# Patient Record
Sex: MALE | Race: WHITE | Employment: OTHER | URBAN - METROPOLITAN AREA
[De-identification: names, ages, dates, MRNs, and addresses within clinical notes are randomized per-mention and may not be internally consistent; named-entity substitution may affect disease eponyms.]

---

## 2017-08-22 ENCOUNTER — HOSPITAL ENCOUNTER (OUTPATIENT)
Dept: PREADMISSION TESTING | Age: 72
Discharge: HOME | End: 2017-08-22
Payer: MEDICARE

## 2017-08-22 VITALS — WEIGHT: 144 LBS | HEIGHT: 71 IN | BODY MASS INDEX: 20.16 KG/M2

## 2017-08-22 LAB
ANION GAP SERPL CALC-SCNC: 9 MMOL/L (ref 3–18)
APTT PPP: 28.8 SEC (ref 23–36.4)
ATRIAL RATE: 92 BPM
BACTERIA SPEC CULT: NORMAL
BUN SERPL-MCNC: 26 MG/DL (ref 7–18)
BUN/CREAT SERPL: 21 (ref 12–20)
CALCIUM SERPL-MCNC: 9.6 MG/DL (ref 8.5–10.1)
CALCULATED P AXIS, ECG09: 75 DEGREES
CALCULATED R AXIS, ECG10: -63 DEGREES
CALCULATED T AXIS, ECG11: 78 DEGREES
CHLORIDE SERPL-SCNC: 103 MMOL/L (ref 100–108)
CO2 SERPL-SCNC: 27 MMOL/L (ref 21–32)
CREAT SERPL-MCNC: 1.21 MG/DL (ref 0.6–1.3)
DIAGNOSIS, 93000: NORMAL
ERYTHROCYTE [DISTWIDTH] IN BLOOD BY AUTOMATED COUNT: 14.7 % (ref 11.6–14.5)
EST. AVERAGE GLUCOSE BLD GHB EST-MCNC: 131 MG/DL
GLUCOSE SERPL-MCNC: 90 MG/DL (ref 74–99)
HBA1C MFR BLD: 6.2 % (ref 4.5–5.6)
HCT VFR BLD AUTO: 43.7 % (ref 36–48)
HGB BLD-MCNC: 14.9 G/DL (ref 13–16)
INR PPP: 1 (ref 0.8–1.2)
MCH RBC QN AUTO: 31.8 PG (ref 24–34)
MCHC RBC AUTO-ENTMCNC: 34.1 G/DL (ref 31–37)
MCV RBC AUTO: 93.4 FL (ref 74–97)
P-R INTERVAL, ECG05: 144 MS
PLATELET # BLD AUTO: 216 K/UL (ref 135–420)
PMV BLD AUTO: 10.6 FL (ref 9.2–11.8)
POTASSIUM SERPL-SCNC: 4.4 MMOL/L (ref 3.5–5.5)
PROTHROMBIN TIME: 12.3 SEC (ref 11.5–15.2)
Q-T INTERVAL, ECG07: 354 MS
QRS DURATION, ECG06: 102 MS
QTC CALCULATION (BEZET), ECG08: 437 MS
RBC # BLD AUTO: 4.68 M/UL (ref 4.7–5.5)
SERVICE CMNT-IMP: NORMAL
SODIUM SERPL-SCNC: 139 MMOL/L (ref 136–145)
VENTRICULAR RATE, ECG03: 92 BPM
WBC # BLD AUTO: 9.2 K/UL (ref 4.6–13.2)

## 2017-08-22 PROCEDURE — 80048 BASIC METABOLIC PNL TOTAL CA: CPT | Performed by: ORTHOPAEDIC SURGERY

## 2017-08-22 PROCEDURE — 85027 COMPLETE CBC AUTOMATED: CPT | Performed by: ORTHOPAEDIC SURGERY

## 2017-08-22 PROCEDURE — 83036 HEMOGLOBIN GLYCOSYLATED A1C: CPT | Performed by: ORTHOPAEDIC SURGERY

## 2017-08-22 PROCEDURE — 87641 MR-STAPH DNA AMP PROBE: CPT | Performed by: ORTHOPAEDIC SURGERY

## 2017-08-22 PROCEDURE — 85730 THROMBOPLASTIN TIME PARTIAL: CPT | Performed by: ORTHOPAEDIC SURGERY

## 2017-08-22 PROCEDURE — 85610 PROTHROMBIN TIME: CPT | Performed by: ORTHOPAEDIC SURGERY

## 2017-08-22 PROCEDURE — 93005 ELECTROCARDIOGRAM TRACING: CPT

## 2017-08-22 RX ORDER — OMEGA-3S/DHA/EPA/FISH OIL/D3 300MG-1000
CAPSULE ORAL
COMMUNITY
End: 2021-01-01

## 2017-08-22 RX ORDER — LISINOPRIL AND HYDROCHLOROTHIAZIDE 20; 25 MG/1; MG/1
TABLET ORAL DAILY
COMMUNITY
End: 2021-01-01

## 2017-08-22 RX ORDER — ASPIRIN 81 MG/1
TABLET ORAL DAILY
COMMUNITY
End: 2017-09-08

## 2017-08-22 RX ORDER — SODIUM CHLORIDE, SODIUM LACTATE, POTASSIUM CHLORIDE, CALCIUM CHLORIDE 600; 310; 30; 20 MG/100ML; MG/100ML; MG/100ML; MG/100ML
125 INJECTION, SOLUTION INTRAVENOUS CONTINUOUS
Status: CANCELLED | OUTPATIENT
Start: 2017-08-22

## 2017-08-22 RX ORDER — BISMUTH SUBSALICYLATE 262 MG
1 TABLET,CHEWABLE ORAL DAILY
COMMUNITY

## 2017-08-22 RX ORDER — CEFAZOLIN SODIUM 2 G/50ML
2 SOLUTION INTRAVENOUS ONCE
Status: CANCELLED | OUTPATIENT
Start: 2017-08-22 | End: 2017-08-22

## 2017-08-22 RX ORDER — ATORVASTATIN CALCIUM 20 MG/1
20 TABLET, FILM COATED ORAL DAILY
COMMUNITY
End: 2020-05-01

## 2017-08-22 RX ORDER — METFORMIN HYDROCHLORIDE 500 MG/1
500 TABLET ORAL 2 TIMES DAILY WITH MEALS
COMMUNITY
End: 2021-01-01

## 2017-08-22 NOTE — PERIOP NOTES
No sleep apnea or previous sleep studies. No history of MH. Care fusion instructions reviewed and kit given. Does not meet criteria for special population at this time. Not participating in clinical trials or research study.

## 2017-09-05 PROBLEM — M54.10 RADICULOPATHY OF LEG: Status: ACTIVE | Noted: 2017-09-05

## 2017-09-05 PROBLEM — M48.061 LUMBAR SPINAL STENOSIS: Status: ACTIVE | Noted: 2017-09-05

## 2017-09-05 PROBLEM — M47.816 LUMBAR SPONDYLOSIS: Status: ACTIVE | Noted: 2017-09-05

## 2017-09-05 NOTE — H&P
Patient Name:   Afua Soria    YOB: 1945      Chief Complaint:  Status post epidural on 07/21/17. History of Chief Complaint:  The patient presents status post epidural on 07/21/17. He states the injection worked great for three days and his symptoms completely returned in his back, buttocks, and bilateral lower extremities, right greater than left, with severe pain that is incapacitating with standing and ambulating. At this point, he feels he has failed conservative care. We have reviewed his studies with him and discussed he does have a spondylolisthesis at L4-5 with severe facet joint arthropathy. He has severe degenerative disc disease, facet joint arthropathy, and a very large disc herniation at L5-S1. His treatment would require wide decompressive lumbar laminectomy and instrumented stabilization at both levels. He has been given the risks and benefits of that procedure. Past Medical/Surgical History:  Patient reported no relevant past med/surg/psych history. Allergies:  No known allergies. Ingredient Reaction Medication Name Comment   NO KNOWN ALLERGIES          Current Medications:    Medication Directions   aspirin 81 mg tablet,delayed release    Lipitor 20 mg tablet    metformin 500 mg tablet    lisinopril 20 mg-hydrochlorothiazide 25 mg tablet take 1 tablet by oral route  every day   Vitamin D3 2,000 unit capsule take 1 capsule by oral route  every day     Social History:    SMOKING  Status Tobacco Type Units Per Day Yrs Used   Heavy tobacco smoker Cigarette 1.00      ALCOHOL  There is no history of alcohol use     Family History:    Disease Detail Family Member Age Cause of Death Comments   Family history of Heart disease         Review of Systems:   Pertinent positives include joint stiffness and numbness/tingling. Pertinent negatives include weight change.     Vitals:  Date BP Pulse Temp (F) Resp. (per min.) Height (Total in.) Weight (lbs.) BMI   06/07/2017 71.00  20.78     Physical Examination:    The thoracolumbar spine has normal kyphosis and lordosis, a normal appearance, and no scoliosis. There was no tenderness to palpation over the thoracolumbar paraspinal musculature. No crepitation, deformity, or instability was noted. There is full range of motion of the cervical, thoracic, and lumbar spine and of the hips, knees, and ankles. Ambulation was without antalgia. Straight leg, cross leg, and femoral stretch testing were negative. There was no weakness in the paraspinal muscles or in all myotomes (L2-S1) of the lower extremities. Deep tendon reflexes are present in the Achilles and patellae and were normal,  2/4, bilaterally. Clonus was negative, and Babinski was downgoing. Pulses were 2+ in the lower extremities bilaterally, and no edema was noted. Leg lengths were equal.  The patient could stand and heel walk and toe walk without evidence of neurological deficits. The patient is oriented with no evident mood abnormality. There is tenderness of the lumbosacral junction and bilateral gluteus that increases with flexion, extension, side bending, and rotation. He has a positive tension sign on the right. Assessment:   1. Lumbar degenerative disc disease, multilevel. 2. Lumbar facet joint arthropathy, multilevel. 3. Grade 1 anterolisthesis L4-5.  4. Grade 1 retrolisthesis L5-S1.  5. Spinal stenosis L4-S1.  6. Right greater than left lower extremity radiculopathy. 7. Neurogenic claudication. Recommendation: At this time, the patient has failed conservative care. We are going to move forward with an L4 through S1 decompression and instrumented fusion with LESS exposure surgical approach from the right. I suspect that he will need a full decompression of L5 unilateral decompression at L4-5 right and interbody cages at L4-5 and L5-S1 from a right-sided approach.  The risks versus the benefits as well as the alternatives were fully explained to the patient. Risks include, but are not limited to, paralysis, death, heart attack, stroke, pulmonary embolism, deep vein thrombosis, infection, failure to relieve pain, increase in back or leg pain, reherniation of disc material, need for revision surgery, scarring, spinal fluid leak, bowel or bladder dysfunction, disease transmission, chronic graft site pain, instrumentation failure, pseudarthrosis, and the need for chronic ambulatory assist devices. The patient states full understanding of the risks and benefits and wishes to proceed. This patient has been scheduled for a surgical procedure with expected acute opiate control up to three months duration. Opiate use will be for pain not adequately controlled with non-opiate treatments. Opiate treatment has reduced pain, improved function and quality of life measures for patients after this procedure and is necessary. There are no unmanaged or intolerable side effects. There has been no reported aberrant substance or medication-taking behavior, addiction, or diversion. This patient has been notified to inform us of any benzodiazepine or sedative hypnotic usage. The [place] Prescription Monitoring Program report has been reviewed for this patient preoperatively and is appropriate. My physician assistant and partners may occasionally sign limited medication prescriptions that have been approved by me. We will refer this patient to our pain management department if any abnormal behavior or lack of pain control with a reasonable dosage of opiate is encountered or if greater than three months of opiate is required.

## 2017-09-07 ENCOUNTER — ANESTHESIA EVENT (OUTPATIENT)
Dept: SURGERY | Age: 72
DRG: 460 | End: 2017-09-07
Payer: MEDICARE

## 2017-09-07 ENCOUNTER — HOSPITAL ENCOUNTER (INPATIENT)
Age: 72
LOS: 1 days | Discharge: HOME HEALTH | DRG: 460 | End: 2017-09-08
Attending: ORTHOPAEDIC SURGERY | Admitting: ORTHOPAEDIC SURGERY
Payer: MEDICARE

## 2017-09-07 ENCOUNTER — ANESTHESIA (OUTPATIENT)
Dept: SURGERY | Age: 72
DRG: 460 | End: 2017-09-07
Payer: MEDICARE

## 2017-09-07 ENCOUNTER — APPOINTMENT (OUTPATIENT)
Dept: GENERAL RADIOLOGY | Age: 72
DRG: 460 | End: 2017-09-07
Attending: ORTHOPAEDIC SURGERY
Payer: MEDICARE

## 2017-09-07 PROBLEM — E11.9 DM (DIABETES MELLITUS) (HCC): Chronic | Status: ACTIVE | Noted: 2017-09-07

## 2017-09-07 LAB
ABO + RH BLD: NORMAL
BLOOD GROUP ANTIBODIES SERPL: NORMAL
GLUCOSE BLD STRIP.AUTO-MCNC: 108 MG/DL (ref 70–110)
GLUCOSE BLD STRIP.AUTO-MCNC: 112 MG/DL (ref 70–110)
GLUCOSE BLD STRIP.AUTO-MCNC: 140 MG/DL (ref 70–110)
GLUCOSE BLD STRIP.AUTO-MCNC: 95 MG/DL (ref 70–110)
SPECIMEN EXP DATE BLD: NORMAL

## 2017-09-07 PROCEDURE — C1713 ANCHOR/SCREW BN/BN,TIS/BN: HCPCS | Performed by: ORTHOPAEDIC SURGERY

## 2017-09-07 PROCEDURE — 77010033678 HC OXYGEN DAILY

## 2017-09-07 PROCEDURE — 65270000029 HC RM PRIVATE

## 2017-09-07 PROCEDURE — 0SG00AJ FUSION OF LUMBAR VERTEBRAL JOINT WITH INTERBODY FUSION DEVICE, POSTERIOR APPROACH, ANTERIOR COLUMN, OPEN APPROACH: ICD-10-PCS | Performed by: ORTHOPAEDIC SURGERY

## 2017-09-07 PROCEDURE — 0SG0071 FUSION OF LUMBAR VERTEBRAL JOINT WITH AUTOLOGOUS TISSUE SUBSTITUTE, POSTERIOR APPROACH, POSTERIOR COLUMN, OPEN APPROACH: ICD-10-PCS | Performed by: ORTHOPAEDIC SURGERY

## 2017-09-07 PROCEDURE — 74011000250 HC RX REV CODE- 250: Performed by: ORTHOPAEDIC SURGERY

## 2017-09-07 PROCEDURE — 77030013708 HC HNDPC SUC IRR PULS STRY –B: Performed by: ORTHOPAEDIC SURGERY

## 2017-09-07 PROCEDURE — 76210000016 HC OR PH I REC 1 TO 1.5 HR: Performed by: ORTHOPAEDIC SURGERY

## 2017-09-07 PROCEDURE — 77030018836 HC SOL IRR NACL ICUM -A: Performed by: ORTHOPAEDIC SURGERY

## 2017-09-07 PROCEDURE — 77030003194 HC GRFT RECOMB BN MEDT -I1: Performed by: ORTHOPAEDIC SURGERY

## 2017-09-07 PROCEDURE — 77030011640 HC PAD GRND REM COVD -A: Performed by: ORTHOPAEDIC SURGERY

## 2017-09-07 PROCEDURE — 36415 COLL VENOUS BLD VENIPUNCTURE: CPT | Performed by: ORTHOPAEDIC SURGERY

## 2017-09-07 PROCEDURE — 07DR3ZZ EXTRACTION OF ILIAC BONE MARROW, PERCUTANEOUS APPROACH: ICD-10-PCS | Performed by: ORTHOPAEDIC SURGERY

## 2017-09-07 PROCEDURE — 74011000250 HC RX REV CODE- 250: Performed by: ANESTHESIOLOGY

## 2017-09-07 PROCEDURE — C9290 INJ, BUPIVACAINE LIPOSOME: HCPCS | Performed by: ORTHOPAEDIC SURGERY

## 2017-09-07 PROCEDURE — 77030012406 HC DRN WND PENRS BARD -A: Performed by: ORTHOPAEDIC SURGERY

## 2017-09-07 PROCEDURE — 74011250636 HC RX REV CODE- 250/636: Performed by: PHYSICIAN ASSISTANT

## 2017-09-07 PROCEDURE — 0SG3071 FUSION OF LUMBOSACRAL JOINT WITH AUTOLOGOUS TISSUE SUBSTITUTE, POSTERIOR APPROACH, POSTERIOR COLUMN, OPEN APPROACH: ICD-10-PCS | Performed by: ORTHOPAEDIC SURGERY

## 2017-09-07 PROCEDURE — 0SG30AJ FUSION OF LUMBOSACRAL JOINT WITH INTERBODY FUSION DEVICE, POSTERIOR APPROACH, ANTERIOR COLUMN, OPEN APPROACH: ICD-10-PCS | Performed by: ORTHOPAEDIC SURGERY

## 2017-09-07 PROCEDURE — 77030004402 HC BUR NEUR STRY -C: Performed by: ORTHOPAEDIC SURGERY

## 2017-09-07 PROCEDURE — 74011250636 HC RX REV CODE- 250/636: Performed by: ORTHOPAEDIC SURGERY

## 2017-09-07 PROCEDURE — 77030003666 HC NDL SPINAL BD -A: Performed by: ORTHOPAEDIC SURGERY

## 2017-09-07 PROCEDURE — 74011250636 HC RX REV CODE- 250/636

## 2017-09-07 PROCEDURE — 77030020782 HC GWN BAIR PAWS FLX 3M -B: Performed by: ORTHOPAEDIC SURGERY

## 2017-09-07 PROCEDURE — 86900 BLOOD TYPING SEROLOGIC ABO: CPT | Performed by: ORTHOPAEDIC SURGERY

## 2017-09-07 PROCEDURE — 77030003029 HC SUT VCRL J&J -B: Performed by: ORTHOPAEDIC SURGERY

## 2017-09-07 PROCEDURE — 97162 PT EVAL MOD COMPLEX 30 MIN: CPT

## 2017-09-07 PROCEDURE — 77030020262 HC SOL INJ SOD CL 0.9% 100ML: Performed by: ORTHOPAEDIC SURGERY

## 2017-09-07 PROCEDURE — 74011250637 HC RX REV CODE- 250/637: Performed by: PHYSICIAN ASSISTANT

## 2017-09-07 PROCEDURE — 97116 GAIT TRAINING THERAPY: CPT

## 2017-09-07 PROCEDURE — 0ST20ZZ RESECTION OF LUMBAR VERTEBRAL DISC, OPEN APPROACH: ICD-10-PCS | Performed by: ORTHOPAEDIC SURGERY

## 2017-09-07 PROCEDURE — 76060000037 HC ANESTHESIA 3 TO 3.5 HR: Performed by: ORTHOPAEDIC SURGERY

## 2017-09-07 PROCEDURE — 77030034849: Performed by: ORTHOPAEDIC SURGERY

## 2017-09-07 PROCEDURE — 74011250636 HC RX REV CODE- 250/636: Performed by: ANESTHESIOLOGY

## 2017-09-07 PROCEDURE — 77030032490 HC SLV COMPR SCD KNE COVD -B: Performed by: ORTHOPAEDIC SURGERY

## 2017-09-07 PROCEDURE — 74011000272 HC RX REV CODE- 272: Performed by: ORTHOPAEDIC SURGERY

## 2017-09-07 PROCEDURE — 77030020010 HC FCPS BPLR DISP INLC -E: Performed by: ORTHOPAEDIC SURGERY

## 2017-09-07 PROCEDURE — 77030004435 HC BUR RND STRY -C: Performed by: ORTHOPAEDIC SURGERY

## 2017-09-07 PROCEDURE — 01NR0ZZ RELEASE SACRAL NERVE, OPEN APPROACH: ICD-10-PCS | Performed by: ORTHOPAEDIC SURGERY

## 2017-09-07 PROCEDURE — 77030034903 HC CGE SPN P-LIFT DORADO SPFT -I: Performed by: ORTHOPAEDIC SURGERY

## 2017-09-07 PROCEDURE — 77030037875 HC DRSG MEPILEX <16IN BORD MOLN -A: Performed by: ORTHOPAEDIC SURGERY

## 2017-09-07 PROCEDURE — 77030034479 HC ADH SKN CLSR PRINEO J&J -B: Performed by: ORTHOPAEDIC SURGERY

## 2017-09-07 PROCEDURE — 74011000250 HC RX REV CODE- 250

## 2017-09-07 PROCEDURE — 77030003445 HC NDL BIOP BN BD -B: Performed by: ORTHOPAEDIC SURGERY

## 2017-09-07 PROCEDURE — 74011000258 HC RX REV CODE- 258: Performed by: ORTHOPAEDIC SURGERY

## 2017-09-07 PROCEDURE — 0ST40ZZ RESECTION OF LUMBOSACRAL DISC, OPEN APPROACH: ICD-10-PCS | Performed by: ORTHOPAEDIC SURGERY

## 2017-09-07 PROCEDURE — 74011000258 HC RX REV CODE- 258

## 2017-09-07 PROCEDURE — 77030033138 HC SUT PGA STRATFX J&J -B: Performed by: ORTHOPAEDIC SURGERY

## 2017-09-07 PROCEDURE — 01NB0ZZ RELEASE LUMBAR NERVE, OPEN APPROACH: ICD-10-PCS | Performed by: ORTHOPAEDIC SURGERY

## 2017-09-07 PROCEDURE — 82962 GLUCOSE BLOOD TEST: CPT

## 2017-09-07 PROCEDURE — 77030010784 HC BOWL MX BN MEDT -C: Performed by: ORTHOPAEDIC SURGERY

## 2017-09-07 PROCEDURE — 76010000133 HC OR TIME 3 TO 3.5 HR: Performed by: ORTHOPAEDIC SURGERY

## 2017-09-07 PROCEDURE — 77030018835 HC SOL IRR LR ICUM -A: Performed by: ORTHOPAEDIC SURGERY

## 2017-09-07 DEVICE — SCREW SPNL L35MM OD6.5MM LES TECHNOLOGY QUAD-H RBM COAT: Type: IMPLANTABLE DEVICE | Site: SPINE LUMBAR | Status: FUNCTIONAL

## 2017-09-07 DEVICE — BONE GRAFT KIT 7510200 INFUSE SMALL
Type: IMPLANTABLE DEVICE | Site: SPINE LUMBAR | Status: FUNCTIONAL
Brand: INFUSE® BONE GRAFT

## 2017-09-07 DEVICE — SCREW SPNL L35MM OD7MM SLD GEN 3 PEDFUSE RESET: Type: IMPLANTABLE DEVICE | Site: SPINE LUMBAR | Status: FUNCTIONAL

## 2017-09-07 DEVICE — CAGE SPNL STR 30X14X8-10 MM DORADO P-LIFT IBC: Type: IMPLANTABLE DEVICE | Site: SPINE LUMBAR | Status: FUNCTIONAL

## 2017-09-07 DEVICE — SET SCR SPNL L5-7MM PEDFUSE: Type: IMPLANTABLE DEVICE | Site: SPINE LUMBAR | Status: FUNCTIONAL

## 2017-09-07 DEVICE — SCREW SPNL L40MM OD7MM SLD GEN 3 PEDFUSE RESET: Type: IMPLANTABLE DEVICE | Site: SPINE LUMBAR | Status: FUNCTIONAL

## 2017-09-07 DEVICE — SCREW SPNL 6.5X40MM QUAD RBM COAT PEDFUSE REMIND LES: Type: IMPLANTABLE DEVICE | Site: SPINE LUMBAR | Status: FUNCTIONAL

## 2017-09-07 DEVICE — DBM T44150 10CC ORTHOBLEND SMALL DEFGRAF
Type: IMPLANTABLE DEVICE | Site: SPINE LUMBAR | Status: FUNCTIONAL
Brand: GRAFTON®AND GRAFTON PLUS®DEMINERALIZED BONE MATRIX (DBM)

## 2017-09-07 DEVICE — ROD SPNL L65MM OD5.5MM LORDTC PEDFUSE: Type: IMPLANTABLE DEVICE | Site: SPINE LUMBAR | Status: FUNCTIONAL

## 2017-09-07 DEVICE — CAGE SPNL STR 30X12X8-10 MM DORADO P-LIFT IBC: Type: IMPLANTABLE DEVICE | Site: SPINE LUMBAR | Status: FUNCTIONAL

## 2017-09-07 DEVICE — ROD SPNL 5.5X55MM LORDTC PEDFUSE: Type: IMPLANTABLE DEVICE | Site: SPINE LUMBAR | Status: FUNCTIONAL

## 2017-09-07 RX ORDER — ONDANSETRON 2 MG/ML
INJECTION INTRAMUSCULAR; INTRAVENOUS AS NEEDED
Status: DISCONTINUED | OUTPATIENT
Start: 2017-09-07 | End: 2017-09-07 | Stop reason: HOSPADM

## 2017-09-07 RX ORDER — ATORVASTATIN CALCIUM 20 MG/1
20 TABLET, FILM COATED ORAL DAILY
Status: DISCONTINUED | OUTPATIENT
Start: 2017-09-08 | End: 2017-09-08 | Stop reason: HOSPADM

## 2017-09-07 RX ORDER — ONDANSETRON 2 MG/ML
4 INJECTION INTRAMUSCULAR; INTRAVENOUS
Status: DISCONTINUED | OUTPATIENT
Start: 2017-09-07 | End: 2017-09-08 | Stop reason: HOSPADM

## 2017-09-07 RX ORDER — HYDROMORPHONE HYDROCHLORIDE 2 MG/ML
INJECTION, SOLUTION INTRAMUSCULAR; INTRAVENOUS; SUBCUTANEOUS AS NEEDED
Status: DISCONTINUED | OUTPATIENT
Start: 2017-09-07 | End: 2017-09-07 | Stop reason: HOSPADM

## 2017-09-07 RX ORDER — BISACODYL 5 MG
5 TABLET, DELAYED RELEASE (ENTERIC COATED) ORAL DAILY PRN
Status: DISCONTINUED | OUTPATIENT
Start: 2017-09-07 | End: 2017-09-08 | Stop reason: HOSPADM

## 2017-09-07 RX ORDER — TEMAZEPAM 15 MG/1
15 CAPSULE ORAL
Status: DISCONTINUED | OUTPATIENT
Start: 2017-09-07 | End: 2017-09-08 | Stop reason: HOSPADM

## 2017-09-07 RX ORDER — ROCURONIUM BROMIDE 10 MG/ML
INJECTION, SOLUTION INTRAVENOUS AS NEEDED
Status: DISCONTINUED | OUTPATIENT
Start: 2017-09-07 | End: 2017-09-07 | Stop reason: HOSPADM

## 2017-09-07 RX ORDER — SODIUM CHLORIDE 0.9 % (FLUSH) 0.9 %
5-10 SYRINGE (ML) INJECTION AS NEEDED
Status: DISCONTINUED | OUTPATIENT
Start: 2017-09-07 | End: 2017-09-08 | Stop reason: HOSPADM

## 2017-09-07 RX ORDER — LIDOCAINE HYDROCHLORIDE 20 MG/ML
INJECTION, SOLUTION EPIDURAL; INFILTRATION; INTRACAUDAL; PERINEURAL AS NEEDED
Status: DISCONTINUED | OUTPATIENT
Start: 2017-09-07 | End: 2017-09-07 | Stop reason: HOSPADM

## 2017-09-07 RX ORDER — MAGNESIUM SULFATE 100 %
4 CRYSTALS MISCELLANEOUS AS NEEDED
Status: DISCONTINUED | OUTPATIENT
Start: 2017-09-07 | End: 2017-09-08 | Stop reason: HOSPADM

## 2017-09-07 RX ORDER — MIDAZOLAM HYDROCHLORIDE 1 MG/ML
INJECTION, SOLUTION INTRAMUSCULAR; INTRAVENOUS AS NEEDED
Status: DISCONTINUED | OUTPATIENT
Start: 2017-09-07 | End: 2017-09-07 | Stop reason: HOSPADM

## 2017-09-07 RX ORDER — SODIUM CHLORIDE 0.9 % (FLUSH) 0.9 %
5-10 SYRINGE (ML) INJECTION AS NEEDED
Status: DISCONTINUED | OUTPATIENT
Start: 2017-09-07 | End: 2017-09-07 | Stop reason: HOSPADM

## 2017-09-07 RX ORDER — DEXTROSE 50 % IN WATER (D50W) INTRAVENOUS SYRINGE
25-50 AS NEEDED
Status: DISCONTINUED | OUTPATIENT
Start: 2017-09-07 | End: 2017-09-07 | Stop reason: HOSPADM

## 2017-09-07 RX ORDER — DEXTROSE 50 % IN WATER (D50W) INTRAVENOUS SYRINGE
25-50 AS NEEDED
Status: DISCONTINUED | OUTPATIENT
Start: 2017-09-07 | End: 2017-09-08 | Stop reason: HOSPADM

## 2017-09-07 RX ORDER — ACETAMINOPHEN 10 MG/ML
INJECTION, SOLUTION INTRAVENOUS AS NEEDED
Status: DISCONTINUED | OUTPATIENT
Start: 2017-09-07 | End: 2017-09-07 | Stop reason: HOSPADM

## 2017-09-07 RX ORDER — SODIUM CHLORIDE 0.9 % (FLUSH) 0.9 %
5-10 SYRINGE (ML) INJECTION EVERY 8 HOURS
Status: DISCONTINUED | OUTPATIENT
Start: 2017-09-07 | End: 2017-09-08 | Stop reason: HOSPADM

## 2017-09-07 RX ORDER — NALOXONE HYDROCHLORIDE 0.4 MG/ML
0.1 INJECTION, SOLUTION INTRAMUSCULAR; INTRAVENOUS; SUBCUTANEOUS AS NEEDED
Status: DISCONTINUED | OUTPATIENT
Start: 2017-09-07 | End: 2017-09-08 | Stop reason: HOSPADM

## 2017-09-07 RX ORDER — OXYCODONE AND ACETAMINOPHEN 5; 325 MG/1; MG/1
1-2 TABLET ORAL
Status: DISCONTINUED | OUTPATIENT
Start: 2017-09-07 | End: 2017-09-08 | Stop reason: HOSPADM

## 2017-09-07 RX ORDER — SODIUM CHLORIDE, SODIUM LACTATE, POTASSIUM CHLORIDE, CALCIUM CHLORIDE 600; 310; 30; 20 MG/100ML; MG/100ML; MG/100ML; MG/100ML
125 INJECTION, SOLUTION INTRAVENOUS CONTINUOUS
Status: DISCONTINUED | OUTPATIENT
Start: 2017-09-07 | End: 2017-09-08

## 2017-09-07 RX ORDER — PROPOFOL 10 MG/ML
INJECTION, EMULSION INTRAVENOUS AS NEEDED
Status: DISCONTINUED | OUTPATIENT
Start: 2017-09-07 | End: 2017-09-07 | Stop reason: HOSPADM

## 2017-09-07 RX ORDER — SODIUM CHLORIDE, SODIUM LACTATE, POTASSIUM CHLORIDE, CALCIUM CHLORIDE 600; 310; 30; 20 MG/100ML; MG/100ML; MG/100ML; MG/100ML
70 INJECTION, SOLUTION INTRAVENOUS CONTINUOUS
Status: DISCONTINUED | OUTPATIENT
Start: 2017-09-07 | End: 2017-09-08

## 2017-09-07 RX ORDER — NEOSTIGMINE METHYLSULFATE 5 MG/5 ML
SYRINGE (ML) INTRAVENOUS AS NEEDED
Status: DISCONTINUED | OUTPATIENT
Start: 2017-09-07 | End: 2017-09-07 | Stop reason: HOSPADM

## 2017-09-07 RX ORDER — MAGNESIUM SULFATE 100 %
4 CRYSTALS MISCELLANEOUS AS NEEDED
Status: DISCONTINUED | OUTPATIENT
Start: 2017-09-07 | End: 2017-09-07 | Stop reason: HOSPADM

## 2017-09-07 RX ORDER — ONDANSETRON 2 MG/ML
4 INJECTION INTRAMUSCULAR; INTRAVENOUS ONCE
Status: DISCONTINUED | OUTPATIENT
Start: 2017-09-07 | End: 2017-09-07 | Stop reason: HOSPADM

## 2017-09-07 RX ORDER — FAMOTIDINE 20 MG/1
20 TABLET, FILM COATED ORAL EVERY 12 HOURS
Status: DISCONTINUED | OUTPATIENT
Start: 2017-09-07 | End: 2017-09-08 | Stop reason: HOSPADM

## 2017-09-07 RX ORDER — CEFAZOLIN SODIUM 2 G/50ML
2 SOLUTION INTRAVENOUS ONCE
Status: COMPLETED | OUTPATIENT
Start: 2017-09-07 | End: 2017-09-07

## 2017-09-07 RX ORDER — CYCLOBENZAPRINE HCL 10 MG
10 TABLET ORAL
Status: DISCONTINUED | OUTPATIENT
Start: 2017-09-07 | End: 2017-09-08 | Stop reason: HOSPADM

## 2017-09-07 RX ORDER — GLYCOPYRROLATE 0.2 MG/ML
INJECTION INTRAMUSCULAR; INTRAVENOUS AS NEEDED
Status: DISCONTINUED | OUTPATIENT
Start: 2017-09-07 | End: 2017-09-07 | Stop reason: HOSPADM

## 2017-09-07 RX ORDER — SODIUM CHLORIDE, SODIUM LACTATE, POTASSIUM CHLORIDE, CALCIUM CHLORIDE 600; 310; 30; 20 MG/100ML; MG/100ML; MG/100ML; MG/100ML
100 INJECTION, SOLUTION INTRAVENOUS CONTINUOUS
Status: DISCONTINUED | OUTPATIENT
Start: 2017-09-07 | End: 2017-09-07 | Stop reason: HOSPADM

## 2017-09-07 RX ORDER — INSULIN LISPRO 100 [IU]/ML
INJECTION, SOLUTION INTRAVENOUS; SUBCUTANEOUS
Status: DISCONTINUED | OUTPATIENT
Start: 2017-09-07 | End: 2017-09-08 | Stop reason: HOSPADM

## 2017-09-07 RX ORDER — NALOXONE HYDROCHLORIDE 0.4 MG/ML
0.4 INJECTION, SOLUTION INTRAMUSCULAR; INTRAVENOUS; SUBCUTANEOUS AS NEEDED
Status: DISCONTINUED | OUTPATIENT
Start: 2017-09-07 | End: 2017-09-08 | Stop reason: HOSPADM

## 2017-09-07 RX ORDER — LISINOPRIL 20 MG/1
20 TABLET ORAL DAILY
Status: DISCONTINUED | OUTPATIENT
Start: 2017-09-08 | End: 2017-09-08 | Stop reason: HOSPADM

## 2017-09-07 RX ORDER — INSULIN LISPRO 100 [IU]/ML
INJECTION, SOLUTION INTRAVENOUS; SUBCUTANEOUS ONCE
Status: DISCONTINUED | OUTPATIENT
Start: 2017-09-07 | End: 2017-09-07 | Stop reason: HOSPADM

## 2017-09-07 RX ORDER — FENTANYL CITRATE 50 UG/ML
INJECTION, SOLUTION INTRAMUSCULAR; INTRAVENOUS AS NEEDED
Status: DISCONTINUED | OUTPATIENT
Start: 2017-09-07 | End: 2017-09-07 | Stop reason: HOSPADM

## 2017-09-07 RX ORDER — HYDROCHLOROTHIAZIDE 25 MG/1
25 TABLET ORAL DAILY
Status: DISCONTINUED | OUTPATIENT
Start: 2017-09-08 | End: 2017-09-08 | Stop reason: HOSPADM

## 2017-09-07 RX ORDER — DIPHENHYDRAMINE HYDROCHLORIDE 50 MG/ML
12.5 INJECTION, SOLUTION INTRAMUSCULAR; INTRAVENOUS
Status: DISCONTINUED | OUTPATIENT
Start: 2017-09-07 | End: 2017-09-08 | Stop reason: HOSPADM

## 2017-09-07 RX ORDER — HYDROMORPHONE HYDROCHLORIDE 1 MG/ML
0.5 INJECTION, SOLUTION INTRAMUSCULAR; INTRAVENOUS; SUBCUTANEOUS
Status: DISCONTINUED | OUTPATIENT
Start: 2017-09-07 | End: 2017-09-07 | Stop reason: HOSPADM

## 2017-09-07 RX ORDER — NALOXONE HYDROCHLORIDE 0.4 MG/ML
0.1 INJECTION, SOLUTION INTRAMUSCULAR; INTRAVENOUS; SUBCUTANEOUS AS NEEDED
Status: DISCONTINUED | OUTPATIENT
Start: 2017-09-07 | End: 2017-09-07 | Stop reason: HOSPADM

## 2017-09-07 RX ORDER — CEFAZOLIN SODIUM 2 G/50ML
2 SOLUTION INTRAVENOUS EVERY 6 HOURS
Status: COMPLETED | OUTPATIENT
Start: 2017-09-07 | End: 2017-09-08

## 2017-09-07 RX ADMIN — ROCURONIUM BROMIDE 10 MG: 10 INJECTION, SOLUTION INTRAVENOUS at 07:48

## 2017-09-07 RX ADMIN — SODIUM CHLORIDE, SODIUM LACTATE, POTASSIUM CHLORIDE, AND CALCIUM CHLORIDE 125 ML/HR: 600; 310; 30; 20 INJECTION, SOLUTION INTRAVENOUS at 11:58

## 2017-09-07 RX ADMIN — CEFAZOLIN SODIUM 2 G: 2 SOLUTION INTRAVENOUS at 19:25

## 2017-09-07 RX ADMIN — GLYCOPYRROLATE 0.4 MG: 0.2 INJECTION INTRAMUSCULAR; INTRAVENOUS at 10:18

## 2017-09-07 RX ADMIN — FENTANYL CITRATE 25 MCG: 50 INJECTION, SOLUTION INTRAMUSCULAR; INTRAVENOUS at 08:05

## 2017-09-07 RX ADMIN — FAMOTIDINE 20 MG: 20 TABLET ORAL at 15:50

## 2017-09-07 RX ADMIN — SODIUM CHLORIDE 6.25 MG: 9 INJECTION INTRAMUSCULAR; INTRAVENOUS; SUBCUTANEOUS at 11:50

## 2017-09-07 RX ADMIN — LIDOCAINE HYDROCHLORIDE 80 MG: 20 INJECTION, SOLUTION EPIDURAL; INFILTRATION; INTRACAUDAL; PERINEURAL at 07:39

## 2017-09-07 RX ADMIN — Medication 2 MG: at 10:18

## 2017-09-07 RX ADMIN — FAMOTIDINE 20 MG: 20 TABLET ORAL at 20:55

## 2017-09-07 RX ADMIN — ROCURONIUM BROMIDE 20 MG: 10 INJECTION, SOLUTION INTRAVENOUS at 08:21

## 2017-09-07 RX ADMIN — HYDROMORPHONE HYDROCHLORIDE 0.5 MG: 2 INJECTION, SOLUTION INTRAMUSCULAR; INTRAVENOUS; SUBCUTANEOUS at 09:42

## 2017-09-07 RX ADMIN — ROCURONIUM BROMIDE 10 MG: 10 INJECTION, SOLUTION INTRAVENOUS at 09:10

## 2017-09-07 RX ADMIN — HYDROMORPHONE HYDROCHLORIDE 0.5 MG: 2 INJECTION, SOLUTION INTRAMUSCULAR; INTRAVENOUS; SUBCUTANEOUS at 09:05

## 2017-09-07 RX ADMIN — ROCURONIUM BROMIDE 10 MG: 10 INJECTION, SOLUTION INTRAVENOUS at 09:33

## 2017-09-07 RX ADMIN — HYDROMORPHONE HYDROCHLORIDE: 10 INJECTION, SOLUTION INTRAMUSCULAR; INTRAVENOUS; SUBCUTANEOUS at 11:33

## 2017-09-07 RX ADMIN — CEFAZOLIN SODIUM 2 G: 2 SOLUTION INTRAVENOUS at 07:55

## 2017-09-07 RX ADMIN — ROCURONIUM BROMIDE 10 MG: 10 INJECTION, SOLUTION INTRAVENOUS at 09:44

## 2017-09-07 RX ADMIN — PROPOFOL 130 MG: 10 INJECTION, EMULSION INTRAVENOUS at 07:39

## 2017-09-07 RX ADMIN — ACETAMINOPHEN 975 MG: 10 INJECTION, SOLUTION INTRAVENOUS at 09:54

## 2017-09-07 RX ADMIN — ROCURONIUM BROMIDE 40 MG: 10 INJECTION, SOLUTION INTRAVENOUS at 07:40

## 2017-09-07 RX ADMIN — MIDAZOLAM HYDROCHLORIDE 2 MG: 1 INJECTION, SOLUTION INTRAMUSCULAR; INTRAVENOUS at 07:32

## 2017-09-07 RX ADMIN — FENTANYL CITRATE 75 MCG: 50 INJECTION, SOLUTION INTRAMUSCULAR; INTRAVENOUS at 07:38

## 2017-09-07 RX ADMIN — ONDANSETRON 4 MG: 2 INJECTION INTRAMUSCULAR; INTRAVENOUS at 10:05

## 2017-09-07 RX ADMIN — SODIUM CHLORIDE, SODIUM LACTATE, POTASSIUM CHLORIDE, AND CALCIUM CHLORIDE: 600; 310; 30; 20 INJECTION, SOLUTION INTRAVENOUS at 10:19

## 2017-09-07 RX ADMIN — CEFAZOLIN SODIUM 2 G: 2 SOLUTION INTRAVENOUS at 15:50

## 2017-09-07 RX ADMIN — SODIUM CHLORIDE, SODIUM LACTATE, POTASSIUM CHLORIDE, AND CALCIUM CHLORIDE: 600; 310; 30; 20 INJECTION, SOLUTION INTRAVENOUS at 08:13

## 2017-09-07 RX ADMIN — SODIUM CHLORIDE, SODIUM LACTATE, POTASSIUM CHLORIDE, AND CALCIUM CHLORIDE 125 ML/HR: 600; 310; 30; 20 INJECTION, SOLUTION INTRAVENOUS at 06:22

## 2017-09-07 NOTE — IP AVS SNAPSHOT
[address] [place] [id number] 
[phone] Patient: Jewel Jose MRN: [id number] :1945 You are allergic to the following No active allergies Recent Documentation Height Weight BMI Smoking Status 1.803 m 64.9 kg 19.97 kg/m2 Current Every Day Smoker Emergency Contacts Name Discharge Info Relation Home Work Mobile JeronimojasElana MIGUEL ÁNGEL DISCHARGE CAREGIVER [3] Spouse [3] [phone] About your hospitalization You were admitted on:  2017 You last received care in the:  [facility] [address] You were discharged on:  2017 Unit phone number:  [phone] Why you were hospitalized Your primary diagnosis was:  Lumbar Spinal Stenosis Your diagnoses also included:  Lumbar Spondylosis, Radiculopathy Of Leg, Dm (Diabetes Mellitus) (Hcc) Providers Seen During Your Hospitalizations Provider Role Specialty Primary office phone Julio C Nur MD Attending Provider Orthopedic Surgery [phone] Your Primary Care Physician (PCP) Primary Care Physician Office Phone Office Fax Elfida Pyo [phone] Follow-up Information Follow up With Details Comments Contact Info Julio C Nur MD On 2017 Follow up appointment @ 8:30am [address] Orthopedic and [address] [place] [address] 
[phone] Neema Lozano MD   [address] 
[phone] [facility] [address] [place] [address],[address] Floor to continue managing your healthcare needs. [phone] Current Discharge Medication List  
  
START taking these medications Dose & Instructions Dispensing Information Comments Morning Noon Evening Bedtime  
 cyclobenzaprine 10 mg tablet Commonly known as:  FLEXERIL Your last dose was: Your next dose is:    
   
   
 Dose:  10 mg Take 1 Tab by mouth three (3) times daily as needed for Muscle Spasm(s). Quantity:  40 Tab Refills:  1  
     
   
   
   
  
 oxyCODONE-acetaminophen 5-325 mg per tablet Commonly known as:  PERCOCET Your last dose was: Your next dose is:    
   
   
 Dose:  1-2 Tab Take 1-2 Tabs by mouth every four (4) hours as needed. Max Daily Amount: 12 Tabs. Quantity:  40 Tab Refills:  0 CONTINUE these medications which have NOT CHANGED Dose & Instructions Dispensing Information Comments Morning Noon Evening Bedtime LIPITOR 20 mg tablet Generic drug:  atorvastatin Your last dose was: Your next dose is:    
   
   
 Dose:  20 mg Take 20 mg by mouth daily. Refills:  0  
     
   
   
   
  
 lisinopril-hydroCHLOROthiazide 20-25 mg per tablet Commonly known as:  Elyn Gell Your last dose was: Your next dose is: Take  by mouth daily. Refills:  0  
     
   
   
   
  
 metFORMIN 500 mg tablet Commonly known as:  GLUCOPHAGE Your last dose was: Your next dose is: Take  by mouth. Refills:  0  
     
   
   
   
  
 multivitamin tablet Commonly known as:  ONE A DAY Your last dose was: Your next dose is:    
   
   
 Dose:  1 Tab Take 1 Tab by mouth daily. Refills:  0  
     
   
   
   
  
 VITAMIN D3 2,000 unit Tab Generic drug:  cholecalciferol (vitamin D3) Your last dose was: Your next dose is: Take  by mouth. Refills:  0 STOP taking these medications   
 aspirin delayed-release 81 mg tablet Where to Get Your Medications Information on where to get these meds will be given to you by the nurse or doctor. ! Ask your nurse or doctor about these medications  
  cyclobenzaprine 10 mg tablet oxyCODONE-acetaminophen 5-325 mg per tablet Discharge Instructions [address] Dr. Johana Huntley Instructions Lumbar Fusion *YOU MUST AVOID SMOKING OR BEING AROUND ANYONE WHO SMOKES. AVOID ALL PRODUCTS THAT CONTAIN NICOTINE. DO NOT TAKE IBUPROFEN OR ANTI--INFLAMMATORIES, AS THESE MAY ALTER THE HEALING OF THE FUSION. ACTIVITIES : 
*The first week after surgery 1. You may be up and walking about the house. 2.  Activities around the house, such as washing dishes, fixing light meals, and your own personal care are fine. 3.  Avoid strenuous activities, such as vacuuming, lifting laundry or grocery bags. 4.  Do not lift anything heavier than 1 gallon of milk (or about 5-8 pounds). 5.  Do not bend over to  items from the ground level until 3 months post-op. *Week 2 and beyond 1. You may gradually increase your activities, but still avoid heavy lifting, pushing/pulling. 2.  Walking is the best way to rebuild strength and stamina. Start SLOWLY and gradually increase the distance a little every week. 3.  Walk at a pace that avoids fatigue or severe pain. Do not try to walk several blocks the first day! As you increase the distance, you may feel tired. If so, stop and rest.  
4.  You should be able to walk several blocks by your first clinic visit. 5.  Follow-up with Dr. Justo Kolb in 10-14 days. BATHING and INCISION CARE: 
1. The incision may be tender to touch or feel numb: this is normal.  
2.  Keep the incision clean and dry no showering until your follow-up appointment. The incision will be closed with sutures under the skin and the skin will be glued. 3.  Do not apply any lotions, ointments or oils on the incision. 4.  If you notice any excessive swelling, redness, or persistent drainage around the incision, notify the office immediately. DRIVIN. You should not drive until after your follow-up appointment. 2.  You can be in a vehicle for short distances, but if you travel any long distance, please stop about every 30 minutes and walk/stretch. 3.  You should NEVER drive while taking narcotic medication. RETURN TO WORK : 
1. The decision to return to work will be determined on an individual basis. 2.  Many people who have a strenuous job (construction, heavy labor, etc) may need to be off work for up to 12 weeks. 3.  If you need a work note, please let us know as soon as possible, and not the same day you are planning to return to work. NUTRITION : 
1.  Good nutrition is an essential part of healing. 2.  You should eat a balanced diet each day, including fruits, vegetables, dairy products and protein. 3.  Remember to drink plenty of water. 4.  If you have not had a bowel movement within 3 days of surgery, you will need to use a laxative or suppository that can be obtained over-the-counter at your local pharmacy. BONE STIMULATOR: 
1. A spinal bone stimulator may be prescribed for you under certain situations. 2.  A nurse will call you if one has been requested and discuss its use for approximately 4-6 months post-op every day. 3.  This device assists in bone healing and fusion. MEDICATIONS - 
1. You may resume the medications you were taking before surgery, with the general exception of (or DO NOT TAKE) non-steroidal anti-inflammatory medications, such as Motrin, Aleve, Advil Naprosyn, Ibuprofen or aspirin. 2.  You will receive a prescription for pain medication at discharge from the hospital. The pain medication works best if taken before the pain becomes severe. 3.  To reduce stomach upset, always take the medication with food. 4.  Begin to wean yourself off the pain medication during the second week after discharge. 5.  If you need a refill, please call the office during working hours at least 2 days before your prescription runs out.  Do not wait until your bottle is empty to call for a refill. 6.  DO NOT drive if you are taking narcotic pain medications. HOME HEALTH CARE: 
1.   A home health care service has been set-up for you to help assist you once you leave the hospital. 
2.  They will contact you either before you leave the hospital or within 24 hours once you have been discharged home. 3. A nurse will assist you with your dressing changes and a Physical Therapist with help you with your therapy needs. CALL THE OFFICE: 
? If you have severe pain unrelieved by the medications, new numbness or tingling in your legs; 
? If you have a fever of 101.0°F or greater;  
? If you notice excessive swelling, redness, or persistent drainage from the incision or IV site; [facility] office number is [phone] from 8:00am to 5:00pm Monday through Friday. After 5:00pm, on weekends, or holidays, please leave a message with our answering service and the doctor on-call will get back to you shortly. Discharge Orders None [employer] Announcement We are excited to announce that we are making your provider's discharge notes available to you in [employer]. You will see these notes when they are completed and signed by the physician that discharged you from your recent hospital stay. If you have any questions or concerns about any information you see in [employer], please call the Health Information Department where you were seen or reach out to your Primary Care Provider for more information about your plan of care. Introducing [facility]! Tiffany Hinojosa introduces [employer] patient portal. Now you can access parts of your medical record, email your doctor's office, and request medication refills online. 1. In your internet browser, go to [email or web address] 2. Click on the First Time User? Click Here link in the Sign In box. You will see the New Member Sign Up page. 3. Enter your [employer] Access Code exactly as it appears below. You will not need to use this code after youve completed the sign-up process. If you do not sign up before the expiration date, you must request a new code. · [employer] Access Code: [email or web address] Expires: 11/20/2017  6:50 AM 
 
4. Enter the last four digits of your Social Security Number (xxxx) and Date of Birth (mm/dd/yyyy) as indicated and click Submit. You will be taken to the next sign-up page. 5. Create a [employer] ID. This will be your [employer] login ID and cannot be changed, so think of one that is secure and easy to remember. 6. Create a [employer] password. You can change your password at any time. 7. Enter your Password Reset Question and Answer. This can be used at a later time if you forget your password. 8. Enter your e-mail address. You will receive e-mail notification when new information is available in [address] 9. Click Sign Up. You can now view and download portions of your medical record. 10. Click the Download Summary menu link to download a portable copy of your medical information. If you have questions, please visit the Frequently Asked Questions section of the [employer] website. Remember, [employer] is NOT to be used for urgent needs. For medical emergencies, dial 911. Now available from your iPhone and Android! General Information Please provide this summary of care documentation to your next provider. Patient Signature:  ____________________________________________________________ Date:  ____________________________________________________________  
  
Zeynep Castellon Provider Signature:  ____________________________________________________________ Date:  ____________________________________________________________

## 2017-09-07 NOTE — PROGRESS NOTES
Problem: Mobility Impaired (Adult and Pediatric)  Goal: *Acute Goals and Plan of Care (Insert Text)  In 1-7 days pt will be able to perform:  STG  1. Bed mobility: Demonstrate proper log-roll technique for safe initiation of rolling for OOB activities. 2. Supine to sit to supine S with HR for meals. 3. Sit to stand to sit S with RW/LSO in prep for ambulation. LT. Gait: Ambulate 150ft S with RW/LSO, for home/community mobility. 2. Activity tolerance: Tolerate up in chair 30 minutes-1 hour for ADLs. 3. Patient/Family Education: Patient/family to be independent with HEP for follow-up care and safe discharge. PHYSICAL THERAPY EVALUATION     Patient: Mike Muhammad (43 y.o. male)  Date: 2017  Primary Diagnosis: LUMBAR SPONDYLOSIS W/RADICULOPATHY,LUMBOSACRAL SPONDYLOLISTHESIS  Lumbar spinal stenosis  Procedure(s) (LRB):  L4-S1 LAMINECTOMY, INSTRUMENTED FUSION W/CAGES W/C-ARM (N/A) Day of Surgery   Precautions:   Fall, WBAT      ASSESSMENT :  Based on the objective data described below, the patient presents with decreased functional mobility and independence in regard to bed mobility, transfers, gt quality and tolerance, generalized strength, pain, stair negotiation and safety due to recent back surgery. Pt instructed in back precautions, log roll techniques and donning/doffing/wearing LSO. Pt drowsy and required additional time for all mobility. Pt required min A for bed mobility, supine <>SL<>sit. Pt able to participate in gt training w/ RW, LSO, GB and min A w/ antalgic pattern. Pt c/o light headedness, dizziness and pain during gt training limiting tolerance and distance. Pt returned to supine in bed w/ all needs within reach, SCDs applied. Nurse Edelmira Cruz aware. [facility]. Pt reports having wheels for his SW present. Patient will benefit from skilled intervention to address the above impairments.   Patients rehabilitation potential is considered to be Good  Factors which may influence rehabilitation potential include:   [ ]         None noted  [ ]         Mental ability/status  [ ]         Medical condition  [ ]         Home/family situation and support systems  [ ]         Safety awareness  [X]         Pain tolerance/management  [ ]         Other:        PLAN :  Recommendations and Planned Interventions:  [X]           Bed Mobility Training             [ ]    Neuromuscular Re-Education  [X]           Transfer Training                   [ ]    Orthotic/Prosthetic Training  [X]           Gait Training                          [ ]    Modalities  [X]           Therapeutic Exercises          [ ]    Edema Management/Control  [X]           Therapeutic Activities            [X]    Patient and Family Training/Education  [ ]           Other (comment):     Frequency/Duration: Patient will be followed by physical therapy twice daily to address goals. Discharge Recommendations: Home Health  Further Equipment Recommendations for Discharge: N/A       SUBJECTIVE:   [facility]      OBJECTIVE DATA SUMMARY:       Past Medical History:   Diagnosis Date    Diabetes ([facility] [address] 2013     type 2    Hypertension 2013     Past Surgical History:   Procedure Laterality Date    HX HEENT         40 years ago deviated septum     Barriers to Learning/Limitations: None  Compensate with: visual, verbal, tactile, kinesthetic cues/model  Prior Level of Function/Home Situation:   Home Situation  Home Environment: Private residence  # Steps to Enter: 0  One/Two Story Residence: One story  Living Alone: No  Support Systems: Spouse/Significant Other/Partner  Patient Expects to be Discharged to[de-identified] Private residence  Current DME Used/Available at Home: Brace/Splint, Walker  Critical Behavior:  Neurologic State: Drowsy  Orientation Level: Oriented to person;Oriented to place;Oriented to situation  Cognition: Follows commands; Impaired decision making;Poor safety awareness  Safety/Judgement: Awareness of environment  Psychosocial  Patient Behaviors: Calm; Cooperative  Purposeful Interaction: Yes  Pt Identified Daily Priority: Clinical issues (comment)  Caritas Process: Establish trust;Teaching/learning; Attend basic human needs;Create healing environment  Caring Interventions: Reassure  Reassure: Therapeutic listening; Informing  Skin Condition/Temp: Dry;Warm  Skin Integrity: Incision (comment) (back)  Skin Integumentary  Skin Color: Appropriate for ethnicity  Skin Condition/Temp: Dry;Warm  Skin Integrity: Incision (comment) (back)  Turgor: Epidermis thin w/ loss of subcut tissue  Hair Growth: Present  Varicosities: Absent  Strength:    Strength: Generally decreased, functional  Tone & Sensation:   Tone: Normal  Sensation: Intact  Range Of Motion:  AROM: Generally decreased, functional  Functional Mobility:  Bed Mobility:  Rolling: Minimum assistance;Contact guard assistance; Additional time (vc)  Supine to Sit: Minimum assistance; Additional time (vc)  Transfers:  Sit to Stand: Minimum assistance; Additional time (vc)  Stand to Sit: Minimum assistance; Additional time (vc)  Balance:   Sitting: Impaired; With support  Sitting - Static: Fair (occasional)  Sitting - Dynamic: Fair (occasional)  Standing: Impaired; With support  Standing - Static: Fair;Constant support  Standing - Dynamic : Fair  Ambulation/Gait Training:  Distance (ft): 10 Feet (ft)  Assistive Device: Walker, rolling;Gait belt;Brace/Splint  Ambulation - Level of Assistance: Minimal assistance (vc)  Gait Abnormalities: Antalgic;Decreased step clearance  Base of Support: Narrowed  Stance: Weight shift;Time  Speed/Nicky: Slow;Shuffled  Step Length: Left shortened;Right shortened  Swing Pattern: Left asymmetrical;Right asymmetrical  Interventions: Safety awareness training;Verbal cues  Therapeutic Exercises:   Pain:  Pain Scale 1: Numeric (0 - 10)  Pain Intensity 1: 0  Pain Location 1: Back  Pain Orientation 1: Lower  Pain Description 1: Constant; Throbbing;Radiating  Pain Intervention(s) 1: Declines; Rest  Activity Tolerance:   Fair   Please refer to the flowsheet for vital signs taken during this treatment. After treatment:   [ ]         Patient left in no apparent distress sitting up in chair  [X]         Patient left in no apparent distress in bed  [X]         Call bell left within reach  [X]         Nursing notified  [ ]         Caregiver present  [ ]         Bed alarm activated      COMMUNICATION/EDUCATION:   [X]         Fall prevention education was provided and the patient/caregiver indicated understanding. [X]         Patient/family have participated as able in goal setting and plan of care. [X]         Patient/family agree to work toward stated goals and plan of care. [ ]         Patient understands intent and goals of therapy, but is neutral about his/her participation. [ ]         Patient is unable to participate in goal setting and plan of care.      Thank you for this referral.  Isabel Jonas, PT   Time Calculation: 23 mins  Eval Complexity: History: HIGH Complexity :3+ comorbidities / personal factors will impact the outcome/ POC Exam:MEDIUM Complexity : 3 Standardized tests and measures addressing body structure, function, activity limitation and / or participation in recreation  Presentation: MEDIUM Complexity : Evolving with changing characteristics  Clinical Decision Making:Medium Complexity amb <30' Overall Complexity:MEDIUM

## 2017-09-07 NOTE — ROUTINE PROCESS
Bedside and Verbal shift change report given to CHRYSTAL Inman RN (oncoming nurse) by SHAHBAZ Mccracken RN (offgoing nurse). Report included the following information SBAR, Kardex, OR Summary, Intake/Output and MAR.

## 2017-09-07 NOTE — IP AVS SNAPSHOT
Tash Cardenas 
 
 
 [address] [place] [id number] 
[phone] Patient: Amelia Dailey MRN: [id number] :1945 Current Discharge Medication List  
  
START taking these medications Dose & Instructions Dispensing Information Comments Morning Noon Evening Bedtime  
 cyclobenzaprine 10 mg tablet Commonly known as:  FLEXERIL Your last dose was: Your next dose is:    
   
   
 Dose:  10 mg Take 1 Tab by mouth three (3) times daily as needed for Muscle Spasm(s). Quantity:  40 Tab Refills:  1  
     
   
   
   
  
 oxyCODONE-acetaminophen 5-325 mg per tablet Commonly known as:  PERCOCET Your last dose was: Your next dose is:    
   
   
 Dose:  1-2 Tab Take 1-2 Tabs by mouth every four (4) hours as needed. Max Daily Amount: 12 Tabs. Quantity:  40 Tab Refills:  0 CONTINUE these medications which have NOT CHANGED Dose & Instructions Dispensing Information Comments Morning Noon Evening Bedtime LIPITOR 20 mg tablet Generic drug:  atorvastatin Your last dose was: Your next dose is:    
   
   
 Dose:  20 mg Take 20 mg by mouth daily. Refills:  0  
     
   
   
   
  
 lisinopril-hydroCHLOROthiazide 20-25 mg per tablet Commonly known as:  Mendez Josep Your last dose was: Your next dose is: Take  by mouth daily. Refills:  0  
     
   
   
   
  
 metFORMIN 500 mg tablet Commonly known as:  GLUCOPHAGE Your last dose was: Your next dose is: Take  by mouth. Refills:  0  
     
   
   
   
  
 multivitamin tablet Commonly known as:  ONE A DAY Your last dose was: Your next dose is:    
   
   
 Dose:  1 Tab Take 1 Tab by mouth daily. Refills:  0  
     
   
   
   
  
 VITAMIN D3 2,000 unit Tab Generic drug:  cholecalciferol (vitamin D3) Your last dose was: Your next dose is: Take  by mouth. Refills:  0 STOP taking these medications   
 aspirin delayed-release 81 mg tablet Where to Get Your Medications Information on where to get these meds will be given to you by the nurse or doctor. ! Ask your nurse or doctor about these medications  
  cyclobenzaprine 10 mg tablet  
 oxyCODONE-acetaminophen 5-325 mg per tablet

## 2017-09-07 NOTE — PERIOP NOTES
Transported patient to room 213 via hospital bed, portable oxygen at 2 L via NC, PCA pump, pt sleeping easily aroused. Upon arrival to room pt was received by Sheri Hart RN, VS: 129/73, 62, 14, 97.4, 100%.  Opportunity for questions provided

## 2017-09-07 NOTE — PROGRESS NOTES
1245 - Received patient from PACU in satisfactory condition. VSS. Assumed care of patient. Patient is drowsy, but arousable. Oriented x3. Patient is calm and cooperative. Reports numbness to toes from prior to surgery. Pedal and radial pulses palpable and equal bilaterally. Capillary Refill < 3 seconds. Lung sounds clear bilaterally. Respirations even and unlabored. No use of accessory muscles. Educated on use of incentive spirometer and patient demonstrated proper use. Abdomen is soft and non-tender. Bowel sounds hypoactive to all quadrants. Patient with nausea. Diet ginger-jesús offered, patient declined. Patient has alonso catheter intact. No bladder distention evident. No complaints of bladder discomfort. Skin is warm, dry and skin color is appropriate to race. Dressing to back noted CDI. No other skin integrity issues present. Carlos hose applied to BLE. Sequential compression device applied to BLE. IV intact to left hand and infusing without difficulty. Reports pain 0/10. Educated on use of PCA. Patient oriented to call bell use as well as bed use. Call bell within reach. Bed in low position. Three side rails up. 1410 - Patient remains drowsy, but more alert. Denies any pain/discomfort. Call light within reach. Shift summary: Patient had uneventful shift. Patient ambulated with assistance in room with PT. Patient denied pain throughout shift. No issues/concerns at this time.

## 2017-09-07 NOTE — ANESTHESIA PREPROCEDURE EVALUATION
Anesthetic History   No history of anesthetic complications            Review of Systems / Medical History  Patient summary reviewed, nursing notes reviewed and pertinent labs reviewed    Pulmonary          Smoker         Neuro/Psych   Within defined limits           Cardiovascular    Hypertension: well controlled              Exercise tolerance: >4 METS     GI/Hepatic/Renal                Endo/Other    Diabetes: type 2    Arthritis     Other Findings              Physical Exam    Airway  Mallampati: III  TM Distance: 4 - 6 cm  Neck ROM: normal range of motion   Mouth opening: Diminished (comment)     Cardiovascular  Regular rate and rhythm,  S1 and S2 normal,  no murmur, click, rub, or gallop  Rhythm: regular  Rate: normal         Dental  No notable dental hx       Pulmonary  Breath sounds clear to auscultation               Abdominal  GI exam deferred       Other Findings            Anesthetic Plan    ASA: 2  Anesthesia type: general          Induction: Intravenous  Anesthetic plan and risks discussed with: Patient

## 2017-09-07 NOTE — PERIOP NOTES
Reviewed PTA medication list with patient/caregiver and patient/caregiver denies any additional medications.  Patient admits to having a responsible adult care for them for at least 24 hours after surgery.

## 2017-09-07 NOTE — INTERVAL H&P NOTE
H&P Update:  Littie Done was seen and examined. History and physical has been reviewed. The patient has been examined. There have been no significant clinical changes since the completion of the originally dated History and Physical.  Patient identified by surgeon; surgical site was confirmed by patient and surgeon.     Signed By: Mary Pham MD     September 7, 2017 7:35 AM

## 2017-09-07 NOTE — PROGRESS NOTES
Problem: Falls - Risk of  Goal: *Absence of Falls  Document Tushar Fall Risk and appropriate interventions in the flowsheet. Outcome: Progressing Towards Goal  Fall Risk Interventions:  Mobility Interventions: Patient to call before getting OOB           Medication Interventions: Patient to call before getting OOB     Elimination Interventions: Patient to call for help with toileting needs      Bed wheels locked and in lowest position. Side rails x3. Gripper socks to bilateral feet. Problem: Pain  Goal: *Control of Pain  Outcome: Progressing Towards Goal  Patient has not reported any pain this shift. Educated on use of PCA if needed for pain relief. Patient informed to notify nurse of increase in pain.

## 2017-09-07 NOTE — PROGRESS NOTES
conducted a pre-surgery visited with [facility], who is a 73 y.o.,male. The  provided the following Interventions:  Initiated a relationship of care and support. Offered prayer and assurance of continued prayers on patients behalf. Plan:  Chaplains will continue to follow and will provide pastoral care on an as needed/requested basis.  recommends bedside caregivers page  on duty if patient shows signs of acute spiritual or emotional distress.     [address] [facility]

## 2017-09-07 NOTE — PERIOP NOTES
PCA started loading dose and basal rate held after speaking with Dr Rochelle Hidalgo do to pt neuro status (very sleepy and decreased respirations)

## 2017-09-07 NOTE — PERIOP NOTES
TRANSFER - OUT REPORT:    Verbal report given to [facility] on [employer]  being transferred to  for routine post - op       Report consisted of patients Situation, Background, Assessment and   Recommendations(SBAR). Information from the following report(s) OR Summary, Procedure Summary, Intake/Output and MAR was reviewed with the receiving nurse. Lines:   Peripheral IV 09/07/17 Left Hand (Active)   Site Assessment Clean, dry, & intact 9/7/2017  6:21 AM   Phlebitis Assessment 0 9/7/2017  6:21 AM   Infiltration Assessment 0 9/7/2017  6:21 AM   Dressing Status Clean, dry, & intact 9/7/2017  6:21 AM   Dressing Type Transparent 9/7/2017  6:21 AM   Hub Color/Line Status Green 9/7/2017  6:21 AM        Opportunity for questions and clarification was provided.       Patient transported with:   Registered Nurse  Tech   OXYGEN 2L NC

## 2017-09-07 NOTE — ANESTHESIA POSTPROCEDURE EVALUATION
Post-Anesthesia Evaluation and Assessment    Cardiovascular Function/Vital Signs  Visit Vitals    /68    Pulse (!) 57    Temp 36.7 °C (98.1 °F)    Resp 11    Ht 5' 11\" (1.803 m)    Wt 64.9 kg (143 lb 3 oz)    SpO2 98%    BMI 19.97 kg/m2       Patient is status post Procedure(s):  L4-S1 LAMINECTOMY, INSTRUMENTED FUSION W/CAGES W/C-ARM. Nausea/Vomiting: Controlled. Postoperative hydration reviewed and adequate. Pain:  Pain Scale 1: Numeric (0 - 10) (09/07/17 1135)  Pain Intensity 1: 0 (09/07/17 1135)   Managed. Neurological Status:   Neuro (WDL): Exceptions to WDL (09/07/17 1041)   At baseline. Mental Status and Level of Consciousness: Arousable. Pulmonary Status:   O2 Device: Nasal cannula (09/07/17 1105)   Adequate oxygenation and airway patent. Complications related to anesthesia: None    Post-anesthesia assessment completed. No concerns. Patient has met all discharge requirements.     Signed By: Nolan Watts MD    September 7, 2017

## 2017-09-07 NOTE — PROGRESS NOTES
19:37 Assessment completed. Lungs are clear bilat. Back dsg remains C/D/I with serosanguinous drainage per H-vac. + CMS & + PP. Continues to have numbness & tingling in LE's. Offers 0 c/o of pain or discomfort. Resting quietly in bed with TV on.     22:35  Shift assessment completed. See nsg flow sheet for details    03:00 Reassessed with 0 changes noted. Back dsg remains C/D/I with sm amt of drainage per H-vac. + CMS & + PP. Continues to refuse pain meds (PCA, oral, muscle relaxer) States \"He doesn't like taking medicines. \"    05:40 Offered pain meds again prior to stopping PCA but pt declined & states I'm fine. Mepilex dsg on back remains C/D/I . H-vac emptied. Jimenez dc'd, urinal within reach. 06:45 Up in the chair @bedside with phone & call bell within reach. 07:00 c/o feeling lightheaded in the chair, BP 87/55, . Back to  Bed, BP 99/61,.    07:25 Bedside and Verbal shift change report given to CATHERINE Alvarado RN (oncoming nurse) by Jarad Toure RN (offgoing nurse). Report included the following information SBAR.     08:00 Dr Maude Esparza paged. Bolus & EKG ordered. Will assess after orders.

## 2017-09-07 NOTE — OP NOTES
OPERATIVE NOTE    Patient: Gama Logan MRN: [id number]  SSN: [id number]    YOB: 1945  Age: 73 y.o. Sex: male      Indications: This is a 73y.o. year-old male who presents with back and leg pain. He was positive for DDD/Instability/Stenosis. The patient was admitted for surgery as conservative measures have failed. Date of Procedure: 9/7/2017     Preoperative Diagnosis: LUMBAR SPONDYLOSIS W/RADICULOPATHY,LUMBOSACRAL SPONDYLOLISTHESIS    Postoperative Diagnosis: LUMBAR SPONDYLOSIS W/RADICULOPATHY,LUMBOSACRAL SPONDYLOLISTHESIS      Procedure: L4-5 and L5-S1 posterior osteotomy for stabilization/decompression. L4,L5,S1 laminectomy. L4-S1 posterolateral, instrumented and interbody PEEK cage fusion. Grafting, Fluoro, BMA through separate fascial incision. Surgeon: Beatrice Bazan DO,Surgical Assistant: Michael Hopper PA-C    Anesthesia: [facility]    Estimated Blood Loss: 190cc    Specimens: * No specimens in log *     Drains: Hemovac    Implants:   Implant Name Type Inv.  Item Serial No.  Lot No. LRB No. Used Action   GRAFT BNE RECOMB HUM INFUS SM --  - [id number]  GRAFT BNE RECOMB HUM INFUS SM --   [employer] SOFAMOR DANEK [id number] N/A 1 Implanted   GRAFT BNE SUB DEFGRAFT SM 10ML -- ORTHOBLEND - [id number]  GRAFT BNE SUB DEFGRAFT SM 10ML -- ORTHOBLEND [id number] [employer] SPINALGRAFT TECH  N/A 1 Implanted   SCR SET PEDFUSE 5-7MM --  - S0  SCR SET PEDFUSE 5-7MM --  0 SPINEFRONTIER  N/A 6 Implanted   DOT SP LORDTC PEDFUSE 5.5X65MM --  - S0  DOT SP LORDTC PEDFUSE 5.5X65MM --  0 SPINEFRONTIER  N/A 1 Implanted   DOT SP LORDTC PEDFUSE 5.5X55MM --  - S0  DOT SP LORDTC PEDFUSE 5.5X55MM --  0 SPINEFRONTIER  N/A 1 Implanted   CAGE SPNE [facility] STR [id number] -- DORADO P-LIFT - S0  CAGE SPNE [facility] STR [id number] -- DORADO P-LIFT 0 SPINEFRONTIER  N/A 1 Implanted   CAGE SPNE IBC STR [id number] -- DORADO P-LIFT - S0  CAGE SPNE IBC STR [id number] -- DORADO P-LIFT 0 SPINEFRONTIER  N/A 1 Implanted SCR SYS QUAD RBM 6.5X40MM -- PEDFUSE REMIND LES - S0  SCR SYS QUAD RBM 6.5X40MM -- PEDFUSE REMIND LES 0 SPINEFRONTIER  N/A 3 Implanted   SCR SYS QUAD RBM 6.5X35MM -- PEDFUSE REMIND LES - S0  SCR SYS QUAD RBM 6.5X35MM -- PEDFUSE REMIND LES 0 SPINEFRONTIER  N/A 1 Implanted   SCR PDCL RESET SLD 7.0X40MM -- GEN 3 - S0  SCR PDCL RESET SLD 7.0X40MM -- GEN 3 0 SPINEFRONTIER  N/A 1 Implanted   SCR PDCL RESET SLD 7.0X35MM -- GEN 3 - S0   SCR PDCL RESET SLD 7.0X35MM -- GEN 3 0 SPINEFRONTIER   N/A 1 Implanted       Complications: None; patient tolerated the procedure well. Procedure: The patent was greeted by anesthesia and taken to the operative suite where the patient underwent general endotracheal anesthesia. The patient was positioned in the prone position on a standard radiolucent Kareem spine table. A alonso catheter was placed. The lumbar spine was sterilely prepped and draped in the standard fashion. Flouroscopy confirmed the appropriate segments and an incision was made over the posterior spinous processes. The paraspinal musculature was elevated with electrocautery. The upper facet capsule of the surgical levels was left intact and the remaining facet capsules for the surgical levels were removed and the facets decorticated posteriorly. The transverse processes and posterolateral bone was exposed to prepare for posterolateral fusion. . A high speed bur was utilized to outline the laminectomy site and a complete decompressive lumbar laminectomy was subsequently performed secondary to spinal stenosis. Complete or partial laminectomy was performed at  L-4-S1 focused right. Aggressive medial facetectomies were performed at all levels with partial or complete facetectomies as needed to decompress the exiting spinal nerve roots. Foraminotomy was performed over each exiting nerve root bilaterally to assure neurologic decompression.  Posterior facet osteotomies were performed at L4-5 and L5-S1 right to allow stability restoration and decompression where there were found to be severe facet cysts completely compressing the NF. A large cyst had pushed to the midline at L5-S1 and was compressing the S1 root as a psuedoherniation. At the completion of the decompression there was no evidence of residual neurological encroachment at any level. A complete discectomy was subsequently performed for interbody fusion from a trans-foraminal approach at L-4-S1. All disc material was removed. All cartilage was removed from the endplates with disc space brandi and ring curettes. Four cc's of local bone was packed at the interbody fusion levels as grafting material.  An PEEK interbody cage of appropriate length and height was placed in oblique position between the superior and interior endplate of the  M-[id number] space for disc height restoration, neural foraminal expansion and lordosis correction as well as interbody fusion. This process was repeated at L5-S1 for interbody cage and fusion. .All of these were accomplished and X-rays confirmed excellent position of the interbody fusion cages without abnormalities. Attention was turned to pedicle screw instrumentation. Andrea Segura was utilized to palpate the superior, inferior, and medial wall of each pedicle from the spinal canal.  A high speed bur was utilized to gain lateral entrance into the pedicle and an awl was passed through the pedicle into the vertebral bone. A ball tipped probe was utilized to palpate the internal anatomy of each pedicle including the superior, inferior, medial and lateral internal wall of each pedicle as well as a bony bottom and lateral wall of each vertebrae. An appropriate length and diameter SpineFrontier Remind Midline Cortical Based pedicle screw was placed and confirmed both radiographicaly and visually to be in excellent position without neurologic encroachment or pedicle wall breach. This was again performed at  L-4-S1. .  A bone marrow aspiration was performed from the right iliac crest with a bone marrow aspiration needle and mixed with Orthoblend DBM structural grafts and allowed to sit on the back table for absorption. The transverse processes  and posterolateral bone was decorticated with a high speed bur. The tissues were irrigated with 3000 ml of sterile saline with bacitracin utilizing pulsatile lavage. Subsequently, the remainder of the patients local bone from decompression was placed between the posterolateral structures for posterolateral fusion between  L-4-S1. The Orthoblend DBM structural grafts were packed as a bone graft extender with the local bone and a large bone graft volume was noted. Pre-bent lordotic rods were placed into the polyaxial heads of the pedicle screw instrumentation and locked into position with locking caps and a torque limiting screwdriver. A secondary evaluation of each neural foramina was performed and no residual or post instrumentation stenosis was noted. All bleeding was cauterized with bipolar electrocautery or gel-foam hemostatic agents. A deep hemovac drain was placed. Final X-rays confirmed excellent position of the spinal intsrumentation without abnormality. The fascia was re-approximated with 1-0 Vicryl, the skin edges were re-approximated with 2-0 Vicryl and the skin was closed with a running 3-0 Monocryl. A layer of Dermabond Prineo skin sealant was placed as well as a Mepalex dressing. The patient recovered from anesthesia and was transferred to the post-anesthesia care unit in stable condition.   Eddie Irwin MD  9/7/2017  10:14 AM

## 2017-09-07 NOTE — PERIOP NOTES
Patient's wife left with his belonging's I asked her to please bring his back brace to his room after surgery and not take it home she stated she would bring it back when he was done.

## 2017-09-08 VITALS
HEART RATE: 112 BPM | HEIGHT: 71 IN | BODY MASS INDEX: 20.05 KG/M2 | OXYGEN SATURATION: 96 % | DIASTOLIC BLOOD PRESSURE: 62 MMHG | TEMPERATURE: 97.3 F | WEIGHT: 143.19 LBS | SYSTOLIC BLOOD PRESSURE: 116 MMHG | RESPIRATION RATE: 18 BRPM

## 2017-09-08 LAB
ANION GAP SERPL CALC-SCNC: 6 MMOL/L (ref 3–18)
ATRIAL RATE: 97 BPM
BUN SERPL-MCNC: 20 MG/DL (ref 7–18)
BUN/CREAT SERPL: 17 (ref 12–20)
CALCIUM SERPL-MCNC: 8.5 MG/DL (ref 8.5–10.1)
CALCULATED P AXIS, ECG09: 67 DEGREES
CALCULATED R AXIS, ECG10: -23 DEGREES
CALCULATED T AXIS, ECG11: 57 DEGREES
CHLORIDE SERPL-SCNC: 105 MMOL/L (ref 100–108)
CO2 SERPL-SCNC: 30 MMOL/L (ref 21–32)
CREAT SERPL-MCNC: 1.17 MG/DL (ref 0.6–1.3)
DIAGNOSIS, 93000: NORMAL
ERYTHROCYTE [DISTWIDTH] IN BLOOD BY AUTOMATED COUNT: 14.6 % (ref 11.6–14.5)
GLUCOSE BLD STRIP.AUTO-MCNC: 137 MG/DL (ref 70–110)
GLUCOSE BLD STRIP.AUTO-MCNC: 139 MG/DL (ref 70–110)
GLUCOSE SERPL-MCNC: 107 MG/DL (ref 74–99)
HCT VFR BLD AUTO: 35.3 % (ref 36–48)
HGB BLD-MCNC: 11.9 G/DL (ref 13–16)
MCH RBC QN AUTO: 31.2 PG (ref 24–34)
MCHC RBC AUTO-ENTMCNC: 33.7 G/DL (ref 31–37)
MCV RBC AUTO: 92.7 FL (ref 74–97)
P-R INTERVAL, ECG05: 142 MS
PLATELET # BLD AUTO: 174 K/UL (ref 135–420)
PMV BLD AUTO: 10.2 FL (ref 9.2–11.8)
POTASSIUM SERPL-SCNC: 4.8 MMOL/L (ref 3.5–5.5)
Q-T INTERVAL, ECG07: 364 MS
QRS DURATION, ECG06: 98 MS
QTC CALCULATION (BEZET), ECG08: 462 MS
RBC # BLD AUTO: 3.81 M/UL (ref 4.7–5.5)
SODIUM SERPL-SCNC: 141 MMOL/L (ref 136–145)
VENTRICULAR RATE, ECG03: 97 BPM
WBC # BLD AUTO: 12.7 K/UL (ref 4.6–13.2)

## 2017-09-08 PROCEDURE — 97116 GAIT TRAINING THERAPY: CPT

## 2017-09-08 PROCEDURE — 97535 SELF CARE MNGMENT TRAINING: CPT

## 2017-09-08 PROCEDURE — 85027 COMPLETE CBC AUTOMATED: CPT | Performed by: ORTHOPAEDIC SURGERY

## 2017-09-08 PROCEDURE — 93005 ELECTROCARDIOGRAM TRACING: CPT

## 2017-09-08 PROCEDURE — 74011250637 HC RX REV CODE- 250/637: Performed by: PHYSICIAN ASSISTANT

## 2017-09-08 PROCEDURE — 80048 BASIC METABOLIC PNL TOTAL CA: CPT | Performed by: ORTHOPAEDIC SURGERY

## 2017-09-08 PROCEDURE — 74011250636 HC RX REV CODE- 250/636: Performed by: PHYSICIAN ASSISTANT

## 2017-09-08 PROCEDURE — 36415 COLL VENOUS BLD VENIPUNCTURE: CPT | Performed by: ORTHOPAEDIC SURGERY

## 2017-09-08 PROCEDURE — 82962 GLUCOSE BLOOD TEST: CPT

## 2017-09-08 PROCEDURE — 74011250636 HC RX REV CODE- 250/636: Performed by: INTERNAL MEDICINE

## 2017-09-08 PROCEDURE — 97165 OT EVAL LOW COMPLEX 30 MIN: CPT

## 2017-09-08 PROCEDURE — 97530 THERAPEUTIC ACTIVITIES: CPT

## 2017-09-08 RX ORDER — OXYCODONE AND ACETAMINOPHEN 5; 325 MG/1; MG/1
1-2 TABLET ORAL
Qty: 40 TAB | Refills: 0 | Status: SHIPPED | OUTPATIENT
Start: 2017-09-08 | End: 2017-09-18

## 2017-09-08 RX ORDER — CYCLOBENZAPRINE HCL 10 MG
10 TABLET ORAL
Qty: 40 TAB | Refills: 1 | Status: SHIPPED | OUTPATIENT
Start: 2017-09-08 | End: 2020-10-09 | Stop reason: CLARIF

## 2017-09-08 RX ORDER — SODIUM CHLORIDE 9 MG/ML
150 INJECTION, SOLUTION INTRAVENOUS CONTINUOUS
Status: DISCONTINUED | OUTPATIENT
Start: 2017-09-08 | End: 2017-09-08 | Stop reason: HOSPADM

## 2017-09-08 RX ADMIN — SODIUM CHLORIDE 1000 ML: 900 INJECTION, SOLUTION INTRAVENOUS at 08:06

## 2017-09-08 RX ADMIN — ATORVASTATIN CALCIUM 20 MG: 20 TABLET, FILM COATED ORAL at 09:38

## 2017-09-08 RX ADMIN — CEFAZOLIN SODIUM 2 G: 2 SOLUTION INTRAVENOUS at 01:34

## 2017-09-08 RX ADMIN — FAMOTIDINE 20 MG: 20 TABLET ORAL at 09:38

## 2017-09-08 RX ADMIN — OXYCODONE HYDROCHLORIDE AND ACETAMINOPHEN 2 TABLET: 5; 325 TABLET ORAL at 11:27

## 2017-09-08 RX ADMIN — CYCLOBENZAPRINE HYDROCHLORIDE 10 MG: 10 TABLET, FILM COATED ORAL at 12:52

## 2017-09-08 RX ADMIN — SODIUM CHLORIDE, SODIUM LACTATE, POTASSIUM CHLORIDE, AND CALCIUM CHLORIDE 70 ML/HR: 600; 310; 30; 20 INJECTION, SOLUTION INTRAVENOUS at 01:35

## 2017-09-08 RX ADMIN — SODIUM CHLORIDE 150 ML/HR: 900 INJECTION, SOLUTION INTRAVENOUS at 11:22

## 2017-09-08 NOTE — PROGRESS NOTES
Problem: Falls - Risk of  Goal: *Absence of Falls  Document Tushar Fall Risk and appropriate interventions in the flowsheet.    Outcome: Progressing Towards Goal  Fall Risk Interventions:  Mobility Interventions: Patient to call before getting OOB           Medication Interventions: Assess postural VS orthostatic hypotension, Patient to call before getting OOB, Teach patient to arise slowly     Elimination Interventions: Call light in reach

## 2017-09-08 NOTE — PROGRESS NOTES
Chart reviewed, met with pt at bedside. Pt anticipates clearing PT for discharge home, has wife available to assist. FOC offered and pt chose [facility] [phone] for follow up; referral placed with CMS. Pt has walker for home. Care Management Interventions  PCP Verified by CM:  Yes  Transition of Care Consult (CM Consult): [address]: No  Reason Outside [place]: Physician referred to specific agency ([facility])  Discharge Durable Medical Equipment: No (pt has walker)  Physical Therapy Consult: Yes  Occupational Therapy Consult: Yes  Current Support Network: Lives with Spouse, Own Home  Confirm Follow Up Transport: Family  Plan discussed with Pt/Family/Caregiver: Yes  Freedom of Choice Offered: Yes  Discharge Location  Discharge Placement: Home with home health

## 2017-09-08 NOTE — PROGRESS NOTES
Progress Note      Patient: Earle Moses               Sex: male          DOA: 9/7/2017         YOB: 1945      Age:  73 y.o.        LOS:  LOS: 1 day     Procedure: Procedure(s):  L4-S1 LAMINECTOMY, INSTRUMENTED FUSION W/CAGES W/C-ARM            Subjective:     Earle Moses is a 73 y.o. male who c/o stable, mild-to-moderate joint symptoms intermittently, reasonably well controlled by PRN meds      Objective:      Visit Vitals    BP (!) 87/55 (BP 1 Location: Right arm, BP Patient Position: Sitting)    Pulse (!) 126    Temp 98.3 °F (36.8 °C)    Resp 16    Ht 5' 11\" (1.803 m)    Wt 64.9 kg (143 lb 3 oz)    SpO2 95%    BMI 19.97 kg/m2       Physical Exam:  A&O x3  VSS  HF/GS/QUADS/EHL/TA 5/5 bilateral  Calves nontender      Dressing: C/D/I    Intake and Output:  Current Shift:     Last three shifts:  09/06 1901 - 09/08 0700  In: 5059 [P.O.:740; I.V.:4319]  Out: 3607 [Urine:2575; Drains:295]    Drain Output:    Lab/Data Reviewed: All lab results for the last 24 hours reviewed.     Medications Reviewed    Continued hospitalization is indicated due to therapy needs      Assessment/Plan     Principal Problem:    Lumbar spinal stenosis (9/5/2017)    Active Problems:    Lumbar spondylosis (9/5/2017)      Radiculopathy of leg (9/5/2017)      DM (diabetes mellitus) ([facility] [address] (9/7/2017)        S/p Lumbar fusion doing well  Change dressing, D/c O2  OOB today with PT BID with walker and Brace  D/c drain when less than 50cc, D/c alonso when ambulatory  D/C PCA, D/C IVF  Possible D/C home today if patient clears PT    Home

## 2017-09-08 NOTE — PROGRESS NOTES
Problem: Mobility Impaired (Adult and Pediatric)  Goal: *Acute Goals and Plan of Care (Insert Text)  In 1-7 days pt will be able to perform:  STG  1. Bed mobility: Demonstrate proper log-roll technique for safe initiation of rolling for OOB activities. 2. Supine to sit to supine S with HR for meals. 3. Sit to stand to sit S with RW/LSO in prep for ambulation. LT. Gait: Ambulate 150ft S with RW/LSO, for home/community mobility. 2. Activity tolerance: Tolerate up in chair 30 minutes-1 hour for ADLs. 3. Patient/Family Education: Patient/family to be independent with HEP for follow-up care and safe discharge. Outcome: Progressing Towards Goal  PHYSICAL THERAPY TREATMENT/DISCHARGE     Patient: Audrey Sandoval (53 y.o. male)  Date: 2017  Diagnosis: LUMBAR SPONDYLOSIS W/RADICULOPATHY,LUMBOSACRAL SPONDYLOLISTHESIS  Lumbar spinal stenosis Lumbar spinal stenosis  Procedure(s) (LRB):  L4-S1 LAMINECTOMY, INSTRUMENTED FUSION W/CAGES W/C-ARM (N/A) 1 Day Post-Op  Precautions: Fall, WBAT  Chart, physical therapy assessment, plan of care and goals were reviewed. ASSESSMENT:  Patient has met short and long term goals at this time. Patient donned LSO with supervision. Patient performed sit to/from stand with Supervision, ambulated 150 ft with RW and supervision; pt did require occasional cues for RW management. Pt has no stairs at home. Pt resting HR was 114 bpm, sitting , and increased up to 123-130 with ambulation. Pt is cleared from inpatient physical therapy services at this time and home health physical therapy is recommended upon discharge from hospital. Spoke with nurse, Yuridia Adames, regarding concerns with elevated HR during session. RN reported plans to call hospitalist regarding vitals.       Progression toward goals:  [X]      Goals met  [ ]      Improving appropriately and progressing toward goals  [ ]      Improving slowly and progressing toward goals  [ ]      Not making progress toward goals and plan of care will be adjusted       PLAN:  Patient will be discharged from physical therapy at this time. Rationale for discharge:  [X] Goals Achieved  [ ] Masood Ran  [ ] Patient not participating in therapy  [ ] Other:  Discharge Recommendations:  Home Health  Further Equipment Recommendations for Discharge:  N/A       SUBJECTIVE:   Patient stated I am feeling pretty good right now, no pain unless I move.       OBJECTIVE DATA SUMMARY:   Critical Behavior:  Neurologic State: Alert, Appropriate for age  Orientation Level: Oriented X4  Cognition: Appropriate for age attention/concentration  Safety/Judgement: Awareness of environment  Functional Mobility Training:  Bed Mobility:  Supine to Sit: Supervision; Additional time  Sit to Supine: Supervision; Additional time  Transfers:  Sit to Stand: Supervision; Additional time  Stand to Sit: Supervision  Balance:  Sitting: Intact  Sitting - Static: Good (unsupported)  Sitting - Dynamic: Fair (occasional)  Standing: Intact; With support  Ambulation/Gait Training:  Distance (ft): 150 Feet (ft)  Assistive Device: Gait belt;Walker, rolling  Ambulation - Level of Assistance: Supervision; Additional time  Gait Abnormalities: Decreased step clearance; Antalgic  Base of Support: Narrowed  Stance: Time;Weight shift  Speed/Nicky: Slow;Shuffled  Step Length: Right shortened;Left shortened  Swing Pattern: Left asymmetrical;Right asymmetrical  Interventions: Tactile cues; Verbal cues; Safety awareness training  Pain:  Pain Scale 1: Numeric (0 - 10)  Pain Intensity 1: 2  Pain Location 1: Back  Pain Orientation 1: Lower  Pain Description 1: Aching  Pain Intervention(s) 1: Medication (see MAR)  Activity Tolerance:   Fair  Please refer to the flowsheet for vital signs taken during this treatment.   After treatment:   [ ] Patient left in no apparent distress sitting up in chair  [X] Patient left in no apparent distress in bed  [X] Call bell left within reach  [X] Nursing notified  [ ] Caregiver present  [ ] Bed alarm activated  Virgen Osman   Time Calculation: 40 mins

## 2017-09-08 NOTE — PROGRESS NOTES
Problem: Mobility Impaired (Adult and Pediatric)  Goal: *Acute Goals and Plan of Care (Insert Text)  In 1-7 days pt will be able to perform:  STG  1. Bed mobility: Demonstrate proper log-roll technique for safe initiation of rolling for OOB activities. 2. Supine to sit to supine S with HR for meals. 3. Sit to stand to sit S with RW/LSO in prep for ambulation. LT. Gait: Ambulate 150ft S with RW/LSO, for home/community mobility. 2. Activity tolerance: Tolerate up in chair 30 minutes-1 hour for ADLs. 3. Patient/Family Education: Patient/family to be independent with HEP for follow-up care and safe discharge. Outcome: Progressing Towards Goal  PHYSICAL THERAPY TREATMENT     Patient: Lawrence Nguyen (49 y.o. male)  Date: 2017  Diagnosis: LUMBAR SPONDYLOSIS W/RADICULOPATHY,LUMBOSACRAL SPONDYLOLISTHESIS  Lumbar spinal stenosis Lumbar spinal stenosis  Procedure(s) (LRB):  L4-S1 LAMINECTOMY, INSTRUMENTED FUSION W/CAGES W/C-ARM (N/A) 1 Day Post-Op  Precautions: Fall, WBAT   Chart, physical therapy assessment, plan of care and goals were reviewed. ASSESSMENT:  Pt required CGA to move to EOB, extra time. Discomfort noted with bed mobility. Pt donned LSO with Raul. Pt required Raul during sit to stand for steadying assist. Pt amb 35 ft c/RW, GB applied, CGA; gait distance limited by pain and pt subjective report of \"weakness in back. \" Pt transferred to/from toilet with CGA for safety and cues for hand placement. Pt required CG/Raul to move to supine. Pt left supine in bed with all needs met and call bell within reach. Progression toward goals:  [X]      Improving appropriately and progressing toward goals  [ ]      Improving slowly and progressing toward goals  [ ]      Not making progress toward goals and plan of care will be adjusted       PLAN:  Patient continues to benefit from skilled intervention to address the above impairments. Continue treatment per established plan of care.   Discharge Recommendations:  Home Health  Further Equipment Recommendations for Discharge:  N/A       SUBJECTIVE:   Patient stated I am doing fairly well.       OBJECTIVE DATA SUMMARY:   Critical Behavior:  Neurologic State: Alert, Appropriate for age  Orientation Level: Oriented X4  Cognition: Appropriate for age attention/concentration  Safety/Judgement: Awareness of environment  Functional Mobility Training:  Bed Mobility:  Supine to Sit: Contact guard assistance  Sit to Supine: Contact guard assistance;Minimum assistance  Transfers:  Sit to Stand: Minimum assistance; Additional time  Stand to Sit: Contact guard assistance  Balance:  Sitting: Intact  Sitting - Static: Good (unsupported)  Sitting - Dynamic: Fair (occasional)  Standing: Intact; With support  Ambulation/Gait Training:  Distance (ft): 35 Feet (ft)  Assistive Device: Gait belt;Walker, rolling  Ambulation - Level of Assistance: Contact guard assistance  Gait Abnormalities: Antalgic;Decreased step clearance  Base of Support: Narrowed  Stance: Weight shift  Speed/Nicky: Slow;Shuffled  Step Length: Left shortened;Right shortened  Swing Pattern: Left asymmetrical;Right asymmetrical  Interventions: Tactile cues; Verbal cues; Safety awareness training  Pain:  Pain Scale 1: Numeric (0 - 10)  Pain Intensity 1: 2  Pain Location 1: Back  Pain Orientation 1: Lower  Pain Description 1: Aching  Pain Intervention(s) 1: Medication (see MAR)  Activity Tolerance:   Fair  Please refer to the flowsheet for vital signs taken during this treatment.   After treatment:   [ ] Patient left in no apparent distress sitting up in chair  [X] Patient left in no apparent distress in bed  [X] Call bell left within reach  [X] Nursing notified  [ ] Caregiver present  [ ] Bed alarm activated      Marlon Osman   Time Calculation: 30 mins

## 2017-09-08 NOTE — CONSULTS
Medicine Consult    Patient:  Hans Lynn 73 y.o. male 1945  Asked to evaluate patient by Dr Surinder Polanco  Primary Care Provider:  Florecita Trujillo MD  Date of Admission:  9/7/2017  Reason for Consult: hypotension      Assessment/Plan   1. Postoperative hypotension  2. HTn  3. DM2  4. Lumbar stenosis    PLAN:  - continue to hold antihypertensives  - give additional liter of fluid  - monitor accuchecks ac/hs  - OK for DC home after additional fluid if HR< 100, and normotensive with out orthostasis, otherwise recommend monitoring overnight. If DCd today would hold antihypertensives until follow up w Dr Mac Owen      Patient Active Problem List   Diagnosis Code    Lumbar spinal stenosis M48.06    Lumbar spondylosis M47.816    Radiculopathy of leg M54.10    DM (diabetes mellitus) ([facility] [address] E11.9       Thank you for allowing us to participate in  Mr Riddle's care  HPI:   CC: Elmer Pompa is a 73 y.o. male with past medical history significant for HTn, DM2, lumbar stenosis is sp lumbar laminectomy. He had been doing well until this morning when he was noted to have dizziness and light headedness with getting out of the chair. He was noted to be tachcyardic and hypotensive. Medicine is asked to admit for further management. He was given a liter bolus NS and he had improvement in his blood pressure but remained tachcyadric. CUrrently he denies any complaitns. No dizziness, cp, palpitations or dyspnea. Past Medical History:   Diagnosis Date    Diabetes ([facility] [address] 2013    type 2    Hypertension 2013     Past Surgical History:   Procedure Laterality Date    HX HEENT      40 years ago deviated septum      Social History   Substance Use Topics    Smoking status: Current Every Day Smoker     Packs/day: 1.00     Years: 40.00    Smokeless tobacco: Never Used      Comment: advised not to smoke 24 h pre-op    Alcohol use No     History reviewed. No pertinent family history.   No current facility-administered medications on file prior to encounter. No current outpatient prescriptions on file prior to encounter. No Known Allergies      Review of Systems  Constitutional: No fever, chills, diaphoresis, malaise, fatigue or weight gain/loss or falls  Skin: no itching or rashes  HEENT: no ear discomfort, hearing loss, tinnitus, epistaxis or sore throat  EYES: no blurry vision, double vision or photophobia  CARDIOVASCULAR: no claudication, cp, palpitations, orthopnea, pnd or LE edema  PULMONARY: no cough, wheeze, shortness of breath or sputum production  GI: no nausea, vomiting, diarrhea, abdominal pain, melena, hematemesis or brbpr  : no dysuria, hematuria  MUSCULOSKELETAL: no back pain, joint pain or myalgias  ENDOCRINE: no heat/cold intolerance, polyuria or polydipsia  HEME: no easy bruising or bleeding  NEURO: no unilateral weakness, numbness, tingling or seizures      Physical Exam:      Visit Vitals    /64 (BP 1 Location: Right arm, BP Patient Position: At rest)    Pulse (!) 110    Temp 97.6 °F (36.4 °C)    Resp 16    Ht 5' 11\" (1.803 m)    Wt 64.9 kg (143 lb 3 oz)    SpO2 95%    BMI 19.97 kg/m2     Body mass index is 19.97 kg/(m^2).     Physical Exam:  GEN: well nourished, laying in bed in no acute distress  HEENT: atraumatic, nose normal,oropharynx clear, MMM  NECK: supple, trachea midline, no supraclavicular or submandibular adenopathy noted  EYES: conjuctiva normal, lids with out lesions, PERRL  HEART: RRR with out m/r/g, pmi nondisplaced, pulses 2+ distally, cap refil normal  LUNGS: equal chest wall expansion, cta bl with out wheezes/rales or rhonchi  AB: soft, +BS, nt/nd no organomegaly  NEURO: alert, awake and oriented x3, gait not assessed, cranial nerves intact, strength 5/5 bl UE and LE, sensation intact, reflexes nonpathological  SKIN: dry, intact, warm no breakdown noted        Laboratory Studies:    CMP:   Lab Results   Component Value Date/Time     09/08/2017 01:00 AM    K 4.8 09/08/2017 01:00 AM     09/08/2017 01:00 AM    CO2 30 09/08/2017 01:00 AM    AGAP 6 09/08/2017 01:00 AM     (H) 09/08/2017 01:00 AM    BUN 20 (H) 09/08/2017 01:00 AM    CREA 1.17 09/08/2017 01:00 AM    GFRAA >60 09/08/2017 01:00 AM    GFRNA >60 09/08/2017 01:00 AM    CA 8.5 09/08/2017 01:00 AM     CBC:   Lab Results   Component Value Date/Time    WBC 12.7 09/08/2017 01:00 AM    HGB 11.9 (L) 09/08/2017 01:00 AM    HCT 35.3 (L) 09/08/2017 01:00 AM     09/08/2017 01:00 AM         Radha Barlow, DO  Internal Medicine/Geriatrics

## 2017-09-08 NOTE — DISCHARGE SUMMARY
Discharge Summary    Patient: Jarad Rosa               Sex: male          DOA: 9/7/2017         YOB: 1945      Age:  73 y.o.        LOS:  LOS: 1 day                Admit Date: 9/7/2017    Discharge Date: 9/8/2017    Admission Diagnoses: LUMBAR SPONDYLOSIS W/RADICULOPATHY,LUMBOSACRAL SPONDYLOLISTHESIS  Lumbar spinal stenosis    Discharge Diagnoses:    Problem List as of 9/8/2017  Date Reviewed: 9/8/2017          Codes Class Noted - Resolved    DM (diabetes mellitus) ([facility] [address] (Chronic) ICD-10-CM: E11.9  ICD-9-CM: 250.00  9/7/2017 - Present        * (Principal)Lumbar spinal stenosis ICD-10-CM: M48.06  ICD-9-CM: 724.02  9/5/2017 - Present        Lumbar spondylosis ICD-10-CM: M47.816  ICD-9-CM: 721.3  9/5/2017 - Present        Radiculopathy of leg ICD-10-CM: M54.10  ICD-9-CM: 724.4  9/5/2017 - Present              Discharge Condition:  stable    Hospital Course: The patient was transferred to the floor for post-operative and medical care. He   was alert and oriented times 3. He was neurologically intact in the lower extremities, and calves are non-tender. He was c/o of stable, mild-to-moderate joint symptoms intermittently, reasonably well controlled by current meds . He will begin Physical therapy and will be up and ambulatory with their walker. Their Hemoglobin was stable. Their pain medications were continued for pain control. Later today, if He is thought to be medically and orthopaedically stable then they will be discharged. Consults: None    Significant Diagnostic Studies: none    Discharge Medications:     Current Discharge Medication List      START taking these medications    Details   cyclobenzaprine (FLEXERIL) 10 mg tablet Take 1 Tab by mouth three (3) times daily as needed for Muscle Spasm(s). Qty: 40 Tab, Refills: 1      oxyCODONE-acetaminophen (PERCOCET) 5-325 mg per tablet Take 1-2 Tabs by mouth every four (4) hours as needed. Max Daily Amount: 12 Tabs.   Qty: 40 Tab, Refills: 0         CONTINUE these medications which have NOT CHANGED    Details   atorvastatin (LIPITOR) 20 mg tablet Take 20 mg by mouth daily. lisinopril-hydroCHLOROthiazide (PRINZIDE, ZESTORETIC) 20-25 mg per tablet Take  by mouth daily. metFORMIN (GLUCOPHAGE) 500 mg tablet Take  by mouth. cholecalciferol, vitamin D3, (VITAMIN D3) 2,000 unit tab Take  by mouth.      multivitamin (ONE A DAY) tablet Take 1 Tab by mouth daily. STOP taking these medications       aspirin delayed-release 81 mg tablet Comments:   Reason for Stopping:               Activity: No lifting, driving or strenuous activity for 2 weeks     Diet:  Regular Diet    Wound Care: Keep wound clean and dry. Change dressing as needed. Follow-up: Pt should follow-up in the office in 10 days - 2 weeks. They should not get their incision wet until they see us back. The patient will have home health care since they are home bound after their recent surgery. They will have dressing changes and physical therapy. With physical therapy, they should be up and ambulatory weight bear as tolerated. They will be sent home on percocet and flexaril. They will also take all of the medications on their discharge medical reconciliation form. They should avoid any lifting, anti-inflammatories or tobacco use. They will call with any and all concerns. Their medications are on the chart.

## 2017-09-08 NOTE — DISCHARGE INSTRUCTIONS
[facility]  Dr. Eliud Sharif Post-Operative Instructions Lumbar Fusion    *YOU MUST AVOID SMOKING OR BEING AROUND ANYONE WHO SMOKES. AVOID ALL PRODUCTS THAT CONTAIN NICOTINE. DO NOT TAKE IBUPROFEN OR ANTI--INFLAMMATORIES, AS THESE MAY ALTER THE HEALING OF THE FUSION. ACTIVITIES :  *The first week after surgery   1. You may be up and walking about the house. 2.  Activities around the house, such as washing dishes, fixing light meals, and your own personal care are fine. 3.  Avoid strenuous activities, such as vacuuming, lifting laundry or grocery bags. 4.  Do not lift anything heavier than 1 gallon of milk (or about 5-8 pounds). 5.  Do not bend over to  items from the ground level until 3 months post-op. *Week 2 and beyond  1. You may gradually increase your activities, but still avoid heavy lifting, pushing/pulling. 2.  Walking is the best way to rebuild strength and stamina. Start SLOWLY and gradually increase the distance a little every week. 3.  Walk at a pace that avoids fatigue or severe pain. Do not try to walk several blocks the first day! As you increase the distance, you may feel tired. If so, stop and rest.   4.  You should be able to walk several blocks by your first clinic visit. 5.  Follow-up with Dr. Ronald Natarajan in 10-14 days. BATHING and INCISION CARE:  1. The incision may be tender to touch or feel numb: this is normal.   2.  Keep the incision clean and dry no showering until your follow-up appointment. The incision will be closed with sutures under the skin and the skin will be glued. 3.  Do not apply any lotions, ointments or oils on the incision. 4.  If you notice any excessive swelling, redness, or persistent drainage around the incision, notify the office immediately. DRIVIN. You should not drive until after your follow-up appointment.    2.  You can be in a vehicle for short distances, but if you travel any long distance, please stop about every 30 minutes and walk/stretch. 3.  You should NEVER drive while taking narcotic medication. RETURN TO WORK :  1. The decision to return to work will be determined on an individual basis. 2.  Many people who have a strenuous job (construction, heavy labor, etc) may need to be off work for up to 12 weeks. 3.  If you need a work note, please let us know as soon as possible, and not the same day you are planning to return to work. NUTRITION :  1.  Good nutrition is an essential part of healing. 2.  You should eat a balanced diet each day, including fruits, vegetables, dairy products and protein. 3.  Remember to drink plenty of water. 4.  If you have not had a bowel movement within 3 days of surgery, you will need to use a laxative or suppository that can be obtained over-the-counter at your local pharmacy. BONE STIMULATOR:  1. A spinal bone stimulator may be prescribed for you under certain situations. 2.  A nurse will call you if one has been requested and discuss its use for approximately 4-6 months post-op every day. 3.  This device assists in bone healing and fusion. MEDICATIONS -  1. You may resume the medications you were taking before surgery, with the general exception of (or DO NOT TAKE) non-steroidal anti-inflammatory medications, such as Motrin, Aleve, Advil Naprosyn, Ibuprofen or aspirin. 2.  You will receive a prescription for pain medication at discharge from the hospital. The pain medication works best if taken before the pain becomes severe. 3.  To reduce stomach upset, always take the medication with food. 4.  Begin to wean yourself off the pain medication during the second week after discharge. 5.  If you need a refill, please call the office during working hours at least 2 days before your prescription runs out. Do not wait until your bottle is empty to call for a refill. 6.  DO NOT drive if you are taking narcotic pain medications.     HOME HEALTH CARE:  1.   A home health care service has been set-up for you to help assist you once you leave the hospital.  2.  They will contact you either before you leave the hospital or within 24 hours once you have been discharged home. 3. A nurse will assist you with your dressing changes and a Physical Therapist with help you with your therapy needs. CALL THE OFFICE:   If you have severe pain unrelieved by the medications, new numbness or tingling in your legs;   If you have a fever of 101.0°F or greater;    If you notice excessive swelling, redness, or persistent drainage from the incision or IV site; [facility] office number is [phone] from 8:00am to 5:00pm Monday through Friday. After 5:00pm, on weekends, or holidays, please leave a message with our answering service and the doctor on-call will get back to you shortly.

## 2017-09-08 NOTE — ROUTINE PROCESS
Dual AVS reviewed with Jen Ely RN. All medications reviewed individually with patient. Opportunities for questions and concerns provided. Patient discharged via (mode of transport ie. Car, ambulance or air transport) car. Patient's arm band appropriately discarded.

## 2017-09-08 NOTE — PROGRESS NOTES
Problem: Self Care Deficits Care Plan (Adult)  Goal: *Acute Goals and Plan of Care (Insert Text)  OCCUPATIONAL THERAPY EVALUATION/DISCHARGE     Patient: Marlene Oleary (77 y.o. male)  Date: 9/8/2017  Primary Diagnosis: LUMBAR SPONDYLOSIS W/RADICULOPATHY,LUMBOSACRAL SPONDYLOLISTHESIS  Lumbar spinal stenosis  Procedure(s) (LRB):  L4-S1 LAMINECTOMY, INSTRUMENTED FUSION W/CAGES W/C-ARM (N/A) 1 Day Post-Op   Precautions:  Back, Fall      ASSESSMENT AND RECOMMENDATIONS:  Based on the objective data described below, the patient presents with L4-S1 decompression and fusion. Pt completed LB ADL with min assist and UB with supervision. Pt completed toileting and transfers with supervision using RW and verbal cues for direction. Pt education on home safety, back precautions and ADL technique and demonstrated understanding through verbal feedback. Pt HR this session at rest 114 and with activity up to 130. Nurse Vera Rubio notified of elevated HR. Pt has spouse to assist at home if needed. No further skilled acute OT needs indicated at this time. Skilled occupational therapy is not indicated at this time. Discharge Recommendations: Home Health  Further Equipment Recommendations for Discharge: N/A        SUBJECTIVE:   Patient stated I'm fine.       OBJECTIVE DATA SUMMARY:       Past Medical History:   Diagnosis Date    Diabetes ([facility] [address] 2013     type 2    Hypertension 2013     Past Surgical History:   Procedure Laterality Date    HX HEENT         40 years ago deviated septum     Barriers to Learning/Limitations: None  Compensate with: visual, verbal, tactile, kinesthetic cues/model  Prior Level of Function/Home Situation: I with ADLs prior to admission  Home Situation  Home Environment: Private residence  # Steps to Enter: 0  One/Two Story Residence: One story  Living Alone: No  Support Systems: Spouse/Significant Other/Partner  Patient Expects to be Discharged to[de-identified] Private residence  Current DME Used/Available at Home: Korin Patiño  [ ]     Right hand dominant        [ ]     Left hand dominant  Cognitive/Behavioral Status:  Neurologic State: Alert; Appropriate for age  Orientation Level: Oriented X4  Cognition: Appropriate for age attention/concentration  Safety/Judgement: Awareness of environment; Fall prevention  Skin: incision on lower back covered with dressing  Edema: none noted  Vision/Perceptual:  N/A  Coordination:  Coordination: Within functional limits  Fine Motor Skills-Upper: Left Intact; Right Intact    Gross Motor Skills-Upper: Left Intact; Right Intact  Balance:  Sitting: Intact  Sitting - Static: Good (unsupported)  Sitting - Dynamic: Fair (occasional)  Standing: Intact; With support  Strength:  Strength: Within functional limits  Tone & Sensation:  Tone: Normal  Sensation: Intact  Range of Motion:  AROM: Within functional limits  Functional Mobility and Transfers for ADLs:  Bed Mobility:  Supine to Sit: Supervision; Additional time  Sit to Supine: Supervision; Additional time  Transfers:  Sit to Stand: Supervision; Additional time              Toilet Transfer : Supervision              ADL Assessment:  Upper Body Dressing: Supervision  Lower Body Dressing: Minimum assistance  Toileting: Supervision  ADL Intervention:  Cognitive Retraining  Safety/Judgement: Awareness of environment; Fall prevention     Pain:  Pain Scale 1: Numeric (0 - 10)  Pain Intensity 1: 2  Pain Location 1: Back  Pain Orientation 1: Lower  Pain Description 1: Aching  Pain Intervention(s) 1: Medication (see MAR)  Activity Tolerance:   good  Please refer to the flowsheet for vital signs taken during this treatment.   After treatment:   [ ]  Patient left in no apparent distress sitting up in chair  [X]  Patient left in no apparent distress in bed  [X]  Call bell left within reach  [X]  Nursing notified  [ ]  Caregiver present  [ ]  Bed alarm activated      COMMUNICATION/EDUCATION:   Communication/Collaboration:  [X]      Home safety education was provided and the patient/caregiver indicated understanding. [X]      Patient/family have participated as able and agree with findings and recommendations. [ ]      Patient is unable to participate in plan of care at this time.      Dioni Knight OTR/L  Time Calculation: 41 mins

## 2017-09-08 NOTE — PROGRESS NOTES
8:10 AM   Pt is awake, alert, oriented x 4. Lying in bed. Lungs are clear. Abdomen soft with active BS. With hemovac drain with serosanguinous drainage. Pt has numbness to both legs which is present before surgery. Pt was seen by Dr Rochelle Hidalgo this morning with discharge order. BP was low and pt tacchycardic this morning after getting out of bed. Pt is back in bed. IV NSS 1 liter bolus  was started. EKG was ordered and being done at this time. 9:39 AM  /64  after IV bolus. Lungs are clear. Abdomen soft with active BS.    10:15 AM  Pt was seen by PT. Ambulated to  and voided 300 ml of clear yellow urine. Pt walked to  outside of room then back in bed. Pt has not cleared PT yet. Pt did not have dizziness while ambulating. 11:12 AM   Pt was seen by dr Marly Scott. Ordered NSS at 150 ml/hr. As per MD, okay for pt to go home today. 12:59 PM   Pain level 2/10 at rest but goes up when moving as per pt. Medicated with Flexeril 10 mg po for leg spasm. Resting in bed.   1:38 PM  Pt was seen by PT. Pt ambulated in hallway with PT. Pt did well with ambulation but WA went up to 130 while ambulating. WA at rest was  116.    2:04 PM Dr Marly Scott aware of elevated WA of 130 while walking  And present VS. /71  RR 16 Temp 99.6. Will give rest of IFV  700 ml bolus and recheck  WA while ambulating. 1515  Pt's IV bolus was completed. Pt ambulated with RN abd CNA in hallway. WA while ambulating  Went up to 118. BP while sitting after 3 min 112. /62 after ambulating. Dr Marly Scott was aware. OK for pt to go home. Pt is aware  will hold BP meds till seen by Dr Gely Ye. Pt denied dizziness. 3:34 PM INT removed. Mepilex dressing to lower back removed. Incision intact with glue. Hemovac drain removed. Gauze and Tegaderm applied. Mepilex dressing applied to incision site. 3:44 PM  Discharge instructions given by Phong Foley RN.

## 2017-09-14 ENCOUNTER — APPOINTMENT (OUTPATIENT)
Dept: MRI IMAGING | Age: 72
DRG: 641 | End: 2017-09-14
Attending: HOSPITALIST
Payer: MEDICARE

## 2017-09-14 ENCOUNTER — HOSPITAL ENCOUNTER (INPATIENT)
Age: 72
LOS: 4 days | Discharge: NURSING, REHAB OR LONG-TERM CARE | DRG: 641 | End: 2017-09-18
Attending: EMERGENCY MEDICINE | Admitting: ORTHOPAEDIC SURGERY
Payer: MEDICARE

## 2017-09-14 ENCOUNTER — APPOINTMENT (OUTPATIENT)
Dept: GENERAL RADIOLOGY | Age: 72
DRG: 641 | End: 2017-09-14
Attending: EMERGENCY MEDICINE
Payer: MEDICARE

## 2017-09-14 DIAGNOSIS — E86.0 DEHYDRATION: ICD-10-CM

## 2017-09-14 DIAGNOSIS — R26.2 UNABLE TO AMBULATE: Primary | ICD-10-CM

## 2017-09-14 DIAGNOSIS — R33.8 ACUTE URINARY RETENTION: ICD-10-CM

## 2017-09-14 DIAGNOSIS — E87.1 HYPONATREMIA: ICD-10-CM

## 2017-09-14 DIAGNOSIS — G89.18 ACUTE POST-OPERATIVE PAIN: ICD-10-CM

## 2017-09-14 PROBLEM — R33.9 URINARY RETENTION: Status: ACTIVE | Noted: 2017-09-14

## 2017-09-14 LAB
ALBUMIN SERPL-MCNC: 2.4 G/DL (ref 3.4–5)
ALBUMIN/GLOB SERPL: 0.5 {RATIO} (ref 0.8–1.7)
ALP SERPL-CCNC: 176 U/L (ref 45–117)
ALT SERPL-CCNC: 63 U/L (ref 16–61)
ANION GAP SERPL CALC-SCNC: 5 MMOL/L (ref 3–18)
APPEARANCE UR: CLEAR
AST SERPL-CCNC: 52 U/L (ref 15–37)
BACTERIA URNS QL MICRO: NEGATIVE /HPF
BASOPHILS # BLD: 0 K/UL (ref 0–0.06)
BASOPHILS NFR BLD: 0 % (ref 0–2)
BILIRUB SERPL-MCNC: 0.7 MG/DL (ref 0.2–1)
BILIRUB UR QL: NEGATIVE
BUN SERPL-MCNC: 30 MG/DL (ref 7–18)
BUN/CREAT SERPL: 26 (ref 12–20)
CALCIUM SERPL-MCNC: 11.8 MG/DL (ref 8.5–10.1)
CHLORIDE SERPL-SCNC: 95 MMOL/L (ref 100–108)
CK MB CFR SERPL CALC: 2.9 % (ref 0–4)
CK MB SERPL-MCNC: 1.6 NG/ML (ref 5–25)
CK SERPL-CCNC: 55 U/L (ref 39–308)
CO2 SERPL-SCNC: 29 MMOL/L (ref 21–32)
COLOR UR: YELLOW
CREAT SERPL-MCNC: 1.17 MG/DL (ref 0.6–1.3)
DIFFERENTIAL METHOD BLD: ABNORMAL
EOSINOPHIL # BLD: 0.3 K/UL (ref 0–0.4)
EOSINOPHIL NFR BLD: 2 % (ref 0–5)
EPITH CASTS URNS QL MICRO: NORMAL /LPF (ref 0–5)
ERYTHROCYTE [DISTWIDTH] IN BLOOD BY AUTOMATED COUNT: 14.8 % (ref 11.6–14.5)
EST. AVERAGE GLUCOSE BLD GHB EST-MCNC: 151 MG/DL
GLOBULIN SER CALC-MCNC: 5.1 G/DL (ref 2–4)
GLUCOSE BLD STRIP.AUTO-MCNC: 106 MG/DL (ref 70–110)
GLUCOSE BLD STRIP.AUTO-MCNC: 122 MG/DL (ref 70–110)
GLUCOSE BLD STRIP.AUTO-MCNC: 125 MG/DL (ref 70–110)
GLUCOSE SERPL-MCNC: 131 MG/DL (ref 74–99)
GLUCOSE UR STRIP.AUTO-MCNC: NEGATIVE MG/DL
HBA1C MFR BLD: 6.9 % (ref 4.5–5.6)
HCT VFR BLD AUTO: 31.1 % (ref 36–48)
HGB BLD-MCNC: 10.7 G/DL (ref 13–16)
HGB UR QL STRIP: NEGATIVE
KETONES UR QL STRIP.AUTO: NEGATIVE MG/DL
LACTATE SERPL-SCNC: 1 MMOL/L (ref 0.4–2)
LEUKOCYTE ESTERASE UR QL STRIP.AUTO: ABNORMAL
LYMPHOCYTES # BLD: 1.6 K/UL (ref 0.9–3.6)
LYMPHOCYTES NFR BLD: 12 % (ref 21–52)
MCH RBC QN AUTO: 31.1 PG (ref 24–34)
MCHC RBC AUTO-ENTMCNC: 34.4 G/DL (ref 31–37)
MCV RBC AUTO: 90.4 FL (ref 74–97)
MONOCYTES # BLD: 2.2 K/UL (ref 0.05–1.2)
MONOCYTES NFR BLD: 17 % (ref 3–10)
NEUTS SEG # BLD: 8.7 K/UL (ref 1.8–8)
NEUTS SEG NFR BLD: 69 % (ref 40–73)
NITRITE UR QL STRIP.AUTO: NEGATIVE
PH UR STRIP: 5 [PH] (ref 5–8)
PLATELET # BLD AUTO: 358 K/UL (ref 135–420)
PMV BLD AUTO: 9.5 FL (ref 9.2–11.8)
POTASSIUM SERPL-SCNC: 3.9 MMOL/L (ref 3.5–5.5)
PROT SERPL-MCNC: 7.5 G/DL (ref 6.4–8.2)
PROT UR STRIP-MCNC: ABNORMAL MG/DL
RBC # BLD AUTO: 3.44 M/UL (ref 4.7–5.5)
RBC #/AREA URNS HPF: NEGATIVE /HPF (ref 0–5)
SODIUM SERPL-SCNC: 129 MMOL/L (ref 136–145)
SP GR UR REFRACTOMETRY: 1.01 (ref 1–1.03)
TROPONIN I SERPL-MCNC: <0.02 NG/ML (ref 0–0.06)
UROBILINOGEN UR QL STRIP.AUTO: 0.2 EU/DL (ref 0.2–1)
WBC # BLD AUTO: 12.8 K/UL (ref 4.6–13.2)
WBC URNS QL MICRO: NORMAL /HPF (ref 0–5)

## 2017-09-14 PROCEDURE — 83605 ASSAY OF LACTIC ACID: CPT | Performed by: EMERGENCY MEDICINE

## 2017-09-14 PROCEDURE — 74011250636 HC RX REV CODE- 250/636: Performed by: EMERGENCY MEDICINE

## 2017-09-14 PROCEDURE — 96361 HYDRATE IV INFUSION ADD-ON: CPT

## 2017-09-14 PROCEDURE — 85025 COMPLETE CBC W/AUTO DIFF WBC: CPT | Performed by: EMERGENCY MEDICINE

## 2017-09-14 PROCEDURE — 81001 URINALYSIS AUTO W/SCOPE: CPT | Performed by: EMERGENCY MEDICINE

## 2017-09-14 PROCEDURE — 74011250637 HC RX REV CODE- 250/637: Performed by: HOSPITALIST

## 2017-09-14 PROCEDURE — 74011250636 HC RX REV CODE- 250/636: Performed by: ORTHOPAEDIC SURGERY

## 2017-09-14 PROCEDURE — 72158 MRI LUMBAR SPINE W/O & W/DYE: CPT

## 2017-09-14 PROCEDURE — 71010 XR CHEST PORT: CPT

## 2017-09-14 PROCEDURE — 80053 COMPREHEN METABOLIC PANEL: CPT | Performed by: EMERGENCY MEDICINE

## 2017-09-14 PROCEDURE — 65270000029 HC RM PRIVATE

## 2017-09-14 PROCEDURE — 82550 ASSAY OF CK (CPK): CPT | Performed by: EMERGENCY MEDICINE

## 2017-09-14 PROCEDURE — 51702 INSERT TEMP BLADDER CATH: CPT

## 2017-09-14 PROCEDURE — 96374 THER/PROPH/DIAG INJ IV PUSH: CPT

## 2017-09-14 PROCEDURE — 77030005514 HC CATH URETH FOL14 BARD -A

## 2017-09-14 PROCEDURE — 83036 HEMOGLOBIN GLYCOSYLATED A1C: CPT | Performed by: HOSPITALIST

## 2017-09-14 PROCEDURE — 74000 XR ABD (KUB): CPT

## 2017-09-14 PROCEDURE — 82962 GLUCOSE BLOOD TEST: CPT

## 2017-09-14 PROCEDURE — 74011250636 HC RX REV CODE- 250/636: Performed by: HOSPITALIST

## 2017-09-14 PROCEDURE — A9585 GADOBUTROL INJECTION: HCPCS | Performed by: ORTHOPAEDIC SURGERY

## 2017-09-14 PROCEDURE — 93005 ELECTROCARDIOGRAM TRACING: CPT

## 2017-09-14 PROCEDURE — 96375 TX/PRO/DX INJ NEW DRUG ADDON: CPT

## 2017-09-14 PROCEDURE — 99285 EMERGENCY DEPT VISIT HI MDM: CPT

## 2017-09-14 PROCEDURE — 77030027138 HC INCENT SPIROMETER -A

## 2017-09-14 RX ORDER — POLYETHYLENE GLYCOL 3350 17 G/17G
17 POWDER, FOR SOLUTION ORAL DAILY
Status: DISCONTINUED | OUTPATIENT
Start: 2017-09-15 | End: 2017-09-14

## 2017-09-14 RX ORDER — HYDROCODONE BITARTRATE AND ACETAMINOPHEN 5; 325 MG/1; MG/1
1 TABLET ORAL
Status: DISCONTINUED | OUTPATIENT
Start: 2017-09-14 | End: 2017-09-18 | Stop reason: HOSPADM

## 2017-09-14 RX ORDER — INSULIN LISPRO 100 [IU]/ML
INJECTION, SOLUTION INTRAVENOUS; SUBCUTANEOUS
Status: DISCONTINUED | OUTPATIENT
Start: 2017-09-14 | End: 2017-09-15

## 2017-09-14 RX ORDER — ONDANSETRON 2 MG/ML
4 INJECTION INTRAMUSCULAR; INTRAVENOUS ONCE
Status: COMPLETED | OUTPATIENT
Start: 2017-09-14 | End: 2017-09-14

## 2017-09-14 RX ORDER — DEXTROSE 50 % IN WATER (D50W) INTRAVENOUS SYRINGE
25-50 AS NEEDED
Status: DISCONTINUED | OUTPATIENT
Start: 2017-09-14 | End: 2017-09-18 | Stop reason: HOSPADM

## 2017-09-14 RX ORDER — MAGNESIUM SULFATE 100 %
4 CRYSTALS MISCELLANEOUS AS NEEDED
Status: DISCONTINUED | OUTPATIENT
Start: 2017-09-14 | End: 2017-09-18 | Stop reason: HOSPADM

## 2017-09-14 RX ORDER — SODIUM CHLORIDE 9 MG/ML
100 INJECTION, SOLUTION INTRAVENOUS CONTINUOUS
Status: DISCONTINUED | OUTPATIENT
Start: 2017-09-14 | End: 2017-09-17

## 2017-09-14 RX ORDER — ENOXAPARIN SODIUM 100 MG/ML
40 INJECTION SUBCUTANEOUS EVERY 24 HOURS
Status: DISCONTINUED | OUTPATIENT
Start: 2017-09-14 | End: 2017-09-18 | Stop reason: HOSPADM

## 2017-09-14 RX ORDER — MORPHINE SULFATE 4 MG/ML
4 INJECTION, SOLUTION INTRAMUSCULAR; INTRAVENOUS ONCE
Status: COMPLETED | OUTPATIENT
Start: 2017-09-14 | End: 2017-09-14

## 2017-09-14 RX ORDER — POLYETHYLENE GLYCOL 3350 17 G/17G
17 POWDER, FOR SOLUTION ORAL DAILY
Status: DISCONTINUED | OUTPATIENT
Start: 2017-09-14 | End: 2017-09-18 | Stop reason: HOSPADM

## 2017-09-14 RX ADMIN — Medication 4 MG: at 15:03

## 2017-09-14 RX ADMIN — ENOXAPARIN SODIUM 40 MG: 40 INJECTION SUBCUTANEOUS at 20:24

## 2017-09-14 RX ADMIN — SODIUM CHLORIDE 1000 ML: 900 INJECTION, SOLUTION INTRAVENOUS at 14:47

## 2017-09-14 RX ADMIN — ONDANSETRON 4 MG: 2 INJECTION INTRAMUSCULAR; INTRAVENOUS at 15:03

## 2017-09-14 RX ADMIN — POLYETHYLENE GLYCOL 3350 17 G: 17 POWDER, FOR SOLUTION ORAL at 20:24

## 2017-09-14 RX ADMIN — GADOBUTROL 7.5 ML: 604.72 INJECTION INTRAVENOUS at 22:24

## 2017-09-14 NOTE — ED TRIAGE NOTES
EMS called to pt's home - where he was able to ambulate to cot w/ assistance of EMS - pt reports he had a Lumbar laminectomy here last Thursday - discharged home Friday w/ HH Nsg and Physical therapy to support him. Pt reports incontinence of urine since his sgy w/ low back pain (difficulty controlling back pain since sgy) and no feeling in both legs. [facility] nurse contacted Laureen's office re: pt's c/o's - and they told him to bring him to office - pt's wife reports she was not able to bring pt to the office via POV - this was 2 days ago. Today [facility] Nurse called office again - and they brought him here today. Pt awake/alert - stating he may need to go into a rehab facility. Pt reports no BM since before his sgy - eating okay - did take senekot yesterday w/ some gas movement. Pt denies any abd pain.

## 2017-09-14 NOTE — PROGRESS NOTES

## 2017-09-14 NOTE — ED PROVIDER NOTES
[address]  EMERGENCY DEPARTMENT HISTORY AND PHYSICAL EXAM       Date: 9/14/2017   Patient Name: Marimar Mckinney   YOB: 1945  Medical Record Number: [id number]    History of Presenting Illness     Chief Complaint   Patient presents with    Back Pain        History Provided By:  patient    Additional History: 2:02 PM   Marimar Mckinney is a 73 y.o. male who presents to the emergency department via EMS C/O bilateral leg numbness and lower back pain s/p lumbar laminectomy performed by Dr. Alfonzo Sylvester 7 days ago. Pt rates his pain a 7/10. Pt has been attending physical therapy. Associated sxs include constipation and urinary incontinence since the surgery. NKDA. Reports he takes Hydrocodone for pain. Denies abdominal pain, chest pain, SOB, and any other sxs or complaints. Primary Care Provider: Gabe Krishna MD   Specialist:    Past History     Past Medical History:   Past Medical History:   Diagnosis Date    Diabetes ([facility] [address] 2013    type 2    Hypertension 2013        Past Surgical History:   Past Surgical History:   Procedure Laterality Date    HX HEENT      40 years ago deviated septum    HX ORTHOPAEDIC      Lumbar laminectomy 9/7/17        Family History:   History reviewed. No pertinent family history. Social History:   Social History   Substance Use Topics    Smoking status: Current Every Day Smoker     Packs/day: 1.00     Years: 40.00    Smokeless tobacco: Never Used      Comment: advised not to smoke 24 h pre-op    Alcohol use No        Allergies:   No Known Allergies     Review of Systems   Review of Systems   Respiratory: Negative for shortness of breath. Cardiovascular: Negative for chest pain. Gastrointestinal: Positive for constipation. Negative for abdominal pain. Genitourinary:        (+) urinary incontinence   Musculoskeletal: Positive for back pain (lower). Neurological: Positive for numbness (bilateral legs).    All other systems reviewed and are negative. Physical Exam  Vitals:    09/14/17 1343 09/14/17 1500 09/14/17 1556   BP: 131/84 (!) 142/92 (!) 167/92   Pulse: (!) 115 (!) 105 (!) 106   Resp: 18 18 22   Temp: 98.1 °F (36.7 °C)     SpO2: 96% 97% 97%   Weight: 68 kg (150 lb)     Height: 5' 11\" (1.803 m)         Physical Exam   Constitutional: He is oriented to person, place, and time. He appears well-developed and well-nourished. HENT:   Head: Normocephalic and atraumatic. Right Ear: External ear normal.   Left Ear: External ear normal.   Nose: Nose normal.   Mouth/Throat: Oropharynx is clear and moist. Mucous membranes are dry. Eyes: Conjunctivae and EOM are normal. Pupils are equal, round, and reactive to light. Neck: Normal range of motion. Neck supple. No JVD present. No tracheal deviation present. No thyromegaly present. Cardiovascular: Normal heart sounds and intact distal pulses. Exam reveals no gallop and no friction rub. No murmur heard. Fast rate and rhythm   Pulmonary/Chest: Effort normal and breath sounds normal. No respiratory distress. He has no wheezes. He has no rales. Abdominal: He exhibits distension (mild). He exhibits no mass. Bowel sounds are decreased. There is tenderness. There is no rebound and no guarding. Genitourinary: Rectum normal and prostate normal. Rectal exam shows guaiac negative stool. Genitourinary Comments: Rectal normal tone, stool brown, heme negative. Musculoskeletal: Normal range of motion. Neurological: He is alert and oriented to person, place, and time. He has normal reflexes. No cranial nerve deficit. He exhibits normal muscle tone. CN II-XII intact. Pt is able to move his lower extremities off the bed. Sensation is intact to light touch and position sense of lower extremities symmetric and equal bilaterally; DTRs 2+ BLE symmetric. Skin: Skin is warm and dry. No rash noted.    Lumbar surgical scar is intact, no erythema, warmth, swelling tenderness or drainage. Psychiatric: He has a normal mood and affect. His behavior is normal.   Nursing note and vitals reviewed. Diagnostic Study Results     Labs -      Recent Results (from the past 12 hour(s))   EKG, 12 LEAD, INITIAL    Collection Time: 09/14/17  2:25 PM   Result Value Ref Range    Ventricular Rate 111 BPM    Atrial Rate 111 BPM    P-R Interval 132 ms    QRS Duration 100 ms    Q-T Interval 332 ms    QTC Calculation (Bezet) 451 ms    Calculated P Axis 58 degrees    Calculated R Axis -32 degrees    Calculated T Axis 39 degrees    Diagnosis       Sinus tachycardia with occasional premature ventricular complexes  Left axis deviation  Inferior infarct , age undetermined  Abnormal ECG  When compared with ECG of 08-SEP-2017 08:15,  Inferior infarct is now present     LACTIC ACID    Collection Time: 09/14/17  2:30 PM   Result Value Ref Range    Lactic acid 1.0 0.4 - 2.0 MMOL/L   CBC WITH AUTOMATED DIFF    Collection Time: 09/14/17  2:30 PM   Result Value Ref Range    WBC 12.8 4.6 - 13.2 K/uL    RBC 3.44 (L) 4.70 - 5.50 M/uL    HGB 10.7 (L) 13.0 - 16.0 g/dL    HCT 31.1 (L) 36.0 - 48.0 %    MCV 90.4 74.0 - 97.0 FL    MCH 31.1 24.0 - 34.0 PG    MCHC 34.4 31.0 - 37.0 g/dL    RDW 14.8 (H) 11.6 - 14.5 %    PLATELET 523 884 - 008 K/uL    MPV 9.5 9.2 - 11.8 FL    NEUTROPHILS 69 40 - 73 %    LYMPHOCYTES 12 (L) 21 - 52 %    MONOCYTES 17 (H) 3 - 10 %    EOSINOPHILS 2 0 - 5 %    BASOPHILS 0 0 - 2 %    ABS. NEUTROPHILS 8.7 (H) 1.8 - 8.0 K/UL    ABS. LYMPHOCYTES 1.6 0.9 - 3.6 K/UL    ABS. MONOCYTES 2.2 (H) 0.05 - 1.2 K/UL    ABS. EOSINOPHILS 0.3 0.0 - 0.4 K/UL    ABS.  BASOPHILS 0.0 0.0 - 0.06 K/UL    DF AUTOMATED     METABOLIC PANEL, COMPREHENSIVE    Collection Time: 09/14/17  2:30 PM   Result Value Ref Range    Sodium 129 (L) 136 - 145 mmol/L    Potassium 3.9 3.5 - 5.5 mmol/L    Chloride 95 (L) 100 - 108 mmol/L    CO2 29 21 - 32 mmol/L    Anion gap 5 3.0 - 18 mmol/L    Glucose 131 (H) 74 - 99 mg/dL    BUN 30 (H) 7.0 - 18 MG/DL    Creatinine 1.17 0.6 - 1.3 MG/DL    BUN/Creatinine ratio 26 (H) 12 - 20      GFR est AA >60 >60 ml/min/1.73m2    GFR est non-AA >60 >60 ml/min/1.73m2    Calcium 11.8 (H) 8.5 - 10.1 MG/DL    Bilirubin, total 0.7 0.2 - 1.0 MG/DL    ALT (SGPT) 63 (H) 16 - 61 U/L    AST (SGOT) 52 (H) 15 - 37 U/L    Alk. phosphatase 176 (H) 45 - 117 U/L    Protein, total 7.5 6.4 - 8.2 g/dL    Albumin 2.4 (L) 3.4 - 5.0 g/dL    Globulin 5.1 (H) 2.0 - 4.0 g/dL    A-G Ratio 0.5 (L) 0.8 - 1.7     URINALYSIS W/ RFLX MICROSCOPIC    Collection Time: 09/14/17  2:30 PM   Result Value Ref Range    Color YELLOW      Appearance CLEAR      Specific gravity 1.015 1.005 - 1.030      pH (UA) 5.0 5.0 - 8.0      Protein TRACE (A) NEG mg/dL    Glucose NEGATIVE  NEG mg/dL    Ketone NEGATIVE  NEG mg/dL    Bilirubin NEGATIVE  NEG      Blood NEGATIVE  NEG      Urobilinogen 0.2 0.2 - 1.0 EU/dL    Nitrites NEGATIVE  NEG      Leukocyte Esterase TRACE (A) NEG     CARDIAC PANEL,(CK, CKMB & TROPONIN)    Collection Time: 09/14/17  2:30 PM   Result Value Ref Range    CK 55 39 - 308 U/L    CK - MB 1.6 <3.6 ng/ml    CK-MB Index 2.9 0.0 - 4.0 %    Troponin-I, Qt. <0.02 0.00 - 0.06 NG/ML   URINE MICROSCOPIC ONLY    Collection Time: 09/14/17  2:30 PM   Result Value Ref Range    WBC 0 to 3 0 - 5 /hpf    RBC NEGATIVE  0 - 5 /hpf    Epithelial cells 0 to 3 0 - 5 /lpf    Bacteria NEGATIVE  NEG /hpf       Radiologic Studies -  The following have been ordered and reviewed:  XR CHEST PORT   Final Result  IMPRESSION:  Rotated projection of the chest with mild left basilar atelectasis. No superimposed acute radiographic abnormality. As read by the radiologist.       XR ABD (KUB)   Final Result   IMPRESSION:  1. Marked burden of formed stool throughout the large intestine with mild associated gaseous distention of the transverse colon. As read by the radiologist.        Medical Decision Making   I am the first provider for this patient.      I reviewed the vital signs, available nursing notes, past medical history, past surgical history, family history and social history. Vital Signs-Reviewed the patient's vital signs. Patient Vitals for the past 12 hrs:   Temp Pulse Resp BP SpO2   09/14/17 1556 - (!) 106 22 (!) 167/92 97 %   09/14/17 1500 - (!) 105 18 (!) 142/92 97 %   09/14/17 1343 98.1 °F (36.7 °C) (!) 115 18 131/84 96 %       Pulse Oximetry Analysis - Normal 96% on room air     Cardiac Monitor:   Rate 115 bpm  Rhythm: Sinus tachycardia. EKG interpretation: (Preliminary)  Sinus tachycardia. Rate 111 bpm. Q wave II, aVF with occasional PVCs  EKG read by Natalie Villagran MD at 2:25 PM      Provider Notes:   INITIAL CLINICAL IMPRESSION and PLANS:  The patient presents with the primary complaint(s) of: lower back pain and numbness. The presentation, to include historical aspects and clinical findings are consistent with the DX of Lumbar cord compression. However, other possible DX's to consider as primary, associated with, or exacerbated by include:    1. Cauda equina syndrome  2. Dehydration  3. Constipation  4. Electrolyte abnormality  5. Severe post op pain  6. UTI    Considering the above, my initial management plan to evaluate and therapeutic interventions include the following and as noted in the orders:    1. Labs: UA, CMP, CBC, Lactic Acid    Procedures:   Procedures     PROCEDURE NOTE - RECTAL EXAM:   2:07 PM  Performed by: Natalie Villagran MD  Rectal exam performed. Rectal tone is intact. The procedure took 1-15 minutes, and pt tolerated well. Written by Bakari Mcelroy ED Scribe, as dictated by Natalie Villagran MD.     ED Course:  2:02 PM  Initial assessment performed. The patients presenting problems have been discussed, and they are in agreement with the care plan formulated and outlined with them. I have encouraged them to ask questions as they arise throughout their visit. 4:00 PM Pt had 2L of urine output after alonso placement.      4:18 PM Discussed patient's history, exam, and available diagnostics results with Maira Young DO (orthopedic surgery), who recommends admission to hospitalist for dehydration and urinary retention. 4:41 PM Discussed patient's history, exam, and available diagnostics results with Clarita Warren MD, internal medicine, who states pt will be difficulty to admit pt as he does not meet admission criteria. Will talk to Maira Young DO (orthopedic surgery) again. Medications Given in the ED:  Medications   sodium chloride 0.9 % bolus infusion 1,000 mL (0 mL IntraVENous IV Completed 9/14/17 1556)   morphine injection 4 mg (4 mg IntraVENous Given 9/14/17 1503)   ondansetron (ZOFRAN) injection 4 mg (4 mg IntraVENous Given 9/14/17 1503)     Discussion:  Patient presents with urinary retention, inability to ambulate. Labs remarkable for hyponatremia, elevated BUN. Patient given IV NS fluid bolus. CXR unremarkable. KUB with constipation. Patient felt better after 2 liters urine output post alonso cath. Patient unable to ambulate secondary to LE weakness. Case discussed with Orthopedics who agrees with admission. Case discussed with hospitalist who agrees with medicine consultation. Admission Note:  4:50 PM Discussed patient's history, exam, and available diagnostics results with Maira Young DO (orthopedic surgery), who agrees to admit pt to Medical, but would like internal medicine for consult. 4:51 PM Patient is being admitted to the hospital by Maira Young DO. The results of their tests and reasons for their admission have been discussed with them and/or available family. They convey agreement and understanding for the need to be admitted and for their admission diagnosis. 5:00 PM Discussed patient's history, exam, and available diagnostics results with Clarita Warren MD, internal medicine, who agrees to consult on pt.     CONDITIONS ON ADMISSION:  Deep Vein Thrombosis is not present at the time of admission. Thrombosis is not present at the time of admission. Urinary Tract Infection is not present at the time of admission. Pneumonia is not present at the time of admission. MRSA is not present at the time of admission. Wound infection is not present at the time of admission. Pressure Ulcer is not present at the time of admission. Diagnosis   Clinical Impression:   1. Unable to ambulate    2. Acute post-operative pain    3. Acute urinary retention    4. Dehydration    5. Hyponatremia         _______________________________   Attestations: This note is prepared by Jayashree Gracia, acting as a Scribe for Aurelia Cahse MD on 1:46 PM on 9/14/2017 . Aurelia Chase MD: The scribe's documentation has been prepared under my direction and personally reviewed by me in its entirety.   _______________________________

## 2017-09-14 NOTE — ED NOTES
TRANSFER - OUT REPORT:    Verbal report [facility](name[facility] [address] [facility]  [facility] to [facility] (unit) for routine progression of care       Report consisted of patients Situation, Background, Assessment and   Recommendations(SBAR). Information from the following report(s) SBAR, Kardex, ED Summary, Intake/Output, MAR, Recent Results and Cardiac Rhythm sinus tach was reviewed with the receiving nurse. Lines:   Peripheral IV 09/14/17 Left Antecubital (Active)   Site Assessment Clean, dry, & intact 9/14/2017  2:17 PM   Phlebitis Assessment 0 9/14/2017  2:17 PM   Infiltration Assessment 0 9/14/2017  2:17 PM   Dressing Status Clean, dry, & intact 9/14/2017  2:17 PM   Dressing Type Transparent 9/14/2017  2:17 PM   Hub Color/Line Status Pink 9/14/2017  2:17 PM        Opportunity for questions and clarification was provided.       Patient transported with:   [place]

## 2017-09-14 NOTE — PROGRESS NOTES
1840 pt arrived to unit via stretcher, pt required assistance transferring from stretcher to bed. Shift summary: pt resting in bed. No c/o voiced. MRI check list gone over by patient and wife.

## 2017-09-14 NOTE — CONSULTS
[address] [place] [address] Rd    Name:  Lyssa Saldana  MR#:  [id number]  :  1945  Account #:  [de-identified]  Date of Adm:  2017  Date of Consultation:  2017      REQUESTING PHYSICIAN: William Tam DO    REASON FOR MEDICAL CONSULTATION: Hyponatremia and  urinary retention. HISTORY OF PRESENT ILLNESS: This is a 68-year-old gentleman  who had a laminectomy 1 week ago. He came into the emergency  room because of pain and lower abdominal pain. He was unable to  urinate over the past few days very well and thus he was noted to be in  acute urinary retention. A Jimenez catheter was placed and that has  alleviated that nicely for him. However, he complains of leg numbness,  low back pain and inability to walk. He was sent home after his  laminectomy and he met clinical parameters, but his pain has been  debilitating him at home. He has had a physical therapist out to the  house, but he otherwise has not been progressing and thus  recommendations were for admission to the hospital for physical  therapy, I think at this point in time, the admitting surgeon has  accepted him and Medicine has been consulted. With that said, he is  relatively stable in the emergency room bed, lying comfortably, denies  any acute pain currently. He is feeling much better after the Jimenez  catheter was placed. his sodium was slightly depressed at 129. He has  had no other signs or symptoms of infection. He can move both feet  with good pressure bilaterally. PAST MEDICAL HISTORY: Notable for non-insulin-dependent  diabetes mellitus, hypertension, back surgery recently on 2017. He has had a previous deviated septum surgery. FAMILY HISTORY: Not pertinent to his current issues. SOCIAL HISTORY: Smokes a pack of cigarettes a day, history of 40  years plus. No regular alcohol use. ALLERGIES: NO MEDICATION ALLERGIES. MEDICATIONS THAT HE IS ON AT HOME INCLUDE  1. Lipitor.   2. Vitamin D.  3. Flexeril. 4. Lisinopril/Hydrochlorothiazide. 5. Metformin. 6. Multivitamin. 7. Percocet. PRIMARY CARE PHYSICIAN: Mao So MD, with  [facility]    REVIEW OF SYSTEMS  GENERAL: Denies fevers or chills. CARDIOVASCULAR: No chest pain, palpitations, or leg swelling. MUSCULOSKELETAL: Lower back pain, pain down both legs with  associated paresthesias. GASTROINTESTINAL: No nausea, vomiting, constipation. GENITOURINARY: Difficulty urinating, but improved lower suprapubic  pain after Jimenez catheter placed. No hematuria or dysuria noted. HEMATOLOGIC: No bleeding or easy bruisability. NEUROLOGIC: No syncopal symptoms, seizures or near syncope. PHYSICAL EXAMINATION  GENERAL: He is awake and alert, in no acute distress. The patient is  73years old. VITAL SIGNS: Temperature is 98, pulse 110, blood pressure 136/89,  saturation on room air is 95%, respiratory rate 20. HEENT: His oropharynx is dry. No lesions. Sclerae are anicteric. Conjunctivae pink. NECK: Supple. No thyromegaly or lymphadenopathy. LUNGS: Clear bilaterally. No rales, rhonchi, or wheezes. CARDIAC: Tachycardic, regular rhythm. No murmur, rub, or gallop. ABDOMEN: Soft, flat, nontender. No distention. Normal bowel sounds. Jimenez catheter is in place currently. LOWER EXTREMITIES: Trace ankle edema. NEUROLOGIC: Upper extremity strength is equal bilaterally. Lower  extremity strength with flexion and dorsiflexion appears equal  bilaterally. He has generalized leg weakness with difficulty lifting his  legs off the bed, primarily due to pain from what I can assess. Distal  tendon reflexes are weakened in both lower extremities. No muscle  fasciculations seen. LABORATORY DATA: His labs show a sodium of 129, potassium 3.9,  BUN 30, creatinine 1.17. Calcium is 11.8. Alkaline phosphatase is 176. Cardiac markers were normal.    Chest x-ray showed no acute radiographic abnormality.     KUB showed marked burden of formed stool throughout the large  intestine. EKG showed sinus tachycardia with occasional PVCs. Hemoglobin and hematocrit 10.7 and 31.1, platelets of 825, white  count is 12.8. ASSESSMENT  1. Acute urinary retention. 2. Recent laminectomy one week ago. 3. Lower extremity pain and paresthesias. 4. Hyponatremia. 5. Diabetes mellitus. PLAN: I would recommend, unless orthopedic surgery disagrees, that  we go ahead and get an MRI of the spine. With urinary retention,  certainly could be postoperative, but would want to make sure that  there was no other complication potentially. Will put him on a diabetic  diet, Accu-Cheks before meals and at bedtime. Lovenox for DVT  prophylaxis. MiraLax daily. Give a Fleet enema today for constipation. Fluid hydration resuscitation, keep the Jimenez catheter in place. Will  follow along with you. Consult physical therapy. Repeat a BMP and  CBC in the morning, and appreciate the consult, working  with Orthopedic Surgery on this patient. At this point in time, anticipate  he will be in the hospital at least 2-3 days likely.         MD Austin Mcenil / Cm.Manny  D:  09/14/2017   18:36  T:  09/14/2017   19:15  Job #:  [id number]

## 2017-09-14 NOTE — ROUTINE PROCESS
TRANSFER - IN REPORT:    Verbal report received from Valeriy Mai RN(name) on [address]  being received from ER(unit) for routine progression of care      Report consisted of patients Situation, Background, Assessment and   Recommendations(SBAR). Information from the following report(s) SBAR, Kardex, ED Summary, Intake/Output, MAR, Recent Results and Med Rec Status was reviewed with the receiving nurse. Opportunity for questions and clarification was provided. Assessment completed upon patients arrival to unit and care assumed.

## 2017-09-15 ENCOUNTER — APPOINTMENT (OUTPATIENT)
Dept: ULTRASOUND IMAGING | Age: 72
DRG: 641 | End: 2017-09-15
Attending: HOSPITALIST
Payer: MEDICARE

## 2017-09-15 LAB
ANION GAP SERPL CALC-SCNC: 0 MMOL/L (ref 3–18)
BUN SERPL-MCNC: 28 MG/DL (ref 7–18)
BUN/CREAT SERPL: 24 (ref 12–20)
CALCIUM SERPL-MCNC: 10.8 MG/DL (ref 8.5–10.1)
CHLORIDE SERPL-SCNC: 103 MMOL/L (ref 100–108)
CO2 SERPL-SCNC: 32 MMOL/L (ref 21–32)
CREAT SERPL-MCNC: 1.18 MG/DL (ref 0.6–1.3)
ERYTHROCYTE [DISTWIDTH] IN BLOOD BY AUTOMATED COUNT: 15 % (ref 11.6–14.5)
GLUCOSE BLD STRIP.AUTO-MCNC: 101 MG/DL (ref 70–110)
GLUCOSE BLD STRIP.AUTO-MCNC: 158 MG/DL (ref 70–110)
GLUCOSE BLD STRIP.AUTO-MCNC: 231 MG/DL (ref 70–110)
GLUCOSE BLD STRIP.AUTO-MCNC: 265 MG/DL (ref 70–110)
GLUCOSE SERPL-MCNC: 95 MG/DL (ref 74–99)
HCT VFR BLD AUTO: 26.5 % (ref 36–48)
HGB BLD-MCNC: 9.1 G/DL (ref 13–16)
MCH RBC QN AUTO: 31.5 PG (ref 24–34)
MCHC RBC AUTO-ENTMCNC: 34.3 G/DL (ref 31–37)
MCV RBC AUTO: 91.7 FL (ref 74–97)
PLATELET # BLD AUTO: 324 K/UL (ref 135–420)
PMV BLD AUTO: 9.5 FL (ref 9.2–11.8)
POTASSIUM SERPL-SCNC: 4.4 MMOL/L (ref 3.5–5.5)
RBC # BLD AUTO: 2.89 M/UL (ref 4.7–5.5)
SODIUM SERPL-SCNC: 135 MMOL/L (ref 136–145)
WBC # BLD AUTO: 9.9 K/UL (ref 4.6–13.2)

## 2017-09-15 PROCEDURE — 74011250636 HC RX REV CODE- 250/636: Performed by: HOSPITALIST

## 2017-09-15 PROCEDURE — 76770 US EXAM ABDO BACK WALL COMP: CPT

## 2017-09-15 PROCEDURE — 74011636637 HC RX REV CODE- 636/637: Performed by: ORTHOPAEDIC SURGERY

## 2017-09-15 PROCEDURE — 74011250636 HC RX REV CODE- 250/636: Performed by: ORTHOPAEDIC SURGERY

## 2017-09-15 PROCEDURE — 82962 GLUCOSE BLOOD TEST: CPT

## 2017-09-15 PROCEDURE — 74011636637 HC RX REV CODE- 636/637: Performed by: HOSPITALIST

## 2017-09-15 PROCEDURE — 97116 GAIT TRAINING THERAPY: CPT

## 2017-09-15 PROCEDURE — 74011250637 HC RX REV CODE- 250/637: Performed by: PHYSICIAN ASSISTANT

## 2017-09-15 PROCEDURE — 80048 BASIC METABOLIC PNL TOTAL CA: CPT | Performed by: HOSPITALIST

## 2017-09-15 PROCEDURE — 36415 COLL VENOUS BLD VENIPUNCTURE: CPT | Performed by: HOSPITALIST

## 2017-09-15 PROCEDURE — 65270000029 HC RM PRIVATE

## 2017-09-15 PROCEDURE — 74011250637 HC RX REV CODE- 250/637: Performed by: HOSPITALIST

## 2017-09-15 PROCEDURE — 85027 COMPLETE CBC AUTOMATED: CPT | Performed by: HOSPITALIST

## 2017-09-15 PROCEDURE — 97161 PT EVAL LOW COMPLEX 20 MIN: CPT

## 2017-09-15 RX ORDER — INSULIN LISPRO 100 [IU]/ML
INJECTION, SOLUTION INTRAVENOUS; SUBCUTANEOUS
Status: DISCONTINUED | OUTPATIENT
Start: 2017-09-15 | End: 2017-09-18 | Stop reason: HOSPADM

## 2017-09-15 RX ORDER — ATORVASTATIN CALCIUM 20 MG/1
20 TABLET, FILM COATED ORAL DAILY
Status: DISCONTINUED | OUTPATIENT
Start: 2017-09-15 | End: 2017-09-18 | Stop reason: HOSPADM

## 2017-09-15 RX ORDER — IBUPROFEN 200 MG
1 TABLET ORAL DAILY
Status: DISCONTINUED | OUTPATIENT
Start: 2017-09-15 | End: 2017-09-18 | Stop reason: HOSPADM

## 2017-09-15 RX ORDER — TAMSULOSIN HYDROCHLORIDE 0.4 MG/1
0.8 CAPSULE ORAL DAILY
Status: DISCONTINUED | OUTPATIENT
Start: 2017-09-16 | End: 2017-09-18 | Stop reason: HOSPADM

## 2017-09-15 RX ORDER — DEXAMETHASONE SODIUM PHOSPHATE 4 MG/ML
4 INJECTION, SOLUTION INTRA-ARTICULAR; INTRALESIONAL; INTRAMUSCULAR; INTRAVENOUS; SOFT TISSUE EVERY 6 HOURS
Status: COMPLETED | OUTPATIENT
Start: 2017-09-15 | End: 2017-09-16

## 2017-09-15 RX ORDER — INSULIN LISPRO 100 [IU]/ML
INJECTION, SOLUTION INTRAVENOUS; SUBCUTANEOUS
Status: DISCONTINUED | OUTPATIENT
Start: 2017-09-16 | End: 2017-09-15

## 2017-09-15 RX ADMIN — POLYETHYLENE GLYCOL 3350 17 G: 17 POWDER, FOR SOLUTION ORAL at 08:10

## 2017-09-15 RX ADMIN — DEXAMETHASONE SODIUM PHOSPHATE 4 MG: 4 INJECTION, SOLUTION INTRAMUSCULAR; INTRAVENOUS at 12:00

## 2017-09-15 RX ADMIN — DEXAMETHASONE SODIUM PHOSPHATE 4 MG: 4 INJECTION, SOLUTION INTRAMUSCULAR; INTRAVENOUS at 08:10

## 2017-09-15 RX ADMIN — INSULIN LISPRO 9 UNITS: 100 INJECTION, SOLUTION INTRAVENOUS; SUBCUTANEOUS at 22:51

## 2017-09-15 RX ADMIN — ATORVASTATIN CALCIUM 20 MG: 20 TABLET, FILM COATED ORAL at 08:10

## 2017-09-15 RX ADMIN — INSULIN LISPRO 4 UNITS: 100 INJECTION, SOLUTION INTRAVENOUS; SUBCUTANEOUS at 17:52

## 2017-09-15 RX ADMIN — SODIUM CHLORIDE 100 ML/HR: 900 INJECTION, SOLUTION INTRAVENOUS at 08:09

## 2017-09-15 RX ADMIN — HYDROCODONE BITARTRATE AND ACETAMINOPHEN 1 TABLET: 5; 325 TABLET ORAL at 22:51

## 2017-09-15 RX ADMIN — ENOXAPARIN SODIUM 40 MG: 40 INJECTION SUBCUTANEOUS at 19:07

## 2017-09-15 RX ADMIN — INSULIN LISPRO 2 UNITS: 100 INJECTION, SOLUTION INTRAVENOUS; SUBCUTANEOUS at 14:40

## 2017-09-15 RX ADMIN — DEXAMETHASONE SODIUM PHOSPHATE 4 MG: 4 INJECTION, SOLUTION INTRAMUSCULAR; INTRAVENOUS at 17:52

## 2017-09-15 RX ADMIN — HYDROCODONE BITARTRATE AND ACETAMINOPHEN 1 TABLET: 5; 325 TABLET ORAL at 08:10

## 2017-09-15 NOTE — PROGRESS NOTES
Shift Summary : Very drowsy when awake and sleeping the rest of night without signs of distress. At approximately 0500 awake and wet with perspiration. Partial bath with gown and pillow case change with some of the six covers removed. Repositioned. Dressing to back loose. Suture line dry with edges well approximated. Jimenez intact and draining yellow urine. No complaints of pain. Bed rest continued until further evaluations.

## 2017-09-15 NOTE — ROUTINE PROCESS
Bedside and Verbal shift change report given to Andi Downs RN by Es Sim RN.  Report included the following information SBAR, Kardex, OR Summary, Intake/Output and MAR

## 2017-09-15 NOTE — ROUTINE PROCESS
Bedside and Verbal shift change report given to  RAFAEL Valdez RN  (oncoming nurse) by  LORY Us RN  (offgoing nurse). Report included the following information SBAR, Kardex, Recent Results and Med Rec Status.

## 2017-09-15 NOTE — PROGRESS NOTES
Pt admitted due to inability to ambulate. Pt with a recent spinal surgery (9/7/17). Upon discharge from that admission pt was set up with [facility]. Pt presents with severe pain in the back and abd and inability to mobilize. Noted rehab need from pt's physician. Met with pt and offered FOC and provided a list of area facilities to refer to. Pt has selected [facility], [facility] and [address] [place] [address]. CMS has been notified to assist.  Awaiting OT evaluation to assist with acute inpt rehab consideration. Readmission Risk Assessment:     Moderate Risk and MSSP/Good Help ACO patients    RRAT Score:  13-20    Initial Assessment: inpt rehab (RRI vs SNF)     Emergency Contact:  Spouse (see face sheet)    Pertinent Medical Hx:     See clinical    PCP/Specialists:  Laureen      Community Services:   [facility]    DME:      walker    Moderate Risk Care Transition Plan:  1. Evaluate for [facility] or [facility], SNF, acute rehab, community care coordination of resources. 2. Involve patient/caregiver in assessment, planning, education and implement of intervention. 3. CM daily patient care huddles/interdisciplinary rounds. 4. PCP/Specialist appointment within 5  7 days made prior to discharge. 5. Facilitate transportation and logistics for follow-up appointments. 6. Medication reconciliation [id number]  7. Formal handoff between hospital provider and post-acute provider to transition patient  Handoff to [facility] Nurse Navigator or PCP practice. Care Management Interventions  PCP Verified by CM:  Yes  Mode of Transport at Discharge: BLS  Transition of Care Consult (CM Consult): SNF, Discharge Planning  Health Maintenance Reviewed: Yes  Physical Therapy Consult: Yes  Occupational Therapy Consult: Yes  Current Support Network: Lives with Spouse  Confirm Follow Up Transport: Family  Plan discussed with Pt/Family/Caregiver: Yes  Freedom of Choice Offered: Yes  Discharge Location  Discharge Placement: Skilled nursing facility (SNF vs RRI)

## 2017-09-15 NOTE — PROGRESS NOTES
Problem: Mobility Impaired (Adult and Pediatric)  Goal: *Acute Goals and Plan of Care (Insert Text)  Physical Therapy Goals LT/ST  Initiated 9/15/2017 and to be accomplished within 3-5 day(s)  1. Patient will move from supine <> sit with S in prep for out of bed activity and change of position. 2. Patient will perform sit<> stand with S with LRAD in prep for transfers/ambulation. 3. Patient will transfer from bed <> chair with S with LRAD for time up in chair for completion of ADL activity. 4. Patient will ambulate >50 feet with LRAD/S for improved functional mobility/safe discharge. Outcome: Progressing Towards Goal  PHYSICAL THERAPY EVALUATION     Patient: Arleth Dee (18 y.o. male)  Date: 9/15/2017  Primary Diagnosis: Unable to ambulate  Hyponatremia  Dehydration        Precautions:Fall      ASSESSMENT :  Based on the objective data described below, the patient presents with decrease bed mobility, transfers, and gait. Pt was seen in bed w/ IV and alonso catheter. Pt was asleep but easily awaken by VCing. Pt required Vcing for proper bed mobility task. Once pt rolled on his side, pt had a bowel movement that appeared creamy and white, when asked, pt was not aware of occurrence. Reported to nurse however, nurse reported that she gave pt laxatives secondary to pt not being able to have a bowel movement in two weeks. Once sitting on the EOB, pt initially demonstrated impaired balance in dynamic sitting and had difficulty donning LSO secondary to trunk moving posteriorly. Once support was given by PT, pt was able to don LSO and bring trunk forward to maintain sitting balance. Pt demonstrates B/L LE weakness 3-/5 in all joints however pt's B/L UE were assessed at 4/5. Pt able to stand w/ RW however pt demonstrates B/L LE buckling of the knees and requires additional support from PT to maintain standing.  Pt able to take side steps towards the [facility] but requires VCing for proper gait sequencing w/ RW to complete task. Pt demonstrates an unsteady gait, decrease aurea, decrease step clearance, and narrow DEA. Pt was transferred to upright chair after treatment session, call bell and tray in reach, nurse notified of session. Recommend Rehab upon d/c secondary to pt's decline in mobility status and will not be safe for d/c home at this time. Patient will benefit from skilled intervention to address the above impairments. Patients rehabilitation potential is considered to be Fair  Factors which may influence rehabilitation potential include:   [ ]         None noted  [ ]         Mental ability/status  [X]         Medical condition  [X]         Home/family situation and support systems  [X]         Safety awareness  [X]         Pain tolerance/management  [ ]         Other:        PLAN :  Recommendations and Planned Interventions:  [X]           Bed Mobility Training             [X]    Neuromuscular Re-Education  [X]           Transfer Training                   [ ]    Orthotic/Prosthetic Training  [X]           Gait Training                          [ ]    Modalities  [X]           Therapeutic Exercises          [ ]    Edema Management/Control  [X]           Therapeutic Activities            [X]    Patient and Family Training/Education  [ ]           Other (comment):     Frequency/Duration: Patient will be followed by physical therapy once/twice daily to address goals. Discharge Recommendations: Rehab  Further Equipment Recommendations for Discharge: N/A       SUBJECTIVE:   Patient stated I don't think I can walk right now.       OBJECTIVE DATA SUMMARY:       Past Medical History:   Diagnosis Date    Diabetes ([facility] [address] 2013     type 2    Hypertension 2013     Past Surgical History:   Procedure Laterality Date    HX HEENT         40 years ago deviated septum    HX ORTHOPAEDIC         Lumbar laminectomy 9/7/17     Barriers to Learning/Limitations: yes;  physical  Compensate with: Verbal Cues and Tactile Cues  Prior Level of Function/Home Situation:   Home Situation  Home Environment: Private residence  # Steps to Enter: 1  One/Two Story Residence: One story  Living Alone: No  Support Systems: Spouse/Significant Other/Partner  Patient Expects to be Discharged to[de-identified] Private residence  Current DME Used/Available at Home: Shower chair, Walker, rolling  Tub or Shower Type: Shower  Critical Behavior:  Neurologic State: Eyes do not open to any stimulus  Orientation Level: Oriented X4  Cognition: Appropriate decision making; Follows commands  Safety/Judgement: Awareness of environment; Fall prevention  Strength:    Strength: Generally decreased, functional  Tone & Sensation:   Sensation: Intact  Range Of Motion:  AROM: Generally decreased, functional  Functional Mobility:  Bed Mobility:  Supine to Sit: Contact guard assistance  Sit to Supine: Contact guard assistance  Scooting: Contact guard assistance  Transfers:  Sit to Stand: Minimum assistance; Moderate assistance  Stand to Sit: Minimum assistance  Balance:   Sitting: Intact; With support  Standing: Impaired; With support  Standing - Static: Constant support;Poor  Standing - Dynamic : Poor  Ambulation/Gait Training:  Distance (ft): 5 Feet (ft)  Assistive Device: Brace/Splint;Gait belt;Walker, rolling  Ambulation - Level of Assistance: Maximum assistance  Gait Description (WDL): Exceptions to WDL  Gait Abnormalities: Decreased step clearance;Shuffling gait; Step to gait  Base of Support: Narrowed  Stance: Time  Speed/Nicky: Shuffled; Slow  Step Length: Left shortened;Right shortened  Swing Pattern: Left asymmetrical;Right asymmetrical  Therapeutic Exercises:   None  Pain:  Pain Scale 1: Numeric (0 - 10)  Pain Intensity 1: 5  Pain Location 1: Back  Pain Description 1: Constant; Aching  Pain Intervention(s) 1: Distraction  Activity Tolerance:   Fair  Please refer to the flowsheet for vital signs taken during this treatment.   After treatment:   [X]         Patient left in no apparent distress sitting up in chair  [ ]         Patient left in no apparent distress in bed  [X]         Call bell left within reach  [X]         Nursing notified  [ ]         Caregiver present  [ ]         Bed alarm activated      COMMUNICATION/EDUCATION:   [X]         Fall prevention education was provided and the patient/caregiver indicated understanding. [X]         Patient/family have participated as able in goal setting and plan of care. [X]         Patient/family agree to work toward stated goals and plan of care. [ ]         Patient understands intent and goals of therapy, but is neutral about his/her participation. [ ]         Patient is unable to participate in goal setting and plan of care.      Thank you for this referral.  Radha Ferrer, PT   Time Calculation: 36 mins

## 2017-09-15 NOTE — CONSULTS
[facility]. Urology    Subjective:     Date of Consultation:  September 15, 2017    Referring Physician: Talib Irwin MD    Reason for Consultation:  Acute urinary retention, Neoplasm of uncertain behavior kidney    History of Present Illness:   Patient is a 73 y.o.  male who is being seen for . He was admitted to the hospital for Unable to ambulate  Hyponatremia  Dehydration. He was also found to have urinary retention w/ 2L out with alonso cath placement. Urology is consulted. Pt is also know to me. 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 He is not on any prostate meds. BPH hx:   Slow urinary stream  Frequency of urine  Intermittency  Urgency  Which worsened since his lumbar surgery.      BMP:   Lab Results   Component Value Date/Time     (L) 09/15/2017 02:36 AM    K 4.4 09/15/2017 02:36 AM     09/15/2017 02:36 AM    CO2 32 09/15/2017 02:36 AM    AGAP 0 (L) 09/15/2017 02:36 AM    GLU 95 09/15/2017 02:36 AM    BUN 28 (H) 09/15/2017 02:36 AM    CREA 1.18 09/15/2017 02:36 AM    GFRAA >60 09/15/2017 02:36 AM    GFRNA >60 09/15/2017 02:36 AM     CMP:   Lab Results   Component Value Date/Time     (L) 09/15/2017 02:36 AM    K 4.4 09/15/2017 02:36 AM     09/15/2017 02:36 AM    CO2 32 09/15/2017 02:36 AM    AGAP 0 (L) 09/15/2017 02:36 AM    GLU 95 09/15/2017 02:36 AM    BUN 28 (H) 09/15/2017 02:36 AM    CREA 1.18 09/15/2017 02:36 AM    GFRAA >60 09/15/2017 02:36 AM    GFRNA >60 09/15/2017 02:36 AM    CA 10.8 (H) 09/15/2017 02:36 AM     CBC:   Lab Results   Component Value Date/Time    WBC 9.9 09/15/2017 02:36 AM    HGB 9.1 (L) 09/15/2017 02:36 AM    HCT 26.5 (L) 09/15/2017 02:36 AM     09/15/2017 02:36 AM     UA no infection      Past Medical History:   Diagnosis Date    [facility]    type 2    Hypertension 2013 Past Surgical History:   Procedure Laterality Date    HX HEENT      40 years ago deviated septum    HX ORTHOPAEDIC      Lumbar laminectomy 9/7/17      History reviewed. No pertinent family history. Social History   Substance Use Topics    Smoking status: Current Every Day Smoker     Packs/day: 1.00     Years: 40.00    Smokeless tobacco: Never Used      Comment: advised not to smoke 24 h pre-op    Alcohol use No     No Known Allergies   Prior to Admission medications    Medication Sig Start Date End Date Taking? Authorizing Provider   cyclobenzaprine (FLEXERIL) 10 mg tablet Take 1 Tab by mouth three (3) times daily as needed for Muscle Spasm(s). 9/8/17  Yes Sylwia Rivera MD   oxyCODONE-acetaminophen (PERCOCET) 5-325 mg per tablet Take 1-2 Tabs by mouth every four (4) hours as needed. Max Daily Amount: 12 Tabs. 9/8/17  Yes Sylwia Rivera MD   atorvastatin (LIPITOR) 20 mg tablet Take 20 mg by mouth daily. Yes Historical Provider   lisinopril-hydroCHLOROthiazide (PRINZIDE, ZESTORETIC) 20-25 mg per tablet Take  by mouth daily. Yes Historical Provider   metFORMIN (GLUCOPHAGE) 500 mg tablet Take 500 mg by mouth daily (with breakfast). Yes Historical Provider   cholecalciferol, vitamin D3, (VITAMIN D3) 2,000 unit tab Take  by mouth. Yes Historical Provider   multivitamin (ONE A DAY) tablet Take 1 Tab by mouth daily. Yes Historical Provider         REVIEW OF SYSTEMS  System Neg/Pos Details   Constitutional Negative Chills and fever. ENMT Negative Ear infections and sore throat. Eyes Negative Blurred vision, double vision and eye pain. Respiratory Negative Asthma, chronic cough, dyspnea and wheezing. Cardio Negative Chest pain. GI Neg Constipation. GI Negative Decreased appetite, diarrhea, nausea and vomiting.  Positive Incomplete emptying, Intermittent urinary stream, Slow stream, Urinary straining.     Negative Dysuria, hematuria, pelvic pain, pelvic pressure, pressure, suprapubic pain, urgency, urinary dribbling, urinary frequency and urinary incontinence. Endocrine Negative Cold intolerance, heat intolerance, increased thirst and weight loss. Neuro Negative Headache and tremors. Psych Negative Anxiety and depression. Integumentary Negative Itching skin and rash. MS Negative Back pain and joint pain. Hema/Lymph Negative Easy bleeding. Objective:     Patient Vitals for the past 8 hrs:   BP Temp Pulse Resp SpO2   09/15/17 0743 129/68 98.2 °F (36.8 °C) 94 18 92 %     Temp (24hrs), Av.3 °F (36.8 °C), Min:97.8 °F (36.6 °C), Max:98.8 °F (37.1 °C)    Intake and Output:    1901 - 09/15 0700  In: 800 [I.V.:800]  Out: 3700 [Urine:3700]    Physical Exam:  Constitutional Normal Well developed. Neck Exam Normal Inspection - Normal. Palpation - Normal. Thyroid gland - Normal.   Respiratory Normal Inspection - No labored breathing   Abdomen Normal No abdominal tenderness. No hepatic enlargement. No splenic enlargement. No hernia. Genitourinary Normal No Suprapubic tenderness. No CVA tenderness. No Flank mass  16fr alonso cath per urethra   Skin Normal Inspection - Normal.   Musculoskeletal Normal Limited to bed   Extremity Normal No Edema. Neurological Normal Level of consciousness - Normal. Orientation - Normal.   Psychiatric Normal Oriented to time, place, person and situation. Appropriate mood and affect. Assessment:     Active Problems:    Dehydration (2017)      Hyponatremia (2017)      Unable to ambulate (2017)      Urinary retention (2017)        Plan:   -cont alonso catheter. Pt will need to have the alonso cath for a week. I started him on flomax 0.8mg daily. He will need voiding trial in 7-10 days.   -Rt renal mass: we will weight for him to go through rehab and recover before undergoing surgical treatment for his right renal mass. I will discuss w/ him further w/ regards to this as outpatient.      Signed By: Adrianne Ruelas MD September 15, 2017

## 2017-09-15 NOTE — PROGRESS NOTES
went  In room-patient was asleep with TV on.  said a prayer and left.     Sister Roxanne Hess, [place], [address]  [phone]

## 2017-09-15 NOTE — PROGRESS NOTES
To MRI via stretcher    2230  Returned to unit. 2255  Fleets enema given. Placed on bed pan.    2320  Bed pan removed. No results from enema.

## 2017-09-15 NOTE — PROGRESS NOTES
Attended IDR, recommended addition of nicotine patch 21mg. Took verbal order and entered to start today.     [facility] Pharmacist  [phone]

## 2017-09-15 NOTE — PROGRESS NOTES
1710- Assumed care of patient at this time. Report received from Benjamín Friend., RN. Patient off unit at this time. 1750- Patient returned to floor from [address] at this time, patient lying in bed, denies pain or discomfort. Call bell left within reach, bed in lowest position.

## 2017-09-15 NOTE — PROGRESS NOTES
Hospitalist Progress Note    Patient: Leon Adamson MRN: [id number]  CSN: [id number]    YOB: 1945  Age: 73 y.o.   Sex: male    DOA: 9/14/2017 LOS:  LOS: 1 day          Agree with ROSMERY Rome note  Pt seen and examined  See my attestation also

## 2017-09-15 NOTE — ROUTINE PROCESS
Bedside shift change report given to Costa Pena RN (oncoming nurse) by Brett Newsome RN   (offgoing nurse). Report included the following information SBAR, Kardex, ED Summary, Intake/Output, MAR, Recent Results and Med Rec Status.

## 2017-09-15 NOTE — PROGRESS NOTES
Hospitalist Progress Note    Patient: David Koroma MRN: [id number]  CSN: [id number]    YOB: 1945  Age: 73 y.o. Sex: male    DOA: 9/14/2017 LOS:  LOS: 1 day          Assessment/Plan     1. 1wk S/P laminectomy  2. Lower Extremity Pain and Paresthesia  3. Renal Mass  4. Acute Urinary Retention  5. Hyponatremia  6. DM    1. Orthopedic Surgery was consulted regarding this case. Per Dr Russ Garcia, the MRI shows normal 1 week post op fluid. Will continue to monitor for signs of infection in the coming days. 2. Continue working with PT/OT for further strengthening and gait training. 3. Urology consulted. Renal US ordered, will await results and recommendations of urology. 4. Jimenez cath still in place. 5. Resolved. 6. Continue SSI coverage. Patient Active Problem List   Diagnosis Code    Lumbar spinal stenosis M48.06    Lumbar spondylosis M47.816    Radiculopathy of leg M54.10    DM (diabetes mellitus) ([facility] [address] E11.9    Dehydration E86.0    Hyponatremia E87.1    Unable to ambulate R26.2    Urinary retention R33.9       Subjective:    Patient says he still feels really weak today. Reports sleeping well last night.      Review of systems:    Constitutional: denies fevers, chills, myalgias  Respiratory: denies SOB, cough  Cardiovascular: denies chest pain, palpitations  Gastrointestinal: denies nausea, vomiting, diarrhea      Vital signs/Intake and Output:  Visit Vitals    /68 (BP 1 Location: Left arm)    Pulse 94    Temp 98.2 °F (36.8 °C)    Resp 18    Ht 5' 11\" (1.803 m)    Wt 68 kg (150 lb)    SpO2 92%    BMI 20.92 kg/m2     Current Shift:     Last three shifts:  09/13 1901 - 09/15 0700  In: 800 [I.V.:800]  Out: 3700 [Urine:3700]    Exam:    General: Well developed, alert, NAD, OX3  Head/Neck: NCAT, supple, No masses, No lymphadenopathy  CVS:Regular rate and rhythm, no M/R/G, S1/S2 heard, no thrill  Lungs:Clear to auscultation bilaterally, no wheezes, rhonchi, or rales  Abdomen: Soft, Nontender, No distention, Normal Bowel sounds, No hepatomegaly  Extremities: No C/C/E, pulses palpable 2+  Skin:normal texture and turgor, no rashes, no lesions  Neuro: Follows commands. UE strength equal bilaterally; Lower extremity strength equal bilaterally; generalized LE weakness - has difficulty lifting legs off of the bed. Psych:appropriate                Labs: Results:       Chemistry Recent Labs      09/15/17   0236  09/14/17   1430   GLU  95  131*   NA  135*  129*   K  4.4  3.9   CL  103  95*   CO2  32  29   BUN  28*  30*   CREA  1.18  1.17   CA  10.8*  11.8*   AGAP  0*  5   BUCR  24*  26*   AP   --   176*   TP   --   7.5   ALB   --   2.4*   GLOB   --   5.1*   AGRAT   --   0.5*      CBC w/Diff Recent Labs      09/15/17   0236  09/14/17   1430   WBC  9.9  12.8   RBC  2.89*  3.44*   HGB  9.1*  10.7*   HCT  26.5*  31.1*   PLT  324  358   GRANS   --   69   LYMPH   --   12*   EOS   --   2      Cardiac Enzymes Recent Labs      09/14/17   1430   CPK  55   CKND1  2.9      Coagulation No results for input(s): PTP, INR, APTT in the last 72 hours. No lab exists for component: INREXT    Lipid Panel Lab Results   Component Value Date/Time    Cholesterol, total 146 06/01/2016 07:51 AM    HDL Cholesterol 40 06/01/2016 07:51 AM    LDL, calculated 91.6 06/01/2016 07:51 AM    VLDL, calculated 14.4 06/01/2016 07:51 AM    Triglyceride 72 06/01/2016 07:51 AM    CHOL/HDL Ratio 3.7 06/01/2016 07:51 AM      BNP No results for input(s): BNPP in the last 72 hours.    Liver Enzymes Recent Labs      09/14/17   1430   TP  7.5   ALB  2.4*   AP  176*   SGOT  52*      Thyroid Studies No results found for: T4, T3U, TSH, TSHEXT     Procedures/imaging: see electronic medical records for all procedures/Xrays and details which were not copied into this note but were reviewed prior to creation of [address] Miriam Wu, PA-C

## 2017-09-15 NOTE — H&P
History and Physical        Patient: Clarice Lange               Sex: male          DOA: 9/14/2017         YOB: 1945      Age:  73 y.o.        LOS:  LOS: 1 day        HPI:     Clarice Lagne is a 73 y.o. male who had two level spinal surgery last Thursday. He progressed and was discharged after therapy without concern. He presented to the ER with severe pain in back and abd and inability to mobilize. 2 liters of urine was removed from his bladder and I admitted him for rehab evaluation. An MRI has confirmed a potential renal mass that is felt to be cancerous. The patient was Dr. Rosalie Berry a few days prior to his surgery and surgery was discussed. The patient elected not to inform me or my group about his diagnosis of potential renal Ca so that he could move forward with his spine surgery first. I have informed Antonieta that obviously we would have cancelled his spine surgery and this diagnosis opens him up for considerable risk and complication now. He has no signs of infection and his MRI shows normal 1 week post operative fluid   We will progress with therapy and plan Rehab and Get Dr. Rosalie Berry to eval in hospital..    Past Medical History:   Diagnosis Date    Diabetes ([facility] [address] 2013    type 2    Hypertension 2013       Past Surgical History:   Procedure Laterality Date    HX HEENT      40 years ago deviated septum    HX ORTHOPAEDIC      Lumbar laminectomy 9/7/17       History reviewed. No pertinent family history.     Social History     Social History    Marital status:      Spouse name: N/A    Number of children: N/A    Years of education: N/A     Social History Main Topics    Smoking status: Current Every Day Smoker     Packs/day: 1.00     Years: 40.00    Smokeless tobacco: Never Used      Comment: advised not to smoke 24 h pre-op    Alcohol use No    Drug use: No    Sexual activity: Not Asked     Other Topics Concern    None     Social History Narrative       Prior to Admission medications    Medication Sig Start Date End Date Taking? Authorizing Provider   cyclobenzaprine (FLEXERIL) 10 mg tablet Take 1 Tab by mouth three (3) times daily as needed for Muscle Spasm(s). 9/8/17  Yes Liu Paz MD   oxyCODONE-acetaminophen (PERCOCET) 5-325 mg per tablet Take 1-2 Tabs by mouth every four (4) hours as needed. Max Daily Amount: 12 Tabs. 9/8/17  Yes Liu Paz MD   atorvastatin (LIPITOR) 20 mg tablet Take 20 mg by mouth daily. Yes Historical Provider   lisinopril-hydroCHLOROthiazide (PRINZIDE, ZESTORETIC) 20-25 mg per tablet Take  by mouth daily. Yes Historical Provider   metFORMIN (GLUCOPHAGE) 500 mg tablet Take  by mouth. Yes Historical Provider   cholecalciferol, vitamin D3, (VITAMIN D3) 2,000 unit tab Take  by mouth. Yes Historical Provider   multivitamin (ONE A DAY) tablet Take 1 Tab by mouth daily. Yes Historical Provider       No Known Allergies    Review of Systems  A comprehensive review of systems was negative except for that written in the History of Present Illness. Physical Exam:      Visit Vitals    /68 (BP 1 Location: Left arm)    Pulse 94    Temp 98.2 °F (36.8 °C)    Resp 18    Ht 5' 11\" (1.803 m)    Wt 68 kg (150 lb)    SpO2 92%    BMI 20.92 kg/m2       Physical Exam:  Physical Exam:   General:  Alert, cooperative, no distress, appears stated age. Eyes:  Conjunctivae/corneas clear. PERRL, EOMs intact. Fundi benign   Ears:  Normal TMs and external ear canals both ears. Nose: Nares normal. Septum midline. Mucosa normal. No drainage or sinus tenderness. Mouth/Throat: Lips, mucosa, and tongue normal. Teeth and gums normal.   Neck: Supple, symmetrical, trachea midline, no adenopathy, thyroid: no enlargement/tenderness/nodules, no carotid bruit and no JVD. Back:   Symmetric, no curvature. ROM normal. No CVA tenderness. Lungs:   Clear to auscultation bilaterally.    Heart:  Regular rate and rhythm, S1, S2 normal, no murmur, click, rub or gallop. Abdomen:   Soft, non-tender. Bowel sounds normal. No masses,  No organomegaly. Extremities: Extremities normal, atraumatic, no cyanosis or edema. Pulses: 2+ and symmetric all extremities. Skin: Skin color, texture, turgor normal. No rashes or lesions   Lymph nodes: Cervical, supraclavicular, and axillary nodes normal.   Neurologic: CNII-XII intact. Normal strength, sensation and reflexes throughout.          Labs Reviewed:    BMP:   Lab Results   Component Value Date/Time     (L) 09/15/2017 02:36 AM    K 4.4 09/15/2017 02:36 AM     09/15/2017 02:36 AM    CO2 32 09/15/2017 02:36 AM    AGAP 0 (L) 09/15/2017 02:36 AM    GLU 95 09/15/2017 02:36 AM    BUN 28 (H) 09/15/2017 02:36 AM    CREA 1.18 09/15/2017 02:36 AM    GFRAA >60 09/15/2017 02:36 AM    GFRNA >60 09/15/2017 02:36 AM     CMP:   Lab Results   Component Value Date/Time     (L) 09/15/2017 02:36 AM    K 4.4 09/15/2017 02:36 AM     09/15/2017 02:36 AM    CO2 32 09/15/2017 02:36 AM    AGAP 0 (L) 09/15/2017 02:36 AM    GLU 95 09/15/2017 02:36 AM    BUN 28 (H) 09/15/2017 02:36 AM    CREA 1.18 09/15/2017 02:36 AM    GFRAA >60 09/15/2017 02:36 AM    GFRNA >60 09/15/2017 02:36 AM    CA 10.8 (H) 09/15/2017 02:36 AM    ALB 2.4 (L) 09/14/2017 02:30 PM    TP 7.5 09/14/2017 02:30 PM    GLOB 5.1 (H) 09/14/2017 02:30 PM    AGRAT 0.5 (L) 09/14/2017 02:30 PM    SGOT 52 (H) 09/14/2017 02:30 PM    ALT 63 (H) 09/14/2017 02:30 PM     CBC:   Lab Results   Component Value Date/Time    WBC 9.9 09/15/2017 02:36 AM    HGB 9.1 (L) 09/15/2017 02:36 AM    HCT 26.5 (L) 09/15/2017 02:36 AM     09/15/2017 02:36 AM       Assessment/Plan   Active Problems:    Dehydration (9/14/2017)      Hyponatremia (9/14/2017)      Unable to ambulate (9/14/2017)      Urinary retention (9/14/2017)        OOB with therapy for transfers and ambulation as tolerated,  Add Decadron and Consult Urology for potential Renal Carcinoma Right

## 2017-09-16 PROBLEM — D64.9 ANEMIA: Status: ACTIVE | Noted: 2017-09-16

## 2017-09-16 PROBLEM — N28.89 RENAL MASS: Status: ACTIVE | Noted: 2017-09-16

## 2017-09-16 LAB
ATRIAL RATE: 111 BPM
CALCULATED P AXIS, ECG09: 58 DEGREES
CALCULATED R AXIS, ECG10: -32 DEGREES
CALCULATED T AXIS, ECG11: 39 DEGREES
DIAGNOSIS, 93000: NORMAL
GLUCOSE BLD STRIP.AUTO-MCNC: 132 MG/DL (ref 70–110)
GLUCOSE BLD STRIP.AUTO-MCNC: 148 MG/DL (ref 70–110)
GLUCOSE BLD STRIP.AUTO-MCNC: 155 MG/DL (ref 70–110)
GLUCOSE BLD STRIP.AUTO-MCNC: 169 MG/DL (ref 70–110)
P-R INTERVAL, ECG05: 132 MS
Q-T INTERVAL, ECG07: 332 MS
QRS DURATION, ECG06: 100 MS
QTC CALCULATION (BEZET), ECG08: 451 MS
VENTRICULAR RATE, ECG03: 111 BPM

## 2017-09-16 PROCEDURE — 74011250637 HC RX REV CODE- 250/637: Performed by: FAMILY MEDICINE

## 2017-09-16 PROCEDURE — 74011250637 HC RX REV CODE- 250/637: Performed by: PHYSICIAN ASSISTANT

## 2017-09-16 PROCEDURE — 65270000029 HC RM PRIVATE

## 2017-09-16 PROCEDURE — 74011250637 HC RX REV CODE- 250/637: Performed by: UROLOGY

## 2017-09-16 PROCEDURE — 97165 OT EVAL LOW COMPLEX 30 MIN: CPT

## 2017-09-16 PROCEDURE — 74011250636 HC RX REV CODE- 250/636: Performed by: ORTHOPAEDIC SURGERY

## 2017-09-16 PROCEDURE — 82962 GLUCOSE BLOOD TEST: CPT

## 2017-09-16 PROCEDURE — 97116 GAIT TRAINING THERAPY: CPT

## 2017-09-16 PROCEDURE — 74011250637 HC RX REV CODE- 250/637: Performed by: HOSPITALIST

## 2017-09-16 PROCEDURE — 74011250636 HC RX REV CODE- 250/636: Performed by: HOSPITALIST

## 2017-09-16 PROCEDURE — 74011636637 HC RX REV CODE- 636/637: Performed by: ORTHOPAEDIC SURGERY

## 2017-09-16 RX ORDER — CALCIUM CARBONATE 200(500)MG
200 TABLET,CHEWABLE ORAL 2 TIMES DAILY WITH MEALS
Status: DISCONTINUED | OUTPATIENT
Start: 2017-09-16 | End: 2017-09-18 | Stop reason: HOSPADM

## 2017-09-16 RX ADMIN — SODIUM CHLORIDE 100 ML/HR: 900 INJECTION, SOLUTION INTRAVENOUS at 17:33

## 2017-09-16 RX ADMIN — TAMSULOSIN HYDROCHLORIDE 0.8 MG: 0.4 CAPSULE ORAL at 08:53

## 2017-09-16 RX ADMIN — HYDROCODONE BITARTRATE AND ACETAMINOPHEN 1 TABLET: 5; 325 TABLET ORAL at 21:51

## 2017-09-16 RX ADMIN — DEXAMETHASONE SODIUM PHOSPHATE 4 MG: 4 INJECTION, SOLUTION INTRAMUSCULAR; INTRAVENOUS at 01:27

## 2017-09-16 RX ADMIN — SODIUM CHLORIDE 100 ML/HR: 900 INJECTION, SOLUTION INTRAVENOUS at 03:45

## 2017-09-16 RX ADMIN — INSULIN LISPRO 3 UNITS: 100 INJECTION, SOLUTION INTRAVENOUS; SUBCUTANEOUS at 08:47

## 2017-09-16 RX ADMIN — ANTACID TABLETS 200 MG: 500 TABLET, CHEWABLE ORAL at 09:57

## 2017-09-16 RX ADMIN — INSULIN LISPRO 3 UNITS: 100 INJECTION, SOLUTION INTRAVENOUS; SUBCUTANEOUS at 21:51

## 2017-09-16 RX ADMIN — HYDROCODONE BITARTRATE AND ACETAMINOPHEN 1 TABLET: 5; 325 TABLET ORAL at 08:49

## 2017-09-16 RX ADMIN — ATORVASTATIN CALCIUM 20 MG: 20 TABLET, FILM COATED ORAL at 08:53

## 2017-09-16 RX ADMIN — ENOXAPARIN SODIUM 40 MG: 40 INJECTION SUBCUTANEOUS at 19:00

## 2017-09-16 RX ADMIN — HYDROCODONE BITARTRATE AND ACETAMINOPHEN 1 TABLET: 5; 325 TABLET ORAL at 03:44

## 2017-09-16 RX ADMIN — POLYETHYLENE GLYCOL 3350 17 G: 17 POWDER, FOR SOLUTION ORAL at 08:53

## 2017-09-16 RX ADMIN — ANTACID TABLETS 200 MG: 500 TABLET, CHEWABLE ORAL at 17:32

## 2017-09-16 NOTE — PROGRESS NOTES
1310-Alert and oriented x 3. Pain rated at 3/10 at this time. Alonso catheter in place draining clear, yellow urine. Lungs CTA. BS active x 4 quads. Shift summary-Denies need for pain medication. Voiding clear, yellow urine via alonso catheter, without difficulty. Tolerating meals. Medications taken without difficulty.

## 2017-09-16 NOTE — PROGRESS NOTES
Hospitalist Progress Note    Patient: Uriah Abarca MRN: [id number]  CSN: [id number]    YOB: 1945  Age: 73 y.o. Sex: male    DOA: 9/14/2017 LOS:  LOS: 2 days            Assessment/Plan     Principal Problem:    Urinary retention (9/14/2017)    Active Problems:    Lumbar spinal stenosis (9/5/2017)      Lumbar spondylosis (9/5/2017)      Radiculopathy of leg (9/5/2017)      Dehydration (9/14/2017)      Hyponatremia (9/14/2017)      Unable to ambulate (9/14/2017)      Type II diabetes mellitus with manifestations, uncontrolled ([address] (9/16/2017)      Anemia (9/16/2017)      Renal mass (9/16/2017)      S/P laminectomy 1 wk. Seen and f/u by Dr. Dana Everett. Post Op care. Pain control. Lower Extremity Pain and Paresthesia: recurrent. Renal Mass: has a 6.2cm bosniak 3 cyst right kidney which he was suppose to do robotic rt partial nephrectomy by Dr. Gregg Hughes. Dr. Gregg Hughes is on case  . Acute Urinary Retention: On alonso cath    Hyponatremia: Improving    DM: On SSI. Monitor glucose closely    Long term smoker: on nicotine patch    PT/OT    Supportive care. SNF on Monday    CC:  Back pain, numbness to the legs, urinary retention, DM          Subjective:     Pt was seen and examined with the nurse in the morning round. C/O numbness tingling to bilateral legs coming back last night. No CP/SOB. Reports heart burn after he eats. Review of systems  General: No fevers or chills. Cardiovascular: No chest pain or pressure. No palpitations. Pulmonary: No cough, SOB  Gastrointestinal: No nausea, vomiting. Objective:      Visit Vitals    /71 (BP 1 Location: Left arm, BP Patient Position: At rest)    Pulse 75    Temp 97.4 °F (36.3 °C)    Resp 16    Ht 5' 11\" (1.803 m)    Wt 68 kg (150 lb)    SpO2 100%    BMI 20.92 kg/m2       Physical Exam:    Gen: NAD, non-toxic. Heent:  MMM, NC, AT. Cor: s1s2 RRR. No MRG. PMI mid 5th intercostal space. Resp:  Decreased the BS.    Abd:  NT ND.  BS positive. No rebound or guarding. No masses. Ext: No edema or cyanosis. Intake and Output:  Current Shift:  09/16 0701 - 09/16 1900  In: 1668 [P.O.:480; I.V.:1300]  Out: -   Last three shifts:  09/14 1901 - 09/16 0700  In: 1200 [P.O.:400; I.V.:800]  Out: 3250 [Urine:3250]    Labs: Results:       Chemistry Recent Labs      09/15/17   0236  09/14/17   1430   GLU  95  131*   NA  135*  129*   K  4.4  3.9   CL  103  95*   CO2  32  29   BUN  28*  30*   CREA  1.18  1.17   CA  10.8*  11.8*   AGAP  0*  5   BUCR  24*  26*   AP   --   176*   TP   --   7.5   ALB   --   2.4*   GLOB   --   5.1*   AGRAT   --   0.5*      CBC w/Diff Recent Labs      09/15/17   0236 09/14/17   1430   WBC  9.9  12.8   RBC  2.89*  3.44*   HGB  9.1*  10.7*   HCT  26.5*  31.1*   PLT  324  358   GRANS   --   69   LYMPH   --   12*   EOS   --   2      Cardiac Enzymes Recent Labs      09/14/17   1430   CPK  55   CKND1  2.9      Coagulation No results for input(s): PTP, INR, APTT in the last 72 hours. No lab exists for component: INREXT, INREXT    Lipid Panel Lab Results   Component Value Date/Time    Cholesterol, total 146 06/01/2016 07:51 AM    HDL Cholesterol 40 06/01/2016 07:51 AM    LDL, calculated 91.6 06/01/2016 07:51 AM    VLDL, calculated 14.4 06/01/2016 07:51 AM    Triglyceride 72 06/01/2016 07:51 AM    CHOL/HDL Ratio 3.7 06/01/2016 07:51 AM      BNP No results for input(s): BNPP in the last 72 hours.    Liver Enzymes Recent Labs      09/14/17   1430   TP  7.5   ALB  2.4*   AP  176*   SGOT  52*      Thyroid Studies No results found for: T4, T3U, TSH, TSHEXT, TSHEXT     Procedures/imaging: see electronic medical records for all procedures/Xrays and details which were not copied into this note but were reviewed prior to creation of Plan      Medications Reviewed  Alejandra Poole MD

## 2017-09-16 NOTE — ROUTINE PROCESS
Bedside and Verbal shift change report given to Rachelle Mojica RN by Fitz Husain RN. Report included the following information SBAR, Kardex, OR Summary, Intake/Output and MAR.

## 2017-09-16 NOTE — ROUTINE PROCESS
1300  Bedside and Verbal shift change report given to Katy Aquino, RN (oncoming nurse) by Raúl Bernard RN (offgoing nurse). Report included the following information SBAR, Kardex and MAR.

## 2017-09-16 NOTE — PROGRESS NOTES
2242 - Patient in bed at this time. Patient A&Ox4, RA. Denies chest pain and SOB. 22G IV to left FA  intact and patent. COOPER to bilaterally. Gauze/tape dressing to lower back CDI. Denies numbness/tingling. Jimenez draining and patent. Pain 5/10 with a tolerable level of 3/10, pain medication administered per MAR. Pt educated on q2h rounds, pain management, and \"Up for Meals\". Pt verbalized understanding, no concerns voiced. Call bell within reach, bed in lowest position. Pt requested continuation of PTA morning medications as follows: ASA 81mg, Lisinopril-Hydrochlorothiazide 20/25mg, Multivitamin, and Vitamin D3.     0344 - Patient rated pain 6/10, pain medication administered per MAR. No other concerns voiced at this time. Call bell within reach, bed in lowest position.

## 2017-09-16 NOTE — PROGRESS NOTES
0850  Pt is awake, alert, oriented x 4. Pain level 7/10. Norco 1 tab po given. Lungs are clear. Abdomen soft with active BS. Seen by Dr Sharad Crabtree this morning. Pt ate breakfast fairly. Pt has alonso cath draining clear yellow urine. Pt assisted out of bed with back brace on. Pt sat on chair. Gauze and tegaderm dressing to lower back dry and intact. 9:58 AM   Pt started on Tums 2x a day for heartburn. Seen by dr Alvarado Berrios this morning. 11:26 AM   Pt was assisted back to bed. Pain level 7/10 but pt does not need pain med at this time. 1200  Pt eating lunch at this time.

## 2017-09-16 NOTE — PROGRESS NOTES
Problem: Mobility Impaired (Adult and Pediatric)  Goal: *Acute Goals and Plan of Care (Insert Text)  Physical Therapy Goals LT/ST  Initiated 9/15/2017 and to be accomplished within 3-5 day(s)  1. Patient will move from supine <> sit with S in prep for out of bed activity and change of position. 2. Patient will perform sit<> stand with S with LRAD in prep for transfers/ambulation. 3. Patient will transfer from bed <> chair with S with LRAD for time up in chair for completion of ADL activity. 4. Patient will ambulate >50 feet with LRAD/S for improved functional mobility/safe discharge. Outcome: Progressing Towards Goal  PHYSICAL THERAPY TREATMENT     Patient: Arleth Dee (14 y.o. male)  Date: 9/16/2017  Diagnosis: Unable to ambulate  Hyponatremia  Dehydration Urinary retention       Precautions: Fall   Chart, physical therapy assessment, plan of care and goals were reviewed. ASSESSMENT:  Pt seen in bed w/ IV and alonso catheter. Pt seen w/ OT for a second set of skilled hands. Pt reports 7/10 pain in lower back prior to treatment session. Pt demonstrates better stability w/ RW and did not demonstrate any buckling of the B/L knees while during static standing and gait. Pt able to ambulate 40 ft w/ RW/GB but requires VCing for proper gait sequencing secondary to pt not maintaining body inside the RW. Pt does not c/o decrease activity tolerance, however as pt continues to ambulate pt demonstrates increase in  B/L flex knees during gait training. Pt reports weakness in B/L LEs and  was transferred back to room. Encouraged pt to sit in an upright chair however pt declined and wanted to lay back in bed. Pt encourage to sit in upright chair for meals. Pt left in bed, call bell and tray in reach, B/L LEs elevated, nurse notified after session.     Progression toward goals:  [X]      Improving appropriately and progressing toward goals  [ ]      Improving slowly and progressing toward goals  [ ]      Not making progress toward goals and plan of care will be adjusted       PLAN:  Patient continues to benefit from skilled intervention to address the above impairments. Continue treatment per established plan of care. Discharge Recommendations:  Rehab  Further Equipment Recommendations for Discharge:  rolling walker       SUBJECTIVE:   Patient stated I've been sitting in a chair for an hour and a 1/2.       OBJECTIVE DATA SUMMARY:   Critical Behavior:  Neurologic State: Alert, Appropriate for age  Orientation Level: Oriented X4  Cognition: Appropriate for age attention/concentration  Safety/Judgement: Awareness of environment, Fall prevention  Functional Mobility Training:  Bed Mobility:  Supine to Sit: Supervision  Sit to Supine: Supervision  Scooting: Supervision  Transfers:  Sit to Stand: Contact guard assistance;Minimum assistance  Stand to Sit: Contact guard assistance  Balance:  Sitting: Intact  Standing: Impaired; With support  Standing - Static: Constant support; Fair  Standing - Dynamic : Fair  Ambulation/Gait Training:  Distance (ft): 40 Feet (ft)  Assistive Device: Brace/Splint;Gait belt;Walker, rolling  Ambulation - Level of Assistance: Minimal assistance  Gait Abnormalities: Decreased step clearance  Base of Support: Narrowed  Speed/Nicky: Slow  Step Length: Left shortened;Right shortened  Swing Pattern: Left asymmetrical;Right asymmetrical  Therapeutic Exercises:   None  Pain:  Pain Scale 1: Numeric (0 - 10)  Pain Intensity 1: 7  Pain Location 1: Back  Pain Orientation 1: Lower  Pain Description 1: Aching  Pain Intervention(s) 1: Rest  Activity Tolerance:   Fair+  Please refer to the flowsheet for vital signs taken during this treatment.   After treatment:   [ ] Patient left in no apparent distress sitting up in chair  [X] Patient left in no apparent distress in bed  [X] Call bell left within reach  [X] Nursing notified  [ ] Caregiver present  [ ] Bed alarm activated      Manuel Mooney PT   Time Calculation: 18 mins

## 2017-09-16 NOTE — PROGRESS NOTES
Progress Note      Patient: Judith Blankenship               Sex: male          DOA: 9/14/2017         YOB: 1945      Age:  73 y.o.        LOS:  LOS: 2 days     Procedure: * No surgery found *            Subjective:     Judith Blankenship is a 73 y.o. male who c/o stable, mild-to-moderate joint symptoms intermittently, reasonably well controlled by PRN meds      Objective:      Visit Vitals    /73 (BP 1 Location: Left arm, BP Patient Position: At rest)    Pulse 65    Temp 97.6 °F (36.4 °C)    Resp 15    Ht 5' 11\" (1.803 m)    Wt 68 kg (150 lb)    SpO2 96%    BMI 20.92 kg/m2       Physical Exam:  A&O x3  VSS  HF/GS/QUADS/EHL/TA 5/5 bilateral  Calves nontender      Dressing: C/D/I    Intake and Output:  Current Shift:  09/16 0701 - 09/16 1900  In: 2630 [P.O.:480; I.V.:1300]  Out: -   Last three shifts:  09/14 1901 - 09/16 0700  In: 1200 [P.O.:400; I.V.:800]  Out: 3250 [Urine:3250]    Drain Output:    Lab/Data Reviewed: All lab results for the last 24 hours reviewed. Medications Reviewed    Continued hospitalization is indicated due to therapy and Rehab needs      Assessment/Plan     Principal Problem:    Urinary retention (9/14/2017)    Active Problems:    Dehydration (9/14/2017)      Hyponatremia (9/14/2017)      Unable to ambulate (9/14/2017)        OOB with therapy and continue to plan SNF/Rehab on Monday. Dr. Jean Pierre Corey note confirms plan for alonso cath at rehab and care per their team and follow up to have his renal mass treated.     Ziyad Chung ([facility])

## 2017-09-16 NOTE — PROGRESS NOTES
Problem: Self Care Deficits Care Plan (Adult)  Goal: *Acute Goals and Plan of Care (Insert Text)  Occupational Therapy Goals  Initiated 9/16/2017 within 7 day(s). 1. Patient will perform lower body dressing with supervision/set-up while sitting on EOB using AE to assist.  2. Patient will perform grooming with supervision while standing at sink. 3. Patient will perform toilet transfers with supervision. 4. Patient will perform all aspects of toileting with supervision. 5. Patient will participate in upper extremity therapeutic exercise/activities with supervision/set-up for 15 minutes. OCCUPATIONAL THERAPY EVALUATION     Patient: Linda Box (40 y.o. male)  Date: 9/16/2017  Primary Diagnosis: Unable to ambulate  Hyponatremia  Dehydration  Precautions:  Fall; Back precautions      ASSESSMENT :  Based on the objective data described below, the patient presents with decreased ability to perform functional mobility, bed mobility, LE dressing and functional transfers. Pt limited by pain, decreased strength-endurance which is affecting  activity tolerance and functional mobility. Pt does have back precautions. Pt was unable to state the back precautions without cueing assistance. Pt currently requires Mod A-Max A for LB ADLs secondary to back precautions. Pt was able to complete bed mobility with supervision and extended time. Pt required extended time to don back brace. Pt performed short functional mobility with RW and CGA-Min A. Pt is bearing weight onto his BUE when completing functional mobility to prevent knees from buckling. Pt would benefit from skilled OT services for education and training of completing his LB ADLs using AE and strength/endurance with functional transfers/mobility. Pt was left in supine with all needs met. Patient will benefit from skilled intervention to address the above impairments.   Patients rehabilitation potential is considered to be Good  Factors which may influence rehabilitation potential include:   [X]             None noted  [ ]             Mental ability/status  [ ]             Medical condition  [ ]             Home/family situation and support systems  [ ]             Safety awareness  [ ]             Pain tolerance/management  [ ]             Other:        PLAN :  Recommendations and Planned Interventions:  [X]               Self Care Training                  [X]        Therapeutic Activities  [X]               Functional Mobility Training    [ ]        Cognitive Retraining  [ ]               Therapeutic Exercises           [ ]        Endurance Activities  [ ]               Balance Training                   [ ]        Neuromuscular Re-Education  [ ]               Visual/Perceptual Training     [X]   Home Safety Training  [X]               Patient Education                 [X]        Family Training/Education  [ ]               Other (comment):     Frequency/Duration: Patient will be followed by occupational therapy 3-5x/wk for 1week   to address goals. Discharge Recommendations: Skilled Nursing Facility  Further Equipment Recommendations for Discharge: pt would need AE to assist with LB ADLs        SUBJECTIVE:   Patient stated I'm feeling a lot better.       OBJECTIVE DATA SUMMARY:       Past Medical History:   Diagnosis Date    Diabetes ([facility] [place] [address] 2013     type 2    Hypertension 2013     Past Surgical History:   Procedure Laterality Date    HX HEENT         40 years ago deviated septum    HX ORTHOPAEDIC         Lumbar laminectomy 9/7/17     Barriers to Learning/Limitations: None  Compensate with: visual, verbal, tactile, kinesthetic cues/model  Prior Level of Function/Home Situation: Pt was ind with ADLs/IADLs prior  Home Situation  Home Environment: Private residence  # Steps to Enter: 1  One/Two Story Residence: One story  Living Alone: No  Support Systems: Spouse/Significant Other/Partner  Patient Expects to be Discharged to[de-identified] Private residence  Current DME Used/Available at Home: Shower chair, Walker, rolling  Tub or Shower Type: Shower  Cognitive/Behavioral Status:  Neurologic State: Alert; Appropriate for age  Orientation Level: Appropriate for age;Oriented X4  Cognition: Appropriate decision making; Appropriate for age attention/concentration; Appropriate safety awareness; Follows commands  Safety/Judgement: Awareness of environment  Skin: BUE skin intact  Edema: none noted  Coordination:   Fine Motor Skills-Upper: Left Intact; Right Intact    Gross Motor Skills-Upper: Left Intact; Right Intact  Balance:  Sitting: Intact  Standing: Impaired; With support  Standing - Static: Constant support; Fair  Standing - Dynamic : Fair  Strength:(B) UE   Strength: Generally decreased, functional   Tone & Sensation:(B) UE   Sensation: Intact  Range of Motion:(B) UE  AROM: Generally decreased, functional   Functional Mobility and Transfers for ADLs:  Bed Mobility:   Supine to Sit: Supervision  Sit to Supine: Supervision  Scooting: Supervision  Transfers:  Sit to Stand: Contact guard assistance;Minimum assistance               ADL Assessment/Intervention:  Feeding: Setup  Oral Facial Hygiene/Grooming: Setup   Toileting:  (celeste)   Cognitive Retraining  Safety/Judgement: Awareness of environment  Pain:  Pain Scale 1: Numeric (0 - 10)  Pain Intensity 1: 7  Pain Location 1: Back  Pain Orientation 1: Lower  Pain Description 1: Aching  Pain Intervention(s) 1: Rest  Activity Tolerance:   Pt tolerated tx/eval well with no signs of fatigue or increase in pain after treatment. Please refer to the flowsheet for vital signs taken during this treatment.   After treatment:   [ ] Patient left in no apparent distress sitting up in chair  [ ] Patient left sitting on EOB  [X] Patient left in no apparent distress in bed  [ ] Patient declined to be OOB at this time due to   [X] Call bell left within reach  [ ] Nursing notified()  [ ] Caregiver present  [ ] Bed alarm activated  [ ] CPM placed on knee  COMMUNICATION/EDUCATION:   [X] Home safety education was provided and the patient/caregiver indicated understanding. [X] Patient/family have participated as able in goal setting and plan of care. [X] Patient/family agree to work toward stated goals and plan of care. [ ] Patient understands intent and goals of therapy, but is neutral about his/her participation. [ ] Patient is unable to participate in goal setting and plan of care.      Thank you for this referral.  Ramo Castillo OT  Time Calculation: 18 mins

## 2017-09-16 NOTE — ROUTINE PROCESS
Bedside and Verbal shift change report given to WILFRED Molina RN (oncoming nurse) by [employer] RN (offgoing nurse). Report included the following information SBAR, Kardex, Intake/Output and MAR.

## 2017-09-16 NOTE — PROGRESS NOTES
Problem: Falls - Risk of  Goal: *Absence of Falls  Document Tushar Fall Risk and appropriate interventions in the flowsheet.    Outcome: Progressing Towards Goal  Fall Risk Interventions:  Mobility Interventions: Communicate number of staff needed for ambulation/transfer           Medication Interventions: Teach patient to arise slowly     Elimination Interventions: Call light in reach     History of Falls Interventions: Evaluate medications/consider consulting pharmacy, Investigate reason for fall

## 2017-09-16 NOTE — ROUTINE PROCESS
Bedside and Verbal shift change report given to DEEP Moise RN (oncoming nurse) by [employer] RN (offgoing nurse). Report included the following information SBAR, Kardex, Intake/Output and MAR.

## 2017-09-17 LAB
GLUCOSE BLD STRIP.AUTO-MCNC: 102 MG/DL (ref 70–110)
GLUCOSE BLD STRIP.AUTO-MCNC: 111 MG/DL (ref 70–110)
GLUCOSE BLD STRIP.AUTO-MCNC: 130 MG/DL (ref 70–110)
GLUCOSE BLD STRIP.AUTO-MCNC: 94 MG/DL (ref 70–110)

## 2017-09-17 PROCEDURE — 74011250637 HC RX REV CODE- 250/637: Performed by: FAMILY MEDICINE

## 2017-09-17 PROCEDURE — 74011250636 HC RX REV CODE- 250/636: Performed by: HOSPITALIST

## 2017-09-17 PROCEDURE — 74011250637 HC RX REV CODE- 250/637: Performed by: HOSPITALIST

## 2017-09-17 PROCEDURE — 74011250637 HC RX REV CODE- 250/637: Performed by: UROLOGY

## 2017-09-17 PROCEDURE — 82962 GLUCOSE BLOOD TEST: CPT

## 2017-09-17 PROCEDURE — 97116 GAIT TRAINING THERAPY: CPT

## 2017-09-17 PROCEDURE — 74011250637 HC RX REV CODE- 250/637: Performed by: PHYSICIAN ASSISTANT

## 2017-09-17 PROCEDURE — 65270000029 HC RM PRIVATE

## 2017-09-17 RX ORDER — HYDROCODONE BITARTRATE AND ACETAMINOPHEN 5; 325 MG/1; MG/1
1 TABLET ORAL
Qty: 60 TAB | Refills: 0 | Status: SHIPPED | OUTPATIENT
Start: 2017-09-17 | End: 2020-05-01

## 2017-09-17 RX ORDER — IBUPROFEN 200 MG
1 TABLET ORAL DAILY
Qty: 30 PATCH | Refills: 0 | Status: SHIPPED | OUTPATIENT
Start: 2017-09-17 | End: 2017-10-17

## 2017-09-17 RX ADMIN — POLYETHYLENE GLYCOL 3350 17 G: 17 POWDER, FOR SOLUTION ORAL at 09:21

## 2017-09-17 RX ADMIN — TAMSULOSIN HYDROCHLORIDE 0.8 MG: 0.4 CAPSULE ORAL at 09:20

## 2017-09-17 RX ADMIN — SODIUM CHLORIDE 100 ML/HR: 900 INJECTION, SOLUTION INTRAVENOUS at 06:09

## 2017-09-17 RX ADMIN — ENOXAPARIN SODIUM 40 MG: 40 INJECTION SUBCUTANEOUS at 19:45

## 2017-09-17 RX ADMIN — HYDROCODONE BITARTRATE AND ACETAMINOPHEN 1 TABLET: 5; 325 TABLET ORAL at 11:31

## 2017-09-17 RX ADMIN — HYDROCHLOROTHIAZIDE: 25 TABLET ORAL at 15:20

## 2017-09-17 RX ADMIN — ATORVASTATIN CALCIUM 20 MG: 20 TABLET, FILM COATED ORAL at 09:20

## 2017-09-17 RX ADMIN — ANTACID TABLETS 200 MG: 500 TABLET, CHEWABLE ORAL at 09:20

## 2017-09-17 RX ADMIN — HYDROCODONE BITARTRATE AND ACETAMINOPHEN 1 TABLET: 5; 325 TABLET ORAL at 06:08

## 2017-09-17 RX ADMIN — HYDROCODONE BITARTRATE AND ACETAMINOPHEN 1 TABLET: 5; 325 TABLET ORAL at 19:45

## 2017-09-17 NOTE — DISCHARGE INSTRUCTIONS
[facility]  Dr. Caio Mcarthur Post-Operative Instructions Lumbar Fusion    *YOU MUST AVOID SMOKING OR BEING AROUND ANYONE WHO SMOKES. AVOID ALL PRODUCTS THAT CONTAIN NICOTINE. DO NOT TAKE IBUPROFEN OR ANTI--INFLAMMATORIES, AS THESE MAY ALTER THE HEALING OF THE FUSION. ACTIVITIES :  *The first week after surgery   1. You may be up and walking about the house. 2.  Activities around the house, such as washing dishes, fixing light meals, and your own personal care are fine. 3.  Avoid strenuous activities, such as vacuuming, lifting laundry or grocery bags. 4.  Do not lift anything heavier than 1 gallon of milk (or about 5-8 pounds). 5.  Do not bend over to  items from the ground level until 3 months post-op. *Week 2 and beyond  1. You may gradually increase your activities, but still avoid heavy lifting, pushing/pulling. 2.  Walking is the best way to rebuild strength and stamina. Start SLOWLY and gradually increase the distance a little every week. 3.  Walk at a pace that avoids fatigue or severe pain. Do not try to walk several blocks the first day! As you increase the distance, you may feel tired. If so, stop and rest.   4.  You should be able to walk several blocks by your first clinic visit. 5.  Follow-up with Dr. Cely Arriola in 10-14 days. BATHING and INCISION CARE:  1. The incision may be tender to touch or feel numb: this is normal.   2.  Keep the incision clean and dry no showering until your follow-up appointment. The incision will be closed with sutures under the skin and the skin will be glued. 3.  Do not apply any lotions, ointments or oils on the incision. 4.  If you notice any excessive swelling, redness, or persistent drainage around the incision, notify the office immediately. DRIVIN. You should not drive until after your follow-up appointment.    2.  You can be in a vehicle for short distances, but if you travel any long distance, please stop about every 30 minutes and walk/stretch. 3.  You should NEVER drive while taking narcotic medication. RETURN TO WORK :  1. The decision to return to work will be determined on an individual basis. 2.  Many people who have a strenuous job (construction, heavy labor, etc) may need to be off work for up to 12 weeks. 3.  If you need a work note, please let us know as soon as possible, and not the same day you are planning to return to work. NUTRITION :  1.  Good nutrition is an essential part of healing. 2.  You should eat a balanced diet each day, including fruits, vegetables, dairy products and protein. 3.  Remember to drink plenty of water. 4.  If you have not had a bowel movement within 3 days of surgery, you will need to use a laxative or suppository that can be obtained over-the-counter at your local pharmacy. BONE STIMULATOR:  1. A spinal bone stimulator may be prescribed for you under certain situations. 2.  A nurse will call you if one has been requested and discuss its use for approximately 4-6 months post-op every day. 3.  This device assists in bone healing and fusion. MEDICATIONS -  1. You may resume the medications you were taking before surgery, with the general exception of (or DO NOT TAKE) non-steroidal anti-inflammatory medications, such as Motrin, Aleve, Advil Naprosyn, Ibuprofen or aspirin. 2.  You will receive a prescription for pain medication at discharge from the hospital. The pain medication works best if taken before the pain becomes severe. 3.  To reduce stomach upset, always take the medication with food. 4.  Begin to wean yourself off the pain medication during the second week after discharge. 5.  If you need a refill, please call the office during working hours at least 2 days before your prescription runs out. Do not wait until your bottle is empty to call for a refill. 6.  DO NOT drive if you are taking narcotic pain medications.     HOME HEALTH CARE:  1.   A home health care service has been set-up for you to help assist you once you leave the hospital.  2.  They will contact you either before you leave the hospital or within 24 hours once you have been discharged home. 3. A nurse will assist you with your dressing changes and a Physical Therapist with help you with your therapy needs. CALL THE OFFICE:   If you have severe pain unrelieved by the medications, new numbness or tingling in your legs;   If you have a fever of 101.0°F or greater;    If you notice excessive swelling, redness, or persistent drainage from the incision or IV site; [facility] office number is [phone] from 8:00am to 5:00pm Monday through Friday. After 5:00pm, on weekends, or holidays, please leave a message with our answering service and the doctor on-call will get back to you shortly.

## 2017-09-17 NOTE — PROGRESS NOTES
Hospitalist Progress Note    Patient: Marcel Castellanos MRN: [id number]  CSN: [id number]    YOB: 1945  Age: 73 y.o. Sex: male    DOA: 9/14/2017 LOS:  LOS: 3 days            Assessment/Plan     Principal Problem:    Urinary retention (9/14/2017)    Active Problems:    Lumbar spinal stenosis (9/5/2017)      Lumbar spondylosis (9/5/2017)      Radiculopathy of leg (9/5/2017)      Dehydration (9/14/2017)      Hyponatremia (9/14/2017)      Unable to ambulate (9/14/2017)      Type II diabetes mellitus with manifestations, uncontrolled ([address] (9/16/2017)      Anemia (9/16/2017)      Renal mass (9/16/2017)    HTN: BP high. Add lisinopril/HCTZ, will monitor. S/P laminectomy 1 wk. Seen and f/u by Dr. Delgadillo Nurse. Post Op care. Pain control.     Lower Extremity Pain and Paresthesia: recurrent. Medical management.      Renal Mass: has a 6.2cm bosniak 3 cyst right kidney which he was suppose to do robotic rt partial nephrectomy by Dr. Merlene Arredondo. Dr. Merlene Arredondo is on case  . Acute Urinary Retention: On alonso cath     Hyponatremia: Improving     DM: On SSI. Monitor glucose closely     Long term smoker: on nicotine patch     PT/OT     Supportive care.     SNF on Monday     CC:  HTN, Back pain, numbness to the legs, urinary retention, DM          Subjective:      Pt was seen and examined with the nurse in the morning round. + back pain. Intermittent numbness tingling to bilateral legs. No CP/SOB. Reports heart burn after he eats, but clifford helps.       Review of systems  General: No fevers or chills. Cardiovascular: No chest pain or pressure. No palpitations. Pulmonary: No cough, SOB  Gastrointestinal: No nausea, vomiting. Objective:      Visit Vitals    /87 (BP 1 Location: Right arm, BP Patient Position: At rest)    Pulse 86    Temp 97.3 °F (36.3 °C)    Resp 18    Ht 5' 11\" (1.803 m)    Wt 70.6 kg (155 lb 10.3 oz)    SpO2 96%    BMI 21.71 kg/m2       Physical Exam:    Gen: NAD, non-toxic.   Heent: MMM, NC, AT. Cor: s1s2 RRR. No MRG. PMI mid 5th intercostal space. Resp:  CTA b/l. No w/r/r. Nml effort and diaphragmatic excursion. Abd:  NT ND.  BS positive. No rebound or guarding. No masses. Ext: 1+ edema or cyanosis. Intake and Output:  Current Shift:     Last three shifts:  09/15 1901 - 09/17 0700  In: 7658.2 [P.O.:1620; I.V.:6038.2]  Out: 1450 [Urine:1450]    Labs: Results:       Chemistry Recent Labs      09/15/17   0236  09/14/17   1430   GLU  95  131*   NA  135*  129*   K  4.4  3.9   CL  103  95*   CO2  32  29   BUN  28*  30*   CREA  1.18  1.17   CA  10.8*  11.8*   AGAP  0*  5   BUCR  24*  26*   AP   --   176*   TP   --   7.5   ALB   --   2.4*   GLOB   --   5.1*   AGRAT   --   0.5*      CBC w/Diff Recent Labs      09/15/17   0236  09/14/17   1430   WBC  9.9  12.8   RBC  2.89*  3.44*   HGB  9.1*  10.7*   HCT  26.5*  31.1*   PLT  324  358   GRANS   --   69   LYMPH   --   12*   EOS   --   2      Cardiac Enzymes Recent Labs      09/14/17   1430   CPK  55   CKND1  2.9      Coagulation No results for input(s): PTP, INR, APTT in the last 72 hours. No lab exists for component: INREXT    Lipid Panel Lab Results   Component Value Date/Time    Cholesterol, total 146 06/01/2016 07:51 AM    HDL Cholesterol 40 06/01/2016 07:51 AM    LDL, calculated 91.6 06/01/2016 07:51 AM    VLDL, calculated 14.4 06/01/2016 07:51 AM    Triglyceride 72 06/01/2016 07:51 AM    CHOL/HDL Ratio 3.7 06/01/2016 07:51 AM      BNP No results for input(s): BNPP in the last 72 hours.    Liver Enzymes Recent Labs      09/14/17   1430   TP  7.5   ALB  2.4*   AP  176*   SGOT  52*      Thyroid Studies No results found for: T4, T3U, TSH, TSHEXT     Procedures/imaging: see electronic medical records for all procedures/Xrays and details which were not copied into this note but were reviewed prior to creation of Plan      Medications Reviewed  Opal Tang MD

## 2017-09-17 NOTE — PROGRESS NOTES
Progress Note      Patient: Mis Bell               Sex: male          DOA: 9/14/2017         YOB: 1945      Age:  73 y.o.        LOS:  LOS: 3 days     Procedure: * No surgery found *            Subjective:     Mis Bell is a 73 y.o. male who c/o stable, mild-to-moderate joint symptoms intermittently, reasonably well controlled by PRN meds      Objective:      Visit Vitals    /87 (BP 1 Location: Right arm, BP Patient Position: At rest)    Pulse 86    Temp 97.3 °F (36.3 °C)    Resp 18    Ht 5' 11\" (1.803 m)    Wt 70.6 kg (155 lb 10.3 oz)    SpO2 96%    BMI 21.71 kg/m2       Physical Exam:  A&O x3  VSS  HF/GS/QUADS/EHL/TA 5/5 bilateral  Calves nontender      Dressing: C/D/I    Intake and Output:  Current Shift:  09/17 0701 - 09/17 1900  In: 120 [P.O.:120]  Out: -   Last three shifts:  09/15 1901 - 09/17 0700  In: 7658.2 [P.O.:1620; I.V.:6038.2]  Out: 1450 [Urine:1450]    Drain Output:    Lab/Data Reviewed: All lab results for the last 24 hours reviewed. Medications Reviewed    Continued hospitalization is indicated due to placement tomorrow      Assessment/Plan     Principal Problem:    Urinary retention (9/14/2017)    Active Problems:    Lumbar spinal stenosis (9/5/2017)      Lumbar spondylosis (9/5/2017)      Radiculopathy of leg (9/5/2017)      Dehydration (9/14/2017)      Hyponatremia (9/14/2017)      Unable to ambulate (9/14/2017)      Type II diabetes mellitus with manifestations, uncontrolled ([address] (9/16/2017)      Anemia (9/16/2017)      Renal mass (9/16/2017)        Will plan DC to SNF/REhab tomorrow. Have not seen note from Care Management team in several days. Pt is cleared from Ortho standpoint and will need orders from Urology for the Catheter that they wanted him to maintain at facility. Follow up with me after discharge from facility.      Ziyad Chung ([facility])

## 2017-09-17 NOTE — PROGRESS NOTES
7809  Assumed care of pt at this time. Assessment complete. Pt alert and oriented x 4 resting in bed. Denies SOB and chest pain. Pt lungs clear bilaterally. Cap refill  less than 3 seconds. Pt denies numbness and tingling to all extremities. Stated pain 7/10. Pt has 22 GIV to R arm. Pt has gauze with transparent film dressing to lower back CDI . SCD's and TEDs in place. Pt encouraged to continue use of IS. Pt verbalized understanding. Call light and possessions within reach. Bed in low position. Will continue to monitor. 1059  Pt ambulating in hallway with PT.     1130  Pt stated pain is 7/10. Medicated with 5mg of norco    1526  Gauze and transparent film removed. Incision site well approx and free from s/s of infection. Pt stated pain is 7/10. Mepilex applied to lower back. Pt tolerated well.

## 2017-09-17 NOTE — PROGRESS NOTES
Problem: Falls - Risk of  Goal: *Absence of Falls  Document Tushar Fall Risk and appropriate interventions in the flowsheet.    Outcome: Progressing Towards Goal  Fall Risk Interventions:  Mobility Interventions: PT Consult for mobility concerns, PT Consult for assist device competence, OT consult for ADLs, Patient to call before getting OOB, Communicate number of staff needed for ambulation/transfer, Utilize walker, cane, or other assitive device     Mentation Interventions: Adequate sleep, hydration, pain control     Medication Interventions: Patient to call before getting OOB, Teach patient to arise slowly     Elimination Interventions: Call light in reach, Toileting schedule/hourly rounds, Patient to call for help with toileting needs     History of Falls Interventions: Evaluate medications/consider consulting pharmacy, Investigate reason for fall

## 2017-09-17 NOTE — PROGRESS NOTES
Problem: Mobility Impaired (Adult and Pediatric)  Goal: *Acute Goals and Plan of Care (Insert Text)  Physical Therapy Goals LT/ST  Initiated 9/15/2017 and to be accomplished within 3-5 day(s)  1. Patient will move from supine <> sit with S in prep for out of bed activity and change of position. 2. Patient will perform sit<> stand with S with LRAD in prep for transfers/ambulation. 3. Patient will transfer from bed <> chair with S with LRAD for time up in chair for completion of ADL activity. 4. Patient will ambulate >50 feet with LRAD/S for improved functional mobility/safe discharge. Outcome: Progressing Towards Goal  PHYSICAL THERAPY TREATMENT     Patient: Ramses Costa (50 y.o. male)  Date: 9/17/2017  Diagnosis: Unable to ambulate  Hyponatremia  Dehydration Urinary retention  Precautions: Fall   Chart, physical therapy assessment, plan of care and goals were reviewed. ASSESSMENT:  Pt showing continued progress with mobility and slightly increasing ambulation distance with RW. Mild fatigue noted post tx. Pt willing to remain sitting in chair post tx. Reviewed seated HEP. Pt express understanding. Cont POC  Progression toward goals:  [ ]      Improving appropriately and progressing toward goals  [X]      Improving slowly and progressing toward goals  [ ]      Not making progress toward goals and plan of care will be adjusted       PLAN:  Patient continues to benefit from skilled intervention to address the above impairments. Continue treatment per established plan of care.   Discharge Recommendations:  Rehab  Further Equipment Recommendations for Discharge:  rolling walker       SUBJECTIVE:   Patient stated  \" Yes, lets get up\"      OBJECTIVE DATA SUMMARY:   Critical Behavior:  Neurologic State: Alert, Appropriate for age  Orientation Level: Oriented X4  Cognition: Appropriate decision making  Safety/Judgement: Awareness of environment  Functional Mobility Training:  Bed Mobility:  Supine to Sit: Supervision  Scooting: Supervision  Transfers:  Sit to Stand: Contact guard assistance  Stand to Sit: Contact guard assistance  Balance:  Sitting: Intact  Standing: Impaired; With support  Standing - Static: Fair;Constant support  Standing - Dynamic : Fair  Ambulation/Gait Training:  Distance (ft): 65 Feet (ft)  Assistive Device: Walker, rolling;Gait belt;Brace/Splint  Ambulation - Level of Assistance: Contact guard assistance;Minimal assistance  Gait Abnormalities: Decreased step clearance  Base of Support: Narrowed  Speed/Nicky: Slow  Step Length: Left shortened;Right shortened  Swing Pattern: Left asymmetrical;Right asymmetrical     Therapeutic Exercises:            EXERCISE   Sets   Reps   Active Active Assist   Passive Self ROM   Comments   Ankle Pumps 1 10  [X] [ ] [ ] [ ]     Quad Sets/Glut Sets     [ ] [ ] [ ] [ ]     Hamstring Sets     [ ] [ ] [ ] [ ]     Gabriela Cordova     [ ] [ ] [ ] [ ]     Ivan Lent     [ ] [ ] [ ] [ ]     Thresa Anes     [ ] [ ] [ ] [ ]     Hip Abd/Add     [ ] [ ] [ ] [ ]     Arlice Side 1 8 [X] [ ] [ ] [ ]     Adrienne Doss     [ ] [ ] [ ] [ ]     Michael Cortez     [ ] [ ] [ ] [ ]           [ ] [ ] [ ] [ ]           Pain:  Pain Scale 1: Numeric (0 - 10)  Pain Intensity 1: 7  Pain Location 1: Back  Pain Orientation 1: Lower  Pain Description 1: Aching  Pain Intervention(s) 1: Medication (see MAR)  Activity Tolerance:   Fair      After treatment:   [X] Patient left in no apparent distress sitting up in chair  [ ] Patient left in no apparent distress in bed  [X] Call bell left within reach  [ ] Nursing notified  [ ] Caregiver present  [ ] Bed alarm activated      Chris Wang PTA   Time Calculation: 13 mins

## 2017-09-17 NOTE — ROUTINE PROCESS
1930  Bedside and Verbal shift change report given to Ricky Baker RN (oncoming nurse) by Ondina Oleary RN (offgoing nurse). Report included the following information SBAR, Kardex, MAR and Recent Results.

## 2017-09-17 NOTE — PROGRESS NOTES
2147 - Patient in bed at this time. Patient A&Ox4, RA. Denies chest pain and SOB. 22G IV to left FA  intact and patent. COOPER to BLE. Gauze/tape dressing to lower back CDI. Denies numbness, tingling to BLE. Jimenez draining and patent. Pain 7/10 with a tolerable level of 3/10, pain medication administered per MAR. Pt educated on q2h rounds and pain management. Pt verbalized understanding, no concerns voiced. Call bell within reach, bed in lowest position. 0602 - Patient rated pain 7/10, pain medication administered per MAR. No other concerns voiced at this time. Call bell within reach, bed in lowest position.

## 2017-09-17 NOTE — ROUTINE PROCESS
Bedside and Verbal shift change report given to ILIANA Anderson RN by Edda Caba RN. Report included the following information SBAR, Kardex, OR Summary, Intake/Output and MAR.

## 2017-09-18 VITALS
SYSTOLIC BLOOD PRESSURE: 168 MMHG | DIASTOLIC BLOOD PRESSURE: 93 MMHG | RESPIRATION RATE: 18 BRPM | TEMPERATURE: 98.1 F | WEIGHT: 155.65 LBS | OXYGEN SATURATION: 96 % | HEART RATE: 99 BPM | BODY MASS INDEX: 21.79 KG/M2 | HEIGHT: 71 IN

## 2017-09-18 LAB
ANION GAP SERPL CALC-SCNC: 8 MMOL/L (ref 3–18)
BASOPHILS # BLD: 0.1 K/UL (ref 0–0.06)
BASOPHILS NFR BLD: 0 % (ref 0–2)
BUN SERPL-MCNC: 25 MG/DL (ref 7–18)
BUN/CREAT SERPL: 27 (ref 12–20)
CALCIUM SERPL-MCNC: 9.6 MG/DL (ref 8.5–10.1)
CHLORIDE SERPL-SCNC: 102 MMOL/L (ref 100–108)
CO2 SERPL-SCNC: 26 MMOL/L (ref 21–32)
CREAT SERPL-MCNC: 0.94 MG/DL (ref 0.6–1.3)
DIFFERENTIAL METHOD BLD: ABNORMAL
EOSINOPHIL # BLD: 0.3 K/UL (ref 0–0.4)
EOSINOPHIL NFR BLD: 3 % (ref 0–5)
ERYTHROCYTE [DISTWIDTH] IN BLOOD BY AUTOMATED COUNT: 14.4 % (ref 11.6–14.5)
GLUCOSE BLD STRIP.AUTO-MCNC: 103 MG/DL (ref 70–110)
GLUCOSE SERPL-MCNC: 88 MG/DL (ref 74–99)
HCT VFR BLD AUTO: 31 % (ref 36–48)
HGB BLD-MCNC: 10.7 G/DL (ref 13–16)
LYMPHOCYTES # BLD: 2.6 K/UL (ref 0.9–3.6)
LYMPHOCYTES NFR BLD: 19 % (ref 21–52)
MCH RBC QN AUTO: 31.6 PG (ref 24–34)
MCHC RBC AUTO-ENTMCNC: 34.5 G/DL (ref 31–37)
MCV RBC AUTO: 91.4 FL (ref 74–97)
MONOCYTES # BLD: 1.5 K/UL (ref 0.05–1.2)
MONOCYTES NFR BLD: 11 % (ref 3–10)
NEUTS SEG # BLD: 9.4 K/UL (ref 1.8–8)
NEUTS SEG NFR BLD: 67 % (ref 40–73)
PLATELET # BLD AUTO: 517 K/UL (ref 135–420)
PMV BLD AUTO: 9.2 FL (ref 9.2–11.8)
POTASSIUM SERPL-SCNC: 4.3 MMOL/L (ref 3.5–5.5)
RBC # BLD AUTO: 3.39 M/UL (ref 4.7–5.5)
SODIUM SERPL-SCNC: 136 MMOL/L (ref 136–145)
WBC # BLD AUTO: 13.9 K/UL (ref 4.6–13.2)

## 2017-09-18 PROCEDURE — 74011250637 HC RX REV CODE- 250/637: Performed by: PHYSICIAN ASSISTANT

## 2017-09-18 PROCEDURE — 82962 GLUCOSE BLOOD TEST: CPT

## 2017-09-18 PROCEDURE — 74011250637 HC RX REV CODE- 250/637: Performed by: UROLOGY

## 2017-09-18 PROCEDURE — 74011250637 HC RX REV CODE- 250/637: Performed by: FAMILY MEDICINE

## 2017-09-18 PROCEDURE — 80048 BASIC METABOLIC PNL TOTAL CA: CPT | Performed by: FAMILY MEDICINE

## 2017-09-18 PROCEDURE — 74011250637 HC RX REV CODE- 250/637: Performed by: HOSPITALIST

## 2017-09-18 PROCEDURE — 36415 COLL VENOUS BLD VENIPUNCTURE: CPT | Performed by: FAMILY MEDICINE

## 2017-09-18 PROCEDURE — 85025 COMPLETE CBC W/AUTO DIFF WBC: CPT | Performed by: FAMILY MEDICINE

## 2017-09-18 PROCEDURE — 97116 GAIT TRAINING THERAPY: CPT

## 2017-09-18 RX ADMIN — HYDROCODONE BITARTRATE AND ACETAMINOPHEN 1 TABLET: 5; 325 TABLET ORAL at 01:25

## 2017-09-18 RX ADMIN — ANTACID TABLETS 200 MG: 500 TABLET, CHEWABLE ORAL at 09:27

## 2017-09-18 RX ADMIN — ATORVASTATIN CALCIUM 20 MG: 20 TABLET, FILM COATED ORAL at 09:27

## 2017-09-18 RX ADMIN — POLYETHYLENE GLYCOL 3350 17 G: 17 POWDER, FOR SOLUTION ORAL at 09:27

## 2017-09-18 RX ADMIN — TAMSULOSIN HYDROCHLORIDE 0.8 MG: 0.4 CAPSULE ORAL at 09:27

## 2017-09-18 RX ADMIN — HYDROCHLOROTHIAZIDE: 25 TABLET ORAL at 09:27

## 2017-09-18 NOTE — PROGRESS NOTES
D/C Plan: [facility] 9/18/17      Pt has been accepted to [facility] for admission today. Spoke with pt and he is aware and in agreement with this plan. Medical transport will have to be arranged for pt to transfer to [facility] ([address]) due to pt's inability to ambulate to ensure a safe transition. Pt's wife will meet the pt at the facility. Please call report to [phone]. No other plan of care needs have been identified.

## 2017-09-18 NOTE — PROGRESS NOTES
Problem: Falls - Risk of  Goal: *Absence of Falls  Document Tushar Fall Risk and appropriate interventions in the flowsheet.    Outcome: Progressing Towards Goal  Fall Risk Interventions:  Mobility Interventions: Patient to call before getting OOB, Utilize walker, cane, or other assitive device     Mentation Interventions: Adequate sleep, hydration, pain control     Medication Interventions: Patient to call before getting OOB, Teach patient to arise slowly     Elimination Interventions: Call light in reach     History of Falls Interventions: Evaluate medications/consider consulting pharmacy, Investigate reason for fall

## 2017-09-18 NOTE — PROGRESS NOTES
2406 - Patient in chair at this time. A/O x 4. IV to right arm  intact and patent. SCDs and TEDs to bilateral legs. Mepilex dressing to back CDI. No numbness/tingling. Pedal pulses palpable. Lungs CTA. Bowel sounds active to all quadrants. Pain 3/10. Alonso catheter in place, draining clear, yellow urine. 1103-Report called to Shar Antonio RN at [address] [place] [address], with opportunity given for questions to be answered. 1150-Patient left facility for [address] [place] [address] via Medical Transfer. Pain medication x one tab given before leaving, unable to chart on MAR. IV access discontinued. Patient left with Alonso catheter in place for one week longer, Shar Antonio RN aware of alonso in place.

## 2017-09-18 NOTE — PROGRESS NOTES
1925 - Assumed care of patient at this time. Patient denies chest pain, shortness of breath, or numbness or tingling in the upper or lower extremities. Dressing on the lower back is clean dry and intact. COOPER hose in place on the patient. 22g IV in the right wrist is patent with no signs of phlebitis or infiltration. Patient currently experiencing 7/10 pain. Bed is in lowest position with the wheels locked. Siderails x 3 are up. Call bell within reach. Patient is currently resting in bed in no apparent distress. 1945 - Head to toe assessment performed at this time. Patient has no complaints of chest pain or shortness of breath. Denies any numbness or tingling in extremities. Lungs are clear to auscultation. Bowel sounds are present in all 4 quadrants. Patient educated how to  actively manage their pain. Patient encouraged to use the incentive spirometer. Patient educated on the side effects of medications. Explained the importance of ambulation. Patient left in bed with call light within reach, bed in lowest position with wheels locked, and siderails x 3 up. Will continue to monitor. Shift Summary - Patient had an uneventful night. Pt pain was controlled with PRN medications. Jimenez draining adequate urine. Patient encouraged to continue to actively manage pain and call for assistance. Patient left in room with no signs of distress. Call bell within reach, bed in lowest position with wheels locked, and siderails x 3 up.

## 2017-09-18 NOTE — PROGRESS NOTES
conducted an initial consultation and Spiritual Assessment for [facility], who is a 73 y.o.,male. According to the patients chart Congregational Affiliation is: No Gnosticism. Patient is in good spirits. Expect to be discharged to [address] [place] [address] soon. Cj. The reason the Patient came to the hospital is:   Patient Active Problem List    Diagnosis Date Noted    Type II diabetes mellitus with manifestations, uncontrolled ([facility] 09/16/2017    Anemia 09/16/2017    Renal mass 09/16/2017    Dehydration 09/14/2017    Hyponatremia 09/14/2017    Unable to ambulate 09/14/2017    Urinary retention 09/14/2017    DM (diabetes mellitus) ([facility] [address] 09/07/2017    Lumbar spinal stenosis 09/05/2017    Lumbar spondylosis 09/05/2017    Radiculopathy of leg 09/05/2017        The  provided the following Interventions:  Initiated a relationship of care and support. Explored issues of vik, belief, spirituality and Sikhism/ritual needs while hospitalized. Listened empathically. Provided information about Spiritual Care Services. Offered prayer and assurance of continued prayers on patients behalf. Chart reviewed. The following outcomes were achieved:  Patient shared limited information about their medical narrative, spiritual journey and beliefs.  confirmed Patient's Congregational Affiliation. Patient processed feelings about current hospitalization. Patient expressed gratitude for Spiritual Care visit. Assessment:  There are no significant spiritual or Sikhism issues which require further intervention at this time. Patient does not have any Sikhism or cultural needs that will affect patients preferences in health care. Plan:  Chaplains will continue to follow and will provide pastoral care as needed or requested.  recommends bedside caregivers page  on duty if patient shows signs of acute spiritual or emotional distress.     [address] [facility], [place]   Board Certified [facility]  [phone] - Office

## 2017-09-18 NOTE — ROUTINE PROCESS
Bedside and Verbal shift change report given to DEEP Villasenor RN (oncoming nurse) by LORIE Ortega RN (offgoing nurse). Report included the following information SBAR, Kardex, Intake/Output and MAR.

## 2017-09-18 NOTE — PROGRESS NOTES
Hospitalist Progress Note    Patient: Bettina Soriano MRN: [id number]  CSN: [id number]    YOB: 1945  Age: 73 y.o. Sex: male    DOA: 9/14/2017 LOS:  LOS: 4 days        Assessment/Plan     1. Urinary Retention  2. Lower Extremity Weakness, Bilateral  3. HTN  4. DM2  5. Renal Mass    Patient seen an examined this morning. Patient had uneventful weekend and is doing well. Still residual lower extremity weakness and back pain. Dr Cely Arriola has been following this patient, and Dr Jessica Pickard was consulted regarding renal mass. The plan moving forward is outpatient follow up with Dr Cely Arriola, and partial nephrectomy with Dr Jessica Pickard. Patient is stable enough for discharge and agree with Dr Caio blair regarding follow up and management of problems. Patient Active Problem List   Diagnosis Code    Lumbar spinal stenosis M48.06    Lumbar spondylosis M47.816    Radiculopathy of leg M54.10    DM (diabetes mellitus) ([facility] [address] E11.9    Dehydration E86.0    Hyponatremia E87.1    Unable to ambulate R26.2    Urinary retention R33.9    Type II diabetes mellitus with manifestations, uncontrolled (HCC) E11.8, E11.65    Anemia D64.9    Renal mass N28.89       Subjective:    Patient reports an easy weekend with some LE weakness and back pain.     Review of systems:    Constitutional: denies fevers, chills, myalgias  Respiratory: denies SOB, cough  Cardiovascular: denies chest pain, palpitations  Gastrointestinal: denies nausea, vomiting, diarrhea      Vital signs/Intake and Output:  Visit Vitals    BP (!) 168/93    Pulse 99    Temp 98.1 °F (36.7 °C)    Resp 18    Ht 5' 11\" (1.803 m)    Wt 70.6 kg (155 lb 10.3 oz)    SpO2 96%    BMI 21.71 kg/m2     Current Shift:  09/18 0701 - 09/18 1900  In: 120 [P.O.:120]  Out: -   Last three shifts:  09/16 1901 - 09/18 0700  In: 6518.2 [P.O.:1460; I.V.:5058.2]  Out: 6125 [Urine:6125]    Exam:    General: Well developed, alert, NAD, OX3  Head/Neck: NCAT, supple, No masses, No lymphadenopathy  CVS:Regular rate and rhythm, no M/R/G, S1/S2 heard, no thrill  Lungs:Clear to auscultation bilaterally, no wheezes, rhonchi, or rales  Abdomen: Soft, Nontender, No distention, Normal Bowel sounds, No hepatomegaly  Extremities: No C/C/E, pulses palpable 2+  Skin:normal texture and turgor, no rashes, no lesions  Neuro:grossly normal , follows commands  Psych:appropriate                Labs: Results:       Chemistry Recent Labs      09/18/17   0435   GLU  88   NA  136   K  4.3   CL  102   CO2  26   BUN  25*   CREA  0.94   CA  9.6   AGAP  8   BUCR  27*      CBC w/Diff Recent Labs      09/18/17 0435   WBC  13.9*   RBC  3.39*   HGB  10.7*   HCT  31.0*   PLT  517*   GRANS  67   LYMPH  19*   EOS  3      Cardiac Enzymes No results for input(s): CPK, CKND1, LUH in the last 72 hours. No lab exists for component: CKRMB, TROIP   Coagulation No results for input(s): PTP, INR, APTT in the last 72 hours. No lab exists for component: INREXT    Lipid Panel Lab Results   Component Value Date/Time    Cholesterol, total 146 06/01/2016 07:51 AM    HDL Cholesterol 40 06/01/2016 07:51 AM    LDL, calculated 91.6 06/01/2016 07:51 AM    VLDL, calculated 14.4 06/01/2016 07:51 AM    Triglyceride 72 06/01/2016 07:51 AM    CHOL/HDL Ratio 3.7 06/01/2016 07:51 AM      BNP No results for input(s): BNPP in the last 72 hours. Liver Enzymes No results for input(s): TP, ALB, TBIL, AP, SGOT, GPT in the last 72 hours.     No lab exists for component: DBIL   Thyroid Studies No results found for: T4, T3U, TSH, TSHEXT     Procedures/imaging: see electronic medical records for all procedures/Xrays and details which were not copied into this note but were reviewed prior to creation of [address] Miriam Wu, PA-C

## 2017-09-18 NOTE — PROGRESS NOTES
Problem: Mobility Impaired (Adult and Pediatric)  Goal: *Acute Goals and Plan of Care (Insert Text)  Physical Therapy Goals LT/ST  Initiated 9/15/2017 and to be accomplished within 3-5 day(s)  1. Patient will move from supine <> sit with S in prep for out of bed activity and change of position. 2. Patient will perform sit<> stand with S with LRAD in prep for transfers/ambulation. 3. Patient will transfer from bed <> chair with S with LRAD for time up in chair for completion of ADL activity. 4. Patient will ambulate >50 feet with LRAD/S for improved functional mobility/safe discharge. Outcome: Progressing Towards Goal  PHYSICAL THERAPY TREATMENT     Patient: Nam Madera (6 y.o. male)  Date: 9/18/2017  Diagnosis: Unable to ambulate  Hyponatremia  Dehydration Urinary retention  Precautions: Fall   Chart, physical therapy assessment, plan of care and goals were reviewed. ASSESSMENT:  Progression toward goals:  [ ]      Improving appropriately and progressing toward goals  [X]      Improving slowly and progressing toward goals  [ ]      Not making progress toward goals and plan of care will be adjusted       PLAN:  Patient continues to benefit from skilled intervention to address the above impairments. Continue treatment per established plan of care. Discharge Recommendations:  Rehab  Further Equipment Recommendations for Discharge:  rolling walker       SUBJECTIVE:   Patient stated  I'm ready      OBJECTIVE DATA SUMMARY:   Critical Behavior:  Neurologic State: Alert, Appropriate for age  Orientation Level: Oriented X4  Cognition: Appropriate for age attention/concentration  Safety/Judgement: Awareness of environment  Functional Mobility Training:  Bed Mobility:  Supine to Sit: Supervision  Scooting: Supervision  Transfers:  Sit to Stand: Contact guard assistance  Stand to Sit: Contact guard assistance  Balance:  Sitting: Intact  Standing: Impaired; With support  Standing - Static: Fair;Constant support  Standing - Dynamic : Fair  Ambulation/Gait Training:  Distance (ft): 125 Feet (ft)  Assistive Device: Walker, rolling;Gait belt  Ambulation - Level of Assistance: Contact guard assistance  Gait Abnormalities: Decreased step clearance  Base of Support: Narrowed  Speed/Nicky: Slow  Step Length: Right shortened;Left shortened  Swing Pattern: Right asymmetrical;Left asymmetrical     Pain:  Pain Scale 1: Visual  Pain Intensity 1: 8  Pain Location 1: Back  Pain Orientation 1: Lower  Pain Intervention(s) 1: Medication (see MAR)  Activity Tolerance:   Fair     After treatment:   [X] Patient left in no apparent distress sitting up in chair  [ ] Patient left in no apparent distress in bed  [X] Call bell left within reach  [ ] Nursing notified  [ ] Caregiver present  [ ] Bed alarm activated      Shanon Alexandra PTA   Time Calculation: 16 mins

## 2017-09-18 NOTE — ROUTINE PROCESS
Bedside and Verbal shift change report given to Jorje Jarvis RN (oncoming nurse) by Celina Nick RN (offgoing nurse).  Report included the following information SBAR, Procedure Summary, Intake/Output, MAR and Recent Results

## 2017-09-20 ENCOUNTER — HOSPITAL ENCOUNTER (OUTPATIENT)
Dept: LAB | Age: 72
Discharge: HOME | End: 2017-09-20
Payer: MEDICARE

## 2017-09-20 LAB
BASOPHILS # BLD: 0 K/UL (ref 0–0.1)
BASOPHILS NFR BLD: 0 % (ref 0–3)
DIFFERENTIAL METHOD BLD: ABNORMAL
EOSINOPHIL # BLD: 0.5 K/UL (ref 0–0.4)
EOSINOPHIL NFR BLD: 3 % (ref 0–5)
ERYTHROCYTE [DISTWIDTH] IN BLOOD BY AUTOMATED COUNT: 14.8 % (ref 11.6–14.5)
HCT VFR BLD AUTO: 34.2 % (ref 36–48)
HGB BLD-MCNC: 11.7 G/DL (ref 13–16)
LYMPHOCYTES # BLD: 3 K/UL (ref 0.8–3.5)
LYMPHOCYTES NFR BLD: 20 % (ref 20–51)
MCH RBC QN AUTO: 31.1 PG (ref 24–34)
MCHC RBC AUTO-ENTMCNC: 34.2 G/DL (ref 31–37)
MCV RBC AUTO: 91 FL (ref 74–97)
METAMYELOCYTES NFR BLD MANUAL: 6 %
MONOCYTES # BLD: 2.3 K/UL (ref 0–1)
MONOCYTES NFR BLD: 15 % (ref 2–9)
MYELOCYTES NFR BLD MANUAL: 3 %
NEUTS SEG # BLD: 8 K/UL (ref 1.8–8)
NEUTS SEG NFR BLD: 53 % (ref 42–75)
PLATELET # BLD AUTO: 613 K/UL (ref 135–420)
PLATELET COMMENTS,PCOM: ABNORMAL
PMV BLD AUTO: 9.3 FL (ref 9.2–11.8)
RBC # BLD AUTO: 3.76 M/UL (ref 4.7–5.5)
RBC MORPH BLD: ABNORMAL
WBC # BLD AUTO: 15 K/UL (ref 4.6–13.2)

## 2017-09-20 PROCEDURE — 85025 COMPLETE CBC W/AUTO DIFF WBC: CPT | Performed by: FAMILY MEDICINE

## 2017-09-26 ENCOUNTER — HOSPITAL ENCOUNTER (OUTPATIENT)
Dept: LAB | Age: 72
Discharge: HOME | End: 2017-09-26

## 2017-09-26 LAB
BASOPHILS # BLD: 0 K/UL (ref 0–0.06)
BASOPHILS NFR BLD: 0 % (ref 0–2)
DIFFERENTIAL METHOD BLD: ABNORMAL
EOSINOPHIL # BLD: 0.1 K/UL (ref 0–0.4)
EOSINOPHIL NFR BLD: 1 % (ref 0–5)
ERYTHROCYTE [DISTWIDTH] IN BLOOD BY AUTOMATED COUNT: 14.3 % (ref 11.6–14.5)
HCT VFR BLD AUTO: 35.3 % (ref 36–48)
HGB BLD-MCNC: 12.1 G/DL (ref 13–16)
LYMPHOCYTES # BLD: 1.6 K/UL (ref 0.9–3.6)
LYMPHOCYTES NFR BLD: 11 % (ref 21–52)
MCH RBC QN AUTO: 30.7 PG (ref 24–34)
MCHC RBC AUTO-ENTMCNC: 34.3 G/DL (ref 31–37)
MCV RBC AUTO: 89.6 FL (ref 74–97)
MONOCYTES # BLD: 2.9 K/UL (ref 0.05–1.2)
MONOCYTES NFR BLD: 20 % (ref 3–10)
NEUTS SEG # BLD: 9.8 K/UL (ref 1.8–8)
NEUTS SEG NFR BLD: 68 % (ref 40–73)
PLATELET # BLD AUTO: 426 K/UL (ref 135–420)
PMV BLD AUTO: 10 FL (ref 9.2–11.8)
RBC # BLD AUTO: 3.94 M/UL (ref 4.7–5.5)
RBC MORPH BLD: ABNORMAL
WBC # BLD AUTO: 14.4 K/UL (ref 4.6–13.2)

## 2017-09-26 PROCEDURE — 85025 COMPLETE CBC W/AUTO DIFF WBC: CPT | Performed by: FAMILY MEDICINE

## 2017-10-03 ENCOUNTER — HOSPITAL ENCOUNTER (EMERGENCY)
Age: 72
Discharge: HOME | End: 2017-10-03
Attending: EMERGENCY MEDICINE
Payer: MEDICARE

## 2017-10-03 VITALS
TEMPERATURE: 97.8 F | WEIGHT: 145 LBS | HEART RATE: 91 BPM | DIASTOLIC BLOOD PRESSURE: 79 MMHG | RESPIRATION RATE: 17 BRPM | SYSTOLIC BLOOD PRESSURE: 120 MMHG | BODY MASS INDEX: 20.3 KG/M2 | HEIGHT: 71 IN | OXYGEN SATURATION: 97 %

## 2017-10-03 DIAGNOSIS — R33.8 ACUTE URINARY RETENTION: Primary | ICD-10-CM

## 2017-10-03 DIAGNOSIS — N39.0 URINARY TRACT INFECTION WITHOUT HEMATURIA, SITE UNSPECIFIED: ICD-10-CM

## 2017-10-03 LAB
APPEARANCE UR: ABNORMAL
BACTERIA URNS QL MICRO: ABNORMAL /HPF
BILIRUB UR QL: NEGATIVE
COLOR UR: YELLOW
GLUCOSE UR STRIP.AUTO-MCNC: NEGATIVE MG/DL
HGB UR QL STRIP: ABNORMAL
HYALINE CASTS URNS QL MICRO: NEGATIVE /LPF (ref 0–2)
KETONES UR QL STRIP.AUTO: NEGATIVE MG/DL
LEUKOCYTE ESTERASE UR QL STRIP.AUTO: ABNORMAL
MUCOUS THREADS URNS QL MICRO: NEGATIVE /LPF
NITRITE UR QL STRIP.AUTO: NEGATIVE
PH UR STRIP: 7.5 [PH] (ref 5–8)
PROT UR STRIP-MCNC: 100 MG/DL
RBC #/AREA URNS HPF: ABNORMAL /HPF (ref 0–5)
SP GR UR REFRACTOMETRY: 1.01 (ref 1–1.03)
UROBILINOGEN UR QL STRIP.AUTO: 1 EU/DL (ref 0.2–1)
WBC URNS QL MICRO: ABNORMAL /HPF (ref 0–5)

## 2017-10-03 PROCEDURE — 51702 INSERT TEMP BLADDER CATH: CPT

## 2017-10-03 PROCEDURE — 74011250637 HC RX REV CODE- 250/637: Performed by: EMERGENCY MEDICINE

## 2017-10-03 PROCEDURE — 87077 CULTURE AEROBIC IDENTIFY: CPT | Performed by: EMERGENCY MEDICINE

## 2017-10-03 PROCEDURE — 87086 URINE CULTURE/COLONY COUNT: CPT | Performed by: EMERGENCY MEDICINE

## 2017-10-03 PROCEDURE — 99284 EMERGENCY DEPT VISIT MOD MDM: CPT

## 2017-10-03 PROCEDURE — 87186 SC STD MICRODIL/AGAR DIL: CPT | Performed by: EMERGENCY MEDICINE

## 2017-10-03 PROCEDURE — 77030034849

## 2017-10-03 PROCEDURE — 81001 URINALYSIS AUTO W/SCOPE: CPT | Performed by: EMERGENCY MEDICINE

## 2017-10-03 RX ORDER — LEVOFLOXACIN 500 MG/1
500 TABLET, FILM COATED ORAL
Status: COMPLETED | OUTPATIENT
Start: 2017-10-03 | End: 2017-10-03

## 2017-10-03 RX ORDER — LEVOFLOXACIN 500 MG/1
500 TABLET, FILM COATED ORAL DAILY
Qty: 7 TAB | Refills: 0 | Status: SHIPPED | OUTPATIENT
Start: 2017-10-03 | End: 2020-05-01

## 2017-10-03 RX ADMIN — LEVOFLOXACIN 500 MG: 500 TABLET, FILM COATED ORAL at 02:09

## 2017-10-03 NOTE — ED NOTES
Presented to ED to be evaluated for reported urinary retention since 10 a.m. Patient had indwelling alonso catheter in place x reported 5 days with removal @ 1000. Patient reports lower abdominal pain as documented. Patient denies any additional complaints at time of assessment. No s/s distress noted. Emptied 1000ccs cloudy yellow urine.

## 2017-10-03 NOTE — DISCHARGE INSTRUCTIONS
Urinary Retention: Care Instructions  Your Care Instructions    Urinary retention means that you aren't able to urinate. In men, it is often caused by a blockage of the urinary tract from an enlarged prostate gland. In men and women, it can also be caused by an infection or nerve damage. Or it may be a side effect of a medicine. The doctor may have drained the urine from your bladder. If you still have problems passing urine, you may need to use a catheter at home. This is used to empty your bladder until the problem can be fixed. Your doctor may put a catheter in your bladder before you go home. If so, he or she will tell you when to come back to have the catheter removed. The doctor has checked you closely. But problems can develop later. If you notice any problems or new symptoms, get medical treatment right away. Follow-up care is a key part of your treatment and safety. Be sure to make and go to all appointments, and call your doctor if you are having problems. It's also a good idea to know your test results and keep a list of the medicines you take. How can you care for yourself at home? · Take your medicines exactly as prescribed. Call your doctor if you think you are having a problem with your medicine. You will get more details on the specific medicines your doctor prescribes. · Check with your doctor before you use any over-the-counter medicines. Many cold and allergy medicines, for example, can make this problem worse. Make sure your doctor knows all of the medicines, vitamins, supplements, and herbal remedies you take. · Spread out through the day the amount of fluid you drink. Do not drink a lot at bedtime. · Avoid alcohol and caffeine. · If you have been given a catheter, or if one is already in place, follow the instructions you were given. Always wash your hands before and after you handle the catheter. When should you call for help?   Call your doctor now or seek immediate medical care if:  · You cannot urinate at all, or it is getting harder to urinate. · You have symptoms of a urinary tract infection. These may include:  ¨ Pain or burning when you urinate. ¨ A frequent need to urinate without being able to pass much urine. ¨ Pain in the flank, which is just below the rib cage and above the waist on either side of the back. ¨ Blood in your urine. ¨ A fever. Watch closely for changes in your health, and be sure to contact your doctor if:  · You have any problems with your catheter. · You do not get better as expected. Where can you learn more? Go to http://jacinta-sonia.info/. Enter M244 in the search box to learn more about \"Urinary Retention: Care Instructions. \"  Current as of: August 12, 2016  Content Version: 11.3  © 0598-1857 [employer]. Care instructions adapted under license by [employer] (which disclaims liability or warranty for this information). If you have questions about a medical condition or this instruction, always ask your healthcare professional. [place] [address] any warranty or liability for your use of this information. Urinary Tract Infections in Men: Care Instructions  Your Care Instructions    A urinary tract infection, or UTI, is a general term for an infection anywhere between the kidneys and the tip of the penis. UTIs can also be a result of a prostate problem. Most cause pain or burning when you urinate. Most UTIs are caused by bacteria and can be cured with antibiotics. It is important to complete your treatment so that the infection does not get worse. Follow-up care is a key part of your treatment and safety. Be sure to make and go to all appointments, and call your doctor if you are having problems. It's also a good idea to know your test results and keep a list of the medicines you take. How can you care for yourself at home? · Take your antibiotics as prescribed.  Do not stop taking them just because you feel better. You need to take the full course of antibiotics. · Take your medicines exactly as prescribed. Your doctor may have prescribed a medicine, such as phenazopyridine (Pyridium), to help relieve pain when you urinate. This turns your urine orange. You may stop taking it when your symptoms get better. But be sure to take all of your antibiotics, which treat the infection. · Drink extra water for the next day or two. This will help make the urine less concentrated and help wash out the bacteria causing the infection. (If you have kidney, heart, or liver disease and have to limit your fluids, talk with your doctor before you increase your fluid intake.)  · Avoid drinks that are carbonated or have caffeine. They can irritate the bladder. · Urinate often. Try to empty your bladder each time. · To relieve pain, take a hot bath or lay a heating pad (set on low) over your lower belly or genital area. Never go to sleep with a heating pad in place. To help prevent UTIs  · Drink plenty of fluids, enough so that your urine is light yellow or clear like water. If you have kidney, heart, or liver disease and have to limit fluids, talk with your doctor before you increase the amount of fluids you drink. · Urinate when you have the urge. Do not hold your urine for a long time. Urinate before you go to sleep. · Keep your penis clean. Catheter care  If you have a drainage tube (catheter) in place, the following steps will help you care for it. · Always wash your hands before and after touching your catheter. · Check the area around the urethra for inflammation or signs of infection. Signs of infection include irritated, swollen, red, or tender skin, or pus around the catheter. · Clean the area around the catheter with soap and water two times a day. Dry with a clean towel afterward. · Do not apply powder or lotion to the skin around the catheter.   To empty the urine collection bag  · Wash your hands with soap and water. · Without touching the drain spout, remove the spout from its sleeve at the bottom of the collection bag. Open the valve on the spout. · Let the urine flow out of the bag and into the toilet or a container. Do not let the tubing or drain spout touch anything. · After you empty the bag, clean the end of the drain spout with tissue and water. Close the valve and put the drain spout back into its sleeve at the bottom of the collection bag. · Wash your hands with soap and water. When should you call for help? Call your doctor now or seek immediate medical care if:  · Symptoms such as a fever, chills, nausea, or vomiting get worse or happen for the first time. · You have new pain in your back just below your rib cage. This is called flank pain. · There is new blood or pus in your urine. · You are not able to take or keep down your antibiotics. Watch closely for changes in your health, and be sure to contact your doctor if:  · You are not getting better after taking an antibiotic for 2 days. · Your symptoms go away but then come back. Where can you learn more? Go to http://jacinta-sonia.info/. Enter M997 in the search box to learn more about \"Urinary Tract Infections in Men: Care Instructions. \"  Current as of: November 28, 2016  Content Version: 11.3  © 1518-6993 [employer]. Care instructions adapted under license by [employer] (which disclaims liability or warranty for this information). If you have questions about a medical condition or this instruction, always ask your healthcare professional. [place] [address] any warranty or liability for your use of this information.

## 2017-10-03 NOTE — ED NOTES
Patient armband removed and shredded  I have reviewed discharge instructions with the patient and caregiver. The patient and caregiver verbalized understanding.

## 2017-10-03 NOTE — ED TRIAGE NOTES
Pt brought to ED by [facility] from [facility]. Pt had alonso catherter removed yesterday morning and reports urinary retention. Staff attempted to reinsert alonso catheter 3 times and were unsuccesfull.

## 2017-10-03 NOTE — ED PROVIDER NOTES
[address]  EMERGENCY DEPARTMENT HISTORY AND PHYSICAL EXAM       Date: 10/3/2017   Patient Name: Ellen Leo   YOB: 1945  Medical Record Number: [id number]      HISTORY OF PRESENTING ILLNESS     Chief Complaint   Patient presents with    Urinary Retention        History Provided By:  patient    Additional History:   1:08 AM    Ellen Leo is a 73 y.o. male with pertinent PMHx of HTN, urinary retention, DM and HLD presenting via EMS to the ED c/o urinary retention s/p alonso catheter being removed x 1000 this morning. Pt notes an associated symptom of suprapubic abdominal pain. Pt was sent here because they were unable to place a alonso at his home. Nursing were able to place a alonso catheter here with significant relief of his pain. Pt specifically denies any N/V/D or fever/chills. PCP: Hannah Ospina MD  Social Hx: + tobacco use, - alcohol use, - illicit drug use    There are no other complaints, changes, or physical findings at this time. PAST HISTORY    Past Medical History:   Past Medical History:   Diagnosis Date    Atherosclerosis     Diabetes ([facility] [address] 2013    type 2    Hyperlipidemia     Hypertension 2013    Muscle weakness     Neoplasm     right kidney    Spinal stenosis     Urinary retention     Vitamin D deficiency         Past Surgical History:   Past Surgical History:   Procedure Laterality Date    HX HEENT      40 years ago deviated septum    HX ORTHOPAEDIC      Lumbar laminectomy 9/7/17        Social History:   Social History   Substance Use Topics    Smoking status: Current Every Day Smoker     Packs/day: 1.00     Years: 40.00    Smokeless tobacco: Never Used      Comment: advised not to smoke 24 h pre-op    Alcohol use No        Allergies:   No Known Allergies     Review of Systems   Review of Systems   Constitutional: Negative for chills and fever. Gastrointestinal: Positive for abdominal pain (suprapubic).  Negative for diarrhea, nausea and vomiting. Genitourinary: Positive for difficulty urinating. All other systems reviewed and are negative. PHYSICAL EXAM    Vitals:    10/03/17 0107   BP: 138/85   Pulse: 100   Resp: 16   Temp: 97.3 °F (36.3 °C)   SpO2: 98%   Weight: 65.8 kg (145 lb)   Height: 5' 11\" (1.803 m)       Physical Exam   Constitutional: He is oriented to person, place, and time. He appears well-developed and well-nourished. HENT:   Head: Normocephalic and atraumatic. Right Ear: External ear normal.   Left Ear: External ear normal.   Nose: Nose normal.   Mouth/Throat: Oropharynx is clear and moist.   Eyes: Conjunctivae and EOM are normal. Pupils are equal, round, and reactive to light. Neck: Normal range of motion. Neck supple. No JVD present. No tracheal deviation present. No thyromegaly present. Cardiovascular: Normal rate, regular rhythm, normal heart sounds and intact distal pulses. Exam reveals no gallop and no friction rub. No murmur heard. Pulmonary/Chest: Effort normal and breath sounds normal. No respiratory distress. He has no wheezes. He has no rales. Abdominal: Soft. Bowel sounds are normal. He exhibits no distension and no mass. There is tenderness (mild suprapubic TTP). There is no rebound and no guarding. Musculoskeletal: Normal range of motion. Neurological: He is alert and oriented to person, place, and time. He has normal reflexes. No cranial nerve deficit. Skin: Skin is warm and dry. No rash noted. Psychiatric: He has a normal mood and affect. His behavior is normal.   Nursing note and vitals reviewed.       DIAGNOSTIC RESULTS    Labs -      Recent Results (from the past 12 hour(s))   URINALYSIS W/ RFLX MICROSCOPIC    Collection Time: 10/03/17  1:16 AM   Result Value Ref Range    Color YELLOW      Appearance TURBID      Specific gravity 1.011 1.005 - 1.030      pH (UA) 7.5 5.0 - 8.0      Protein 100 (A) NEG mg/dL    Glucose NEGATIVE  NEG mg/dL    Ketone NEGATIVE NEG mg/dL    Bilirubin NEGATIVE  NEG      Blood MODERATE (A) NEG      Urobilinogen 1.0 0.2 - 1.0 EU/dL    Nitrites NEGATIVE  NEG      Leukocyte Esterase LARGE (A) NEG     URINE MICROSCOPIC ONLY    Collection Time: 10/03/17  1:16 AM   Result Value Ref Range    WBC TOO NUMEROUS TO COUNT 0 - 5 /hpf    RBC 5 to 10 0 - 5 /hpf    Bacteria 4+ (A) NEG /hpf    Mucus NEGATIVE  NEG /lpf    Hyaline cast NEGATIVE  0 - 2 /lpf       MEDICAL DECISION MAKING  I am the first provider for this patient. I reviewed the vital signs, available nursing notes, past medical history, past surgical history, family history and social history. Vital Signs-Reviewed the patient's vital signs. Patient Vitals for the past 12 hrs:   Temp Pulse Resp BP SpO2   10/03/17 0107 97.3 °F (36.3 °C) 100 16 138/85 98 %       Pulse Oximetry Analysis - Normal 98% on RA      Old Medical Records: Nursing notes. PROVIDERS NOTES: INITIAL CLINICAL IMPRESSION and PLANS:  The patient presents with the primary complaint(s) of: difficulty urinating. The presentation, to include historical aspects and clinical findings are consistent with the DX of urinary retention. However, other possible DX's to consider as primary, associated with, or exacerbated by include:    1. BPH  2.  UTI    Considering the above, my initial management plan to evaluate and therapeutic interventions include the following and as noted in the orders:    1. Labs: UA       Procedures:   Procedures    ED Course:    1:08 AM  Initial assessment performed. Medications   levoFLOXacin (LEVAQUIN) tablet 500 mg (500 mg Oral Given 10/3/17 0209)     Discussion:  Patient was stable in the ED improved after alonso catheter placement with resolution of suprapubic pain. Patient had more than 1 liter urine output. U/A showed UTI. Patient was given Levaquin. Alonso placed to leg bag. Patient will follow-up with Urology and PCP for further evaluation.     DISCHARGE NOTE:  2:00 AM  The patient is ready for discharge. The patient's signs, symptoms, diagnosis, and discharge instructions have been discussed and the patient and/or family has conveyed their understanding. The patient and/or family is to follow up as recommended or return to the ER should their symptoms worsen. Plan has been discussed and the patient and/or family is in agreement. Written by Jillian Ovalle ED Scribe, as dictated by Danny Solis MD.     DIAGNOSIS  Clinical Impression:   1. Acute urinary retention    2. Urinary tract infection without hematuria, site unspecified         PLAN: D/C    Follow-up Information     Follow up With Details Comments Contact Info    Rex Serrano MD Schedule an appointment as soon as possible for a visit for primary care follow up, as needed [address]      Krystle Gray MD Schedule an appointment as soon as possible for a visit for urology follow up; OR with your urologist [address] [place] [address] [phone]      [facility] EMERGENCY DEPT  As needed, If symptoms worsen [address] [address] [phone]          Current Discharge Medication List      START taking these medications    Details   levoFLOXacin (LEVAQUIN) 500 mg tablet Take 1 Tab by mouth daily. Qty: 7 Tab, Refills: 0             _______________________________   Attestation: This note is prepared by Annalise Rader. Drea Ovalle, acting as Scribe for Danny Solis MD; at 1:08 AM on 10/3/2017. Danny Solis MD: The scribe's documentation has been prepared under my direction and personally reviewed by me in its entirety.  I confirm that the note above accurately reflects all work, treatment, procedures, and medical decision making performed by me.     _______________________________

## 2017-10-03 NOTE — ED NOTES
Called report to [facility] with discharge information given to receiving RN(Kandi)

## 2017-10-04 NOTE — CALL BACK NOTE
11:08 AM  10/04/2017    Reviewed recent [facility] ED visit. + growth on culture. On levaquin. Will await sensitivity.      Johnathan Lindsey PA-C

## 2017-10-05 LAB
BACTERIA SPEC CULT: ABNORMAL
SERVICE CMNT-IMP: ABNORMAL

## 2017-10-05 NOTE — CALL BACK NOTE
9: 07 AM  10/05/2017    On levaquin for UTI. Sensitive per C&S. On appropriate tx.      Kemi Sow PA-C

## 2020-05-01 ENCOUNTER — HOSPITAL ENCOUNTER (INPATIENT)
Age: 75
LOS: 3 days | Discharge: HOME HEALTH | DRG: 698 | End: 2020-05-04
Attending: EMERGENCY MEDICINE | Admitting: HOSPITALIST
Payer: MEDICARE

## 2020-05-01 ENCOUNTER — APPOINTMENT (OUTPATIENT)
Dept: GENERAL RADIOLOGY | Age: 75
DRG: 698 | End: 2020-05-01
Attending: EMERGENCY MEDICINE
Payer: MEDICARE

## 2020-05-01 DIAGNOSIS — T83.511A URINARY TRACT INFECTION ASSOCIATED WITH INDWELLING URETHRAL CATHETER, INITIAL ENCOUNTER (HCC): Primary | ICD-10-CM

## 2020-05-01 DIAGNOSIS — N39.0 URINARY TRACT INFECTION ASSOCIATED WITH INDWELLING URETHRAL CATHETER, INITIAL ENCOUNTER (HCC): Primary | ICD-10-CM

## 2020-05-01 DIAGNOSIS — N17.9 AKI (ACUTE KIDNEY INJURY) (HCC): ICD-10-CM

## 2020-05-01 PROBLEM — E87.1 HYPONATREMIA: Status: RESOLVED | Noted: 2017-09-14 | Resolved: 2020-05-01

## 2020-05-01 PROBLEM — D64.9 ANEMIA: Status: RESOLVED | Noted: 2017-09-16 | Resolved: 2020-05-01

## 2020-05-01 PROBLEM — E86.0 DEHYDRATION: Status: RESOLVED | Noted: 2017-09-14 | Resolved: 2020-05-01

## 2020-05-01 PROBLEM — E11.9 DM (DIABETES MELLITUS) (HCC): Chronic | Status: RESOLVED | Noted: 2017-09-07 | Resolved: 2020-05-01

## 2020-05-01 PROBLEM — A41.9 SEPSIS (HCC): Status: ACTIVE | Noted: 2020-05-01

## 2020-05-01 LAB
ALBUMIN SERPL-MCNC: 3.2 G/DL (ref 3.4–5)
ALBUMIN/GLOB SERPL: 0.7 {RATIO} (ref 0.8–1.7)
ALP SERPL-CCNC: 120 U/L (ref 45–117)
ALT SERPL-CCNC: 27 U/L (ref 16–61)
ANION GAP SERPL CALC-SCNC: 5 MMOL/L (ref 3–18)
APPEARANCE UR: ABNORMAL
AST SERPL-CCNC: 31 U/L (ref 10–38)
BACTERIA URNS QL MICRO: ABNORMAL /HPF
BASOPHILS # BLD: 0 K/UL (ref 0–0.1)
BASOPHILS NFR BLD: 0 % (ref 0–3)
BILIRUB SERPL-MCNC: 0.7 MG/DL (ref 0.2–1)
BILIRUB UR QL: NEGATIVE
BUN SERPL-MCNC: 27 MG/DL (ref 7–18)
BUN/CREAT SERPL: 17 (ref 12–20)
CALCIUM SERPL-MCNC: 8.7 MG/DL (ref 8.5–10.1)
CHLORIDE SERPL-SCNC: 107 MMOL/L (ref 100–111)
CK MB CFR SERPL CALC: 0.6 % (ref 0–4)
CK MB SERPL-MCNC: 1.8 NG/ML (ref 5–25)
CK SERPL-CCNC: 299 U/L (ref 39–308)
CO2 SERPL-SCNC: 27 MMOL/L (ref 21–32)
COLOR UR: ABNORMAL
CREAT SERPL-MCNC: 1.55 MG/DL (ref 0.6–1.3)
DIFFERENTIAL METHOD BLD: ABNORMAL
EOSINOPHIL # BLD: 0 K/UL (ref 0–0.4)
EOSINOPHIL NFR BLD: 0 % (ref 0–5)
EPITH CASTS URNS QL MICRO: ABNORMAL /LPF (ref 0–5)
ERYTHROCYTE [DISTWIDTH] IN BLOOD BY AUTOMATED COUNT: 15.6 % (ref 11.6–14.5)
EST. AVERAGE GLUCOSE BLD GHB EST-MCNC: 123 MG/DL
GLOBULIN SER CALC-MCNC: 4.4 G/DL (ref 2–4)
GLUCOSE BLD STRIP.AUTO-MCNC: 96 MG/DL (ref 70–110)
GLUCOSE SERPL-MCNC: 133 MG/DL (ref 74–99)
GLUCOSE UR STRIP.AUTO-MCNC: NEGATIVE MG/DL
HBA1C MFR BLD: 5.9 % (ref 4.2–5.6)
HCT VFR BLD AUTO: 39.9 % (ref 36–48)
HGB BLD-MCNC: 13.6 G/DL (ref 13–16)
HGB UR QL STRIP: ABNORMAL
KETONES UR QL STRIP.AUTO: ABNORMAL MG/DL
LACTATE SERPL-SCNC: 1.9 MMOL/L (ref 0.4–2)
LEUKOCYTE ESTERASE UR QL STRIP.AUTO: ABNORMAL
LYMPHOCYTES # BLD: 3.1 K/UL (ref 0.8–3.5)
LYMPHOCYTES NFR BLD: 12 % (ref 20–51)
MCH RBC QN AUTO: 30.2 PG (ref 24–34)
MCHC RBC AUTO-ENTMCNC: 34.1 G/DL (ref 31–37)
MCV RBC AUTO: 88.7 FL (ref 74–97)
MONOCYTES # BLD: 1.8 K/UL (ref 0–1)
MONOCYTES NFR BLD: 7 % (ref 2–9)
NEUTS BAND NFR BLD MANUAL: 1 % (ref 0–5)
NEUTS SEG # BLD: 21 K/UL (ref 1.8–8)
NEUTS SEG NFR BLD: 80 % (ref 42–75)
NITRITE UR QL STRIP.AUTO: POSITIVE
PH UR STRIP: 6 [PH] (ref 5–8)
PLATELET # BLD AUTO: 235 K/UL (ref 135–420)
PLATELET COMMENTS,PCOM: ABNORMAL
PMV BLD AUTO: 10.2 FL (ref 9.2–11.8)
POTASSIUM SERPL-SCNC: 4 MMOL/L (ref 3.5–5.5)
PROT SERPL-MCNC: 7.6 G/DL (ref 6.4–8.2)
PROT UR STRIP-MCNC: 100 MG/DL
RBC # BLD AUTO: 4.5 M/UL (ref 4.7–5.5)
RBC #/AREA URNS HPF: ABNORMAL /HPF (ref 0–5)
RBC MORPH BLD: ABNORMAL
SODIUM SERPL-SCNC: 139 MMOL/L (ref 136–145)
SP GR UR REFRACTOMETRY: 1.02 (ref 1–1.03)
TROPONIN I SERPL-MCNC: <0.02 NG/ML (ref 0–0.04)
UROBILINOGEN UR QL STRIP.AUTO: 1 EU/DL (ref 0.2–1)
WBC # BLD AUTO: 26.2 K/UL (ref 4.6–13.2)
WBC URNS QL MICRO: ABNORMAL /HPF (ref 0–5)

## 2020-05-01 PROCEDURE — 74011250637 HC RX REV CODE- 250/637: Performed by: HOSPITALIST

## 2020-05-01 PROCEDURE — 87040 BLOOD CULTURE FOR BACTERIA: CPT

## 2020-05-01 PROCEDURE — 65270000029 HC RM PRIVATE

## 2020-05-01 PROCEDURE — 82962 GLUCOSE BLOOD TEST: CPT

## 2020-05-01 PROCEDURE — 74011250636 HC RX REV CODE- 250/636: Performed by: EMERGENCY MEDICINE

## 2020-05-01 PROCEDURE — 83036 HEMOGLOBIN GLYCOSYLATED A1C: CPT

## 2020-05-01 PROCEDURE — 80053 COMPREHEN METABOLIC PANEL: CPT

## 2020-05-01 PROCEDURE — 96375 TX/PRO/DX INJ NEW DRUG ADDON: CPT

## 2020-05-01 PROCEDURE — 87086 URINE CULTURE/COLONY COUNT: CPT

## 2020-05-01 PROCEDURE — 74011250636 HC RX REV CODE- 250/636: Performed by: HOSPITALIST

## 2020-05-01 PROCEDURE — 96374 THER/PROPH/DIAG INJ IV PUSH: CPT

## 2020-05-01 PROCEDURE — 87186 SC STD MICRODIL/AGAR DIL: CPT

## 2020-05-01 PROCEDURE — 51702 INSERT TEMP BLADDER CATH: CPT

## 2020-05-01 PROCEDURE — 87077 CULTURE AEROBIC IDENTIFY: CPT

## 2020-05-01 PROCEDURE — 74011250637 HC RX REV CODE- 250/637: Performed by: INTERNAL MEDICINE

## 2020-05-01 PROCEDURE — 83605 ASSAY OF LACTIC ACID: CPT

## 2020-05-01 PROCEDURE — 85025 COMPLETE CBC W/AUTO DIFF WBC: CPT

## 2020-05-01 PROCEDURE — 74011000258 HC RX REV CODE- 258: Performed by: HOSPITALIST

## 2020-05-01 PROCEDURE — 74011250637 HC RX REV CODE- 250/637: Performed by: EMERGENCY MEDICINE

## 2020-05-01 PROCEDURE — 93005 ELECTROCARDIOGRAM TRACING: CPT

## 2020-05-01 PROCEDURE — 82550 ASSAY OF CK (CPK): CPT

## 2020-05-01 PROCEDURE — 99285 EMERGENCY DEPT VISIT HI MDM: CPT

## 2020-05-01 PROCEDURE — 74011000250 HC RX REV CODE- 250: Performed by: EMERGENCY MEDICINE

## 2020-05-01 PROCEDURE — 71045 X-RAY EXAM CHEST 1 VIEW: CPT

## 2020-05-01 PROCEDURE — 81001 URINALYSIS AUTO W/SCOPE: CPT

## 2020-05-01 RX ORDER — GUAIFENESIN 100 MG/5ML
81 LIQUID (ML) ORAL DAILY
COMMUNITY

## 2020-05-01 RX ORDER — LEVOFLOXACIN 5 MG/ML
750 INJECTION, SOLUTION INTRAVENOUS EVERY 24 HOURS
Status: DISCONTINUED | OUTPATIENT
Start: 2020-05-01 | End: 2020-05-04 | Stop reason: HOSPADM

## 2020-05-01 RX ORDER — CALCIUM CARBONATE 200(500)MG
200 TABLET,CHEWABLE ORAL
Status: DISCONTINUED | OUTPATIENT
Start: 2020-05-02 | End: 2020-05-04 | Stop reason: HOSPADM

## 2020-05-01 RX ORDER — IBUPROFEN 200 MG
1 TABLET ORAL DAILY
Status: DISCONTINUED | OUTPATIENT
Start: 2020-05-01 | End: 2020-05-04 | Stop reason: HOSPADM

## 2020-05-01 RX ORDER — LEVOFLOXACIN 5 MG/ML
750 INJECTION, SOLUTION INTRAVENOUS EVERY 24 HOURS
Status: DISCONTINUED | OUTPATIENT
Start: 2020-05-01 | End: 2020-05-01

## 2020-05-01 RX ORDER — LISINOPRIL 20 MG/1
20 TABLET ORAL DAILY
Status: DISCONTINUED | OUTPATIENT
Start: 2020-05-02 | End: 2020-05-04 | Stop reason: HOSPADM

## 2020-05-01 RX ORDER — CALCIUM CARBONATE 200(500)MG
200 TABLET,CHEWABLE ORAL
Status: DISCONTINUED | OUTPATIENT
Start: 2020-05-01 | End: 2020-05-04 | Stop reason: HOSPADM

## 2020-05-01 RX ORDER — HYDROCHLOROTHIAZIDE 25 MG/1
25 TABLET ORAL DAILY
Status: DISCONTINUED | OUTPATIENT
Start: 2020-05-02 | End: 2020-05-04 | Stop reason: HOSPADM

## 2020-05-01 RX ORDER — GUAIFENESIN 100 MG/5ML
81 LIQUID (ML) ORAL DAILY
Status: DISCONTINUED | OUTPATIENT
Start: 2020-05-02 | End: 2020-05-04 | Stop reason: HOSPADM

## 2020-05-01 RX ORDER — SODIUM CHLORIDE 9 MG/ML
100 INJECTION, SOLUTION INTRAVENOUS CONTINUOUS
Status: DISCONTINUED | OUTPATIENT
Start: 2020-05-01 | End: 2020-05-04

## 2020-05-01 RX ORDER — INSULIN LISPRO 100 [IU]/ML
INJECTION, SOLUTION INTRAVENOUS; SUBCUTANEOUS
Status: DISCONTINUED | OUTPATIENT
Start: 2020-05-01 | End: 2020-05-04 | Stop reason: HOSPADM

## 2020-05-01 RX ORDER — ACETAMINOPHEN 325 MG/1
650 TABLET ORAL
Status: DISCONTINUED | OUTPATIENT
Start: 2020-05-01 | End: 2020-05-04 | Stop reason: HOSPADM

## 2020-05-01 RX ORDER — METOPROLOL SUCCINATE 25 MG/1
1 TABLET, EXTENDED RELEASE ORAL
COMMUNITY
Start: 2020-03-21 | End: 2021-01-01

## 2020-05-01 RX ORDER — MAGNESIUM SULFATE 100 %
4 CRYSTALS MISCELLANEOUS AS NEEDED
Status: DISCONTINUED | OUTPATIENT
Start: 2020-05-01 | End: 2020-05-04 | Stop reason: HOSPADM

## 2020-05-01 RX ORDER — ACETAMINOPHEN 500 MG
1000 TABLET ORAL ONCE
Status: COMPLETED | OUTPATIENT
Start: 2020-05-01 | End: 2020-05-01

## 2020-05-01 RX ORDER — IBUPROFEN 200 MG
1 TABLET ORAL DAILY
Status: DISCONTINUED | OUTPATIENT
Start: 2020-05-02 | End: 2020-05-01

## 2020-05-01 RX ORDER — METOPROLOL SUCCINATE 25 MG/1
25 TABLET, EXTENDED RELEASE ORAL DAILY
Status: DISCONTINUED | OUTPATIENT
Start: 2020-05-02 | End: 2020-05-04 | Stop reason: HOSPADM

## 2020-05-01 RX ORDER — ONDANSETRON 2 MG/ML
4 INJECTION INTRAMUSCULAR; INTRAVENOUS
Status: DISCONTINUED | OUTPATIENT
Start: 2020-05-01 | End: 2020-05-04 | Stop reason: HOSPADM

## 2020-05-01 RX ORDER — ENOXAPARIN SODIUM 100 MG/ML
40 INJECTION SUBCUTANEOUS EVERY 24 HOURS
Status: DISCONTINUED | OUTPATIENT
Start: 2020-05-01 | End: 2020-05-04 | Stop reason: HOSPADM

## 2020-05-01 RX ADMIN — CEFEPIME HYDROCHLORIDE 2 G: 2 INJECTION, POWDER, FOR SOLUTION INTRAVENOUS at 23:18

## 2020-05-01 RX ADMIN — SODIUM CHLORIDE 178 ML: 900 INJECTION, SOLUTION INTRAVENOUS at 17:33

## 2020-05-01 RX ADMIN — ANTACID TABLETS 200 MG: 500 TABLET, CHEWABLE ORAL at 21:35

## 2020-05-01 RX ADMIN — LEVOFLOXACIN 750 MG: 5 INJECTION, SOLUTION INTRAVENOUS at 16:25

## 2020-05-01 RX ADMIN — SODIUM CHLORIDE 1000 ML: 900 INJECTION, SOLUTION INTRAVENOUS at 16:25

## 2020-05-01 RX ADMIN — ENOXAPARIN SODIUM 40 MG: 40 INJECTION SUBCUTANEOUS at 17:34

## 2020-05-01 RX ADMIN — SODIUM CHLORIDE 1000 ML: 900 INJECTION, SOLUTION INTRAVENOUS at 16:00

## 2020-05-01 RX ADMIN — CEFEPIME HYDROCHLORIDE 2 G: 2 INJECTION, POWDER, FOR SOLUTION INTRAVENOUS at 16:19

## 2020-05-01 RX ADMIN — ACETAMINOPHEN 1000 MG: 500 TABLET ORAL at 16:00

## 2020-05-01 NOTE — ED TRIAGE NOTES
Pt to ED via EMS after EMS was called by pt's wife. EMS reports cough, but pt denies cough, subjective fever/chills (found to have temp of 100.8F upon arrival), CP/SOB, N/V/D. Pt reports hx of smoking, and chronic alonso catheter d/t back surgery, pt pt. Pt arrives to ED Aox4, w/ SpO2 sats ranging from 95-98% on RA, pt respirations unlabored with regular rate and rhythm. Pt placed on cardiac monitoring x3.

## 2020-05-01 NOTE — ED PROVIDER NOTES
EMERGENCY DEPARTMENT HISTORY AND PHYSICAL EXAM 
 
Date: 5/1/2020 Patient Name: Shaylee Pierre History of Presenting Illness Chief Complaint Patient presents with  Fever History Provided By: [address] Shaylee Pierre is a 69 y.o. male with PMHX of hypertension, diabetes, spinal stenosis, chronic indwelling Jimenez who presents to the emergency department C/O altered mental status. Patient arrives via EMS. He reports that he is not sure why he is in the hospital however when pressed reports that his wife called the medics because he was acting funny. Patient denies complaints to me. He denies chest pain, trouble breathing, fever, chills. No abdominal pain. Reports recently exchange Jimenez catheter. States normal diet. Is otherwise been a state of his normal health. Reports less of his wife and his dog who have both been healthy PCP: Karen Rosario MD 
 
Current Facility-Administered Medications Medication Dose Route Frequency Provider Last Rate Last Dose  cefepime (MAXIPIME) 2 g in sterile water (preservative free) 10 mL IV syringe  2 g IntraVENous Q8H Park Manriquez MD      
 levoFLOXacin (LEVAQUIN) 750 mg in D5W IVPB  750 mg IntraVENous Q24H Clair Aguila MD      
 sodium chloride 0.9 % bolus infusion 1,000 mL  1,000 mL IntraVENous ONCE Clair Aguila MD      
 
Current Outpatient Medications Medication Sig Dispense Refill  metoprolol succinate (TOPROL-XL) 25 mg XL tablet Take 1 Tab by mouth daily.  aspirin 81 mg chewable tablet Take 81 mg by mouth daily.  cyclobenzaprine (FLEXERIL) 10 mg tablet Take 1 Tab by mouth three (3) times daily as needed for Muscle Spasm(s). 40 Tab 1  
 lisinopril-hydroCHLOROthiazide (PRINZIDE, ZESTORETIC) 20-25 mg per tablet Take  by mouth daily.  metFORMIN (GLUCOPHAGE) 500 mg tablet Take 500 mg by mouth two (2) times daily (with meals).  cholecalciferol, vitamin D3, (VITAMIN D3) 2,000 unit tab Take  by mouth.  multivitamin (ONE A DAY) tablet Take 1 Tab by mouth daily. Past History Past Medical History: 
Past Medical History:  
Diagnosis Date  Atherosclerosis  Diabetes ([address] 2013  
 type 2  
 Hyperlipidemia  Hypertension 2013  Muscle weakness  Neoplasm   
 right kidney  Spinal stenosis  Urinary retention  Vitamin D deficiency Past Surgical History: 
Past Surgical History:  
Procedure Laterality Date  HX HEENT    
 40 years ago deviated septum  HX ORTHOPAEDIC Lumbar laminectomy 9/7/17 Family History: 
History reviewed. No pertinent family history. Social History: 
Social History Tobacco Use  Smoking status: Current Every Day Smoker Packs/day: 1.00 Years: 40.00 Pack years: 40.00  Smokeless tobacco: Never Used  Tobacco comment: advised not to smoke 24 h pre-op Substance Use Topics  Alcohol use: No  
 Drug use: No  
 
 
Allergies: 
No Known Allergies Review of Systems Review of Systems Constitutional: Negative for chills and fever. Respiratory: Negative for cough, chest tightness and shortness of breath. Cardiovascular: Negative for chest pain and palpitations. Gastrointestinal: Negative for abdominal pain, diarrhea, nausea and vomiting. Genitourinary: Negative for penile pain. Musculoskeletal: Negative for arthralgias and back pain. Neurological: Negative for weakness, light-headedness and headaches. All other systems reviewed and are negative. Physical Exam  
 
Vitals:  
 05/01/20 1445 05/01/20 1455 05/01/20 1500 05/01/20 1515 BP: 135/63  129/64 129/67 Pulse: (!) 105 (!) 105 (!) 107 (!) 104 Resp: 16 23 23 19 Temp: (!) 100.8 °F (38.2 °C) SpO2: 97% 95% 95% 96% Weight: 72.6 kg (160 lb) Height: 5' 11\" (1.803 m) Physical Exam 
Vitals signs and nursing note reviewed. Constitutional: General: He is not in acute distress. Appearance: Normal appearance. He is well-developed and underweight. He is not ill-appearing. HENT:  
   Head: Normocephalic and atraumatic. Eyes:  
   Conjunctiva/sclera: Conjunctivae normal.  
   Pupils: Pupils are equal, round, and reactive to light. Neck: Musculoskeletal: Normal range of motion and neck supple. Cardiovascular:  
   Rate and Rhythm: Regular rhythm. Tachycardia present. Frequent extrasystoles are present. Heart sounds: Normal heart sounds. No murmur. Comments: Bigeminy Pulmonary:  
   Effort: Pulmonary effort is normal. No tachypnea or respiratory distress. Breath sounds: Normal breath sounds and air entry. No wheezing or rales. Chest:  
   Chest wall: No tenderness. Abdominal:  
   General: Abdomen is flat. There is no distension. Palpations: Abdomen is soft. Tenderness: There is no abdominal tenderness. There is no guarding or rebound. Musculoskeletal: Normal range of motion. General: No tenderness. Lymphadenopathy:  
   Cervical: No cervical adenopathy. Skin: 
   General: Skin is warm and dry. Neurological:  
   General: No focal deficit present. Mental Status: He is alert and oriented to person, place, and time. Cranial Nerves: No cranial nerve deficit. Motor: No abnormal muscle tone. Coordination: Coordination normal.  
Psychiatric:     
   Mood and Affect: Mood normal.     
   Behavior: Behavior normal.  
 
 
 
 
Diagnostic Study Results Labs - Recent Results (from the past 12 hour(s)) CBC WITH AUTOMATED DIFF Collection Time: 05/01/20  2:55 PM  
Result Value Ref Range WBC 26.2 (H) 4.6 - 13.2 K/uL  
 RBC 4.50 (L) 4.70 - 5.50 M/uL  
 HGB 13.6 13.0 - 16.0 g/dL HCT 39.9 36.0 - 48.0 % MCV 88.7 74.0 - 97.0 FL  
 MCH 30.2 24.0 - 34.0 PG  
 MCHC 34.1 31.0 - 37.0 g/dL  
 RDW 15.6 (H) 11.6 - 14.5 % PLATELET 436 594 - 352 K/uL  MPV 10.2 9.2 - 11.8 FL  
 NEUTROPHILS PENDING % LYMPHOCYTES PENDING % MONOCYTES PENDING % EOSINOPHILS PENDING % BASOPHILS PENDING %  
 ABS. NEUTROPHILS PENDING K/UL  
 ABS. LYMPHOCYTES PENDING K/UL  
 ABS. MONOCYTES PENDING K/UL  
 ABS. EOSINOPHILS PENDING K/UL  
 ABS. BASOPHILS PENDING K/UL  
 DF PENDING   
METABOLIC PANEL, COMPREHENSIVE Collection Time: 05/01/20  2:55 PM  
Result Value Ref Range Sodium 139 136 - 145 mmol/L Potassium 4.0 3.5 - 5.5 mmol/L Chloride 107 100 - 111 mmol/L  
 CO2 27 21 - 32 mmol/L Anion gap 5 3.0 - 18 mmol/L Glucose 133 (H) 74 - 99 mg/dL BUN 27 (H) 7.0 - 18 MG/DL Creatinine 1.55 (H) 0.6 - 1.3 MG/DL  
 BUN/Creatinine ratio 17 12 - 20 GFR est AA 54 (L) >60 ml/min/1.73m2 GFR est non-AA 44 (L) >60 ml/min/1.73m2 Calcium 8.7 8.5 - 10.1 MG/DL Bilirubin, total 0.7 0.2 - 1.0 MG/DL  
 ALT (SGPT) 27 16 - 61 U/L  
 AST (SGOT) 31 10 - 38 U/L Alk. phosphatase 120 (H) 45 - 117 U/L Protein, total 7.6 6.4 - 8.2 g/dL Albumin 3.2 (L) 3.4 - 5.0 g/dL Globulin 4.4 (H) 2.0 - 4.0 g/dL A-G Ratio 0.7 (L) 0.8 - 1.7 CARDIAC PANEL,(CK, CKMB & TROPONIN) Collection Time: 05/01/20  2:55 PM  
Result Value Ref Range  39 - 308 U/L  
 CK - MB 1.8 <3.6 ng/ml CK-MB Index 0.6 0.0 - 4.0 % Troponin-I, QT <0.02 0.0 - 0.045 NG/ML  
LACTIC ACID Collection Time: 05/01/20  2:55 PM  
Result Value Ref Range Lactic acid 1.9 0.4 - 2.0 MMOL/L  
URINALYSIS W/ RFLX MICROSCOPIC Collection Time: 05/01/20  3:15 PM  
Result Value Ref Range Color DARK YELLOW Appearance CLOUDY Specific gravity 1.025 1.005 - 1.030    
 pH (UA) 6.0 5.0 - 8.0 Protein 100 (A) NEG mg/dL Glucose Negative NEG mg/dL Ketone TRACE (A) NEG mg/dL Bilirubin Negative NEG Blood SMALL (A) NEG Urobilinogen 1.0 0.2 - 1.0 EU/dL Nitrites Positive (A) NEG Leukocyte Esterase LARGE (A) NEG URINE MICROSCOPIC ONLY  Collection Time: 05/01/20  3:15 PM  
 Result Value Ref Range WBC TOO NUMEROUS TO COUNT 0 - 5 /hpf  
 RBC TOO NUMEROUS TO COUNT 0 - 5 /hpf Epithelial cells FEW 0 - 5 /lpf Bacteria 4+ (A) NEG /hpf Radiologic Studies -  
XR CHEST PORT    (Results Pending) CT Results  (Last 48 hours) None CXR Results  (Last 48 hours) None Medications given in the ED- Medications  
cefepime (MAXIPIME) 2 g in sterile water (preservative free) 10 mL IV syringe (has no administration in time range) levoFLOXacin (LEVAQUIN) 750 mg in D5W IVPB (has no administration in time range)  
sodium chloride 0.9 % bolus infusion 1,000 mL (has no administration in time range)  
sodium chloride 0.9 % bolus infusion 1,000 mL (1,000 mL IntraVENous New Bag 5/1/20 1600)  
acetaminophen (TYLENOL) tablet 1,000 mg (1,000 mg Oral Given 5/1/20 1600) Medical Decision Making I am the first provider for this patient. I reviewed the vital signs, available nursing notes, past medical history, past surgical history, family history and social history. Vital Signs-Reviewed the patient's vital signs. Pulse Oximetry Analysis - 96% on RA , normal 
 
Cardiac Monitor: 
Rate: 104 bpm 
Rhythm: Tachycardia with bigeminy, previous EKG demonstrated PVCs EKG interpretation: (Preliminary) EKG read by Dr. Riley Rojas at 0711 Sinus tachycardia rate of 107, ventricular  bigeminy Records Reviewed: Nursing Notes and Old Medical Records Provider Notes (Medical Decision Making): Yuli Powers is a 69 y.o. male who presents here with fever, tachycardia. Found to have UTI due to indwelling Jimenez. Will exchange and cover for complicated catheter associated cystitis. Patient nontoxic and currently denying to me medical complaints. Lactate 1.9. Denies COVID-19 symptoms. Procedures: 
Procedures ED Course:  
CONSULT NOTE:  
4:02 PM  
I discussed care with Dr Ann Generous It was a standard discussion, including history of patients chief complaint, available diagnostic results, and treatment course. Discussed case. Will admit to inpatient telemetry Diagnosis and Disposition Critical Care Time:  
 
Core Measures: 
For Hospitalized Patients: 
 
1. Hospitalization Decision Time: The decision to hospitalize the patient was made by Dr Alberto Lind at 1600 on 5/1/2020 2. Aspirin: Aspirin was not given because the patient did not present with a stroke at the time of their Emergency Department evaluation 4:06 PM  Patient is being admitted to the hospital by Dr Michelle Molina. The results of their tests and reasons for their admission have been discussed with them and/or available family. They convey agreement and understanding for the need to be admitted and for their admission diagnosis. CONDITIONS ON ADMISSION: 
Sepsis is present at the time of admission. Deep Vein Thrombosis is not present at the time of admission. Thrombosis is not present at the time of admission. Urinary Tract Infection is present at the time of admission. Pneumonia is not present at the time of admission. MRSA is not present at the time of admission. Wound infection is not present at the time of admission. Pressure Ulcer is not present at the time of admission. CLINICAL IMPRESSION: 
 
1. Urinary tract infection associated with indwelling urethral catheter, initial encounter ([facility] [address] 2. BRITT (acute kidney injury) ([facility] [address]   
 
_______________________________ Please note that this dictation was completed with See[employer], the computer voice recognition software. Quite often unanticipated grammatical, syntax, homophones, and other interpretive errors are inadvertently transcribed by the computer software. Please disregard these errors. Please excuse any errors that have escaped final proofreading.

## 2020-05-01 NOTE — ED NOTES
TRANSFER - OUT REPORT: 
 
Verbal report given to Edilberto Bautista RN(name) on Carleen Boyle  being transferred to [address] [place] [address](unit) for routine progression of care Report consisted of patients Situation, Background, Assessment and  
Recommendations(SBAR). Information from the following report(s) SBAR, Kardex, ED Summary, [facility] and Recent Results was reviewed with the receiving nurse. Lines:  
Peripheral IV 05/01/20 Left Forearm (Active) Site Assessment Clean, dry, & intact 5/1/2020  2:59 PM  
Phlebitis Assessment 0 5/1/2020  2:59 PM  
Infiltration Assessment 0 5/1/2020  2:59 PM  
Dressing Status Clean, dry, & intact 5/1/2020  2:59 PM  
Dressing Type Transparent 5/1/2020  2:59 PM  
   
Peripheral IV 05/01/20 Right Arm (Active) Site Assessment Clean, dry, & intact 5/1/2020  3:05 PM  
Phlebitis Assessment 0 5/1/2020  3:05 PM  
Infiltration Assessment 0 5/1/2020  3:05 PM  
Dressing Status Clean, dry, & intact 5/1/2020  3:05 PM  
Dressing Type Tape;Transparent 5/1/2020  3:05 PM  
Hub Color/Line Status Pink;Flushed;Patent 5/1/2020  3:05 PM  
Action Taken Blood drawn 5/1/2020  3:05 PM  
Alcohol Cap Used Yes 5/1/2020  3:05 PM  
  
 
Opportunity for questions and clarification was provided. Patient transported with: 
 Monitor Registered Nurse

## 2020-05-01 NOTE — H&P
[facility] HISTORY AND PHYSICAL Name:  Rafael Rashid 
MR#:   [id number] :  1945 ACCOUNT #:  [[id number]] ADMIT DATE:  2020 ADMITTING DIAGNOSES: 
1. Sepsis. 2.  Catheter-associated urinary tract infection. 3.  Delirium secondary to sepsis, metabolic encephalopathy. 4.  Chronic urinary retention with chronic Jimenez catheter. 5.  History of spinal surgery. 6.  Tobacco use. 7.  Diabetes mellitus. 8.  Hypertension. HISTORY OF PRESENT ILLNESS:  This is a 68-year-old gentleman, who has a chronic indwelling Jimenez due to chronic urinary retention after a back surgery in the past.  He has had the catheter for about 3 or 4 years now. His wife called EMS because he was delirious at home and acting \"funny. \" He really did not have any complaints when he came into the emergency room, although his urine looked putrid per the emergency room physician. He has had his Jimenez catheter exchanged now. He met sepsis criteria with SIRS, elevated heart rate, temperature, and source of an infection with urinary tract infection per urinalysis, so he is being admitted for further treatment of infection. PAST MEDICAL HISTORY:  Includes hypertension; diabetes mellitus; urinary retention; spinal stenosis, status post surgery; hyperlipidemia; tobacco use. PAST SURGICAL HISTORY:  Previous laminectomy, deviated septum. FAMILY HISTORY:  Diabetes. SOCIAL HISTORY:  Smokes one pack of cigarettes a day, has done for 40 years. Denies any alcohol use. No drug use. He is retired from work and he lives currently with his wife. He used to work at [employer]. ALLERGIES:  HE HAS NO MEDICATION ALLERGIES. MEDICATIONS AT HOME:  Include aspirin, vitamin D, Zestoretic, Glucophage, Toprol, and multivitamin. REVIEW OF SYSTEMS: 
GENERAL:  He denies fevers or chills. RESPIRATORY:  No shortness of breath, cough, or wheezing. CARDIOVASCULAR:  No chest pain, palpitations, or leg swelling. GASTROINTESTINAL:  No nausea, vomiting, diarrhea. He ate fine this morning. GENITOURINARY:  No difficulty with pain around the Jimenez or bleeding recently. MUSCULOSKELETAL:  No myalgias or arthralgias. PHYSICAL EXAMINATION: 
GENERAL:  On exam, he is a 82-year-old elderly gentleman, who is awake, pleasant, cooperative, oriented x3. VITAL SIGNS:  T-max is 100.8, pulse 107, blood pressure 157/79, respiratory rate 19, SaO2 100% on room air. HEENT:  Head and neck is normal.  Oropharynx is dry. No lesions. Sclerae anicteric. LUNGS:  Clear bilaterally. CARDIAC:  Regular rate and rhythm. No murmur. ABDOMEN:  Soft, nontender. No distention or hepatosplenomegaly. Jimenez catheter now exchanged, draining clearer yellow urine. EXTREMITIES:  Lower extremities:  No clubbing, cyanosis, or edema. LABORATORY DATA:  Metabolic panel shows BUN 27, creatinine 1.55, so there is evidence for some prerenal azotemia. His LFTs and cardiac markers are within normal limits. Potassium, sodium, and bicarb are normal.  White count is 26.2, H and H 13.6 and 39.9, platelets are 286. Neutrophils are 80%, bands are 1. The patient has had urine culture sent. He had a chest x-ray that shows no acute cardiopulmonary abnormality. The urinalysis is large,  yxm-[id number]-[id number]-count wbc's, positive nitrites, large leukocyte esterase. No EKG done. ASSESSMENT: 
1.  Sepsis due to catheter-associated urinary tract infection. 2.  Metabolic encephalopathy due to above. 3.  Hypertension. 4.  Diabetes mellitus type 2. 
5.  Tobacco use. 6.  Urinary retention with chronic Jimenez catheter. PLAN:  Admission to telemetry, sepsis protocol initiated. Fluid resuscitation. His lactate was normal.  Continue Levaquin and cefepime per sepsis protocol, Accu-Cheks a.c. and at bedtime. Check a hemoglobin A1c. Follow up the results of his urine culture.   Sliding scale insulin as needed. Continue maintenance IV fluids. Repeat CBC with differential and BMP in the morning. DVT prophylaxis with Lovenox. Nicoderm patch for his tobacco use, counseled cessation, and he is stable to be admitted to telemetry. If his heart rate gets better over the next 46 hours, he can likely transition and downgrade to a medical bed also. Expected length of stay 2-3 days in the hospital.  I have discussed the plan of care with him. Papito Mueller MD 
 
 
[id number] 
D:  05/01/2020 16:53 
T:  05/01/2020 19:18 
JOB #:  [id number] CC:  Cristin Snider MD

## 2020-05-02 LAB
ANION GAP SERPL CALC-SCNC: 5 MMOL/L (ref 3–18)
ATRIAL RATE: 107 BPM
BASOPHILS # BLD: 0 K/UL (ref 0–0.1)
BASOPHILS NFR BLD: 0 % (ref 0–2)
BUN SERPL-MCNC: 28 MG/DL (ref 7–18)
BUN/CREAT SERPL: 22 (ref 12–20)
CALCIUM SERPL-MCNC: 7.8 MG/DL (ref 8.5–10.1)
CALCULATED P AXIS, ECG09: 59 DEGREES
CALCULATED R AXIS, ECG10: -64 DEGREES
CALCULATED T AXIS, ECG11: 70 DEGREES
CHLORIDE SERPL-SCNC: 115 MMOL/L (ref 100–111)
CO2 SERPL-SCNC: 23 MMOL/L (ref 21–32)
CREAT SERPL-MCNC: 1.27 MG/DL (ref 0.6–1.3)
DIAGNOSIS, 93000: NORMAL
DIFFERENTIAL METHOD BLD: ABNORMAL
EOSINOPHIL # BLD: 0.2 K/UL (ref 0–0.4)
EOSINOPHIL NFR BLD: 1 % (ref 0–5)
ERYTHROCYTE [DISTWIDTH] IN BLOOD BY AUTOMATED COUNT: 15.8 % (ref 11.6–14.5)
GLUCOSE BLD STRIP.AUTO-MCNC: 110 MG/DL (ref 70–110)
GLUCOSE BLD STRIP.AUTO-MCNC: 112 MG/DL (ref 70–110)
GLUCOSE BLD STRIP.AUTO-MCNC: 113 MG/DL (ref 70–110)
GLUCOSE BLD STRIP.AUTO-MCNC: 81 MG/DL (ref 70–110)
GLUCOSE SERPL-MCNC: 85 MG/DL (ref 74–99)
HCT VFR BLD AUTO: 31.9 % (ref 36–48)
HGB BLD-MCNC: 10.6 G/DL (ref 13–16)
LYMPHOCYTES # BLD: 2.3 K/UL (ref 0.9–3.6)
LYMPHOCYTES NFR BLD: 15 % (ref 21–52)
MCH RBC QN AUTO: 29.9 PG (ref 24–34)
MCHC RBC AUTO-ENTMCNC: 33.2 G/DL (ref 31–37)
MCV RBC AUTO: 89.9 FL (ref 74–97)
MONOCYTES # BLD: 1.6 K/UL (ref 0.05–1.2)
MONOCYTES NFR BLD: 11 % (ref 3–10)
NEUTS SEG # BLD: 11.2 K/UL (ref 1.8–8)
NEUTS SEG NFR BLD: 73 % (ref 40–73)
P-R INTERVAL, ECG05: 140 MS
PLATELET # BLD AUTO: 166 K/UL (ref 135–420)
PMV BLD AUTO: 10.1 FL (ref 9.2–11.8)
POTASSIUM SERPL-SCNC: 3.9 MMOL/L (ref 3.5–5.5)
Q-T INTERVAL, ECG07: 340 MS
QRS DURATION, ECG06: 98 MS
QTC CALCULATION (BEZET), ECG08: 453 MS
RBC # BLD AUTO: 3.55 M/UL (ref 4.7–5.5)
SODIUM SERPL-SCNC: 143 MMOL/L (ref 136–145)
VENTRICULAR RATE, ECG03: 107 BPM
WBC # BLD AUTO: 15.3 K/UL (ref 4.6–13.2)

## 2020-05-02 PROCEDURE — 74011250637 HC RX REV CODE- 250/637: Performed by: FAMILY MEDICINE

## 2020-05-02 PROCEDURE — 74011250637 HC RX REV CODE- 250/637: Performed by: HOSPITALIST

## 2020-05-02 PROCEDURE — 65270000029 HC RM PRIVATE

## 2020-05-02 PROCEDURE — 74011000250 HC RX REV CODE- 250: Performed by: EMERGENCY MEDICINE

## 2020-05-02 PROCEDURE — 74011250636 HC RX REV CODE- 250/636: Performed by: HOSPITALIST

## 2020-05-02 PROCEDURE — 80048 BASIC METABOLIC PNL TOTAL CA: CPT

## 2020-05-02 PROCEDURE — 74011250637 HC RX REV CODE- 250/637: Performed by: INTERNAL MEDICINE

## 2020-05-02 PROCEDURE — 36415 COLL VENOUS BLD VENIPUNCTURE: CPT

## 2020-05-02 PROCEDURE — 85025 COMPLETE CBC W/AUTO DIFF WBC: CPT

## 2020-05-02 PROCEDURE — 74011250636 HC RX REV CODE- 250/636: Performed by: EMERGENCY MEDICINE

## 2020-05-02 PROCEDURE — 82962 GLUCOSE BLOOD TEST: CPT

## 2020-05-02 PROCEDURE — 77010033678 HC OXYGEN DAILY

## 2020-05-02 RX ORDER — TRAZODONE HYDROCHLORIDE 50 MG/1
50 TABLET ORAL
Status: DISCONTINUED | OUTPATIENT
Start: 2020-05-02 | End: 2020-05-04 | Stop reason: HOSPADM

## 2020-05-02 RX ADMIN — ANTACID TABLETS 200 MG: 500 TABLET, CHEWABLE ORAL at 09:02

## 2020-05-02 RX ADMIN — ENOXAPARIN SODIUM 40 MG: 40 INJECTION SUBCUTANEOUS at 16:16

## 2020-05-02 RX ADMIN — HYDROCHLOROTHIAZIDE 25 MG: 25 TABLET ORAL at 09:03

## 2020-05-02 RX ADMIN — ANTACID TABLETS 200 MG: 500 TABLET, CHEWABLE ORAL at 11:30

## 2020-05-02 RX ADMIN — CEFEPIME HYDROCHLORIDE 2 G: 2 INJECTION, POWDER, FOR SOLUTION INTRAVENOUS at 06:48

## 2020-05-02 RX ADMIN — ONDANSETRON 4 MG: 2 INJECTION INTRAMUSCULAR; INTRAVENOUS at 19:56

## 2020-05-02 RX ADMIN — LEVOFLOXACIN 750 MG: 5 INJECTION, SOLUTION INTRAVENOUS at 16:16

## 2020-05-02 RX ADMIN — ASPIRIN 81 MG 81 MG: 81 TABLET ORAL at 09:07

## 2020-05-02 RX ADMIN — CEFEPIME HYDROCHLORIDE 2 G: 2 INJECTION, POWDER, FOR SOLUTION INTRAVENOUS at 16:15

## 2020-05-02 RX ADMIN — TRAZODONE HYDROCHLORIDE 50 MG: 50 TABLET ORAL at 22:48

## 2020-05-02 RX ADMIN — ANTACID TABLETS 200 MG: 500 TABLET, CHEWABLE ORAL at 16:15

## 2020-05-02 RX ADMIN — MULTIPLE VITAMINS W/ MINERALS TAB 1 TABLET: TAB at 09:03

## 2020-05-02 RX ADMIN — LISINOPRIL 20 MG: 20 TABLET ORAL at 09:03

## 2020-05-02 RX ADMIN — METOPROLOL SUCCINATE 25 MG: 25 TABLET, EXTENDED RELEASE ORAL at 09:03

## 2020-05-02 RX ADMIN — SODIUM CHLORIDE 100 ML/HR: 900 INJECTION, SOLUTION INTRAVENOUS at 13:14

## 2020-05-02 RX ADMIN — SODIUM CHLORIDE 100 ML/HR: 900 INJECTION, SOLUTION INTRAVENOUS at 23:52

## 2020-05-02 NOTE — ROUTINE PROCESS
1540 
Bedside and Verbal shift change report given to Clarissa Hylton RN (oncoming nurse) by Hunter Tee RN (offgoing nurse). Report included the following information SBAR, Kardex and MAR.

## 2020-05-02 NOTE — PROGRESS NOTES
Problem: Falls - Risk of 
Goal: *Absence of Falls Description: Document Bard Sin Fall Risk and appropriate interventions in the flowsheet. Outcome: Progressing Towards Goal 
Note: Fall Risk Interventions: 
Mobility Interventions: Bed/chair exit alarm, Communicate number of staff needed for ambulation/transfer, Patient to call before getting OOB, Utilize walker, cane, or other assistive device Medication Interventions: Bed/chair exit alarm, Patient to call before getting OOB, Teach patient to arise slowly Elimination Interventions: Bed/chair exit alarm, Call light in reach, Patient to call for help with toileting needs History of Falls Interventions: Bed/chair exit alarm, Consult care management for discharge planning

## 2020-05-02 NOTE — PROGRESS NOTES
Chart reviewed as CM on call. Pt admitted to medical by hospitalist for sepsis. VO for PT/OT eval order given by MD Lay Stern. CM will follow for transition of care needs and will be available via hospital  on weekends. Reason for Admission:   Per H&P pt is \"This is a 62-year-old gentleman, who has a chronic indwelling Jimenez due to chronic urinary retention after a back surgery in the past.  He has had the catheter for about 3 or 4 years now. His wife called EMS because he was delirious at home and acting \"funny. \" He really did not have any complaints when he came into the emergency room, although his urine looked putrid per the emergency room physician. He has had his Jimenez catheter exchanged now. He met sepsis criteria with SIRS, elevated heart rate, temperature, and source of an infection with urinary tract infection per urinalysis, so he is being admitted for further treatment of infection. \" 
               
RUR Score:     mod PCP: First and Last name:  Listed as MD Serrano Name of Practice: [facility]. Are you a current patient: Yes/No:  
 Approximate date of last visit: Do you (patient/family) have any concerns for transition/discharge? Plan for utilizing home health:   TBD pending therapy Current Advanced Directive/Advance Care Plan:  Not on file Transition of Care Plan:      TBD Care Management Interventions Transition of Care Consult (CM Consult): Discharge Planning Physical Therapy Consult: Yes Occupational Therapy Consult:  Yes

## 2020-05-02 NOTE — ROUTINE PROCESS
Bedside and Verbal shift change report given to Nida Cochran RN by Georgia Fisher. Report included the following information SBAR, Kardex, OR Summary, Intake/Output and MAR.

## 2020-05-02 NOTE — PROGRESS NOTES
Hospitalist Progress Note Patient: Manish Charlton MRN: [id number]  CSN: [id number] YOB: 1945  Age: 69 y.o. Sex: male DOA: 5/1/2020 LOS:  LOS: 1 day Assessment/Plan Principal Problem: 
  Sepsis ([address] (5/1/2020) Active Problems: 
  Lumbar spondylosis (9/5/2017) Urinary retention (9/14/2017) Type II diabetes mellitus with manifestations, uncontrolled ([address] (9/16/2017) UTI (urinary tract infection) due to urinary indwelling catheter ([address] (5/1/2020) UTI (urinary tract infection) (5/1/2020) Sepsis: sepsis protocol initiated. Fluid resuscitation. Continue Levaquin and cefepime per sepsis protocol,  Follow up the results of his urine culture. Sliding scale insulin as needed. Continue maintenance IV fluids.  
  
Metabolic encephalopathy due to above. Resolved. Hypertension: Controlled. Continue current regimen. Diabetes mellitus type 2: Accu-Cheks a.c. and at bedtime. Check a hemoglobin A1c/5.9 Tobacco use: On nicotine patch. Consult for smoking cessation Urinary retention with chronic Jimenez catheter: Being seen in follow-up by Dr. Miles Garduno at [facility] History of spinal surgery PT/OT Dispo: 2-3 days CC:   Sepsis, UTI, DM, HTN Subjective: Pt was seen and examined with the nurse in the morning round. \"I am fine. Can I go home today? \" Review of systems General: No fevers or chills. Cardiovascular: No chest pain or pressure. No palpitations. Pulmonary: No cough, SOB Gastrointestinal: No nausea, vomiting. Objective:  
  
Visit Vitals /71 (BP 1 Location: Left arm, BP Patient Position: Sitting) Pulse 88 Temp 97.7 °F (36.5 °C) Resp 16 Ht 5' 11\" (1.803 m) Wt 72.6 kg (160 lb) SpO2 95% BMI 22.32 kg/m² Physical Exam: 
 
Gen: NAD, non-toxic. Heent:  MMM, NC, AT. Cor: s1s2 RRR. No MRG. PMI mid 5th intercostal space. Resp:  CTA b/l. No w/r/r. Nml effort and diaphragmatic excursion. Abd:  NT ND.  BS positive. No rebound or guarding. No masses. Ext: No edema or cyanosis. Intake and Output: 
Current Shift:  05/02 0701 - 05/02 1900 In: 240 [P.O.:240] Out: - Last three shifts:  04/30 1901 - 05/02 0700 In: 2178 [I.V.:2178] Out: 550 [Urine:550] Labs: Results:  
   
Chemistry Recent Labs 05/02/20 
0400 05/01/20 
1455 GLU 85 133*  139  
K 3.9 4.0  
* 107 CO2 23 27 BUN 28* 27* CREA 1.27 1.55* CA 7.8* 8.7 AGAP 5 5 BUCR 22* 17  
AP  --  120* TP  --  7.6 ALB  --  3.2*  
GLOB  --  4.4* AGRAT  --  0.7* CBC w/Diff Recent Labs 05/02/20 
0400 05/01/20 
1455 WBC 15.3* 26.2*  
RBC 3.55* 4.50* HGB 10.6* 13.6 HCT 31.9* 39.9  235 GRANS 73 80* LYMPH 15* 12* EOS 1 0 Cardiac Enzymes Recent Labs 05/01/20 
1455  CKND1 0.6 Coagulation No results for input(s): PTP, INR, APTT, INREXT in the last 72 hours. Lipid Panel Lab Results Component Value Date/Time Cholesterol, total 146 06/01/2016 07:51 AM  
 HDL Cholesterol 40 06/01/2016 07:51 AM  
 LDL, calculated 91.6 06/01/2016 07:51 AM  
 VLDL, calculated 14.4 06/01/2016 07:51 AM  
 Triglyceride 72 06/01/2016 07:51 AM  
 CHOL/HDL Ratio 3.7 06/01/2016 07:51 AM  
  
BNP No results for input(s): BNPP in the last 72 hours. Liver Enzymes Recent Labs 05/01/20 
1455 TP 7.6 ALB 3.2* * SGOT 31 Thyroid Studies No results found for: T4, T3U, TSH, TSHEXT Procedures/imaging: see electronic medical records for all procedures/Xrays and details which were not copied into this note but were reviewed prior to creation of Plan Medications Reviewed Jaun Cheng MD

## 2020-05-02 NOTE — PROGRESS NOTES
2010 - Patient arrives to unit at this time. Admission completed at this time. Patient is A/O x 4. IV to L FA and RUE  intact and patent. .  Dressing to L elbow CDI. Abrasion noted to this left elbow from the fall this AM per pt. . Denies numbness/tingling. Pain 0/10. Jimenez with summer urine. Patient was oriented to the room to include use of call bell, meal ordering, and use of incentive spirometer. Phone and call bell left within reach. Bed alarm activated d/t HO frequent falls. Skin assessment completed with ED RN, Jeannette Calderon. Pt has a small abrasion to L elbow, the rest of skin is intact. 2045-Paged Dr Renetta Steen for TUMs, pt reporting heart burn, says he takes tums at home. 2330-Pt's O2 sats beteen 88-90 on RA. Put on 2l NC, sats up to 95 %. Will monitor. Pt had uneventful shift. Pt ambulated with 1 assist with a walker. No issues/concerns at this time. Call bell within reach Now sitting on the edge of bed, oxygen off. Day RN to assess if he needs it. RN also aware that pt is a very high fall risk, and that he will need a bed alarm when in bed. No confusion noted in this shift.

## 2020-05-02 NOTE — PROGRESS NOTES
Problem: Falls - Risk of 
Goal: *Absence of Falls Description: Document Mary Duran Fall Risk and appropriate interventions in the flowsheet. Outcome: Progressing Towards Goal 
Note: Fall Risk Interventions: 
Mobility Interventions: Bed/chair exit alarm Medication Interventions: Bed/chair exit alarm, Patient to call before getting OOB Elimination Interventions: Bed/chair exit alarm History of Falls Interventions: Bed/chair exit alarm, Consult care management for discharge planning

## 2020-05-02 NOTE — PROGRESS NOTES
0745 
 Pt is awake, alert, oriented x4. Sitting at edge of bed. Denies pain at this time. With alonso cath draining clear yellow urine. 0911 
 pt sitting at edge of bed. Ate breakfast well. Lungs are clear. Abdomen soft with active BS. Denies pain. [facility] Resting in bed. No complaints made. 36 Pt was seen by Dr Katalina Newton. Pt for PT/OT eval/tx. [address] As per pt, he takes Trazadone 50 mg at bedtime for sleep but not on his med list.  RN verified with wife and as per wife ,pt takes 50 mg Trazadone at bedtime. Dr Katalina Newton was made aware and order was placed.

## 2020-05-03 LAB
ANION GAP SERPL CALC-SCNC: 8 MMOL/L (ref 3–18)
BASOPHILS # BLD: 0 K/UL (ref 0–0.1)
BASOPHILS NFR BLD: 0 % (ref 0–2)
BUN SERPL-MCNC: 28 MG/DL (ref 7–18)
BUN/CREAT SERPL: 24 (ref 12–20)
CALCIUM SERPL-MCNC: 7.9 MG/DL (ref 8.5–10.1)
CHLORIDE SERPL-SCNC: 111 MMOL/L (ref 100–111)
CO2 SERPL-SCNC: 22 MMOL/L (ref 21–32)
CREAT SERPL-MCNC: 1.17 MG/DL (ref 0.6–1.3)
DIFFERENTIAL METHOD BLD: ABNORMAL
EOSINOPHIL # BLD: 0 K/UL (ref 0–0.4)
EOSINOPHIL NFR BLD: 0 % (ref 0–5)
ERYTHROCYTE [DISTWIDTH] IN BLOOD BY AUTOMATED COUNT: 15.6 % (ref 11.6–14.5)
GLUCOSE BLD STRIP.AUTO-MCNC: 110 MG/DL (ref 70–110)
GLUCOSE BLD STRIP.AUTO-MCNC: 113 MG/DL (ref 70–110)
GLUCOSE BLD STRIP.AUTO-MCNC: 118 MG/DL (ref 70–110)
GLUCOSE BLD STRIP.AUTO-MCNC: 197 MG/DL (ref 70–110)
GLUCOSE SERPL-MCNC: 105 MG/DL (ref 74–99)
HCT VFR BLD AUTO: 33.5 % (ref 36–48)
HGB BLD-MCNC: 10.8 G/DL (ref 13–16)
LYMPHOCYTES # BLD: 1.9 K/UL (ref 0.9–3.6)
LYMPHOCYTES NFR BLD: 17 % (ref 21–52)
MCH RBC QN AUTO: 29.8 PG (ref 24–34)
MCHC RBC AUTO-ENTMCNC: 32.2 G/DL (ref 31–37)
MCV RBC AUTO: 92.5 FL (ref 74–97)
MONOCYTES # BLD: 1.4 K/UL (ref 0.05–1.2)
MONOCYTES NFR BLD: 13 % (ref 3–10)
NEUTS SEG # BLD: 7.8 K/UL (ref 1.8–8)
NEUTS SEG NFR BLD: 70 % (ref 40–73)
PLATELET # BLD AUTO: 154 K/UL (ref 135–420)
PMV BLD AUTO: 10.3 FL (ref 9.2–11.8)
POTASSIUM SERPL-SCNC: 3.8 MMOL/L (ref 3.5–5.5)
RBC # BLD AUTO: 3.62 M/UL (ref 4.7–5.5)
SODIUM SERPL-SCNC: 141 MMOL/L (ref 136–145)
WBC # BLD AUTO: 11.1 K/UL (ref 4.6–13.2)

## 2020-05-03 PROCEDURE — 74011250636 HC RX REV CODE- 250/636: Performed by: HOSPITALIST

## 2020-05-03 PROCEDURE — 74011000250 HC RX REV CODE- 250: Performed by: EMERGENCY MEDICINE

## 2020-05-03 PROCEDURE — 97161 PT EVAL LOW COMPLEX 20 MIN: CPT

## 2020-05-03 PROCEDURE — 65270000029 HC RM PRIVATE

## 2020-05-03 PROCEDURE — 97535 SELF CARE MNGMENT TRAINING: CPT

## 2020-05-03 PROCEDURE — 74011250637 HC RX REV CODE- 250/637: Performed by: FAMILY MEDICINE

## 2020-05-03 PROCEDURE — 80048 BASIC METABOLIC PNL TOTAL CA: CPT

## 2020-05-03 PROCEDURE — C9113 INJ PANTOPRAZOLE SODIUM, VIA: HCPCS | Performed by: FAMILY MEDICINE

## 2020-05-03 PROCEDURE — 82962 GLUCOSE BLOOD TEST: CPT

## 2020-05-03 PROCEDURE — 74011636637 HC RX REV CODE- 636/637: Performed by: HOSPITALIST

## 2020-05-03 PROCEDURE — 85025 COMPLETE CBC W/AUTO DIFF WBC: CPT

## 2020-05-03 PROCEDURE — 74011000250 HC RX REV CODE- 250: Performed by: FAMILY MEDICINE

## 2020-05-03 PROCEDURE — 74011250637 HC RX REV CODE- 250/637: Performed by: INTERNAL MEDICINE

## 2020-05-03 PROCEDURE — 74011250637 HC RX REV CODE- 250/637: Performed by: HOSPITALIST

## 2020-05-03 PROCEDURE — 74011250636 HC RX REV CODE- 250/636: Performed by: EMERGENCY MEDICINE

## 2020-05-03 PROCEDURE — 97166 OT EVAL MOD COMPLEX 45 MIN: CPT

## 2020-05-03 PROCEDURE — 36415 COLL VENOUS BLD VENIPUNCTURE: CPT

## 2020-05-03 PROCEDURE — 97116 GAIT TRAINING THERAPY: CPT

## 2020-05-03 PROCEDURE — 74011250636 HC RX REV CODE- 250/636: Performed by: FAMILY MEDICINE

## 2020-05-03 RX ADMIN — CEFEPIME HYDROCHLORIDE 2 G: 2 INJECTION, POWDER, FOR SOLUTION INTRAVENOUS at 17:48

## 2020-05-03 RX ADMIN — SODIUM CHLORIDE 100 ML/HR: 900 INJECTION, SOLUTION INTRAVENOUS at 10:49

## 2020-05-03 RX ADMIN — SODIUM CHLORIDE 40 MG: 9 INJECTION, SOLUTION INTRAMUSCULAR; INTRAVENOUS; SUBCUTANEOUS at 14:07

## 2020-05-03 RX ADMIN — ENOXAPARIN SODIUM 40 MG: 40 INJECTION SUBCUTANEOUS at 17:48

## 2020-05-03 RX ADMIN — ONDANSETRON 4 MG: 2 INJECTION INTRAMUSCULAR; INTRAVENOUS at 10:10

## 2020-05-03 RX ADMIN — ANTACID TABLETS 200 MG: 500 TABLET, CHEWABLE ORAL at 17:48

## 2020-05-03 RX ADMIN — SODIUM CHLORIDE 40 MG: 9 INJECTION, SOLUTION INTRAMUSCULAR; INTRAVENOUS; SUBCUTANEOUS at 20:31

## 2020-05-03 RX ADMIN — TRAZODONE HYDROCHLORIDE 50 MG: 50 TABLET ORAL at 21:23

## 2020-05-03 RX ADMIN — ANTACID TABLETS 200 MG: 500 TABLET, CHEWABLE ORAL at 10:44

## 2020-05-03 RX ADMIN — INSULIN LISPRO 2 UNITS: 100 INJECTION, SOLUTION INTRAVENOUS; SUBCUTANEOUS at 22:00

## 2020-05-03 RX ADMIN — CEFEPIME HYDROCHLORIDE 2 G: 2 INJECTION, POWDER, FOR SOLUTION INTRAVENOUS at 10:12

## 2020-05-03 RX ADMIN — LEVOFLOXACIN 750 MG: 5 INJECTION, SOLUTION INTRAVENOUS at 16:12

## 2020-05-03 RX ADMIN — ANTACID TABLETS 200 MG: 500 TABLET, CHEWABLE ORAL at 12:11

## 2020-05-03 RX ADMIN — CEFEPIME HYDROCHLORIDE 2 G: 2 INJECTION, POWDER, FOR SOLUTION INTRAVENOUS at 01:18

## 2020-05-03 NOTE — PROGRESS NOTES
Problem: Mobility Impaired (Adult and Pediatric) Goal: *Acute Goals and Plan of Care (Insert Text) Description: Physical Therapy Goals Initiated 5/3/2020 and to be accomplished within 3-5 day(s) 1. Patient will move from supine <> sit with S in prep for out of bed activity and change of position. 2.  Patient will perform sit<> stand with S with LRAD in prep for transfers/ambulation. 3.  Patient will transfer from bed <> chair with S with LRAD for time up in chair for completion of ADL activity. 4.  Patient will ambulate >100 feet with LRAD/S for improved functional mobility/safe discharge. Outcome: Progressing Towards Goal 
 PHYSICAL THERAPY EVALUATION Patient: Moises Gonzalez (83 y.o. male) Date: 5/3/2020 Primary Diagnosis: UTI (urinary tract infection) [N39.0] UTI (urinary tract infection) [N39.0] Precautions: Fall ASSESSMENT : 
Based on the objective data described below, the patient presents with decrease independence w/ bed mobility, transfers, and gait. Pt seen in supine prior to session w/ telemonitor donned and alonso catheter. Pt reports PTA that he uses a RW to assist w/ mobility in the home but uses his rollator in the community. Pt denies any pain. Pt reported feeling dizzy, however pt's VS assessed WNLs. Upon sitting at the EOB, pt began to vomit in emesis bag, nurse present and administered antinausea medication. Pt willing to continue session, and able to ambulate a short distance w/ RW/GB, mild unsteadiness initially noted. Pt transferred back to room where pt sat at the EOB. Pt began to vomit again, nurse made aware. Pt transferred back to supine in bed after session, call bell and tray in reach, nurse notified after session. Patient will benefit from skilled intervention to address the above impairments. Patients rehabilitation potential is considered to be Good Factors which may influence rehabilitation potential include:  
[]         None noted []         Mental ability/status [x]         Medical condition 
[x]         Home/family situation and support systems 
[]         Safety awareness 
[]         Pain tolerance/management 
[]         Other: PLAN : 
Recommendations and Planned Interventions: 
[x]           Bed Mobility Training             [x]    Neuromuscular Re-Education 
[x]           Transfer Training                   []    Orthotic/Prosthetic Training 
[x]           Gait Training                          []    Modalities [x]           Therapeutic Exercises          []    Edema Management/Control 
[x]           Therapeutic Activities            [x]    Patient and Family Training/Education 
[]           Other (comment): Frequency/Duration: Patient will be followed by physical therapy 1-2 times per day to address goals. Discharge Recommendations: Home Health Further Equipment Recommendations for Discharge: N/A  
 
SUBJECTIVE:  
Patient stated I feel really nauseous right now.  OBJECTIVE DATA SUMMARY:  
 
Past Medical History:  
Diagnosis Date Atherosclerosis Diabetes ([facility] 2013  
 type 2 Hyperlipidemia Hypertension 2013 Muscle weakness Neoplasm   
 right kidney Spinal stenosis Urinary retention Vitamin D deficiency Past Surgical History:  
Procedure Laterality Date HX HEENT    
 40 years ago deviated septum HX ORTHOPAEDIC Lumbar laminectomy 9/7/17 Barriers to Learning/Limitations: yes;  physical 
Compensate with: Verbal Cues and Tactile Cues Prior Level of Function/Home Situation:  
Home Situation Home Environment: Private residence # Steps to Enter: 0 One/Two Story Residence: One story Living Alone: No 
Support Systems: Spouse/Significant Other/Partner Patient Expects to be Discharged to[de-identified] Private residence Current DME Used/Available at Home: [employer], rollator, Walker, rolling Critical Behavior: 
Neurologic State: Alert Orientation Level: Oriented X4 
 Cognition: Follows commands; Appropriate safety awareness Psychosocial 
Purposeful Interaction: Yes Pt Identified Daily Priority: Clinical issues (comment) [employer] Process: Establish trust;Teaching/learning; Attend basic human needs Caring Interventions: Reassure Reassure: Caring rounds; Therapeutic listening Therapeutic Modalities: Intentional therapeutic touch Skin Condition/Temp: Warm;Dry Skin Integrity: Abrasion(elbow) Skin Integumentary Skin Color: Appropriate for ethnicity Skin Condition/Temp: Warm;Dry Skin Integrity: Abrasion(elbow) Turgor: Non-tenting Hair Growth: Present Strength:   
Strength: Generally decreased, functional 
Tone & Sensation:  
Tone: Normal 
Sensation: Intact Range Of Motion: 
AROM: Generally decreased, functional 
Functional Mobility: 
Bed Mobility: 
Supine to Sit: Contact guard assistance Sit to Supine: Contact guard assistance Scooting: Contact guard assistance Transfers: 
Sit to Stand: Contact guard assistance;Minimum assistance Stand to Sit: Contact guard assistance Balance:  
Sitting: Intact Standing: Impaired; With support Standing - Static: Fair Standing - Dynamic : Fair Ambulation/Gait Training: 
Distance (ft): 35 Feet (ft) Assistive Device: Gait belt;Walker, rolling Ambulation - Level of Assistance: Contact guard assistance Gait Description (WDL): Exceptions to [facility] Gait Abnormalities: Decreased step clearance Base of Support: Narrowed Speed/Nicky: Slow Step Length: Left shortened;Right shortened Swing Pattern: Left asymmetrical;Right asymmetrical 
Pain: 
Pain Scale 1: Numeric (0 - 10) Pain Intensity 1: 0 Activity Tolerance:  
Fair Please refer to the flowsheet for vital signs taken during this treatment. After treatment:  
[]         Patient left in no apparent distress sitting up in chair 
[x]         Patient left in no apparent distress in bed 
[x]         Call bell left within reach [x]         Nursing notified 
[]         Caregiver present []         Bed alarm activated COMMUNICATION/EDUCATION:  
[x]         Fall prevention education was provided and the patient/caregiver indicated understanding. [x]         Patient/family have participated as able in goal setting and plan of care. [x]         Patient/family agree to work toward stated goals and plan of care. []         Patient understands intent and goals of therapy, but is neutral about his/her participation. []         Patient is unable to participate in goal setting and plan of care. Thank you for this referral. 
Bozena Aguirre, PT Time Calculation: 27 mins Eval Complexity: History: HIGH Complexity :3+ comorbidities / personal factors will impact the outcome/ POC Exam:LOW Complexity : 1-2 Standardized tests and measures addressing body structure, function, activity limitation and / or participation in recreation  Presentation: LOW Complexity : Stable, uncomplicated  Clinical Decision Making:Low Complexity ambulate >30ft  Overall Complexity:LOW

## 2020-05-03 NOTE — ROUTINE PROCESS
Bedside and Verbal shift change report given to [facility] (oncoming nurse) by Sadiq Abreu (offgoing nurse). Report included the following information SBAR, Kardex and MAR.

## 2020-05-03 NOTE — PROGRESS NOTES
Problem: Self Care Deficits Care Plan (Adult) Goal: *Acute Goals and Plan of Care (Insert Text) Description: Occupational Therapy Goals Initiated 5/3/2020 within 7 day(s). 1.  Patient will perform grooming with supervision/set-up standing at sink for 5 minutes or more. 2.  Patient will perform lower body dressing with supervision/set-up. 3.  Patient will perform toilet transfers with supervision/set-up. 4.  Patient will perform all aspects of toileting with supervision/set-up. 5.  Patient will participate in upper extremity therapeutic exercise/activities with supervision/set-up for 10 minutes. 6.  Patient will utilize energy conservation techniques during functional activities with verbal cues. OCCUPATIONAL THERAPY EVALUATION Patient: Deon Mabry (61 y.o. male) Date: 5/3/2020 Primary Diagnosis: UTI (urinary tract infection) [N39.0] UTI (urinary tract infection) [N39.0] Precautions:   Fall PLOF: mod I for ADls and transfers ASSESSMENT : 
Based on the objective data described below, the patient presents with decreased strength, endurance and balance for carryover of ADls and transfers following above mentioned medical diagnosis. Pt participate with bed mobility, STS transfers and attempt to perform simulated toilet transfers but reported getting dizzy during the session. Pt required min A for LB dressing. Pt was left supine in bed at the end of session in NAD, pain 0/10. Patient will benefit from skilled intervention to address the above impairments. Patient's rehabilitation potential is considered to be Good Factors which may influence rehabilitation potential include:  
[x]             None noted []             Mental ability/status []             Medical condition []             Home/family situation and support systems []             Safety awareness []             Pain tolerance/management 
[]             Other: PLAN : 
 Recommendations and Planned Interventions:  
[x]               Self Care Training                  [x]      Therapeutic Activities [x]               Functional Mobility Training   []      Cognitive Retraining 
[x]               Therapeutic Exercises           []      Endurance Activities [x]               Balance Training                    []      Neuromuscular Re-Education []               Visual/Perceptual Training     [x]      Home Safety Training 
[x]               Patient Education                   [x]      Family Training/Education []               Other (comment): Frequency/Duration: Patient will be followed by occupational therapy 1-2 times per day/4-7 days per week to address goals. Discharge Recommendations: Home Health and To Be Determined Further Equipment Recommendations for Discharge: N/A  
 
SUBJECTIVE:  
Patient stated  I am doing alright.  OBJECTIVE DATA SUMMARY:  
 
Past Medical History:  
Diagnosis Date Atherosclerosis Diabetes ([address] 2013  
 type 2 Hyperlipidemia Hypertension 2013 Muscle weakness Neoplasm   
 right kidney Spinal stenosis Urinary retention Vitamin D deficiency Past Surgical History:  
Procedure Laterality Date HX HEENT    
 40 years ago deviated septum HX ORTHOPAEDIC Lumbar laminectomy 9/7/17 Barriers to Learning/Limitations: None Compensate with: visual, verbal, tactile, kinesthetic cues/model Home Situation:  
Home Situation Home Environment: Private residence # Steps to Enter: 0 One/Two Story Residence: One story Living Alone: No 
Support Systems: Spouse/Significant Other/Partner Patient Expects to be Discharged to[de-identified] Private residence Current DME Used/Available at Home: Paige Matter, [place], Walker, rollator, Shower chair Tub or Shower Type: Shower 
[]  Right hand dominant   []  Left hand dominant Cognitive/Behavioral Status: 
Neurologic State: Alert Orientation Level: Oriented X4 
 Cognition: Appropriate for age attention/concentration; Follows commands Safety/Judgement: Fall prevention Skin: intact Edema: none Vision/Perceptual:   
Tracking: Able to track stimulus in all quadrants w/o difficulty Coordination: BUE Coordination: Within functional limits Fine Motor Skills-Upper: Left Intact; Right Intact Gross Motor Skills-Upper: Left Intact; Right Intact Balance: 
Sitting: Intact Standing: Impaired; With support Standing - Static: Fair Standing - Dynamic : Fair Strength: BUE Strength: Generally decreased, functional 
Tone & Sensation: BUE Tone: Normal 
Sensation: Intact Range of Motion: BUE 
AROM: Generally decreased, functional 
Functional Mobility and Transfers for ADLs: 
Bed Mobility: 
Supine to Sit: Contact guard assistance Sit to Supine: Contact guard assistance Scooting: Contact guard assistance Transfers: 
Sit to Stand: Contact guard assistance Stand to Sit: Contact guard assistance Toilet Transfer : Contact guard assistance;Minimum assistance(simulated toilet transfer) ADL Assessment:  
Feeding: Independent Oral Facial Hygiene/Grooming: Supervision Upper Body Dressing: Supervision Lower Body Dressing: Minimum assistance Toileting: Total assistance ADL Intervention: Lower Body Dressing Assistance Dressing Assistance: Minimum assistance Socks: Minimum assistance Leg Crossed Method Used: Yes Position Performed: Seated edge of bed Cues: Don;Doff;Physical assistance Cognitive Retraining Safety/Judgement: Fall prevention Therapeutic Exercise: 
 
Pain: 
Pain level pre-treatment: 0/10 Pain level post-treatment: 0/10 Pain Intervention(s): Medication (see MAR); Rest, Ice, Repositioning Response to intervention: Nurse notified, See doc flow Activity Tolerance:  
Good Please refer to the flowsheet for vital signs taken during this treatment. After treatment:  
[] Patient left in no apparent distress sitting up in chair [x] Patient left in no apparent distress in bed 
[x] Call bell left within reach 
[] Nursing notified 
[] Caregiver present 
[] Bed alarm activated COMMUNICATION/EDUCATION:  
[x] Role of Occupational Therapy in the acute care setting 
[x] Home safety education was provided and the patient/caregiver indicated understanding. [x] Patient/family have participated as able in goal setting and plan of care. [x] Patient/family agree to work toward stated goals and plan of care. [] Patient understands intent and goals of therapy, but is neutral about his/her participation. [] Patient is unable to participate in goal setting and plan of care. Thank you for this referral. 
Taniya Huddleston, OTR/L Time Calculation: 13 mins Eval Complexity: History: MEDIUM Complexity : Expanded review of history including physical, cognitive and psychosocial  history ; Examination: MEDIUM Complexity : 3-5 performance deficits relating to physical, cognitive , or psychosocial skils that result in activity limitations and / or participation restrictions; Decision Making:MEDIUM Complexity : Patient may present with comorbidities that affect occupational performnce. Miniml to moderate modification of tasks or assistance (eg, physical or verbal ) with assesment(s) is necessary to enable patient to complete evaluation

## 2020-05-03 NOTE — PROGRESS NOTES
Bedside shift change report given to Mere Slater RN (oncoming nurse) by Shilpa Cullen RN (offgoing nurse). Report included the following information SBAR, Kardex, ED Summary, Procedure Summary, Intake/Output, MAR, Recent Results, Cardiac Rhythm NSR, Alarm Parameters  and Quality Measures.

## 2020-05-03 NOTE — PROGRESS NOTES
Bedside and Verbal shift change report given to VAHE Gregg RN (oncoming nurse) by Colleen Mon RN (offgoing nurse). Report included the following information SBAR and Kardex. Patient Vitals for the past 12 hrs: 
 Temp Pulse Resp BP SpO2  
05/03/20 1120 97.7 °F (36.5 °C) 82 17 143/82 91 % 05/03/20 0739 97.7 °F (36.5 °C) 76 16 123/68 96 % 05/03/20 0312 98.8 °F (37.1 °C) 99 16 104/70 (!) 88 %

## 2020-05-03 NOTE — PROGRESS NOTES
DC Plan: TBD, pending therapy recommendations Chart reviewed as CM on call. Pt admitted to medical by hospitalist. Called and spoke with pt on phone regarding dc plan, not entering patient rooms due to covid restrictions. Pt lives with wife. States wife can drive home at discharge. Pt states he drives. Home address confirmed per face sheet. Pt has RW. Denies being active with [facility] services, but states he has had them in past, agency name unknown. Discussed possible need for SNF, FOC offered, pt [facility]. Awaiting therapy recommendations. Pts PCP is MD Serrano, pt states he has appt in the next week, last saw PCP around 6 months ago. Pt also sees MD Kern. No dc concerns identified. CM will cont to follow for transition of care needs and will be available via hospital  on weekends.

## 2020-05-03 NOTE — PROGRESS NOTES
INITIAL NUTRITION ASSESSMENT 
  
RECOMMENDATIONS/PLAN:  
1. Ensure enlive BID. PO Intakes >75% to meet estimated needs 2. Monitor labs/lytes, fluid status, skin integrity, weight, bowel function REASON FOR ASSESSMENT:  
 
[x] Positive Nutrition Screen: 
[] BMI <19 [] NPO/Clear Liquid Diet > 5 days (>3 days in ICU) [] New Order for TPN 
 
NUTRITION ASSESSMENT:  
Client History: 69 yrs old Male admitted with sepsis with chronic indwelling alonso catheter. PMHx: HTN, DM, urinary rentention, spinal stenosis, hyperlipidemia Cultural/Scientology Food Preferences: None Identified FOOD/NUTRITION HISTORY Diet History: unable to obtain Food Allergies:  [x] NKFA Pertinent PTA Medications: metformin, vitamin D3, multivitamin NUTRITION INTAKE Diet Order: diabetic diet 2000kcal   
Average PO Intake:      
Patient Vitals for the past 100 hrs: 
 % Diet Eaten 05/02/20 1859 0 % 05/02/20 1509 25 % 05/02/20 0853 100 % Pertinent Medications:  [x] Reviewed; tums, lispro, MVI+M Electrolyte Replacement Protocol: []K  []Mg  []PO4 Insulin:  [] SSI  [x] Pre-meal   []  Basal   [] Drip  [] None Pt expected to meet estimated nutrient needs through next review:          [x]  Yes     ANTHROPOMETRICS Height: 5' 11\" (180.3 cm)       Weight: 61.1 kg (134 lb 11.2 oz) BMI: 18.8 kg/m^2  -  normal weight (18.5%-24.9% BMI) Weight change: approx wt loss of 10lbs x 9 months, approx 6.9% body weight Comparison to Reference Standards: IBW: 172 lbs      %IBW: 78%      AdjBW: n/a kg NUTRITION-FOCUSED PHYSICAL ASSESSMENT Skin: no pressure area per documentation GI: BM 5/2 BIOCHEMICAL DATA & MEDICAL TESTS Pertinent Labs:  [x] Reviewed NUTRITION PRESCRIPTION Calories: 2440 kcal/day based on 40kcal/kg Protein: 80 g/day based on 1.3 g/kg CHO: 305 g/day based on 50% of total energy Fluid: 2440 ml/day based on 1 kcal/ml NUTRITION DIAGNOSES:  
 1. Predicted suboptimal oral intake related decreased appetite as evidenced by wt loss of approx 6.9% x 9 months NUTRITION INTERVENTIONS:  
INTERVENTIONS:        GOALS: 
1. ONS: ensure enlive BID to aid in meeting estimated needs 1. PO Intakes >75% of meals and supplements throughout the next 3 days LEARNING NEEDS (Diet, Supplementation, Food/Nutrient-Drug Interaction):  
[x] None Identified 
[] Inpatient education provided/documented   
[] Identified and patient:  [] Declined     [] Was not appropriate/indicated NUTRITION MONITORING /EVALUATION:  
Follow PO intake Monitor wt Monitor renal labs, electrolytes, fluid status Monitor for additional supplement needs 
 
[] Participated in Interdisciplinary Rounds 
[x] [address] [place] [address] Reviewed/Documented DISCHARGE NUTRITION RECOMMENDATIONS ADDRESSED:  
    
  [x] To be determined closer to discharge NUTRITION RISK:     [x]  At risk                     []  Not currently at risk Will follow-up per policy. Roman Carranza 1

## 2020-05-03 NOTE — PROGRESS NOTES
Bedside shift change report given to [facility] KARLY Report included the following information SBAR, Kardex, Intake/Output, MAR, and Recent Results.

## 2020-05-03 NOTE — PROGRESS NOTES
Hospitalist Progress Note Patient: Russ Moran MRN: [id number]  [facility]: [id number] YOB: 1945  Age: 69 y.o. Sex: male DOA: 5/1/2020 LOS:  LOS: 2 days Assessment/Plan Principal Problem: 
  Sepsis ([address] (5/1/2020) Active Problems: 
  Lumbar spondylosis (9/5/2017) Urinary retention (9/14/2017) Type II diabetes mellitus with manifestations, uncontrolled ([address] (9/16/2017) UTI (urinary tract infection) due to urinary indwelling catheter ([address] (5/1/2020) UTI (urinary tract infection) (5/1/2020) Sepsis/UTI: on sepsis protocol initiated. IVF. Continue Levaquin and cefepime per sepsis protocol,  Follow up the results of his urine culture.  Sliding scale insulin as needed.  Continue maintenance IV fluids.  
  
Nausea vomiting: New. No abdominal pain no diarrhea. Zofran as needed with IV Protonix. Metabolic encephalopathy due to sepsis UTI. Resolved. 
  
Hypertension: Controlled. Continue current regimen. 
  
Diabetes mellitus type 2: Accu-Cheks a.c. and at bedtime. Check a hemoglobin A1c/5.9 
  
Tobacco use: On nicotine patch. Consult for smoking cessation 
  
Urinary retention with chronic Jimenez catheter: Being seen in follow-up by Dr. Christo Peterson at [facility] 
  
History of spinal surgery 
  
PT/OT 
  
Dispo: 2-3 days 
  
CC:   Sepsis, UTI, DM, HTN, nausea and vomiting Subjective: Pt was seen and examined with the nurse in the morning round. Episode of nausea vomiting since yesterday. No diarrhea no constipation no abdominal pain no chest pain. Review of systems General: No fevers or chills. Cardiovascular: No chest pain or pressure. No palpitations. Pulmonary: No cough, SOB Gastrointestinal: No nausea, vomiting. Objective:  
  
Visit Vitals /82 (BP 1 Location: Left arm, BP Patient Position: At rest) Pulse 82 Temp 97.7 °F (36.5 °C) Resp 17 Ht 5' 11\" (1.803 m) Wt 61.1 kg (134 lb 11.2 oz) SpO2 91% BMI 18.79 kg/m² Physical Exam: 
 
Gen: NAD, ill appearing Heent:  MMM, NC, AT. Cor: s1s2 RRR. No MRG. PMI mid 5th intercostal space. Resp:  CTA b/l. No w/r/r. Nml effort and diaphragmatic excursion. Abd:  NT ND.  BS positive. No rebound or guarding. No masses. Ext: No edema or cyanosis. Intake and Output: 
Current Shift:  05/03 0701 - 05/03 1900 In: -  
Out: 1 Last three shifts:  05/01 1901 - 05/03 0700 In: 1208.3 [P.O.:600; I.V.:608.3] Out: 1500 [Urine:1300] Labs: Results:  
   
Chemistry Recent Labs 05/03/20 0345 05/02/20 0400 05/01/20 
1455 * 85 133*  143 139  
K 3.8 3.9 4.0  
 115* 107 CO2 22 23 27 BUN 28* 28* 27* CREA 1.17 1.27 1.55* CA 7.9* 7.8* 8.7 AGAP 8 5 5 BUCR 24* 22* 17  
AP  --   --  120* TP  --   --  7.6 ALB  --   --  3.2*  
GLOB  --   --  4.4* AGRAT  --   --  0.7* CBC w/Diff Recent Labs 05/03/20 0345 05/02/20 
0400 05/01/20 
1455 WBC 11.1 15.3* 26.2*  
RBC 3.62* 3.55* 4.50* HGB 10.8* 10.6* 13.6 HCT 33.5* 31.9* 39.9  166 235 GRANS 70 73 80* LYMPH 17* 15* 12* EOS 0 1 0 Cardiac Enzymes Recent Labs 05/01/20 
1455  CKND1 0.6 Coagulation No results for input(s): PTP, INR, APTT, INREXT, INREXT in the last 72 hours. Lipid Panel Lab Results Component Value Date/Time Cholesterol, total 146 06/01/2016 07:51 AM  
 HDL Cholesterol 40 06/01/2016 07:51 AM  
 LDL, calculated 91.6 06/01/2016 07:51 AM  
 VLDL, calculated 14.4 06/01/2016 07:51 AM  
 Triglyceride 72 06/01/2016 07:51 AM  
 CHOL/HDL Ratio 3.7 06/01/2016 07:51 AM  
  
BNP No results for input(s): BNPP in the last 72 hours. Liver Enzymes Recent Labs 05/01/20 
1455 TP 7.6 ALB 3.2* * SGOT 31 Thyroid Studies No results found for: T4, T3U, TSH, TSHEXT, TSHEXT Procedures/imaging: see electronic medical records for all procedures/Xrays and details which were not copied into this note but were reviewed prior to creation of Plan Medications Reviewed Ivan Buchanan MD

## 2020-05-04 VITALS
HEIGHT: 71 IN | HEART RATE: 80 BPM | OXYGEN SATURATION: 96 % | BODY MASS INDEX: 18.86 KG/M2 | DIASTOLIC BLOOD PRESSURE: 83 MMHG | SYSTOLIC BLOOD PRESSURE: 138 MMHG | WEIGHT: 134.7 LBS | RESPIRATION RATE: 16 BRPM | TEMPERATURE: 98.4 F

## 2020-05-04 LAB
ANION GAP SERPL CALC-SCNC: 8 MMOL/L (ref 3–18)
BACTERIA SPEC CULT: ABNORMAL
BASOPHILS # BLD: 0 K/UL (ref 0–0.1)
BASOPHILS NFR BLD: 0 % (ref 0–2)
BUN SERPL-MCNC: 28 MG/DL (ref 7–18)
BUN/CREAT SERPL: 24 (ref 12–20)
CALCIUM SERPL-MCNC: 7.9 MG/DL (ref 8.5–10.1)
CC UR VC: ABNORMAL
CHLORIDE SERPL-SCNC: 116 MMOL/L (ref 100–111)
CO2 SERPL-SCNC: 22 MMOL/L (ref 21–32)
CREAT SERPL-MCNC: 1.15 MG/DL (ref 0.6–1.3)
DIFFERENTIAL METHOD BLD: ABNORMAL
EOSINOPHIL # BLD: 0.2 K/UL (ref 0–0.4)
EOSINOPHIL NFR BLD: 2 % (ref 0–5)
ERYTHROCYTE [DISTWIDTH] IN BLOOD BY AUTOMATED COUNT: 15.7 % (ref 11.6–14.5)
GLUCOSE BLD STRIP.AUTO-MCNC: 94 MG/DL (ref 70–110)
GLUCOSE SERPL-MCNC: 87 MG/DL (ref 74–99)
HCT VFR BLD AUTO: 31.9 % (ref 36–48)
HGB BLD-MCNC: 10.6 G/DL (ref 13–16)
LYMPHOCYTES # BLD: 2 K/UL (ref 0.9–3.6)
LYMPHOCYTES NFR BLD: 19 % (ref 21–52)
MCH RBC QN AUTO: 29.6 PG (ref 24–34)
MCHC RBC AUTO-ENTMCNC: 33.2 G/DL (ref 31–37)
MCV RBC AUTO: 89.1 FL (ref 74–97)
MONOCYTES # BLD: 1.8 K/UL (ref 0.05–1.2)
MONOCYTES NFR BLD: 17 % (ref 3–10)
NEUTS SEG # BLD: 6.6 K/UL (ref 1.8–8)
NEUTS SEG NFR BLD: 62 % (ref 40–73)
PLATELET # BLD AUTO: 152 K/UL (ref 135–420)
PMV BLD AUTO: 9.6 FL (ref 9.2–11.8)
POTASSIUM SERPL-SCNC: 3.9 MMOL/L (ref 3.5–5.5)
RBC # BLD AUTO: 3.58 M/UL (ref 4.7–5.5)
SERVICE CMNT-IMP: ABNORMAL
SODIUM SERPL-SCNC: 146 MMOL/L (ref 136–145)
WBC # BLD AUTO: 10.6 K/UL (ref 4.6–13.2)

## 2020-05-04 PROCEDURE — 74011000250 HC RX REV CODE- 250: Performed by: EMERGENCY MEDICINE

## 2020-05-04 PROCEDURE — 97110 THERAPEUTIC EXERCISES: CPT

## 2020-05-04 PROCEDURE — 97116 GAIT TRAINING THERAPY: CPT

## 2020-05-04 PROCEDURE — 80048 BASIC METABOLIC PNL TOTAL CA: CPT

## 2020-05-04 PROCEDURE — 97535 SELF CARE MNGMENT TRAINING: CPT

## 2020-05-04 PROCEDURE — 74011250637 HC RX REV CODE- 250/637: Performed by: INTERNAL MEDICINE

## 2020-05-04 PROCEDURE — 74011250636 HC RX REV CODE- 250/636: Performed by: EMERGENCY MEDICINE

## 2020-05-04 PROCEDURE — 74011000250 HC RX REV CODE- 250: Performed by: FAMILY MEDICINE

## 2020-05-04 PROCEDURE — C9113 INJ PANTOPRAZOLE SODIUM, VIA: HCPCS | Performed by: FAMILY MEDICINE

## 2020-05-04 PROCEDURE — 82962 GLUCOSE BLOOD TEST: CPT

## 2020-05-04 PROCEDURE — 85025 COMPLETE CBC W/AUTO DIFF WBC: CPT

## 2020-05-04 PROCEDURE — 74011250636 HC RX REV CODE- 250/636: Performed by: FAMILY MEDICINE

## 2020-05-04 PROCEDURE — 74011250636 HC RX REV CODE- 250/636: Performed by: HOSPITALIST

## 2020-05-04 PROCEDURE — 74011250637 HC RX REV CODE- 250/637: Performed by: HOSPITALIST

## 2020-05-04 PROCEDURE — 36415 COLL VENOUS BLD VENIPUNCTURE: CPT

## 2020-05-04 RX ORDER — CEFUROXIME AXETIL 500 MG/1
500 TABLET ORAL 2 TIMES DAILY
Qty: 10 TAB | Refills: 0 | Status: SHIPPED | OUTPATIENT
Start: 2020-05-04 | End: 2020-10-09 | Stop reason: CLARIF

## 2020-05-04 RX ORDER — LEVOFLOXACIN 500 MG/1
500 TABLET, FILM COATED ORAL DAILY
Qty: 7 TAB | Refills: 0 | Status: SHIPPED | OUTPATIENT
Start: 2020-05-04 | End: 2020-10-09 | Stop reason: CLARIF

## 2020-05-04 RX ADMIN — CEFEPIME HYDROCHLORIDE 2 G: 2 INJECTION, POWDER, FOR SOLUTION INTRAVENOUS at 08:41

## 2020-05-04 RX ADMIN — SODIUM CHLORIDE 40 MG: 9 INJECTION, SOLUTION INTRAMUSCULAR; INTRAVENOUS; SUBCUTANEOUS at 08:41

## 2020-05-04 RX ADMIN — LISINOPRIL 20 MG: 20 TABLET ORAL at 08:44

## 2020-05-04 RX ADMIN — SODIUM CHLORIDE 100 ML/HR: 900 INJECTION, SOLUTION INTRAVENOUS at 07:51

## 2020-05-04 RX ADMIN — HYDROCHLOROTHIAZIDE 25 MG: 25 TABLET ORAL at 08:44

## 2020-05-04 RX ADMIN — CEFEPIME HYDROCHLORIDE 2 G: 2 INJECTION, POWDER, FOR SOLUTION INTRAVENOUS at 01:08

## 2020-05-04 RX ADMIN — MULTIPLE VITAMINS W/ MINERALS TAB 1 TABLET: TAB at 08:45

## 2020-05-04 RX ADMIN — METOPROLOL SUCCINATE 25 MG: 25 TABLET, EXTENDED RELEASE ORAL at 08:45

## 2020-05-04 RX ADMIN — ANTACID TABLETS 200 MG: 500 TABLET, CHEWABLE ORAL at 08:45

## 2020-05-04 RX ADMIN — ASPIRIN 81 MG 81 MG: 81 TABLET ORAL at 08:45

## 2020-05-04 NOTE — PROGRESS NOTES
9787 
Bedside and verbal shift change report given to Tru Mckay RN (on coming nurse) by Danay Chun RN (off going nurse). Report included the following information SBAR, Kardex, OR Summary, Intake/Output and MAR. 
 
1142 - Changed Jimenez bag to a leg bag. 
 
1142 C. Fay Powers, SASKIA - prepared document. [place], RN - 
AVS review with pt, opportunity for questions and concerns at this time. D/c IV clean and dry. Properly discarded armbands.

## 2020-05-04 NOTE — PROGRESS NOTES
Problem: Mobility Impaired (Adult and Pediatric) Goal: *Acute Goals and Plan of Care (Insert Text) Description: Physical Therapy Goals Initiated 5/3/2020 and to be accomplished within 3-5 day(s) 1. Patient will move from supine <> sit with S in prep for out of bed activity and change of position. 2.  Patient will perform sit<> stand with S with LRAD in prep for transfers/ambulation. 3.  Patient will transfer from bed <> chair with S with LRAD for time up in chair for completion of ADL activity. 4.  Patient will ambulate >100 feet with LRAD/S for improved functional mobility/safe discharge. Outcome: Progressing Towards Goal 
PHYSICAL THERAPY TREATMENT Patient: Shaylee Pierre (30 y.o. male) Date: 5/4/2020 Diagnosis: UTI (urinary tract infection) [N39.0] UTI (urinary tract infection) [N39.0] Sepsis ([facility] [address] Precautions: Fall Chart, physical therapy assessment, plan of care and goals were reviewed. ASSESSMENT: 
Pt found supine in bed and willing to participate with PT session, though discharging today. Able to met goals for bed mobility, transfers and progressing with gt function. Distance increased to 60ft with RW. Verbal cues to remain more closely inside RW as pt tends to increase proximity with time/distance amb. No c/o dizziness today. Tolerated strengthening ex LE's well. Progression toward goals: 
[x]      Improving appropriately and progressing toward goals 
[]      Improving slowly and progressing toward goals 
[]      Not making progress toward goals and plan of care will be adjusted PLAN: 
Patient continues to benefit from skilled intervention to address the above impairments. Continue treatment per established plan of care. Discharge Recommendations:  Home Health Further Equipment Recommendations for Discharge:  N/A  
 
SUBJECTIVE:  
Patient stated Doing okay, going home.  OBJECTIVE DATA SUMMARY:  
Critical Behavior: 
Neurologic State: Alert Orientation Level: Oriented X4 Cognition: Appropriate decision making Safety/Judgement: Awareness of environment, Fall prevention Functional Mobility Training: 
Bed Mobility: 
Supine to Sit: Supervision Sit to Supine: Supervision Scooting: Supervision Transfers: 
Sit to Stand: Supervision; Additional time Stand to Sit: Supervision Balance: 
Sitting: Intact Standing: Impaired; With support Standing - Static: Good Standing - Dynamic : Fair Ambulation/Gait Training: 
Distance (ft): 60 Feet (ft) Assistive Device: Gait belt;Walker, rolling Ambulation - Level of Assistance: Contact guard assistance; Other (comment)(vc) 
Gait Abnormalities: Decreased step clearance Base of Support: Narrowed Speed/Nicky: Slow Step Length: Right shortened;Left shortened Swing Pattern: Right asymmetrical;Left asymmetrical 
Therapeutic Exercises:  
Supine: AP x20, QS x 10, HS x 10 Pain: 
Pain Scale 1: Numeric (0 - 10) Pain Intensity 1: 0 Activity Tolerance:  
[place] Eyvonne Beverage Please refer to the flowsheet for vital signs taken during this treatment. After treatment:  
[] Patient left in no apparent distress sitting up in chair 
[x] Patient left in no apparent distress in bed 
[x] Call bell left within reach 
[] Nursing notified 
[] Caregiver present 
[] Bed alarm activated Bozena Chambers PT Time Calculation: 28 mins

## 2020-05-04 NOTE — ROUTINE PROCESS
1929 Bedside and Verbal shift change report given to Moshe Marcum RN (oncoming nurse) by Roger Gregg RN (offgoing nurse). Report included the following information SBAR, Kardex, Intake/Output, MAR and Recent Results. 2123 Pt resting in bed w/ HOB elevated and bed in lowest position and call bell at pts side. A&Ox4, lung sounds clear in all lobes, bowel sounds active and pedal pulses present and palpable. Jimenez catheter patent and draining summer urine with no odor, cath care provided. Insulin coverage provided for elevated blood sugar. Skin is intact with a abrasion noted to the left elbow. Pt denies any pain or distress at this time. 2318 Received report from CNA pts O2 saturation down to 89%. Upon entry to pts room pt observed sleeping in bed, pt awakened denies any SOB or distress. Lung sounds clear in all lobes, HOB elevated and 2L O2 applied via NC. Will mo itor effectiveness. 2340  Pt reassessed and currently at 97 O2 saturation 1939 Incontinent care provided. 2835 Bedside and Verbal shift change report given to SHAHBAZ Coyle RN (oncoming nurse) by Moshe Marcum RN (offgoing nurse). Report included the following information SBAR, Kardex, Intake/Output, MAR and Recent Results.

## 2020-05-04 NOTE — PROGRESS NOTES
Problem: Falls - Risk of 
Goal: *Absence of Falls Description: Document Gabino Vaz Fall Risk and appropriate interventions in the flowsheet. Outcome: Progressing Towards Goal 
Note: Fall Risk Interventions: 
Mobility Interventions: Assess mobility with egress test 
 
  
 
Medication Interventions: Teach patient to arise slowly Elimination Interventions: Call light in reach History of Falls Interventions: Evaluate medications/consider consulting pharmacy, Room close to nurse's station, Bed/chair exit alarm

## 2020-05-04 NOTE — DISCHARGE INSTRUCTIONS
Patient Education        Sepsis: Care Instructions  Your Care Instructions    Sepsis is an intense reaction to an infection. It can cause deadly damage to the body and lead to a dangerously low blood pressure. You may have inflammation across large areas of your body. It can damage tissue and even go deep into your organs. Infections that can lead to sepsis include:  · A skin infection such as from a cut. · A lung infection like pneumonia. · A kidney infection. · A gut infection such as E. coli. It's important to care for yourself and try to avoid infections so that you don't get sepsis again. Follow-up care is a key part of your treatment and safety. Be sure to make and go to all appointments, and call your doctor if you are having problems. It's also a good idea to know your test results and keep a list of the medicines you take. How can you care for yourself at home? · If your doctor prescribed antibiotics, take them as directed. Do not stop taking them just because you feel better. You need to take the full course of antibiotics. · Help prevent infections that could lead to sepsis:  ? Try to avoid colds and flu. If you must be around people who have a cold or the flu, wash your hands often. And get a flu vaccine every year. ? Get a pneumococcal vaccine shot (to prevent pneumonia, meningitis, and other infections). If you have had one before, ask your doctor if you need another dose. ? Clean any wounds or scrapes. · Do not smoke or use other tobacco products. When you quit smoking, you are less likely to get a cold, the flu, bronchitis, and pneumonia. If you need help quitting, talk to your doctor about stop-smoking programs and medicines. These can increase your chances of quitting for good. · To prevent dehydration, drink plenty of fluids. Choose water and other caffeine-free clear liquids until you feel better.  If you have kidney, heart, or liver disease and have to limit fluids, talk with your doctor before you increase the amount of fluids you drink. · Eat a healthy diet. Include fruits, vegetables, and whole grains in your diet every day. · If your doctor recommends it, try doing some physical activity. Walking is a good choice. Bit by bit, increase the amount you walk every day. When should you call for help? Call 911 anytime you think you may need emergency care. For example, call if:    · You passed out (lost consciousness).    Call your doctor now or seek immediate medical care if:    · You have symptoms of sepsis. These may include:  ? Shortness of breath. ? A fast heart rate. ? Cool, pale, or clammy skin. ? Feeling confused.     · You are dizzy or lightheaded, or you feel like you may faint.     · You have a fever or chills.    Watch closely for changes in your health, and be sure to contact your doctor if:    · You do not get better as expected. Where can you learn more? Go to http://jacintaVdolgsonia.info/  Enter T383 in the search box to learn more about \"Sepsis: Care Instructions. \"  Current as of: June 26, 2019Content Version: 12.4  © 7087-7217 [employer]. Care instructions adapted under license by [employer] (which disclaims liability or warranty for this information). If you have questions about a medical condition or this instruction, always ask your healthcare professional. [address] any warranty or liability for your use of this information. Urinary Tract Infections in Men: Care Instructions  Your Care Instructions    A urinary tract infection, or UTI, is a general term for an infection anywhere between the kidneys and the tip of the penis. UTIs can also be a result of a prostate problem. Most cause pain or burning when you urinate. Most UTIs are caused by bacteria and can be cured with antibiotics. It is important to complete your treatment so that the infection does not get worse.   Follow-up care is a key part of your treatment and safety. Be sure to make and go to all appointments, and call your doctor if you are having problems. It's also a good idea to know your test results and keep a list of the medicines you take. How can you care for yourself at home? · Take your antibiotics as prescribed. Do not stop taking them just because you feel better. You need to take the full course of antibiotics. · Take your medicines exactly as prescribed. Your doctor may have prescribed a medicine, such as phenazopyridine (Pyridium), to help relieve pain when you urinate. This turns your urine orange. You may stop taking it when your symptoms get better. But be sure to take all of your antibiotics, which treat the infection. · Drink extra water for the next day or two. This will help make the urine less concentrated and help wash out the bacteria causing the infection. (If you have kidney, heart, or liver disease and have to limit your fluids, talk with your doctor before you increase your fluid intake.)  · Avoid drinks that are carbonated or have caffeine. They can irritate the bladder. · Urinate often. Try to empty your bladder each time. · To relieve pain, take a hot bath or lay a heating pad (set on low) over your lower belly or genital area. Never go to sleep with a heating pad in place. To help prevent UTIs  · Drink plenty of fluids, enough so that your urine is light yellow or clear like water. If you have kidney, heart, or liver disease and have to limit fluids, talk with your doctor before you increase the amount of fluids you drink. · Urinate when you have the urge. Do not hold your urine for a long time. Urinate before you go to sleep. · Keep your penis clean. Catheter care  If you have a drainage tube (catheter) in place, the following steps will help you care for it. · Always wash your hands before and after touching your catheter.   · Check the area around the urethra for inflammation or signs of infection. Signs of infection include irritated, swollen, red, or tender skin, or pus around the catheter. · Clean the area around the catheter with soap and water two times a day. Dry with a clean towel afterward. · Do not apply powder or lotion to the skin around the catheter. To empty the urine collection bag  · Wash your hands with soap and water. · Without touching the drain spout, remove the spout from its sleeve at the bottom of the collection bag. Open the valve on the spout. · Let the urine flow out of the bag and into the toilet or a container. Do not let the tubing or drain spout touch anything. · After you empty the bag, clean the end of the drain spout with tissue and water. Close the valve and put the drain spout back into its sleeve at the bottom of the collection bag. · Wash your hands with soap and water. When should you call for help? Call your doctor now or seek immediate medical care if:    · Symptoms such as a fever, chills, nausea, or vomiting get worse or happen for the first time.     · You have new pain in your back just below your rib cage. This is called flank pain.     · There is new blood or pus in your urine.     · You are not able to take or keep down your antibiotics.    Watch closely for changes in your health, and be sure to contact your doctor if:    · You are not getting better after taking an antibiotic for 2 days.     · Your symptoms go away but then come back. Where can you learn more? Go to http://jacinta-sonia.info/  Enter G412 in the search box to learn more about \"Urinary Tract Infections in Men: Care Instructions. \"  Current as of: August 21, 2019Content Version: 12.4  © 7494-3829 [employer]. Care instructions adapted under license by [employer] (which disclaims liability or warranty for this information).  If you have questions about a medical condition or this instruction, always ask your healthcare professional. [employer] disclaims any warranty or liability for your use of this information.

## 2020-05-04 NOTE — ACP (ADVANCE CARE PLANNING)
[facility] - Not Interested  addressed Advance Medical Directives with the patient. Patient is not interested at this time.  completed visit with patient and offered Pastoral care. Chaplains will continue to follow and will provide pastoral care as needed or requested. Rev. Donnell Hernandezlain,[facility] [employer] 
[phone]

## 2020-05-04 NOTE — PROGRESS NOTES
Problem: Self Care Deficits Care Plan (Adult) Goal: *Acute Goals and Plan of Care (Insert Text) Description: Occupational Therapy Goals Initiated 5/3/2020 within 7 day(s). 1.  Patient will perform grooming with supervision/set-up standing at sink for 5 minutes or more. 2.  Patient will perform lower body dressing with supervision/set-up. 3.  Patient will perform toilet transfers with supervision/set-up. 4.  Patient will perform all aspects of toileting with supervision/set-up. 5.  Patient will participate in upper extremity therapeutic exercise/activities with supervision/set-up for 10 minutes. 6.  Patient will utilize energy conservation techniques during functional activities with verbal cues. 5/4/2020 0926 by Margie Zaman Outcome: Progressing Towards Goal 
5/4/2020 0926 by Lady Jordan Leahy Outcome: Progressing Towards Goal 
 OCCUPATIONAL THERAPY TREATMENT Patient: Ovidio Larson (81 y.o. male) Date: 5/4/2020 Diagnosis: UTI (urinary tract infection) [N39.0] UTI (urinary tract infection) [N39.0] Sepsis ([address] Precautions: Fall PLOF: MI with ADL's and transfers using RW. Chart, occupational therapy assessment, plan of care, and goals were reviewed. ASSESSMENT: 
Based on the information provided below, pt presents with decreased strength, balance, safety, and activity tolerance which are limiting pt's progress with ADL's and transfers. Pt was received in supine with RN present in room. Pt agreeable to therapy. Pt rated his pain level a 0/10 at rest at the start of the session. Pt did supine to sit to right side of the bed with supervision and extra time. Once he was sitting on the EOB, he complained of being lightheaded. Pt sat on the EOB ~2 minutes before he said it improved. Once his light headedness improved, pt donned his socks with SBA and extra time seated on the EOB.  Pt did sit to stand from EOB with SBA/S with vc's for proper hand placement/safety. Pt ambulated to BR with RW and CGA for balance/safety. Pt washed his hands standing at the sink. Pt needed vc's to stand upright during task vs leaning on sink to complete task. Pt then ambulated back to his bed and requested to sit on the EOB. Pt instructed to not get up by himself and RN aware that pt was sitting on the EOB at the end of the session. Pt's call light in reach and all needs met at the end of the session. Pt was very concerned about dc plans and needed to be reassured several times during the session that everything would be in place for him to go home. Progression toward goals: 
[x]          Improving appropriately and progressing toward goals 
[]          Improving slowly and progressing toward goals 
[]          Not making progress toward goals and plan of care will be adjusted PLAN: 
Patient continues to benefit from skilled intervention to address the above impairments. Continue treatment per established plan of care. Discharge Recommendations:  Home Health Further Equipment Recommendations for Discharge:  N/A  
 
SUBJECTIVE:  
Patient stated i'm going home today. Will HH be set up for me?  OBJECTIVE DATA SUMMARY:  
Cognitive/Behavioral Status: 
Neurologic State: Alert Orientation Level: Oriented X4 Cognition: Appropriate decision making, Follows commands Safety/Judgement: Awareness of environment, Fall prevention Functional Mobility and Transfers for ADLs: 
 Bed Mobility: 
Supine to Sit: Supervision Scooting: Supervision Transfers: 
Sit to Stand: Stand-by assistance;Supervision Stand to Sit: Stand-by assistance;Supervision Bathroom Mobility: Contact guard assistance Balance: 
Sitting: Intact Standing: Impaired; With support Standing - Static: Good Standing - Dynamic : Fair ADL Intervention: 
 Grooming Grooming Assistance: Stand-by assistance Position Performed: Standing Washing Hands: Stand-by assistance Lower Body Dressing Assistance Dressing Assistance: Set-up Socks: Set-up Leg Crossed Method Used: Yes Position Performed: Seated edge of bed Cognitive Retraining Safety/Judgement: Awareness of environment; Fall prevention Pain: 
Pain level pre-treatment: 0/10 Pain level post-treatment: 0/10 Pain Intervention(s): Medication administered by RN (see MAR); Rest, Ice, Repositioning Response to intervention: Nurse notified, See doc flow sheet Activity Tolerance:   
Poor to fair. Pt required rest breaks during ADL's due to fatigue. Please refer to the flowsheet for vital signs taken during this treatment. After treatment:  
[x]  Patient left in no apparent distress sitting on EOB []  Patient left in no apparent distress in bed 
[x]  Call bell left within reach [x]  Nursing notified 
[]  Caregiver present 
[]  Bed alarm activated COMMUNICATION/EDUCATION:  
[x] Role of Occupational Therapy in the acute care setting 
[x] Home safety education was provided and the patient/caregiver indicated understanding. [x] Patient/family have participated as able in working towards goals and plan of care. [x] Patient/family agree to work toward stated goals and plan of care. [] Patient understands intent and goals of therapy, but is neutral about his/her participation. [] Patient is unable to participate in goal setting and plan of care. Thank you for this referral. 
Elías Leahy OTR/L MOT Time Calculation: 23 mins

## 2020-05-04 NOTE — PROGRESS NOTES
Plan: home with [facility] [address] [employer] Chart reviewed, noted recommendations for home health. Spoke with pt on phone into room- FOC offered, pt chose [facility]; referral placed. Pt eager to discharge home, states his wife will pick him up once discharged. Verified contact information via face sheet. Pt will need PCP follow up and Urology follow up (per pt and hospitalist note) pt has chronic alonso and states Dr. Marella Frederickson called wife and wants him to follow up in office, will contact office to see if appointment needed/made. No other needs identified, CM will remain available. Care Management Interventions PCP Verified by CM: Yes Transition of Care Consult (CM Consult): Home Health [address]: No 
Reason Outside [place]: Patient already serviced by other home care/hospice agency([facility]) Discharge Durable Medical Equipment: No 
Physical Therapy Consult: Yes Occupational Therapy Consult: Yes Current Support Network: Lives with Spouse The Plan for Transition of Care is Related to the Following Treatment Goals : [facility] The Patient and/or Patient Representative was Provided with a Choice of Provider and Agrees with the Discharge Plan?: Yes Freedom of Choice List was Provided with Basic Dialogue that Supports the Patient's Individualized Plan of Care/Goals, Treatment Preferences and Shares the Quality Data Associated with the Providers?: Yes Discharge Location Discharge Placement: Home with home health

## 2020-05-04 NOTE — DISCHARGE SUMMARY
[facility] [place] DISCHARGE SUMMARY Name:  Nia Olmstead 
MR#:   [id number] :  1945 ACCOUNT #:  [de-identified] ADMIT DATE:  2020 DISCHARGE DATE:  2020 DISCHARGE DIAGNOSES: 
1. Sepsis with urinary tract infection related to indwelling catheter. 2.  Chronic urinary retention. 3.  Tobacco use. 4.  Diabetes mellitus. HOSPITAL SUMMARY:  The patient is 69years old. 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 He came in with delirium. He was found have a UTI, likely related to his catheter, and thus was begun on treatment. Received fluid resuscitation, sepsis protocol was initiated. The urinalysis came back with positive indicators for UTI. The urine culture came back with Pseudomonas and Staphylococcus aureus. No sensitivities are included on the final result. He had a mild acute kidney injury that is resolved with fluid resuscitation. His hemoglobin A1c is 5.9%. His blood sugars are between . He had a leukocytosis on admission which is now resolved. Platelet count is within normal limits. H and H are stable at 10.6 and 31.9. He is anxious to go home today. He is at his baseline mentation. He is appropriate, up and ambulatory. He wants a leg bag for his Jimenez catheter. PHYSICAL EXAMINATION: 
GENERAL:  He is oriented x3. VITAL SIGNS:  Blood pressure is 138/83, pulse 80, temperature 98.4, respiratory rate 16, SaO2 is 96%. LUNGS:  Clear bilaterally. CARDIAC:  Regular rate and rhythm. No murmur. ABDOMEN:  Soft and nontender. Jimenez catheter is draining clear yellow urine. LOWER EXTREMITIES:  Without edema. PLAN:  Discharge home today on Ceftin 500 mg twice daily for 5 days and Levaquin 500 mg daily for 7 days. He has received 3 days of IV antibiotics here, including cefepime and Levaquin.   The combination of antibiotics is to hopefully cover for both the MSSA and the Pseudomonas, which I am giving him a longer course of, but recommendation would be to follow up his urine studies as an outpatient, and he needs to see his urologist this week which he is planning to do as well. ADDENDUM-IT IS MRSA, NOT MSSA, AND IT IS KLEBSIELLA AND PSEUDOMONAS 
 
MEDICATIONS:  Continue his home medications otherwise which include the following, 
1. Vitamin D. 
2.  Flexeril as needed. 3.  Prinizide for hypertension. 4.  Glucophage. 5.  Aspirin. 6.  Toprol. 7.  Multivitamin. 8.  Again, the new antibiotics are Ceftin and Levaquin. He received cefepime and Levaquin IV over the last 3 days. ADDENDUM: LAB CALLED WITH UPDATE ON CX RESULTS-MRSA NOTED-SO WILL CANCEL CEFTIN AND CALL IN BACTRIM DS FOR 10 DAYS-DISCUSSED WITH PHARMACIST-RECOMMEND REPEAT CX IN 1 WEEK DISPOSITION:  He is stable for transition home today with Home Health. FOLLOWUP:  Follow up with Dr. Sara Rendon, Urology in 2-3 days in the office, and Dr. Charis Griffin in  3 days in the office as well. Thirty five minutes discharge time. I discussed the plan of care with the patient. Kely Cruz MD 
 
 
[id number] 
D:  05/04/2020 11:26 
T:  05/04/2020 11:50 
JOB #:  [id number] CC:  Neeru Paez MD

## 2020-05-07 LAB
BACTERIA SPEC CULT: NORMAL
SERVICE CMNT-IMP: NORMAL

## 2020-10-07 ENCOUNTER — HOSPITAL ENCOUNTER (OUTPATIENT)
Dept: PREADMISSION TESTING | Age: 75
Discharge: HOME | End: 2020-10-07
Payer: MEDICARE

## 2020-10-07 PROCEDURE — 87635 SARS-COV-2 COVID-19 AMP PRB: CPT

## 2020-10-09 LAB — SARS-COV-2, COV2NT: NOT DETECTED

## 2020-10-12 ENCOUNTER — ANESTHESIA EVENT (OUTPATIENT)
Dept: SURGERY | Age: 75
End: 2020-10-12
Payer: MEDICARE

## 2020-10-12 RX ORDER — INSULIN LISPRO 100 [IU]/ML
INJECTION, SOLUTION INTRAVENOUS; SUBCUTANEOUS ONCE
Status: CANCELLED | OUTPATIENT
Start: 2020-10-12 | End: 2020-10-12

## 2020-10-12 RX ORDER — MAGNESIUM SULFATE 100 %
4 CRYSTALS MISCELLANEOUS AS NEEDED
Status: CANCELLED | OUTPATIENT
Start: 2020-10-12

## 2020-10-12 RX ORDER — DEXTROSE MONOHYDRATE 100 MG/ML
125-250 INJECTION, SOLUTION INTRAVENOUS AS NEEDED
Status: CANCELLED | OUTPATIENT
Start: 2020-10-12

## 2020-10-12 RX ORDER — OXYCODONE AND ACETAMINOPHEN 5; 325 MG/1; MG/1
1 TABLET ORAL AS NEEDED
Status: CANCELLED | OUTPATIENT
Start: 2020-10-12

## 2020-10-12 RX ORDER — MAGNESIUM SULFATE 100 %
16 CRYSTALS MISCELLANEOUS AS NEEDED
Status: CANCELLED | OUTPATIENT
Start: 2020-10-12

## 2020-10-12 RX ORDER — SODIUM CHLORIDE 9 MG/ML
125 INJECTION, SOLUTION INTRAVENOUS CONTINUOUS
Status: CANCELLED | OUTPATIENT
Start: 2020-10-12

## 2020-10-12 RX ORDER — NALOXONE HYDROCHLORIDE 0.4 MG/ML
0.1 INJECTION, SOLUTION INTRAMUSCULAR; INTRAVENOUS; SUBCUTANEOUS AS NEEDED
Status: CANCELLED | OUTPATIENT
Start: 2020-10-12

## 2020-10-12 RX ORDER — FENTANYL CITRATE 50 UG/ML
50 INJECTION, SOLUTION INTRAMUSCULAR; INTRAVENOUS
Status: CANCELLED | OUTPATIENT
Start: 2020-10-12

## 2020-10-12 RX ORDER — FENTANYL CITRATE 50 UG/ML
25 INJECTION, SOLUTION INTRAMUSCULAR; INTRAVENOUS AS NEEDED
Status: CANCELLED | OUTPATIENT
Start: 2020-10-12

## 2020-10-12 RX ORDER — ONDANSETRON 2 MG/ML
4 INJECTION INTRAMUSCULAR; INTRAVENOUS ONCE
Status: CANCELLED | OUTPATIENT
Start: 2020-10-12 | End: 2020-10-12

## 2020-10-13 ENCOUNTER — ANESTHESIA (OUTPATIENT)
Dept: SURGERY | Age: 75
End: 2020-10-13
Payer: MEDICARE

## 2020-10-13 ENCOUNTER — HOSPITAL ENCOUNTER (OUTPATIENT)
Age: 75
Setting detail: OUTPATIENT SURGERY
Discharge: HOME | End: 2020-10-13
Attending: OPHTHALMOLOGY | Admitting: OPHTHALMOLOGY
Payer: MEDICARE

## 2020-10-13 VITALS
DIASTOLIC BLOOD PRESSURE: 84 MMHG | WEIGHT: 127.38 LBS | HEIGHT: 71 IN | TEMPERATURE: 98.5 F | BODY MASS INDEX: 17.83 KG/M2 | OXYGEN SATURATION: 95 % | RESPIRATION RATE: 16 BRPM | SYSTOLIC BLOOD PRESSURE: 160 MMHG | HEART RATE: 57 BPM

## 2020-10-13 PROBLEM — H53.8 BLURRED VISION: Status: ACTIVE | Noted: 2020-10-13

## 2020-10-13 PROBLEM — H53.8 BLURRED VISION: Status: RESOLVED | Noted: 2020-10-13 | Resolved: 2020-10-13

## 2020-10-13 LAB — GLUCOSE BLD STRIP.AUTO-MCNC: 88 MG/DL (ref 70–110)

## 2020-10-13 PROCEDURE — 82962 GLUCOSE BLOOD TEST: CPT

## 2020-10-13 PROCEDURE — 77030018837 HC SOL IRR OPTH ALCN -A: Performed by: OPHTHALMOLOGY

## 2020-10-13 PROCEDURE — 2709999900 HC NON-CHARGEABLE SUPPLY: Performed by: OPHTHALMOLOGY

## 2020-10-13 PROCEDURE — 76010000138 HC OR TIME 0.5 TO 1 HR: Performed by: OPHTHALMOLOGY

## 2020-10-13 PROCEDURE — 77030006704 HC BLD OPHTH SLT ALCN -B: Performed by: OPHTHALMOLOGY

## 2020-10-13 PROCEDURE — V2632 POST CHMBR INTRAOCULAR LENS: HCPCS | Performed by: OPHTHALMOLOGY

## 2020-10-13 PROCEDURE — 76060000032 HC ANESTHESIA 0.5 TO 1 HR: Performed by: OPHTHALMOLOGY

## 2020-10-13 PROCEDURE — 77030013389: Performed by: OPHTHALMOLOGY

## 2020-10-13 PROCEDURE — 74011250636 HC RX REV CODE- 250/636: Performed by: OPHTHALMOLOGY

## 2020-10-13 PROCEDURE — 74011000250 HC RX REV CODE- 250: Performed by: OPHTHALMOLOGY

## 2020-10-13 PROCEDURE — 77030020782 HC GWN BAIR PAWS FLX 3M -B: Performed by: OPHTHALMOLOGY

## 2020-10-13 PROCEDURE — 74011250636 HC RX REV CODE- 250/636: Performed by: ANESTHESIOLOGY

## 2020-10-13 PROCEDURE — 76210000021 HC REC RM PH II 0.5 TO 1 HR: Performed by: OPHTHALMOLOGY

## 2020-10-13 PROCEDURE — 77030013340: Performed by: OPHTHALMOLOGY

## 2020-10-13 DEVICE — ACRYSOF(R) IQ ASPHERIC NATURAL IOL, SINGLE-PIECE ACRYLIC FOLDABLE PCL, UV WITH BLUE LIGHTFILTER, 13.0MM LENGTH, 6.0MM ANTERIORASYMMETRIC BICONVEX OPTIC, PLANAR HAPTICS.
Type: IMPLANTABLE DEVICE | Site: EYE | Status: FUNCTIONAL
Brand: ACRYSOF®

## 2020-10-13 RX ORDER — KETOROLAC TROMETHAMINE 5 MG/ML
1 SOLUTION OPHTHALMIC
Status: DISCONTINUED | OUTPATIENT
Start: 2020-10-13 | End: 2020-10-13 | Stop reason: HOSPADM

## 2020-10-13 RX ORDER — SODIUM CHLORIDE, SODIUM LACTATE, POTASSIUM CHLORIDE, CALCIUM CHLORIDE 600; 310; 30; 20 MG/100ML; MG/100ML; MG/100ML; MG/100ML
75 INJECTION, SOLUTION INTRAVENOUS CONTINUOUS
Status: DISCONTINUED | OUTPATIENT
Start: 2020-10-13 | End: 2020-10-13 | Stop reason: HOSPADM

## 2020-10-13 RX ORDER — TROPICAMIDE 10 MG/ML
1 SOLUTION/ DROPS OPHTHALMIC
Status: COMPLETED | OUTPATIENT
Start: 2020-10-13 | End: 2020-10-13

## 2020-10-13 RX ORDER — ONDANSETRON 2 MG/ML
INJECTION INTRAMUSCULAR; INTRAVENOUS AS NEEDED
Status: DISCONTINUED | OUTPATIENT
Start: 2020-10-13 | End: 2020-10-13 | Stop reason: HOSPADM

## 2020-10-13 RX ORDER — PHENYLEPHRINE HYDROCHLORIDE 25 MG/ML
1 SOLUTION/ DROPS OPHTHALMIC
Status: COMPLETED | OUTPATIENT
Start: 2020-10-13 | End: 2020-10-13

## 2020-10-13 RX ORDER — EPINEPHRINE 1 MG/ML
INJECTION, SOLUTION, CONCENTRATE INTRAVENOUS AS NEEDED
Status: DISCONTINUED | OUTPATIENT
Start: 2020-10-13 | End: 2020-10-13 | Stop reason: HOSPADM

## 2020-10-13 RX ORDER — MIDAZOLAM HYDROCHLORIDE 1 MG/ML
INJECTION, SOLUTION INTRAMUSCULAR; INTRAVENOUS AS NEEDED
Status: DISCONTINUED | OUTPATIENT
Start: 2020-10-13 | End: 2020-10-13 | Stop reason: HOSPADM

## 2020-10-13 RX ORDER — FENTANYL CITRATE 50 UG/ML
INJECTION, SOLUTION INTRAMUSCULAR; INTRAVENOUS AS NEEDED
Status: DISCONTINUED | OUTPATIENT
Start: 2020-10-13 | End: 2020-10-13 | Stop reason: HOSPADM

## 2020-10-13 RX ADMIN — PHENYLEPHRINE HYDROCHLORIDE 1 DROP: 2.5 SOLUTION/ DROPS OPHTHALMIC at 08:07

## 2020-10-13 RX ADMIN — PHENYLEPHRINE HYDROCHLORIDE 1 DROP: 2.5 SOLUTION/ DROPS OPHTHALMIC at 07:52

## 2020-10-13 RX ADMIN — TROPICAMIDE 1 DROP: 10 SOLUTION/ DROPS OPHTHALMIC at 08:12

## 2020-10-13 RX ADMIN — KETOROLAC TROMETHAMINE 1 DROP: 5 SOLUTION/ DROPS OPHTHALMIC at 08:07

## 2020-10-13 RX ADMIN — MIDAZOLAM 2 MG: 1 INJECTION INTRAMUSCULAR; INTRAVENOUS at 09:11

## 2020-10-13 RX ADMIN — KETOROLAC TROMETHAMINE 1 DROP: 5 SOLUTION/ DROPS OPHTHALMIC at 07:52

## 2020-10-13 RX ADMIN — KETOROLAC TROMETHAMINE 1 DROP: 5 SOLUTION/ DROPS OPHTHALMIC at 08:12

## 2020-10-13 RX ADMIN — KETOROLAC TROMETHAMINE 1 DROP: 5 SOLUTION/ DROPS OPHTHALMIC at 07:57

## 2020-10-13 RX ADMIN — PHENYLEPHRINE HYDROCHLORIDE 1 DROP: 2.5 SOLUTION/ DROPS OPHTHALMIC at 08:02

## 2020-10-13 RX ADMIN — FENTANYL CITRATE 25 MCG: 50 INJECTION, SOLUTION INTRAMUSCULAR; INTRAVENOUS at 09:24

## 2020-10-13 RX ADMIN — TROPICAMIDE 1 DROP: 10 SOLUTION/ DROPS OPHTHALMIC at 07:57

## 2020-10-13 RX ADMIN — PHENYLEPHRINE HYDROCHLORIDE 1 DROP: 2.5 SOLUTION/ DROPS OPHTHALMIC at 08:12

## 2020-10-13 RX ADMIN — SODIUM CHLORIDE, SODIUM LACTATE, POTASSIUM CHLORIDE, AND CALCIUM CHLORIDE 75 ML/HR: 600; 310; 30; 20 INJECTION, SOLUTION INTRAVENOUS at 08:15

## 2020-10-13 RX ADMIN — LIDOCAINE HYDROCHLORIDE 2 DROP: 35 GEL OPHTHALMIC at 08:13

## 2020-10-13 RX ADMIN — TROPICAMIDE 1 DROP: 10 SOLUTION/ DROPS OPHTHALMIC at 07:52

## 2020-10-13 RX ADMIN — TROPICAMIDE 1 DROP: 10 SOLUTION/ DROPS OPHTHALMIC at 08:07

## 2020-10-13 RX ADMIN — KETOROLAC TROMETHAMINE 1 DROP: 5 SOLUTION/ DROPS OPHTHALMIC at 08:02

## 2020-10-13 RX ADMIN — PHENYLEPHRINE HYDROCHLORIDE 1 DROP: 2.5 SOLUTION/ DROPS OPHTHALMIC at 07:58

## 2020-10-13 RX ADMIN — TROPICAMIDE 1 DROP: 10 SOLUTION/ DROPS OPHTHALMIC at 08:02

## 2020-10-13 RX ADMIN — ONDANSETRON HYDROCHLORIDE 4 MG: 2 INJECTION INTRAMUSCULAR; INTRAVENOUS at 09:18

## 2020-10-13 NOTE — ANESTHESIA POSTPROCEDURE EVALUATION
Post-Anesthesia Evaluation and Assessment Cardiovascular Function/Vital Signs Visit Vitals BP (!) 159/83 (BP 1 Location: Right arm, BP Patient Position: At rest) Pulse (!) 58 Temp 36.9 °C (98.5 °F) Resp 16 Ht 5' 11\" (1.803 m) Wt 57.8 kg (127 lb 6 oz) SpO2 95% BMI 17.77 kg/m² Patient is status post Procedure(s): CATARACT EXTRACTION WITH INTRA OCULAR LENS IMPLANT- LEFT EYE. Nausea/Vomiting: Controlled. Postoperative hydration reviewed and adequate. Pain: 
Pain Scale 1: Numeric (0 - 10) (10/13/20 1007) Pain Intensity 1: 0 (10/13/20 1007) Managed. Neurological Status:  
Neuro (WDL): Within Defined Limits (10/13/20 1007) At baseline. Mental Status and Level of Consciousness: Arousable. Pulmonary Status:  
O2 Device: Room air (10/13/20 1007) Adequate oxygenation and airway patent. Complications related to anesthesia: None Post-anesthesia assessment completed. No concerns. Patient has met all discharge requirements. Signed By: Christo Myles CRNA October 13, 2020

## 2020-10-13 NOTE — INTERVAL H&P NOTE
Update History & Physical 
 
The Patient's History and Physical of October 13, 2020 was reviewed with the patient and I examined the patient. There was no change. The surgical site was confirmed by the patient and me. Plan:  The risk, benefits, expected outcome, and alternative to the recommended procedure have been discussed with the patient. Patient understands and wants to proceed with the procedure.  
 
Electronically signed by Patrice Curran MD on 10/13/2020 at 7:46 AM

## 2020-10-13 NOTE — DISCHARGE INSTRUCTIONS
Patient Education   Patient Education        9 Things To Do If You've Been Exposed to COVID-19    Stay home. If you've been exposed, you should stay in isolation for 14 days. Don't go to school, work, or public areas. And don't use public transportation, ride-shares, or taxis unless you have no choice. Leave your home only if you need to get medical care. But call the doctor's office first so they know you're coming, and wear a cloth face cover when you go. Call your doctor. Call your doctor or other health professional to let them know that you've been exposed. They might want you to be tested, or they may have other instructions for you. If you become sick, wear a face cover when you are around other people. It can help stop the spread of the virus when you cough or sneeze. Limit contact with people in your home. If possible, stay in a separate bedroom and use a separate bathroom. Avoid contact with pets and other animals. Cover your mouth and nose with a tissue when you cough or sneeze. Then throw it in the trash right away. Wash your hands often, especially after you cough or sneeze. Use soap and water, and scrub for at least 20 seconds. If soap and water aren't available, use an alcohol-based hand . Don't share personal household items. These include bedding, towels, cups and glasses, and eating utensils. Clean and disinfect your home every day. Use household  or disinfectant wipes or sprays. Take special care to clean things that you grab with your hands. These include doorknobs, remote controls, phones, and handles on your refrigerator and microwave. And don't forget countertops, tabletops, bathrooms, and computer keyboards. Current as of: July 10, 2020               Content Version: 12.6  © 2006-2020 [employer].    Care instructions adapted under license by [employer] (which disclaims liability or warranty for this information). If you have questions about a medical condition or this instruction, always ask your healthcare professional. [address] any warranty or liability for your use of this information. Cataract Surgery: What to Expect at [address] [place] [address] Drive had cataract surgery. It replaced your cloudy natural lens with a clear artificial one. After surgery, your eye will not hurt. But it may feel scratchy, sticky, or uncomfortable. It may also water more than usual.  Most people see better 1 to 3 days after surgery. But it could take 3 to 10 weeks to get the full benefits of surgery and to see as clearly as possible. Your doctor may send you home with a bandage, patch, or clear shield on your eye. This will keep you from rubbing your eye. Your doctor will also give you eyedrops to help your eye heal. Use them exactly as directed. You can read or watch TV right away, but things may look blurry. Most people are able to return to work or their normal routine in 1 to 3 days. After your eye heals, you may still need to wear glasses, especially for reading. This care sheet gives you a general idea about how long it will take for you to recover. But each person recovers at a different pace. Follow the steps below to get better as quickly as possible. How can you care for yourself at home? Activity    · Rest when you feel tired. Getting enough sleep will help you recover.     · You may have trouble judging distances for a few days. Move slowly, and be careful going up and down stairs and pouring hot liquids. Ask for help if you need it.     · Ask your doctor when it is okay to drive.     · Wear your eye bandage, patch, or shield for as long as your doctor recommends. You may only need to wear it when you sleep.     · You can shower or wash your hair the day after surgery.  Keep water, soap, shampoo, hair spray, and shaving lotion out of your eye, especially for the first week.     · Do not rub or put pressure on your eye for at least 1 week.     · Do not wear eye makeup for 1 to 2 weeks. You may also want to avoid face cream or lotion.     · Do not get your hair colored or permed for 10 days after surgery.     · Do not bend over or do any strenuous activities, such as biking, jogging, weight lifting, or aerobic exercise, for 2 weeks or until your doctor says it is okay.     · Avoid swimming, hot tubs, gardening, and dusting for 1 to 2 weeks.     · Wear sunglasses on bright days for at least 1 year after surgery. Medicines    · Your doctor will tell you if and when you can restart your medicines. He or she will also give you instructions about taking any new medicines.     · If you take aspirin or some other blood thinner, ask your doctor if and when to start taking it again. Make sure that you understand exactly what your doctor wants you to do.     · Follow your doctor's instructions for when to use your eyedrops. Always wash your hands before you put your drops in. To put in eyedrops:  ? Tilt your head back, and pull your lower eyelid down with one finger. ? Drop or squirt the medicine inside the lower lid. ? Close your eye for 30 to 60 seconds to let the drops or ointment move around. ? Do not touch the ointment or dropper tip to your eyelashes or any other surface.     · Follow your doctor's instructions for taking pain medicines. Follow-up care is a key part of your treatment and safety. Be sure to make and go to all appointments, and call your doctor if you are having problems. It's also a good idea to know your test results and keep a list of the medicines you take. When should you call for help? Call 911 anytime you think you may need emergency care. For example, call if:    · You passed out (lost consciousness).     · You have a sudden loss of vision.     · You have sudden chest pain, are short of breath, or cough up blood.    Call your doctor now or seek immediate medical care if:    · You have signs of an eye infection, such as:  ? Pus or thick discharge coming from the eye.  ? Redness or swelling around the eye.  ? A fever.     · You have new or worse eye pain.     · You have vision changes.     · You have symptoms of a blood clot in your leg (called a deep vein thrombosis), such as:  ? Pain in the calf, back of the knee, thigh, or groin. ? Redness and swelling in your leg or groin. Watch closely for changes in your health, and be sure to contact your doctor if:    · You do not get better as expected. Where can you learn more? Go to http://www.gray.com/  Enter R255 in the search box to learn more about \"Cataract Surgery: What to Expect at Home. \"  Current as of: December 18, 2019               Content Version: 12.6  © 6852-7149 [employer]. Care instructions adapted under license by [employer] (which disclaims liability or warranty for this information). If you have questions about a medical condition or this instruction, always ask your healthcare professional. [place] [address] any warranty or liability for your use of this information. DISCHARGE SUMMARY from Nurse    PATIENT INSTRUCTIONS:    After general anesthesia or intravenous sedation, for 24 hours or while taking prescription Narcotics:  · Limit your activities  · Do not drive and operate hazardous machinery  · Do not make important personal or business decisions  · Do  not drink alcoholic beverages  · If you have not urinated within 8 hours after discharge, please contact your surgeon on call.     Report the following to your surgeon:  · Excessive pain, swelling, redness or odor of or around the surgical area  · Temperature over 100.5  · Nausea and vomiting lasting longer than 4 hours or if unable to take medications  · Any signs of decreased circulation or nerve impairment to extremity: change in color, persistent  numbness, tingling, coldness or increase pain  · Any questions    What to do at Home:  Aneta Zhang FROM  [facility] TO OFFICE ON Wednesday AS SCHEDULED      If you experience any of the following symptoms bleeding, nausea, fevers, severe pain, please follow up with dr Francisca Fitzpatrick    *  Please give a list of your current medications to your Primary Care Provider. *  Please update this list whenever your medications are discontinued, doses are      changed, or new medications (including over-the-counter products) are added. *  Please carry medication information at all times in case of emergency situations. These are general instructions for a healthy lifestyle:    No smoking/ No tobacco products/ Avoid exposure to second hand smoke  Surgeon General's Warning:  Quitting smoking now greatly reduces serious risk to your health. Obesity, smoking, and sedentary lifestyle greatly increases your risk for illness    A healthy diet, regular physical exercise & weight monitoring are important for maintaining a healthy lifestyle    You may be retaining fluid if you have a history of heart failure or if you experience any of the following symptoms:  Weight gain of 3 pounds or more overnight or 5 pounds in a week, increased swelling in our hands or feet or shortness of breath while lying flat in bed. Please call your doctor as soon as you notice any of these symptoms; do not wait until your next office visit. The discharge information has been reviewed with the patient and caregiver. The patient and caregiver verbalized understanding. Discharge medications reviewed with the patient and caregiver and appropriate educational materials and side effects teaching were provided.   ___________________________________________________________________________________________________________________________________    Patient armband removed and shredded

## 2020-10-13 NOTE — OP NOTES
Cataract Operative Note Patient: Bronson Polk               Sex: male          DOA: 10/13/2020 YOB: 1945      Age:  69 y.o.        LOS:  LOS: 0 days Preoperative Diagnosis: Cataract left eye Postoperative Diagnosis:  Cataract  left eye Surgeon: Patrice Curran MD, M.D. Anesthesia:  Topical anesthesia Procedure:  Phacoemulsification of posterior chamber for intraocular lens implantation left Fluids:  0 Procedure in Detail: The operative eye was prepped and draped in the usual fashion. A lid speculum was placed in the operative eye. A clear cornea approach was utilized. A paracentesis incision(s) was constructed with a 1 mm slit knife. One percent preservative-free lidocaine followed by viscoelastic was instilled into the anterior chamber. A clear corneal incision was made with a slit knife. A continuous curvilinear capsulorrhexis was constructed followed by hydrodissection. A phacoemulsification tip was placed into the eye, and the lens nucleus was emulsified. The irrigation/aspiration device was then used to remove any remaining cortical material.  Polishing of the capsule was performed as needed. The intraocular lens was then placed into the capsular bag after it was re-inflated with viscoelastic. The remaining viscoelastic was then removed using the irrigation/aspiration device. BSS on a cannula was then used to hydrate the wound edges. At the end of the procedure the wound was found to be watertight, the anterior chamber was deep and the pupil round. No blood loss during surgery. An antibiotic and anti-inflammatroy was placed into the operative eye. The lid speculum was removed. Protective sunglasses were then placed onto the patient. The patient was taken to the [address] [place] [address] Unit (PACU) in good condition having tolerated the procedure well. Estimated Blood Loss: 0 Implants: * No implants in log * Specimens: * No specimens in log * Complications:  None Kylee Packer MD , MPAU. 
[unfilled]  9:18 AM

## 2020-10-13 NOTE — ANESTHESIA PREPROCEDURE EVALUATION
Relevant Problems No relevant active problems Anesthetic History Review of Systems / Medical History Patient summary reviewed, nursing notes reviewed and pertinent labs reviewed Pulmonary Within defined limits Smoker Neuro/Psych Cardiovascular Hypertension Comments:  Low normal LV systolic function, EF 50 - 55%, with mild diastolic  
dysfunction. Abnormal septal wall motion · Mild MR and mild TR · Right heart appears be normal. Right heart pressures elevated and  
estimated to be about 55. · Frequent PVCs are noted GI/Hepatic/Renal 
  
 
 
 
 
Pertinent negatives: No GERD, hepatitis, liver disease and renal disease Endo/Other Diabetes Arthritis Pertinent negatives: No hypothyroidism and hyperthyroidism Other Findings Physical Exam 
 
Airway Mallampati: II 
TM Distance: 4 - 6 cm Neck ROM: normal range of motion Mouth opening: Normal 
 
 Cardiovascular Dental 
 
 
Comments: Missing bottom center teeth. stubs Pulmonary Abdominal 
 
 
 
 Other Findings Anesthetic Plan ASA: 3 Anesthesia type: MAC Induction: Intravenous Anesthetic plan and risks discussed with: Patient Pt took morning dose  Of metformn

## 2020-11-05 NOTE — PROGRESS NOTES
Problem: Mobility Impaired (Adult and Pediatric)  Goal: *Acute Goals and Plan of Care (Insert Text)  Physical Therapy Goals LT/ST  Initiated 9/15/2017 and to be accomplished within 3-5 day(s)  1. Patient will move from supine <> sit with S in prep for out of bed activity and change of position. 2. Patient will perform sit<> stand with S with LRAD in prep for transfers/ambulation. 3. Patient will transfer from bed <> chair with S with LRAD for time up in chair for completion of ADL activity. 4. Patient will ambulate >50 feet with LRAD/S for improved functional mobility/safe discharge. PT session held due to:  [ ]  Nausea/vomiting  [ ]  RN Communication/ suggestion  [ ]  Extreme Pain  [X]  Spa team in with patient. Will f/u later . Thank you.   Tammy Davis, PTA [[email or web address]] : 62 Harvey Bravo MD\par [address] [facility].\[place] [address]

## 2021-01-01 ENCOUNTER — APPOINTMENT (OUTPATIENT)
Dept: GENERAL RADIOLOGY | Age: 76
DRG: 698 | End: 2021-01-01
Attending: INTERNAL MEDICINE
Payer: MEDICARE

## 2021-01-01 ENCOUNTER — PATIENT OUTREACH (OUTPATIENT)
Dept: CASE MANAGEMENT | Age: 76
End: 2021-01-01

## 2021-01-01 ENCOUNTER — HOSPITAL ENCOUNTER (OUTPATIENT)
Dept: LAB | Age: 76
Discharge: HOME | End: 2021-08-07

## 2021-01-01 ENCOUNTER — APPOINTMENT (OUTPATIENT)
Dept: NUCLEAR MEDICINE | Age: 76
DRG: 698 | End: 2021-01-01
Attending: INTERNAL MEDICINE
Payer: MEDICARE

## 2021-01-01 ENCOUNTER — APPOINTMENT (OUTPATIENT)
Dept: GENERAL RADIOLOGY | Age: 76
DRG: 544 | End: 2021-01-01
Attending: EMERGENCY MEDICINE
Payer: MEDICARE

## 2021-01-01 ENCOUNTER — APPOINTMENT (OUTPATIENT)
Dept: GENERAL RADIOLOGY | Age: 76
DRG: 871 | End: 2021-01-01
Attending: STUDENT IN AN ORGANIZED HEALTH CARE EDUCATION/TRAINING PROGRAM
Payer: MEDICARE

## 2021-01-01 ENCOUNTER — APPOINTMENT (OUTPATIENT)
Dept: ULTRASOUND IMAGING | Age: 76
DRG: 698 | End: 2021-01-01
Attending: INTERNAL MEDICINE
Payer: MEDICARE

## 2021-01-01 ENCOUNTER — HOSPITAL ENCOUNTER (EMERGENCY)
Age: 76
Discharge: HOME | End: 2021-03-31
Attending: EMERGENCY MEDICINE
Payer: MEDICARE

## 2021-01-01 ENCOUNTER — APPOINTMENT (OUTPATIENT)
Dept: CT IMAGING | Age: 76
DRG: 871 | End: 2021-01-01
Attending: INTERNAL MEDICINE
Payer: MEDICARE

## 2021-01-01 ENCOUNTER — APPOINTMENT (OUTPATIENT)
Dept: CT IMAGING | Age: 76
DRG: 698 | End: 2021-01-01
Attending: STUDENT IN AN ORGANIZED HEALTH CARE EDUCATION/TRAINING PROGRAM
Payer: MEDICARE

## 2021-01-01 ENCOUNTER — HOSPITAL ENCOUNTER (EMERGENCY)
Age: 76
Discharge: HOME | End: 2021-12-02
Attending: EMERGENCY MEDICINE | Admitting: EMERGENCY MEDICINE
Payer: MEDICARE

## 2021-01-01 ENCOUNTER — APPOINTMENT (OUTPATIENT)
Dept: GENERAL RADIOLOGY | Age: 76
DRG: 698 | End: 2021-01-01
Attending: EMERGENCY MEDICINE
Payer: MEDICARE

## 2021-01-01 ENCOUNTER — APPOINTMENT (OUTPATIENT)
Dept: GENERAL RADIOLOGY | Age: 76
DRG: 698 | End: 2021-01-01
Attending: FAMILY MEDICINE
Payer: MEDICARE

## 2021-01-01 ENCOUNTER — APPOINTMENT (OUTPATIENT)
Dept: MRI IMAGING | Age: 76
DRG: 698 | End: 2021-01-01
Attending: INTERNAL MEDICINE
Payer: MEDICARE

## 2021-01-01 ENCOUNTER — APPOINTMENT (OUTPATIENT)
Dept: CT IMAGING | Age: 76
DRG: 698 | End: 2021-01-01
Attending: HOSPITALIST
Payer: MEDICARE

## 2021-01-01 ENCOUNTER — HOSPITAL ENCOUNTER (OUTPATIENT)
Dept: LAB | Age: 76
Discharge: HOME | End: 2021-08-18

## 2021-01-01 ENCOUNTER — APPOINTMENT (OUTPATIENT)
Dept: GENERAL RADIOLOGY | Age: 76
End: 2021-01-01
Attending: EMERGENCY MEDICINE
Payer: MEDICARE

## 2021-01-01 ENCOUNTER — APPOINTMENT (OUTPATIENT)
Dept: CT IMAGING | Age: 76
DRG: 544 | End: 2021-01-01
Attending: EMERGENCY MEDICINE
Payer: MEDICARE

## 2021-01-01 ENCOUNTER — APPOINTMENT (OUTPATIENT)
Dept: CT IMAGING | Age: 76
DRG: 698 | End: 2021-01-01
Attending: EMERGENCY MEDICINE
Payer: MEDICARE

## 2021-01-01 ENCOUNTER — HOSPITAL ENCOUNTER (INPATIENT)
Age: 76
LOS: 16 days | Discharge: NURSING, REHAB OR LONG-TERM CARE | DRG: 698 | End: 2021-12-27
Attending: STUDENT IN AN ORGANIZED HEALTH CARE EDUCATION/TRAINING PROGRAM | Admitting: FAMILY MEDICINE
Payer: MEDICARE

## 2021-01-01 ENCOUNTER — HOSPITAL ENCOUNTER (EMERGENCY)
Age: 76
Discharge: HOME | DRG: 698 | End: 2021-07-20
Attending: EMERGENCY MEDICINE
Payer: MEDICARE

## 2021-01-01 ENCOUNTER — APPOINTMENT (OUTPATIENT)
Dept: CT IMAGING | Age: 76
DRG: 698 | End: 2021-01-01
Attending: INTERNAL MEDICINE
Payer: MEDICARE

## 2021-01-01 ENCOUNTER — APPOINTMENT (OUTPATIENT)
Dept: GENERAL RADIOLOGY | Age: 76
DRG: 698 | End: 2021-01-01
Attending: STUDENT IN AN ORGANIZED HEALTH CARE EDUCATION/TRAINING PROGRAM
Payer: MEDICARE

## 2021-01-01 ENCOUNTER — APPOINTMENT (OUTPATIENT)
Dept: NON INVASIVE DIAGNOSTICS | Age: 76
DRG: 698 | End: 2021-01-01
Attending: FAMILY MEDICINE
Payer: MEDICARE

## 2021-01-01 ENCOUNTER — HOSPITAL ENCOUNTER (INPATIENT)
Age: 76
LOS: 8 days | Discharge: HOME HEALTH | DRG: 698 | End: 2021-12-01
Attending: EMERGENCY MEDICINE | Admitting: HOSPITALIST
Payer: MEDICARE

## 2021-01-01 ENCOUNTER — APPOINTMENT (OUTPATIENT)
Dept: MRI IMAGING | Age: 76
DRG: 544 | End: 2021-01-01
Attending: PHYSICIAN ASSISTANT
Payer: MEDICARE

## 2021-01-01 ENCOUNTER — HOSPITAL ENCOUNTER (EMERGENCY)
Age: 76
Discharge: HOME | End: 2021-12-05
Attending: EMERGENCY MEDICINE
Payer: MEDICARE

## 2021-01-01 ENCOUNTER — HOSPITAL ENCOUNTER (INPATIENT)
Dept: ULTRASOUND IMAGING | Age: 76
Discharge: HOME | DRG: 871 | End: 2021-12-29
Attending: STUDENT IN AN ORGANIZED HEALTH CARE EDUCATION/TRAINING PROGRAM
Payer: MEDICARE

## 2021-01-01 ENCOUNTER — APPOINTMENT (OUTPATIENT)
Dept: NON INVASIVE DIAGNOSTICS | Age: 76
DRG: 698 | End: 2021-01-01
Attending: INTERNAL MEDICINE
Payer: MEDICARE

## 2021-01-01 ENCOUNTER — HOSPITAL ENCOUNTER (INPATIENT)
Age: 76
LOS: 8 days | Discharge: HOME | DRG: 698 | End: 2021-07-28
Attending: EMERGENCY MEDICINE | Admitting: FAMILY MEDICINE
Payer: MEDICARE

## 2021-01-01 ENCOUNTER — HOSPITAL ENCOUNTER (OUTPATIENT)
Dept: LAB | Age: 76
Discharge: HOME | End: 2021-08-06
Payer: MEDICARE

## 2021-01-01 ENCOUNTER — HOSPITAL ENCOUNTER (OUTPATIENT)
Dept: LAB | Age: 76
Discharge: HOME | End: 2021-08-11

## 2021-01-01 ENCOUNTER — APPOINTMENT (OUTPATIENT)
Dept: VASCULAR SURGERY | Age: 76
DRG: 698 | End: 2021-01-01
Attending: INTERNAL MEDICINE
Payer: MEDICARE

## 2021-01-01 ENCOUNTER — HOSPITAL ENCOUNTER (INPATIENT)
Age: 76
LOS: 3 days | Discharge: NURSING, REHAB OR LONG-TERM CARE | DRG: 544 | End: 2021-05-20
Attending: EMERGENCY MEDICINE | Admitting: FAMILY MEDICINE
Payer: MEDICARE

## 2021-01-01 ENCOUNTER — HOSPITAL ENCOUNTER (EMERGENCY)
Age: 76
Discharge: HOME | End: 2021-04-24
Attending: EMERGENCY MEDICINE
Payer: MEDICARE

## 2021-01-01 ENCOUNTER — HOSPITAL ENCOUNTER (INPATIENT)
Age: 76
LOS: 1 days | DRG: 871 | End: 2021-12-30
Attending: STUDENT IN AN ORGANIZED HEALTH CARE EDUCATION/TRAINING PROGRAM | Admitting: FAMILY MEDICINE
Payer: MEDICARE

## 2021-01-01 VITALS
HEIGHT: 71 IN | OXYGEN SATURATION: 98 % | DIASTOLIC BLOOD PRESSURE: 87 MMHG | RESPIRATION RATE: 18 BRPM | WEIGHT: 132 LBS | SYSTOLIC BLOOD PRESSURE: 150 MMHG | BODY MASS INDEX: 18.48 KG/M2 | TEMPERATURE: 97.8 F | HEART RATE: 78 BPM

## 2021-01-01 VITALS
SYSTOLIC BLOOD PRESSURE: 135 MMHG | TEMPERATURE: 97.9 F | OXYGEN SATURATION: 97 % | HEIGHT: 71 IN | DIASTOLIC BLOOD PRESSURE: 85 MMHG | HEART RATE: 99 BPM | RESPIRATION RATE: 18 BRPM | BODY MASS INDEX: 18.2 KG/M2 | WEIGHT: 130 LBS

## 2021-01-01 VITALS
HEART RATE: 95 BPM | TEMPERATURE: 98.1 F | WEIGHT: 139.99 LBS | HEIGHT: 71 IN | DIASTOLIC BLOOD PRESSURE: 81 MMHG | SYSTOLIC BLOOD PRESSURE: 132 MMHG | BODY MASS INDEX: 19.6 KG/M2 | RESPIRATION RATE: 18 BRPM | OXYGEN SATURATION: 93 %

## 2021-01-01 VITALS
HEART RATE: 77 BPM | WEIGHT: 130 LBS | RESPIRATION RATE: 14 BRPM | SYSTOLIC BLOOD PRESSURE: 178 MMHG | TEMPERATURE: 98.1 F | DIASTOLIC BLOOD PRESSURE: 95 MMHG | HEIGHT: 71 IN | OXYGEN SATURATION: 100 % | BODY MASS INDEX: 18.2 KG/M2

## 2021-01-01 VITALS
WEIGHT: 137 LBS | TEMPERATURE: 98 F | HEIGHT: 71 IN | SYSTOLIC BLOOD PRESSURE: 113 MMHG | BODY MASS INDEX: 19.18 KG/M2 | DIASTOLIC BLOOD PRESSURE: 73 MMHG | OXYGEN SATURATION: 92 % | RESPIRATION RATE: 18 BRPM | HEART RATE: 88 BPM

## 2021-01-01 VITALS
WEIGHT: 135 LBS | DIASTOLIC BLOOD PRESSURE: 85 MMHG | SYSTOLIC BLOOD PRESSURE: 135 MMHG | TEMPERATURE: 97.7 F | OXYGEN SATURATION: 97 % | BODY MASS INDEX: 18.9 KG/M2 | HEART RATE: 76 BPM | HEIGHT: 71 IN | RESPIRATION RATE: 16 BRPM

## 2021-01-01 VITALS
HEIGHT: 71 IN | SYSTOLIC BLOOD PRESSURE: 147 MMHG | RESPIRATION RATE: 16 BRPM | TEMPERATURE: 97.5 F | OXYGEN SATURATION: 100 % | BODY MASS INDEX: 16.8 KG/M2 | DIASTOLIC BLOOD PRESSURE: 73 MMHG | HEART RATE: 70 BPM | WEIGHT: 120 LBS

## 2021-01-01 VITALS
WEIGHT: 132.28 LBS | OXYGEN SATURATION: 99 % | DIASTOLIC BLOOD PRESSURE: 81 MMHG | BODY MASS INDEX: 18.52 KG/M2 | SYSTOLIC BLOOD PRESSURE: 142 MMHG | HEIGHT: 71 IN | RESPIRATION RATE: 18 BRPM | TEMPERATURE: 97.5 F | HEART RATE: 71 BPM

## 2021-01-01 VITALS
WEIGHT: 135 LBS | BODY MASS INDEX: 18.9 KG/M2 | HEIGHT: 71 IN | TEMPERATURE: 97.7 F | DIASTOLIC BLOOD PRESSURE: 76 MMHG | OXYGEN SATURATION: 95 % | RESPIRATION RATE: 15 BRPM | SYSTOLIC BLOOD PRESSURE: 124 MMHG | HEART RATE: 71 BPM

## 2021-01-01 DIAGNOSIS — T83.511A URINARY TRACT INFECTION ASSOCIATED WITH INDWELLING URETHRAL CATHETER, INITIAL ENCOUNTER (HCC): ICD-10-CM

## 2021-01-01 DIAGNOSIS — E86.0 DEHYDRATION: Primary | ICD-10-CM

## 2021-01-01 DIAGNOSIS — R65.21 SEPTIC SHOCK (HCC): Primary | ICD-10-CM

## 2021-01-01 DIAGNOSIS — R33.9 URINARY RETENTION: ICD-10-CM

## 2021-01-01 DIAGNOSIS — G93.41 ACUTE METABOLIC ENCEPHALOPATHY: ICD-10-CM

## 2021-01-01 DIAGNOSIS — T83.511A URINARY TRACT INFECTION ASSOCIATED WITH INDWELLING URETHRAL CATHETER, INITIAL ENCOUNTER (HCC): Primary | ICD-10-CM

## 2021-01-01 DIAGNOSIS — T83.9XXA COMPLICATION OF FOLEY CATHETER, INITIAL ENCOUNTER (HCC): Primary | ICD-10-CM

## 2021-01-01 DIAGNOSIS — T83.9XXA COMPLICATION OF FOLEY CATHETER, INITIAL ENCOUNTER (HCC): ICD-10-CM

## 2021-01-01 DIAGNOSIS — I10 ESSENTIAL HYPERTENSION: ICD-10-CM

## 2021-01-01 DIAGNOSIS — J18.9 PNEUMONIA OF RIGHT LOWER LOBE DUE TO INFECTIOUS ORGANISM: ICD-10-CM

## 2021-01-01 DIAGNOSIS — R94.31 ABNORMAL EKG: ICD-10-CM

## 2021-01-01 DIAGNOSIS — E11.9 TYPE 2 DIABETES MELLITUS WITHOUT COMPLICATION, WITHOUT LONG-TERM CURRENT USE OF INSULIN (HCC): ICD-10-CM

## 2021-01-01 DIAGNOSIS — Z97.8 CHRONIC INDWELLING FOLEY CATHETER: ICD-10-CM

## 2021-01-01 DIAGNOSIS — A41.9 SEPSIS WITHOUT ACUTE ORGAN DYSFUNCTION, DUE TO UNSPECIFIED ORGANISM (HCC): Primary | ICD-10-CM

## 2021-01-01 DIAGNOSIS — A41.9 SEPTIC SHOCK (HCC): Primary | ICD-10-CM

## 2021-01-01 DIAGNOSIS — T83.511D URINARY TRACT INFECTION ASSOCIATED WITH INDWELLING URETHRAL CATHETER, SUBSEQUENT ENCOUNTER: Primary | ICD-10-CM

## 2021-01-01 DIAGNOSIS — N39.0 URINARY TRACT INFECTION ASSOCIATED WITH INDWELLING URETHRAL CATHETER, INITIAL ENCOUNTER (HCC): ICD-10-CM

## 2021-01-01 DIAGNOSIS — T83.9XXA FOLEY CATHETER PROBLEM, INITIAL ENCOUNTER (HCC): Primary | ICD-10-CM

## 2021-01-01 DIAGNOSIS — S32.010A CLOSED COMPRESSION FRACTURE OF BODY OF L1 VERTEBRA (HCC): ICD-10-CM

## 2021-01-01 DIAGNOSIS — I27.20 PULMONARY HYPERTENSION (HCC): ICD-10-CM

## 2021-01-01 DIAGNOSIS — D72.829 LEUKOCYTOSIS, UNSPECIFIED TYPE: ICD-10-CM

## 2021-01-01 DIAGNOSIS — N30.00 ACUTE CYSTITIS WITHOUT HEMATURIA: ICD-10-CM

## 2021-01-01 DIAGNOSIS — N31.2 ATONIC NEUROGENIC BLADDER: ICD-10-CM

## 2021-01-01 DIAGNOSIS — Z78.9 DECREASED ACTIVITIES OF DAILY LIVING (ADL): Primary | ICD-10-CM

## 2021-01-01 DIAGNOSIS — N39.0 URINARY TRACT INFECTION ASSOCIATED WITH INDWELLING URETHRAL CATHETER, SUBSEQUENT ENCOUNTER: Primary | ICD-10-CM

## 2021-01-01 DIAGNOSIS — N39.0 URINARY TRACT INFECTION ASSOCIATED WITH INDWELLING URETHRAL CATHETER, INITIAL ENCOUNTER (HCC): Primary | ICD-10-CM

## 2021-01-01 DIAGNOSIS — J43.9 PULMONARY EMPHYSEMA, UNSPECIFIED EMPHYSEMA TYPE (HCC): ICD-10-CM

## 2021-01-01 DIAGNOSIS — A41.9 SEPSIS WITHOUT ACUTE ORGAN DYSFUNCTION, DUE TO UNSPECIFIED ORGANISM (HCC): ICD-10-CM

## 2021-01-01 LAB
ABO + RH BLD: NORMAL
ALBUMIN SERPL-MCNC: 1.9 G/DL (ref 3.4–5)
ALBUMIN SERPL-MCNC: 2 G/DL (ref 3.4–5)
ALBUMIN SERPL-MCNC: 2.1 G/DL (ref 3.4–5)
ALBUMIN SERPL-MCNC: 2.2 G/DL (ref 3.4–5)
ALBUMIN SERPL-MCNC: 2.2 G/DL (ref 3.4–5)
ALBUMIN SERPL-MCNC: 2.5 G/DL (ref 3.4–5)
ALBUMIN SERPL-MCNC: 3.1 G/DL (ref 3.4–5)
ALBUMIN SERPL-MCNC: 3.3 G/DL (ref 3.4–5)
ALBUMIN SERPL-MCNC: 3.6 G/DL (ref 3.4–5)
ALBUMIN SERPL-MCNC: 3.7 G/DL (ref 3.4–5)
ALBUMIN SERPL-MCNC: 3.8 G/DL (ref 3.4–5)
ALBUMIN SERPL-MCNC: 3.9 G/DL (ref 3.4–5)
ALBUMIN SERPL-MCNC: 4.1 G/DL (ref 3.4–5)
ALBUMIN/GLOB SERPL: 0.4 {RATIO} (ref 0.8–1.7)
ALBUMIN/GLOB SERPL: 0.5 {RATIO} (ref 0.8–1.7)
ALBUMIN/GLOB SERPL: 0.6 {RATIO} (ref 0.8–1.7)
ALBUMIN/GLOB SERPL: 0.7 {RATIO} (ref 0.8–1.7)
ALBUMIN/GLOB SERPL: 0.8 {RATIO} (ref 0.8–1.7)
ALBUMIN/GLOB SERPL: 0.8 {RATIO} (ref 0.8–1.7)
ALBUMIN/GLOB SERPL: 0.9 {RATIO} (ref 0.8–1.7)
ALBUMIN/GLOB SERPL: 1.1 {RATIO} (ref 0.8–1.7)
ALBUMIN/GLOB SERPL: 1.2 {RATIO} (ref 0.8–1.7)
ALP SERPL-CCNC: 103 U/L (ref 45–117)
ALP SERPL-CCNC: 106 U/L (ref 45–117)
ALP SERPL-CCNC: 107 U/L (ref 45–117)
ALP SERPL-CCNC: 111 U/L (ref 45–117)
ALP SERPL-CCNC: 111 U/L (ref 45–117)
ALP SERPL-CCNC: 112 U/L (ref 45–117)
ALP SERPL-CCNC: 112 U/L (ref 45–117)
ALP SERPL-CCNC: 114 U/L (ref 45–117)
ALP SERPL-CCNC: 125 U/L (ref 45–117)
ALP SERPL-CCNC: 76 U/L (ref 45–117)
ALP SERPL-CCNC: 78 U/L (ref 45–117)
ALP SERPL-CCNC: 81 U/L (ref 45–117)
ALP SERPL-CCNC: 82 U/L (ref 45–117)
ALP SERPL-CCNC: 85 U/L (ref 45–117)
ALP SERPL-CCNC: 87 U/L (ref 45–117)
ALP SERPL-CCNC: 93 U/L (ref 45–117)
ALP SERPL-CCNC: 95 U/L (ref 45–117)
ALT SERPL-CCNC: 16 U/L (ref 16–61)
ALT SERPL-CCNC: 17 U/L (ref 16–61)
ALT SERPL-CCNC: 18 U/L (ref 16–61)
ALT SERPL-CCNC: 19 U/L (ref 16–61)
ALT SERPL-CCNC: 22 U/L (ref 16–61)
ALT SERPL-CCNC: 26 U/L (ref 16–61)
ALT SERPL-CCNC: 29 U/L (ref 16–61)
ALT SERPL-CCNC: 31 U/L (ref 16–61)
ALT SERPL-CCNC: 46 U/L (ref 16–61)
ALT SERPL-CCNC: 48 U/L (ref 16–61)
ALT SERPL-CCNC: 51 U/L (ref 16–61)
AMORPH CRY URNS QL MICRO: ABNORMAL
ANION GAP SERPL CALC-SCNC: 10 MMOL/L (ref 3–18)
ANION GAP SERPL CALC-SCNC: 10 MMOL/L (ref 3–18)
ANION GAP SERPL CALC-SCNC: 13 MMOL/L (ref 3–18)
ANION GAP SERPL CALC-SCNC: 13 MMOL/L (ref 3–18)
ANION GAP SERPL CALC-SCNC: 15 MMOL/L (ref 3–18)
ANION GAP SERPL CALC-SCNC: 19 MMOL/L (ref 3–18)
ANION GAP SERPL CALC-SCNC: 3 MMOL/L (ref 3–18)
ANION GAP SERPL CALC-SCNC: 3 MMOL/L (ref 3–18)
ANION GAP SERPL CALC-SCNC: 4 MMOL/L (ref 3–18)
ANION GAP SERPL CALC-SCNC: 4 MMOL/L (ref 3–18)
ANION GAP SERPL CALC-SCNC: 5 MMOL/L (ref 3–18)
ANION GAP SERPL CALC-SCNC: 6 MMOL/L (ref 3–18)
ANION GAP SERPL CALC-SCNC: 7 MMOL/L (ref 3–18)
ANION GAP SERPL CALC-SCNC: 8 MMOL/L (ref 3–18)
APPEARANCE UR: ABNORMAL
APPEARANCE UR: CLEAR
ARTERIAL PATENCY WRIST A: POSITIVE
AST SERPL-CCNC: 117 U/L (ref 10–38)
AST SERPL-CCNC: 16 U/L (ref 10–38)
AST SERPL-CCNC: 17 U/L (ref 10–38)
AST SERPL-CCNC: 17 U/L (ref 10–38)
AST SERPL-CCNC: 19 U/L (ref 10–38)
AST SERPL-CCNC: 20 U/L (ref 10–38)
AST SERPL-CCNC: 24 U/L (ref 10–38)
AST SERPL-CCNC: 25 U/L (ref 10–38)
AST SERPL-CCNC: 28 U/L (ref 10–38)
AST SERPL-CCNC: 28 U/L (ref 10–38)
AST SERPL-CCNC: 29 U/L (ref 10–38)
AST SERPL-CCNC: 31 U/L (ref 10–38)
AST SERPL-CCNC: 33 U/L (ref 10–38)
AST SERPL-CCNC: 39 U/L (ref 10–38)
AST SERPL-CCNC: 40 U/L (ref 10–38)
AST SERPL-CCNC: 43 U/L (ref 10–38)
AST SERPL-CCNC: 52 U/L (ref 10–38)
ATRIAL RATE: 90 BPM
ATRIAL RATE: 92 BPM
ATRIAL RATE: 95 BPM
ATRIAL RATE: 96 BPM
ATRIAL RATE: 98 BPM
BACTERIA SPEC CULT: ABNORMAL
BACTERIA SPEC CULT: NORMAL
BACTERIA URNS QL MICRO: ABNORMAL /HPF
BAKER'S YEAST IGA QN: <20 UNITS (ref 0–24.9)
BAKER'S YEAST IGG QN: <20 UNITS (ref 0–24.9)
BASE DEFICIT BLD-SCNC: 0.7 MMOL/L
BASE DEFICIT BLD-SCNC: 11.9 MMOL/L
BASE DEFICIT BLD-SCNC: 12.6 MMOL/L
BASE DEFICIT BLD-SCNC: 15.5 MMOL/L
BASOPHILS # BLD: 0 K/UL (ref 0–0.1)
BASOPHILS # BLD: 0.1 K/UL (ref 0–0.1)
BASOPHILS # BLD: 0.2 K/UL (ref 0–0.1)
BASOPHILS # BLD: 0.2 K/UL (ref 0–0.1)
BASOPHILS NFR BLD: 0 % (ref 0–2)
BASOPHILS NFR BLD: 1 % (ref 0–2)
BDY SITE: ABNORMAL
BILIRUB DIRECT SERPL-MCNC: 0.1 MG/DL (ref 0–0.2)
BILIRUB SERPL-MCNC: 0.2 MG/DL (ref 0.2–1)
BILIRUB SERPL-MCNC: 0.2 MG/DL (ref 0.2–1)
BILIRUB SERPL-MCNC: 0.3 MG/DL (ref 0.2–1)
BILIRUB SERPL-MCNC: 0.3 MG/DL (ref 0.2–1)
BILIRUB SERPL-MCNC: 0.4 MG/DL (ref 0.2–1)
BILIRUB SERPL-MCNC: 0.5 MG/DL (ref 0.2–1)
BILIRUB SERPL-MCNC: 0.6 MG/DL (ref 0.2–1)
BILIRUB SERPL-MCNC: 0.6 MG/DL (ref 0.2–1)
BILIRUB SERPL-MCNC: 0.7 MG/DL (ref 0.2–1)
BILIRUB SERPL-MCNC: 0.8 MG/DL (ref 0.2–1)
BILIRUB SERPL-MCNC: 1.1 MG/DL (ref 0.2–1)
BILIRUB UR QL: ABNORMAL
BILIRUB UR QL: NEGATIVE
BLASTS NFR BLD MANUAL: 0 %
BLASTS NFR BLD MANUAL: 0 %
BLD PROD TYP BPU: NORMAL
BLOOD GROUP ANTIBODIES SERPL: NORMAL
BNP SERPL-MCNC: 231 PG/ML (ref 0–900)
BNP SERPL-MCNC: 2400 PG/ML (ref 0–900)
BPU ID: NORMAL
BUN SERPL-MCNC: 16 MG/DL (ref 7–18)
BUN SERPL-MCNC: 17 MG/DL (ref 7–18)
BUN SERPL-MCNC: 18 MG/DL (ref 7–18)
BUN SERPL-MCNC: 19 MG/DL (ref 7–18)
BUN SERPL-MCNC: 20 MG/DL (ref 7–18)
BUN SERPL-MCNC: 20 MG/DL (ref 7–18)
BUN SERPL-MCNC: 22 MG/DL (ref 7–18)
BUN SERPL-MCNC: 23 MG/DL (ref 7–18)
BUN SERPL-MCNC: 24 MG/DL (ref 7–18)
BUN SERPL-MCNC: 25 MG/DL (ref 7–18)
BUN SERPL-MCNC: 32 MG/DL (ref 7–18)
BUN SERPL-MCNC: 33 MG/DL (ref 7–18)
BUN SERPL-MCNC: 33 MG/DL (ref 7–18)
BUN SERPL-MCNC: 36 MG/DL (ref 7–18)
BUN SERPL-MCNC: 37 MG/DL (ref 7–18)
BUN SERPL-MCNC: 37 MG/DL (ref 7–18)
BUN SERPL-MCNC: 39 MG/DL (ref 7–18)
BUN SERPL-MCNC: 39 MG/DL (ref 7–18)
BUN SERPL-MCNC: 40 MG/DL (ref 7–18)
BUN SERPL-MCNC: 41 MG/DL (ref 7–18)
BUN SERPL-MCNC: 43 MG/DL (ref 7–18)
BUN SERPL-MCNC: 45 MG/DL (ref 7–18)
BUN SERPL-MCNC: 47 MG/DL (ref 7–18)
BUN SERPL-MCNC: 48 MG/DL (ref 7–18)
BUN SERPL-MCNC: 49 MG/DL (ref 7–18)
BUN SERPL-MCNC: 50 MG/DL (ref 7–18)
BUN SERPL-MCNC: 55 MG/DL (ref 7–18)
BUN SERPL-MCNC: 59 MG/DL (ref 7–18)
BUN SERPL-MCNC: 61 MG/DL (ref 7–18)
BUN SERPL-MCNC: 65 MG/DL (ref 7–18)
BUN SERPL-MCNC: 67 MG/DL (ref 7–18)
BUN/CREAT SERPL: 11 (ref 12–20)
BUN/CREAT SERPL: 14 (ref 12–20)
BUN/CREAT SERPL: 14 (ref 12–20)
BUN/CREAT SERPL: 15 (ref 12–20)
BUN/CREAT SERPL: 17 (ref 12–20)
BUN/CREAT SERPL: 18 (ref 12–20)
BUN/CREAT SERPL: 19 (ref 12–20)
BUN/CREAT SERPL: 20 (ref 12–20)
BUN/CREAT SERPL: 21 (ref 12–20)
BUN/CREAT SERPL: 22 (ref 12–20)
BUN/CREAT SERPL: 23 (ref 12–20)
BUN/CREAT SERPL: 24 (ref 12–20)
BUN/CREAT SERPL: 24 (ref 12–20)
BUN/CREAT SERPL: 25 (ref 12–20)
BUN/CREAT SERPL: 25 (ref 12–20)
BUN/CREAT SERPL: 26 (ref 12–20)
BUN/CREAT SERPL: 28 (ref 12–20)
BUN/CREAT SERPL: 28 (ref 12–20)
BUN/CREAT SERPL: 30 (ref 12–20)
BUN/CREAT SERPL: 31 (ref 12–20)
BUN/CREAT SERPL: 31 (ref 12–20)
BUN/CREAT SERPL: 34 (ref 12–20)
BUN/CREAT SERPL: 34 (ref 12–20)
BUN/CREAT SERPL: 35 (ref 12–20)
BUN/CREAT SERPL: 39 (ref 12–20)
BUN/CREAT SERPL: 39 (ref 12–20)
BUN/CREAT SERPL: 42 (ref 12–20)
CALCIUM SERPL-MCNC: 10.1 MG/DL (ref 8.5–10.1)
CALCIUM SERPL-MCNC: 10.3 MG/DL (ref 8.5–10.1)
CALCIUM SERPL-MCNC: 10.8 MG/DL (ref 8.5–10.1)
CALCIUM SERPL-MCNC: 11 MG/DL (ref 8.5–10.1)
CALCIUM SERPL-MCNC: 11 MG/DL (ref 8.5–10.1)
CALCIUM SERPL-MCNC: 12.9 MG/DL (ref 8.5–10.1)
CALCIUM SERPL-MCNC: 7.4 MG/DL (ref 8.5–10.1)
CALCIUM SERPL-MCNC: 7.5 MG/DL (ref 8.5–10.1)
CALCIUM SERPL-MCNC: 7.7 MG/DL (ref 8.5–10.1)
CALCIUM SERPL-MCNC: 7.7 MG/DL (ref 8.5–10.1)
CALCIUM SERPL-MCNC: 7.8 MG/DL (ref 8.5–10.1)
CALCIUM SERPL-MCNC: 7.9 MG/DL (ref 8.5–10.1)
CALCIUM SERPL-MCNC: 8 MG/DL (ref 8.5–10.1)
CALCIUM SERPL-MCNC: 8.1 MG/DL (ref 8.5–10.1)
CALCIUM SERPL-MCNC: 8.2 MG/DL (ref 8.5–10.1)
CALCIUM SERPL-MCNC: 8.4 MG/DL (ref 8.5–10.1)
CALCIUM SERPL-MCNC: 8.5 MG/DL (ref 8.5–10.1)
CALCIUM SERPL-MCNC: 8.5 MG/DL (ref 8.5–10.1)
CALCIUM SERPL-MCNC: 8.6 MG/DL (ref 8.5–10.1)
CALCIUM SERPL-MCNC: 8.7 MG/DL (ref 8.5–10.1)
CALCIUM SERPL-MCNC: 8.8 MG/DL (ref 8.5–10.1)
CALCIUM SERPL-MCNC: 8.9 MG/DL (ref 8.5–10.1)
CALCIUM SERPL-MCNC: 9 MG/DL (ref 8.5–10.1)
CALCIUM SERPL-MCNC: 9.1 MG/DL (ref 8.5–10.1)
CALCIUM SERPL-MCNC: 9.3 MG/DL (ref 8.5–10.1)
CALCIUM SERPL-MCNC: 9.5 MG/DL (ref 8.5–10.1)
CALCIUM SERPL-MCNC: 9.7 MG/DL (ref 8.5–10.1)
CALCULATED P AXIS, ECG09: 46 DEGREES
CALCULATED P AXIS, ECG09: 55 DEGREES
CALCULATED P AXIS, ECG09: 58 DEGREES
CALCULATED P AXIS, ECG09: 69 DEGREES
CALCULATED P AXIS, ECG09: 78 DEGREES
CALCULATED R AXIS, ECG10: -18 DEGREES
CALCULATED R AXIS, ECG10: -56 DEGREES
CALCULATED R AXIS, ECG10: -57 DEGREES
CALCULATED R AXIS, ECG10: -62 DEGREES
CALCULATED R AXIS, ECG10: -66 DEGREES
CALCULATED T AXIS, ECG11: -7 DEGREES
CALCULATED T AXIS, ECG11: 12 DEGREES
CALCULATED T AXIS, ECG11: 43 DEGREES
CALCULATED T AXIS, ECG11: 55 DEGREES
CALCULATED T AXIS, ECG11: 82 DEGREES
CC UR VC: ABNORMAL
CC UR VC: NORMAL
CHLORIDE SERPL-SCNC: 104 MMOL/L (ref 100–111)
CHLORIDE SERPL-SCNC: 105 MMOL/L (ref 100–111)
CHLORIDE SERPL-SCNC: 106 MMOL/L (ref 100–111)
CHLORIDE SERPL-SCNC: 107 MMOL/L (ref 100–111)
CHLORIDE SERPL-SCNC: 108 MMOL/L (ref 100–111)
CHLORIDE SERPL-SCNC: 109 MMOL/L (ref 100–111)
CHLORIDE SERPL-SCNC: 110 MMOL/L (ref 100–111)
CHLORIDE SERPL-SCNC: 111 MMOL/L (ref 100–111)
CHLORIDE SERPL-SCNC: 112 MMOL/L (ref 100–111)
CHLORIDE SERPL-SCNC: 113 MMOL/L (ref 100–111)
CHLORIDE SERPL-SCNC: 114 MMOL/L (ref 100–111)
CHLORIDE SERPL-SCNC: 115 MMOL/L (ref 100–111)
CHLORIDE SERPL-SCNC: 115 MMOL/L (ref 100–111)
CHLORIDE SERPL-SCNC: 117 MMOL/L (ref 100–111)
CHLORIDE SERPL-SCNC: 118 MMOL/L (ref 100–111)
CHLORIDE SERPL-SCNC: 120 MMOL/L (ref 100–111)
CHLORIDE UR-SCNC: 62 MMOL/L (ref 55–125)
CK MB CFR SERPL CALC: 1 % (ref 0–4)
CK MB CFR SERPL CALC: 1.1 % (ref 0–4)
CK MB CFR SERPL CALC: 1.5 % (ref 0–4)
CK MB CFR SERPL CALC: 1.6 % (ref 0–4)
CK MB CFR SERPL CALC: 1.7 % (ref 0–4)
CK MB CFR SERPL CALC: 1.8 % (ref 0–4)
CK MB CFR SERPL CALC: NORMAL % (ref 0–4)
CK MB SERPL-MCNC: 2.2 NG/ML (ref 5–25)
CK MB SERPL-MCNC: 2.3 NG/ML (ref 5–25)
CK MB SERPL-MCNC: 2.7 NG/ML (ref 5–25)
CK MB SERPL-MCNC: 3 NG/ML (ref 5–25)
CK MB SERPL-MCNC: 3.2 NG/ML (ref 5–25)
CK MB SERPL-MCNC: 3.6 NG/ML (ref 5–25)
CK MB SERPL-MCNC: <1 NG/ML (ref 5–25)
CK SERPL-CCNC: 1078 U/L (ref 39–308)
CK SERPL-CCNC: 148 U/L (ref 39–308)
CK SERPL-CCNC: 191 U/L (ref 39–308)
CK SERPL-CCNC: 193 U/L (ref 39–308)
CK SERPL-CCNC: 196 U/L (ref 39–308)
CK SERPL-CCNC: 227 U/L (ref 39–308)
CK SERPL-CCNC: 240 U/L (ref 39–308)
CK SERPL-CCNC: 45 U/L (ref 39–308)
CO2 SERPL-SCNC: 11 MMOL/L (ref 21–32)
CO2 SERPL-SCNC: 14 MMOL/L (ref 21–32)
CO2 SERPL-SCNC: 16 MMOL/L (ref 21–32)
CO2 SERPL-SCNC: 17 MMOL/L (ref 21–32)
CO2 SERPL-SCNC: 17 MMOL/L (ref 21–32)
CO2 SERPL-SCNC: 19 MMOL/L (ref 21–32)
CO2 SERPL-SCNC: 19 MMOL/L (ref 21–32)
CO2 SERPL-SCNC: 20 MMOL/L (ref 21–32)
CO2 SERPL-SCNC: 20 MMOL/L (ref 21–32)
CO2 SERPL-SCNC: 21 MMOL/L (ref 21–32)
CO2 SERPL-SCNC: 21 MMOL/L (ref 21–32)
CO2 SERPL-SCNC: 22 MMOL/L (ref 21–32)
CO2 SERPL-SCNC: 22 MMOL/L (ref 21–32)
CO2 SERPL-SCNC: 24 MMOL/L (ref 21–32)
CO2 SERPL-SCNC: 25 MMOL/L (ref 21–32)
CO2 SERPL-SCNC: 26 MMOL/L (ref 21–32)
CO2 SERPL-SCNC: 27 MMOL/L (ref 21–32)
CO2 SERPL-SCNC: 28 MMOL/L (ref 21–32)
CO2 SERPL-SCNC: 30 MMOL/L (ref 21–32)
CO2 SERPL-SCNC: 30 MMOL/L (ref 21–32)
CO2 SERPL-SCNC: 33 MMOL/L (ref 21–32)
COLOR UR: ABNORMAL
COLOR UR: YELLOW
COVID-19 RAPID TEST, COVR: DETECTED
COVID-19 RAPID TEST, COVR: NOT DETECTED
CREAT SERPL-MCNC: 0.82 MG/DL (ref 0.6–1.3)
CREAT SERPL-MCNC: 0.92 MG/DL (ref 0.6–1.3)
CREAT SERPL-MCNC: 0.93 MG/DL (ref 0.6–1.3)
CREAT SERPL-MCNC: 0.94 MG/DL (ref 0.6–1.3)
CREAT SERPL-MCNC: 0.98 MG/DL (ref 0.6–1.3)
CREAT SERPL-MCNC: 1.02 MG/DL (ref 0.6–1.3)
CREAT SERPL-MCNC: 1.06 MG/DL (ref 0.6–1.3)
CREAT SERPL-MCNC: 1.09 MG/DL (ref 0.6–1.3)
CREAT SERPL-MCNC: 1.09 MG/DL (ref 0.6–1.3)
CREAT SERPL-MCNC: 1.1 MG/DL (ref 0.6–1.3)
CREAT SERPL-MCNC: 1.11 MG/DL (ref 0.6–1.3)
CREAT SERPL-MCNC: 1.15 MG/DL (ref 0.6–1.3)
CREAT SERPL-MCNC: 1.17 MG/DL (ref 0.6–1.3)
CREAT SERPL-MCNC: 1.19 MG/DL (ref 0.6–1.3)
CREAT SERPL-MCNC: 1.19 MG/DL (ref 0.6–1.3)
CREAT SERPL-MCNC: 1.21 MG/DL (ref 0.6–1.3)
CREAT SERPL-MCNC: 1.26 MG/DL (ref 0.6–1.3)
CREAT SERPL-MCNC: 1.27 MG/DL (ref 0.6–1.3)
CREAT SERPL-MCNC: 1.29 MG/DL (ref 0.6–1.3)
CREAT SERPL-MCNC: 1.3 MG/DL (ref 0.6–1.3)
CREAT SERPL-MCNC: 1.32 MG/DL (ref 0.6–1.3)
CREAT SERPL-MCNC: 1.32 MG/DL (ref 0.6–1.3)
CREAT SERPL-MCNC: 1.34 MG/DL (ref 0.6–1.3)
CREAT SERPL-MCNC: 1.37 MG/DL (ref 0.6–1.3)
CREAT SERPL-MCNC: 1.39 MG/DL (ref 0.6–1.3)
CREAT SERPL-MCNC: 1.41 MG/DL (ref 0.6–1.3)
CREAT SERPL-MCNC: 1.51 MG/DL (ref 0.6–1.3)
CREAT SERPL-MCNC: 1.52 MG/DL (ref 0.6–1.3)
CREAT SERPL-MCNC: 1.56 MG/DL (ref 0.6–1.3)
CREAT SERPL-MCNC: 1.57 MG/DL (ref 0.6–1.3)
CREAT SERPL-MCNC: 1.6 MG/DL (ref 0.6–1.3)
CREAT SERPL-MCNC: 1.63 MG/DL (ref 0.6–1.3)
CREAT SERPL-MCNC: 1.67 MG/DL (ref 0.6–1.3)
CREAT SERPL-MCNC: 1.7 MG/DL (ref 0.6–1.3)
CREAT SERPL-MCNC: 1.73 MG/DL (ref 0.6–1.3)
CREAT SERPL-MCNC: 1.85 MG/DL (ref 0.6–1.3)
CREAT SERPL-MCNC: 1.91 MG/DL (ref 0.6–1.3)
CREAT SERPL-MCNC: 1.98 MG/DL (ref 0.6–1.3)
CREAT SERPL-MCNC: 4.04 MG/DL (ref 0.6–1.3)
CREAT SERPL-MCNC: 4.24 MG/DL (ref 0.6–1.3)
CREAT SERPL-MCNC: 4.39 MG/DL (ref 0.6–1.3)
CREAT SERPL-MCNC: 4.53 MG/DL (ref 0.6–1.3)
CREAT UR-MCNC: 194 MG/DL (ref 30–125)
CROSSMATCH RESULT,%XM: NORMAL
CRP SERPL-MCNC: 15.3 MG/DL (ref 0–0.3)
D DIMER PPP FEU-MCNC: 3.65 UG/ML(FEU)
DIAGNOSIS, 93000: NORMAL
DIFFERENTIAL METHOD BLD: ABNORMAL
ECHO AO ASC DIAM: 3.5 CM
ECHO AO ROOT DIAM: 2.94 CM
ECHO AO ROOT DIAM: 3.19 CM
ECHO AV AREA PEAK VELOCITY: 1.56 CM2
ECHO AV AREA PEAK VELOCITY: 2.18 CM2
ECHO AV AREA VTI: 1.96 CM2
ECHO AV AREA VTI: 2.77 CM2
ECHO AV AREA/BSA PEAK VELOCITY: 0.9 CM2/M2
ECHO AV AREA/BSA PEAK VELOCITY: 1.2 CM2/M2
ECHO AV AREA/BSA VTI: 1.1 CM2/M2
ECHO AV AREA/BSA VTI: 1.6 CM2/M2
ECHO AV MEAN GRADIENT: 3.89 MMHG
ECHO AV MEAN GRADIENT: 5.16 MMHG
ECHO AV PEAK GRADIENT: 10.74 MMHG
ECHO AV PEAK GRADIENT: 7.16 MMHG
ECHO AV PEAK VELOCITY: 133.54 CM/S
ECHO AV PEAK VELOCITY: 163.9 CM/S
ECHO AV VTI: 21.85 CM
ECHO AV VTI: 26.42 CM
ECHO EST RA PRESSURE: 8 MMHG
ECHO IVC PROX: 1.29 CM
ECHO IVC PROX: 2.34 CM
ECHO LA AREA 4C: 18.04 CM2
ECHO LA MAJOR AXIS: 2.72 CM
ECHO LA MAJOR AXIS: 3.67 CM
ECHO LA MINOR AXIS: 1.55 CM
ECHO LA MINOR AXIS: 2.09 CM
ECHO LA VOL 4C: 31.49 ML (ref 18–58)
ECHO LA VOL 4C: 49.4 ML (ref 18–58)
ECHO LA VOLUME INDEX A4C: 17.89 ML/M2 (ref 16–28)
ECHO LA VOLUME INDEX A4C: 28.07 ML/M2 (ref 16–28)
ECHO LV E' LATERAL VELOCITY: 11 CM/S
ECHO LV E' LATERAL VELOCITY: 8.79 CM/S
ECHO LV E' SEPTAL VELOCITY: 7.21 CM/S
ECHO LV E' SEPTAL VELOCITY: 8 CM/S
ECHO LV EDV A4C: 126.69 ML
ECHO LV EDV INDEX A4C: 72 ML/M2
ECHO LV EJECTION FRACTION A4C: 59 PERCENT
ECHO LV ESV A4C: 51.51 ML
ECHO LV ESV INDEX A4C: 29.3 ML/M2
ECHO LV GLOBAL LONGITUDINAL STRAIN (GLS): -17 PERCENT
ECHO LV INTERNAL DIMENSION DIASTOLIC: 3.71 CM (ref 4.2–5.9)
ECHO LV INTERNAL DIMENSION DIASTOLIC: 4.63 CM (ref 4.2–5.9)
ECHO LV INTERNAL DIMENSION SYSTOLIC: 2.79 CM
ECHO LV INTERNAL DIMENSION SYSTOLIC: 2.94 CM
ECHO LV IVSD: 0.71 CM (ref 0.6–1)
ECHO LV IVSD: 1.11 CM (ref 0.6–1)
ECHO LV MASS 2D: 103.4 G (ref 88–224)
ECHO LV MASS 2D: 111.6 G (ref 88–224)
ECHO LV MASS INDEX 2D: 58.8 G/M2 (ref 49–115)
ECHO LV MASS INDEX 2D: 63.4 G/M2 (ref 49–115)
ECHO LV POSTERIOR WALL DIASTOLIC: 0.77 CM (ref 0.6–1)
ECHO LV POSTERIOR WALL DIASTOLIC: 0.81 CM (ref 0.6–1)
ECHO LVOT CARDIAC OUTPUT: 11.77 LITER/MINUTE
ECHO LVOT DIAM: 2.07 CM
ECHO LVOT DIAM: 2.2 CM
ECHO LVOT PEAK GRADIENT: 1.79 MMHG
ECHO LVOT PEAK GRADIENT: 3.01 MMHG
ECHO LVOT PEAK VELOCITY: 66.91 CM/S
ECHO LVOT PEAK VELOCITY: 86.81 CM/S
ECHO LVOT SV: 51.8 ML
ECHO LVOT SV: 60.5 ML
ECHO LVOT VTI: 13.58 CM
ECHO LVOT VTI: 18.02 CM
ECHO MV A VELOCITY: 69.63 CM/S
ECHO MV A VELOCITY: 92.47 CM/S
ECHO MV AREA PHT: 3.38 CM2
ECHO MV E DECELERATION TIME (DT): 224.37 MS
ECHO MV E VELOCITY: 53.92 CM/S
ECHO MV E VELOCITY: 55.33 CM/S
ECHO MV E/A RATIO: 0.58
ECHO MV E/A RATIO: 0.79
ECHO MV E/E' LATERAL: 5.03
ECHO MV E/E' LATERAL: 6.13
ECHO MV E/E' RATIO (AVERAGED): 5.97
ECHO MV E/E' RATIO (AVERAGED): 6.81
ECHO MV E/E' SEPTAL: 6.92
ECHO MV E/E' SEPTAL: 7.48
ECHO MV PRESSURE HALF TIME (PHT): 65.07 MS
ECHO RA AREA 4C: 22.01 CM2
ECHO RA AREA 4C: 25.9 CM2
ECHO RV INTERNAL DIMENSION: 3.28 CM
ECHO RV INTERNAL DIMENSION: 5.33 CM
ECHO RV TAPSE: 1.98 CM (ref 1.5–2)
ECHO TV REGURGITANT MAX VELOCITY: 442.21 CM/S
ECHO TV REGURGITANT MAX VELOCITY: 459.71 CM/S
ECHO TV REGURGITANT PEAK GRADIENT: 78.22 MMHG
ECHO TV REGURGITANT PEAK GRADIENT: 84.53 MMHG
EOSINOPHIL # BLD: 0 K/UL (ref 0–0.4)
EOSINOPHIL # BLD: 0.1 K/UL (ref 0–0.4)
EOSINOPHIL # BLD: 0.2 K/UL (ref 0–0.4)
EOSINOPHIL # BLD: 0.3 K/UL (ref 0–0.4)
EOSINOPHIL # BLD: 0.4 K/UL (ref 0–0.4)
EOSINOPHIL # BLD: 0.5 K/UL (ref 0–0.4)
EOSINOPHIL # BLD: 0.5 K/UL (ref 0–0.4)
EOSINOPHIL # BLD: 0.6 K/UL (ref 0–0.4)
EOSINOPHIL # BLD: 0.7 K/UL (ref 0–0.4)
EOSINOPHIL # BLD: 0.7 K/UL (ref 0–0.4)
EOSINOPHIL # BLD: 0.8 K/UL (ref 0–0.4)
EOSINOPHIL NFR BLD: 0 % (ref 0–5)
EOSINOPHIL NFR BLD: 1 % (ref 0–5)
EOSINOPHIL NFR BLD: 2 % (ref 0–5)
EOSINOPHIL NFR BLD: 3 % (ref 0–5)
EOSINOPHIL NFR BLD: 4 % (ref 0–5)
EOSINOPHIL NFR BLD: 4 % (ref 0–5)
EOSINOPHIL NFR BLD: 5 % (ref 0–5)
EOSINOPHIL NFR BLD: 6 % (ref 0–5)
EOSINOPHIL NFR BLD: 6 % (ref 0–5)
EOSINOPHIL NFR BLD: 7 % (ref 0–5)
EOSINOPHIL NFR BLD: 7 % (ref 0–5)
EOSINOPHIL NFR BLD: 8 % (ref 0–5)
EPITH CASTS URNS QL MICRO: ABNORMAL /LPF (ref 0–5)
EPITH CASTS URNS QL MICRO: NEGATIVE /LPF (ref 0–5)
ERYTHROCYTE [DISTWIDTH] IN BLOOD BY AUTOMATED COUNT: 16.8 % (ref 11.6–14.5)
ERYTHROCYTE [DISTWIDTH] IN BLOOD BY AUTOMATED COUNT: 16.8 % (ref 11.6–14.5)
ERYTHROCYTE [DISTWIDTH] IN BLOOD BY AUTOMATED COUNT: 16.9 % (ref 11.6–14.5)
ERYTHROCYTE [DISTWIDTH] IN BLOOD BY AUTOMATED COUNT: 17 % (ref 11.6–14.5)
ERYTHROCYTE [DISTWIDTH] IN BLOOD BY AUTOMATED COUNT: 17.1 % (ref 11.6–14.5)
ERYTHROCYTE [DISTWIDTH] IN BLOOD BY AUTOMATED COUNT: 17.3 % (ref 11.6–14.5)
ERYTHROCYTE [DISTWIDTH] IN BLOOD BY AUTOMATED COUNT: 17.5 % (ref 11.6–14.5)
ERYTHROCYTE [DISTWIDTH] IN BLOOD BY AUTOMATED COUNT: 17.5 % (ref 11.6–14.5)
ERYTHROCYTE [DISTWIDTH] IN BLOOD BY AUTOMATED COUNT: 17.8 % (ref 11.6–14.5)
ERYTHROCYTE [DISTWIDTH] IN BLOOD BY AUTOMATED COUNT: 18.5 % (ref 11.6–14.5)
ERYTHROCYTE [DISTWIDTH] IN BLOOD BY AUTOMATED COUNT: 18.6 % (ref 11.6–14.5)
ERYTHROCYTE [DISTWIDTH] IN BLOOD BY AUTOMATED COUNT: 18.7 % (ref 11.6–14.5)
ERYTHROCYTE [DISTWIDTH] IN BLOOD BY AUTOMATED COUNT: 18.8 % (ref 11.6–14.5)
ERYTHROCYTE [DISTWIDTH] IN BLOOD BY AUTOMATED COUNT: 18.9 % (ref 11.6–14.5)
ERYTHROCYTE [DISTWIDTH] IN BLOOD BY AUTOMATED COUNT: 19.1 % (ref 11.6–14.5)
ERYTHROCYTE [DISTWIDTH] IN BLOOD BY AUTOMATED COUNT: 19.2 % (ref 11.6–14.5)
ERYTHROCYTE [DISTWIDTH] IN BLOOD BY AUTOMATED COUNT: 19.4 % (ref 11.6–14.5)
ERYTHROCYTE [DISTWIDTH] IN BLOOD BY AUTOMATED COUNT: 19.4 % (ref 11.6–14.5)
ERYTHROCYTE [DISTWIDTH] IN BLOOD BY AUTOMATED COUNT: 19.5 % (ref 11.6–14.5)
ERYTHROCYTE [DISTWIDTH] IN BLOOD BY AUTOMATED COUNT: 19.5 % (ref 11.6–14.5)
ERYTHROCYTE [DISTWIDTH] IN BLOOD BY AUTOMATED COUNT: 19.6 % (ref 11.6–14.5)
ERYTHROCYTE [DISTWIDTH] IN BLOOD BY AUTOMATED COUNT: 19.6 % (ref 11.6–14.5)
ERYTHROCYTE [DISTWIDTH] IN BLOOD BY AUTOMATED COUNT: 19.7 % (ref 11.6–14.5)
ERYTHROCYTE [DISTWIDTH] IN BLOOD BY AUTOMATED COUNT: 19.8 % (ref 11.6–14.5)
ERYTHROCYTE [DISTWIDTH] IN BLOOD BY AUTOMATED COUNT: 20.1 % (ref 11.6–14.5)
ERYTHROCYTE [DISTWIDTH] IN BLOOD BY AUTOMATED COUNT: 20.4 % (ref 11.6–14.5)
ERYTHROCYTE [DISTWIDTH] IN BLOOD BY AUTOMATED COUNT: 20.4 % (ref 11.6–14.5)
ERYTHROCYTE [DISTWIDTH] IN BLOOD BY AUTOMATED COUNT: 20.5 % (ref 11.6–14.5)
ERYTHROCYTE [DISTWIDTH] IN BLOOD BY AUTOMATED COUNT: 20.8 % (ref 11.6–14.5)
ERYTHROCYTE [DISTWIDTH] IN BLOOD BY AUTOMATED COUNT: 21.1 % (ref 11.6–14.5)
ERYTHROCYTE [DISTWIDTH] IN BLOOD BY AUTOMATED COUNT: 21.2 % (ref 11.6–14.5)
ERYTHROCYTE [DISTWIDTH] IN BLOOD BY AUTOMATED COUNT: 21.2 % (ref 11.6–14.5)
ERYTHROCYTE [SEDIMENTATION RATE] IN BLOOD: 53 MM/HR (ref 0–20)
ERYTHROCYTE [SEDIMENTATION RATE] IN BLOOD: 74 MM/HR (ref 0–20)
EST. AVERAGE GLUCOSE BLD GHB EST-MCNC: 120 MG/DL
EST. AVERAGE GLUCOSE BLD GHB EST-MCNC: 120 MG/DL
FAX TO INFO,FAXT: NORMAL
FAX TO NUMBER,FAXN: NORMAL
GAS FLOW.O2 O2 DELIVERY SYS: ABNORMAL L/MIN
GLOBAL LONGITUDINAL STRAIN 2 CHAMBER: -25.8 PERCENT
GLOBAL LONGITUDINAL STRAIN 4 CHAMBER: -11.7 PERCENT
GLOBAL LONGITUDINAL STRAIN LONG AXIS: -13.5 PERCENT
GLOBULIN SER CALC-MCNC: 3.3 G/DL (ref 2–4)
GLOBULIN SER CALC-MCNC: 3.4 G/DL (ref 2–4)
GLOBULIN SER CALC-MCNC: 3.4 G/DL (ref 2–4)
GLOBULIN SER CALC-MCNC: 3.5 G/DL (ref 2–4)
GLOBULIN SER CALC-MCNC: 3.7 G/DL (ref 2–4)
GLOBULIN SER CALC-MCNC: 3.8 G/DL (ref 2–4)
GLOBULIN SER CALC-MCNC: 3.9 G/DL (ref 2–4)
GLOBULIN SER CALC-MCNC: 4.3 G/DL (ref 2–4)
GLOBULIN SER CALC-MCNC: 4.4 G/DL (ref 2–4)
GLOBULIN SER CALC-MCNC: 4.4 G/DL (ref 2–4)
GLOBULIN SER CALC-MCNC: 4.8 G/DL (ref 2–4)
GLOBULIN SER CALC-MCNC: 4.9 G/DL (ref 2–4)
GLOBULIN SER CALC-MCNC: 5 G/DL (ref 2–4)
GLUCOSE BLD STRIP.AUTO-MCNC: 100 MG/DL (ref 70–110)
GLUCOSE BLD STRIP.AUTO-MCNC: 100 MG/DL (ref 70–110)
GLUCOSE BLD STRIP.AUTO-MCNC: 101 MG/DL (ref 70–110)
GLUCOSE BLD STRIP.AUTO-MCNC: 102 MG/DL (ref 70–110)
GLUCOSE BLD STRIP.AUTO-MCNC: 103 MG/DL (ref 70–110)
GLUCOSE BLD STRIP.AUTO-MCNC: 103 MG/DL (ref 70–110)
GLUCOSE BLD STRIP.AUTO-MCNC: 104 MG/DL (ref 70–110)
GLUCOSE BLD STRIP.AUTO-MCNC: 104 MG/DL (ref 70–110)
GLUCOSE BLD STRIP.AUTO-MCNC: 105 MG/DL (ref 70–110)
GLUCOSE BLD STRIP.AUTO-MCNC: 106 MG/DL (ref 70–110)
GLUCOSE BLD STRIP.AUTO-MCNC: 106 MG/DL (ref 70–110)
GLUCOSE BLD STRIP.AUTO-MCNC: 107 MG/DL (ref 70–110)
GLUCOSE BLD STRIP.AUTO-MCNC: 108 MG/DL (ref 70–110)
GLUCOSE BLD STRIP.AUTO-MCNC: 108 MG/DL (ref 70–110)
GLUCOSE BLD STRIP.AUTO-MCNC: 109 MG/DL (ref 70–110)
GLUCOSE BLD STRIP.AUTO-MCNC: 109 MG/DL (ref 70–110)
GLUCOSE BLD STRIP.AUTO-MCNC: 110 MG/DL (ref 70–110)
GLUCOSE BLD STRIP.AUTO-MCNC: 112 MG/DL (ref 70–110)
GLUCOSE BLD STRIP.AUTO-MCNC: 113 MG/DL (ref 70–110)
GLUCOSE BLD STRIP.AUTO-MCNC: 113 MG/DL (ref 70–110)
GLUCOSE BLD STRIP.AUTO-MCNC: 114 MG/DL (ref 70–110)
GLUCOSE BLD STRIP.AUTO-MCNC: 116 MG/DL (ref 70–110)
GLUCOSE BLD STRIP.AUTO-MCNC: 117 MG/DL (ref 70–110)
GLUCOSE BLD STRIP.AUTO-MCNC: 118 MG/DL (ref 70–110)
GLUCOSE BLD STRIP.AUTO-MCNC: 118 MG/DL (ref 70–110)
GLUCOSE BLD STRIP.AUTO-MCNC: 119 MG/DL (ref 70–110)
GLUCOSE BLD STRIP.AUTO-MCNC: 119 MG/DL (ref 70–110)
GLUCOSE BLD STRIP.AUTO-MCNC: 120 MG/DL (ref 70–110)
GLUCOSE BLD STRIP.AUTO-MCNC: 122 MG/DL (ref 70–110)
GLUCOSE BLD STRIP.AUTO-MCNC: 122 MG/DL (ref 70–110)
GLUCOSE BLD STRIP.AUTO-MCNC: 123 MG/DL (ref 70–110)
GLUCOSE BLD STRIP.AUTO-MCNC: 126 MG/DL (ref 70–110)
GLUCOSE BLD STRIP.AUTO-MCNC: 126 MG/DL (ref 70–110)
GLUCOSE BLD STRIP.AUTO-MCNC: 127 MG/DL (ref 70–110)
GLUCOSE BLD STRIP.AUTO-MCNC: 129 MG/DL (ref 70–110)
GLUCOSE BLD STRIP.AUTO-MCNC: 131 MG/DL (ref 70–110)
GLUCOSE BLD STRIP.AUTO-MCNC: 133 MG/DL (ref 70–110)
GLUCOSE BLD STRIP.AUTO-MCNC: 133 MG/DL (ref 70–110)
GLUCOSE BLD STRIP.AUTO-MCNC: 134 MG/DL (ref 70–110)
GLUCOSE BLD STRIP.AUTO-MCNC: 134 MG/DL (ref 70–110)
GLUCOSE BLD STRIP.AUTO-MCNC: 135 MG/DL (ref 70–110)
GLUCOSE BLD STRIP.AUTO-MCNC: 135 MG/DL (ref 70–110)
GLUCOSE BLD STRIP.AUTO-MCNC: 136 MG/DL (ref 70–110)
GLUCOSE BLD STRIP.AUTO-MCNC: 140 MG/DL (ref 70–110)
GLUCOSE BLD STRIP.AUTO-MCNC: 141 MG/DL (ref 70–110)
GLUCOSE BLD STRIP.AUTO-MCNC: 142 MG/DL (ref 70–110)
GLUCOSE BLD STRIP.AUTO-MCNC: 142 MG/DL (ref 70–110)
GLUCOSE BLD STRIP.AUTO-MCNC: 143 MG/DL (ref 70–110)
GLUCOSE BLD STRIP.AUTO-MCNC: 145 MG/DL (ref 70–110)
GLUCOSE BLD STRIP.AUTO-MCNC: 147 MG/DL (ref 70–110)
GLUCOSE BLD STRIP.AUTO-MCNC: 156 MG/DL (ref 70–110)
GLUCOSE BLD STRIP.AUTO-MCNC: 164 MG/DL (ref 70–110)
GLUCOSE BLD STRIP.AUTO-MCNC: 165 MG/DL (ref 70–110)
GLUCOSE BLD STRIP.AUTO-MCNC: 168 MG/DL (ref 70–110)
GLUCOSE BLD STRIP.AUTO-MCNC: 176 MG/DL (ref 70–110)
GLUCOSE BLD STRIP.AUTO-MCNC: 176 MG/DL (ref 70–110)
GLUCOSE BLD STRIP.AUTO-MCNC: 180 MG/DL (ref 70–110)
GLUCOSE BLD STRIP.AUTO-MCNC: 186 MG/DL (ref 70–110)
GLUCOSE BLD STRIP.AUTO-MCNC: 190 MG/DL (ref 70–110)
GLUCOSE BLD STRIP.AUTO-MCNC: 194 MG/DL (ref 70–110)
GLUCOSE BLD STRIP.AUTO-MCNC: 195 MG/DL (ref 70–110)
GLUCOSE BLD STRIP.AUTO-MCNC: 196 MG/DL (ref 70–110)
GLUCOSE BLD STRIP.AUTO-MCNC: 204 MG/DL (ref 70–110)
GLUCOSE BLD STRIP.AUTO-MCNC: 205 MG/DL (ref 70–110)
GLUCOSE BLD STRIP.AUTO-MCNC: 206 MG/DL (ref 70–110)
GLUCOSE BLD STRIP.AUTO-MCNC: 218 MG/DL (ref 70–110)
GLUCOSE BLD STRIP.AUTO-MCNC: 224 MG/DL (ref 70–110)
GLUCOSE BLD STRIP.AUTO-MCNC: 236 MG/DL (ref 70–110)
GLUCOSE BLD STRIP.AUTO-MCNC: 85 MG/DL (ref 70–110)
GLUCOSE BLD STRIP.AUTO-MCNC: 86 MG/DL (ref 70–110)
GLUCOSE BLD STRIP.AUTO-MCNC: 87 MG/DL (ref 70–110)
GLUCOSE BLD STRIP.AUTO-MCNC: 90 MG/DL (ref 70–110)
GLUCOSE BLD STRIP.AUTO-MCNC: 91 MG/DL (ref 70–110)
GLUCOSE BLD STRIP.AUTO-MCNC: 91 MG/DL (ref 70–110)
GLUCOSE BLD STRIP.AUTO-MCNC: 92 MG/DL (ref 70–110)
GLUCOSE BLD STRIP.AUTO-MCNC: 92 MG/DL (ref 70–110)
GLUCOSE BLD STRIP.AUTO-MCNC: 93 MG/DL (ref 70–110)
GLUCOSE BLD STRIP.AUTO-MCNC: 94 MG/DL (ref 70–110)
GLUCOSE BLD STRIP.AUTO-MCNC: 94 MG/DL (ref 70–110)
GLUCOSE BLD STRIP.AUTO-MCNC: 95 MG/DL (ref 70–110)
GLUCOSE BLD STRIP.AUTO-MCNC: 96 MG/DL (ref 70–110)
GLUCOSE BLD STRIP.AUTO-MCNC: 98 MG/DL (ref 70–110)
GLUCOSE BLD STRIP.AUTO-MCNC: 99 MG/DL (ref 70–110)
GLUCOSE SERPL-MCNC: 101 MG/DL (ref 74–99)
GLUCOSE SERPL-MCNC: 101 MG/DL (ref 74–99)
GLUCOSE SERPL-MCNC: 102 MG/DL (ref 74–99)
GLUCOSE SERPL-MCNC: 102 MG/DL (ref 74–99)
GLUCOSE SERPL-MCNC: 103 MG/DL (ref 74–99)
GLUCOSE SERPL-MCNC: 107 MG/DL (ref 74–99)
GLUCOSE SERPL-MCNC: 109 MG/DL (ref 74–99)
GLUCOSE SERPL-MCNC: 109 MG/DL (ref 74–99)
GLUCOSE SERPL-MCNC: 110 MG/DL (ref 74–99)
GLUCOSE SERPL-MCNC: 111 MG/DL (ref 74–99)
GLUCOSE SERPL-MCNC: 111 MG/DL (ref 74–99)
GLUCOSE SERPL-MCNC: 116 MG/DL (ref 74–99)
GLUCOSE SERPL-MCNC: 117 MG/DL (ref 74–99)
GLUCOSE SERPL-MCNC: 117 MG/DL (ref 74–99)
GLUCOSE SERPL-MCNC: 118 MG/DL (ref 74–99)
GLUCOSE SERPL-MCNC: 122 MG/DL (ref 74–99)
GLUCOSE SERPL-MCNC: 122 MG/DL (ref 74–99)
GLUCOSE SERPL-MCNC: 129 MG/DL (ref 74–99)
GLUCOSE SERPL-MCNC: 129 MG/DL (ref 74–99)
GLUCOSE SERPL-MCNC: 130 MG/DL (ref 74–99)
GLUCOSE SERPL-MCNC: 141 MG/DL (ref 74–99)
GLUCOSE SERPL-MCNC: 170 MG/DL (ref 74–99)
GLUCOSE SERPL-MCNC: 214 MG/DL (ref 74–99)
GLUCOSE SERPL-MCNC: 218 MG/DL (ref 74–99)
GLUCOSE SERPL-MCNC: 228 MG/DL (ref 74–99)
GLUCOSE SERPL-MCNC: 65 MG/DL (ref 74–99)
GLUCOSE SERPL-MCNC: 71 MG/DL (ref 74–99)
GLUCOSE SERPL-MCNC: 78 MG/DL (ref 74–99)
GLUCOSE SERPL-MCNC: 80 MG/DL (ref 74–99)
GLUCOSE SERPL-MCNC: 82 MG/DL (ref 74–99)
GLUCOSE SERPL-MCNC: 84 MG/DL (ref 74–99)
GLUCOSE SERPL-MCNC: 88 MG/DL (ref 74–99)
GLUCOSE SERPL-MCNC: 90 MG/DL (ref 74–99)
GLUCOSE SERPL-MCNC: 90 MG/DL (ref 74–99)
GLUCOSE SERPL-MCNC: 91 MG/DL (ref 74–99)
GLUCOSE SERPL-MCNC: 92 MG/DL (ref 74–99)
GLUCOSE SERPL-MCNC: 92 MG/DL (ref 74–99)
GLUCOSE SERPL-MCNC: 94 MG/DL (ref 74–99)
GLUCOSE SERPL-MCNC: 96 MG/DL (ref 74–99)
GLUCOSE SERPL-MCNC: 96 MG/DL (ref 74–99)
GLUCOSE SERPL-MCNC: 97 MG/DL (ref 74–99)
GLUCOSE SERPL-MCNC: 99 MG/DL (ref 74–99)
GLUCOSE UR STRIP.AUTO-MCNC: NEGATIVE MG/DL
HBA1C MFR BLD: 5.8 % (ref 4.2–5.6)
HBA1C MFR BLD: 5.8 % (ref 4.2–5.6)
HCO3 BLD-SCNC: 11.3 MMOL/L (ref 22–26)
HCO3 BLD-SCNC: 12 MMOL/L (ref 22–26)
HCO3 BLD-SCNC: 23.2 MMOL/L (ref 22–26)
HCO3 BLD-SCNC: 9.2 MMOL/L (ref 22–26)
HCT VFR BLD AUTO: 22.6 % (ref 36–48)
HCT VFR BLD AUTO: 24.7 % (ref 36–48)
HCT VFR BLD AUTO: 24.8 % (ref 36–48)
HCT VFR BLD AUTO: 25.9 % (ref 36–48)
HCT VFR BLD AUTO: 26.1 % (ref 36–48)
HCT VFR BLD AUTO: 26.3 % (ref 36–48)
HCT VFR BLD AUTO: 26.7 % (ref 36–48)
HCT VFR BLD AUTO: 26.7 % (ref 36–48)
HCT VFR BLD AUTO: 27.5 % (ref 36–48)
HCT VFR BLD AUTO: 27.8 % (ref 36–48)
HCT VFR BLD AUTO: 27.8 % (ref 36–48)
HCT VFR BLD AUTO: 28 % (ref 36–48)
HCT VFR BLD AUTO: 28.1 % (ref 36–48)
HCT VFR BLD AUTO: 28.2 % (ref 36–48)
HCT VFR BLD AUTO: 28.2 % (ref 36–48)
HCT VFR BLD AUTO: 28.3 % (ref 36–48)
HCT VFR BLD AUTO: 28.6 % (ref 36–48)
HCT VFR BLD AUTO: 28.9 % (ref 36–48)
HCT VFR BLD AUTO: 29.5 % (ref 36–48)
HCT VFR BLD AUTO: 29.9 % (ref 36–48)
HCT VFR BLD AUTO: 30 % (ref 36–48)
HCT VFR BLD AUTO: 30 % (ref 36–48)
HCT VFR BLD AUTO: 30.1 % (ref 36–48)
HCT VFR BLD AUTO: 30.5 % (ref 36–48)
HCT VFR BLD AUTO: 31.1 % (ref 36–48)
HCT VFR BLD AUTO: 31.3 % (ref 36–48)
HCT VFR BLD AUTO: 31.5 % (ref 36–48)
HCT VFR BLD AUTO: 31.6 % (ref 36–48)
HCT VFR BLD AUTO: 31.9 % (ref 36–48)
HCT VFR BLD AUTO: 32.4 % (ref 36–48)
HCT VFR BLD AUTO: 33 % (ref 36–48)
HCT VFR BLD AUTO: 33.7 % (ref 36–48)
HCT VFR BLD AUTO: 34.5 % (ref 36–48)
HCT VFR BLD AUTO: 34.6 % (ref 36–48)
HCT VFR BLD AUTO: 34.7 % (ref 36–48)
HCT VFR BLD AUTO: 36.4 % (ref 36–48)
HCT VFR BLD AUTO: 37.7 % (ref 36–48)
HCT VFR BLD AUTO: 41.3 % (ref 36–48)
HCT VFR BLD AUTO: 42 % (ref 36–48)
HEMOCCULT STL QL: POSITIVE
HGB BLD-MCNC: 10.4 G/DL (ref 13–16)
HGB BLD-MCNC: 10.4 G/DL (ref 13–16)
HGB BLD-MCNC: 10.8 G/DL (ref 13–16)
HGB BLD-MCNC: 10.8 G/DL (ref 13–16)
HGB BLD-MCNC: 11 G/DL (ref 13–16)
HGB BLD-MCNC: 11.1 G/DL (ref 13–16)
HGB BLD-MCNC: 11.4 G/DL (ref 13–16)
HGB BLD-MCNC: 12.7 G/DL (ref 13–16)
HGB BLD-MCNC: 13.2 G/DL (ref 13–16)
HGB BLD-MCNC: 6.9 G/DL (ref 13–16)
HGB BLD-MCNC: 7.8 G/DL (ref 13–16)
HGB BLD-MCNC: 7.9 G/DL (ref 13–16)
HGB BLD-MCNC: 7.9 G/DL (ref 13–16)
HGB BLD-MCNC: 8 G/DL (ref 13–16)
HGB BLD-MCNC: 8.2 G/DL (ref 13–16)
HGB BLD-MCNC: 8.3 G/DL (ref 13–16)
HGB BLD-MCNC: 8.4 G/DL (ref 13–16)
HGB BLD-MCNC: 8.5 G/DL (ref 13–16)
HGB BLD-MCNC: 8.6 G/DL (ref 13–16)
HGB BLD-MCNC: 8.7 G/DL (ref 13–16)
HGB BLD-MCNC: 8.8 G/DL (ref 13–16)
HGB BLD-MCNC: 9 G/DL (ref 13–16)
HGB BLD-MCNC: 9.1 G/DL (ref 13–16)
HGB BLD-MCNC: 9.2 G/DL (ref 13–16)
HGB BLD-MCNC: 9.2 G/DL (ref 13–16)
HGB BLD-MCNC: 9.3 G/DL (ref 13–16)
HGB BLD-MCNC: 9.4 G/DL (ref 13–16)
HGB BLD-MCNC: 9.5 G/DL (ref 13–16)
HGB BLD-MCNC: 9.6 G/DL (ref 13–16)
HGB BLD-MCNC: 9.6 G/DL (ref 13–16)
HGB UR QL STRIP: ABNORMAL
HGB UR QL STRIP: NEGATIVE
HISTORY CHECKED?,CKHIST: NORMAL
IMM GRANULOCYTES # BLD AUTO: 0 K/UL
IMM GRANULOCYTES # BLD AUTO: 0 K/UL (ref 0–0.04)
IMM GRANULOCYTES # BLD AUTO: 0.1 K/UL (ref 0–0.04)
IMM GRANULOCYTES # BLD AUTO: 0.2 K/UL (ref 0–0.04)
IMM GRANULOCYTES # BLD AUTO: 0.3 K/UL (ref 0–0.04)
IMM GRANULOCYTES # BLD AUTO: 0.3 K/UL (ref 0–0.04)
IMM GRANULOCYTES # BLD AUTO: 0.5 K/UL (ref 0–0.04)
IMM GRANULOCYTES # BLD AUTO: 0.6 K/UL (ref 0–0.04)
IMM GRANULOCYTES # BLD AUTO: 1 K/UL (ref 0–0.04)
IMM GRANULOCYTES NFR BLD AUTO: 0 %
IMM GRANULOCYTES NFR BLD AUTO: 0 % (ref 0–0.5)
IMM GRANULOCYTES NFR BLD AUTO: 0 % (ref 0–0.5)
IMM GRANULOCYTES NFR BLD AUTO: 1 % (ref 0–0.5)
IMM GRANULOCYTES NFR BLD AUTO: 2 % (ref 0–0.5)
IMM GRANULOCYTES NFR BLD AUTO: 3 % (ref 0–0.5)
INR PPP: 1.3 (ref 0.8–1.2)
KETONES UR QL STRIP.AUTO: ABNORMAL MG/DL
KETONES UR QL STRIP.AUTO: NEGATIVE MG/DL
LACTATE BLD-SCNC: 11.69 MMOL/L (ref 0.4–2)
LACTATE BLD-SCNC: 2.11 MMOL/L (ref 0.4–2)
LACTATE BLD-SCNC: 2.37 MMOL/L (ref 0.4–2)
LACTATE BLD-SCNC: 3.13 MMOL/L (ref 0.4–2)
LACTATE BLD-SCNC: 3.3 MMOL/L (ref 0.4–2)
LACTATE BLD-SCNC: 7.18 MMOL/L (ref 0.4–2)
LACTATE SERPL-SCNC: 1.4 MMOL/L (ref 0.4–2)
LACTATE SERPL-SCNC: 1.7 MMOL/L (ref 0.4–2)
LACTATE SERPL-SCNC: 2.2 MMOL/L (ref 0.4–2)
LACTATE SERPL-SCNC: 3.5 MMOL/L (ref 0.4–2)
LACTATE SERPL-SCNC: 4.6 MMOL/L (ref 0.4–2)
LACTATE SERPL-SCNC: 5.7 MMOL/L (ref 0.4–2)
LEUKOCYTE ESTERASE UR QL STRIP.AUTO: ABNORMAL
LVOT MG: 0.88 MMHG
LVOT MG: 1.58 MMHG
LYMPHOCYTES # BLD: 1 K/UL (ref 0.9–3.6)
LYMPHOCYTES # BLD: 1.3 K/UL (ref 0.9–3.6)
LYMPHOCYTES # BLD: 1.6 K/UL (ref 0.9–3.6)
LYMPHOCYTES # BLD: 1.6 K/UL (ref 0.9–3.6)
LYMPHOCYTES # BLD: 1.7 K/UL (ref 0.9–3.6)
LYMPHOCYTES # BLD: 1.8 K/UL (ref 0.9–3.6)
LYMPHOCYTES # BLD: 1.8 K/UL (ref 0.9–3.6)
LYMPHOCYTES # BLD: 1.9 K/UL (ref 0.9–3.6)
LYMPHOCYTES # BLD: 1.9 K/UL (ref 0.9–3.6)
LYMPHOCYTES # BLD: 2 K/UL (ref 0.9–3.6)
LYMPHOCYTES # BLD: 2.1 K/UL (ref 0.9–3.6)
LYMPHOCYTES # BLD: 2.2 K/UL (ref 0.9–3.6)
LYMPHOCYTES # BLD: 2.3 K/UL (ref 0.9–3.6)
LYMPHOCYTES # BLD: 2.4 K/UL (ref 0.9–3.6)
LYMPHOCYTES # BLD: 2.6 K/UL (ref 0.9–3.6)
LYMPHOCYTES # BLD: 2.6 K/UL (ref 0.9–3.6)
LYMPHOCYTES # BLD: 2.8 K/UL (ref 0.9–3.6)
LYMPHOCYTES # BLD: 2.8 K/UL (ref 0.9–3.6)
LYMPHOCYTES # BLD: 3 K/UL (ref 0.9–3.6)
LYMPHOCYTES # BLD: 3.1 K/UL (ref 0.9–3.6)
LYMPHOCYTES # BLD: 4 K/UL (ref 0.9–3.6)
LYMPHOCYTES NFR BLD: 11 % (ref 21–52)
LYMPHOCYTES NFR BLD: 11 % (ref 21–52)
LYMPHOCYTES NFR BLD: 12 % (ref 21–52)
LYMPHOCYTES NFR BLD: 13 % (ref 21–52)
LYMPHOCYTES NFR BLD: 14 % (ref 21–52)
LYMPHOCYTES NFR BLD: 17 % (ref 21–52)
LYMPHOCYTES NFR BLD: 19 % (ref 21–52)
LYMPHOCYTES NFR BLD: 21 % (ref 21–52)
LYMPHOCYTES NFR BLD: 21 % (ref 21–52)
LYMPHOCYTES NFR BLD: 22 % (ref 21–52)
LYMPHOCYTES NFR BLD: 23 % (ref 21–52)
LYMPHOCYTES NFR BLD: 24 % (ref 21–52)
LYMPHOCYTES NFR BLD: 27 % (ref 21–52)
LYMPHOCYTES NFR BLD: 28 % (ref 21–52)
LYMPHOCYTES NFR BLD: 6 % (ref 21–52)
LYMPHOCYTES NFR BLD: 6 % (ref 21–52)
LYMPHOCYTES NFR BLD: 7 % (ref 21–52)
LYMPHOCYTES NFR BLD: 8 % (ref 21–52)
LYMPHOCYTES NFR BLD: 9 % (ref 21–52)
MAGNESIUM SERPL-MCNC: 1.5 MG/DL (ref 1.6–2.6)
MAGNESIUM SERPL-MCNC: 1.7 MG/DL (ref 1.6–2.6)
MAGNESIUM SERPL-MCNC: 2.1 MG/DL (ref 1.6–2.6)
MAGNESIUM SERPL-MCNC: 2.2 MG/DL (ref 1.6–2.6)
MCH RBC QN AUTO: 25.3 PG (ref 24–34)
MCH RBC QN AUTO: 25.5 PG (ref 24–34)
MCH RBC QN AUTO: 25.6 PG (ref 24–34)
MCH RBC QN AUTO: 25.6 PG (ref 24–34)
MCH RBC QN AUTO: 25.7 PG (ref 24–34)
MCH RBC QN AUTO: 25.8 PG (ref 24–34)
MCH RBC QN AUTO: 25.9 PG (ref 24–34)
MCH RBC QN AUTO: 26 PG (ref 24–34)
MCH RBC QN AUTO: 26.1 PG (ref 24–34)
MCH RBC QN AUTO: 26.1 PG (ref 24–34)
MCH RBC QN AUTO: 26.2 PG (ref 24–34)
MCH RBC QN AUTO: 26.3 PG (ref 24–34)
MCH RBC QN AUTO: 26.4 PG (ref 24–34)
MCH RBC QN AUTO: 26.5 PG (ref 24–34)
MCH RBC QN AUTO: 26.6 PG (ref 24–34)
MCH RBC QN AUTO: 26.6 PG (ref 24–34)
MCH RBC QN AUTO: 26.7 PG (ref 24–34)
MCH RBC QN AUTO: 26.8 PG (ref 24–34)
MCH RBC QN AUTO: 26.9 PG (ref 24–34)
MCH RBC QN AUTO: 26.9 PG (ref 24–34)
MCH RBC QN AUTO: 27 PG (ref 24–34)
MCH RBC QN AUTO: 27.4 PG (ref 24–34)
MCH RBC QN AUTO: 27.4 PG (ref 24–34)
MCH RBC QN AUTO: 27.5 PG (ref 24–34)
MCH RBC QN AUTO: 27.8 PG (ref 24–34)
MCHC RBC AUTO-ENTMCNC: 29.2 G/DL (ref 31–37)
MCHC RBC AUTO-ENTMCNC: 29.4 G/DL (ref 31–37)
MCHC RBC AUTO-ENTMCNC: 29.6 G/DL (ref 31–37)
MCHC RBC AUTO-ENTMCNC: 29.7 G/DL (ref 31–37)
MCHC RBC AUTO-ENTMCNC: 29.8 G/DL (ref 31–37)
MCHC RBC AUTO-ENTMCNC: 30.1 G/DL (ref 31–37)
MCHC RBC AUTO-ENTMCNC: 30.3 G/DL (ref 31–37)
MCHC RBC AUTO-ENTMCNC: 30.3 G/DL (ref 31–37)
MCHC RBC AUTO-ENTMCNC: 30.4 G/DL (ref 31–37)
MCHC RBC AUTO-ENTMCNC: 30.5 G/DL (ref 31–37)
MCHC RBC AUTO-ENTMCNC: 30.6 G/DL (ref 31–37)
MCHC RBC AUTO-ENTMCNC: 30.6 G/DL (ref 31–37)
MCHC RBC AUTO-ENTMCNC: 30.7 G/DL (ref 31–37)
MCHC RBC AUTO-ENTMCNC: 30.8 G/DL (ref 31–37)
MCHC RBC AUTO-ENTMCNC: 30.8 G/DL (ref 31–37)
MCHC RBC AUTO-ENTMCNC: 30.9 G/DL (ref 31–37)
MCHC RBC AUTO-ENTMCNC: 31.1 G/DL (ref 31–37)
MCHC RBC AUTO-ENTMCNC: 31.1 G/DL (ref 31–37)
MCHC RBC AUTO-ENTMCNC: 31.3 G/DL (ref 31–37)
MCHC RBC AUTO-ENTMCNC: 31.3 G/DL (ref 31–37)
MCHC RBC AUTO-ENTMCNC: 31.4 G/DL (ref 31–37)
MCHC RBC AUTO-ENTMCNC: 31.5 G/DL (ref 31–37)
MCHC RBC AUTO-ENTMCNC: 31.5 G/DL (ref 31–37)
MCHC RBC AUTO-ENTMCNC: 31.6 G/DL (ref 31–37)
MCHC RBC AUTO-ENTMCNC: 31.6 G/DL (ref 31–37)
MCHC RBC AUTO-ENTMCNC: 31.8 G/DL (ref 31–37)
MCHC RBC AUTO-ENTMCNC: 31.9 G/DL (ref 31–37)
MCV RBC AUTO: 83.1 FL (ref 78–100)
MCV RBC AUTO: 83.3 FL (ref 78–100)
MCV RBC AUTO: 83.4 FL (ref 78–100)
MCV RBC AUTO: 83.8 FL (ref 78–100)
MCV RBC AUTO: 84.2 FL (ref 78–100)
MCV RBC AUTO: 84.3 FL (ref 78–100)
MCV RBC AUTO: 84.7 FL (ref 78–100)
MCV RBC AUTO: 84.8 FL (ref 78–100)
MCV RBC AUTO: 85 FL (ref 78–100)
MCV RBC AUTO: 85 FL (ref 78–100)
MCV RBC AUTO: 85.2 FL (ref 78–100)
MCV RBC AUTO: 85.5 FL (ref 74–97)
MCV RBC AUTO: 85.5 FL (ref 78–100)
MCV RBC AUTO: 85.5 FL (ref 78–100)
MCV RBC AUTO: 85.9 FL (ref 78–100)
MCV RBC AUTO: 86 FL (ref 78–100)
MCV RBC AUTO: 86.1 FL (ref 78–100)
MCV RBC AUTO: 86.4 FL (ref 74–97)
MCV RBC AUTO: 86.4 FL (ref 78–100)
MCV RBC AUTO: 86.5 FL (ref 78–100)
MCV RBC AUTO: 86.6 FL (ref 78–100)
MCV RBC AUTO: 86.8 FL (ref 74–97)
MCV RBC AUTO: 86.8 FL (ref 74–97)
MCV RBC AUTO: 86.9 FL (ref 78–100)
MCV RBC AUTO: 87 FL (ref 74–97)
MCV RBC AUTO: 87.1 FL (ref 78–100)
MCV RBC AUTO: 87.3 FL (ref 74–97)
MCV RBC AUTO: 87.3 FL (ref 78–100)
MCV RBC AUTO: 87.6 FL (ref 74–97)
MCV RBC AUTO: 87.8 FL (ref 74–97)
MCV RBC AUTO: 87.9 FL (ref 74–97)
MCV RBC AUTO: 87.9 FL (ref 78–100)
MCV RBC AUTO: 88.1 FL (ref 74–97)
MCV RBC AUTO: 88.1 FL (ref 78–100)
MCV RBC AUTO: 88.2 FL (ref 74–97)
MCV RBC AUTO: 88.9 FL (ref 78–100)
METAMYELOCYTES NFR BLD MANUAL: 0 %
METAMYELOCYTES NFR BLD MANUAL: 0 %
METAMYELOCYTES NFR BLD MANUAL: 1 %
METAMYELOCYTES NFR BLD MANUAL: 1 %
METAMYELOCYTES NFR BLD MANUAL: 2 %
MONOCYTES # BLD: 0.3 K/UL (ref 0.05–1.2)
MONOCYTES # BLD: 0.8 K/UL (ref 0.05–1.2)
MONOCYTES # BLD: 0.8 K/UL (ref 0.05–1.2)
MONOCYTES # BLD: 0.9 K/UL (ref 0.05–1.2)
MONOCYTES # BLD: 1.1 K/UL (ref 0.05–1.2)
MONOCYTES # BLD: 1.2 K/UL (ref 0.05–1.2)
MONOCYTES # BLD: 1.3 K/UL (ref 0.05–1.2)
MONOCYTES # BLD: 1.4 K/UL (ref 0.05–1.2)
MONOCYTES # BLD: 1.5 K/UL (ref 0.05–1.2)
MONOCYTES # BLD: 1.6 K/UL (ref 0.05–1.2)
MONOCYTES # BLD: 1.7 K/UL (ref 0.05–1.2)
MONOCYTES # BLD: 1.7 K/UL (ref 0.05–1.2)
MONOCYTES # BLD: 1.8 K/UL (ref 0.05–1.2)
MONOCYTES # BLD: 2 K/UL (ref 0.05–1.2)
MONOCYTES # BLD: 2.1 K/UL (ref 0.05–1.2)
MONOCYTES # BLD: 2.5 K/UL (ref 0.05–1.2)
MONOCYTES # BLD: 2.5 K/UL (ref 0.05–1.2)
MONOCYTES NFR BLD: 10 % (ref 3–10)
MONOCYTES NFR BLD: 10 % (ref 3–10)
MONOCYTES NFR BLD: 11 % (ref 3–10)
MONOCYTES NFR BLD: 12 % (ref 3–10)
MONOCYTES NFR BLD: 13 % (ref 3–10)
MONOCYTES NFR BLD: 15 % (ref 3–10)
MONOCYTES NFR BLD: 15 % (ref 3–10)
MONOCYTES NFR BLD: 16 % (ref 3–10)
MONOCYTES NFR BLD: 16 % (ref 3–10)
MONOCYTES NFR BLD: 17 % (ref 3–10)
MONOCYTES NFR BLD: 3 % (ref 3–10)
MONOCYTES NFR BLD: 3 % (ref 3–10)
MONOCYTES NFR BLD: 4 % (ref 3–10)
MONOCYTES NFR BLD: 5 % (ref 3–10)
MONOCYTES NFR BLD: 5 % (ref 3–10)
MONOCYTES NFR BLD: 6 % (ref 3–10)
MONOCYTES NFR BLD: 6 % (ref 3–10)
MONOCYTES NFR BLD: 7 % (ref 3–10)
MONOCYTES NFR BLD: 8 % (ref 3–10)
MONOCYTES NFR BLD: 9 % (ref 3–10)
MUCOUS THREADS URNS QL MICRO: ABNORMAL /LPF
MYELOCYTES NFR BLD MANUAL: 0 %
MYELOCYTES NFR BLD MANUAL: 2 %
NEUTS BAND NFR BLD MANUAL: 1 % (ref 0–5)
NEUTS BAND NFR BLD MANUAL: 1 % (ref 0–5)
NEUTS BAND NFR BLD MANUAL: 2 % (ref 0–5)
NEUTS BAND NFR BLD MANUAL: 2 % (ref 0–5)
NEUTS BAND NFR BLD MANUAL: 4 % (ref 0–5)
NEUTS BAND NFR BLD MANUAL: 5 % (ref 0–5)
NEUTS BAND NFR BLD MANUAL: 8 % (ref 0–5)
NEUTS BAND NFR BLD MANUAL: 8 % (ref 0–5)
NEUTS SEG # BLD: 10.2 K/UL (ref 1.8–8)
NEUTS SEG # BLD: 10.5 K/UL (ref 1.8–8)
NEUTS SEG # BLD: 10.7 K/UL (ref 1.8–8)
NEUTS SEG # BLD: 11.2 K/UL (ref 1.8–8)
NEUTS SEG # BLD: 13.6 K/UL (ref 1.8–8)
NEUTS SEG # BLD: 14.7 K/UL (ref 1.8–8)
NEUTS SEG # BLD: 16 K/UL (ref 1.8–8)
NEUTS SEG # BLD: 16.8 K/UL (ref 1.8–8)
NEUTS SEG # BLD: 17.8 K/UL (ref 1.8–8)
NEUTS SEG # BLD: 19 K/UL (ref 1.8–8)
NEUTS SEG # BLD: 19.1 K/UL (ref 1.8–8)
NEUTS SEG # BLD: 20.9 K/UL (ref 1.8–8)
NEUTS SEG # BLD: 21.7 K/UL (ref 1.8–8)
NEUTS SEG # BLD: 23.1 K/UL (ref 1.8–8)
NEUTS SEG # BLD: 24 K/UL (ref 1.8–8)
NEUTS SEG # BLD: 4.6 K/UL (ref 1.8–8)
NEUTS SEG # BLD: 4.7 K/UL (ref 1.8–8)
NEUTS SEG # BLD: 4.9 K/UL (ref 1.8–8)
NEUTS SEG # BLD: 5.3 K/UL (ref 1.8–8)
NEUTS SEG # BLD: 5.3 K/UL (ref 1.8–8)
NEUTS SEG # BLD: 6 K/UL (ref 1.8–8)
NEUTS SEG # BLD: 6.3 K/UL (ref 1.8–8)
NEUTS SEG # BLD: 7.5 K/UL (ref 1.8–8)
NEUTS SEG # BLD: 7.7 K/UL (ref 1.8–8)
NEUTS SEG # BLD: 8.2 K/UL (ref 1.8–8)
NEUTS SEG # BLD: 9.4 K/UL (ref 1.8–8)
NEUTS SEG # BLD: 9.8 K/UL (ref 1.8–8)
NEUTS SEG NFR BLD: 50 % (ref 40–73)
NEUTS SEG NFR BLD: 50 % (ref 40–73)
NEUTS SEG NFR BLD: 54 % (ref 40–73)
NEUTS SEG NFR BLD: 55 % (ref 40–73)
NEUTS SEG NFR BLD: 58 % (ref 40–73)
NEUTS SEG NFR BLD: 61 % (ref 40–73)
NEUTS SEG NFR BLD: 62 % (ref 40–73)
NEUTS SEG NFR BLD: 65 % (ref 40–73)
NEUTS SEG NFR BLD: 68 % (ref 40–73)
NEUTS SEG NFR BLD: 69 % (ref 40–73)
NEUTS SEG NFR BLD: 69 % (ref 40–73)
NEUTS SEG NFR BLD: 72 % (ref 40–73)
NEUTS SEG NFR BLD: 72 % (ref 40–73)
NEUTS SEG NFR BLD: 73 % (ref 40–73)
NEUTS SEG NFR BLD: 73 % (ref 40–73)
NEUTS SEG NFR BLD: 76 % (ref 40–73)
NEUTS SEG NFR BLD: 77 % (ref 40–73)
NEUTS SEG NFR BLD: 78 % (ref 40–73)
NEUTS SEG NFR BLD: 78 % (ref 40–73)
NEUTS SEG NFR BLD: 80 % (ref 40–73)
NEUTS SEG NFR BLD: 80 % (ref 40–73)
NEUTS SEG NFR BLD: 81 % (ref 40–73)
NEUTS SEG NFR BLD: 83 % (ref 40–73)
NEUTS SEG NFR BLD: 84 % (ref 40–73)
NEUTS SEG NFR BLD: 87 % (ref 40–73)
NITRITE UR QL STRIP.AUTO: NEGATIVE
NITRITE UR QL STRIP.AUTO: POSITIVE
NRBC # BLD: 0 K/UL (ref 0–0.01)
NRBC # BLD: 0.02 K/UL (ref 0–0.01)
NRBC # BLD: 0.02 K/UL (ref 0–0.01)
NRBC # BLD: 0.04 K/UL (ref 0–0.01)
NRBC BLD-RTO: 0 PER 100 WBC
NRBC BLD-RTO: 0.1 PER 100 WBC
NRBC BLD-RTO: 0.1 PER 100 WBC
NRBC BLD-RTO: 0.2 PER 100 WBC
O2/TOTAL GAS SETTING VFR VENT: 4 %
O2/TOTAL GAS SETTING VFR VENT: 50 %
O2/TOTAL GAS SETTING VFR VENT: 52 %
O2/TOTAL GAS SETTING VFR VENT: 92 %
OTHER CELLS NFR BLD MANUAL: 0 %
OTHER CELLS NFR BLD MANUAL: 0 %
OTHER,OTHU: ABNORMAL
OTHER,OTHU: ABNORMAL
P-R INTERVAL, ECG05: 140 MS
P-R INTERVAL, ECG05: 148 MS
P-R INTERVAL, ECG05: 150 MS
P-R INTERVAL, ECG05: 150 MS
P-R INTERVAL, ECG05: 152 MS
PCO2 BLD: 19.4 MMHG (ref 35–45)
PCO2 BLD: 20.4 MMHG (ref 35–45)
PCO2 BLD: 23.7 MMHG (ref 35–45)
PCO2 BLD: 33.7 MMHG (ref 35–45)
PH BLD: 7.28 [PH] (ref 7.35–7.45)
PH BLD: 7.31 [PH] (ref 7.35–7.45)
PH BLD: 7.35 [PH] (ref 7.35–7.45)
PH BLD: 7.45 [PH] (ref 7.35–7.45)
PH UR STRIP: 5 [PH] (ref 5–8)
PH UR STRIP: 5.5 [PH] (ref 5–8)
PH UR STRIP: 6 [PH] (ref 5–8)
PH UR STRIP: 6.5 [PH] (ref 5–8)
PH UR STRIP: 7 [PH] (ref 5–8)
PH UR STRIP: 7.5 [PH] (ref 5–8)
PLATELET # BLD AUTO: 159 K/UL (ref 135–420)
PLATELET # BLD AUTO: 166 K/UL (ref 135–420)
PLATELET # BLD AUTO: 167 K/UL (ref 135–420)
PLATELET # BLD AUTO: 177 K/UL (ref 135–420)
PLATELET # BLD AUTO: 180 K/UL (ref 135–420)
PLATELET # BLD AUTO: 181 K/UL (ref 135–420)
PLATELET # BLD AUTO: 189 K/UL (ref 135–420)
PLATELET # BLD AUTO: 210 K/UL (ref 135–420)
PLATELET # BLD AUTO: 212 K/UL (ref 135–420)
PLATELET # BLD AUTO: 218 K/UL (ref 135–420)
PLATELET # BLD AUTO: 220 K/UL (ref 135–420)
PLATELET # BLD AUTO: 222 K/UL (ref 135–420)
PLATELET # BLD AUTO: 229 K/UL (ref 135–420)
PLATELET # BLD AUTO: 240 K/UL (ref 135–420)
PLATELET # BLD AUTO: 265 K/UL (ref 135–420)
PLATELET # BLD AUTO: 266 K/UL (ref 135–420)
PLATELET # BLD AUTO: 267 K/UL (ref 135–420)
PLATELET # BLD AUTO: 270 K/UL (ref 135–420)
PLATELET # BLD AUTO: 275 K/UL (ref 135–420)
PLATELET # BLD AUTO: 276 K/UL (ref 135–420)
PLATELET # BLD AUTO: 276 K/UL (ref 135–420)
PLATELET # BLD AUTO: 278 K/UL (ref 135–420)
PLATELET # BLD AUTO: 279 K/UL (ref 135–420)
PLATELET # BLD AUTO: 281 K/UL (ref 135–420)
PLATELET # BLD AUTO: 292 K/UL (ref 135–420)
PLATELET # BLD AUTO: 298 K/UL (ref 135–420)
PLATELET # BLD AUTO: 307 K/UL (ref 135–420)
PLATELET # BLD AUTO: 317 K/UL (ref 135–420)
PLATELET # BLD AUTO: 317 K/UL (ref 135–420)
PLATELET # BLD AUTO: 362 K/UL (ref 135–420)
PLATELET # BLD AUTO: 382 K/UL (ref 135–420)
PLATELET # BLD AUTO: 440 K/UL (ref 135–420)
PLATELET # BLD AUTO: 453 K/UL (ref 135–420)
PLATELET # BLD AUTO: 529 K/UL (ref 135–420)
PLATELET # BLD AUTO: 562 K/UL (ref 135–420)
PLATELET # BLD AUTO: 597 K/UL (ref 135–420)
PLATELET # BLD AUTO: 625 K/UL (ref 135–420)
PLATELET # BLD AUTO: 638 K/UL (ref 135–420)
PLATELET COMMENTS,PCOM: ABNORMAL
PMV BLD AUTO: 10 FL (ref 9.2–11.8)
PMV BLD AUTO: 10.1 FL (ref 9.2–11.8)
PMV BLD AUTO: 10.2 FL (ref 9.2–11.8)
PMV BLD AUTO: 10.3 FL (ref 9.2–11.8)
PMV BLD AUTO: 10.4 FL (ref 9.2–11.8)
PMV BLD AUTO: 10.4 FL (ref 9.2–11.8)
PMV BLD AUTO: 10.5 FL (ref 9.2–11.8)
PMV BLD AUTO: 10.7 FL (ref 9.2–11.8)
PMV BLD AUTO: 10.8 FL (ref 9.2–11.8)
PMV BLD AUTO: 11.1 FL (ref 9.2–11.8)
PMV BLD AUTO: 11.2 FL (ref 9.2–11.8)
PMV BLD AUTO: 11.2 FL (ref 9.2–11.8)
PMV BLD AUTO: 11.6 FL (ref 9.2–11.8)
PMV BLD AUTO: 9.2 FL (ref 9.2–11.8)
PMV BLD AUTO: 9.2 FL (ref 9.2–11.8)
PMV BLD AUTO: 9.3 FL (ref 9.2–11.8)
PMV BLD AUTO: 9.5 FL (ref 9.2–11.8)
PMV BLD AUTO: 9.7 FL (ref 9.2–11.8)
PMV BLD AUTO: 9.8 FL (ref 9.2–11.8)
PMV BLD AUTO: 9.9 FL (ref 9.2–11.8)
PO2 BLD: 102 MMHG (ref 80–100)
PO2 BLD: 235 MMHG (ref 80–100)
PO2 BLD: 75 MMHG (ref 80–100)
PO2 BLD: 82 MMHG (ref 80–100)
POTASSIUM SERPL-SCNC: 3.3 MMOL/L (ref 3.5–5.5)
POTASSIUM SERPL-SCNC: 3.4 MMOL/L (ref 3.5–5.5)
POTASSIUM SERPL-SCNC: 3.5 MMOL/L (ref 3.5–5.5)
POTASSIUM SERPL-SCNC: 3.7 MMOL/L (ref 3.5–5.5)
POTASSIUM SERPL-SCNC: 3.8 MMOL/L (ref 3.5–5.5)
POTASSIUM SERPL-SCNC: 3.9 MMOL/L (ref 3.5–5.5)
POTASSIUM SERPL-SCNC: 4 MMOL/L (ref 3.5–5.5)
POTASSIUM SERPL-SCNC: 4 MMOL/L (ref 3.5–5.5)
POTASSIUM SERPL-SCNC: 4.1 MMOL/L (ref 3.5–5.5)
POTASSIUM SERPL-SCNC: 4.1 MMOL/L (ref 3.5–5.5)
POTASSIUM SERPL-SCNC: 4.2 MMOL/L (ref 3.5–5.5)
POTASSIUM SERPL-SCNC: 4.2 MMOL/L (ref 3.5–5.5)
POTASSIUM SERPL-SCNC: 4.3 MMOL/L (ref 3.5–5.5)
POTASSIUM SERPL-SCNC: 4.4 MMOL/L (ref 3.5–5.5)
POTASSIUM SERPL-SCNC: 4.5 MMOL/L (ref 3.5–5.5)
POTASSIUM SERPL-SCNC: 4.5 MMOL/L (ref 3.5–5.5)
POTASSIUM SERPL-SCNC: 4.6 MMOL/L (ref 3.5–5.5)
POTASSIUM SERPL-SCNC: 4.7 MMOL/L (ref 3.5–5.5)
POTASSIUM SERPL-SCNC: 4.8 MMOL/L (ref 3.5–5.5)
POTASSIUM SERPL-SCNC: 4.9 MMOL/L (ref 3.5–5.5)
POTASSIUM SERPL-SCNC: 4.9 MMOL/L (ref 3.5–5.5)
POTASSIUM SERPL-SCNC: 5 MMOL/L (ref 3.5–5.5)
POTASSIUM SERPL-SCNC: 5.1 MMOL/L (ref 3.5–5.5)
POTASSIUM SERPL-SCNC: 5.2 MMOL/L (ref 3.5–5.5)
POTASSIUM SERPL-SCNC: 5.6 MMOL/L (ref 3.5–5.5)
POTASSIUM SERPL-SCNC: 5.8 MMOL/L (ref 3.5–5.5)
POTASSIUM SERPL-SCNC: 5.9 MMOL/L (ref 3.5–5.5)
POTASSIUM SERPL-SCNC: 6.4 MMOL/L (ref 3.5–5.5)
PROCALCITONIN SERPL-MCNC: 0.12 NG/ML
PROCALCITONIN SERPL-MCNC: 0.63 NG/ML
PROMYELOCYTES NFR BLD MANUAL: 0 %
PROMYELOCYTES NFR BLD MANUAL: 0 %
PROT SERPL-MCNC: 5.4 G/DL (ref 6.4–8.2)
PROT SERPL-MCNC: 5.6 G/DL (ref 6.4–8.2)
PROT SERPL-MCNC: 5.7 G/DL (ref 6.4–8.2)
PROT SERPL-MCNC: 5.8 G/DL (ref 6.4–8.2)
PROT SERPL-MCNC: 5.8 G/DL (ref 6.4–8.2)
PROT SERPL-MCNC: 5.9 G/DL (ref 6.4–8.2)
PROT SERPL-MCNC: 6 G/DL (ref 6.4–8.2)
PROT SERPL-MCNC: 6.9 G/DL (ref 6.4–8.2)
PROT SERPL-MCNC: 7.2 G/DL (ref 6.4–8.2)
PROT SERPL-MCNC: 7.2 G/DL (ref 6.4–8.2)
PROT SERPL-MCNC: 7.5 G/DL (ref 6.4–8.2)
PROT SERPL-MCNC: 7.6 G/DL (ref 6.4–8.2)
PROT SERPL-MCNC: 8.5 G/DL (ref 6.4–8.2)
PROT SERPL-MCNC: 8.6 G/DL (ref 6.4–8.2)
PROT SERPL-MCNC: 8.7 G/DL (ref 6.4–8.2)
PROT UR STRIP-MCNC: 100 MG/DL
PROT UR STRIP-MCNC: 30 MG/DL
PROT UR STRIP-MCNC: 300 MG/DL
PROT UR STRIP-MCNC: 300 MG/DL
PROT UR STRIP-MCNC: NEGATIVE MG/DL
PROT UR STRIP-MCNC: NEGATIVE MG/DL
PROT UR-MCNC: 102 MG/DL
PROT/CREAT UR-RTO: 0.5
PROTHROMBIN TIME: 15.8 SEC (ref 11.5–15.2)
Q-T INTERVAL, ECG07: 342 MS
Q-T INTERVAL, ECG07: 348 MS
Q-T INTERVAL, ECG07: 354 MS
Q-T INTERVAL, ECG07: 362 MS
Q-T INTERVAL, ECG07: 388 MS
QRS DURATION, ECG06: 100 MS
QRS DURATION, ECG06: 100 MS
QRS DURATION, ECG06: 104 MS
QRS DURATION, ECG06: 106 MS
QRS DURATION, ECG06: 108 MS
QTC CALCULATION (BEZET), ECG08: 432 MS
QTC CALCULATION (BEZET), ECG08: 442 MS
QTC CALCULATION (BEZET), ECG08: 444 MS
QTC CALCULATION (BEZET), ECG08: 444 MS
QTC CALCULATION (BEZET), ECG08: 479 MS
RBC # BLD AUTO: 2.71 M/UL (ref 4.35–5.65)
RBC # BLD AUTO: 2.87 M/UL (ref 4.35–5.65)
RBC # BLD AUTO: 2.93 M/UL (ref 4.35–5.65)
RBC # BLD AUTO: 2.96 M/UL (ref 4.35–5.65)
RBC # BLD AUTO: 2.99 M/UL (ref 4.35–5.65)
RBC # BLD AUTO: 3.14 M/UL (ref 4.35–5.65)
RBC # BLD AUTO: 3.14 M/UL (ref 4.35–5.65)
RBC # BLD AUTO: 3.15 M/UL (ref 4.35–5.65)
RBC # BLD AUTO: 3.18 M/UL (ref 4.35–5.65)
RBC # BLD AUTO: 3.19 M/UL (ref 4.35–5.65)
RBC # BLD AUTO: 3.19 M/UL (ref 4.35–5.65)
RBC # BLD AUTO: 3.23 M/UL (ref 4.35–5.65)
RBC # BLD AUTO: 3.24 M/UL (ref 4.35–5.65)
RBC # BLD AUTO: 3.28 M/UL (ref 4.35–5.65)
RBC # BLD AUTO: 3.31 M/UL (ref 4.35–5.65)
RBC # BLD AUTO: 3.32 M/UL (ref 4.35–5.65)
RBC # BLD AUTO: 3.36 M/UL (ref 4.35–5.65)
RBC # BLD AUTO: 3.38 M/UL (ref 4.35–5.65)
RBC # BLD AUTO: 3.4 M/UL (ref 4.35–5.65)
RBC # BLD AUTO: 3.53 M/UL (ref 4.35–5.65)
RBC # BLD AUTO: 3.55 M/UL (ref 4.35–5.65)
RBC # BLD AUTO: 3.55 M/UL (ref 4.35–5.65)
RBC # BLD AUTO: 3.56 M/UL (ref 4.35–5.65)
RBC # BLD AUTO: 3.6 M/UL (ref 4.35–5.65)
RBC # BLD AUTO: 3.6 M/UL (ref 4.35–5.65)
RBC # BLD AUTO: 3.67 M/UL (ref 4.35–5.65)
RBC # BLD AUTO: 3.71 M/UL (ref 4.35–5.65)
RBC # BLD AUTO: 3.72 M/UL (ref 4.35–5.65)
RBC # BLD AUTO: 3.79 M/UL (ref 4.35–5.65)
RBC # BLD AUTO: 3.8 M/UL (ref 4.35–5.65)
RBC # BLD AUTO: 3.92 M/UL (ref 4.35–5.65)
RBC # BLD AUTO: 3.93 M/UL (ref 4.35–5.65)
RBC # BLD AUTO: 3.95 M/UL (ref 4.35–5.65)
RBC # BLD AUTO: 4.01 M/UL (ref 4.35–5.65)
RBC # BLD AUTO: 4.14 M/UL (ref 4.35–5.65)
RBC # BLD AUTO: 4.24 M/UL (ref 4.35–5.65)
RBC # BLD AUTO: 4.81 M/UL (ref 4.35–5.65)
RBC # BLD AUTO: 4.83 M/UL (ref 4.35–5.65)
RBC #/AREA URNS HPF: ABNORMAL /HPF (ref 0–5)
RBC MORPH BLD: ABNORMAL
SAO2 % BLD: 93.6 % (ref 92–97)
SAO2 % BLD: 96.6 % (ref 92–97)
SAO2 % BLD: 97.5 % (ref 92–97)
SAO2 % BLD: 99.8 % (ref 92–97)
SARS-COV-2, COV2NT: NOT DETECTED
SERVICE CMNT-IMP: ABNORMAL
SERVICE CMNT-IMP: NORMAL
SODIUM SERPL-SCNC: 134 MMOL/L (ref 136–145)
SODIUM SERPL-SCNC: 137 MMOL/L (ref 136–145)
SODIUM SERPL-SCNC: 137 MMOL/L (ref 136–145)
SODIUM SERPL-SCNC: 139 MMOL/L (ref 136–145)
SODIUM SERPL-SCNC: 140 MMOL/L (ref 136–145)
SODIUM SERPL-SCNC: 141 MMOL/L (ref 136–145)
SODIUM SERPL-SCNC: 142 MMOL/L (ref 136–145)
SODIUM SERPL-SCNC: 143 MMOL/L (ref 136–145)
SODIUM SERPL-SCNC: 144 MMOL/L (ref 136–145)
SODIUM SERPL-SCNC: 145 MMOL/L (ref 136–145)
SODIUM SERPL-SCNC: 146 MMOL/L (ref 136–145)
SODIUM SERPL-SCNC: 147 MMOL/L (ref 136–145)
SODIUM UR-SCNC: 55 MMOL/L (ref 20–110)
SOURCE, COVRS: ABNORMAL
SOURCE, COVRS: NORMAL
SP GR UR REFRACTOMETRY: 0 (ref 1–1.03)
SP GR UR REFRACTOMETRY: 1.01 (ref 1–1.03)
SP GR UR REFRACTOMETRY: 1.01 (ref 1–1.03)
SP GR UR REFRACTOMETRY: 1.02 (ref 1–1.03)
SPECIMEN EXP DATE BLD: NORMAL
SPECIMEN TYPE: ABNORMAL
STATUS OF UNIT,%ST: NORMAL
TROPONIN I SERPL-MCNC: 0.02 NG/ML (ref 0–0.04)
TROPONIN I SERPL-MCNC: 0.08 NG/ML (ref 0–0.04)
TROPONIN I SERPL-MCNC: 0.15 NG/ML (ref 0–0.04)
TROPONIN I SERPL-MCNC: 0.18 NG/ML (ref 0–0.04)
TROPONIN I SERPL-MCNC: <0.02 NG/ML (ref 0–0.04)
TROPONIN I SERPL-MCNC: <0.02 NG/ML (ref 0–0.04)
TROPONIN-HIGH SENSITIVITY: 16 NG/L (ref 0–78)
TROPONIN-HIGH SENSITIVITY: 41 NG/L (ref 0–78)
UNIT DIVISION, %UDIV: 0
UROBILINOGEN UR QL STRIP.AUTO: 0.2 EU/DL (ref 0.2–1)
UROBILINOGEN UR QL STRIP.AUTO: 1 EU/DL (ref 0.2–1)
VANCOMYCIN SERPL-MCNC: 14.5 UG/ML (ref 5–40)
VANCOMYCIN SERPL-MCNC: 15.9 UG/ML (ref 5–40)
VANCOMYCIN SERPL-MCNC: 5.5 UG/ML (ref 5–40)
VANCOMYCIN TROUGH SERPL-MCNC: 7.9 UG/ML (ref 10–20)
VENTRICULAR RATE, ECG03: 90 BPM
VENTRICULAR RATE, ECG03: 92 BPM
VENTRICULAR RATE, ECG03: 95 BPM
VENTRICULAR RATE, ECG03: 96 BPM
VENTRICULAR RATE, ECG03: 98 BPM
WBC # BLD AUTO: 10.1 K/UL (ref 4.6–13.2)
WBC # BLD AUTO: 10.3 K/UL (ref 4.6–13.2)
WBC # BLD AUTO: 10.3 K/UL (ref 4.6–13.2)
WBC # BLD AUTO: 10.5 K/UL (ref 4.6–13.2)
WBC # BLD AUTO: 12.3 K/UL (ref 4.6–13.2)
WBC # BLD AUTO: 12.4 K/UL (ref 4.6–13.2)
WBC # BLD AUTO: 12.7 K/UL (ref 4.6–13.2)
WBC # BLD AUTO: 13.2 K/UL (ref 4.6–13.2)
WBC # BLD AUTO: 13.5 K/UL (ref 4.6–13.2)
WBC # BLD AUTO: 13.9 K/UL (ref 4.6–13.2)
WBC # BLD AUTO: 13.9 K/UL (ref 4.6–13.2)
WBC # BLD AUTO: 15.3 K/UL (ref 4.6–13.2)
WBC # BLD AUTO: 15.7 K/UL (ref 4.6–13.2)
WBC # BLD AUTO: 15.7 K/UL (ref 4.6–13.2)
WBC # BLD AUTO: 16.3 K/UL (ref 4.6–13.2)
WBC # BLD AUTO: 17.3 K/UL (ref 4.6–13.2)
WBC # BLD AUTO: 18.2 K/UL (ref 4.6–13.2)
WBC # BLD AUTO: 19 K/UL (ref 4.6–13.2)
WBC # BLD AUTO: 19.1 K/UL (ref 4.6–13.2)
WBC # BLD AUTO: 19.5 K/UL (ref 4.6–13.2)
WBC # BLD AUTO: 20.4 K/UL (ref 4.6–13.2)
WBC # BLD AUTO: 21.2 K/UL (ref 4.6–13.2)
WBC # BLD AUTO: 22.2 K/UL (ref 4.6–13.2)
WBC # BLD AUTO: 22.5 K/UL (ref 4.6–13.2)
WBC # BLD AUTO: 22.8 K/UL (ref 4.6–13.2)
WBC # BLD AUTO: 22.9 K/UL (ref 4.6–13.2)
WBC # BLD AUTO: 23.3 K/UL (ref 4.6–13.2)
WBC # BLD AUTO: 24.8 K/UL (ref 4.6–13.2)
WBC # BLD AUTO: 25.6 K/UL (ref 4.6–13.2)
WBC # BLD AUTO: 26.2 K/UL (ref 4.6–13.2)
WBC # BLD AUTO: 28.8 K/UL (ref 4.6–13.2)
WBC # BLD AUTO: 29.8 K/UL (ref 4.6–13.2)
WBC # BLD AUTO: 9 K/UL (ref 4.6–13.2)
WBC # BLD AUTO: 9.2 K/UL (ref 4.6–13.2)
WBC # BLD AUTO: 9.2 K/UL (ref 4.6–13.2)
WBC # BLD AUTO: 9.4 K/UL (ref 4.6–13.2)
WBC # BLD AUTO: 9.6 K/UL (ref 4.6–13.2)
WBC # BLD AUTO: 9.7 K/UL (ref 4.6–13.2)
WBC URNS QL MICRO: ABNORMAL /HPF (ref 0–5)
YEAST BUDDING URNS QL: POSITIVE
YEAST URNS QL MICRO: ABNORMAL

## 2021-01-01 PROCEDURE — 74011250636 HC RX REV CODE- 250/636: Performed by: STUDENT IN AN ORGANIZED HEALTH CARE EDUCATION/TRAINING PROGRAM

## 2021-01-01 PROCEDURE — 97166 OT EVAL MOD COMPLEX 45 MIN: CPT

## 2021-01-01 PROCEDURE — 96375 TX/PRO/DX INJ NEW DRUG ADDON: CPT

## 2021-01-01 PROCEDURE — 74011000250 HC RX REV CODE- 250: Performed by: INTERNAL MEDICINE

## 2021-01-01 PROCEDURE — 97110 THERAPEUTIC EXERCISES: CPT

## 2021-01-01 PROCEDURE — 74011250636 HC RX REV CODE- 250/636: Performed by: INTERNAL MEDICINE

## 2021-01-01 PROCEDURE — 96366 THER/PROPH/DIAG IV INF ADDON: CPT

## 2021-01-01 PROCEDURE — 85025 COMPLETE CBC W/AUTO DIFF WBC: CPT

## 2021-01-01 PROCEDURE — 85027 COMPLETE CBC AUTOMATED: CPT

## 2021-01-01 PROCEDURE — 36415 COLL VENOUS BLD VENIPUNCTURE: CPT

## 2021-01-01 PROCEDURE — 74011250637 HC RX REV CODE- 250/637: Performed by: HOSPITALIST

## 2021-01-01 PROCEDURE — 65610000006 HC RM INTENSIVE CARE

## 2021-01-01 PROCEDURE — 77010033678 HC OXYGEN DAILY

## 2021-01-01 PROCEDURE — 97162 PT EVAL MOD COMPLEX 30 MIN: CPT

## 2021-01-01 PROCEDURE — 51702 INSERT TEMP BLADDER CATH: CPT

## 2021-01-01 PROCEDURE — 74011000250 HC RX REV CODE- 250: Performed by: EMERGENCY MEDICINE

## 2021-01-01 PROCEDURE — 97116 GAIT TRAINING THERAPY: CPT

## 2021-01-01 PROCEDURE — 96365 THER/PROPH/DIAG IV INF INIT: CPT

## 2021-01-01 PROCEDURE — 74011000258 HC RX REV CODE- 258: Performed by: INTERNAL MEDICINE

## 2021-01-01 PROCEDURE — 87186 SC STD MICRODIL/AGAR DIL: CPT

## 2021-01-01 PROCEDURE — 74011250636 HC RX REV CODE- 250/636: Performed by: FAMILY MEDICINE

## 2021-01-01 PROCEDURE — 74011250637 HC RX REV CODE- 250/637: Performed by: INTERNAL MEDICINE

## 2021-01-01 PROCEDURE — C1894 INTRO/SHEATH, NON-LASER: HCPCS | Performed by: INTERNAL MEDICINE

## 2021-01-01 PROCEDURE — 80202 ASSAY OF VANCOMYCIN: CPT

## 2021-01-01 PROCEDURE — 82962 GLUCOSE BLOOD TEST: CPT

## 2021-01-01 PROCEDURE — 96376 TX/PRO/DX INJ SAME DRUG ADON: CPT

## 2021-01-01 PROCEDURE — 81001 URINALYSIS AUTO W/SCOPE: CPT

## 2021-01-01 PROCEDURE — 65270000029 HC RM PRIVATE

## 2021-01-01 PROCEDURE — 96374 THER/PROPH/DIAG INJ IV PUSH: CPT

## 2021-01-01 PROCEDURE — 74011250636 HC RX REV CODE- 250/636: Performed by: EMERGENCY MEDICINE

## 2021-01-01 PROCEDURE — 80053 COMPREHEN METABOLIC PANEL: CPT

## 2021-01-01 PROCEDURE — 74011000258 HC RX REV CODE- 258: Performed by: FAMILY MEDICINE

## 2021-01-01 PROCEDURE — 93005 ELECTROCARDIOGRAM TRACING: CPT

## 2021-01-01 PROCEDURE — 2709999900 HC NON-CHARGEABLE SUPPLY

## 2021-01-01 PROCEDURE — 80048 BASIC METABOLIC PNL TOTAL CA: CPT

## 2021-01-01 PROCEDURE — 71046 X-RAY EXAM CHEST 2 VIEWS: CPT

## 2021-01-01 PROCEDURE — 74011636637 HC RX REV CODE- 636/637: Performed by: HOSPITALIST

## 2021-01-01 PROCEDURE — 74011250637 HC RX REV CODE- 250/637: Performed by: FAMILY MEDICINE

## 2021-01-01 PROCEDURE — C9113 INJ PANTOPRAZOLE SODIUM, VIA: HCPCS | Performed by: INTERNAL MEDICINE

## 2021-01-01 PROCEDURE — 87077 CULTURE AEROBIC IDENTIFY: CPT

## 2021-01-01 PROCEDURE — 97535 SELF CARE MNGMENT TRAINING: CPT

## 2021-01-01 PROCEDURE — 82550 ASSAY OF CK (CPK): CPT

## 2021-01-01 PROCEDURE — 36430 TRANSFUSION BLD/BLD COMPNT: CPT

## 2021-01-01 PROCEDURE — 65660000000 HC RM CCU STEPDOWN

## 2021-01-01 PROCEDURE — 99284 EMERGENCY DEPT VISIT MOD MDM: CPT

## 2021-01-01 PROCEDURE — 84132 ASSAY OF SERUM POTASSIUM: CPT

## 2021-01-01 PROCEDURE — 83735 ASSAY OF MAGNESIUM: CPT

## 2021-01-01 PROCEDURE — 70450 CT HEAD/BRAIN W/O DYE: CPT

## 2021-01-01 PROCEDURE — 77030008543 HC TBNG MON PRSS MRTM -A: Performed by: INTERNAL MEDICINE

## 2021-01-01 PROCEDURE — P9047 ALBUMIN (HUMAN), 25%, 50ML: HCPCS | Performed by: INTERNAL MEDICINE

## 2021-01-01 PROCEDURE — 83605 ASSAY OF LACTIC ACID: CPT

## 2021-01-01 PROCEDURE — 99223 1ST HOSP IP/OBS HIGH 75: CPT | Performed by: INTERNAL MEDICINE

## 2021-01-01 PROCEDURE — 97530 THERAPEUTIC ACTIVITIES: CPT

## 2021-01-01 PROCEDURE — 74011250636 HC RX REV CODE- 250/636: Performed by: HOSPITALIST

## 2021-01-01 PROCEDURE — 94760 N-INVAS EAR/PLS OXIMETRY 1: CPT

## 2021-01-01 PROCEDURE — A9577 INJ MULTIHANCE: HCPCS | Performed by: FAMILY MEDICINE

## 2021-01-01 PROCEDURE — 87635 SARS-COV-2 COVID-19 AMP PRB: CPT

## 2021-01-01 PROCEDURE — 94640 AIRWAY INHALATION TREATMENT: CPT

## 2021-01-01 PROCEDURE — 84156 ASSAY OF PROTEIN URINE: CPT

## 2021-01-01 PROCEDURE — 84145 PROCALCITONIN (PCT): CPT

## 2021-01-01 PROCEDURE — 72141 MRI NECK SPINE W/O DYE: CPT

## 2021-01-01 PROCEDURE — 99285 EMERGENCY DEPT VISIT HI MDM: CPT

## 2021-01-01 PROCEDURE — 86671 FUNGUS NES ANTIBODY: CPT

## 2021-01-01 PROCEDURE — 86923 COMPATIBILITY TEST ELECTRIC: CPT

## 2021-01-01 PROCEDURE — 76770 US EXAM ABDO BACK WALL COMP: CPT

## 2021-01-01 PROCEDURE — 71045 X-RAY EXAM CHEST 1 VIEW: CPT

## 2021-01-01 PROCEDURE — 85652 RBC SED RATE AUTOMATED: CPT

## 2021-01-01 PROCEDURE — 74011000250 HC RX REV CODE- 250: Performed by: HOSPITALIST

## 2021-01-01 PROCEDURE — 82728 ASSAY OF FERRITIN: CPT

## 2021-01-01 PROCEDURE — U0003 INFECTIOUS AGENT DETECTION BY NUCLEIC ACID (DNA OR RNA); SEVERE ACUTE RESPIRATORY SYNDROME CORONAVIRUS 2 (SARS-COV-2) (CORONAVIRUS DISEASE [COVID-19]), AMPLIFIED PROBE TECHNIQUE, MAKING USE OF HIGH THROUGHPUT TECHNOLOGIES AS DESCRIBED BY CMS-2020-01-R: HCPCS

## 2021-01-01 PROCEDURE — 82436 ASSAY OF URINE CHLORIDE: CPT

## 2021-01-01 PROCEDURE — 93306 TTE W/DOPPLER COMPLETE: CPT

## 2021-01-01 PROCEDURE — 87086 URINE CULTURE/COLONY COUNT: CPT

## 2021-01-01 PROCEDURE — 74176 CT ABD & PELVIS W/O CONTRAST: CPT

## 2021-01-01 PROCEDURE — 72148 MRI LUMBAR SPINE W/O DYE: CPT

## 2021-01-01 PROCEDURE — 96368 THER/DIAG CONCURRENT INF: CPT

## 2021-01-01 PROCEDURE — 77010033711 HC HIGH FLOW OXYGEN

## 2021-01-01 PROCEDURE — 74011636637 HC RX REV CODE- 636/637: Performed by: FAMILY MEDICINE

## 2021-01-01 PROCEDURE — 84300 ASSAY OF URINE SODIUM: CPT

## 2021-01-01 PROCEDURE — 74011000250 HC RX REV CODE- 250: Performed by: STUDENT IN AN ORGANIZED HEALTH CARE EDUCATION/TRAINING PROGRAM

## 2021-01-01 PROCEDURE — 86140 C-REACTIVE PROTEIN: CPT

## 2021-01-01 PROCEDURE — 88184 FLOWCYTOMETRY/ TC 1 MARKER: CPT

## 2021-01-01 PROCEDURE — A9540 TC99M MAA: HCPCS

## 2021-01-01 PROCEDURE — 74011000258 HC RX REV CODE- 258: Performed by: STUDENT IN AN ORGANIZED HEALTH CARE EDUCATION/TRAINING PROGRAM

## 2021-01-01 PROCEDURE — 74011636637 HC RX REV CODE- 636/637: Performed by: INTERNAL MEDICINE

## 2021-01-01 PROCEDURE — 83615 LACTATE (LD) (LDH) ENZYME: CPT

## 2021-01-01 PROCEDURE — 74011000250 HC RX REV CODE- 250: Performed by: FAMILY MEDICINE

## 2021-01-01 PROCEDURE — 99232 SBSQ HOSP IP/OBS MODERATE 35: CPT | Performed by: INTERNAL MEDICINE

## 2021-01-01 PROCEDURE — 72158 MRI LUMBAR SPINE W/O & W/DYE: CPT

## 2021-01-01 PROCEDURE — 4A023N6 MEASUREMENT OF CARDIAC SAMPLING AND PRESSURE, RIGHT HEART, PERCUTANEOUS APPROACH: ICD-10-PCS | Performed by: INTERNAL MEDICINE

## 2021-01-01 PROCEDURE — P9047 ALBUMIN (HUMAN), 25%, 50ML: HCPCS | Performed by: FAMILY MEDICINE

## 2021-01-01 PROCEDURE — 72157 MRI CHEST SPINE W/O & W/DYE: CPT

## 2021-01-01 PROCEDURE — 96360 HYDRATION IV INFUSION INIT: CPT

## 2021-01-01 PROCEDURE — 87040 BLOOD CULTURE FOR BACTERIA: CPT

## 2021-01-01 PROCEDURE — 74011000636 HC RX REV CODE- 636: Performed by: FAMILY MEDICINE

## 2021-01-01 PROCEDURE — 82272 OCCULT BLD FECES 1-3 TESTS: CPT

## 2021-01-01 PROCEDURE — 99283 EMERGENCY DEPT VISIT LOW MDM: CPT

## 2021-01-01 PROCEDURE — 74011250637 HC RX REV CODE- 250/637: Performed by: STUDENT IN AN ORGANIZED HEALTH CARE EDUCATION/TRAINING PROGRAM

## 2021-01-01 PROCEDURE — 83036 HEMOGLOBIN GLYCOSYLATED A1C: CPT

## 2021-01-01 PROCEDURE — 82553 CREATINE MB FRACTION: CPT

## 2021-01-01 PROCEDURE — 76450000000

## 2021-01-01 PROCEDURE — 84484 ASSAY OF TROPONIN QUANT: CPT

## 2021-01-01 PROCEDURE — 92526 ORAL FUNCTION THERAPY: CPT

## 2021-01-01 PROCEDURE — 51798 US URINE CAPACITY MEASURE: CPT

## 2021-01-01 PROCEDURE — 74011636637 HC RX REV CODE- 636/637: Performed by: STUDENT IN AN ORGANIZED HEALTH CARE EDUCATION/TRAINING PROGRAM

## 2021-01-01 PROCEDURE — P9016 RBC LEUKOCYTES REDUCED: HCPCS

## 2021-01-01 PROCEDURE — 74011000258 HC RX REV CODE- 258: Performed by: EMERGENCY MEDICINE

## 2021-01-01 PROCEDURE — 88185 FLOWCYTOMETRY/TC ADD-ON: CPT

## 2021-01-01 PROCEDURE — 74177 CT ABD & PELVIS W/CONTRAST: CPT

## 2021-01-01 PROCEDURE — 83880 ASSAY OF NATRIURETIC PEPTIDE: CPT

## 2021-01-01 PROCEDURE — C1751 CATH, INF, PER/CENT/MIDLINE: HCPCS | Performed by: INTERNAL MEDICINE

## 2021-01-01 PROCEDURE — 77030013797 HC KT TRNSDUC PRSSR EDWD -A: Performed by: INTERNAL MEDICINE

## 2021-01-01 PROCEDURE — 80076 HEPATIC FUNCTION PANEL: CPT

## 2021-01-01 PROCEDURE — 71275 CT ANGIOGRAPHY CHEST: CPT

## 2021-01-01 PROCEDURE — 92610 EVALUATE SWALLOWING FUNCTION: CPT

## 2021-01-01 PROCEDURE — 85610 PROTHROMBIN TIME: CPT

## 2021-01-01 PROCEDURE — 96372 THER/PROPH/DIAG INJ SC/IM: CPT

## 2021-01-01 PROCEDURE — 93970 EXTREMITY STUDY: CPT

## 2021-01-01 PROCEDURE — 36600 WITHDRAWAL OF ARTERIAL BLOOD: CPT

## 2021-01-01 PROCEDURE — C8929 TTE W OR WO FOL WCON,DOPPLER: HCPCS

## 2021-01-01 PROCEDURE — 82803 BLOOD GASES ANY COMBINATION: CPT

## 2021-01-01 PROCEDURE — 85379 FIBRIN DEGRADATION QUANT: CPT

## 2021-01-01 PROCEDURE — 86901 BLOOD TYPING SEROLOGIC RH(D): CPT

## 2021-01-01 PROCEDURE — 93451 RIGHT HEART CATH: CPT | Performed by: INTERNAL MEDICINE

## 2021-01-01 PROCEDURE — 94761 N-INVAS EAR/PLS OXIMETRY MLT: CPT

## 2021-01-01 PROCEDURE — 85018 HEMOGLOBIN: CPT

## 2021-01-01 RX ORDER — MELATONIN
2000 DAILY
Status: DISCONTINUED | OUTPATIENT
Start: 2021-12-31 | End: 2021-12-31 | Stop reason: HOSPADM

## 2021-01-01 RX ORDER — ARFORMOTEROL TARTRATE 15 UG/2ML
15 SOLUTION RESPIRATORY (INHALATION)
Status: DISCONTINUED | OUTPATIENT
Start: 2021-01-01 | End: 2021-01-01 | Stop reason: HOSPADM

## 2021-01-01 RX ORDER — ACETAMINOPHEN 325 MG/1
650 TABLET ORAL
Status: DISCONTINUED | OUTPATIENT
Start: 2021-01-01 | End: 2021-12-31 | Stop reason: HOSPADM

## 2021-01-01 RX ORDER — BUDESONIDE 0.5 MG/2ML
500 INHALANT ORAL
Status: DISCONTINUED | OUTPATIENT
Start: 2021-01-01 | End: 2021-01-01 | Stop reason: HOSPADM

## 2021-01-01 RX ORDER — POLYETHYLENE GLYCOL 3350 17 G/17G
17 POWDER, FOR SOLUTION ORAL DAILY PRN
Status: DISCONTINUED | OUTPATIENT
Start: 2021-01-01 | End: 2021-01-01 | Stop reason: HOSPADM

## 2021-01-01 RX ORDER — LEVOFLOXACIN 500 MG/1
500 TABLET, FILM COATED ORAL EVERY 24 HOURS
Qty: 7 TABLET | Refills: 0 | Status: SHIPPED
Start: 2021-01-01 | End: 2021-01-01

## 2021-01-01 RX ORDER — LORAZEPAM 2 MG/ML
1 INJECTION INTRAMUSCULAR
Status: DISCONTINUED | OUTPATIENT
Start: 2021-01-01 | End: 2021-12-31 | Stop reason: HOSPADM

## 2021-01-01 RX ORDER — SODIUM CHLORIDE 9 MG/ML
1000 INJECTION, SOLUTION INTRAVENOUS ONCE
Status: COMPLETED | OUTPATIENT
Start: 2021-01-01 | End: 2021-01-01

## 2021-01-01 RX ORDER — DEXTROSE 50 % IN WATER (D50W) INTRAVENOUS SYRINGE
25-50 AS NEEDED
Status: DISCONTINUED | OUTPATIENT
Start: 2021-01-01 | End: 2021-01-01 | Stop reason: HOSPADM

## 2021-01-01 RX ORDER — FAMOTIDINE 20 MG/1
20 TABLET, FILM COATED ORAL 2 TIMES DAILY
Status: DISCONTINUED | OUTPATIENT
Start: 2021-01-01 | End: 2021-01-01

## 2021-01-01 RX ORDER — LORAZEPAM 2 MG/ML
0.5 INJECTION INTRAMUSCULAR ONCE
Status: COMPLETED | OUTPATIENT
Start: 2021-01-01 | End: 2021-01-01

## 2021-01-01 RX ORDER — ONDANSETRON 2 MG/ML
4 INJECTION INTRAMUSCULAR; INTRAVENOUS
Status: DISCONTINUED | OUTPATIENT
Start: 2021-01-01 | End: 2021-12-31 | Stop reason: HOSPADM

## 2021-01-01 RX ORDER — NYSTATIN 100000 [USP'U]/G
POWDER TOPICAL 2 TIMES DAILY
Status: DISCONTINUED | OUTPATIENT
Start: 2021-01-01 | End: 2021-01-01 | Stop reason: HOSPADM

## 2021-01-01 RX ORDER — ACETAMINOPHEN 325 MG/1
650 TABLET ORAL
Status: DISCONTINUED | OUTPATIENT
Start: 2021-01-01 | End: 2021-01-01 | Stop reason: HOSPADM

## 2021-01-01 RX ORDER — PHENYLEPHRINE HYDROCHLORIDE 10 MG/ML
INJECTION INTRAVENOUS
Status: DISCONTINUED
Start: 2021-01-01 | End: 2021-12-31 | Stop reason: HOSPADM

## 2021-01-01 RX ORDER — SODIUM CHLORIDE, SODIUM LACTATE, POTASSIUM CHLORIDE, CALCIUM CHLORIDE 600; 310; 30; 20 MG/100ML; MG/100ML; MG/100ML; MG/100ML
150 INJECTION, SOLUTION INTRAVENOUS CONTINUOUS
Status: DISCONTINUED | OUTPATIENT
Start: 2021-01-01 | End: 2021-12-31 | Stop reason: HOSPADM

## 2021-01-01 RX ORDER — MAGNESIUM SULFATE IN 0.9% NACL 2 G/50 ML
2 INTRAVENOUS SOLUTION, PIGGYBACK (ML) INTRAVENOUS ONCE
Status: DISCONTINUED | OUTPATIENT
Start: 2021-01-01 | End: 2021-01-01

## 2021-01-01 RX ORDER — VANCOMYCIN HYDROCHLORIDE
1250 EVERY 24 HOURS
Status: DISCONTINUED | OUTPATIENT
Start: 2021-01-01 | End: 2021-01-01

## 2021-01-01 RX ORDER — LEVOFLOXACIN 500 MG/1
500 TABLET, FILM COATED ORAL EVERY 24 HOURS
Status: DISCONTINUED | OUTPATIENT
Start: 2021-01-01 | End: 2021-01-01 | Stop reason: HOSPADM

## 2021-01-01 RX ORDER — POTASSIUM CHLORIDE 20 MEQ/1
20 TABLET, EXTENDED RELEASE ORAL
Status: COMPLETED | OUTPATIENT
Start: 2021-01-01 | End: 2021-01-01

## 2021-01-01 RX ORDER — FAMOTIDINE 20 MG/1
20 TABLET, FILM COATED ORAL DAILY
Status: DISCONTINUED | OUTPATIENT
Start: 2021-01-01 | End: 2021-01-01 | Stop reason: HOSPADM

## 2021-01-01 RX ORDER — PROMETHAZINE HYDROCHLORIDE 25 MG/1
12.5 TABLET ORAL
Status: DISCONTINUED | OUTPATIENT
Start: 2021-01-01 | End: 2021-01-01 | Stop reason: HOSPADM

## 2021-01-01 RX ORDER — VANCOMYCIN HYDROCHLORIDE
1250 ONCE
Status: COMPLETED | OUTPATIENT
Start: 2021-01-01 | End: 2021-01-01

## 2021-01-01 RX ORDER — METOPROLOL SUCCINATE 25 MG/1
25 TABLET, EXTENDED RELEASE ORAL
Status: DISCONTINUED | OUTPATIENT
Start: 2021-01-01 | End: 2021-01-01

## 2021-01-01 RX ORDER — CALCIUM CARBONATE 200(500)MG
200 TABLET,CHEWABLE ORAL
Status: DISCONTINUED | OUTPATIENT
Start: 2021-01-01 | End: 2021-01-01 | Stop reason: HOSPADM

## 2021-01-01 RX ORDER — ALBUMIN HUMAN 250 G/1000ML
12.5 SOLUTION INTRAVENOUS EVERY 6 HOURS
Status: DISCONTINUED | OUTPATIENT
Start: 2021-01-01 | End: 2021-12-31 | Stop reason: HOSPADM

## 2021-01-01 RX ORDER — SODIUM CHLORIDE 9 MG/ML
150 INJECTION, SOLUTION INTRAVENOUS CONTINUOUS
Status: DISCONTINUED | OUTPATIENT
Start: 2021-01-01 | End: 2021-01-01

## 2021-01-01 RX ORDER — TRAZODONE HYDROCHLORIDE 50 MG/1
50 TABLET ORAL
Status: DISCONTINUED | OUTPATIENT
Start: 2021-01-01 | End: 2021-01-01 | Stop reason: HOSPADM

## 2021-01-01 RX ORDER — SODIUM CHLORIDE 9 MG/ML
250 INJECTION, SOLUTION INTRAVENOUS AS NEEDED
Status: DISCONTINUED | OUTPATIENT
Start: 2021-01-01 | End: 2021-01-01

## 2021-01-01 RX ORDER — LEVOFLOXACIN 5 MG/ML
750 INJECTION, SOLUTION INTRAVENOUS EVERY 24 HOURS
Status: DISCONTINUED | OUTPATIENT
Start: 2021-01-01 | End: 2021-01-01

## 2021-01-01 RX ORDER — SODIUM CHLORIDE 9 MG/ML
100 INJECTION, SOLUTION INTRAVENOUS ONCE
Status: DISCONTINUED | OUTPATIENT
Start: 2021-01-01 | End: 2021-01-01

## 2021-01-01 RX ORDER — HYDROCORTISONE SODIUM SUCCINATE 100 MG/2ML
INJECTION, POWDER, FOR SOLUTION INTRAMUSCULAR; INTRAVENOUS
Status: DISCONTINUED
Start: 2021-01-01 | End: 2021-12-31 | Stop reason: HOSPADM

## 2021-01-01 RX ORDER — DEXTROSE 50 % IN WATER (D50W) INTRAVENOUS SYRINGE
25 ONCE
Status: COMPLETED | OUTPATIENT
Start: 2021-01-01 | End: 2021-01-01

## 2021-01-01 RX ORDER — HEPARIN SODIUM 5000 [USP'U]/ML
5000 INJECTION, SOLUTION INTRAVENOUS; SUBCUTANEOUS EVERY 8 HOURS
Status: DISCONTINUED | OUTPATIENT
Start: 2021-01-01 | End: 2021-12-31 | Stop reason: HOSPADM

## 2021-01-01 RX ORDER — GUAIFENESIN 100 MG/5ML
81 LIQUID (ML) ORAL DAILY
Status: DISCONTINUED | OUTPATIENT
Start: 2021-01-01 | End: 2021-01-01 | Stop reason: HOSPADM

## 2021-01-01 RX ORDER — HYDROCORTISONE SODIUM SUCCINATE 100 MG/2ML
50 INJECTION, POWDER, FOR SOLUTION INTRAMUSCULAR; INTRAVENOUS ONCE
Status: DISCONTINUED | OUTPATIENT
Start: 2021-01-01 | End: 2021-01-01

## 2021-01-01 RX ORDER — CALCIUM GLUCONATE 20 MG/ML
1 INJECTION, SOLUTION INTRAVENOUS ONCE
Status: COMPLETED | OUTPATIENT
Start: 2021-01-01 | End: 2021-01-01

## 2021-01-01 RX ORDER — ENOXAPARIN SODIUM 100 MG/ML
40 INJECTION SUBCUTANEOUS DAILY
Status: DISCONTINUED | OUTPATIENT
Start: 2021-01-01 | End: 2021-01-01 | Stop reason: HOSPADM

## 2021-01-01 RX ORDER — BUDESONIDE 0.5 MG/2ML
500 INHALANT ORAL
Status: DISCONTINUED | OUTPATIENT
Start: 2021-01-01 | End: 2021-01-01

## 2021-01-01 RX ORDER — INSULIN LISPRO 100 [IU]/ML
INJECTION, SOLUTION INTRAVENOUS; SUBCUTANEOUS EVERY 6 HOURS
Status: DISCONTINUED | OUTPATIENT
Start: 2021-01-01 | End: 2021-12-31 | Stop reason: HOSPADM

## 2021-01-01 RX ORDER — AMLODIPINE BESYLATE 10 MG/1
10 TABLET ORAL DAILY
Qty: 30 TABLET | Refills: 0 | Status: SHIPPED
Start: 2021-01-01

## 2021-01-01 RX ORDER — NITROGLYCERIN 0.4 MG/1
0.4 TABLET SUBLINGUAL
Status: DISCONTINUED | OUTPATIENT
Start: 2021-01-01 | End: 2021-01-01 | Stop reason: HOSPADM

## 2021-01-01 RX ORDER — INSULIN LISPRO 100 [IU]/ML
INJECTION, SOLUTION INTRAVENOUS; SUBCUTANEOUS
Status: DISCONTINUED | OUTPATIENT
Start: 2021-01-01 | End: 2021-01-01 | Stop reason: HOSPADM

## 2021-01-01 RX ORDER — DEXTROSE 50 % IN WATER (D50W) INTRAVENOUS SYRINGE
12.5-25 AS NEEDED
Status: DISCONTINUED | OUTPATIENT
Start: 2021-01-01 | End: 2021-12-31 | Stop reason: HOSPADM

## 2021-01-01 RX ORDER — LEVOFLOXACIN 5 MG/ML
750 INJECTION, SOLUTION INTRAVENOUS
Status: DISCONTINUED | OUTPATIENT
Start: 2021-01-01 | End: 2021-01-01

## 2021-01-01 RX ORDER — MAGNESIUM SULFATE 100 %
4 CRYSTALS MISCELLANEOUS AS NEEDED
Status: DISCONTINUED | OUTPATIENT
Start: 2021-01-01 | End: 2021-01-01 | Stop reason: HOSPADM

## 2021-01-01 RX ORDER — MAGNESIUM SULFATE 100 %
4 CRYSTALS MISCELLANEOUS AS NEEDED
Status: DISCONTINUED | OUTPATIENT
Start: 2021-01-01 | End: 2021-01-01 | Stop reason: SDUPTHER

## 2021-01-01 RX ORDER — MAGNESIUM SULFATE 100 %
16 CRYSTALS MISCELLANEOUS AS NEEDED
Status: DISCONTINUED | OUTPATIENT
Start: 2021-01-01 | End: 2021-12-31 | Stop reason: HOSPADM

## 2021-01-01 RX ORDER — MAG HYDROX/ALUMINUM HYD/SIMETH 200-200-20
15 SUSPENSION, ORAL (FINAL DOSE FORM) ORAL
Status: DISCONTINUED | OUTPATIENT
Start: 2021-01-01 | End: 2021-01-01 | Stop reason: HOSPADM

## 2021-01-01 RX ORDER — DEXTROSE 50 % IN WATER (D50W) INTRAVENOUS SYRINGE
25-50 AS NEEDED
Status: DISCONTINUED | OUTPATIENT
Start: 2021-01-01 | End: 2021-12-31 | Stop reason: HOSPADM

## 2021-01-01 RX ORDER — HYDRALAZINE HYDROCHLORIDE 50 MG/1
50 TABLET, FILM COATED ORAL
Status: DISCONTINUED | OUTPATIENT
Start: 2021-01-01 | End: 2021-01-01 | Stop reason: HOSPADM

## 2021-01-01 RX ORDER — POLYETHYLENE GLYCOL 3350 17 G/17G
17 POWDER, FOR SOLUTION ORAL DAILY PRN
Status: DISCONTINUED | OUTPATIENT
Start: 2021-01-01 | End: 2021-01-01

## 2021-01-01 RX ORDER — CEFPODOXIME PROXETIL 200 MG/1
200 TABLET, FILM COATED ORAL 2 TIMES DAILY
Qty: 14 TABLET | Refills: 0 | Status: SHIPPED | OUTPATIENT
Start: 2021-01-01 | End: 2021-01-01

## 2021-01-01 RX ORDER — MAGNESIUM SULFATE 100 %
16 CRYSTALS MISCELLANEOUS AS NEEDED
Status: DISCONTINUED | OUTPATIENT
Start: 2021-01-01 | End: 2021-01-01 | Stop reason: HOSPADM

## 2021-01-01 RX ORDER — ONDANSETRON 2 MG/ML
4 INJECTION INTRAMUSCULAR; INTRAVENOUS
Status: DISCONTINUED | OUTPATIENT
Start: 2021-01-01 | End: 2021-01-01 | Stop reason: HOSPADM

## 2021-01-01 RX ORDER — SODIUM CHLORIDE 9 MG/ML
250 INJECTION, SOLUTION INTRAVENOUS AS NEEDED
Status: DISCONTINUED | OUTPATIENT
Start: 2021-01-01 | End: 2021-01-01 | Stop reason: HOSPADM

## 2021-01-01 RX ORDER — SODIUM CHLORIDE 0.9 % (FLUSH) 0.9 %
5-40 SYRINGE (ML) INJECTION AS NEEDED
Status: DISCONTINUED | OUTPATIENT
Start: 2021-01-01 | End: 2021-12-31 | Stop reason: HOSPADM

## 2021-01-01 RX ORDER — MIDODRINE HYDROCHLORIDE 2.5 MG/1
5 TABLET ORAL
Status: COMPLETED | OUTPATIENT
Start: 2021-01-01 | End: 2021-01-01

## 2021-01-01 RX ORDER — SODIUM CHLORIDE 0.9 % (FLUSH) 0.9 %
5-40 SYRINGE (ML) INJECTION AS NEEDED
Status: DISCONTINUED | OUTPATIENT
Start: 2021-01-01 | End: 2021-01-01 | Stop reason: HOSPADM

## 2021-01-01 RX ORDER — INSULIN LISPRO 100 [IU]/ML
INJECTION, SOLUTION INTRAVENOUS; SUBCUTANEOUS
Status: DISCONTINUED | OUTPATIENT
Start: 2021-01-01 | End: 2021-01-01 | Stop reason: SDUPTHER

## 2021-01-01 RX ORDER — FUROSEMIDE 10 MG/ML
20 INJECTION INTRAMUSCULAR; INTRAVENOUS DAILY
Status: DISCONTINUED | OUTPATIENT
Start: 2021-01-01 | End: 2021-01-01 | Stop reason: HOSPADM

## 2021-01-01 RX ORDER — METRONIDAZOLE 500 MG/100ML
500 INJECTION, SOLUTION INTRAVENOUS EVERY 6 HOURS
Status: DISCONTINUED | OUTPATIENT
Start: 2021-01-01 | End: 2021-01-01 | Stop reason: DRUGHIGH

## 2021-01-01 RX ORDER — METOPROLOL SUCCINATE 50 MG/1
50 TABLET, EXTENDED RELEASE ORAL
Status: DISCONTINUED | OUTPATIENT
Start: 2021-01-01 | End: 2021-01-01

## 2021-01-01 RX ORDER — INSULIN LISPRO 100 [IU]/ML
INJECTION, SOLUTION INTRAVENOUS; SUBCUTANEOUS EVERY 6 HOURS
Status: DISCONTINUED | OUTPATIENT
Start: 2021-01-01 | End: 2021-01-01 | Stop reason: HOSPADM

## 2021-01-01 RX ORDER — ACETAMINOPHEN 650 MG/1
650 SUPPOSITORY RECTAL
Status: DISCONTINUED | OUTPATIENT
Start: 2021-01-01 | End: 2021-01-01 | Stop reason: HOSPADM

## 2021-01-01 RX ORDER — LEVOFLOXACIN 5 MG/ML
500 INJECTION, SOLUTION INTRAVENOUS EVERY 24 HOURS
Status: DISCONTINUED | OUTPATIENT
Start: 2021-01-01 | End: 2021-01-01 | Stop reason: SDUPTHER

## 2021-01-01 RX ORDER — LEVOFLOXACIN 5 MG/ML
500 INJECTION, SOLUTION INTRAVENOUS
Status: DISCONTINUED | OUTPATIENT
Start: 2021-12-31 | End: 2021-12-31 | Stop reason: HOSPADM

## 2021-01-01 RX ORDER — METRONIDAZOLE 500 MG/1
500 TABLET ORAL 3 TIMES DAILY
Qty: 12 TABLET | Refills: 0 | Status: SHIPPED
Start: 2021-01-01 | End: 2021-12-31

## 2021-01-01 RX ORDER — HEPARIN SODIUM 5000 [USP'U]/ML
5000 INJECTION, SOLUTION INTRAVENOUS; SUBCUTANEOUS EVERY 8 HOURS
Status: DISCONTINUED | OUTPATIENT
Start: 2021-01-01 | End: 2021-01-01

## 2021-01-01 RX ORDER — OMEPRAZOLE 40 MG/1
40 CAPSULE, DELAYED RELEASE ORAL DAILY
Qty: 30 CAPSULE | Refills: 0 | Status: SHIPPED | OUTPATIENT
Start: 2021-01-01

## 2021-01-01 RX ORDER — CALCIUM CARBONATE 200(500)MG
1 TABLET,CHEWABLE ORAL 2 TIMES DAILY
COMMUNITY
End: 2021-01-01

## 2021-01-01 RX ORDER — SODIUM CHLORIDE 0.9 % (FLUSH) 0.9 %
5-40 SYRINGE (ML) INJECTION EVERY 8 HOURS
Status: DISCONTINUED | OUTPATIENT
Start: 2021-01-01 | End: 2021-01-01 | Stop reason: HOSPADM

## 2021-01-01 RX ORDER — HEPARIN SODIUM 5000 [USP'U]/ML
5000 INJECTION, SOLUTION INTRAVENOUS; SUBCUTANEOUS EVERY 8 HOURS
Status: DISCONTINUED | OUTPATIENT
Start: 2021-01-01 | End: 2021-01-01 | Stop reason: HOSPADM

## 2021-01-01 RX ORDER — ONDANSETRON 4 MG/1
4 TABLET, ORALLY DISINTEGRATING ORAL
Status: DISCONTINUED | OUTPATIENT
Start: 2021-01-01 | End: 2021-01-01 | Stop reason: HOSPADM

## 2021-01-01 RX ORDER — SODIUM CHLORIDE 0.9 % (FLUSH) 0.9 %
5-10 SYRINGE (ML) INJECTION AS NEEDED
Status: DISCONTINUED | OUTPATIENT
Start: 2021-01-01 | End: 2021-01-01 | Stop reason: HOSPADM

## 2021-01-01 RX ORDER — ACETAMINOPHEN 650 MG/1
650 SUPPOSITORY RECTAL
Status: DISCONTINUED | OUTPATIENT
Start: 2021-01-01 | End: 2021-12-31 | Stop reason: HOSPADM

## 2021-01-01 RX ORDER — CEPHALEXIN 500 MG/1
500 CAPSULE ORAL 4 TIMES DAILY
Qty: 28 CAPSULE | Refills: 0 | Status: SHIPPED | OUTPATIENT
Start: 2021-01-01 | End: 2021-01-01

## 2021-01-01 RX ORDER — LEVOFLOXACIN 5 MG/ML
500 INJECTION, SOLUTION INTRAVENOUS EVERY 24 HOURS
Status: DISCONTINUED | OUTPATIENT
Start: 2021-01-01 | End: 2021-01-01 | Stop reason: ALTCHOICE

## 2021-01-01 RX ORDER — ATORVASTATIN CALCIUM 20 MG/1
20 TABLET, FILM COATED ORAL
Status: DISCONTINUED | OUTPATIENT
Start: 2021-01-01 | End: 2021-01-01 | Stop reason: HOSPADM

## 2021-01-01 RX ORDER — LIDOCAINE HYDROCHLORIDE 10 MG/ML
INJECTION INFILTRATION; PERINEURAL AS NEEDED
Status: DISCONTINUED | OUTPATIENT
Start: 2021-01-01 | End: 2021-01-01 | Stop reason: HOSPADM

## 2021-01-01 RX ORDER — CEFTRIAXONE 250 MG/8ML
1000 INJECTION, POWDER, FOR SOLUTION INTRAMUSCULAR; INTRAVENOUS ONCE
Status: DISCONTINUED | OUTPATIENT
Start: 2021-01-01 | End: 2021-01-01

## 2021-01-01 RX ORDER — ENOXAPARIN SODIUM 100 MG/ML
40 INJECTION SUBCUTANEOUS EVERY 24 HOURS
Status: DISCONTINUED | OUTPATIENT
Start: 2021-01-01 | End: 2021-01-01 | Stop reason: HOSPADM

## 2021-01-01 RX ORDER — SODIUM CHLORIDE 9 MG/ML
50 INJECTION, SOLUTION INTRAVENOUS CONTINUOUS
Status: DISCONTINUED | OUTPATIENT
Start: 2021-01-01 | End: 2021-01-01

## 2021-01-01 RX ORDER — GUAIFENESIN 100 MG/5ML
324 LIQUID (ML) ORAL
Status: ACTIVE | OUTPATIENT
Start: 2021-01-01 | End: 2021-01-01

## 2021-01-01 RX ORDER — AMLODIPINE BESYLATE 5 MG/1
10 TABLET ORAL DAILY
Status: DISCONTINUED | OUTPATIENT
Start: 2021-01-01 | End: 2021-01-01 | Stop reason: HOSPADM

## 2021-01-01 RX ORDER — ADENOSINE 3 MG/ML
INJECTION, SOLUTION INTRAVENOUS
Status: DISCONTINUED | OUTPATIENT
Start: 2021-01-01 | End: 2021-01-01 | Stop reason: HOSPADM

## 2021-01-01 RX ORDER — ALBUMIN HUMAN 250 G/1000ML
25 SOLUTION INTRAVENOUS EVERY 6 HOURS
Status: DISCONTINUED | OUTPATIENT
Start: 2021-01-01 | End: 2021-01-01

## 2021-01-01 RX ORDER — DEXTROSE 50 % IN WATER (D50W) INTRAVENOUS SYRINGE
25-50 AS NEEDED
Status: DISCONTINUED | OUTPATIENT
Start: 2021-01-01 | End: 2021-01-01 | Stop reason: SDUPTHER

## 2021-01-01 RX ORDER — CEPHALEXIN 500 MG/1
500 CAPSULE ORAL 2 TIMES DAILY
Qty: 20 CAP | Refills: 0 | Status: SHIPPED | OUTPATIENT
Start: 2021-01-01 | End: 2021-01-01

## 2021-01-01 RX ORDER — SODIUM CHLORIDE 0.9 % (FLUSH) 0.9 %
5-10 SYRINGE (ML) INJECTION AS NEEDED
Status: DISCONTINUED | OUTPATIENT
Start: 2021-01-01 | End: 2021-12-31 | Stop reason: HOSPADM

## 2021-01-01 RX ORDER — BISACODYL 5 MG
5 TABLET, DELAYED RELEASE (ENTERIC COATED) ORAL DAILY PRN
Status: DISCONTINUED | OUTPATIENT
Start: 2021-01-01 | End: 2021-01-01

## 2021-01-01 RX ORDER — METOPROLOL SUCCINATE 50 MG/1
50 TABLET, EXTENDED RELEASE ORAL
Status: DISCONTINUED | OUTPATIENT
Start: 2021-01-01 | End: 2021-01-01 | Stop reason: HOSPADM

## 2021-01-01 RX ORDER — MORPHINE SULFATE 2 MG/ML
2 INJECTION, SOLUTION INTRAMUSCULAR; INTRAVENOUS
Status: DISCONTINUED | OUTPATIENT
Start: 2021-01-01 | End: 2021-12-31 | Stop reason: HOSPADM

## 2021-01-01 RX ORDER — METOPROLOL SUCCINATE 50 MG/1
50 TABLET, EXTENDED RELEASE ORAL
Qty: 30 TABLET | Refills: 0 | Status: SHIPPED
Start: 2021-01-01

## 2021-01-01 RX ORDER — SODIUM BICARBONATE 1 MEQ/ML
50 SYRINGE (ML) INTRAVENOUS ONCE
Status: COMPLETED | OUTPATIENT
Start: 2021-01-01 | End: 2021-01-01

## 2021-01-01 RX ORDER — FUROSEMIDE 10 MG/ML
40 INJECTION INTRAMUSCULAR; INTRAVENOUS ONCE
Status: COMPLETED | OUTPATIENT
Start: 2021-01-01 | End: 2021-01-01

## 2021-01-01 RX ORDER — CIPROFLOXACIN 500 MG/1
500 TABLET ORAL 2 TIMES DAILY
Qty: 20 TAB | Refills: 0 | Status: SHIPPED | OUTPATIENT
Start: 2021-01-01 | End: 2021-01-01

## 2021-01-01 RX ORDER — METRONIDAZOLE 250 MG/1
500 TABLET ORAL 3 TIMES DAILY
Status: DISCONTINUED | OUTPATIENT
Start: 2021-01-01 | End: 2021-01-01 | Stop reason: HOSPADM

## 2021-01-01 RX ORDER — SODIUM CHLORIDE 0.9 % (FLUSH) 0.9 %
5-40 SYRINGE (ML) INJECTION EVERY 8 HOURS
Status: DISCONTINUED | OUTPATIENT
Start: 2021-01-01 | End: 2021-12-31 | Stop reason: HOSPADM

## 2021-01-01 RX ORDER — POLYETHYLENE GLYCOL 3350 17 G/17G
17 POWDER, FOR SOLUTION ORAL DAILY PRN
Status: DISCONTINUED | OUTPATIENT
Start: 2021-01-01 | End: 2021-12-31 | Stop reason: HOSPADM

## 2021-01-01 RX ORDER — MELATONIN
2000 DAILY
Status: DISCONTINUED | OUTPATIENT
Start: 2021-01-01 | End: 2021-01-01 | Stop reason: HOSPADM

## 2021-01-01 RX ORDER — OXYCODONE HYDROCHLORIDE 5 MG/1
5 TABLET ORAL
Status: DISCONTINUED | OUTPATIENT
Start: 2021-01-01 | End: 2021-01-01 | Stop reason: HOSPADM

## 2021-01-01 RX ORDER — LIDOCAINE HYDROCHLORIDE 20 MG/ML
JELLY TOPICAL
Status: DISCONTINUED | OUTPATIENT
Start: 2021-01-01 | End: 2021-01-01 | Stop reason: HOSPADM

## 2021-01-01 RX ORDER — POTASSIUM CHLORIDE 20 MEQ/1
40 TABLET, EXTENDED RELEASE ORAL EVERY 4 HOURS
Status: COMPLETED | OUTPATIENT
Start: 2021-01-01 | End: 2021-01-01

## 2021-01-01 RX ORDER — ATORVASTATIN CALCIUM 20 MG/1
20 TABLET, FILM COATED ORAL
Status: CANCELLED | OUTPATIENT
Start: 2021-01-01

## 2021-01-01 RX ORDER — METOPROLOL SUCCINATE 25 MG/1
25 TABLET, EXTENDED RELEASE ORAL DAILY
Status: DISCONTINUED | OUTPATIENT
Start: 2021-01-01 | End: 2021-01-01

## 2021-01-01 RX ORDER — METRONIDAZOLE 500 MG/100ML
500 INJECTION, SOLUTION INTRAVENOUS EVERY 8 HOURS
Status: DISCONTINUED | OUTPATIENT
Start: 2021-01-01 | End: 2021-01-01

## 2021-01-01 RX ORDER — ONDANSETRON 4 MG/1
4 TABLET, ORALLY DISINTEGRATING ORAL
Status: DISCONTINUED | OUTPATIENT
Start: 2021-01-01 | End: 2021-12-31 | Stop reason: HOSPADM

## 2021-01-01 RX ORDER — IPRATROPIUM BROMIDE AND ALBUTEROL SULFATE 2.5; .5 MG/3ML; MG/3ML
3 SOLUTION RESPIRATORY (INHALATION)
Status: DISCONTINUED | OUTPATIENT
Start: 2021-01-01 | End: 2021-01-01

## 2021-01-01 RX ORDER — NOREPINEPHRINE BITARTRATE/D5W 8 MG/250ML
.5-3 PLASTIC BAG, INJECTION (ML) INTRAVENOUS
Status: DISCONTINUED | OUTPATIENT
Start: 2021-01-01 | End: 2021-12-31 | Stop reason: HOSPADM

## 2021-01-01 RX ORDER — METOPROLOL SUCCINATE 25 MG/1
25 TABLET, EXTENDED RELEASE ORAL
Status: DISCONTINUED | OUTPATIENT
Start: 2021-01-01 | End: 2021-01-01 | Stop reason: HOSPADM

## 2021-01-01 RX ORDER — PREGABALIN 75 MG/1
75 CAPSULE ORAL 2 TIMES DAILY
Status: DISCONTINUED | OUTPATIENT
Start: 2021-01-01 | End: 2021-01-01 | Stop reason: HOSPADM

## 2021-01-01 RX ORDER — SODIUM CHLORIDE 9 MG/ML
150 INJECTION, SOLUTION INTRAVENOUS CONTINUOUS
Status: DISPENSED | OUTPATIENT
Start: 2021-01-01 | End: 2021-01-01

## 2021-01-01 RX ORDER — SODIUM CHLORIDE, SODIUM LACTATE, POTASSIUM CHLORIDE, CALCIUM CHLORIDE 600; 310; 30; 20 MG/100ML; MG/100ML; MG/100ML; MG/100ML
25 INJECTION, SOLUTION INTRAVENOUS CONTINUOUS
Status: DISCONTINUED | OUTPATIENT
Start: 2021-01-01 | End: 2021-01-01

## 2021-01-01 RX ORDER — MORPHINE SULFATE 2 MG/ML
2 INJECTION, SOLUTION INTRAMUSCULAR; INTRAVENOUS
Status: DISCONTINUED | OUTPATIENT
Start: 2021-01-01 | End: 2021-01-01 | Stop reason: HOSPADM

## 2021-01-01 RX ORDER — PREGABALIN 75 MG/1
75 CAPSULE ORAL
Status: DISCONTINUED | OUTPATIENT
Start: 2021-01-01 | End: 2021-01-01 | Stop reason: HOSPADM

## 2021-01-01 RX ORDER — DEXAMETHASONE SODIUM PHOSPHATE 4 MG/ML
20 INJECTION, SOLUTION INTRA-ARTICULAR; INTRALESIONAL; INTRAMUSCULAR; INTRAVENOUS; SOFT TISSUE DAILY
Status: DISCONTINUED | OUTPATIENT
Start: 2021-01-01 | End: 2021-12-31 | Stop reason: HOSPADM

## 2021-01-01 RX ORDER — MAGNESIUM SULFATE HEPTAHYDRATE 40 MG/ML
2 INJECTION, SOLUTION INTRAVENOUS ONCE
Status: DISCONTINUED | OUTPATIENT
Start: 2021-01-01 | End: 2021-01-01

## 2021-01-01 RX ORDER — SODIUM CHLORIDE 9 MG/ML
1000 INJECTION, SOLUTION INTRAVENOUS CONTINUOUS
Status: DISCONTINUED | OUTPATIENT
Start: 2021-01-01 | End: 2021-01-01

## 2021-01-01 RX ORDER — ALBUTEROL SULFATE 0.83 MG/ML
10 SOLUTION RESPIRATORY (INHALATION)
Status: COMPLETED | OUTPATIENT
Start: 2021-01-01 | End: 2021-01-01

## 2021-01-01 RX ADMIN — METRONIDAZOLE 500 MG: 500 INJECTION, SOLUTION INTRAVENOUS at 21:49

## 2021-01-01 RX ADMIN — Medication 10 ML: at 21:22

## 2021-01-01 RX ADMIN — ASPIRIN 81 MG: 81 TABLET, CHEWABLE ORAL at 09:11

## 2021-01-01 RX ADMIN — SODIUM CHLORIDE 1000 ML: 9 INJECTION, SOLUTION INTRAVENOUS at 17:15

## 2021-01-01 RX ADMIN — METRONIDAZOLE 500 MG: 500 INJECTION, SOLUTION INTRAVENOUS at 15:43

## 2021-01-01 RX ADMIN — HEPARIN SODIUM 5000 UNITS: 5000 INJECTION INTRAVENOUS; SUBCUTANEOUS at 18:01

## 2021-01-01 RX ADMIN — Medication 2 UNITS: at 17:19

## 2021-01-01 RX ADMIN — METRONIDAZOLE 500 MG: 250 TABLET ORAL at 15:33

## 2021-01-01 RX ADMIN — PIPERACILLIN AND TAZOBACTAM 4.5 G: 4; .5 INJECTION, POWDER, LYOPHILIZED, FOR SOLUTION INTRAVENOUS at 05:49

## 2021-01-01 RX ADMIN — PREGABALIN 75 MG: 75 CAPSULE ORAL at 22:05

## 2021-01-01 RX ADMIN — ASPIRIN 81 MG: 81 TABLET, CHEWABLE ORAL at 08:11

## 2021-01-01 RX ADMIN — ATORVASTATIN CALCIUM 20 MG: 20 TABLET, FILM COATED ORAL at 21:57

## 2021-01-01 RX ADMIN — VANCOMYCIN HYDROCHLORIDE 125 MG: 1 INJECTION, POWDER, LYOPHILIZED, FOR SOLUTION INTRAVENOUS at 17:11

## 2021-01-01 RX ADMIN — Medication 1 TABLET: at 08:11

## 2021-01-01 RX ADMIN — SODIUM CHLORIDE 150 ML/HR: 9 INJECTION, SOLUTION INTRAVENOUS at 00:20

## 2021-01-01 RX ADMIN — ALBUMIN (HUMAN) 12.5 G: 0.25 INJECTION, SOLUTION INTRAVENOUS at 20:16

## 2021-01-01 RX ADMIN — ASPIRIN 81 MG: 81 TABLET, CHEWABLE ORAL at 08:47

## 2021-01-01 RX ADMIN — VANCOMYCIN HYDROCHLORIDE 125 MG: 1 INJECTION, POWDER, LYOPHILIZED, FOR SOLUTION INTRAVENOUS at 06:46

## 2021-01-01 RX ADMIN — FUROSEMIDE 40 MG: 10 INJECTION, SOLUTION INTRAMUSCULAR; INTRAVENOUS at 21:41

## 2021-01-01 RX ADMIN — Medication 10 ML: at 14:24

## 2021-01-01 RX ADMIN — HEPARIN SODIUM 5000 UNITS: 5000 INJECTION INTRAVENOUS; SUBCUTANEOUS at 02:06

## 2021-01-01 RX ADMIN — HEPARIN SODIUM 5000 UNITS: 5000 INJECTION INTRAVENOUS; SUBCUTANEOUS at 10:57

## 2021-01-01 RX ADMIN — HEPARIN SODIUM 5000 UNITS: 5000 INJECTION INTRAVENOUS; SUBCUTANEOUS at 01:19

## 2021-01-01 RX ADMIN — TRAZODONE HYDROCHLORIDE 50 MG: 50 TABLET ORAL at 21:30

## 2021-01-01 RX ADMIN — VANCOMYCIN HYDROCHLORIDE 750 MG: 750 INJECTION, POWDER, LYOPHILIZED, FOR SOLUTION INTRAVENOUS at 08:41

## 2021-01-01 RX ADMIN — NOREPINEPHRINE BITARTRATE 4 MCG/MIN: 1 INJECTION, SOLUTION, CONCENTRATE INTRAVENOUS at 06:46

## 2021-01-01 RX ADMIN — HEPARIN SODIUM 5000 UNITS: 5000 INJECTION INTRAVENOUS; SUBCUTANEOUS at 21:15

## 2021-01-01 RX ADMIN — HEPARIN SODIUM 5000 UNITS: 5000 INJECTION INTRAVENOUS; SUBCUTANEOUS at 09:40

## 2021-01-01 RX ADMIN — PREGABALIN 75 MG: 75 CAPSULE ORAL at 21:36

## 2021-01-01 RX ADMIN — MEROPENEM 1 G: 1 INJECTION, POWDER, FOR SOLUTION INTRAVENOUS at 11:09

## 2021-01-01 RX ADMIN — Medication 10 ML: at 14:00

## 2021-01-01 RX ADMIN — SODIUM CHLORIDE, SODIUM LACTATE, POTASSIUM CHLORIDE, AND CALCIUM CHLORIDE 1000 ML: 600; 310; 30; 20 INJECTION, SOLUTION INTRAVENOUS at 06:30

## 2021-01-01 RX ADMIN — METRONIDAZOLE 500 MG: 250 TABLET ORAL at 18:27

## 2021-01-01 RX ADMIN — METOPROLOL SUCCINATE 25 MG: 25 TABLET, EXTENDED RELEASE ORAL at 22:21

## 2021-01-01 RX ADMIN — PIPERACILLIN AND TAZOBACTAM 3.38 G: 3; .375 INJECTION, POWDER, LYOPHILIZED, FOR SOLUTION INTRAVENOUS at 09:55

## 2021-01-01 RX ADMIN — ANTACID TABLETS 200 MG: 500 TABLET, CHEWABLE ORAL at 05:59

## 2021-01-01 RX ADMIN — VANCOMYCIN HYDROCHLORIDE 750 MG: 750 INJECTION, POWDER, LYOPHILIZED, FOR SOLUTION INTRAVENOUS at 04:05

## 2021-01-01 RX ADMIN — HEPARIN SODIUM 5000 UNITS: 5000 INJECTION INTRAVENOUS; SUBCUTANEOUS at 18:06

## 2021-01-01 RX ADMIN — FAMOTIDINE 20 MG: 20 TABLET ORAL at 08:11

## 2021-01-01 RX ADMIN — METHYLPREDNISOLONE SODIUM SUCCINATE 40 MG: 40 INJECTION, POWDER, FOR SOLUTION INTRAMUSCULAR; INTRAVENOUS at 21:46

## 2021-01-01 RX ADMIN — Medication 2 UNITS: at 12:53

## 2021-01-01 RX ADMIN — PIPERACILLIN AND TAZOBACTAM 4.5 G: 4; .5 INJECTION, POWDER, LYOPHILIZED, FOR SOLUTION INTRAVENOUS at 14:31

## 2021-01-01 RX ADMIN — CEFEPIME HYDROCHLORIDE 2 G: 2 INJECTION, POWDER, FOR SOLUTION INTRAVENOUS at 13:37

## 2021-01-01 RX ADMIN — CEFTRIAXONE 1 G: 1 INJECTION, POWDER, FOR SOLUTION INTRAMUSCULAR; INTRAVENOUS at 16:31

## 2021-01-01 RX ADMIN — HEPARIN SODIUM 5000 UNITS: 5000 INJECTION INTRAVENOUS; SUBCUTANEOUS at 20:44

## 2021-01-01 RX ADMIN — ATORVASTATIN CALCIUM 20 MG: 20 TABLET, FILM COATED ORAL at 21:36

## 2021-01-01 RX ADMIN — NOREPINEPHRINE BITARTRATE 9 MCG/MIN: 1 INJECTION, SOLUTION, CONCENTRATE INTRAVENOUS at 21:40

## 2021-01-01 RX ADMIN — ACETAMINOPHEN 650 MG: 325 TABLET ORAL at 09:25

## 2021-01-01 RX ADMIN — PREGABALIN 75 MG: 75 CAPSULE ORAL at 09:08

## 2021-01-01 RX ADMIN — METOPROLOL SUCCINATE 25 MG: 25 TABLET, EXTENDED RELEASE ORAL at 21:02

## 2021-01-01 RX ADMIN — Medication 10 ML: at 06:00

## 2021-01-01 RX ADMIN — METOPROLOL SUCCINATE 25 MG: 25 TABLET, EXTENDED RELEASE ORAL at 22:01

## 2021-01-01 RX ADMIN — HEPARIN SODIUM 5000 UNITS: 5000 INJECTION INTRAVENOUS; SUBCUTANEOUS at 05:43

## 2021-01-01 RX ADMIN — VANCOMYCIN HYDROCHLORIDE 125 MG: 1 INJECTION, POWDER, LYOPHILIZED, FOR SOLUTION INTRAVENOUS at 06:51

## 2021-01-01 RX ADMIN — HEPARIN SODIUM 5000 UNITS: 5000 INJECTION INTRAVENOUS; SUBCUTANEOUS at 18:28

## 2021-01-01 RX ADMIN — SODIUM CHLORIDE 40 MG: 9 INJECTION, SOLUTION INTRAMUSCULAR; INTRAVENOUS; SUBCUTANEOUS at 20:20

## 2021-01-01 RX ADMIN — LEVOFLOXACIN 750 MG: 5 INJECTION, SOLUTION INTRAVENOUS at 19:35

## 2021-01-01 RX ADMIN — FUROSEMIDE 20 MG: 10 INJECTION, SOLUTION INTRAMUSCULAR; INTRAVENOUS at 09:27

## 2021-01-01 RX ADMIN — BUDESONIDE 500 MCG: 0.5 INHALANT RESPIRATORY (INHALATION) at 11:10

## 2021-01-01 RX ADMIN — HEPARIN SODIUM 5000 UNITS: 5000 INJECTION INTRAVENOUS; SUBCUTANEOUS at 02:14

## 2021-01-01 RX ADMIN — HEPARIN SODIUM 5000 UNITS: 5000 INJECTION INTRAVENOUS; SUBCUTANEOUS at 21:12

## 2021-01-01 RX ADMIN — ASPIRIN 81 MG: 81 TABLET, CHEWABLE ORAL at 08:59

## 2021-01-01 RX ADMIN — Medication 10 ML: at 21:51

## 2021-01-01 RX ADMIN — HEPARIN SODIUM 5000 UNITS: 5000 INJECTION INTRAVENOUS; SUBCUTANEOUS at 04:05

## 2021-01-01 RX ADMIN — VANCOMYCIN HYDROCHLORIDE 750 MG: 750 INJECTION, POWDER, LYOPHILIZED, FOR SOLUTION INTRAVENOUS at 02:28

## 2021-01-01 RX ADMIN — AMLODIPINE BESYLATE 10 MG: 5 TABLET ORAL at 10:57

## 2021-01-01 RX ADMIN — ENOXAPARIN SODIUM 40 MG: 40 INJECTION SUBCUTANEOUS at 09:40

## 2021-01-01 RX ADMIN — BUDESONIDE 500 MCG: 0.5 INHALANT RESPIRATORY (INHALATION) at 07:21

## 2021-01-01 RX ADMIN — WATER 1 G: 1 INJECTION INTRAMUSCULAR; INTRAVENOUS; SUBCUTANEOUS at 18:38

## 2021-01-01 RX ADMIN — ALBUMIN (HUMAN) 25 G: 0.25 INJECTION, SOLUTION INTRAVENOUS at 17:49

## 2021-01-01 RX ADMIN — METRONIDAZOLE 500 MG: 250 TABLET ORAL at 21:39

## 2021-01-01 RX ADMIN — HEPARIN SODIUM 5000 UNITS: 5000 INJECTION INTRAVENOUS; SUBCUTANEOUS at 17:44

## 2021-01-01 RX ADMIN — HEPARIN SODIUM 5000 UNITS: 5000 INJECTION INTRAVENOUS; SUBCUTANEOUS at 09:03

## 2021-01-01 RX ADMIN — ALUMINUM HYDROXIDE, MAGNESIUM HYDROXIDE, AND SIMETHICONE 40 ML: 200; 200; 20 SUSPENSION ORAL at 21:12

## 2021-01-01 RX ADMIN — Medication 10 ML: at 14:57

## 2021-01-01 RX ADMIN — ATORVASTATIN CALCIUM 20 MG: 20 TABLET, FILM COATED ORAL at 00:42

## 2021-01-01 RX ADMIN — FAMOTIDINE 20 MG: 20 TABLET ORAL at 08:42

## 2021-01-01 RX ADMIN — PREGABALIN 75 MG: 75 CAPSULE ORAL at 21:02

## 2021-01-01 RX ADMIN — AMLODIPINE BESYLATE 10 MG: 5 TABLET ORAL at 10:19

## 2021-01-01 RX ADMIN — ARFORMOTEROL TARTRATE 15 MCG: 15 SOLUTION RESPIRATORY (INHALATION) at 19:40

## 2021-01-01 RX ADMIN — SODIUM CHLORIDE 1000 ML: 9 INJECTION, SOLUTION INTRAVENOUS at 15:20

## 2021-01-01 RX ADMIN — PIPERACILLIN AND TAZOBACTAM 4.5 G: 4; .5 INJECTION, POWDER, LYOPHILIZED, FOR SOLUTION INTRAVENOUS at 21:58

## 2021-01-01 RX ADMIN — SODIUM CHLORIDE 50 ML/HR: 900 INJECTION, SOLUTION INTRAVENOUS at 19:54

## 2021-01-01 RX ADMIN — SODIUM CHLORIDE 125 ML/HR: 900 INJECTION, SOLUTION INTRAVENOUS at 05:22

## 2021-01-01 RX ADMIN — SODIUM CHLORIDE 1000 ML: 9 INJECTION, SOLUTION INTRAVENOUS at 11:11

## 2021-01-01 RX ADMIN — HEPARIN SODIUM 5000 UNITS: 5000 INJECTION INTRAVENOUS; SUBCUTANEOUS at 09:29

## 2021-01-01 RX ADMIN — HEPARIN SODIUM 5000 UNITS: 5000 INJECTION INTRAVENOUS; SUBCUTANEOUS at 12:53

## 2021-01-01 RX ADMIN — PREGABALIN 75 MG: 75 CAPSULE ORAL at 22:27

## 2021-01-01 RX ADMIN — NYSTATIN: 100000 POWDER TOPICAL at 08:47

## 2021-01-01 RX ADMIN — SODIUM CHLORIDE 770 ML: 9 INJECTION, SOLUTION INTRAVENOUS at 14:54

## 2021-01-01 RX ADMIN — IPRATROPIUM BROMIDE AND ALBUTEROL SULFATE 3 ML: .5; 3 SOLUTION RESPIRATORY (INHALATION) at 11:10

## 2021-01-01 RX ADMIN — ALBUMIN (HUMAN) 12.5 G: 0.25 INJECTION, SOLUTION INTRAVENOUS at 06:37

## 2021-01-01 RX ADMIN — VANCOMYCIN HYDROCHLORIDE 750 MG: 750 INJECTION, POWDER, LYOPHILIZED, FOR SOLUTION INTRAVENOUS at 21:36

## 2021-01-01 RX ADMIN — CEFEPIME HYDROCHLORIDE 2 G: 2 INJECTION, POWDER, FOR SOLUTION INTRAVENOUS at 01:19

## 2021-01-01 RX ADMIN — FAMOTIDINE 20 MG: 20 TABLET ORAL at 09:00

## 2021-01-01 RX ADMIN — ASPIRIN 81 MG: 81 TABLET, CHEWABLE ORAL at 08:45

## 2021-01-01 RX ADMIN — PIPERACILLIN AND TAZOBACTAM 4.5 G: 4; .5 INJECTION, POWDER, LYOPHILIZED, FOR SOLUTION INTRAVENOUS at 21:20

## 2021-01-01 RX ADMIN — PREGABALIN 75 MG: 75 CAPSULE ORAL at 09:09

## 2021-01-01 RX ADMIN — VANCOMYCIN HYDROCHLORIDE 750 MG: 750 INJECTION, POWDER, LYOPHILIZED, FOR SOLUTION INTRAVENOUS at 22:04

## 2021-01-01 RX ADMIN — METOPROLOL SUCCINATE 25 MG: 25 TABLET, EXTENDED RELEASE ORAL at 22:18

## 2021-01-01 RX ADMIN — METRONIDAZOLE 500 MG: 250 TABLET ORAL at 21:59

## 2021-01-01 RX ADMIN — ENOXAPARIN SODIUM 40 MG: 40 INJECTION SUBCUTANEOUS at 08:40

## 2021-01-01 RX ADMIN — HYDROCHLOROTHIAZIDE: 25 TABLET ORAL at 09:27

## 2021-01-01 RX ADMIN — ATORVASTATIN CALCIUM 20 MG: 20 TABLET, FILM COATED ORAL at 21:02

## 2021-01-01 RX ADMIN — TRAZODONE HYDROCHLORIDE 50 MG: 50 TABLET ORAL at 21:53

## 2021-01-01 RX ADMIN — PIPERACILLIN AND TAZOBACTAM 4.5 G: 4; .5 INJECTION, POWDER, LYOPHILIZED, FOR SOLUTION INTRAVENOUS at 06:00

## 2021-01-01 RX ADMIN — CEFTRIAXONE 1 G: 1 INJECTION, POWDER, FOR SOLUTION INTRAMUSCULAR; INTRAVENOUS at 15:20

## 2021-01-01 RX ADMIN — Medication 10 ML: at 05:49

## 2021-01-01 RX ADMIN — PREGABALIN 75 MG: 75 CAPSULE ORAL at 20:44

## 2021-01-01 RX ADMIN — NYSTATIN: 100000 POWDER TOPICAL at 10:20

## 2021-01-01 RX ADMIN — ATORVASTATIN CALCIUM 20 MG: 20 TABLET, FILM COATED ORAL at 22:21

## 2021-01-01 RX ADMIN — DEXTROSE MONOHYDRATE 25 G: 25 INJECTION, SOLUTION INTRAVENOUS at 18:45

## 2021-01-01 RX ADMIN — INSULIN LISPRO 4 UNITS: 100 INJECTION, SOLUTION INTRAVENOUS; SUBCUTANEOUS at 12:45

## 2021-01-01 RX ADMIN — ANTACID TABLETS 200 MG: 500 TABLET, CHEWABLE ORAL at 09:09

## 2021-01-01 RX ADMIN — ALBUMIN (HUMAN) 25 G: 0.25 INJECTION, SOLUTION INTRAVENOUS at 23:08

## 2021-01-01 RX ADMIN — HEPARIN SODIUM 5000 UNITS: 5000 INJECTION INTRAVENOUS; SUBCUTANEOUS at 02:26

## 2021-01-01 RX ADMIN — PIPERACILLIN AND TAZOBACTAM 4.5 G: 4; .5 INJECTION, POWDER, LYOPHILIZED, FOR SOLUTION INTRAVENOUS at 14:58

## 2021-01-01 RX ADMIN — TRAZODONE HYDROCHLORIDE 50 MG: 50 TABLET ORAL at 00:42

## 2021-01-01 RX ADMIN — HEPARIN SODIUM 5000 UNITS: 5000 INJECTION INTRAVENOUS; SUBCUTANEOUS at 01:35

## 2021-01-01 RX ADMIN — CEFTRIAXONE 1 G: 1 INJECTION, POWDER, FOR SOLUTION INTRAMUSCULAR; INTRAVENOUS at 16:46

## 2021-01-01 RX ADMIN — TRAZODONE HYDROCHLORIDE 50 MG: 50 TABLET ORAL at 23:37

## 2021-01-01 RX ADMIN — ALBUMIN (HUMAN) 25 G: 0.25 INJECTION, SOLUTION INTRAVENOUS at 23:22

## 2021-01-01 RX ADMIN — PHENYLEPHRINE HYDROCHLORIDE 60 MCG/MIN: 10 INJECTION INTRAVENOUS at 17:20

## 2021-01-01 RX ADMIN — ENOXAPARIN SODIUM 40 MG: 100 INJECTION SUBCUTANEOUS at 10:42

## 2021-01-01 RX ADMIN — CEFEPIME HYDROCHLORIDE 2 G: 2 INJECTION, POWDER, FOR SOLUTION INTRAVENOUS at 06:04

## 2021-01-01 RX ADMIN — INSULIN LISPRO 4 UNITS: 100 INJECTION, SOLUTION INTRAVENOUS; SUBCUTANEOUS at 17:47

## 2021-01-01 RX ADMIN — Medication 1 TABLET: at 09:03

## 2021-01-01 RX ADMIN — HEPARIN SODIUM 5000 UNITS: 5000 INJECTION INTRAVENOUS; SUBCUTANEOUS at 09:33

## 2021-01-01 RX ADMIN — NYSTATIN: 100000 POWDER TOPICAL at 21:46

## 2021-01-01 RX ADMIN — FUROSEMIDE 20 MG: 10 INJECTION, SOLUTION INTRAMUSCULAR; INTRAVENOUS at 08:47

## 2021-01-01 RX ADMIN — Medication 5 ML: at 21:50

## 2021-01-01 RX ADMIN — HEPARIN SODIUM 5000 UNITS: 5000 INJECTION INTRAVENOUS; SUBCUTANEOUS at 12:52

## 2021-01-01 RX ADMIN — Medication 10 ML: at 18:28

## 2021-01-01 RX ADMIN — HEPARIN SODIUM 5000 UNITS: 5000 INJECTION INTRAVENOUS; SUBCUTANEOUS at 06:01

## 2021-01-01 RX ADMIN — METOPROLOL SUCCINATE 50 MG: 50 TABLET, EXTENDED RELEASE ORAL at 21:39

## 2021-01-01 RX ADMIN — HEPARIN SODIUM 5000 UNITS: 5000 INJECTION INTRAVENOUS; SUBCUTANEOUS at 18:27

## 2021-01-01 RX ADMIN — FUROSEMIDE 20 MG: 10 INJECTION, SOLUTION INTRAMUSCULAR; INTRAVENOUS at 09:09

## 2021-01-01 RX ADMIN — METOPROLOL SUCCINATE 25 MG: 25 TABLET, EXTENDED RELEASE ORAL at 00:27

## 2021-01-01 RX ADMIN — HEPARIN SODIUM 5000 UNITS: 5000 INJECTION INTRAVENOUS; SUBCUTANEOUS at 10:03

## 2021-01-01 RX ADMIN — INSULIN LISPRO 2 UNITS: 100 INJECTION, SOLUTION INTRAVENOUS; SUBCUTANEOUS at 02:15

## 2021-01-01 RX ADMIN — ACETAMINOPHEN 650 MG: 325 TABLET ORAL at 04:29

## 2021-01-01 RX ADMIN — PREGABALIN 75 MG: 75 CAPSULE ORAL at 08:36

## 2021-01-01 RX ADMIN — ENOXAPARIN SODIUM 40 MG: 40 INJECTION SUBCUTANEOUS at 08:19

## 2021-01-01 RX ADMIN — AMLODIPINE BESYLATE 10 MG: 5 TABLET ORAL at 08:41

## 2021-01-01 RX ADMIN — FAMOTIDINE 20 MG: 20 TABLET ORAL at 10:39

## 2021-01-01 RX ADMIN — PIPERACILLIN AND TAZOBACTAM 4.5 G: 4; .5 INJECTION, POWDER, LYOPHILIZED, FOR SOLUTION INTRAVENOUS at 05:20

## 2021-01-01 RX ADMIN — HEPARIN SODIUM 5000 UNITS: 5000 INJECTION INTRAVENOUS; SUBCUTANEOUS at 01:55

## 2021-01-01 RX ADMIN — IOPAMIDOL 100 ML: 612 INJECTION, SOLUTION INTRAVENOUS at 09:33

## 2021-01-01 RX ADMIN — VANCOMYCIN HYDROCHLORIDE 750 MG: 750 INJECTION, POWDER, LYOPHILIZED, FOR SOLUTION INTRAVENOUS at 12:44

## 2021-01-01 RX ADMIN — BUDESONIDE 500 MCG: 0.5 INHALANT RESPIRATORY (INHALATION) at 21:46

## 2021-01-01 RX ADMIN — FAMOTIDINE 20 MG: 20 TABLET ORAL at 08:51

## 2021-01-01 RX ADMIN — Medication 10 ML: at 06:12

## 2021-01-01 RX ADMIN — ARFORMOTEROL TARTRATE 15 MCG: 15 SOLUTION RESPIRATORY (INHALATION) at 07:29

## 2021-01-01 RX ADMIN — PIPERACILLIN AND TAZOBACTAM 3.38 G: 3; .375 INJECTION, POWDER, LYOPHILIZED, FOR SOLUTION INTRAVENOUS at 02:07

## 2021-01-01 RX ADMIN — SODIUM CHLORIDE 100 ML/HR: 900 INJECTION, SOLUTION INTRAVENOUS at 00:42

## 2021-01-01 RX ADMIN — CEFEPIME HYDROCHLORIDE 2 G: 2 INJECTION, POWDER, FOR SOLUTION INTRAVENOUS at 13:45

## 2021-01-01 RX ADMIN — CEFTRIAXONE 1 G: 1 INJECTION, POWDER, FOR SOLUTION INTRAMUSCULAR; INTRAVENOUS at 17:44

## 2021-01-01 RX ADMIN — LEVOFLOXACIN 500 MG: 500 INJECTION, SOLUTION INTRAVENOUS at 15:03

## 2021-01-01 RX ADMIN — Medication 10 ML: at 21:02

## 2021-01-01 RX ADMIN — TRAZODONE HYDROCHLORIDE 50 MG: 50 TABLET ORAL at 21:47

## 2021-01-01 RX ADMIN — ENOXAPARIN SODIUM 40 MG: 100 INJECTION SUBCUTANEOUS at 14:19

## 2021-01-01 RX ADMIN — METRONIDAZOLE 500 MG: 250 TABLET ORAL at 17:11

## 2021-01-01 RX ADMIN — Medication 2 UNITS: at 17:22

## 2021-01-01 RX ADMIN — ALBUMIN (HUMAN) 12.5 G: 0.25 INJECTION, SOLUTION INTRAVENOUS at 17:23

## 2021-01-01 RX ADMIN — ASPIRIN 81 MG: 81 TABLET, CHEWABLE ORAL at 10:57

## 2021-01-01 RX ADMIN — Medication 2 UNITS: at 12:54

## 2021-01-01 RX ADMIN — SODIUM CHLORIDE, SODIUM LACTATE, POTASSIUM CHLORIDE, AND CALCIUM CHLORIDE 25 ML/HR: 600; 310; 30; 20 INJECTION, SOLUTION INTRAVENOUS at 05:42

## 2021-01-01 RX ADMIN — Medication 10 ML: at 22:23

## 2021-01-01 RX ADMIN — ENOXAPARIN SODIUM 40 MG: 40 INJECTION SUBCUTANEOUS at 09:36

## 2021-01-01 RX ADMIN — ARFORMOTEROL TARTRATE 15 MCG: 15 SOLUTION RESPIRATORY (INHALATION) at 20:00

## 2021-01-01 RX ADMIN — NYSTATIN: 100000 POWDER TOPICAL at 10:14

## 2021-01-01 RX ADMIN — PREGABALIN 75 MG: 75 CAPSULE ORAL at 21:13

## 2021-01-01 RX ADMIN — ACETAMINOPHEN 650 MG: 325 TABLET ORAL at 21:07

## 2021-01-01 RX ADMIN — METOPROLOL SUCCINATE 25 MG: 25 TABLET, EXTENDED RELEASE ORAL at 01:28

## 2021-01-01 RX ADMIN — BUDESONIDE 500 MCG: 0.5 INHALANT RESPIRATORY (INHALATION) at 07:16

## 2021-01-01 RX ADMIN — SODIUM CHLORIDE, SODIUM LACTATE, POTASSIUM CHLORIDE, AND CALCIUM CHLORIDE 1000 ML: 600; 310; 30; 20 INJECTION, SOLUTION INTRAVENOUS at 05:05

## 2021-01-01 RX ADMIN — Medication 10 ML: at 05:14

## 2021-01-01 RX ADMIN — NYSTATIN 15 G: 100000 POWDER TOPICAL at 21:52

## 2021-01-01 RX ADMIN — Medication 10 ML: at 21:00

## 2021-01-01 RX ADMIN — ASPIRIN 81 MG: 81 TABLET, CHEWABLE ORAL at 09:29

## 2021-01-01 RX ADMIN — AMLODIPINE BESYLATE 10 MG: 5 TABLET ORAL at 09:44

## 2021-01-01 RX ADMIN — BUDESONIDE 500 MCG: 0.5 INHALANT RESPIRATORY (INHALATION) at 07:39

## 2021-01-01 RX ADMIN — BUDESONIDE 500 MCG: 0.5 INHALANT RESPIRATORY (INHALATION) at 21:10

## 2021-01-01 RX ADMIN — Medication 2000 UNITS: at 08:11

## 2021-01-01 RX ADMIN — ASPIRIN 81 MG: 81 TABLET, CHEWABLE ORAL at 08:52

## 2021-01-01 RX ADMIN — BUDESONIDE 500 MCG: 0.5 INHALANT RESPIRATORY (INHALATION) at 20:48

## 2021-01-01 RX ADMIN — HEPARIN SODIUM 5000 UNITS: 5000 INJECTION INTRAVENOUS; SUBCUTANEOUS at 18:03

## 2021-01-01 RX ADMIN — METRONIDAZOLE 500 MG: 250 TABLET ORAL at 17:44

## 2021-01-01 RX ADMIN — ARFORMOTEROL TARTRATE 15 MCG: 15 SOLUTION RESPIRATORY (INHALATION) at 07:39

## 2021-01-01 RX ADMIN — VANCOMYCIN HYDROCHLORIDE 125 MG: 1 INJECTION, POWDER, LYOPHILIZED, FOR SOLUTION INTRAVENOUS at 13:41

## 2021-01-01 RX ADMIN — FAMOTIDINE 20 MG: 20 TABLET ORAL at 10:14

## 2021-01-01 RX ADMIN — ATORVASTATIN CALCIUM 20 MG: 20 TABLET, FILM COATED ORAL at 22:18

## 2021-01-01 RX ADMIN — METOPROLOL SUCCINATE 50 MG: 50 TABLET, EXTENDED RELEASE ORAL at 21:53

## 2021-01-01 RX ADMIN — SODIUM BICARBONATE 50 MEQ: 84 INJECTION, SOLUTION INTRAVENOUS at 18:53

## 2021-01-01 RX ADMIN — BUDESONIDE 500 MCG: 0.5 INHALANT RESPIRATORY (INHALATION) at 19:40

## 2021-01-01 RX ADMIN — VANCOMYCIN HYDROCHLORIDE 125 MG: 1 INJECTION, POWDER, LYOPHILIZED, FOR SOLUTION INTRAVENOUS at 21:25

## 2021-01-01 RX ADMIN — Medication 10 ML: at 06:19

## 2021-01-01 RX ADMIN — METRONIDAZOLE 500 MG: 500 INJECTION, SOLUTION INTRAVENOUS at 00:41

## 2021-01-01 RX ADMIN — METOPROLOL SUCCINATE 25 MG: 25 TABLET, EXTENDED RELEASE ORAL at 22:27

## 2021-01-01 RX ADMIN — SODIUM CHLORIDE 1000 ML: 900 INJECTION, SOLUTION INTRAVENOUS at 04:08

## 2021-01-01 RX ADMIN — TRAZODONE HYDROCHLORIDE 50 MG: 50 TABLET ORAL at 22:27

## 2021-01-01 RX ADMIN — TRAZODONE HYDROCHLORIDE 50 MG: 50 TABLET ORAL at 21:51

## 2021-01-01 RX ADMIN — SODIUM CHLORIDE 932 ML: 9 INJECTION, SOLUTION INTRAVENOUS at 19:34

## 2021-01-01 RX ADMIN — PREGABALIN 75 MG: 75 CAPSULE ORAL at 08:45

## 2021-01-01 RX ADMIN — HEPARIN SODIUM 5000 UNITS: 5000 INJECTION INTRAVENOUS; SUBCUTANEOUS at 18:36

## 2021-01-01 RX ADMIN — VANCOMYCIN HYDROCHLORIDE 125 MG: 1 INJECTION, POWDER, LYOPHILIZED, FOR SOLUTION INTRAVENOUS at 00:15

## 2021-01-01 RX ADMIN — METRONIDAZOLE 500 MG: 250 TABLET ORAL at 16:31

## 2021-01-01 RX ADMIN — ALBUMIN (HUMAN) 25 G: 0.25 INJECTION, SOLUTION INTRAVENOUS at 23:21

## 2021-01-01 RX ADMIN — PIPERACILLIN AND TAZOBACTAM 3.38 G: 3; .375 INJECTION, POWDER, LYOPHILIZED, FOR SOLUTION INTRAVENOUS at 21:02

## 2021-01-01 RX ADMIN — VANCOMYCIN HYDROCHLORIDE 125 MG: 1 INJECTION, POWDER, LYOPHILIZED, FOR SOLUTION INTRAVENOUS at 14:06

## 2021-01-01 RX ADMIN — Medication 10 ML: at 21:38

## 2021-01-01 RX ADMIN — Medication 10 ML: at 15:49

## 2021-01-01 RX ADMIN — PREGABALIN 75 MG: 75 CAPSULE ORAL at 21:15

## 2021-01-01 RX ADMIN — FAMOTIDINE 20 MG: 20 TABLET ORAL at 01:29

## 2021-01-01 RX ADMIN — TRAZODONE HYDROCHLORIDE 50 MG: 50 TABLET ORAL at 01:29

## 2021-01-01 RX ADMIN — ALBUMIN (HUMAN) 25 G: 0.25 INJECTION, SOLUTION INTRAVENOUS at 06:01

## 2021-01-01 RX ADMIN — PREGABALIN 75 MG: 75 CAPSULE ORAL at 21:10

## 2021-01-01 RX ADMIN — ENOXAPARIN SODIUM 40 MG: 40 INJECTION SUBCUTANEOUS at 08:47

## 2021-01-01 RX ADMIN — SODIUM CHLORIDE 1000 ML: 9 INJECTION, SOLUTION INTRAVENOUS at 00:24

## 2021-01-01 RX ADMIN — TRAZODONE HYDROCHLORIDE 50 MG: 50 TABLET ORAL at 21:59

## 2021-01-01 RX ADMIN — HEPARIN SODIUM 5000 UNITS: 5000 INJECTION INTRAVENOUS; SUBCUTANEOUS at 03:00

## 2021-01-01 RX ADMIN — HEPARIN SODIUM 5000 UNITS: 5000 INJECTION INTRAVENOUS; SUBCUTANEOUS at 05:42

## 2021-01-01 RX ADMIN — HEPARIN SODIUM 5000 UNITS: 5000 INJECTION INTRAVENOUS; SUBCUTANEOUS at 02:00

## 2021-01-01 RX ADMIN — Medication 1 TABLET: at 09:36

## 2021-01-01 RX ADMIN — TRAZODONE HYDROCHLORIDE 50 MG: 50 TABLET ORAL at 22:21

## 2021-01-01 RX ADMIN — SODIUM CHLORIDE 125 ML/HR: 900 INJECTION, SOLUTION INTRAVENOUS at 13:40

## 2021-01-01 RX ADMIN — ASPIRIN 81 MG: 81 TABLET, CHEWABLE ORAL at 10:36

## 2021-01-01 RX ADMIN — TRAZODONE HYDROCHLORIDE 50 MG: 50 TABLET ORAL at 21:13

## 2021-01-01 RX ADMIN — VANCOMYCIN HYDROCHLORIDE 125 MG: 1 INJECTION, POWDER, LYOPHILIZED, FOR SOLUTION INTRAVENOUS at 12:21

## 2021-01-01 RX ADMIN — Medication 10 ML: at 06:21

## 2021-01-01 RX ADMIN — METOPROLOL SUCCINATE 50 MG: 50 TABLET, EXTENDED RELEASE ORAL at 13:58

## 2021-01-01 RX ADMIN — METRONIDAZOLE 500 MG: 250 TABLET ORAL at 10:57

## 2021-01-01 RX ADMIN — POTASSIUM CHLORIDE 20 MEQ: 20 TABLET, EXTENDED RELEASE ORAL at 01:00

## 2021-01-01 RX ADMIN — MIDODRINE HYDROCHLORIDE 5 MG: 2.5 TABLET ORAL at 10:37

## 2021-01-01 RX ADMIN — HEPARIN SODIUM 5000 UNITS: 5000 INJECTION INTRAVENOUS; SUBCUTANEOUS at 09:24

## 2021-01-01 RX ADMIN — SODIUM CHLORIDE 125 ML/HR: 900 INJECTION, SOLUTION INTRAVENOUS at 15:14

## 2021-01-01 RX ADMIN — FAMOTIDINE 20 MG: 20 TABLET ORAL at 10:57

## 2021-01-01 RX ADMIN — ARFORMOTEROL TARTRATE 15 MCG: 15 SOLUTION RESPIRATORY (INHALATION) at 08:25

## 2021-01-01 RX ADMIN — ALBUMIN (HUMAN) 12.5 G: 0.25 INJECTION, SOLUTION INTRAVENOUS at 02:14

## 2021-01-01 RX ADMIN — CALCIUM GLUCONATE 1000 MG: 20 INJECTION, SOLUTION INTRAVENOUS at 18:58

## 2021-01-01 RX ADMIN — HEPARIN SODIUM 5000 UNITS: 5000 INJECTION INTRAVENOUS; SUBCUTANEOUS at 09:44

## 2021-01-01 RX ADMIN — ASPIRIN 81 MG: 81 TABLET, CHEWABLE ORAL at 09:08

## 2021-01-01 RX ADMIN — HEPARIN SODIUM 5000 UNITS: 5000 INJECTION INTRAVENOUS; SUBCUTANEOUS at 18:53

## 2021-01-01 RX ADMIN — ENOXAPARIN SODIUM 40 MG: 40 INJECTION SUBCUTANEOUS at 08:35

## 2021-01-01 RX ADMIN — ARFORMOTEROL TARTRATE 15 MCG: 15 SOLUTION RESPIRATORY (INHALATION) at 19:33

## 2021-01-01 RX ADMIN — PREGABALIN 75 MG: 75 CAPSULE ORAL at 22:18

## 2021-01-01 RX ADMIN — LEVOFLOXACIN 500 MG: 500 INJECTION, SOLUTION INTRAVENOUS at 15:33

## 2021-01-01 RX ADMIN — Medication 1 TABLET: at 08:52

## 2021-01-01 RX ADMIN — NYSTATIN: 100000 POWDER TOPICAL at 22:06

## 2021-01-01 RX ADMIN — HEPARIN SODIUM 5000 UNITS: 5000 INJECTION INTRAVENOUS; SUBCUTANEOUS at 02:07

## 2021-01-01 RX ADMIN — GADOBENATE DIMEGLUMINE 15 ML: 529 INJECTION, SOLUTION INTRAVENOUS at 13:00

## 2021-01-01 RX ADMIN — TRAZODONE HYDROCHLORIDE 50 MG: 50 TABLET ORAL at 21:02

## 2021-01-01 RX ADMIN — VANCOMYCIN HYDROCHLORIDE 125 MG: 1 INJECTION, POWDER, LYOPHILIZED, FOR SOLUTION INTRAVENOUS at 12:52

## 2021-01-01 RX ADMIN — Medication 10 ML: at 06:05

## 2021-01-01 RX ADMIN — SODIUM CHLORIDE 1000 ML: 9 INJECTION, SOLUTION INTRAVENOUS at 17:20

## 2021-01-01 RX ADMIN — PIPERACILLIN AND TAZOBACTAM 3.38 G: 3; .375 INJECTION, POWDER, LYOPHILIZED, FOR SOLUTION INTRAVENOUS at 06:58

## 2021-01-01 RX ADMIN — Medication 10 ML: at 05:46

## 2021-01-01 RX ADMIN — HEPARIN SODIUM 5000 UNITS: 5000 INJECTION INTRAVENOUS; SUBCUTANEOUS at 17:55

## 2021-01-01 RX ADMIN — VANCOMYCIN HYDROCHLORIDE 750 MG: 750 INJECTION, POWDER, LYOPHILIZED, FOR SOLUTION INTRAVENOUS at 21:39

## 2021-01-01 RX ADMIN — BUDESONIDE 500 MCG: 0.5 INHALANT RESPIRATORY (INHALATION) at 07:29

## 2021-01-01 RX ADMIN — HEPARIN SODIUM 5000 UNITS: 5000 INJECTION INTRAVENOUS; SUBCUTANEOUS at 12:07

## 2021-01-01 RX ADMIN — NYSTATIN: 100000 POWDER TOPICAL at 21:27

## 2021-01-01 RX ADMIN — ATORVASTATIN CALCIUM 20 MG: 20 TABLET, FILM COATED ORAL at 21:49

## 2021-01-01 RX ADMIN — CEFTRIAXONE 1 G: 1 INJECTION, POWDER, FOR SOLUTION INTRAMUSCULAR; INTRAVENOUS at 15:33

## 2021-01-01 RX ADMIN — Medication 10 ML: at 22:24

## 2021-01-01 RX ADMIN — ENOXAPARIN SODIUM 40 MG: 40 INJECTION SUBCUTANEOUS at 10:07

## 2021-01-01 RX ADMIN — NYSTATIN: 100000 POWDER TOPICAL at 08:52

## 2021-01-01 RX ADMIN — Medication 10 ML: at 22:01

## 2021-01-01 RX ADMIN — ASPIRIN 81 MG: 81 TABLET, CHEWABLE ORAL at 08:36

## 2021-01-01 RX ADMIN — METRONIDAZOLE 500 MG: 500 INJECTION, SOLUTION INTRAVENOUS at 03:22

## 2021-01-01 RX ADMIN — Medication 1 TABLET: at 08:34

## 2021-01-01 RX ADMIN — Medication 10 ML: at 05:22

## 2021-01-01 RX ADMIN — HYDRALAZINE HYDROCHLORIDE 50 MG: 50 TABLET, FILM COATED ORAL at 20:12

## 2021-01-01 RX ADMIN — HEPARIN SODIUM 5000 UNITS: 5000 INJECTION INTRAVENOUS; SUBCUTANEOUS at 13:38

## 2021-01-01 RX ADMIN — LIDOCAINE HYDROCHLORIDE 1 G: 10 INJECTION, SOLUTION INFILTRATION; PERINEURAL at 16:06

## 2021-01-01 RX ADMIN — METHYLPREDNISOLONE SODIUM SUCCINATE 40 MG: 40 INJECTION, POWDER, FOR SOLUTION INTRAMUSCULAR; INTRAVENOUS at 06:00

## 2021-01-01 RX ADMIN — TRAZODONE HYDROCHLORIDE 50 MG: 50 TABLET ORAL at 20:44

## 2021-01-01 RX ADMIN — ATORVASTATIN CALCIUM 20 MG: 20 TABLET, FILM COATED ORAL at 21:26

## 2021-01-01 RX ADMIN — ATORVASTATIN CALCIUM 20 MG: 20 TABLET, FILM COATED ORAL at 21:13

## 2021-01-01 RX ADMIN — PIPERACILLIN AND TAZOBACTAM 4.5 G: 4; .5 INJECTION, POWDER, LYOPHILIZED, FOR SOLUTION INTRAVENOUS at 15:14

## 2021-01-01 RX ADMIN — ASPIRIN 81 MG: 81 TABLET, CHEWABLE ORAL at 09:41

## 2021-01-01 RX ADMIN — NOREPINEPHRINE BITARTRATE 30 MCG/MIN: 1 INJECTION, SOLUTION, CONCENTRATE INTRAVENOUS at 17:23

## 2021-01-01 RX ADMIN — Medication 10 ML: at 21:20

## 2021-01-01 RX ADMIN — SODIUM CHLORIDE 50 ML/HR: 900 INJECTION, SOLUTION INTRAVENOUS at 21:49

## 2021-01-01 RX ADMIN — ACETAMINOPHEN 650 MG: 325 TABLET ORAL at 08:48

## 2021-01-01 RX ADMIN — ALBUMIN (HUMAN) 25 G: 0.25 INJECTION, SOLUTION INTRAVENOUS at 17:23

## 2021-01-01 RX ADMIN — Medication 10 ML: at 21:31

## 2021-01-01 RX ADMIN — Medication 2 UNITS: at 16:30

## 2021-01-01 RX ADMIN — AMLODIPINE BESYLATE 10 MG: 5 TABLET ORAL at 09:40

## 2021-01-01 RX ADMIN — FAMOTIDINE 20 MG: 20 TABLET ORAL at 10:32

## 2021-01-01 RX ADMIN — SODIUM CHLORIDE 150 ML/HR: 9 INJECTION, SOLUTION INTRAVENOUS at 20:43

## 2021-01-01 RX ADMIN — VANCOMYCIN HYDROCHLORIDE 500 MG: 1 INJECTION, POWDER, LYOPHILIZED, FOR SOLUTION INTRAVENOUS at 09:39

## 2021-01-01 RX ADMIN — SODIUM CHLORIDE 125 ML/HR: 900 INJECTION, SOLUTION INTRAVENOUS at 21:41

## 2021-01-01 RX ADMIN — Medication 2 UNITS: at 18:03

## 2021-01-01 RX ADMIN — Medication 10 ML: at 06:08

## 2021-01-01 RX ADMIN — Medication 10 ML: at 06:26

## 2021-01-01 RX ADMIN — Medication 10 ML: at 00:00

## 2021-01-01 RX ADMIN — METRONIDAZOLE 500 MG: 250 TABLET ORAL at 10:36

## 2021-01-01 RX ADMIN — TRAZODONE HYDROCHLORIDE 50 MG: 50 TABLET ORAL at 21:26

## 2021-01-01 RX ADMIN — VANCOMYCIN HYDROCHLORIDE 125 MG: 1 INJECTION, POWDER, LYOPHILIZED, FOR SOLUTION INTRAVENOUS at 06:58

## 2021-01-01 RX ADMIN — ASPIRIN 81 MG: 81 TABLET, CHEWABLE ORAL at 09:44

## 2021-01-01 RX ADMIN — TRAZODONE HYDROCHLORIDE 50 MG: 50 TABLET ORAL at 21:10

## 2021-01-01 RX ADMIN — Medication 10 ML: at 06:01

## 2021-01-01 RX ADMIN — METOPROLOL SUCCINATE 25 MG: 25 TABLET, EXTENDED RELEASE ORAL at 22:06

## 2021-01-01 RX ADMIN — METOPROLOL SUCCINATE 50 MG: 50 TABLET, EXTENDED RELEASE ORAL at 21:59

## 2021-01-01 RX ADMIN — FAMOTIDINE 20 MG: 20 TABLET ORAL at 09:24

## 2021-01-01 RX ADMIN — PREGABALIN 75 MG: 75 CAPSULE ORAL at 21:51

## 2021-01-01 RX ADMIN — ARFORMOTEROL TARTRATE 15 MCG: 15 SOLUTION RESPIRATORY (INHALATION) at 21:58

## 2021-01-01 RX ADMIN — Medication 2 UNITS: at 12:37

## 2021-01-01 RX ADMIN — ACETAMINOPHEN 650 MG: 325 TABLET ORAL at 05:59

## 2021-01-01 RX ADMIN — HEPARIN SODIUM 5000 UNITS: 5000 INJECTION INTRAVENOUS; SUBCUTANEOUS at 03:25

## 2021-01-01 RX ADMIN — VANCOMYCIN HYDROCHLORIDE 125 MG: 1 INJECTION, POWDER, LYOPHILIZED, FOR SOLUTION INTRAVENOUS at 17:36

## 2021-01-01 RX ADMIN — ATORVASTATIN CALCIUM 20 MG: 20 TABLET, FILM COATED ORAL at 21:39

## 2021-01-01 RX ADMIN — Medication 10 ML: at 05:52

## 2021-01-01 RX ADMIN — IPRATROPIUM BROMIDE AND ALBUTEROL SULFATE 3 ML: .5; 3 SOLUTION RESPIRATORY (INHALATION) at 21:46

## 2021-01-01 RX ADMIN — CEFTRIAXONE 1 G: 1 INJECTION, POWDER, FOR SOLUTION INTRAMUSCULAR; INTRAVENOUS at 14:23

## 2021-01-01 RX ADMIN — AMLODIPINE BESYLATE 10 MG: 5 TABLET ORAL at 08:45

## 2021-01-01 RX ADMIN — FAMOTIDINE 20 MG: 20 TABLET ORAL at 09:33

## 2021-01-01 RX ADMIN — VANCOMYCIN HYDROCHLORIDE 125 MG: 1 INJECTION, POWDER, LYOPHILIZED, FOR SOLUTION INTRAVENOUS at 17:48

## 2021-01-01 RX ADMIN — ALBUMIN (HUMAN) 25 G: 0.25 INJECTION, SOLUTION INTRAVENOUS at 11:53

## 2021-01-01 RX ADMIN — ALBUMIN (HUMAN) 25 G: 0.25 INJECTION, SOLUTION INTRAVENOUS at 11:25

## 2021-01-01 RX ADMIN — TRAZODONE HYDROCHLORIDE 50 MG: 50 TABLET ORAL at 21:15

## 2021-01-01 RX ADMIN — LEVOFLOXACIN 500 MG: 500 INJECTION, SOLUTION INTRAVENOUS at 15:22

## 2021-01-01 RX ADMIN — FAMOTIDINE 20 MG: 20 TABLET ORAL at 08:40

## 2021-01-01 RX ADMIN — TRAZODONE HYDROCHLORIDE 50 MG: 50 TABLET ORAL at 22:05

## 2021-01-01 RX ADMIN — METRONIDAZOLE 500 MG: 250 TABLET ORAL at 10:13

## 2021-01-01 RX ADMIN — SODIUM ZIRCONIUM CYCLOSILICATE 10 G: 5 POWDER, FOR SUSPENSION ORAL at 21:02

## 2021-01-01 RX ADMIN — PREGABALIN 75 MG: 75 CAPSULE ORAL at 21:57

## 2021-01-01 RX ADMIN — TRAZODONE HYDROCHLORIDE 50 MG: 50 TABLET ORAL at 21:57

## 2021-01-01 RX ADMIN — Medication 10 ML: at 07:34

## 2021-01-01 RX ADMIN — METRONIDAZOLE 500 MG: 250 TABLET ORAL at 21:44

## 2021-01-01 RX ADMIN — ASPIRIN 81 MG: 81 TABLET, CHEWABLE ORAL at 10:39

## 2021-01-01 RX ADMIN — METRONIDAZOLE 500 MG: 250 TABLET ORAL at 17:32

## 2021-01-01 RX ADMIN — ARFORMOTEROL TARTRATE 15 MCG: 15 SOLUTION RESPIRATORY (INHALATION) at 08:22

## 2021-01-01 RX ADMIN — Medication 2000 UNITS: at 08:51

## 2021-01-01 RX ADMIN — INSULIN LISPRO 4 UNITS: 100 INJECTION, SOLUTION INTRAVENOUS; SUBCUTANEOUS at 06:05

## 2021-01-01 RX ADMIN — WATER 1 G: 1 INJECTION INTRAMUSCULAR; INTRAVENOUS; SUBCUTANEOUS at 11:00

## 2021-01-01 RX ADMIN — LEVOFLOXACIN 750 MG: 5 INJECTION, SOLUTION INTRAVENOUS at 19:03

## 2021-01-01 RX ADMIN — TRAZODONE HYDROCHLORIDE 50 MG: 50 TABLET ORAL at 21:49

## 2021-01-01 RX ADMIN — FAMOTIDINE 20 MG: 20 TABLET ORAL at 09:54

## 2021-01-01 RX ADMIN — ALBUMIN (HUMAN) 25 G: 0.25 INJECTION, SOLUTION INTRAVENOUS at 17:38

## 2021-01-01 RX ADMIN — HEPARIN SODIUM 5000 UNITS: 5000 INJECTION INTRAVENOUS; SUBCUTANEOUS at 01:56

## 2021-01-01 RX ADMIN — ATORVASTATIN CALCIUM 20 MG: 20 TABLET, FILM COATED ORAL at 22:49

## 2021-01-01 RX ADMIN — ASPIRIN 81 MG: 81 TABLET, CHEWABLE ORAL at 08:41

## 2021-01-01 RX ADMIN — TRAZODONE HYDROCHLORIDE 50 MG: 50 TABLET ORAL at 21:48

## 2021-01-01 RX ADMIN — METOPROLOL SUCCINATE 25 MG: 25 TABLET, EXTENDED RELEASE ORAL at 22:00

## 2021-01-01 RX ADMIN — ENOXAPARIN SODIUM 40 MG: 40 INJECTION SUBCUTANEOUS at 09:11

## 2021-01-01 RX ADMIN — ENOXAPARIN SODIUM 40 MG: 40 INJECTION SUBCUTANEOUS at 08:52

## 2021-01-01 RX ADMIN — GADOBENATE DIMEGLUMINE 15 ML: 529 INJECTION, SOLUTION INTRAVENOUS at 12:29

## 2021-01-01 RX ADMIN — CEFEPIME HYDROCHLORIDE 2 G: 2 INJECTION, POWDER, FOR SOLUTION INTRAVENOUS at 19:32

## 2021-01-01 RX ADMIN — PIPERACILLIN AND TAZOBACTAM 4.5 G: 4; .5 INJECTION, POWDER, LYOPHILIZED, FOR SOLUTION INTRAVENOUS at 05:45

## 2021-01-01 RX ADMIN — CEFTRIAXONE 1 G: 1 INJECTION, POWDER, FOR SOLUTION INTRAMUSCULAR; INTRAVENOUS at 17:21

## 2021-01-01 RX ADMIN — ASPIRIN 81 MG: 81 TABLET, CHEWABLE ORAL at 10:13

## 2021-01-01 RX ADMIN — ACETAMINOPHEN 650 MG: 325 TABLET ORAL at 16:53

## 2021-01-01 RX ADMIN — SODIUM CHLORIDE 50 ML/HR: 900 INJECTION, SOLUTION INTRAVENOUS at 20:16

## 2021-01-01 RX ADMIN — TRAZODONE HYDROCHLORIDE 50 MG: 50 TABLET ORAL at 22:18

## 2021-01-01 RX ADMIN — SODIUM CHLORIDE 500 ML: 900 INJECTION, SOLUTION INTRAVENOUS at 13:54

## 2021-01-01 RX ADMIN — WATER 1 G: 1 INJECTION INTRAMUSCULAR; INTRAVENOUS; SUBCUTANEOUS at 04:18

## 2021-01-01 RX ADMIN — FAMOTIDINE 20 MG: 20 TABLET ORAL at 08:45

## 2021-01-01 RX ADMIN — ENOXAPARIN SODIUM 40 MG: 100 INJECTION SUBCUTANEOUS at 09:09

## 2021-01-01 RX ADMIN — METRONIDAZOLE 500 MG: 250 TABLET ORAL at 01:32

## 2021-01-01 RX ADMIN — AMLODIPINE BESYLATE 10 MG: 5 TABLET ORAL at 08:52

## 2021-01-01 RX ADMIN — Medication 10 ML: at 21:41

## 2021-01-01 RX ADMIN — METRONIDAZOLE 500 MG: 250 TABLET ORAL at 10:35

## 2021-01-01 RX ADMIN — Medication 1 TABLET: at 10:39

## 2021-01-01 RX ADMIN — FAMOTIDINE 20 MG: 20 TABLET ORAL at 08:48

## 2021-01-01 RX ADMIN — ATORVASTATIN CALCIUM 20 MG: 20 TABLET, FILM COATED ORAL at 22:01

## 2021-01-01 RX ADMIN — ENOXAPARIN SODIUM 40 MG: 40 INJECTION SUBCUTANEOUS at 10:39

## 2021-01-01 RX ADMIN — ATORVASTATIN CALCIUM 20 MG: 20 TABLET, FILM COATED ORAL at 21:53

## 2021-01-01 RX ADMIN — METOPROLOL SUCCINATE 25 MG: 25 TABLET, EXTENDED RELEASE ORAL at 21:13

## 2021-01-01 RX ADMIN — HEPARIN SODIUM 5000 UNITS: 5000 INJECTION INTRAVENOUS; SUBCUTANEOUS at 01:40

## 2021-01-01 RX ADMIN — Medication 10 ML: at 22:00

## 2021-01-01 RX ADMIN — NYSTATIN: 100000 POWDER TOPICAL at 21:00

## 2021-01-01 RX ADMIN — Medication 10 ML: at 22:03

## 2021-01-01 RX ADMIN — MORPHINE SULFATE 2 MG: 2 INJECTION, SOLUTION INTRAMUSCULAR; INTRAVENOUS at 21:30

## 2021-01-01 RX ADMIN — HEPARIN SODIUM 5000 UNITS: 5000 INJECTION INTRAVENOUS; SUBCUTANEOUS at 22:04

## 2021-01-01 RX ADMIN — Medication 2000 UNITS: at 08:59

## 2021-01-01 RX ADMIN — SODIUM CHLORIDE, SODIUM LACTATE, POTASSIUM CHLORIDE, AND CALCIUM CHLORIDE 25 ML/HR: 600; 310; 30; 20 INJECTION, SOLUTION INTRAVENOUS at 19:35

## 2021-01-01 RX ADMIN — SODIUM CHLORIDE, SODIUM LACTATE, POTASSIUM CHLORIDE, AND CALCIUM CHLORIDE 100 ML/HR: 600; 310; 30; 20 INJECTION, SOLUTION INTRAVENOUS at 08:20

## 2021-01-01 RX ADMIN — METRONIDAZOLE 500 MG: 250 TABLET ORAL at 09:44

## 2021-01-01 RX ADMIN — Medication 10 ML: at 06:46

## 2021-01-01 RX ADMIN — METOPROLOL SUCCINATE 25 MG: 25 TABLET, EXTENDED RELEASE ORAL at 22:49

## 2021-01-01 RX ADMIN — ASPIRIN 81 MG: 81 TABLET, CHEWABLE ORAL at 09:36

## 2021-01-01 RX ADMIN — FUROSEMIDE 20 MG: 10 INJECTION, SOLUTION INTRAMUSCULAR; INTRAVENOUS at 08:45

## 2021-01-01 RX ADMIN — BUDESONIDE 500 MCG: 0.5 INHALANT RESPIRATORY (INHALATION) at 08:25

## 2021-01-01 RX ADMIN — Medication 10 ML: at 07:00

## 2021-01-01 RX ADMIN — NOREPINEPHRINE BITARTRATE 4 MCG/MIN: 1 INJECTION, SOLUTION, CONCENTRATE INTRAVENOUS at 18:00

## 2021-01-01 RX ADMIN — FUROSEMIDE 20 MG: 10 INJECTION, SOLUTION INTRAMUSCULAR; INTRAVENOUS at 08:19

## 2021-01-01 RX ADMIN — VANCOMYCIN HYDROCHLORIDE 1250 MG: 10 INJECTION, POWDER, LYOPHILIZED, FOR SOLUTION INTRAVENOUS at 22:04

## 2021-01-01 RX ADMIN — ATORVASTATIN CALCIUM 20 MG: 20 TABLET, FILM COATED ORAL at 21:30

## 2021-01-01 RX ADMIN — TRAZODONE HYDROCHLORIDE 50 MG: 50 TABLET ORAL at 21:52

## 2021-01-01 RX ADMIN — INSULIN HUMAN 10 UNITS: 100 INJECTION, SOLUTION PARENTERAL at 18:42

## 2021-01-01 RX ADMIN — Medication 1 TABLET: at 08:40

## 2021-01-01 RX ADMIN — CEFEPIME HYDROCHLORIDE 2 G: 2 INJECTION, POWDER, FOR SOLUTION INTRAVENOUS at 01:03

## 2021-01-01 RX ADMIN — Medication 2000 UNITS: at 09:36

## 2021-01-01 RX ADMIN — ATORVASTATIN CALCIUM 20 MG: 20 TABLET, FILM COATED ORAL at 21:59

## 2021-01-01 RX ADMIN — TRAZODONE HYDROCHLORIDE 50 MG: 50 TABLET ORAL at 22:06

## 2021-01-01 RX ADMIN — METRONIDAZOLE 500 MG: 500 INJECTION, SOLUTION INTRAVENOUS at 16:07

## 2021-01-01 RX ADMIN — IOPAMIDOL 80 ML: 755 INJECTION, SOLUTION INTRAVENOUS at 22:41

## 2021-01-01 RX ADMIN — SODIUM CHLORIDE 100 ML/HR: 900 INJECTION, SOLUTION INTRAVENOUS at 11:04

## 2021-01-01 RX ADMIN — FAMOTIDINE 20 MG: 20 TABLET ORAL at 09:29

## 2021-01-01 RX ADMIN — VANCOMYCIN HYDROCHLORIDE 125 MG: 1 INJECTION, POWDER, LYOPHILIZED, FOR SOLUTION INTRAVENOUS at 01:41

## 2021-01-01 RX ADMIN — LEVOFLOXACIN 500 MG: 500 TABLET, FILM COATED ORAL at 14:38

## 2021-01-01 RX ADMIN — ASPIRIN 81 MG: 81 TABLET, CHEWABLE ORAL at 08:19

## 2021-01-01 RX ADMIN — METRONIDAZOLE 500 MG: 250 TABLET ORAL at 09:03

## 2021-01-01 RX ADMIN — PREGABALIN 75 MG: 75 CAPSULE ORAL at 08:41

## 2021-01-01 RX ADMIN — VANCOMYCIN HYDROCHLORIDE 125 MG: 1 INJECTION, POWDER, LYOPHILIZED, FOR SOLUTION INTRAVENOUS at 12:44

## 2021-01-01 RX ADMIN — VANCOMYCIN HYDROCHLORIDE 500 MG: 1 INJECTION, POWDER, LYOPHILIZED, FOR SOLUTION INTRAVENOUS at 13:00

## 2021-01-01 RX ADMIN — SODIUM CHLORIDE 125 ML/HR: 900 INJECTION, SOLUTION INTRAVENOUS at 11:11

## 2021-01-01 RX ADMIN — Medication 10 ML: at 13:59

## 2021-01-01 RX ADMIN — ANTACID TABLETS 200 MG: 500 TABLET, CHEWABLE ORAL at 20:16

## 2021-01-01 RX ADMIN — Medication 10 ML: at 23:23

## 2021-01-01 RX ADMIN — PREGABALIN 75 MG: 75 CAPSULE ORAL at 00:42

## 2021-01-01 RX ADMIN — VANCOMYCIN HYDROCHLORIDE 125 MG: 1 INJECTION, POWDER, LYOPHILIZED, FOR SOLUTION INTRAVENOUS at 00:36

## 2021-01-01 RX ADMIN — POTASSIUM CHLORIDE 40 MEQ: 1500 TABLET, EXTENDED RELEASE ORAL at 11:16

## 2021-01-01 RX ADMIN — PREGABALIN 75 MG: 75 CAPSULE ORAL at 08:19

## 2021-01-01 RX ADMIN — METRONIDAZOLE 500 MG: 250 TABLET ORAL at 21:25

## 2021-01-01 RX ADMIN — BUDESONIDE 500 MCG: 0.5 INHALANT RESPIRATORY (INHALATION) at 21:58

## 2021-01-01 RX ADMIN — FUROSEMIDE 20 MG: 10 INJECTION, SOLUTION INTRAMUSCULAR; INTRAVENOUS at 11:25

## 2021-01-01 RX ADMIN — VANCOMYCIN HYDROCHLORIDE 125 MG: 1 INJECTION, POWDER, LYOPHILIZED, FOR SOLUTION INTRAVENOUS at 01:54

## 2021-01-01 RX ADMIN — Medication 10 ML: at 15:06

## 2021-01-01 RX ADMIN — NYSTATIN: 100000 POWDER TOPICAL at 21:38

## 2021-01-01 RX ADMIN — ALBUMIN (HUMAN) 25 G: 0.25 INJECTION, SOLUTION INTRAVENOUS at 05:06

## 2021-01-01 RX ADMIN — FAMOTIDINE 20 MG: 20 TABLET ORAL at 10:36

## 2021-01-01 RX ADMIN — ASPIRIN 81 MG: 81 TABLET, CHEWABLE ORAL at 08:35

## 2021-01-01 RX ADMIN — ENOXAPARIN SODIUM 40 MG: 40 INJECTION SUBCUTANEOUS at 09:03

## 2021-01-01 RX ADMIN — VANCOMYCIN HYDROCHLORIDE 125 MG: 1 INJECTION, POWDER, LYOPHILIZED, FOR SOLUTION INTRAVENOUS at 01:27

## 2021-01-01 RX ADMIN — ATORVASTATIN CALCIUM 20 MG: 20 TABLET, FILM COATED ORAL at 21:44

## 2021-01-01 RX ADMIN — HEPARIN SODIUM 5000 UNITS: 5000 INJECTION INTRAVENOUS; SUBCUTANEOUS at 09:54

## 2021-01-01 RX ADMIN — HEPARIN SODIUM 5000 UNITS: 5000 INJECTION INTRAVENOUS; SUBCUTANEOUS at 01:10

## 2021-01-01 RX ADMIN — Medication 2 UNITS: at 11:30

## 2021-01-01 RX ADMIN — LORAZEPAM 0.5 MG: 2 INJECTION INTRAMUSCULAR; INTRAVENOUS at 20:25

## 2021-01-01 RX ADMIN — Medication 10 ML: at 14:58

## 2021-01-01 RX ADMIN — METRONIDAZOLE 500 MG: 500 INJECTION, SOLUTION INTRAVENOUS at 09:33

## 2021-01-01 RX ADMIN — BUDESONIDE 500 MCG: 0.5 INHALANT RESPIRATORY (INHALATION) at 19:33

## 2021-01-01 RX ADMIN — BUDESONIDE 500 MCG: 0.5 INHALANT RESPIRATORY (INHALATION) at 08:22

## 2021-01-01 RX ADMIN — SODIUM CHLORIDE, SODIUM LACTATE, POTASSIUM CHLORIDE, AND CALCIUM CHLORIDE 100 ML/HR: 600; 310; 30; 20 INJECTION, SOLUTION INTRAVENOUS at 21:47

## 2021-01-01 RX ADMIN — ALBUMIN (HUMAN) 25 G: 0.25 INJECTION, SOLUTION INTRAVENOUS at 06:12

## 2021-01-01 RX ADMIN — METOPROLOL SUCCINATE 50 MG: 50 TABLET, EXTENDED RELEASE ORAL at 21:30

## 2021-01-01 RX ADMIN — PIPERACILLIN AND TAZOBACTAM 4.5 G: 4; .5 INJECTION, POWDER, LYOPHILIZED, FOR SOLUTION INTRAVENOUS at 00:12

## 2021-01-01 RX ADMIN — Medication 2000 UNITS: at 10:39

## 2021-01-01 RX ADMIN — FAMOTIDINE 20 MG: 20 TABLET ORAL at 09:44

## 2021-01-01 RX ADMIN — METRONIDAZOLE 500 MG: 500 INJECTION, SOLUTION INTRAVENOUS at 21:00

## 2021-01-01 RX ADMIN — HEPARIN SODIUM 5000 UNITS: 5000 INJECTION INTRAVENOUS; SUBCUTANEOUS at 18:30

## 2021-01-01 RX ADMIN — ANTACID TABLETS 200 MG: 500 TABLET, CHEWABLE ORAL at 18:21

## 2021-01-01 RX ADMIN — Medication 10 ML: at 21:59

## 2021-01-01 RX ADMIN — FAMOTIDINE 20 MG: 20 TABLET ORAL at 09:09

## 2021-01-01 RX ADMIN — METRONIDAZOLE 500 MG: 250 TABLET ORAL at 22:06

## 2021-01-01 RX ADMIN — ALBUMIN (HUMAN) 25 G: 0.25 INJECTION, SOLUTION INTRAVENOUS at 12:43

## 2021-01-01 RX ADMIN — SODIUM CHLORIDE 1932 ML: 9 INJECTION, SOLUTION INTRAVENOUS at 19:26

## 2021-01-01 RX ADMIN — METRONIDAZOLE 500 MG: 250 TABLET ORAL at 22:01

## 2021-01-01 RX ADMIN — VANCOMYCIN HYDROCHLORIDE 125 MG: 1 INJECTION, POWDER, LYOPHILIZED, FOR SOLUTION INTRAVENOUS at 06:12

## 2021-01-01 RX ADMIN — SODIUM CHLORIDE 1000 ML: 9 INJECTION, SOLUTION INTRAVENOUS at 18:31

## 2021-01-01 RX ADMIN — VANCOMYCIN HYDROCHLORIDE 125 MG: 1 INJECTION, POWDER, LYOPHILIZED, FOR SOLUTION INTRAVENOUS at 06:29

## 2021-01-01 RX ADMIN — ONDANSETRON 4 MG: 2 INJECTION INTRAMUSCULAR; INTRAVENOUS at 21:16

## 2021-01-01 RX ADMIN — SODIUM CHLORIDE 125 ML/HR: 900 INJECTION, SOLUTION INTRAVENOUS at 22:00

## 2021-01-01 RX ADMIN — PREGABALIN 75 MG: 75 CAPSULE ORAL at 21:48

## 2021-01-01 RX ADMIN — HEPARIN SODIUM 5000 UNITS: 5000 INJECTION INTRAVENOUS; SUBCUTANEOUS at 10:19

## 2021-01-01 RX ADMIN — VANCOMYCIN HYDROCHLORIDE 125 MG: 1 INJECTION, POWDER, LYOPHILIZED, FOR SOLUTION INTRAVENOUS at 18:27

## 2021-01-01 RX ADMIN — ANTACID TABLETS 200 MG: 500 TABLET, CHEWABLE ORAL at 22:22

## 2021-01-01 RX ADMIN — Medication 10 ML: at 22:50

## 2021-01-01 RX ADMIN — HEPARIN SODIUM 5000 UNITS: 5000 INJECTION INTRAVENOUS; SUBCUTANEOUS at 13:44

## 2021-01-01 RX ADMIN — CEFEPIME HYDROCHLORIDE 2 G: 2 INJECTION, POWDER, FOR SOLUTION INTRAVENOUS at 16:23

## 2021-01-01 RX ADMIN — Medication 10 ML: at 05:08

## 2021-01-01 RX ADMIN — TRAZODONE HYDROCHLORIDE 50 MG: 50 TABLET ORAL at 21:44

## 2021-01-01 RX ADMIN — Medication 10 ML: at 02:29

## 2021-01-01 RX ADMIN — VASOPRESSIN 0.03 UNITS/MIN: 20 INJECTION INTRAVENOUS at 18:22

## 2021-01-01 RX ADMIN — Medication 5 ML: at 05:44

## 2021-01-01 RX ADMIN — ATORVASTATIN CALCIUM 20 MG: 20 TABLET, FILM COATED ORAL at 21:40

## 2021-01-01 RX ADMIN — LIDOCAINE HYDROCHLORIDE 1 G: 10 INJECTION, SOLUTION EPIDURAL; INFILTRATION; INTRACAUDAL; PERINEURAL at 15:11

## 2021-01-01 RX ADMIN — BUDESONIDE 500 MCG: 0.5 INHALANT RESPIRATORY (INHALATION) at 20:00

## 2021-01-01 RX ADMIN — Medication 10 ML: at 01:32

## 2021-01-01 RX ADMIN — HEPARIN SODIUM 5000 UNITS: 5000 INJECTION INTRAVENOUS; SUBCUTANEOUS at 21:10

## 2021-01-01 RX ADMIN — Medication 10 ML: at 05:53

## 2021-01-01 RX ADMIN — POTASSIUM CHLORIDE 40 MEQ: 1500 TABLET, EXTENDED RELEASE ORAL at 14:58

## 2021-01-01 RX ADMIN — ARFORMOTEROL TARTRATE 15 MCG: 15 SOLUTION RESPIRATORY (INHALATION) at 20:48

## 2021-01-01 RX ADMIN — PERFLUTREN 1 ML: 6.52 INJECTION, SUSPENSION INTRAVENOUS at 09:27

## 2021-01-01 RX ADMIN — INSULIN LISPRO 4 UNITS: 100 INJECTION, SOLUTION INTRAVENOUS; SUBCUTANEOUS at 18:25

## 2021-01-01 RX ADMIN — METOPROLOL SUCCINATE 50 MG: 50 TABLET, EXTENDED RELEASE ORAL at 21:25

## 2021-01-01 RX ADMIN — VANCOMYCIN HYDROCHLORIDE 125 MG: 1 INJECTION, POWDER, LYOPHILIZED, FOR SOLUTION INTRAVENOUS at 03:18

## 2021-01-01 RX ADMIN — Medication 10 ML: at 05:03

## 2021-01-01 RX ADMIN — TRAZODONE HYDROCHLORIDE 50 MG: 50 TABLET ORAL at 22:01

## 2021-01-01 RX ADMIN — ALBUMIN (HUMAN) 25 G: 0.25 INJECTION, SOLUTION INTRAVENOUS at 05:43

## 2021-01-01 RX ADMIN — SODIUM CHLORIDE 1000 ML: 9 INJECTION, SOLUTION INTRAVENOUS at 13:43

## 2021-01-01 RX ADMIN — Medication 10 ML: at 16:26

## 2021-01-01 RX ADMIN — ANTACID TABLETS 200 MG: 500 TABLET, CHEWABLE ORAL at 15:37

## 2021-01-01 RX ADMIN — HEPARIN SODIUM 5000 UNITS: 5000 INJECTION INTRAVENOUS; SUBCUTANEOUS at 02:59

## 2021-01-01 RX ADMIN — Medication 10 ML: at 22:28

## 2021-01-01 RX ADMIN — HEPARIN SODIUM 5000 UNITS: 5000 INJECTION INTRAVENOUS; SUBCUTANEOUS at 10:32

## 2021-01-01 RX ADMIN — NOREPINEPHRINE BITARTRATE 23 MCG/MIN: 1 INJECTION, SOLUTION, CONCENTRATE INTRAVENOUS at 10:56

## 2021-01-01 RX ADMIN — Medication 1 TABLET: at 09:11

## 2021-01-01 RX ADMIN — Medication 10 ML: at 21:53

## 2021-01-01 RX ADMIN — METOPROLOL SUCCINATE 25 MG: 25 TABLET, EXTENDED RELEASE ORAL at 21:52

## 2021-01-01 RX ADMIN — CEFEPIME HYDROCHLORIDE 2 G: 2 INJECTION, POWDER, FOR SOLUTION INTRAVENOUS at 14:52

## 2021-01-01 RX ADMIN — METOPROLOL SUCCINATE 25 MG: 25 TABLET, EXTENDED RELEASE ORAL at 21:49

## 2021-01-01 RX ADMIN — HEPARIN SODIUM 5000 UNITS: 5000 INJECTION INTRAVENOUS; SUBCUTANEOUS at 10:13

## 2021-01-01 RX ADMIN — FAMOTIDINE 20 MG: 20 TABLET ORAL at 10:09

## 2021-01-01 RX ADMIN — PREGABALIN 75 MG: 75 CAPSULE ORAL at 21:40

## 2021-01-01 RX ADMIN — PIPERACILLIN AND TAZOBACTAM 4.5 G: 4; .5 INJECTION, POWDER, LYOPHILIZED, FOR SOLUTION INTRAVENOUS at 21:38

## 2021-01-01 RX ADMIN — TRAZODONE HYDROCHLORIDE 50 MG: 50 TABLET ORAL at 21:36

## 2021-01-01 RX ADMIN — NOREPINEPHRINE BITARTRATE 21 MCG/MIN: 1 INJECTION, SOLUTION, CONCENTRATE INTRAVENOUS at 04:27

## 2021-01-01 RX ADMIN — ATORVASTATIN CALCIUM 20 MG: 20 TABLET, FILM COATED ORAL at 01:29

## 2021-01-01 RX ADMIN — ACETAMINOPHEN 650 MG: 325 TABLET ORAL at 20:16

## 2021-01-01 RX ADMIN — PIPERACILLIN AND TAZOBACTAM 4.5 G: 4; .5 INJECTION, POWDER, LYOPHILIZED, FOR SOLUTION INTRAVENOUS at 17:43

## 2021-01-01 RX ADMIN — Medication 10 ML: at 07:01

## 2021-01-01 RX ADMIN — SODIUM CHLORIDE 500 ML: 9 INJECTION, SOLUTION INTRAVENOUS at 17:08

## 2021-01-01 RX ADMIN — ANTACID TABLETS 200 MG: 500 TABLET, CHEWABLE ORAL at 15:21

## 2021-01-01 RX ADMIN — METRONIDAZOLE 500 MG: 250 TABLET ORAL at 08:52

## 2021-01-01 RX ADMIN — MAGNESIUM SULFATE HEPTAHYDRATE: 500 INJECTION, SOLUTION INTRAMUSCULAR; INTRAVENOUS at 17:49

## 2021-01-01 RX ADMIN — AMLODIPINE BESYLATE 10 MG: 5 TABLET ORAL at 13:59

## 2021-01-01 RX ADMIN — PREGABALIN 75 MG: 75 CAPSULE ORAL at 09:27

## 2021-01-01 RX ADMIN — ATORVASTATIN CALCIUM 20 MG: 20 TABLET, FILM COATED ORAL at 22:06

## 2021-01-01 RX ADMIN — HEPARIN SODIUM 5000 UNITS: 5000 INJECTION INTRAVENOUS; SUBCUTANEOUS at 17:11

## 2021-01-01 RX ADMIN — ARFORMOTEROL TARTRATE 15 MCG: 15 SOLUTION RESPIRATORY (INHALATION) at 21:10

## 2021-01-01 RX ADMIN — METRONIDAZOLE 500 MG: 500 INJECTION, SOLUTION INTRAVENOUS at 06:18

## 2021-01-01 RX ADMIN — SODIUM CHLORIDE, SODIUM LACTATE, POTASSIUM CHLORIDE, AND CALCIUM CHLORIDE 1000 ML: 600; 310; 30; 20 INJECTION, SOLUTION INTRAVENOUS at 18:32

## 2021-01-01 RX ADMIN — METRONIDAZOLE 500 MG: 250 TABLET ORAL at 08:45

## 2021-01-01 RX ADMIN — VANCOMYCIN HYDROCHLORIDE 1250 MG: 10 INJECTION, POWDER, LYOPHILIZED, FOR SOLUTION INTRAVENOUS at 00:11

## 2021-01-01 RX ADMIN — HYDROCHLOROTHIAZIDE: 25 TABLET ORAL at 09:09

## 2021-01-01 RX ADMIN — ARFORMOTEROL TARTRATE 15 MCG: 15 SOLUTION RESPIRATORY (INHALATION) at 07:35

## 2021-01-01 RX ADMIN — ASPIRIN 81 MG: 81 TABLET, CHEWABLE ORAL at 09:09

## 2021-01-01 RX ADMIN — FAMOTIDINE 20 MG: 20 TABLET ORAL at 09:40

## 2021-01-01 RX ADMIN — NYSTATIN: 100000 POWDER TOPICAL at 08:41

## 2021-01-01 RX ADMIN — Medication 5 ML: at 22:07

## 2021-01-01 RX ADMIN — Medication 2000 UNITS: at 08:35

## 2021-01-01 RX ADMIN — CEFTRIAXONE 1 G: 1 INJECTION, POWDER, FOR SOLUTION INTRAMUSCULAR; INTRAVENOUS at 16:26

## 2021-01-01 RX ADMIN — Medication 2 UNITS: at 12:01

## 2021-01-01 RX ADMIN — CEFTRIAXONE 1 G: 1 INJECTION, POWDER, FOR SOLUTION INTRAMUSCULAR; INTRAVENOUS at 15:39

## 2021-01-01 RX ADMIN — Medication 2000 UNITS: at 08:39

## 2021-01-01 RX ADMIN — SODIUM CHLORIDE 50 ML/HR: 900 INJECTION, SOLUTION INTRAVENOUS at 16:33

## 2021-01-01 RX ADMIN — METRONIDAZOLE 500 MG: 250 TABLET ORAL at 10:14

## 2021-01-01 RX ADMIN — HEPARIN SODIUM 5000 UNITS: 5000 INJECTION INTRAVENOUS; SUBCUTANEOUS at 10:35

## 2021-01-01 RX ADMIN — ASPIRIN 81 MG: 81 TABLET, CHEWABLE ORAL at 09:27

## 2021-01-01 RX ADMIN — BUDESONIDE 500 MCG: 0.5 INHALANT RESPIRATORY (INHALATION) at 07:35

## 2021-01-01 RX ADMIN — POTASSIUM CHLORIDE 20 MEQ: 20 TABLET, EXTENDED RELEASE ORAL at 16:57

## 2021-01-01 RX ADMIN — METRONIDAZOLE 500 MG: 250 TABLET ORAL at 09:40

## 2021-01-01 RX ADMIN — ASPIRIN 81 MG: 81 TABLET, CHEWABLE ORAL at 09:33

## 2021-01-01 RX ADMIN — TRAZODONE HYDROCHLORIDE 50 MG: 50 TABLET ORAL at 22:49

## 2021-01-01 RX ADMIN — PREGABALIN 75 MG: 75 CAPSULE ORAL at 08:47

## 2021-01-01 RX ADMIN — HEPARIN SODIUM 5000 UNITS: 5000 INJECTION INTRAVENOUS; SUBCUTANEOUS at 12:43

## 2021-01-01 RX ADMIN — VANCOMYCIN HYDROCHLORIDE 500 MG: 1 INJECTION, POWDER, LYOPHILIZED, FOR SOLUTION INTRAVENOUS at 01:10

## 2021-01-01 RX ADMIN — METRONIDAZOLE 500 MG: 250 TABLET ORAL at 17:22

## 2021-01-01 RX ADMIN — HEPARIN SODIUM 5000 UNITS: 5000 INJECTION INTRAVENOUS; SUBCUTANEOUS at 12:12

## 2021-01-01 RX ADMIN — Medication 10 ML: at 05:45

## 2021-01-01 RX ADMIN — SODIUM CHLORIDE 50 ML/HR: 900 INJECTION, SOLUTION INTRAVENOUS at 16:48

## 2021-01-01 RX ADMIN — Medication 10 ML: at 16:00

## 2021-01-01 RX ADMIN — ACETAMINOPHEN 650 MG: 325 TABLET ORAL at 15:00

## 2021-01-01 RX ADMIN — Medication 2000 UNITS: at 09:11

## 2021-01-01 RX ADMIN — HEPARIN SODIUM 5000 UNITS: 5000 INJECTION INTRAVENOUS; SUBCUTANEOUS at 17:40

## 2021-01-01 RX ADMIN — Medication 2 UNITS: at 22:06

## 2021-01-01 RX ADMIN — PREGABALIN 75 MG: 75 CAPSULE ORAL at 21:12

## 2021-01-01 RX ADMIN — PREGABALIN 75 MG: 75 CAPSULE ORAL at 22:21

## 2021-01-01 RX ADMIN — NYSTATIN: 100000 POWDER TOPICAL at 09:45

## 2021-01-01 RX ADMIN — ARFORMOTEROL TARTRATE 15 MCG: 15 SOLUTION RESPIRATORY (INHALATION) at 20:17

## 2021-01-01 RX ADMIN — MORPHINE SULFATE 2 MG: 2 INJECTION, SOLUTION INTRAMUSCULAR; INTRAVENOUS at 22:40

## 2021-01-01 RX ADMIN — Medication 10 ML: at 21:46

## 2021-01-01 RX ADMIN — Medication 5 ML: at 22:00

## 2021-01-01 RX ADMIN — HEPARIN SODIUM 5000 UNITS: 5000 INJECTION INTRAVENOUS; SUBCUTANEOUS at 17:22

## 2021-01-01 RX ADMIN — ARFORMOTEROL TARTRATE 15 MCG: 15 SOLUTION RESPIRATORY (INHALATION) at 07:21

## 2021-01-01 RX ADMIN — PIPERACILLIN AND TAZOBACTAM 3.38 G: 3; .375 INJECTION, POWDER, LYOPHILIZED, FOR SOLUTION INTRAVENOUS at 13:48

## 2021-01-01 RX ADMIN — METOPROLOL SUCCINATE 25 MG: 25 TABLET, EXTENDED RELEASE ORAL at 21:50

## 2021-01-01 RX ADMIN — BUDESONIDE 500 MCG: 0.5 INHALANT RESPIRATORY (INHALATION) at 20:17

## 2021-01-01 RX ADMIN — METRONIDAZOLE 500 MG: 250 TABLET ORAL at 17:40

## 2021-01-01 RX ADMIN — HEPARIN SODIUM 5000 UNITS: 5000 INJECTION INTRAVENOUS; SUBCUTANEOUS at 17:18

## 2021-01-01 RX ADMIN — ARFORMOTEROL TARTRATE 15 MCG: 15 SOLUTION RESPIRATORY (INHALATION) at 07:16

## 2021-01-01 RX ADMIN — SODIUM CHLORIDE 1000 ML: 9 INJECTION, SOLUTION INTRAVENOUS at 10:37

## 2021-01-01 RX ADMIN — FAMOTIDINE 20 MG: 20 TABLET ORAL at 09:41

## 2021-01-01 RX ADMIN — FAMOTIDINE 20 MG: 20 TABLET ORAL at 08:52

## 2021-01-01 RX ADMIN — ANTACID TABLETS 200 MG: 500 TABLET, CHEWABLE ORAL at 23:23

## 2021-01-01 RX ADMIN — Medication 10 ML: at 14:31

## 2021-01-01 RX ADMIN — FAMOTIDINE 20 MG: 20 TABLET ORAL at 21:13

## 2021-01-01 RX ADMIN — HEPARIN SODIUM 5000 UNITS: 5000 INJECTION INTRAVENOUS; SUBCUTANEOUS at 11:10

## 2021-01-01 RX ADMIN — Medication 10 ML: at 15:54

## 2021-01-01 RX ADMIN — VANCOMYCIN HYDROCHLORIDE 500 MG: 1 INJECTION, POWDER, LYOPHILIZED, FOR SOLUTION INTRAVENOUS at 21:00

## 2021-01-01 RX ADMIN — CEFTRIAXONE 1 G: 1 INJECTION, POWDER, FOR SOLUTION INTRAMUSCULAR; INTRAVENOUS at 18:05

## 2021-01-01 RX ADMIN — ALBUTEROL SULFATE 10 MG: 2.5 SOLUTION RESPIRATORY (INHALATION) at 19:03

## 2021-01-01 RX ADMIN — Medication 10 ML: at 01:36

## 2021-01-01 RX ADMIN — METRONIDAZOLE 500 MG: 250 TABLET ORAL at 15:40

## 2021-01-01 RX ADMIN — METRONIDAZOLE 500 MG: 250 TABLET ORAL at 21:30

## 2021-01-01 RX ADMIN — NOREPINEPHRINE BITARTRATE 7 MCG/MIN: 1 INJECTION, SOLUTION, CONCENTRATE INTRAVENOUS at 19:40

## 2021-01-01 RX ADMIN — ASPIRIN 81 MG: 81 TABLET, CHEWABLE ORAL at 09:40

## 2021-01-01 RX ADMIN — Medication 10 ML: at 21:42

## 2021-01-01 RX ADMIN — METRONIDAZOLE 500 MG: 250 TABLET ORAL at 21:53

## 2021-03-31 NOTE — ED NOTES
Discharge instructions provided to patient. Verbalized understanding. Alert and oriented. Discharged with alonso. Escorted out of ED via w/c.

## 2021-03-31 NOTE — ED PROVIDER NOTES
EMERGENCY DEPARTMENT HISTORY AND PHYSICAL EXAM 
 
Date: 3/31/2021 Patient Name: Florinda Royal History of Presenting Illness Chief Complaint Patient presents with  Urinary Catheter Problem History Provided By: Patient 4:39 PM 
Florinda Royal is a 70 y.o. male with PMHX of spinal stenosis, diabetes, hypertension, hyperlipidemia, neurogenic bladder with chronic indwelling Jimenez catheter who presents to the emergency department via EMS C/O Jimenez catheter complication. Patient states he has not had urine drained from his Jimenez catheter since noon yesterday and feels pressure pain and fullness in his bladder. He is followed by urologist Dr. Thao Dover, has monthly catheter changes and it has been approximately 3 weeks since his last catheter change. Pt denies fever, dysuria, hematuria, change in odor or appearance of urine prior to catheter obstruction, and any other sxs or complaints. PCP: Mike Niño MD 
 
Current Outpatient Medications Medication Sig Dispense Refill  cephALEXin (Keflex) 500 mg capsule Take 1 Cap by mouth two (2) times a day for 10 days. 20 Cap 0  pregabalin (Lyrica) 25 mg capsule Take 75 mg by mouth. Indications: restless legs syndrome, an extreme discomfort in the calf muscles when sitting or lying down  atorvastatin (Lipitor) 20 mg tablet Take 20 mg by mouth nightly.  nitrofurantoin (MACRODANTIN) 100 mg capsule Take 100 mg by mouth daily.  traZODone (DESYREL) 50 mg tablet Take 50 mg by mouth nightly.  metoprolol succinate (TOPROL-XL) 25 mg XL tablet Take 1 Tab by mouth nightly.  aspirin 81 mg chewable tablet Take 81 mg by mouth daily.  lisinopril-hydroCHLOROthiazide (PRINZIDE, ZESTORETIC) 20-25 mg per tablet Take  by mouth daily.  metFORMIN (GLUCOPHAGE) 500 mg tablet Take 500 mg by mouth two (2) times daily (with meals).  cholecalciferol, vitamin D3, (VITAMIN D3) 2,000 unit tab Take  by mouth.     
 multivitamin (ONE A DAY) tablet Take 1 Tab by mouth daily. Past History Past Medical History: 
Past Medical History:  
Diagnosis Date  Atherosclerosis  Diabetes ([address] 2013  
 type 2  
 High cholesterol  Hyperlipidemia  Hypertension 2013  Muscle weakness  Neoplasm   
 right kidney  Spinal stenosis  Urinary retention  Vitamin D deficiency Past Surgical History: 
Past Surgical History:  
Procedure Laterality Date  HX HEENT    
 40 years ago deviated septum  HX LUMBAR LAMINECTOMY  2017  HX ORTHOPAEDIC Lumbar laminectomy 9/7/17  HX ORTHOPAEDIC Right CTR Family History: 
History reviewed. No pertinent family history. Social History: 
Social History Tobacco Use  Smoking status: Current Every Day Smoker Packs/day: 1.00 Years: 40.00 Pack years: 40.00  Smokeless tobacco: Never Used  Tobacco comment: advised not to smoke 24 h pre-op Substance Use Topics  Alcohol use: No  
 Drug use: No  
 
 
Allergies: Allergies Allergen Reactions  Zocor [Simvastatin] Other (comments) Made him \"ill\" Review of Systems Review of Systems Constitutional: Negative for fever. Gastrointestinal: Positive for abdominal pain. Genitourinary: Positive for difficulty urinating. Negative for dysuria and hematuria. All other systems reviewed and are negative. Physical Exam  
 
Vitals:  
 03/31/21 1638 03/31/21 1707 BP: (!) 190/108 (!) 154/103 Pulse: 85 Resp: 18 Temp: 97.5 °F (36.4 °C) SpO2: 98% 97% Weight: 59.9 kg (132 lb) Height: 5' 11\" (1.803 m) Physical Exam 
Vital signs and nursing notes reviewed. CONSTITUTIONAL: Alert. Well-appearing; well-nourished; in no apparent distress. GI: Normal bowel sounds; non-distended; +Suprapubic fullness and tenderness; no guarding or rigidity; no palpable organomegaly. No CVA tenderness. BACK:  No evidence of trauma or deformity.  Non-tender to palpation. SKIN: Normal for age and race; warm; dry; good turgor; no apparent lesions or exudate. NEURO: A & O x3. PSYCH:  Mood and affect appropriate. Diagnostic Study Results Labs - Recent Results (from the past 12 hour(s)) URINALYSIS W/ RFLX MICROSCOPIC Collection Time: 03/31/21  5:00 PM  
Result Value Ref Range Color RED Appearance TURBID Specific gravity 1.017 1.005 - 1.030    
 pH (UA) 7.0 5.0 - 8.0 Protein 100 (A) NEG mg/dL Glucose Negative NEG mg/dL Ketone Negative NEG mg/dL Bilirubin Negative NEG Blood LARGE (A) NEG Urobilinogen 1.0 0.2 - 1.0 EU/dL Nitrites Positive (A) NEG Leukocyte Esterase LARGE (A) NEG URINE MICROSCOPIC ONLY Collection Time: 03/31/21  5:00 PM  
Result Value Ref Range WBC TOO NUMEROUS TO COUNT 0 - 5 /hpf  
 RBC TOO NUMEROUS TO COUNT 0 - 5 /hpf Epithelial cells RARE 0 - 5 /lpf Bacteria 3+ (A) NEG /hpf Radiologic Studies - No orders to display CT Results  (Last 48 hours) None CXR Results  (Last 48 hours) None Medications given in the ED- Medications - No data to display Medical Decision Making I am the first provider for this patient. I reviewed the vital signs, available nursing notes, past medical history, past surgical history, family history and social history. Vital Signs-Reviewed the patient's vital signs. Records Reviewed: Nursing Notes Procedures: 
Procedures ED Course: 
4:39 PM  
Initial assessment performed. The patients presenting problems have been discussed, and they are in agreement with the care plan formulated and outlined with them. I have encouraged them to ask questions as they arise throughout their visit. 4:50 PM progress note RN states >200ccs on bladder scan. Will remove and replaced Jimenez Catheter. 5:45 PM progress note Patient had approximately 600 cc of urine drained after Jimenez catheter was placed by RN without complication. Pt feels much better and abd soft and nontender on reexam.  Urine appears somewhat cloudy and bloody. UA consistent with UTI, will culture urine and start on Keflex and recommend follow-up with his urologist Dr. Lj Holliday. Diagnosis and Disposition DISCHARGE NOTE: 
 
Mario Ross  results have been reviewed with him. He has been counseled regarding his diagnosis, treatment, and plan. He verbally conveys understanding and agreement of the signs, symptoms, diagnosis, treatment and prognosis and additionally agrees to follow up as discussed. He also agrees with the care-plan and conveys that all of his questions have been answered. I have also provided discharge instructions for him that include: educational information regarding their diagnosis and treatment, and list of reasons why they would want to return to the ED prior to their follow-up appointment, should his condition change. He has been provided with education for proper emergency department utilization. CLINICAL IMPRESSION: 
 
1. Complication of Jimenez catheter, initial encounter ([facility] 2. Urinary retention 3. Urinary tract infection associated with indwelling urethral catheter, initial encounter ([facility] PLAN: 
1. D/C Home 2. Current Discharge Medication List  
  
START taking these medications Details  
cephALEXin (Keflex) 500 mg capsule Take 1 Cap by mouth two (2) times a day for 10 days. Qty: 20 Cap, Refills: 0  
  
  
 
3. Follow-up Information Follow up With Specialties Details Why Contact Info Jorge A Gonzalez MD Urology Schedule an appointment as soon as possible for a visit   [address] [address] 
[phone] [facility] EMERGENCY DEPT Emergency Medicine  As needed, If symptoms worsen [address] [address] 
[phone]  
  
 
_______________________________ Please note that this dictation was completed with [employer] computer voice recognition software. Quite often unanticipated grammatical, syntax, homophones, and other interpretive errors are inadvertently transcribed by the computer software. Please disregard these errors. Please excuse any errors that have escaped final proofreading.

## 2021-03-31 NOTE — ED TRIAGE NOTES
Patient brought in by ems with c/o \"urinary catheter blockage. \" Patient with chronic alonso that he last emptied at 12pm yesterday afternoon. No urine to catheter since. Patient c/o \"bladder pain. \"

## 2021-04-24 NOTE — ED TRIAGE NOTES
Pt reports that when he woke up this evening he had nothing in his alonso bag. He reports that it was changed out about 3 weeks prior since he has neurogenic bladder. Pt further reports that he spent a few hours pushing on his bladder to expel some urine out but reports that it appears to be blocked.

## 2021-04-26 NOTE — ED PROVIDER NOTES
EMERGENCY DEPARTMENT HISTORY AND PHYSICAL EXAM 
 
Date: 4/24/2021 Patient Name: Brittany Acevedo History of Presenting Illness Chief Complaint Patient presents with  Urinary Retention  
  blocked urinary catheter History Provided By: Patient and Patient's Wife Additional History (Context):  
3:45 AM  
Brittany Acevedo is a 70 y.o. 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 Patient usually has his Jimenez changed out every 4 weeks or so. He denies any fevers or chills but did notice that his Jimenez bag was not filling. He was tried to unclog it by repeatedly pressing on his abdomen which gave him some mild discomfort but otherwise was unsuccessful. He denies witnessing any blood. He denies any diarrhea nausea or vomiting. Social History Patient does smoke cigarettes but does not drink alcohol or use drugs Family History No history of blood clot disorders or significant prostate problems PCP: Haresh Beyer MD 
 
Current Outpatient Medications Medication Sig Dispense Refill  pregabalin (Lyrica) 25 mg capsule Take 75 mg by mouth. Indications: restless legs syndrome, an extreme discomfort in the calf muscles when sitting or lying down  atorvastatin (Lipitor) 20 mg tablet Take 20 mg by mouth nightly.  nitrofurantoin (MACRODANTIN) 100 mg capsule Take 100 mg by mouth daily.  traZODone (DESYREL) 50 mg tablet Take 50 mg by mouth nightly.  metoprolol succinate (TOPROL-XL) 25 mg XL tablet Take 1 Tab by mouth nightly.  aspirin 81 mg chewable tablet Take 81 mg by mouth daily.  lisinopril-hydroCHLOROthiazide (PRINZIDE, ZESTORETIC) 20-25 mg per tablet Take  by mouth daily.  metFORMIN (GLUCOPHAGE) 500 mg tablet Take 500 mg by mouth two (2) times daily (with meals).     
 cholecalciferol, vitamin D3, (VITAMIN D3) 2,000 unit tab Take  by mouth.  multivitamin (ONE A DAY) tablet Take 1 Tab by mouth daily. Past History Past Medical History: 
Past Medical History:  
Diagnosis Date  Atherosclerosis  Diabetes ([address] 2013  
 type 2  
 High cholesterol  Hyperlipidemia  Hypertension 2013  Muscle weakness  Neoplasm   
 right kidney  Spinal stenosis  Urinary retention  Vitamin D deficiency Past Surgical History: 
Past Surgical History:  
Procedure Laterality Date  HX HEENT    
 40 years ago deviated septum  HX LUMBAR LAMINECTOMY  2017  HX ORTHOPAEDIC Lumbar laminectomy 9/7/17  HX ORTHOPAEDIC Right CTR Family History: 
History reviewed. No pertinent family history. Social History: 
Social History Tobacco Use  Smoking status: Current Every Day Smoker Packs/day: 1.00 Years: 40.00 Pack years: 40.00  Smokeless tobacco: Never Used  Tobacco comment: advised not to smoke 24 h pre-op Substance Use Topics  Alcohol use: No  
 Drug use: No  
 
 
Allergies: Allergies Allergen Reactions  Zocor [Simvastatin] Other (comments) Made him \"ill\" Review of Systems Review of Systems Gastrointestinal: Negative for abdominal pain, diarrhea, nausea and vomiting. Endocrine: Negative for polydipsia and polyuria. Genitourinary: Positive for decreased urine volume. Negative for enuresis and flank pain. Musculoskeletal: Negative for back pain. Skin: Negative for color change, rash and wound. Allergic/Immunologic: Negative for immunocompromised state. Hematological: Does not bruise/bleed easily. Psychiatric/Behavioral: Negative for confusion. All other systems reviewed and are negative. Physical Exam  
 
Vitals:  
 04/24/21 7315 04/24/21 0345 04/24/21 2921 BP: (!) 166/87 (!) 158/77 135/85 Pulse: 76 Resp: 16 Temp: 97.7 °F (36.5 °C) SpO2: 94% 97% 97% Weight: 61.2 kg (135 lb) Height: 5' 11\" (1.803 m) Physical Exam 
Vitals signs and nursing note reviewed. Constitutional:   
   General: He is not in acute distress. Appearance: He is well-developed. He is not ill-appearing, toxic-appearing or diaphoretic. HENT:  
   Head: Normocephalic and atraumatic. Eyes:  
   General: No scleral icterus. Extraocular Movements:  
   Right eye: Normal extraocular motion. Left eye: Normal extraocular motion. Conjunctiva/sclera: Conjunctivae normal.  
   Pupils: Pupils are equal, round, and reactive to light. Neck: Musculoskeletal: Normal range of motion and neck supple. Trachea: No tracheal deviation. Cardiovascular:  
   Rate and Rhythm: Normal rate and regular rhythm. Heart sounds: Normal heart sounds. Pulmonary:  
   Effort: Pulmonary effort is normal. No respiratory distress. Breath sounds: Normal breath sounds. No stridor. Abdominal:  
   General: Bowel sounds are normal. There is no distension. Palpations: Abdomen is soft. Tenderness: There is no abdominal tenderness. There is no rebound. Musculoskeletal: Normal range of motion. General: No tenderness. Comments: Grossly unremarkable without abnormalities Skin: 
   General: Skin is warm and dry. Capillary Refill: Capillary refill takes less than 2 seconds. Findings: No erythema or rash. Neurological:  
   Mental Status: He is alert and oriented to person, place, and time. Cranial Nerves: No cranial nerve deficit. Sensory: Sensation is intact. Motor: Atrophy present. No weakness, abnormal muscle tone or seizure activity. Coordination: Coordination is intact. Deep Tendon Reflexes: Reflexes are normal and symmetric. Comments: Mild bilateral lower extremity atrophy. Psychiatric:     
   Mood and Affect: Mood normal.     
   Behavior: Behavior normal.     
   Thought Content:  Thought content normal. Judgment: Judgment normal.  
 
 
Diagnostic Study Results Labs - No results found for this or any previous visit (from the past 12 hour(s)). Radiologic Studies - No orders to display CT Results  (Last 48 hours) None CXR Results  (Last 48 hours) None Medications given in the ED- Medications  
sodium chloride 0.9 % bolus infusion 1,000 mL (0 mL IntraVENous IV Completed 4/24/21 4342) Medical Decision Making I am the first provider for this patient. I reviewed the vital signs, available nursing notes, past medical history, past surgical history, family history and social history. Vital Signs-Reviewed the patient's vital signs. Pulse Oximetry Analysis -97% on room air Records Reviewed: NURSING NOTES AND PREVIOUS MEDICAL RECORDS Provider Notes (Medical Decision Making):  
Patient has a Jimenez catheter which is clogged with sediment and our cellular debris or mucoid tissue. During irrigation his wife entered the room and began discussing some of his of his other symptoms. She emphasized the fact that he does not drink much fluid and its associated with him frequently clogging his catheter as he has diminished urinary output. We discussed her insightful, which seems to aggravate the patient. We did encourage her frequent use of potts a clear liquid and avoiding excessive use of sodas or caffeinated products as a source of hydration however his wife change in he does not drink any fluid on any regularity. After some discussion and this revelation we all agreed to check some blood work provide some IV fluids in the meantime to help improve situation ensure no evidence of electrolyte disorders or kidney injuries. Upon finding his numbers were diminished we showed him this and encouraged him to as he was starting to approach levels where he required a overnight treatment to improve and BRITT.   Patient voiced understanding will exercise some changes in his diet and follow-up with Dr. Ambika Morales. We also encouraged him to diminish if not completely stop smoking. Procedures: 
Procedures ED Course:  
3:45 AM : Initial assessment performed. The patients presenting problems have been discussed, and they are in agreement with the care plan formulated and outlined with them. I have encouraged them to ask questions as they arise throughout their visit. Diagnosis and Disposition DISCHARGE NOTE: 
4:56 AM 
Victor Hugo Olsen  results have been reviewed with him. He has been counseled regarding his diagnosis, treatment, and plan. He verbally conveys understanding and agreement of the signs, symptoms, diagnosis, treatment and prognosis and additionally agrees to follow up as discussed. He also agrees with the care-plan and conveys that all of his questions have been answered. I have also provided discharge instructions for him that include: educational information regarding their diagnosis and treatment, and list of reasons why they would want to return to the ED prior to their follow-up appointment, should his condition change. He has been provided with education for proper emergency department utilization. CLINICAL IMPRESSION: 
 
1. Dehydration 2. Type 2 diabetes mellitus without complication, without long-term current use of insulin ([address] 3. Complication of Jimenez catheter, initial encounter ([address] 4. Essential hypertension 5. Atonic neurogenic bladder PLAN: 
1. D/C Home 2. Discharge Medication List as of 4/24/2021  5:21 AM  
  
 
3. Follow-up Information Follow up With Specialties Details Why Contact Info Haresh Beyer MD Family Medicine In 1 week  [address] [place] [address] [phone] Katalina Reeves MD Urology In 1 week  [facility] [address] [place] [id number] [phone] 
  
  
 
_______________________________ This note was partially transcribed via voice recognition software. Although efforts have been made to catch any discrepancies, it may contain sound alike words, grammatical errors, or nonsensical words.

## 2021-04-27 NOTE — CALL BACK NOTE
1:33 PM  04/27/2021    On macorbid for UTI. Pt has alonso. Urine C&S as below. Needs ABX change. Called patient and wife. Discussed need for ABX change. Verified only allergy to Zocor and with no allergies to ABX. Sent Cipro to pharmacy in EMR per wife's request. Pt has appt to see urology in 2 days. All questions answered.      Roger Martell PA-C

## 2021-05-17 PROBLEM — S32.010A COMPRESSION FRACTURE OF L1 VERTEBRA (HCC): Status: ACTIVE | Noted: 2021-01-01

## 2021-05-17 NOTE — ED TRIAGE NOTES
Pt arrives from home by EMS alert and oriented at this time, reports feeling weak for the past few days. States he has fallen three times. Reports chronic back pain otherwise denies pain at this time. Pt states when he walks he has no strength in legs. Denies hitting head or LOC with falls.

## 2021-05-17 NOTE — ED NOTES
Pt resting in bed at this time. Denies any needs. Pt alonso was removed new alonso placed per order, pt  Tolerated well, draining now to gravity.

## 2021-05-17 NOTE — ED NOTES
Per order walked pt. Was able to walk about 30 feet with rolling walker and two assists. Pt states his back hurt and his legs felt weak. Assisted back to bed and positioned for comfort.

## 2021-05-17 NOTE — PROGRESS NOTES
Chart reviewed, cm spoke with  regarding cm consult for home health services, after speaking with pt at bedside pt informed cm that he is active with [facility] home [facility] agency which they come twice weekly pt stated\" but it's not helping, I'M just too weak and I've lost weight\" pt lives with his elderly spouse, denies having any family near that can assist with care, for SNF pt will need a 3 night qualifying stay per medicare guidelines at this time per  no inpatient diagnosis but will order further testing, if pt returns home can assist with arranging a  for SNF and nutritionist, at this time cm will cont to monitor chart for cont testing results.

## 2021-05-17 NOTE — ED NOTES
Attempted to reach PT department. No one is on call at this time for ED, provider jagdeep made aware.

## 2021-05-17 NOTE — ED NOTES
Pt resting in bed at this time. Report given to [facility]. No longer primary RN.

## 2021-05-17 NOTE — Clinical Note
Status[de-identified] INPATIENT [101]   Type of Bed: Medical [8]   Inpatient Hospitalization Certified Necessary for the Following Reasons: 3.  Patient receiving treatment that can only be provided in an inpatient setting (further clarification in H&P documentation)   Admitting Diagnosis: Unable to ambulate [[id number]]   Admitting Diagnosis: UTI (urinary tract infection) [955527]   Admitting Diagnosis: Compression fracture of L1 vertebra [facility]) [[id number]]   Admitting Physician: Familia Haile [[id number]]   Attending Physician: Familia Haile [[id number]]   Estimated Length of Stay: 2 Midnights   Discharge Plan[de-identified] [facility]

## 2021-05-17 NOTE — ED NOTES
Provider jagdeep at bedside to see pt. He was made aware that pt has alonso cath and is asking about getting it changed today while in ED, he is supposed to see urology next tues to have it changed.

## 2021-05-17 NOTE — ED PROVIDER NOTES
[address] 
EMERGENCY DEPARTMENT HISTORY AND PHYSICAL EXAM 
 
1:25 PM 
 
Date: 5/17/2021 Patient Name: Soledad Fernandez History of Presenting Illness Chief Complaint Patient presents with  Fatigue History Provided By: Patient Location/Duration/Severity/Modifying factors Patient is a 79-year-old male who presents to the emergency department with a chief complaint of generalized weakness. Patient reports symptoms going on for about 1-2 weeks. Nothing seems to make it better or worse. He describes the weakness as generalized weakness in his legs. He is unsure of the timeframe or duration of the symptoms exactly. Reports is continuous reports he talked to his PCP today who sent him here to be admitted for 3 nights week to PT/SNF. Patient reportedly has home health aide that comes twice per week. The patient denies any fevers or chills. Also would like his urinary catheter changed indicating that it has been about 3 weeks. No chest pain no shortness of breath. No vomiting or diarrhea that he is aware of. He notes that he has had some generalized lower abdominal discomfort primarily suprapubic region over about the same timeframe. He believes this to be related to the Jimenez catheter. Patient also has had a couple of falls recently. Does not believe him to have injured himself. He usually gets around with a Rollator. PCP: Eneida Mc MD 
 
 
 
Past History Past Medical History: 
Past Medical History:  
Diagnosis Date  Atherosclerosis  Diabetes ([address] 2013  
 type 2  
 High cholesterol  Hyperlipidemia  Hypertension 2013  Muscle weakness  Neoplasm   
 right kidney  Spinal stenosis  Urinary retention  Vitamin D deficiency Past Surgical History: 
Past Surgical History:  
Procedure Laterality Date  HX HEENT    
 40 years ago deviated septum  HX LUMBAR LAMINECTOMY  2017  HX ORTHOPAEDIC    
 Lumbar laminectomy 9/7/17  HX ORTHOPAEDIC Right CTR Family History: 
History reviewed. No pertinent family history. Social History: 
Social History Tobacco Use  Smoking status: Current Every Day Smoker Packs/day: 1.00 Years: 40.00 Pack years: 40.00  Smokeless tobacco: Never Used  Tobacco comment: advised not to smoke 24 h pre-op Substance Use Topics  Alcohol use: No  
 Drug use: No  
 
 
Allergies: Allergies Allergen Reactions  Zocor [Simvastatin] Other (comments) Made him \"ill\" I reviewed and confirmed the above information with patient and updated as necessary. Review of Systems Review of Systems Constitutional: Negative for chills and fever. HENT: Negative for congestion, rhinorrhea, sinus pressure and sneezing. Eyes: Negative for visual disturbance. Respiratory: Negative for cough and shortness of breath. Cardiovascular: Negative for chest pain. Gastrointestinal: Positive for abdominal pain (Suprapubic). Negative for diarrhea, nausea and vomiting. Genitourinary: Positive for difficulty urinating. Negative for dysuria, frequency and urgency. Musculoskeletal: Negative for back pain and neck pain. Skin: Negative for rash. Neurological: Negative for syncope, numbness and headaches. Physical Exam  
 
Visit Vitals BP (!) 129/93 (BP 1 Location: Right upper arm) Pulse 80 Temp 97.3 °F (36.3 °C) Resp 16 Ht 5' 11\" (1.803 m) Wt 56.2 kg (124 lb) SpO2 95% BMI 17.29 kg/m² Physical Exam 
Constitutional:   
   General: He is not in acute distress. Appearance: Normal appearance. He is normal weight. He is not ill-appearing or toxic-appearing. Comments: Very thin appearing, nontoxic. Appears quite fatigued. HENT:  
   Head: Normocephalic and atraumatic. Comments: I do not appreciate any outward signs of trauma.  
   Right Ear: External ear normal.  
   Left Ear: External ear normal.  
   Nose: Nose normal. No congestion or rhinorrhea. Mouth/Throat:  
   Mouth: Mucous membranes are dry. Pharynx: No oropharyngeal exudate or posterior oropharyngeal erythema. Comments: Dry oral mucosa Eyes:  
   Conjunctiva/sclera: Conjunctivae normal.  
   Pupils: Pupils are equal, round, and reactive to light. Neck: Musculoskeletal: Normal range of motion and neck supple. Cardiovascular:  
   Rate and Rhythm: Normal rate and regular rhythm. Pulses: Normal pulses. Heart sounds: Normal heart sounds. No murmur. No friction rub. Pulmonary:  
   Effort: Pulmonary effort is normal.  
   Breath sounds: Normal breath sounds. No wheezing, rhonchi or rales. Abdominal:  
   General: Abdomen is flat. Tenderness: There is no abdominal tenderness. There is no guarding or rebound. Musculoskeletal: Normal range of motion. General: No swelling or tenderness. Right lower leg: No edema. Left lower leg: No edema. Comments: 4 out of 5 strength of both lower extremities, symmetric strength of both upper extremities. Normal tenderness to the lower lumbar spine about the midline. Skin: 
   General: Skin is warm and dry. Capillary Refill: Capillary refill takes less than 2 seconds. Findings: No rash. Neurological:  
   General: No focal deficit present. Mental Status: He is alert and oriented to person, place, and time. Cranial Nerves: No cranial nerve deficit. Sensory: No sensory deficit. Motor: No weakness. Diagnostic Study Results Labs - Recent Results (from the past 24 hour(s)) EKG, 12 LEAD, INITIAL Collection Time: 05/17/21 12:20 PM  
Result Value Ref Range Ventricular Rate 95 BPM  
 Atrial Rate 95 BPM  
 P-R Interval 150 ms QRS Duration 108 ms Q-T Interval 354 ms QTC Calculation (Bezet) 444 ms Calculated P Axis 69 degrees Calculated R Axis -66 degrees Calculated T Axis 82 degrees Diagnosis   Sinus rhythm with occasional premature ventricular complexes Left axis deviation Inferior infarct (cited on or before 14-SEP-2017) Abnormal ECG When compared with ECG of 01-MAY-2020 14:46, No significant change was found Confirmed by Cherrie Villalobos MD. ([id number]) on 5/17/2021 2:31:34 PM 
  
CBC WITH AUTOMATED DIFF Collection Time: 05/17/21 12:35 PM  
Result Value Ref Range WBC 12.4 4.6 - 13.2 K/uL  
 RBC 4.81 4.35 - 5.65 M/uL  
 HGB 13.2 13.0 - 16.0 g/dL HCT 42.0 36.0 - 48.0 % MCV 87.3 74.0 - 97.0 FL  
 MCH 27.4 24.0 - 34.0 PG  
 MCHC 31.4 31.0 - 37.0 g/dL  
 RDW 16.8 (H) 11.6 - 14.5 % PLATELET 035 892 - 849 K/uL MPV 10.8 9.2 - 11.8 FL  
 NEUTROPHILS 62 40 - 73 % LYMPHOCYTES 24 21 - 52 % MONOCYTES 11 (H) 3 - 10 % EOSINOPHILS 2 0 - 5 % BASOPHILS 1 0 - 2 %  
 ABS. NEUTROPHILS 7.7 1.8 - 8.0 K/UL  
 ABS. LYMPHOCYTES 3.0 0.9 - 3.6 K/UL  
 ABS. MONOCYTES 1.4 (H) 0.05 - 1.2 K/UL  
 ABS. EOSINOPHILS 0.3 0.0 - 0.4 K/UL  
 ABS. BASOPHILS 0.1 0.0 - 0.1 K/UL  
 DF AUTOMATED METABOLIC PANEL, COMPREHENSIVE Collection Time: 05/17/21 12:35 PM  
Result Value Ref Range Sodium 141 136 - 145 mmol/L Potassium 4.8 3.5 - 5.5 mmol/L Chloride 104 100 - 111 mmol/L  
 CO2 33 (H) 21 - 32 mmol/L Anion gap 4 3.0 - 18 mmol/L Glucose 82 74 - 99 mg/dL BUN 40 (H) 7.0 - 18 MG/DL Creatinine 1.32 (H) 0.6 - 1.3 MG/DL  
 BUN/Creatinine ratio 30 (H) 12 - 20 GFR est AA >60 >60 ml/min/1.73m2 GFR est non-AA 53 (L) >60 ml/min/1.73m2 Calcium 12.9 (H) 8.5 - 10.1 MG/DL Bilirubin, total 0.4 0.2 - 1.0 MG/DL  
 ALT (SGPT) 22 16 - 61 U/L  
 AST (SGOT) 28 10 - 38 U/L Alk. phosphatase 114 45 - 117 U/L Protein, total 8.5 (H) 6.4 - 8.2 g/dL Albumin 4.1 3.4 - 5.0 g/dL Globulin 4.4 (H) 2.0 - 4.0 g/dL A-G Ratio 0.9 0.8 - 1.7 NT-PRO BNP Collection Time: 05/17/21 12:35 PM  
Result Value Ref Range NT pro- 0 - 900 PG/ML  
CARDIAC PANEL,(CK, CKMB & TROPONIN)  Collection Time: 05/17/21 12:35 PM  
Result Value Ref Range CK - MB 3.6 (H) <3.6 ng/ml CK-MB Index 1.6 0.0 - 4.0 %  39 - 308 U/L Troponin-I, QT <0.02 0.0 - 0.045 NG/ML  
URINALYSIS W/ RFLX MICROSCOPIC Collection Time: 05/17/21  2:30 PM  
Result Value Ref Range Color YELLOW Appearance TURBID Specific gravity 1.023 1.005 - 1.030    
 pH (UA) 7.5 5.0 - 8.0 Protein 300 (A) NEG mg/dL Glucose Negative NEG mg/dL Ketone Negative NEG mg/dL Bilirubin Negative NEG Blood LARGE (A) NEG Urobilinogen 0.2 0.2 - 1.0 EU/dL Nitrites Positive (A) NEG Leukocyte Esterase LARGE (A) NEG URINE MICROSCOPIC ONLY Collection Time: 05/17/21  2:30 PM  
Result Value Ref Range WBC TOO NUMEROUS TO COUNT 0 - 5 /hpf  
 RBC 36 to 50 0 - 5 /hpf Epithelial cells 1+ 0 - 5 /lpf Bacteria 4+ (A) NEG /hpf Amorphous Crystals 1+ (A) NEG Radiologic Studies -  
CT ABD PELV WO CONT Final Result Superior endplate acute compression fracture of L1 with 50% loss of height. Correlate clinically. Infrarenal aortic ectasia, 2.8 cm. Right lower pole complex cyst with rim calcifications measures 5.1 x 5.2 cm with  
internal measurements of approximately 30 HU. Correlate with contrast-enhanced CT or MRI. Jimenez catheter within incompletely decompressed urinary bladder containing  
layering calculi. CT HEAD WO CONT Final Result 1. No acute intracranial abnormality demonstrated. 2. Mild subcortical and periventricular white matter low-attenuation, compatible  
with sequela of chronic ischemic microvascular change. 3. Faint clustered calcifications within the central mat of indeterminate  
etiology. No accompanying mass affect or edema appreciated.   
  > Findings may reflect sequela of prior hemorrhage, be vascular in etiology,  
or a manifestation of prior toxic/metabolic insult. XR CHEST PORT Final Result No acute radiographic cardiopulmonary abnormality. Medical Decision Making I am the first provider for this patient. I reviewed the vital signs, available nursing notes, past medical history, past surgical history, family history and social history. Vital Signs-Reviewed the patient's vital signs. EKG: NSR, rate 95bpm, no STELLA or STD. Left axis. Occasional PVCs Records Reviewed: Nursing Notes, Old Medical Records, Previous Radiology Studies and Previous Laboratory Studies (Time of Review: 1:25 PM) ED Course: Progress Notes, Reevaluation, and Consults: 
ED Course as of May 17 2202 Mon May 17, 2021  
1552 Discussed the renal mass with the patient, he tells me he is aware of it and is being looked at by urology. [BRIAN] 1616 Discussed with the PA from the spine surgery group of Dr. Irais Madison with Dr. Carlyle Méndez. Recommended a lumbar corset, outpatient management and follow-up on Wednesday or Thursday of this week. [BRIAN] ED Course User Index [BRIAN] Jillian Clark DO  
 
 
 
Provider Notes (Medical Decision Making): MDM Number of Diagnoses or Management Options Diagnosis management comments: 46-year-old male who presents with generalized weakness. Symptoms going on for the past 1 to 2 weeks. Was referred by his PCP for tried to set up home health needs. On exam the patient is well-appearing, he does appear thin. He seems to have lost some weight. The differential would include injury such as subdural hematoma, epidural hematoma, subarachnoid hemorrhage. He does not appear to have any obvious injuries on exam.  Likewise consider dehydration, acute kidney injury, renal failure, liver failure, metastatic cancer, complaint of abdominal pain could be cystitis/UTI, pyelonephritis, clogged catheter. Patient hassymmetric strength of his lower extremities. Does complain of any numbness in the legs. His urinary function is at baseline, I do not suspect intrinsic cord compression of pathology.   Plan will be to obtain basic labs, chest x-ray and CT brain as well as CT abdomen pelvis given recent falls and complaints of abdominal pain. I quested that the  see the patient to see if he can be assisted in his home health needs. Results reviewed on abdominal CT imaging patient seem to have a 50% superior endplate compression fracture of L2. This was discussed with orthospine who typically manages these outpatient with kyphoplasty, requested a lumbar corset which we do not have. Patient also seems to have significant UTI. I will add on a culture to see what is growing from it. He does not appear to be septic.  feels uncomfortable with the patient going home likewise it is does not seem safe given his falls and that he is suffering from injuries. Unable to set up outpatient SNF for the patient. Contacted the hospitalist on-call who agreed to admit the patient. Patient will be kept in the hospital for the meantime and hopefully physical therapy and Occupational Therapy to work with the patient and he can improve his ambulatory status and self-care needs. Procedures Core Measures: 
For Hospitalized Patients: 
 
1. Hospitalization Decision Time: The decision to hospitalize the patient was made by Dr. Rosy Fernandez at [facility] on 5/17/2021 2. Aspirin: Aspirin was not given because the patient did not present with a stroke at the time of their Emergency Department evaluation 7:45 PM  Patient is being admitted to the hospital by Dr. Shakila Jacobson. The results of their tests and reasons for their admission have been discussed with them and/or available family. They convey agreement and understanding for the need to be admitted and for their admission diagnosis. CONDITIONS ON ADMISSION: 
Sepsis is not present at the time of admission. Deep Vein Thrombosis is not present at the time of admission. Thrombosis is not present at the time of admission.  Urinary Tract Infection is present at the time of admission. Pneumonia is not present at the time of admission. MRSA is not present at the time of admission. Wound infection is not present at the time of admission. Pressure Ulcer is not present at the time of admission. CLINICAL IMPRESSION: 
 
1. Decreased activities of daily living (ADL) 2. Closed compression fracture of body of L1 vertebra ([facility] [address] 3. Urinary tract infection associated with indwelling urethral catheter, initial encounter ([facility] Critical Care Time: 0 Diagnosis Clinical Impression: 1. Decreased activities of daily living (ADL) 2. Closed compression fracture of body of L1 vertebra ([facility] [address] 3. Urinary tract infection associated with indwelling urethral catheter, initial encounter ([facility] Disposition: ADmit Follow-up Information None Current Discharge Medication List  
  
CONTINUE these medications which have NOT CHANGED Details  
pregabalin (Lyrica) 25 mg capsule Take 75 mg by mouth. Indications: restless legs syndrome, an extreme discomfort in the calf muscles when sitting or lying down  
  
atorvastatin (Lipitor) 20 mg tablet Take 20 mg by mouth nightly. traZODone (DESYREL) 50 mg tablet Take 50 mg by mouth nightly. metoprolol succinate (TOPROL-XL) 25 mg XL tablet Take 1 Tab by mouth nightly. aspirin 81 mg chewable tablet Take 81 mg by mouth daily. lisinopril-hydroCHLOROthiazide (PRINZIDE, ZESTORETIC) 20-25 mg per tablet Take  by mouth daily. cholecalciferol, vitamin D3, (VITAMIN D3) 2,000 unit tab Take  by mouth.  
  
multivitamin (ONE A DAY) tablet Take 1 Tab by mouth daily. metFORMIN (GLUCOPHAGE) 500 mg tablet Take 500 mg by mouth two (2) times daily (with meals). Anjali Saba DO Emergency Medicine May 17, 2021, 1:25 PM  
 
This note is dictated utilizing Dragon voice recognition software. Unfortunately this leads to occasional typographical errors using the voice recognition.  I apologize in advance if the situation occurs. If questions occur please do not hesitate to contact me directly.  
 
Dianna Travis, DO

## 2021-05-18 NOTE — PROGRESS NOTES
Comprehensive Nutrition Assessment Type and Reason for Visit: Initial 
 
Nutrition Recommendations/Plan: Supplement: will order Glucerna BID Nutrition Assessment:  PMHx of diabetes, urinary retention, Lumbar spinal stenosis, Lumbar spondylosis, Radiculopathy of leg. Admitted d/t inability to ambulate, UTI,Compression fracture of L1 vertebra. Malnutrition Assessment: 
Malnutrition Status:  Severe malnutrition Context:  Acute illness Findings of the 6 clinical characteristics of malnutrition:  
Energy Intake:  7 - 50% or less of est energy requirements for 5 or more days Weight Loss: Body Fat Loss:  7 - Moderate body fat loss, Buccal region, Orbital  
Muscle Mass Loss:  7 - Moderate muscle mass loss, Clavicles (pectoralis & deltoids), Temples (temporalis) Fluid Accumulation:  Unable to assess,   
 Strength:  Not performed Estimated Daily Nutrient Needs: 
Energy (kcal): 1937-6905; Weight Used for Energy Requirements: Current Protein (g): 56-84; Weight Used for Protein Requirements: Current(1-1.5g/kg) Fluid (ml/day): 8794-0780; Method Used for Fluid Requirements: 1 ml/kcal 
 
 
Nutrition Related Findings:  Labs and meds reviewed. No BM noted at this time. Appetite has been okay the last few days eats about 25-50% of meal. Usually consist of eggs, [place] toast, ribs, cranberry juice. Lost about 5lb in about 2 weeks, and normally weights 135. Does not take any ONS prior to admission, but willing to try Glucerna, strawberry or vanilla. Wounds:   
None Current Nutrition Therapies: DIET DIABETIC CONSISTENT CARB Regular Anthropometric Measures: 
· Height:  5' 11\" (180.3 cm) · Current Body Wt:  56.2 kg (123 lb 14.4 oz) · Admission Body Wt:  123 lb 14.4 oz · Usual Body Wt:  61.2 kg (135 lb)(4/2021) · Ideal Body Wt:  172 lbs:  72 % · Adjusted Body Weight:   ; Weight Adjustment for: No adjustment · Adjusted BMI:      
· BMI Category:  Underweight (BMI less than 22) age over 72 Nutrition Diagnosis: · Severe malnutrition related to acute injury/trauma as evidenced by severe loss of subcutaneous fat, severe muscle loss Nutrition Interventions:  
Food and/or Nutrient Delivery: Continue current diet Nutrition Education and Counseling: No recommendations at this time Coordination of Nutrition Care: Continue to monitor while inpatient, Interdisciplinary rounds Goals: 
Encourage PO intake >50% at most measl throughout the next 3-5 days Nutrition Monitoring and Evaluation:  
Behavioral-Environmental Outcomes: None identified Food/Nutrient Intake Outcomes: Food and nutrient intake Physical Signs/Symptoms Outcomes: Biochemical data, Skin, Weight, Nutrition focused physical findings, Nausea/vomiting, GI status Discharge Planning: Too soon to determine Electronically signed by Meggan Weinstein on 5/18/2021 at 2:59 PM

## 2021-05-18 NOTE — DIABETES MGMT
GLYCEMIC CONTROL PLAN OF CARE Diabetes Management:  
  
Assessment: known h/o DM, HbA1C within recommended range for age + comorbids on oral home regimen BG in target range (non-ICU\" < 180 ; -180):  [x] Yes  [] No   
 
Steroids: No 
 
TDD previous day = 0 Recommend: continue with corrective coverage 
- encourage PO intake Most recent blood glucose results:  
Lab Results Component Value Date/Time GLU 80 05/18/2021 02:10 AM  
 GLU 82 05/17/2021 12:35 PM  
 GLUCPOC 91 05/18/2021 07:46 AM  
 
 
Hypo: No 
 
HbA1C: equivalent  to ave BGlucose of 123 mg/dl for 2-3 months prior to admission Lab Results Component Value Date/Time Hemoglobin A1c 5.9 (H) 05/01/2020 02:55 PM  
 
 
Adequate glycemic control PTA:  [x] Yes  [] No   
 
Home diabetes medications: 
Key Antihyperglycemic Medications   
    
  
 metFORMIN (GLUCOPHAGE) 500 mg tablet Take 500 mg by mouth two (2) times daily (with meals). Goals:  Blood glucose will be within target range of 70 - 180 mg/dL by: 6/30 Diet:  
Active Orders Diet DIET DIABETIC CONSISTENT CARB Regular Education:  _____ Refer to Diabetes Education Record 
                     __X___ Education not indicated at this time Beatrice Duncan MS, RN, CDE Glycemic Control Team 
[phone] Pager [phone] (M-TH 8:00-4:30P) *After Hours pager [phone]

## 2021-05-18 NOTE — PROGRESS NOTES
5/18/2021 PT note: consult received and chart reviewed. Note pt awaiting ortho consult. Will f/u accordingly after pt seen by ortho. Nurse [facility] aware.  Thank you for this referral.  
Roc Daugherty, PT

## 2021-05-18 NOTE — H&P
History & Physical 
 
Patient: Juan Velasquez MRN: [id number]  [facility]: [id number] YOB: 1945  Age: 70 y.o. Sex: male DOA: 5/17/2021 Primary Care Provider:  Robbie Connolly MD 
 
 
Assessment/Plan  
71 y/o male with h/o lumbar stenosis, lumbar spondylosis, neurogenic bladder requiring regular catherization and type II diabetes mellitus admitted for inability to walk, frequent falls, generalized weakness, compression fracture of L1 vertebra and UTI. Inability to walk Frequent falls Generalized weakness Compression fracture of L1 vertebra Diabetes Mellitus Neurogenic bladder with catherization Urinary Tract Infection Admit to medical surgical floor Orthopedic surgery consult completed by ED: Indicated treatment for compression fracture was brace and kyphoplasty, will determine possible options for brace acquisition with case management in the a.m. Grossly abnormal urinalysis, continue IV Rocephin and follow urine culture Change alonso bag and tubing PT/OT consults Case management consult to assist with possible facility placement Rapid Covid test to assist with possible facility placement ADA diet, SSI, fingerstick blood glucose before every meal and nightly DVT prophylaxis: Heparin GI prophylaxis: Pepcid Patient Active Problem List  
Diagnosis Code  Lumbar spinal stenosis M48.061  Lumbar spondylosis M47.816  Radiculopathy of leg M54.10  
 Unable to ambulate R26.2  Urinary retention R33.9  Type II diabetes mellitus with manifestations, uncontrolled ([facility]) E11.8, E11.65  Renal mass N28.89  
 UTI (urinary tract infection) due to urinary indwelling catheter ([facility]) T83.511A, N39.0  Sepsis ([address] A41.9  
 UTI (urinary tract infection) N39.0  Compression fracture of L1 vertebra ([facility]) S32.010A Estimated length of stay: 3-4 days CC: weakness and frequent falls HPI:  
 
Juan Velasquez is a 70 y.o. male who presents to the emergency department with a chief complaint of generalized weakness. Patient reports symptoms going on for about 1-2 weeks. Nothing seems to make it better or worse. He describes the weakness as generalized weakness in his legs that he says has been going on for approximately a week. He describes it as just not having any strength. Related to this he states that he has had multiple falls over the last week or 2 without any significant injury. His normal baseline is to ambulate with a Rollator. He denies any constitutional symptoms such as fever, chills, night sweats, nausea, vomiting, diarrhea loose stools, shortness of breath, difficulty breathing or chest pain. He states that he is followed by urologist Dr. Gerardo Espino for his neurogenic bladder and he goes to his office at least once a month to have his Jimenez bag changed approximately every 3 to 4 weeks. Past Medical History:  
Diagnosis Date  Atherosclerosis  Diabetes ([facility] 2013  
 type 2  
 High cholesterol  Hyperlipidemia  Hypertension 2013  Muscle weakness  Neoplasm   
 right kidney  Spinal stenosis  Urinary retention  Vitamin D deficiency Past Surgical History:  
Procedure Laterality Date  HX HEENT    
 40 years ago deviated septum  HX LUMBAR LAMINECTOMY  2017  HX ORTHOPAEDIC Lumbar laminectomy 9/7/17  HX ORTHOPAEDIC Right CTR History reviewed. No pertinent family history. Social History Socioeconomic History  Marital status:  Spouse name: Not on file  Number of children: Not on file  Years of education: Not on file  Highest education level: Not on file Tobacco Use  Smoking status: Current Every Day Smoker Packs/day: 1.00 Years: 40.00 Pack years: 40.00  Smokeless tobacco: Never Used  Tobacco comment: advised not to smoke 24 h pre-op Substance and Sexual Activity  Alcohol use: No  
 Drug use: No  
 
 
Prior to Admission medications Medication Sig Start Date End Date Taking? Authorizing Provider  
pregabalin (Lyrica) 25 mg capsule Take 75 mg by mouth. Indications: restless legs syndrome, an extreme discomfort in the calf muscles when sitting or lying down    Provider, Historical  
atorvastatin (Lipitor) 20 mg tablet Take 20 mg by mouth nightly. Provider, Historical  
traZODone (DESYREL) 50 mg tablet Take 50 mg by mouth nightly. Provider, Historical  
metoprolol succinate (TOPROL-XL) 25 mg XL tablet Take 1 Tab by mouth nightly. 3/21/20   Other, MD Katelynn  
aspirin 81 mg chewable tablet Take 81 mg by mouth daily. Other, MD Katelynn  
lisinopril-hydroCHLOROthiazide (PRINZIDE, ZESTORETIC) 20-25 mg per tablet Take  by mouth daily. Provider, Historical  
metFORMIN (GLUCOPHAGE) 500 mg tablet Take 500 mg by mouth two (2) times daily (with meals). Provider, Historical  
cholecalciferol, vitamin D3, (VITAMIN D3) 2,000 unit tab Take  by mouth. Provider, Historical  
multivitamin (ONE A DAY) tablet Take 1 Tab by mouth daily. Provider, Historical  
 
 
Allergies Allergen Reactions  Zocor [Simvastatin] Other (comments) Made him \"ill\" Review of Systems Gen: No fever, chills, malaise, weight loss/gain. Heent: No headache, rhinorrhea, epistaxis, ear pain, hearing loss, sinus pain, neck pain/stiffness, sore throat. Heart: No chest pain, palpitations, CHAVARRIA, pnd, or orthopnea. Resp: No cough, hemoptysis, wheezing and shortness of breath. GI: No nausea, vomiting, diarrhea, constipation, melena or hematochezia. : No urinary obstruction, dysuria or hematuria. Derm: No rash, new skin lesion or pruritis. Musc/skeletal: no bone or joint complains. Vasc: No edema, cyanosis or claudication. Endo: No heat/cold intolerance, no polyuria,polydipsia or polyphagia. Neuro: No unilateral weakness, numbness, tingling. No seizures. Heme: No easy bruising or bleeding.  
 
 
 
 
Physical Exam:  
 
Physical Exam: 
Visit Vitals BP (!) 146/94 (BP 1 Location: Right upper arm) Comment: Simultaneous filing. User may not have seen previous data. Pulse 74 Temp 97.8 °F (36.6 °C) Resp 14 Ht 5' 11\" (1.803 m) Wt 56.2 kg (124 lb) SpO2 99% Comment: Simultaneous filing. User may not have seen previous data. BMI 17.29 kg/m² O2 Device: None (Room air) Temp (24hrs), Av.3 °F (36.8 °C), Min:97.8 °F (36.6 °C), Max:98.8 °F (37.1 °C) No intake/output data recorded.  0701 -  1900 In: 500 [I.V.:500] Out: - General:  Awake, cooperative, no distress. Head:  Normocephalic, without obvious abnormality, atraumatic. Eyes:  Conjunctivae/corneas clear, sclera anicteric, PERRL, EOMs intact. Nose: Nares normal. No drainage or sinus tenderness. Throat: Lips, mucosa, and tongue normal.   
Neck: Supple, symmetrical, trachea midline, no adenopathy. Lungs:   Clear to auscultation bilaterally. Heart:  Regular rate and rhythm, S1, S2 normal, no murmur, click, rub or gallop. Abdomen: Soft, non-tender. Bowel sounds normal. No masses,  No organomegaly. Extremities: Extremities normal, atraumatic, no cyanosis or edema. Capillary refill normal.  
Pulses: 2+ and symmetric all extremities. Skin: Skin color pink, turgor normal. No rashes or lesions Neurologic: CNII-XII intact. No focal motor or sensory deficit. Labs Reviewed: 
 
Recent Results (from the past 24 hour(s)) EKG, 12 LEAD, INITIAL Collection Time: 21 12:20 PM  
Result Value Ref Range Ventricular Rate 95 BPM  
 Atrial Rate 95 BPM  
 P-R Interval 150 ms QRS Duration 108 ms Q-T Interval 354 ms QTC Calculation (Bezet) 444 ms Calculated P Axis 69 degrees Calculated R Axis -66 degrees Calculated T Axis 82 degrees Diagnosis Sinus rhythm with occasional premature ventricular complexes Left axis deviation Inferior infarct (cited on or before 14-SEP-2017) Abnormal ECG When compared with ECG of 01-MAY-2020 14:46, No significant change was found Confirmed by Jalen Ulloa MD. ([id number]) on 5/17/2021 2:31:34 PM 
  
CBC WITH AUTOMATED DIFF Collection Time: 05/17/21 12:35 PM  
Result Value Ref Range WBC 12.4 4.6 - 13.2 K/uL  
 RBC 4.81 4.35 - 5.65 M/uL  
 HGB 13.2 13.0 - 16.0 g/dL HCT 42.0 36.0 - 48.0 % MCV 87.3 74.0 - 97.0 FL  
 MCH 27.4 24.0 - 34.0 PG  
 MCHC 31.4 31.0 - 37.0 g/dL  
 RDW 16.8 (H) 11.6 - 14.5 % PLATELET 275 395 - 927 K/uL MPV 10.8 9.2 - 11.8 FL  
 NEUTROPHILS 62 40 - 73 % LYMPHOCYTES 24 21 - 52 % MONOCYTES 11 (H) 3 - 10 % EOSINOPHILS 2 0 - 5 % BASOPHILS 1 0 - 2 %  
 ABS. NEUTROPHILS 7.7 1.8 - 8.0 K/UL  
 ABS. LYMPHOCYTES 3.0 0.9 - 3.6 K/UL  
 ABS. MONOCYTES 1.4 (H) 0.05 - 1.2 K/UL  
 ABS. EOSINOPHILS 0.3 0.0 - 0.4 K/UL  
 ABS. BASOPHILS 0.1 0.0 - 0.1 K/UL  
 DF AUTOMATED METABOLIC PANEL, COMPREHENSIVE Collection Time: 05/17/21 12:35 PM  
Result Value Ref Range Sodium 141 136 - 145 mmol/L Potassium 4.8 3.5 - 5.5 mmol/L Chloride 104 100 - 111 mmol/L  
 CO2 33 (H) 21 - 32 mmol/L Anion gap 4 3.0 - 18 mmol/L Glucose 82 74 - 99 mg/dL BUN 40 (H) 7.0 - 18 MG/DL Creatinine 1.32 (H) 0.6 - 1.3 MG/DL  
 BUN/Creatinine ratio 30 (H) 12 - 20 GFR est AA >60 >60 ml/min/1.73m2 GFR est non-AA 53 (L) >60 ml/min/1.73m2 Calcium 12.9 (H) 8.5 - 10.1 MG/DL Bilirubin, total 0.4 0.2 - 1.0 MG/DL  
 ALT (SGPT) 22 16 - 61 U/L  
 AST (SGOT) 28 10 - 38 U/L Alk. phosphatase 114 45 - 117 U/L Protein, total 8.5 (H) 6.4 - 8.2 g/dL Albumin 4.1 3.4 - 5.0 g/dL Globulin 4.4 (H) 2.0 - 4.0 g/dL A-G Ratio 0.9 0.8 - 1.7 NT-PRO BNP Collection Time: 05/17/21 12:35 PM  
Result Value Ref Range NT pro- 0 - 900 PG/ML  
CARDIAC PANEL,(CK, CKMB & TROPONIN) Collection Time: 05/17/21 12:35 PM  
Result Value Ref Range CK - MB 3.6 (H) <3.6 ng/ml CK-MB Index 1.6 0.0 - 4.0 %  39 - 308 U/L  Troponin-I, QT <0.02 0.0 - 0.045 NG/ML  
URINALYSIS W/ RFLX MICROSCOPIC Collection Time: 05/17/21  2:30 PM  
Result Value Ref Range Color YELLOW Appearance TURBID Specific gravity 1.023 1.005 - 1.030    
 pH (UA) 7.5 5.0 - 8.0 Protein 300 (A) NEG mg/dL Glucose Negative NEG mg/dL Ketone Negative NEG mg/dL Bilirubin Negative NEG Blood LARGE (A) NEG Urobilinogen 0.2 0.2 - 1.0 EU/dL Nitrites Positive (A) NEG Leukocyte Esterase LARGE (A) NEG URINE MICROSCOPIC ONLY Collection Time: 05/17/21  2:30 PM  
Result Value Ref Range WBC TOO NUMEROUS TO COUNT 0 - 5 /hpf  
 RBC 36 to 50 0 - 5 /hpf Epithelial cells 1+ 0 - 5 /lpf Bacteria 4+ (A) NEG /hpf Amorphous Crystals 1+ (A) NEG Procedures/imaging: see electronic medical records for all procedures/Xrays and details which were not copied into this note but were reviewed prior to creation of Plan CC: Edgar Horan MD

## 2021-05-18 NOTE — PROGRESS NOTES
Physician Progress Note      Bhumika Carrera  CSN #:                  [id number]  :                       1945  ADMIT DATE:       2021 11:46 AM  DISCH DATE:  RESPONDING  PROVIDER #:        Pool Mazariegos MD          QUERY TEXT:    Pt admitted with UTI. Pt noted to have chronic indwelling urinary catheter . If possible, please document in the progress notes and discharge summary if you are evaluating and/or treating any of the following: The medical record reflects the following:  Risk Factors:  urinary alonso catheter  Clinical Indicators: UA - Nitrites positive, leukocyte esterase large, WBC TNTC, Bacteria 4+    Treatment: Change alonso bag and tubing, rocephin    Thank You  Neema Fuentes RN [facility] CRCR  [phone]  Options provided:  -- UTI due to chronic indwelling urinary catheter  -- UTI not due to indwelling urinary catheter  -- Other - I will add my own diagnosis  -- Disagree - Not applicable / Not valid  -- Disagree - Clinically unable to determine / Unknown  -- Refer to Clinical Documentation Reviewer    PROVIDER RESPONSE TEXT:    No UTI, urine cultures negative.     Query created by: Mandi Arzola on 2021 11:09 AM      Electronically signed by:  Pool Mazariegos MD 2021 5:51 PM

## 2021-05-18 NOTE — PROGRESS NOTES
Reason for Admission: UTI Chart reviewed. Per H&P: Vignesh Alva is a 70 y.o. male who presents to the emergency department with a chief complaint of generalized weakness.  Patient reports symptoms going on for about 1-2 weeks.  Nothing seems to make it better or worse.  He describes the weakness as generalized weakness in his legs that he says has been going on for approximately a week. He describes it as just not having any strength. Related to this he states that he has had multiple falls over the last week or 2 without any significant injury. His normal baseline is to ambulate with a Rollator. He denies any constitutional symptoms such as fever, chills, night sweats, nausea, vomiting, diarrhea loose stools, shortness of breath, difficulty breathing or chest pain. He states that he is followed by urologist Dr. Alexa Rothman for his neurogenic bladder and he goes to his office at least once a month to have his Jimenez bag changed approximately every 3 to 4 weeks. \" 
 
Care Management/Patient Conversation: 1908: Chart reviewed. CM spoke with the patient at the bedside and informed him of the CM's role. The patient confirmed demographics and PCP. CM and the patient discussed discharge plans to include transportation upon discharge, DME, family support, and transportation to and from appointments. The patient states that he currently lives with his wife and that prior to this admission he was using a walker to assist with ambulation. The patient states that he still drives himself places occasionally. CM dicussed with the patient whether or not he has a home health agency that he would prefer in the event that [facility] services are indicated upon discharge. FOC offered to patient and patient has chosen [facility] as his first choice for home care needs, should HH be indicated at discharge. The patient denies any questions or concerns at this time.  CM to follow the patient's progress and be available to assist with discharge planning as needed. CMS referral placed. Prior to admission patient was living: Home with wife Prior to admission patient was using the following DME: Uses walker, has cane if needed as well RUR Score: 14% Plan for utilizing home health: TBD    
 
PCP: First and Last name: Julio C Rosario MD  
 Name of Practice: [facility] 21 Are you a current patient: Yes/No: Yes Approximate date of last visit: 3/16/21 per EMR Can you participate in a virtual visit with your PCP:  
                 
Current Advanced Directive/Advance Care Plan: Full Code no ACP docs on file Healthcare Decision Maker: Kiley Walters: [phone] Transition of Care Plan: In progress Care Management Interventions PCP Verified by CM: Yes Mode of Transport at Discharge: Other (see comment)(family/friend) Transition of Care Consult (CM Consult): Discharge Planning Current Support Network: Lives with Spouse Confirm Follow Up Transport: Family The Plan for Transition of Care is Related to the Following Treatment Goals : UTI The Patient and/or Patient Representative was Provided with a Choice of Provider and Agrees with the Discharge Plan?: Yes Name of the Patient Representative Who was Provided with a Choice of Provider and Agrees with the Discharge Plan: Christie Mock Freedom of Choice List was Provided with Basic Dialogue that Supports the Patient's Individualized Plan of Care/Goals, Treatment Preferences and Shares the Quality Data Associated with the Providers?: Yes

## 2021-05-18 NOTE — ROUTINE PROCESS
TRANSFER - IN REPORT: 
 
Verbal report received from Alejandro Velazquez, [address] [place] [address] (name) on Tena Garcia  being received from ED(unit) for routine progression of care Report consisted of patients Situation, Background, Assessment and  
Recommendations(SBAR). Information from the following report(s) SBAR, Kardex, [facility] and Recent Results was reviewed with the receiving nurse. Opportunity for questions and clarification was provided. Assessment completed upon patients arrival to unit and care assumed. Uneventful shift; no acute changes Bedside and verbal report given by (off going nurse) LI Cagle, RN to (oncoming nurse) Faustino Nassar, [address] [place] [address]. Report included the following information SBAR, Kardex, OR Summary, Intake/Output, and MAR.

## 2021-05-18 NOTE — PROGRESS NOTES
6409 Beside and verbal shift change report given to [facility] (oncoming nurse) by Juan Machuca (off going nurse). Report included the following information SBAR, Kardex,OR summary, Intake/output and MAR.  
 
1155 Noticed that R leg tends to twitch, pt stated that this is normal for them and that they take lyrica at bedtime at home. 1415 updated  about Lyrica medication, stated he will take a look at it 
 
1922 Bedside and Verbal shift change report given to [id number] [facility] (oncoming nurse) by Rell (offgoing nurse). Report included the following information SBAR, Kardex, Intake/Output, and MAR.

## 2021-05-18 NOTE — PROGRESS NOTES
Hospitalist Progress Note Patient: Nu Orona MRN: [id number]  CSN: [id number] YOB: 1945  Age: 70 y.o. Sex: male DOA: 5/17/2021 LOS:  LOS: 1 day Assessment/Plan Patient Active Problem List  
Diagnosis Code  Lumbar spinal stenosis M48.061  Lumbar spondylosis M47.816  Radiculopathy of leg M54.10  
 Unable to ambulate R26.2  Urinary retention R33.9  Type II diabetes mellitus with manifestations, uncontrolled ([facility]) E11.8, E11.65  Renal mass N28.89  
 UTI (urinary tract infection) due to urinary indwelling catheter ([facility]) T83.511A, N39.0  Sepsis ([address] A41.9  
 UTI (urinary tract infection) N39.0  Compression fracture of L1 vertebra ([facility]) S32.010A  
  
 
 
 
69 y/o male with h/o lumbar stenosis, lumbar spondylosis, neurogenic bladder requiring regular catherization and type II diabetes mellitus admitted for inability to walk, frequent falls, generalized weakness, compression fracture of L1 vertebra and UTI. Inability to walk- 
Likely from lumbar stenosis, PT/OT Lumbar spinal stenosis/compression fracture L1 vertebra- 
ER physician discussed with Ortho, at this point continue with back brace and follow-up with spine surgery for kyphoplasty. Patient's wife will bring back brace. UTI- In a patient with neurogenic bladder and self-catheterization. Past cultures grew Pseudomonas and E. coli. Will start on cefepime. Follow urine cultures. DM- On sliding scale insulin DVT prophylaxis with Lovenox Disposition : 1 to 2 days Review of systems General: No fevers or chills. Cardiovascular: No chest pain or pressure. No palpitations. Pulmonary: No shortness of breath. Gastrointestinal: No nausea, vomiting. Physical Exam: 
General: Awake, cooperative, no acute distress   
HEENT: NC, Atraumatic. PERRLA, anicteric sclerae. Lungs: CTA Bilaterally. No Wheezing/Rhonchi/Rales.  
Heart:  S1 S2,  No murmur, No Rubs, No Gallops Abdomen: Soft, Non distended, Non tender.  +Bowel sounds, Extremities: No c/c/e Psych:   Not anxious or agitated. Neurologic:  No acute neurological deficit. Vital signs/Intake and Output: 
Visit Vitals /70 Pulse 91 Temp 97.6 °F (36.4 °C) Resp 18 Ht 5' 11\" (1.803 m) Wt 56.2 kg (124 lb) SpO2 97% BMI 17.29 kg/m² Current Shift:  05/18 0701 - 05/18 1900 In: -  
Out: 450 [Urine:450] Last three shifts:  05/16 1901 - 05/18 0700 In: 500 [I.V.:500] Out: -  
 
 
 
 
 
Labs: Results:  
   
Chemistry Recent Labs 05/18/21 0210 05/17/21 
1235 GLU 80 82  141  
K 3.7 4.8  
 104 CO2 30 33* BUN 36* 40* CREA 1.15 1.32* CA 10.3* 12.9* AGAP 3 4 BUCR 31* 30* AP  --  114 TP  --  8.5* ALB  --  4.1 GLOB  --  4.4* AGRAT  --  0.9 CBC w/Diff Recent Labs 05/18/21 0210 05/17/21 
1235 WBC 10.5 12.4 RBC 3.95* 4.81  
HGB 11.0* 13.2 HCT 34.6* 42.0  281 GRANS  --  62  
LYMPH  --  24 EOS  --  2 Cardiac Enzymes Recent Labs 05/17/21 
1235  CKND1 1.6 Coagulation No results for input(s): PTP, INR, APTT, INREXT in the last 72 hours. Lipid Panel Lab Results Component Value Date/Time Cholesterol, total 146 06/01/2016 07:51 AM  
 HDL Cholesterol 40 06/01/2016 07:51 AM  
 LDL, calculated 91.6 06/01/2016 07:51 AM  
 VLDL, calculated 14.4 06/01/2016 07:51 AM  
 Triglyceride 72 06/01/2016 07:51 AM  
 CHOL/HDL Ratio 3.7 06/01/2016 07:51 AM  
  
BNP No results for input(s): BNPP in the last 72 hours. Liver Enzymes Recent Labs 05/17/21 
1235 TP 8.5* ALB 4.1  Thyroid Studies No results found for: T4, T3U, TSH, TSHEXT Procedures/imaging: see electronic medical records for all procedures/Xrays and details which were not copied into this note but were reviewed prior to creation of Plan

## 2021-05-18 NOTE — ROUTINE PROCESS
1917 Bedside shift change report given to Tasneem Giraldo RN (oncoming nurse) by Aishwarya Durham RN (offgoing nurse). Report included the following information SBAR, Kardex, Intake/Output, MAR and Recent Results.

## 2021-05-18 NOTE — PROGRESS NOTES
Problem: Falls - Risk of 
Goal: *Absence of Falls Description: Document Mao Tao Fall Risk and appropriate interventions in the flowsheet. Outcome: Progressing Towards Goal 
Note: Fall Risk Interventions: 
Mobility Interventions: Patient to call before getting OOB Elimination Interventions: Call light in reach, Patient to call for help with toileting needs History of Falls Interventions: Consult care management for discharge planning, Investigate reason for fall Problem: Pain Goal: *Control of Pain Outcome: Progressing Towards Goal

## 2021-05-18 NOTE — PROGRESS NOTES
Occupational Therapy Evaluation Attempt OT eval orders received. Chart reviewed. Attempted Occupational Therapy Evaluation, however, patient unable to be seen due to: 
[]  Nausea/vomiting 
[]  Eating 
[]  Pain 
[]  Patient too lethargic 
[]  Off Unit for testing/procedure 
[]  Dialysis treatment in progress 
[]  Telemetry Results [x]  Other: Per note, awaiting ortho consult. Will f/u later if deemed appropriate following the consult.  
Evelin Díaz OTR/L

## 2021-05-18 NOTE — ED NOTES
TRANSFER - OUT REPORT: 
 
Verbal report given to Claudia Romano RN (name) on Donzella Friday  being transferred to Medical (unit) for routine progression of care Report consisted of patients Situation, Background, Assessment and  
Recommendations(SBAR). Information from the following report(s) SBAR, Kardex, ED Summary and MAR was reviewed with the receiving nurse. Lines:  
Peripheral IV 05/17/21 Right Antecubital (Active) Site Assessment Clean, dry, & intact 05/17/21 1236 Phlebitis Assessment 0 05/17/21 1236 Infiltration Assessment 0 05/17/21 1236 Dressing Status Clean, dry, & intact 05/17/21 1236 Dressing Type Transparent 05/17/21 1236 Hub Color/Line Status Flushed 05/17/21 1236 Opportunity for questions and clarification was provided. Patient transported with: 
 [employer]

## 2021-05-18 NOTE — PROGRESS NOTES
Problem: Falls - Risk of 
Goal: *Absence of Falls Description: Document Nata Gordillo Fall Risk and appropriate interventions in the flowsheet. Outcome: Progressing Towards Goal 
Note: Fall Risk Interventions: 
Mobility Interventions: Patient to call before getting OOB Elimination Interventions: Toileting schedule/hourly rounds, Toilet paper/wipes in reach, Call light in reach History of Falls Interventions: Consult care management for discharge planning, Door open when patient unattended Problem: Patient Education: Go to Patient Education Activity Goal: Patient/Family Education Outcome: Progressing Towards Goal 
  
Problem: Pain Goal: *Control of Pain Outcome: Progressing Towards Goal

## 2021-05-18 NOTE — DIABETES MGMT
Diabetes Patient/Family Education Record Factors That May Influence Patients Ability to Learn or Comply with Recommendations 
 []   Language barrier    []   Cultural needs   []   Motivation  
 []   Cognitive limitation    []   Physical   []   Education  
 []   Physiological factors   []   Hearing/vision/speaking impairment   []   Amish beliefs []   Financial factors   []  Other:   [x]  No factors identified at this time. Person Instructed: 
 [x]   Patient   []   Family   []  Other Preference for Learning: 
 [x]   Verbal   []   Written   []  Demonstration Level of Comprehension & Competence:   
[x]  Good                                      [] Fair                                     []  Poor                             []  Needs Reinforcement  
[x]  Teach back completed Education Component:  
[x]  Medication management, including how to administer insulin (if appropriate) and potential medication interactions []  Nutritional management - [] Obtained usual meal pattern   []   Basic carbohydrate counting  []  Plate method 
[]  Limit concentrated sweets and avoid sweetened beverages  []  Portion control  []    Avoid skipping meals  
[]  Exercise  
[]  Signs, symptoms, and treatment of hyperglycemia and hypoglycemia  
[] Prevention, recognition and treatment of hyperglycemia and hypoglycemia  
[]  Importance of blood glucose monitoring  [] Blood Glucose targets   []   Provided patient with blood glucose meter 
[]  Has glucometer and supplies at home  
[]  Instruction on use of the blood glucose meter and recommended monitoring schedule [x]  Discuss the importance of HbA1C monitoring. Patients A1c is _5.9__ %. This is equivalent to average glucose of __123 mg/dl for the past 2-3 months.  
[]  Sick day guidelines  
[]  Proper use and disposal of lancets, needles, syringes or insulin pens (if appropriate) []  Potential long-term complications (retinopathy, kidney disease, neuropathy, foot care)  
[] Information about whom to contact in case of emergency or for more information   
[x]  Goal:  Patient/family will demonstrate understanding of Diabetes Self- Management Skills by: (date) ___6/30____ Plan for post-discharge education or self-management support: 
  [] Outpatient class schedule provided            [] Patient Declined 
  [] Scheduled for outpatient classes (date) _______ 
  [] Written information provided Verify: 
Does patient understand how diabetes medications work? ____________yes________________ Does patient have difficulty obtaining diabetes medications or testing supplies? _____no____________ Brooke Weller MS, RN, CDE Glycemic Control Team 
[phone] Pager [phone] (M-TH 8:00-4:30P) *After Hours pager [phone]

## 2021-05-19 NOTE — PROGRESS NOTES
Problem: Falls - Risk of 
Goal: *Absence of Falls Description: Document Candi Bernal Fall Risk and appropriate interventions in the flowsheet. Outcome: Progressing Towards Goal 
Note: Fall Risk Interventions: 
Mobility Interventions: Assess mobility with egress test, Communicate number of staff needed for ambulation/transfer, OT consult for ADLs, Patient to call before getting OOB, PT Consult for mobility concerns, PT Consult for assist device competence, Utilize walker, cane, or other assistive device Elimination Interventions: Call light in reach, Patient to call for help with toileting needs History of Falls Interventions: Consult care management for discharge planning, Door open when patient unattended, Evaluate medications/consider consulting pharmacy, Investigate reason for fall Problem: Pain Goal: *Control of Pain Outcome: Progressing Towards Goal

## 2021-05-19 NOTE — PROGRESS NOTES
Bedside shift change report given to [address] [place] [address] (oncoming nurse) by Steph Paniagua RN (offgoing nurse). Report included the following information SBAR, Kardex, Intake/Output, MAR and Recent Results.

## 2021-05-19 NOTE — CONSULTS
Consult Note Patient: Power Ramírez               Sex: male          DOA: 5/17/2021 YOB: 1945      Age:  70 y.o.        LOS:  LOS: 2 days HPI:  
 
Power Ramírez is a 70 y.o. male who has been seen for low back pain. Admitted for overall weakness and malaise. He does not remember a fall or accident and says he has had chronic back pain that has not really changed. Numbness in his legs has not changed. He usually wears a back brace when doing any long trips or walking. Does not really do much exercise. Past Medical History:  
Diagnosis Date  Atherosclerosis  Diabetes ([facility] [address] 2013  
 type 2  
 High cholesterol  Hyperlipidemia  Hypertension 2013  Muscle weakness  Neoplasm   
 right kidney  Spinal stenosis  Urinary retention  Vitamin D deficiency Past Surgical History:  
Procedure Laterality Date  HX HEENT    
 40 years ago deviated septum  HX LUMBAR LAMINECTOMY  2017  HX ORTHOPAEDIC Lumbar laminectomy 9/7/17  HX ORTHOPAEDIC Right CTR History reviewed. No pertinent family history. Social History Socioeconomic History  Marital status:  Spouse name: Not on file  Number of children: Not on file  Years of education: Not on file  Highest education level: Not on file Tobacco Use  Smoking status: Current Every Day Smoker Packs/day: 1.00 Years: 40.00 Pack years: 40.00  Smokeless tobacco: Never Used  Tobacco comment: advised not to smoke 24 h pre-op Substance and Sexual Activity  Alcohol use: No  
 Drug use: No  
 
Social Determinants of Health Financial Resource Strain:  Difficulty of Paying Living Expenses:   
Food Insecurity:  Worried About [address] in the Last Year:   
[address] in the Last Year:   
Transportation Needs:   
 Lack of Transportation (Medical):  Lack of Transportation (Non-Medical): Physical Activity:   
 Days of Exercise per Week:  Minutes of Exercise per Session:   
Stress:  Feeling of Stress :   
Social Connections:  Frequency of Communication with Friends and Family:  Frequency of Social Gatherings with Friends and Family:  Attends Restorationism Services:  Active Member of Clubs or Organizations:  Attends Club or Organization Meetings:  Marital Status:   
 
 
Prior to Admission medications Medication Sig Start Date End Date Taking? Authorizing Provider  
pregabalin (Lyrica) 25 mg capsule Take 75 mg by mouth. Indications: restless legs syndrome, an extreme discomfort in the calf muscles when sitting or lying down   Yes Provider, Historical  
atorvastatin (Lipitor) 20 mg tablet Take 20 mg by mouth nightly. Yes Provider, Historical  
traZODone (DESYREL) 50 mg tablet Take 50 mg by mouth nightly. Yes Provider, Historical  
metoprolol succinate (TOPROL-XL) 25 mg XL tablet Take 1 Tab by mouth nightly. 3/21/20  Yes Other, MD Katelynn  
aspirin 81 mg chewable tablet Take 81 mg by mouth daily. Yes Other, MD Katelynn  
lisinopril-hydroCHLOROthiazide (PRINZIDE, ZESTORETIC) 20-25 mg per tablet Take  by mouth daily. Yes Provider, Historical  
cholecalciferol, vitamin D3, (VITAMIN D3) 2,000 unit tab Take  by mouth. Yes Provider, Historical  
multivitamin (ONE A DAY) tablet Take 1 Tab by mouth daily. Yes Provider, Historical  
metFORMIN (GLUCOPHAGE) 500 mg tablet Take 500 mg by mouth two (2) times daily (with meals). Provider, Historical  
 
 
Allergies Allergen Reactions  Zocor [Simvastatin] Other (comments) Made him \"ill\" Review of Systems A comprehensive review of systems was negative. Physical Exam:  
  
Visit Vitals /76 Pulse 64 Temp 97.5 °F (36.4 °C) Resp 16 Ht 5' 11\" (1.803 m) Wt 56.5 kg (124 lb 9.6 oz) SpO2 99% BMI 17.38 kg/m² Physical Exam: 
Physical Exam:  
General:  Alert, cooperative, no distress, appears stated age.  
   
   
Nose: Nares normal. Septum midline. Mucosa normal. No drainage or sinus tenderness. Mouth/Throat: Lips, mucosa, and tongue normal.   
Neck: Supple, symmetrical, trachea midline, no adenopathy, thyroid: no enlargement/tenderness/nodules, no JVD. Back:   Symmetric, no curvature. ROM normal. No CVA tenderness. Tenderness along the spinous processes T11-L4 and bilateral PSIS Abdomen:   Soft, non-tender. Bowel sounds normal. No masses,  No organomegaly. Extremities: Extremities normal, atraumatic, no cyanosis or edema. Pulses: 2+ and symmetric all extremities. Skin: Skin color, texture, turgor normal. No rashes or lesions Lymph nodes: Cervical, supraclavicular, and axillary nodes normal.  
Neurologic: CNII-XII intact. Normal strength, sensation and reflexes throughout. Labs Reviewed: CBC:  
Lab Results Component Value Date/Time WBC 10.3 05/19/2021 01:00 AM  
 HGB 11.4 (L) 05/19/2021 01:00 AM  
 HCT 36.4 05/19/2021 01:00 AM  
  05/19/2021 01:00 AM  
 
 
MRI Lumbar spine:  Acute osteoporotic type moderate compression deformity of the L1 vertebral body with mild accompanying retropulsion.  
  > Mild spinal canal stenosis at the level of the retropulsed vertebral body 
noted. 2. Postop changes from posterior instrumented spinal fusion procedure and 
interlaminar fixation L4-L5. Assessment/Plan Principal Problem: 
  UTI (urinary tract infection) (5/1/2020) Active Problems: 
  Unable to ambulate (9/14/2017) Compression fracture of L1 vertebra ([address] (5/17/2021) 70 y.o. male with long term back pain. History of previous surgery and no real change in his back pain or neurologic functions. MRI shows recent compression fracture. Plan:   1) Back brace (wife will bring from home) 2) ambulate as tolerated. 3) Follow-up with Dr. Edi Hayes in 3 weeks.

## 2021-05-19 NOTE — PROGRESS NOTES
Problem: Self Care Deficits Care Plan (Adult) Goal: *Acute Goals and Plan of Care (Insert Text) Description: Initial Occupational Therapy Goals (5/19/2021) Within 7 day(s): 1. Patient will perform toilet transfer with mod I in preparation for bowel and bladder management. 2. Patient will perform bowel and bladder management with mod I for increased independence with ADLs. 3. Patient will perform UB dressing with mod I (including back brace) for increased independence with ADLs. 4. Patient will perform LB dressing with mod I & A/E PRN for increased independence with ADLs. 5. Patient will utilize energy conservation techniques with 1 verbal cue(s) for increased independence with ADLs. Outcome: Progressing Towards Goal 
 OCCUPATIONAL THERAPY EVALUATION Patient: Migdalia Durham (56 y.o. male) Date: 5/19/2021 Primary Diagnosis: Unable to ambulate [R26.2] UTI (urinary tract infection) [N39.0] Compression fracture of L1 vertebra ([facility] [address] [S32.010A] Precautions: Fall PLOF: pt grossly mod I for ADLs/functional mobility, recent increase of back pain has been limiting his mobility ASSESSMENT : 
Based on the objective data described below, the patient presents with decreased strength, ROM, balance, endurance, and increased back pain limiting independence with ADLs. Pt found supine in bed, reporting pain 9/10, agreeable to therapy. Educated pt on log rolling technique for bed mobility for increased comfort, pt CGA for rolling and min A to sit up to EOB. BUE AROM WFL, however strength generally decreased. Pt donned LSO with CGA. Pt was able to doff/don B socks with ankle over knee method, additional time/effort to complete. Pt min A for STS, and was able to take ~3 sides steps to R. Pt was able to stand ~1 minute then returned to seated EOB. Lunch tray arriving at end of session, pt requesting to sit up to EOB to eat lunch, call bell/phone within reach.  
 
Education: role of OT and POC, fall prevention Patient will benefit from skilled intervention to address the above impairments. Patient's rehabilitation potential is considered to be Good Factors which may influence rehabilitation potential include:  
[]             None noted []             Mental ability/status [x]             Medical condition []             Home/family situation and support systems []             Safety awareness [x]             Pain tolerance/management 
[]             Other: PLAN : 
Recommendations and Planned Interventions:  
[x]               Self Care Training                  [x]      Therapeutic Activities [x]               Functional Mobility Training   []      Cognitive Retraining 
[x]               Therapeutic Exercises           [x]      Endurance Activities [x]               Balance Training                    []      Neuromuscular Re-Education []               Visual/Perceptual Training     [x]      Home Safety Training 
[x]               Patient Education                   [x]      Family Training/Education []               Other (comment): Frequency/Duration: Patient will be followed by occupational therapy 1-2 times per day/4-7 days per week to address goals. Discharge Recommendations: Ziyad Chung Further Equipment Recommendations for Discharge: To Be Determined (TBD) at next level of care SUBJECTIVE:  
Patient stated i've been better.  OBJECTIVE DATA SUMMARY:  
 
Past Medical History:  
Diagnosis Date Atherosclerosis Diabetes ([facility] [address] 2013  
 type 2 High cholesterol Hyperlipidemia Hypertension 2013 Muscle weakness Neoplasm   
 right kidney Spinal stenosis Urinary retention Vitamin D deficiency Past Surgical History:  
Procedure Laterality Date HX HEENT    
 40 years ago deviated septum HX LUMBAR LAMINECTOMY  2017 HX ORTHOPAEDIC Lumbar laminectomy 9/7/17 HX ORTHOPAEDIC Right CTR Barriers to Learning/Limitations: yes;  physical 
Compensate with: visual, verbal, tactile, kinesthetic cues/model Home Situation:  
Home Situation Home Environment: Private residence # Steps to Enter: 1 Rails to Enter: No 
Wheelchair Ramp: No 
One/Two Story Residence: One story Living Alone: No 
Support Systems: Spouse/Significant Other/Partner Patient Expects to be Discharged to[de-identified] Rehabilitation facility Current DME Used/Available at Home: Grab bars, Walker, rollator, Shower chair Tub or Shower Type: Shower 
[]  Right hand dominant   []  Left hand dominant Cognitive/Behavioral Status: 
Neurologic State: Alert Orientation Level: Oriented X4 Cognition: Follows commands Safety/Judgement: Awareness of environment; Fall prevention Vision/Perceptual:   
Corrective Lenses: Reading glasses Coordination: BUE Coordination: Generally decreased, functional 
Fine Motor Skills-Upper: Left Intact; Right Intact Gross Motor Skills-Upper: Left Intact; Right Intact Balance: 
Sitting: Intact Standing: Impaired; With support Strength: BUE Strength: Generally decreased, functional 
 
Tone & Sensation: BUE Tone: Normal 
Sensation: Impaired Range of Motion: BUE 
AROM: Generally decreased, functional 
PROM: Generally decreased, functional 
 
Functional Mobility and Transfers for ADLs: 
Bed Mobility: 
Rolling: Stand-by assistance Supine to Sit: Minimum assistance Sit to Supine: Minimum assistance Scooting: Contact guard assistance Transfers: 
Sit to Stand: Minimum assistance (bed elevated, vc) Assistive Device: Brace/Splint;Gait belt;Walker, rolling ADL Assessment:  
Feeding: Independent Oral Facial Hygiene/Grooming: Stand-by assistance (seated EOB) Bathing: Minimum assistance; Moderate assistance Upper Body Dressing: Stand-by assistance Lower Body Dressing: Minimum assistance Toileting: Minimum assistance ADL Intervention: 
Upper Body Dressing Assistance Dressing Assistance: Contact guard assistance Orthotics(Brace): Contact guard assistance Lower Body Dressing Assistance Dressing Assistance: Contact guard assistance Socks: Contact guard assistance Leg Crossed Method Used: Yes Position Performed: Seated edge of bed Cues: Verbal cues provided;Visual cues provided Cognitive Retraining Safety/Judgement: Awareness of environment; Fall prevention Pain: 
Pain level pre-treatment: 9/10 Pain level post-treatment: 9/10 Pain Intervention(s): Medication provided by Nursing (see MAR); Rest, Ice, Repositioning Response to intervention: Nurse notified, See doc flow sheet Activity Tolerance:  
Fair-. Patient able to stand ~1 minute(s). Patient able to complete ADLs with rest breaks. Patient limited by pain, strength, ROM, endurance. Patient unsteady. Please refer to the flowsheet for vital signs taken during this treatment. After treatment:  
[] Patient left in no apparent distress sitting up in chair 
[x] Patient left in no apparent distress in bed 
[x] Call bell left within reach [x] Nursing notified 
[] Caregiver present 
[] Bed alarm activated COMMUNICATION/EDUCATION:  
[x] Role of Occupational Therapy in the acute care setting 
[x] Home safety education was provided and the patient/caregiver indicated understanding. [x] Patient/family have participated as able in goal setting and plan of care. [x] Patient/family agree to work toward stated goals and plan of care. [] Patient understands intent and goals of therapy, but is neutral about his/her participation. [] Patient is unable to participate in goal setting and plan of care. Thank you for this referral. 
Marisela Weir OTR/L Time Calculation: 24 mins Eval Complexity: History: MEDIUM Complexity : Expanded review of history including physical, cognitive and psychosocial  history ;   
Examination: MEDIUM Complexity : 3-5 performance deficits relating to physical, cognitive , or psychosocial skils that result in activity limitations and / or participation restrictions; Decision Making:MEDIUM Complexity : Patient may present with comorbidities that affect occupational performnce. Miniml to moderate modification of tasks or assistance (eg, physical or verbal ) with assesment(s) is necessary to enable patient to complete evaluation

## 2021-05-19 NOTE — PROGRESS NOTES
Problem: Mobility Impaired (Adult and Pediatric) Goal: *Acute Goals and Plan of Care (Insert Text) Description: Physical Therapy Goals Initiated 5/19/2021 and to be accomplished within 5 day(s) 1. Patient will move from supine <> sit with S in prep for out of bed activity and change of position. 2.  Patient will perform sit<> stand with S/LSO/RW in prep for transfers/ambulation. 3.  Patient will transfer from bed <> chair with S/LSO/RW for time up in chair for completion of ADL activity. 4.  Patient will ambulate 100 feet with S/LSO/RW for improved functional mobility at discharge. Outcome: Progressing Towards Goal 
PHYSICAL THERAPY EVALUATION Patient: Brittany Acevedo (11 y.o. male) Date: 5/19/2021 Primary Diagnosis: Unable to ambulate [R26.2] UTI (urinary tract infection) [N39.0] Compression fracture of L1 vertebra ([address] [S32.010A] Precautions:   Fall PLOF: spouse reports pt amb with rollator and back brace ASSESSMENT : 
Based on the objective data described below, the patient is seen on medical unit following admission for above dx. Pt presents with decreased ROM/motor performance (Le's grossly 2/5 to 3-/5, limitations in functional mobility with regard to bed mobility/ transfers, decreased gait quality/balance and decreased activity tolerance. Patient reports pain 9/10 t/o session. Educated in log rolling and performed same with SBA/vc. Pt transition to sit EOB with min assist; LSO donned with mod assist. Pt STS w/RW/min A/additional time and bed elevated. GT limited to lateral steps at bedside with RW/min A and shuffling gt with forward posture (which increased with time). Pt returned to supine with min A for LE mgmt and vc. Left with all needs in reach, and spouse present. Note pt answering questions during session and spouse often correcting pt. Answered pt questions regarding PT and mobility.   Recommend SNF for follow up physical therapy upon discharge to reach maximal level of independence/safety with functional mobility prior to return home with spouse assist.  
Patient will benefit from skilled intervention to address the above impairments. Pt Education: Role of physical therapy in acute care setting, fall prevention and safety/technique during functional mobility tasks Patient's rehabilitation potential is considered to be Fair Factors which may influence rehabilitation potential include:  
[]         None noted 
[x]         Mental ability/status [x]         Medical condition 
[]         Home/family situation and support systems 
[x]         Safety awareness [x]         Pain tolerance/management 
[]         Other: PLAN : 
Recommendations and Planned Interventions:  
[x]           Bed Mobility Training             []    Neuromuscular Re-Education 
[x]           Transfer Training                   []    Orthotic/Prosthetic Training 
[x]           Gait Training                          []    Modalities [x]           Therapeutic Exercises           []    Edema Management/Control 
[x]           Therapeutic Activities            []    Family Training/Education 
[x]           Patient Education 
[]           Other (comment): Frequency/Duration: Patient will be followed by physical therapy 1-2 times per day/4-7 days per week to address goals. Discharge Recommendations: Ziyad Chung Further Equipment Recommendations for Discharge: N/A  
 
SUBJECTIVE:  
Patient stated I'm okay.  OBJECTIVE DATA SUMMARY:  
 
Past Medical History:  
Diagnosis Date  Atherosclerosis  Diabetes ([facility] 2013  
 type 2  
 High cholesterol  Hyperlipidemia  Hypertension 2013  Muscle weakness  Neoplasm   
 right kidney  Spinal stenosis  Urinary retention  Vitamin D deficiency Past Surgical History:  
Procedure Laterality Date  HX HEENT    
 40 years ago deviated septum  HX LUMBAR LAMINECTOMY  2017  HX ORTHOPAEDIC  Lumbar laminectomy 9/7/17  HX ORTHOPAEDIC Right CTR Barriers to Learning/Limitations: yes;  physical and altered mental status (i.e.Sedation, Confusion) Compensate with: Visual Cues, Verbal Cues and Tactile Cues Home Situation: 
Home Situation Home Environment: Private residence # Steps to Enter: 1 (threshold) Rails to Enter: No 
Wheelchair Ramp: No 
One/Two Story Residence: One story Living Alone: No 
Support Systems: Spouse/Significant Other/Partner Patient Expects to be Discharged to[de-identified] Rehabilitation facility Current DME Used/Available at Home: Magana Litten, rolling, Walker, rollator, Grab bars, Shower chair Tub or Shower Type: Shower Critical Behavior: 
Neurologic State: Alert Orientation Level: Oriented X4 Cognition: Follows commands Safety/Judgement: Awareness of environment; Fall prevention Psychosocial 
Patient Behaviors: Calm; Cooperative Family  Behaviors: Calm;Supportive Purposeful Interaction: Yes Pt Identified Daily Priority: Clinical issues (comment) Caritas Process: Nurture loving kindness;Enable vik/hope;Establish trust;Teaching/learning; Attend basic human needs;Create healing environment;Supportive expression Caring Interventions: Reassure; Therapeutic modalities Reassure: Therapeutic listening; Informing;Caring rounds Therapeutic Modalities: Intentional therapeutic touch;Humor Family  Behaviors: Calm;Supportive Strength:   
Strength: Generally decreased, functional (unable to tolerate MMT L LE d/t jerking LE) Tone & Sensation:  
Tone: Normal 
Sensation: Impaired Range Of Motion: 
AROM: Generally decreased, functional 
PROM: Generally decreased, functional 
Functional Mobility: 
Bed Mobility: 
Rolling: Stand-by assistance Supine to Sit: Minimum assistance Sit to Supine: Minimum assistance Scooting: Contact guard assistance Transfers: 
Sit to Stand: Minimum assistance; Additional time (bed ht elevated , vc) Stand to Sit: Minimum assistance (vc) 
Balance:  
Sitting: Intact Standing: Impaired; With support Standing - Static: Fair;Constant support Standing - Dynamic : Fair;Constant support Ambulation/Gait Training: 
Distance (ft): 2 Feet (ft) (lateral steps at side of bed) Assistive Device: Brace/Splint;Gait belt;Walker, rolling Ambulation - Level of Assistance: Minimal assistance Gait Description (WDL): Exceptions to [facility] Gait Abnormalities: Antalgic;Decreased step clearance;Shuffling gait; Step to gait; Other (forward posture incr'g with time) Base of Support: Narrowed Speed/Nicky: Slow;Shuffled Step Length: Right shortened;Left shortened Interventions: Safety awareness training; Tactile cues; Verbal cues; Visual/Demos 9 Pain: 
Pain level pre-treatment: 9/10 Pain level post-treatment: 9/10 Pain Intervention(s) : Medication (see MAR); Rest, Ice, Repositioning Response to intervention: Nurse notified, See doc flow Activity Tolerance:  
Fair Please refer to the flowsheet for vital signs taken during this treatment. After treatment:  
[]         Patient left in no apparent distress sitting up in chair 
[x]         Patient left in no apparent distress in bed 
[x]         Call bell left within reach [x]         Nursing notified 
[x]         Caregiver present 
[]         Bed alarm activated 
[]         SCDs applied COMMUNICATION/EDUCATION:  
[x]         Role of Physical Therapy in the acute care setting. [x]         Fall prevention education was provided and the patient/caregiver indicated understanding. [x]         Patient/family have participated as able in goal setting and plan of care. [x]         Patient/family agree to work toward stated goals and plan of care. []         Patient understands intent and goals of therapy, but is neutral about his/her participation. []         Patient is unable to participate in goal setting/plan of care: ongoing with therapy staff. 
[]         Other: Thank you for this referral. 
Kevyn Ferrer, PT [place] Complexity: History: HIGH Complexity :3+ comorbidities / personal factors will impact the outcome/ POC Exam:MEDIUM Complexity : 3 Standardized tests and measures addressing body structure, function, activity limitation and / or participation in recreation  Presentation: MEDIUM Complexity : Evolving with changing characteristics  Clinical Decision Making:Medium Complexity  Overall Complexity:MEDIUM

## 2021-05-19 NOTE — PROGRESS NOTES
Hospitalist Progress Note    Patient: Yaneth Miller MRN: [id number]  CSN: [id number]    YOB: 1945  Age: 70 y.o. Sex: male    DOA: 5/17/2021 LOS:  LOS: 2 days                Assessment/Plan     Patient Active Problem List   Diagnosis Code    Lumbar spinal stenosis M48.061    Lumbar spondylosis M47.816    Radiculopathy of leg M54.10    Unable to ambulate R26.2    Urinary retention R33.9    Type II diabetes mellitus with manifestations, uncontrolled ([facility] E11.8, E11.65    Renal mass N28.89    UTI (urinary tract infection) due to urinary indwelling catheter (HCC) T83.511A, N39.0    Sepsis (HCC) A41.9    UTI (urinary tract infection) N39.0    Compression fracture of L1 vertebra ([facility] [address] S32.010A            71 y/o male with h/o lumbar stenosis, lumbar spondylosis, neurogenic bladder requiring regular catherization and type II diabetes mellitus admitted for inability to walk, frequent falls, generalized weakness, compression fracture of L1 vertebra and UTI. Inability to walk-  Likely from lumbar stenosis,  PT/OT    Lumbar spinal stenosis/compression fracture L1 vertebra-  ER physician discussed with Ortho, at this point continue with back brace and follow-up with spine surgery. Patient's wife will bring back brace. Also seen by Dr. Boone Steele, agree with above. UTI-  In a patient with neurogenic bladder and self-catheterization. Past cultures grew Pseudomonas and E. coli. on cefepime. Present urine cultures with mixed marky. DM-  On sliding scale insulin    DVT prophylaxis with Lovenox    Disposition : 1 to 2 days    Review of systems  General: No fevers or chills. Cardiovascular: No chest pain or pressure. No palpitations. Pulmonary: No shortness of breath. Gastrointestinal: No nausea, vomiting. Physical Exam:  General: Awake, cooperative, no acute distress    HEENT: NC, Atraumatic. PERRLA, anicteric sclerae. Lungs: CTA Bilaterally.  No Wheezing/Rhonchi/Rales. Heart:  S1 S2,  No murmur, No Rubs, No Gallops  Abdomen: Soft, Non distended, Non tender.  +Bowel sounds,   Extremities: No c/c/e  Psych:   Not anxious or agitated. Neurologic:  No acute neurological deficit. Vital signs/Intake and Output:  Visit Vitals  /81   Pulse 86   Temp 97.9 °F (36.6 °C)   Resp 17   Ht 5' 11\" (1.803 m)   Wt 56.5 kg (124 lb 9.6 oz)   SpO2 98%   BMI 17.38 kg/m²     Current Shift:  05/19 0701 - 05/19 1900  In: -   Out: 50 [Urine:50]  Last three shifts:  05/17 1901 - 05/19 0700  In: -   Out: 890 [Urine:890]            Labs: Results:       Chemistry Recent Labs     05/19/21 0100 05/18/21 0210 05/17/21  1235   GLU 92 80 82    141 141   K 3.8 3.7 4.8    108 104   CO2 28 30 33*   BUN 41* 36* 40*   CREA 1.32* 1.15 1.32*   CA 9.7 10.3* 12.9*   AGAP 5 3 4   BUCR 31* 31* 30*   AP  --   --  114   TP  --   --  8.5*   ALB  --   --  4.1   GLOB  --   --  4.4*   AGRAT  --   --  0.9      CBC w/Diff Recent Labs     05/19/21 0100 05/18/21  0210 05/17/21  1235   WBC 10.3 10.5 12.4   RBC 4.14* 3.95* 4.81   HGB 11.4* 11.0* 13.2   HCT 36.4 34.6* 42.0    229 281   GRANS  --   --  62   LYMPH  --   --  24   EOS  --   --  2      Cardiac Enzymes Recent Labs     05/17/21  1235      CKND1 1.6      Coagulation No results for input(s): PTP, INR, APTT, INREXT, INREXT in the last 72 hours. Lipid Panel Lab Results   Component Value Date/Time    Cholesterol, total 146 06/01/2016 07:51 AM    HDL Cholesterol 40 06/01/2016 07:51 AM    LDL, calculated 91.6 06/01/2016 07:51 AM    VLDL, calculated 14.4 06/01/2016 07:51 AM    Triglyceride 72 06/01/2016 07:51 AM    CHOL/HDL Ratio 3.7 06/01/2016 07:51 AM      BNP No results for input(s): BNPP in the last 72 hours.    Liver Enzymes Recent Labs     05/17/21  1235   TP 8.5*   ALB 4.1         Thyroid Studies No results found for: T4, T3U, TSH, TSHEXT, TSHEXT     Procedures/imaging: see electronic medical records for all procedures/Xrays and details which were not copied into this note but were reviewed prior to creation of Plan

## 2021-05-19 NOTE — PROGRESS NOTES
Discharge Planning: SNF vs home with home health CM spoke with the patient regarding plans for discharge. The patient states that if PT/OT recommend SNF/Rehab he is agreeable with this plan. FOC provided and the patient states that his first choice facility is [facility]. Additionally he gives this CM permission to send clinical information to all local [facility], and [place] SNF/rehab facilities so that said facilities may evaluate the patient's eligibility for admission. CM to do so and report back to the patient which facilities are able to accept for admission so that the patient can chose which facility they prefer. 1420: CM spoke with the patient regarding the status of his COVID vaccine. The patient states that he has had the first of one of the two part COVID vaccinations and that he will ask his wife to bring it to the hospital. 
 
[phone]: CM met with the patient at the bedside and provided him with the list of local SNFs. CM highlighted the facilities that have accepted thus far and the patient indicates at this time that his first choice is [facility]. CM informed the patient that [facility] is currently only accepting patients who are fully vaccinated for COVID and have been for at least two weeks. The patient states that he will discuss with his wife and follow-up with this CM in the morning. Care Management Interventions PCP Verified by CM: Yes Mode of Transport at Discharge: Other (see comment) (family/friend) Transition of Care Consult (CM Consult): SNF Physical Therapy Consult: Yes Occupational Therapy Consult: Yes Current Support Network: Lives with Spouse Confirm Follow Up Transport: Family The Plan for Transition of Care is Related to the Following Treatment Goals : UTI The Patient and/or Patient Representative was Provided with a Choice of Provider and Agrees with the Discharge Plan?: Yes Name of the Patient Representative Who was Provided with a Choice of Provider and Agrees with the Discharge Plan: Stefanie Campos [place] of Choice List was Provided with Basic Dialogue that Supports the Patient's Individualized Plan of Care/Goals, Treatment Preferences and Shares the Quality Data Associated with the Providers?: Yes Discharge Location Discharge Placement: Skilled nursing facility No

## 2021-05-19 NOTE — PROGRESS NOTES
0429 Pt C/O 8/10 back pain unrelieved by repositioning, PRN Tylenol administered will follow up on effectiveness 0518 Pain re-accessed pt states his pain is about the same and nothing really works but he cares not to take narcotics. Jimenez in place connected to leg bag on left leg draining summer tinged urine, clear with no odor 
 
0720 Bedside and Verbal shift change report given to LORY Coates RN (oncoming nurse) by Kofi Mae RN (offgoing nurse). Report included the following information SBAR, Kardex, Intake/Output, MAR and Recent Results.

## 2021-05-19 NOTE — PROGRESS NOTES
Pharmacy Dosing Services: Cefepime Pharmacist Renal Dosing Note for Cefepime Physician: Ilya Foster Indication: UTI (known history of E.coli growth) Previous Regimen Cefepime 1g IV Q8H Serum Creatinine Lab Results Component Value Date/Time Creatinine 1.32 (H) 05/19/2021 01:00 AM  
  
Creatinine Clearance Estimated Creatinine Clearance: 39.8 mL/min (A) (based on SCr of 1.32 mg/dL (H)). BUN Lab Results Component Value Date/Time BUN 41 (H) 05/19/2021 01:00 AM  
   
 
Cefepime 1g IV Q8H renally adjusted to Cefepime 2g IV Q24H per [facility] P&T protocol. Pharmacy will continue to monitor daily and make adjustments based on clinical status. Breezy Sutton, PharmD 
[phone]

## 2021-05-20 PROBLEM — T83.511A UTI (URINARY TRACT INFECTION) DUE TO URINARY INDWELLING CATHETER (HCC): Status: RESOLVED | Noted: 2020-05-01 | Resolved: 2021-01-01

## 2021-05-20 PROBLEM — N39.0 UTI (URINARY TRACT INFECTION) DUE TO URINARY INDWELLING CATHETER (HCC): Status: RESOLVED | Noted: 2020-05-01 | Resolved: 2021-01-01

## 2021-05-20 NOTE — ROUTINE PROCESS
1927 Bedside and Verbal shift change report given to Jordi Morris RN (oncoming nurse) by Oriana Walter RN (offgoing nurse). Report included the following information SBAR, Kardex, Intake/Output, MAR and Recent Results. 1955 Roxanna Wu with Dr Jose Raul Robertson R/T pt C/O gas and heartburn, N.O received for Calcium Carbonate tid. 
 
0100 Pt states discomfort has subsided. Bedside and Verbal shift change report given to MARNIE Blount RN (oncoming nurse) by Jordi Morris RN (offgoing nurse). Report included the following information SBAR, Kardex, Intake/Output, MAR and Recent Results.

## 2021-05-20 NOTE — PROGRESS NOTES
Transition of care to SNF: [facility] today @ 2:30 pm 
 
0905: CM spoke with the patient and his wife and they would like the patient to transition to [facility] upon discharge. 0945: CM met with the patient and his wife at the bedside. CM made a copy of the patient's COVID vaccination card and placed it on the patient's chart before returning the card to the patient's wife. CM informed both the patient and his wife that a 2:30 pm transport has been arranged to take the patient to [facility] today. No additional questions or concerns voiced by the patient or his wife. Communication to Patient/Family: Met with patient and family and they are agreeable to the transition plan. The Plan for Transition of Care is related to the following treatment goals: UTI The Patient and/or patient representative was provided with a choice of provider and agrees  
with the discharge plan. Yes [x] No [] Freedom of choice list was provided with basic dialogue that supports the patient's individualized plan of care/goals and shares the quality data associated with the providers. Yes [x] No [] SNF/Rehab Transition: 
Patient has been accepted to [address] at [address] [place] [address] for SNF/Rehab and meets criteria for admission. Patient will transported by [facility] and expected to leave at 1430. Communication to SNF/Rehab: 
Bedside RN, has been notified to update the transition plan to the facility and call report [phone]. Discharge information has been updated on the AVS and communicated via [address] and/or CC link. Discharge instructions to be fax'd to facility at [phone] per request. 
  
Please include all hard scripts for controlled substances, med rec and dc summary in packet. Please medicate for pain prior to dc if possible and needed to help offset delay when patient first arrives to facility.  
 
Reviewed and confirmed with facility, [facility] can manage the patient care needs for the following:  
 
Coleman Wahl with (X) only those applicable: 
Medication: 
[x]Medications are available at the facility []IV Antibiotics []Controlled Substance  hard copies available sent. []Weekly Labs Equipment: 
[]CPAP/BiPAP []Wound Vacuum []Jimenez or Urinary Device []PICC/Central Line []Nebulizer []Ventilator Treatment: 
[]Isolation (for MRSA, VRE, etc.) []Surgical Drain Management []Tracheostomy Care 
[]Dressing Changes []Dialysis with transportation []PEG Care []Oxygen []Daily Weights for Heart Failure Dietary: 
[x]Any diet limitations []Tube Feedings []Total Parenteral Management (TPN) Financial Resources: 
[]Medicaid Application Completed []UAI Completed  and copy given to pt/family 
 
[]A screening has previously been completed. []Level II Completed 
 
[] Private pay individual who will not become  
financially eligible for Medicaid within 6 months from admission to a [address] [place] [address] facility. [x] Individual refused to have screening conducted. []Medicaid Application Completed []The screening denied because it was determined individual did not need/did not qualify for nursing facility level of care. [] Out of state residents seeking direct admission to a [address] facility. [] Individuals who are inpatients of an out of state hospital, or in state or out of state veterans/ hospital and seek direct admission to a [address] facility [] Individuals who are pateints or residents of a state owned/operated facility that is licensed by Department of [employer] ([employer]) and seek direct admission to [address] facility [] A screening not required for enrollment in [facility] services as set out in 12 VAC 30- [] [facility]) staff shall perform screenings of the [facility] clients. Advanced Care Plan: 
[]Surrogate Decision Maker of Care 
[]POA []Communicated Code Status and copy sent. Other:  
   
 
Care Management Interventions PCP Verified by CM: Yes Mode of Transport at Discharge: Other (see comment) (family/friend) Transition of Care Consult (CM Consult): SNF Physical Therapy Consult: Yes Occupational Therapy Consult: Yes Current Support Network: Lives with Spouse Confirm Follow Up Transport: Family The Plan for Transition of Care is Related to the Following Treatment Goals : UTI The Patient and/or Patient Representative was Provided with a Choice of Provider and Agrees with the Discharge Plan?: Yes Name of the Patient Representative Who was Provided with a Choice of Provider and Agrees with the Discharge Plan: Judit Romo Freedom of Choice List was Provided with Basic Dialogue that Supports the Patient's Individualized Plan of Care/Goals, Treatment Preferences and Shares the Quality Data Associated with the Providers?: Yes Discharge Location Discharge Placement: Skilled nursing facility

## 2021-05-20 NOTE — DISCHARGE SUMMARY
Discharge Summary Patient: Sebastien Mclean MRN: [id number]  CSN: [id number] YOB: 1945  Age: 70 y.o. Sex: male DOA: 5/17/2021 LOS:  LOS: 3 days   Discharge Date:   
 
Primary Care Provider:  Jose Corcoran MD 
 
Admission Diagnoses: Unable to ambulate [R26.2] UTI (urinary tract infection) [N39.0] Compression fracture of L1 vertebra ([address] [S32.010A] Discharge Diagnoses:   
Problem List as of 5/20/2021 Date Reviewed: 10/13/2020 Codes Class Noted - Resolved Compression fracture of L1 vertebra (HCC) ICD-10-CM: S32.010A ICD-9-CM: 805.4  5/17/2021 - Present Type II diabetes mellitus with manifestations, uncontrolled (HCC) ICD-10-CM: E11.8, E11.65 ICD-9-CM: 250.92  9/16/2017 - Present Renal mass ICD-10-CM: N28.89 ICD-9-CM: 593.9  9/16/2017 - Present Unable to ambulate ICD-10-CM: R26.2 ICD-9-CM: 719.7  9/14/2017 - Present Urinary retention ICD-10-CM: R33.9 ICD-9-CM: 788.20  9/14/2017 - Present Lumbar spinal stenosis ICD-10-CM: M48.061 
ICD-9-CM: 724.02  9/5/2017 - Present Lumbar spondylosis ICD-10-CM: M47.816 ICD-9-CM: 721.3  9/5/2017 - Present Radiculopathy of leg ICD-10-CM: M54.10 ICD-9-CM: 724.4  9/5/2017 - Present RESOLVED: Blurred vision ICD-10-CM: H53.8 ICD-9-CM: 368.8  10/13/2020 - 10/13/2020 RESOLVED: UTI (urinary tract infection) due to urinary indwelling catheter (HCC) ICD-10-CM: T83.511A, N39.0 ICD-9-CM: 996.64, 599.0  5/1/2020 - 5/20/2021 RESOLVED: Anemia ICD-10-CM: D64.9 ICD-9-CM: 285.9  9/16/2017 - 5/1/2020 RESOLVED: Dehydration ICD-10-CM: E86.0 ICD-9-CM: 276.51  9/14/2017 - 5/1/2020 RESOLVED: Hyponatremia ICD-10-CM: E87.1 ICD-9-CM: 276.1  9/14/2017 - 5/1/2020 RESOLVED: DM (diabetes mellitus) (HCC) (Chronic) ICD-10-CM: E11.9 ICD-9-CM: 250.00  9/7/2017 - 5/1/2020 Discharge Medications:    
Current Discharge Medication List  
  
CONTINUE these medications which have NOT CHANGED Details  
pregabalin (Lyrica) 25 mg capsule Take 75 mg by mouth. Indications: restless legs syndrome, an extreme discomfort in the calf muscles when sitting or lying down  
  
atorvastatin (Lipitor) 20 mg tablet Take 20 mg by mouth nightly. traZODone (DESYREL) 50 mg tablet Take 50 mg by mouth nightly. metoprolol succinate (TOPROL-XL) 25 mg XL tablet Take 1 Tab by mouth nightly. aspirin 81 mg chewable tablet Take 81 mg by mouth daily. lisinopril-hydroCHLOROthiazide (PRINZIDE, ZESTORETIC) 20-25 mg per tablet Take  by mouth daily. cholecalciferol, vitamin D3, (VITAMIN D3) 2,000 unit tab Take  by mouth.  
  
multivitamin (ONE A DAY) tablet Take 1 Tab by mouth daily. metFORMIN (GLUCOPHAGE) 500 mg tablet Take 500 mg by mouth two (2) times daily (with meals). Discharge Condition: Good Procedures : None Consults: Orthopedics PHYSICAL EXAM  
Visit Vitals BP (!) 147/73 (BP 1 Location: Left upper arm, BP Patient Position: At rest) Pulse 70 Temp 97.5 °F (36.4 °C) Resp 16 Ht 5' 11\" (1.803 m) Wt 54.4 kg (120 lb) SpO2 100% BMI 16.74 kg/m² General: Awake, cooperative, no acute distress HEENT: NC, Atraumatic. PERRLA, EOMI. Anicteric sclerae. Lungs:  CTA Bilaterally. No Wheezing/Rhonchi/Rales. Heart:  Regular  rhythm,  No murmur, No Rubs, No Gallops Abdomen: Soft, Non distended, Non tender. +Bowel sounds, Extremities: No c/c/e Psych:   Not anxious or agitated. Neurologic:  No acute neurological deficits. Admission HPI :  
Stefanie Campos is a 70 y.o. male who presents to the emergency department with a chief complaint of generalized weakness.  Patient reports symptoms going on for about 1-2 weeks.  Nothing seems to make it better or worse.  He describes the weakness as generalized weakness in his legs that he says has been going on for approximately a week.   He describes it as just not having any strength. Related to this he states that he has had multiple falls over the last week or 2 without any significant injury. His normal baseline is to ambulate with a Rollator. He denies any constitutional symptoms such as fever, chills, night sweats, nausea, vomiting, diarrhea loose stools, shortness of breath, difficulty breathing or chest pain. He states that he is followed by urologist Dr. Meri Auguste for his neurogenic bladder and he goes to his office at least once a month to have his Jimenez bag changed approximately every 3 to 4 weeks. Hospital Course :  
Mr. Bryan Pride was admitted to medical floor, he was seen by orthopedic spine surgery. They recommended to continue with his back brace and follow-up with spine surgery as outpatient. He received PT/OT during hospitalization. His UA showed possible UTI however his urine cultures showed mixed marky. Based on his previous urine cultures that grew Pseudomonas and E. coli he was placed on cefepime. He has chronic indwelling Jimenez catheter. This will be stopped at discharge. For his diabetes we started on sliding scale insulin. Activity: Activity as tolerated Diet: Diabetic Diet Follow-up: PCP, spine surgery Disposition: rehab Minutes spent on discharge: 45 Labs: Results:  
   
Chemistry Recent Labs  
  05/20/21 
0250 05/19/21 
0100 05/18/21 
0210 GLU 78 92 80  139 141  
K 3.8 3.8 3.7  106 108 CO2 25 28 30 BUN 37* 41* 36* CREA 1.10 1.32* 1.15  
CA 8.8 9.7 10.3* AGAP 7 5 3 BUCR 34* 31* 31* CBC w/Diff Recent Labs  
  05/20/21 
0250 05/19/21 
0100 05/18/21 
0210 WBC 9.7 10.3 10.5 RBC 3.80* 4.14* 3.95* HGB 10.4* 11.4* 11.0*  
HCT 33.0* 36.4 34.6*  220 229 Cardiac Enzymes No results for input(s): CPK, CKND1, LUH in the last 72 hours. No lab exists for component: Hazle Echevaria Coagulation No results for input(s): PTP, INR, APTT, INREXT, INREXT in the last 72 hours. Lipid Panel Lab Results Component Value Date/Time Cholesterol, total 146 06/01/2016 07:51 AM  
 HDL Cholesterol 40 06/01/2016 07:51 AM  
 LDL, calculated 91.6 06/01/2016 07:51 AM  
 VLDL, calculated 14.4 06/01/2016 07:51 AM  
 Triglyceride 72 06/01/2016 07:51 AM  
 CHOL/HDL Ratio 3.7 06/01/2016 07:51 AM  
  
BNP No results for input(s): BNPP in the last 72 hours. Liver Enzymes No results for input(s): TP, ALB, TBIL, AP in the last 72 hours. No lab exists for component: SGOT, GPT, DBIL Thyroid Studies No results found for: T4, T3U, TSH, TSHEXT, TSHEXT Significant Diagnostic Studies: MRI LUMB SPINE WO CONT Result Date: 5/18/2021 EXAM: MRI OF THE LUMBAR SPINE, WITHOUT IV CONTRAST CLINICAL INDICATION/HISTORY: L1 compression fracture, hx lumbar surgery   > Additional: None. COMPARISON: MRI lumbar spine 9/14/2017; CT scan abdomen/pelvis 5/17/2021   > Reference Exam: None. TECHNIQUE: T1 weighted sagittal and axial, STIR and T2 FSE sagittal, T2 FSE axial. _______________ FINDINGS: OSSEOUS, LUMBAR ALIGNMENT: Moderate compression deformity of the superior plate of L1 with osteonecrotic central cleft demonstrated. Approximately 2 to 3 mm retropulsion at the superior margin of the L1 vertebral body. Remaining vertebral body heights appear preserved. Postop changes from prior posterior instrumented spinal fusion spanning L4-S1 with accompanying interlaminar fixation L4-L5 and L5-S1. Overall maintenance of usual lumbar lordosis without evidence of listhesis. Levoconvex curvature of the mid lumbar spine, apex L2-L3. Noninstrumented levels demonstrate mild intervertebral disc height loss. No suspicious osseous lesion. Vertebral body marrow signal intensity is normal. There are mild degenerative changes of the left and right sacroiliac joints.   PARASPINAL AND RETROPERITONEAL SOFT TISSUES: Partial inclusion of an exophytic lower pole right renal lesion estimated at approximately 6.2 cm in greatest included dimension. Intermediate signal intensity noted on both T1 and T2-weighted sequences. No acute abnormality throughout the visualized retroperitoneum. OTHER: Conus medullaris ends at the L1 vertebral body level. Correlation of axial and sagittal images reveals the following: T12-L1: Mild diffuse disc bulge. Slight retropulsion of the L1 vertebral body related to compression fracture noted with AP diameter of the thecal sac at this level is estimated at approximately 8 to 9 mm. Bilateral facet joint osteoarthritis is noted with mild ligamentum flavum infolding. Mild right neuroforaminal narrowing. Left neural foramen is patent. L1-L2: Mild diffuse disc bulge, asymmetric towards the left foraminal position. No spinal canal or neuroforaminal stenosis. Mild bilateral facet joint osteoarthritis without significant ligamentum flavum infolding. L2-L3: Diffuse disc bulge. Mild ventral impression of the thecal sac. AP diameter of the thecal sac at this level is estimated at approximately 12 mm. Mild facet joint osteoarthritis and ligamentum flavum infolding. No significant neuroforaminal narrowing. L3-L4: Diffuse disc bulge with moderately severe facet joint osteoarthritis. Lung window flavum infolding is present along with moderate interspinous abutment posteriorly. Hypertrophic changes and diffuse nature of the disc bulge result in thecal sac narrowing, with AP diameter estimated at approximately 8 mm. Right greater the left lateral recess narrowing. Mild bilateral, right greater than left neuroforaminal narrowing. L4-L5: Postop changes from discectomy and instrumented fusion at this level. No evidence of spinal canal stenosis. Moderate right and mild left-sided neuroforaminal narrowing. L5-S1: Postop changes from discectomy, and interlaminar fixation, and posterior instrumentation. No spinal canal stenosis. No significant neuroforaminal impingement appreciated in the limitations of artifact. _______________ 1. Acute osteoporotic type moderate compression deformity of the L1 vertebral body with mild accompanying retropulsion.   > Mild spinal canal stenosis at the level of the retropulsed vertebral body noted. 2. Postop changes from posterior instrumented spinal fusion procedure and interlaminar fixation L4-L5.   > Multilevel lumbar spondylosis with junctional stenosis at the L3-L4 level with right greater than left lateral recess narrowing. 3. Partial inclusion of indeterminate exophytic right renal mass lesion of uncertain etiology CT HEAD WO CONT Result Date: 5/17/2021 EXAM: CT head INDICATION: 68year-old patient with generalized fatigue and weakness COMPARISON: None. TECHNIQUE: Axial CT imaging of the head was performed without intravenous contrast. Standard multiplanar coronal and sagittal reformatted images were obtained and are included in interpretation. Please note that this study was first available to be interpreted at 1628 hours on 5/17/2021. One or more dose reduction techniques were used on this CT: automated exposure control, adjustment of the mAs and/or kVp according to patient size, and iterative reconstruction techniques. The specific techniques used on this CT exam have been documented in the patient's electronic medical record. Digital Imaging and Communications in Medicine (DICOM) format image data are available to nonaffiliated external healthcare facilities or entities on a secure, media free, reciprocally searchable basis with patient authorization for at least a 12-month period after this study. _______________ FINDINGS: BRAIN AND POSTERIOR FOSSA: Cortical and cerebellar volume loss is present, within normal limits for patient age. Ventricular size and configuration is normal. Basilar cisterns are patent. Subcortical and periventricular white matter low-attenuation is noted.  Foci of dystrophic calcification present within the There is no intracranial hemorrhage, mass effect, or midline shift. Gray-white matter differentiation is within normal limits. Cluster of faint calcifications in the central mat. No associated mass effect or edema. EXTRA-AXIAL SPACES AND MENINGES: There are no abnormal extra-axial fluid collections. CALVARIUM: Intact. SINUSES: Clear. OTHER: None. _______________ 1. No acute intracranial abnormality demonstrated. 2. Mild subcortical and periventricular white matter low-attenuation, compatible with sequela of chronic ischemic microvascular change. 3. Faint clustered calcifications within the central mat of indeterminate etiology. No accompanying mass affect or edema appreciated.   > Findings may reflect sequela of prior hemorrhage, be vascular in etiology, or a manifestation of prior toxic/metabolic insult. CT ABD PELV WO CONT Result Date: 5/17/2021 EXAM: CT of the abdomen and pelvis INDICATION: Generalized weakness. COMPARISON: Renal ultrasound 9/15/2017 TECHNIQUE: Axial CT imaging of the abdomen and pelvis was performed without intravenous contrast. Multiplanar reformats were generated. One or more dose reduction techniques were used on this CT: automated exposure control, adjustment of the mAs and/or kVp according to patient size, and iterative reconstruction techniques. The specific techniques used on this CT exam have been documented in the patient's electronic medical record. Digital Imaging and Communications in Medicine (DICOM) format image data are available to nonaffiliated external healthcare facilities or entities on a secure, media free, reciprocally searchable basis with patient authorization for at least a 12-month period after this study. _______________ FINDINGS: LOWER CHEST: Unremarkable. LIVER, BILIARY: Liver is normal. No biliary dilation. Cholecystectomy PANCREAS: Normal. SPLEEN: Normal. ADRENALS: Normal. KIDNEYS/URETERS/BLADDER: No hydronephrosis.  Right lower pole complex cyst with rim calcifications measures 5.1 x 5.2 cm with internal measurements of approximately 30 HU. Jimenez catheter within incompletely decompressed urinary bladder containing layering calculi. PELVIC ORGANS: Unremarkable. VASCULATURE: Extensive vascular calcifications. Infrarenal aortic ectasia, 2.8 cm. LYMPH NODES: No enlarged lymph nodes. GASTROINTESTINAL TRACT: No bowel dilation or wall thickening. Large amount stool throughout the colon. BONES: Superior endplate acute compression fracture of L1 with 50% loss of height. L4-S1 posterior spinal fusion hardware with interbody disc cages. OTHER: None. _______________ Superior endplate acute compression fracture of L1 with 50% loss of height. Correlate clinically. Infrarenal aortic ectasia, 2.8 cm. Right lower pole complex cyst with rim calcifications measures 5.1 x 5.2 cm with internal measurements of approximately 30 HU. Correlate with contrast-enhanced CT or MRI. Jimenez catheter within incompletely decompressed urinary bladder containing layering calculi. XR CHEST PORT Result Date: 5/17/2021 EXAM: XR CHEST PORT CLINICAL INDICATION/HISTORY: Generalized weakness -Additional: None COMPARISON: Radiographs May 1, 2020 TECHNIQUE: Frontal view of the chest obtained on 2 exposures _______________ FINDINGS: HEART AND MEDIASTINUM: Normal cardiac size and mediastinal contours. LUNGS AND PLEURAL SPACES: No focal pneumonic consolidation, pneumothorax, or pleural effusion. BONY THORAX AND SOFT TISSUES: No acute osseous abnormality _______________ No acute radiographic cardiopulmonary abnormality. No results found for this or any previous visit. Please note that this dictation was completed with [employer], the computer voice recognition software. Quite often unanticipated grammatical, syntax, homophones, and other interpretive errors are inadvertently transcribed by the computer software. Please disregard these errors. Please excuse any errors that have escaped final proofreading.   
 
CC: Rex Serrano, MD

## 2021-05-20 NOTE — PROGRESS NOTES
[id number] Received bedside shift report from off going nurse Jennifer Leon, SASKIA. Report included the following information SBAR, Kardex, Intake/Output, MAR and Recent Results. Pt unable to provide a PTA med list. Wife brought in a bag of loose pills in a zip lock bag with no containers or labels. ASke pt wife to take home. [facility](mail order) and [facility] are patients pharmacy. 1330 Paged Dr. Vera Olivo to verify whether patient discharging with Jimenez. [address] report to [facility] and spoke with Elke Funes, RN. Report included the following information SBAR, Kardex, Intake/Output, MAR and Recent Results. 1630 Transport given report.

## 2021-05-20 NOTE — PROGRESS NOTES
Problem: Falls - Risk of 
Goal: *Absence of Falls Description: Document Sabrina Clifton Fall Risk and appropriate interventions in the flowsheet. Outcome: Progressing Towards Goal 
Note: Fall Risk Interventions: 
Mobility Interventions: Assess mobility with egress test, Communicate number of staff needed for ambulation/transfer, Patient to call before getting OOB, Utilize walker, cane, or other assistive device Medication Interventions: Teach patient to arise slowly, Patient to call before getting OOB, Evaluate medications/consider consulting pharmacy Elimination Interventions: Call light in reach, Patient to call for help with toileting needs History of Falls Interventions: Investigate reason for fall, Consult care management for discharge planning, Door open when patient unattended, Evaluate medications/consider consulting pharmacy, Room close to nurse's station

## 2021-05-29 NOTE — PROGRESS NOTES
Physician Progress Note Edward Tapia 
[facility] #:                  [id number] :                       1945 ADMIT DATE:       2021 11:46 AM 
DISCH DATE:        2021 4:35 PM 
RESPONDING 
PROVIDER #:        Sara Pineda MD 
 
 
 
 
QUERY TEXT: 
 
Patient admitted with Unable to ambulate  . Noted documentation of  UTI in H&P on  . In order to support the diagnosis of ***, please include additional clinical indicators in your documentation. Or please document if the diagnosis of *** has been ruled out after further study. The medical record reflects the following: 
Risk Factors: Neurogenic bladder Clinical Indicators: Consideration was given for a UTI and patient was treated with antibiotics,Discharge summary stated His UA showed possible UTI however his urine cultures showed mixed marky. Based on his previous urine cultures that grew Pseudomonas and E. coli he was placed on cefecpime. Treatment:  cefecpime Thank- You Naty Young  [facility] 
[phone] Options provided: 
-- UTI  present as evidenced by, Please document evidence. -- UTI  was ruled out 
-- Other - I will add my own diagnosis -- Disagree - Not applicable / Not valid -- Disagree - Clinically unable to determine / Unknown 
-- Refer to Clinical Documentation Reviewer PROVIDER RESPONSE TEXT: 
 
UTI was ruled out after study. Query created by: Melia Duran on 2021 10:34 AM 
 
 
QUERY TEXT: 
 
Patient was admitted with weakness and frequent falls. They are noted to have an L1 vertebral 
compression fracture . Patient was evaluated by PT/OT and discharged to 
SNF. After study can the suspected etiology of the fracture be further specified as: The medical record reflects the following: 
Risk Factors: frequent falls Clinical Indicators: L1 vertebral compression fracture Treatment: Back brace, PTO Thank- You Naty Young  [facility] 
[phone] Options provided: 
-- Traumatic compression fracture 
-- Non-traumatic compression fracture 
-- Other - I will add my own diagnosis -- Disagree - Not applicable / Not valid -- Disagree - Clinically unable to determine / Unknown 
-- Refer to Clinical Documentation Reviewer PROVIDER RESPONSE TEXT: 
 
Provider is clinically unable to determine a response to this query. Query created by: Iliana Edwards on 5/25/2021 10:47 AM 
 
 
QUERY TEXT: 
 
PER Dietary consult noted that patient met criteria for severe malnutrition in the context of acute illness. with 50% or less of energy requirements for 5 or more days and moderate fat/muscle loss. Dietary 
supplements were ordered. BMI is noted to be 17. After study, was this patient suspected to have: The medical record reflects the following: 
Risk Factors: Inability to walk Clinical Indicators: PER RD: -Severe malnutrition related to acute injury/trauma as evidenced by severe loss of subcutaneous fat, severe muscle loss Treatment:  Supplement: will order Glucerna BID Thank- You Micaela Pereira RN [address] [place] [address] 
[phone] Options provided: 
-- Severe malnutrition per ASPEN criteria 
-- Non-severe malnutrition per ASPEN criteria 
-- Normal nutritional status -- Other - I will add my own diagnosis -- Disagree - Not applicable / Not valid -- Disagree - Clinically unable to determine / Unknown 
-- Refer to Clinical Documentation Reviewer PROVIDER RESPONSE TEXT: 
 
Normal nutritional status Query created by: Iliana Edwards on 5/25/2021 11:09 AM 
 
 
Electronically signed by:  Anamaria Spence MD 5/29/2021 9:03 AM

## 2021-07-20 PROBLEM — I10 HTN (HYPERTENSION): Status: ACTIVE | Noted: 2021-01-01

## 2021-07-20 PROBLEM — A41.9 SEPSIS (HCC): Status: ACTIVE | Noted: 2021-01-01

## 2021-07-20 PROBLEM — N39.0 UTI (URINARY TRACT INFECTION): Status: ACTIVE | Noted: 2021-01-01

## 2021-07-20 PROBLEM — I71.43 ANEURYSM OF INFRARENAL ABDOMINAL AORTA: Status: ACTIVE | Noted: 2021-01-01

## 2021-07-20 PROBLEM — Z97.8 PRESENCE OF INDWELLING FOLEY CATHETER: Status: ACTIVE | Noted: 2021-01-01

## 2021-07-20 PROBLEM — N17.9 AKI (ACUTE KIDNEY INJURY) (HCC): Status: ACTIVE | Noted: 2021-01-01

## 2021-07-20 NOTE — ED TRIAGE NOTES
Pt arrived via EMS with c/o alonso cath problem. Pt states pain started at 0600 today and rates pain 9/10 to alonso cath insertion area. Alonso was placed 3 weeks ago, 16fr 5cc balloon.  50ml urine output noted in leg drainage bag

## 2021-07-20 NOTE — ED TRIAGE NOTES
Patient arrives via ambulance after near syncopal episode at home. Patient seen here earlier today and catheter changed, now draining serosanguinous fluid.

## 2021-07-20 NOTE — ED PROVIDER NOTES
EMERGENCY DEPARTMENT HISTORY AND PHYSICAL EXAM    5:02 PM    Date: 7/20/2021  Patient Name: Ellie Squires    History of Presenting Illness     Chief Complaint   Patient presents with    Dizziness       History Provided By: Patient  Location/Duration/Severity/Modifying factors   Patient is a 80-year-old male who presents to the emergency department with a chief complaint of lightheadedness and dizziness. Patient tells me that he upon getting home from his earlier visit today was getting out of a truck when he felt lightheaded and dizzy. Per EMS on arrival of his blood pressure was in the 50I systolic. With an IV fluid bolus it did improve back towards normal.  Patient denies any fevers or chills. He is otherwise been in usual state of health but has noted some blood coming from the Jimenez catheter. Patient denies any chest pain or shortness of breath. Earlier today he was seen by the author for blocked urinary catheter. Patient had his catheter replaced and he was feeling much better at that time. Nothing seems to make his lightheadedness better or worse though it seems to have resolved at this point.           PCP: Rex Serrano MD    Current Facility-Administered Medications   Medication Dose Route Frequency Provider Last Rate Last Admin    Vancomycin-Pharmacy to Dose  1 Each Other Rx Dosing/Monitoring Jordy Rockwell MD        vancomycin (VANCOCIN) 500 mg in 0.9% sodium chloride (MBP/ADV) 100 mL MBP  500 mg IntraVENous Q24H Jordy Rockwell MD        sodium chloride (NS) flush 5-40 mL  5-40 mL IntraVENous Q8H Jona DOBSON MD   10 mL at 07/21/21 0508    sodium chloride (NS) flush 5-40 mL  5-40 mL IntraVENous PRN Jordy Rockwell MD        acetaminophen (TYLENOL) tablet 650 mg  650 mg Oral Q6H PRN Jordy Rockwell MD        Or    acetaminophen (TYLENOL) suppository 650 mg  650 mg Rectal Q6H PRN Jordy Rockwell MD        polyethylene glycol [facility] packet 17 g  17 g Oral DAILY PRN Simón Davalos MD        ondansetron (ZOFRAN ODT) tablet 4 mg  4 mg Oral Q8H PRN Simón Davalos MD        Or    ondansetron [facility]) injection 4 mg  4 mg IntraVENous Q6H PRN Simón Davalos MD        enoxaparin (LOVENOX) injection 40 mg  40 mg SubCUTAneous DAILY Simón Davalos MD        cefTRIAXone (ROCEPHIN) 1 g in sterile water (preservative free) 10 mL IV syringe  1 g IntraVENous Q24H Simón Davalos MD        0.9% sodium chloride infusion  100 mL/hr IntraVENous CONTINUOUS Simón Davalos  mL/hr at 07/21/21 0042 100 mL/hr at 07/21/21 0042    atorvastatin (LIPITOR) tablet 20 mg  20 mg Oral QHS Simón Davalos MD   20 mg at 07/21/21 9144    aspirin chewable tablet 81 mg  81 mg Oral DAILY Simón Davalos MD        cholecalciferol (VITAMIN D3) (1000 Units /25 mcg) tablet 2,000 Units  2,000 Units Oral DAILY Simón Davalos MD        pregabalin (LYRICA) capsule 75 mg  75 mg Oral QHS Simón Davalos MD   75 mg at 07/21/21 0042    traZODone (DESYREL) tablet 50 mg  50 mg Oral QHS Simón Davalos MD   50 mg at 07/21/21 0042    multivitamin, tx-iron-ca-min (THERA-M w/ IRON) tablet 1 Tablet  1 Tablet Oral DAILY Simón Davalos MD           Past History     Past Medical History:  Past Medical History:   Diagnosis Date    Atherosclerosis     Diabetes ([facility] [address] 2013    type 2    High cholesterol     Hyperlipidemia     Hypertension 2013    Muscle weakness     Neoplasm     right kidney    Spinal stenosis     Urinary retention     Vitamin D deficiency        Past Surgical History:  Past Surgical History:   Procedure Laterality Date    HX HEENT      40 years ago deviated septum    HX LUMBAR LAMINECTOMY  2017    HX ORTHOPAEDIC      Lumbar laminectomy 9/7/17    HX ORTHOPAEDIC Right     CTR       Family History:  Family History   Problem Relation Age of Onset    Heart Disease Father        Social History:  Social History     Tobacco Use    Smoking status: Former Smoker    Smokeless tobacco: Never Used   Substance Use Topics    Alcohol use: No    Drug use: No       Allergies: Allergies   Allergen Reactions    Zocor [Simvastatin] Other (comments)     Made him \"ill\"       I reviewed and confirmed the above information with patient and updated as necessary. Review of Systems     Review of Systems   Constitutional: Negative for fever. HENT: Negative for congestion and rhinorrhea. Eyes: Negative for visual disturbance. Respiratory: Negative for cough and shortness of breath. Cardiovascular: Negative for chest pain and leg swelling. Gastrointestinal: Negative for abdominal pain, diarrhea, nausea and vomiting. Genitourinary: Negative for dysuria and urgency. Musculoskeletal: Negative for back pain, myalgias and neck pain. Skin: Negative for rash. Neurological: Positive for dizziness and light-headedness. Negative for headaches. Physical Exam     Visit Vitals  /61   Pulse 85   Temp 97.9 °F (36.6 °C)   Resp 16   Ht 5' 11\" (1.803 m)   Wt 59 kg (130 lb)   SpO2 92%   BMI 18.13 kg/m²       Physical Exam  Constitutional:       General: He is not in acute distress. Appearance: Normal appearance. He is normal weight. He is not ill-appearing or toxic-appearing. HENT:      Head: Normocephalic and atraumatic. Right Ear: External ear normal.      Left Ear: External ear normal.      Nose: Nose normal.      Mouth/Throat:      Mouth: Mucous membranes are moist.      Pharynx: No oropharyngeal exudate or posterior oropharyngeal erythema. Eyes:      Conjunctiva/sclera: Conjunctivae normal.      Pupils: Pupils are equal, round, and reactive to light. Cardiovascular:      Rate and Rhythm: Normal rate and regular rhythm. Pulses: Normal pulses. Heart sounds: Normal heart sounds. No murmur heard. No friction rub.    Pulmonary:      Effort: Pulmonary effort is normal. Breath sounds: Normal breath sounds. No wheezing, rhonchi or rales. Abdominal:      General: Abdomen is flat. Tenderness: There is no abdominal tenderness. There is no right CVA tenderness, left CVA tenderness, guarding or rebound. Genitourinary:     Comments: Jimenez catheter in place  Musculoskeletal:         General: No swelling or tenderness. Normal range of motion. Cervical back: Normal range of motion and neck supple. Right lower leg: No edema. Left lower leg: No edema. Skin:     General: Skin is warm and dry. Capillary Refill: Capillary refill takes less than 2 seconds. Findings: No rash. Neurological:      General: No focal deficit present. Mental Status: He is alert. Motor: No weakness.          Diagnostic Study Results     Labs -  Recent Results (from the past 24 hour(s))   URINALYSIS W/ RFLX MICROSCOPIC    Collection Time: 07/20/21  2:35 PM   Result Value Ref Range    Color YELLOW      Appearance TURBID      Specific gravity 1.017 1.005 - 1.030      pH (UA) 7.0 5.0 - 8.0      Protein 30 (A) NEG mg/dL    Glucose Negative NEG mg/dL    Ketone Negative NEG mg/dL    Bilirubin Negative NEG      Blood MODERATE (A) NEG      Urobilinogen 0.2 0.2 - 1.0 EU/dL    Nitrites Positive (A) NEG      Leukocyte Esterase LARGE (A) NEG     URINE MICROSCOPIC ONLY    Collection Time: 07/20/21  2:35 PM   Result Value Ref Range    WBC TOO NUMEROUS TO COUNT 0 - 5 /hpf    RBC 11 to 20 0 - 5 /hpf    Epithelial cells FEW 0 - 5 /lpf    Bacteria 3+ (A) NEG /hpf    Other: 2+ CALCIUM PHOSPHATE CRYSTALS    EKG, 12 LEAD, INITIAL    Collection Time: 07/20/21  5:05 PM   Result Value Ref Range    Ventricular Rate 90 BPM    Atrial Rate 90 BPM    P-R Interval 148 ms    QRS Duration 104 ms    Q-T Interval 362 ms    QTC Calculation (Bezet) 442 ms    Calculated P Axis 58 degrees    Calculated R Axis -57 degrees    Calculated T Axis -7 degrees    Diagnosis       Sinus rhythm with frequent premature ventricular complexes  Possible Left atrial enlargement  Left axis deviation  Inferior infarct (cited on or before 14-SEP-2017)  Abnormal ECG  When compared with ECG of 17-MAY-2021 12:20,  Nonspecific T wave abnormality now evident in Inferior leads  Nonspecific T wave abnormality, worse in Lateral leads     CBC WITH AUTOMATED DIFF    Collection Time: 07/20/21  5:12 PM   Result Value Ref Range    WBC 28.8 (H) 4.6 - 13.2 K/uL    RBC 3.93 (L) 4.35 - 5.65 M/uL    HGB 10.8 (L) 13.0 - 16.0 g/dL    HCT 34.5 (L) 36.0 - 48.0 %    MCV 87.8 74.0 - 97.0 FL    MCH 27.5 24.0 - 34.0 PG    MCHC 31.3 31.0 - 37.0 g/dL    RDW 17.3 (H) 11.6 - 14.5 %    PLATELET 464 014 - 232 K/uL    MPV 10.0 9.2 - 11.8 FL    NEUTROPHILS 78 (H) 40 - 73 %    BAND NEUTROPHILS 2 0 - 5 %    LYMPHOCYTES 14 (L) 21 - 52 %    MONOCYTES 6 3 - 10 %    EOSINOPHILS 0 0 - 5 %    BASOPHILS 0 0 - 2 %    ABS. NEUTROPHILS 23.1 (H) 1.8 - 8.0 K/UL    ABS. LYMPHOCYTES 4.0 (H) 0.9 - 3.6 K/UL    ABS. MONOCYTES 1.7 (H) 0.05 - 1.2 K/UL    ABS. EOSINOPHILS 0.0 0.0 - 0.4 K/UL    ABS. BASOPHILS 0.0 0.0 - 0.1 K/UL    DF MANUAL      PLATELET COMMENTS ADEQUATE PLATELETS      RBC COMMENTS ANISOCYTOSIS  1+       METABOLIC PANEL, COMPREHENSIVE    Collection Time: 07/20/21  5:12 PM   Result Value Ref Range    Sodium 140 136 - 145 mmol/L    Potassium 3.9 3.5 - 5.5 mmol/L    Chloride 105 100 - 111 mmol/L    CO2 28 21 - 32 mmol/L    Anion gap 7 3.0 - 18 mmol/L    Glucose 117 (H) 74 - 99 mg/dL    BUN 40 (H) 7.0 - 18 MG/DL    Creatinine 1.73 (H) 0.6 - 1.3 MG/DL    BUN/Creatinine ratio 23 (H) 12 - 20      GFR est AA 47 (L) >60 ml/min/1.73m2    GFR est non-AA 39 (L) >60 ml/min/1.73m2    Calcium 11.0 (H) 8.5 - 10.1 MG/DL    Bilirubin, total 0.6 0.2 - 1.0 MG/DL    ALT (SGPT) 22 16 - 61 U/L    AST (SGOT) 20 10 - 38 U/L    Alk.  phosphatase 111 45 - 117 U/L    Protein, total 7.6 6.4 - 8.2 g/dL    Albumin 3.3 (L) 3.4 - 5.0 g/dL    Globulin 4.3 (H) 2.0 - 4.0 g/dL    A-G Ratio 0.8 0.8 - 1.7 CARDIAC PANEL,(CK, CKMB & TROPONIN)    Collection Time: 07/20/21  5:12 PM   Result Value Ref Range    CK - MB 2.7 <3.6 ng/ml    CK-MB Index 1.8 0.0 - 4.0 %     39 - 308 U/L    Troponin-I, QT 0.18 (H) 0.0 - 0.045 NG/ML   POC LACTIC ACID    Collection Time: 07/20/21  5:52 PM   Result Value Ref Range    Lactic Acid (POC) 2.37 (HH) 0.40 - 2.00 mmol/L   CULTURE, BLOOD    Collection Time: 07/20/21  5:53 PM    Specimen: Blood   Result Value Ref Range    Special Requests: NO SPECIAL REQUESTS      Culture result: NO GROWTH AFTER 13 HOURS     CULTURE, BLOOD    Collection Time: 07/20/21  6:34 PM    Specimen: Blood   Result Value Ref Range    Special Requests: NO SPECIAL REQUESTS      Culture result: NO GROWTH AFTER 12 HOURS     CARDIAC PANEL,(CK, CKMB & TROPONIN)    Collection Time: 07/20/21  8:18 PM   Result Value Ref Range    CK - MB 3.2 <3.6 ng/ml    CK-MB Index 1.7 0.0 - 4.0 %     39 - 308 U/L    Troponin-I, QT 0.15 (H) 0.0 - 8.113 NG/ML   METABOLIC PANEL, COMPREHENSIVE    Collection Time: 07/21/21  3:10 AM   Result Value Ref Range    Sodium 141 136 - 145 mmol/L    Potassium 4.1 3.5 - 5.5 mmol/L    Chloride 109 100 - 111 mmol/L    CO2 24 21 - 32 mmol/L    Anion gap 8 3.0 - 18 mmol/L    Glucose 71 (L) 74 - 99 mg/dL    BUN 40 (H) 7.0 - 18 MG/DL    Creatinine 1.56 (H) 0.6 - 1.3 MG/DL    BUN/Creatinine ratio 26 (H) 12 - 20      GFR est AA 53 (L) >60 ml/min/1.73m2    GFR est non-AA 44 (L) >60 ml/min/1.73m2    Calcium 9.5 8.5 - 10.1 MG/DL    Bilirubin, total 0.5 0.2 - 1.0 MG/DL    ALT (SGPT) 18 16 - 61 U/L    AST (SGOT) 19 10 - 38 U/L    Alk.  phosphatase 81 45 - 117 U/L    Protein, total 5.9 (L) 6.4 - 8.2 g/dL    Albumin 2.5 (L) 3.4 - 5.0 g/dL    Globulin 3.4 2.0 - 4.0 g/dL    A-G Ratio 0.7 (L) 0.8 - 1.7     CBC W/O DIFF    Collection Time: 07/21/21  3:10 AM   Result Value Ref Range    WBC 21.2 (H) 4.6 - 13.2 K/uL    RBC 3.19 (L) 4.35 - 5.65 M/uL    HGB 8.6 (L) 13.0 - 16.0 g/dL    HCT 28.1 (L) 36.0 - 48.0 %    MCV 88.1 74.0 - 97.0 FL    MCH 27.0 24.0 - 34.0 PG    MCHC 30.6 (L) 31.0 - 37.0 g/dL    RDW 17.5 (H) 11.6 - 14.5 %    PLATELET 859 963 - 310 K/uL    MPV 11.2 9.2 - 11.8 FL   CK-MB,QT    Collection Time: 07/21/21  3:10 AM   Result Value Ref Range    CK - MB 3.0 <3.6 ng/ml    CK-MB Index 1.5 0.0 - 4.0 %   CK    Collection Time: 07/21/21  3:10 AM   Result Value Ref Range     39 - 308 U/L   TROPONIN I    Collection Time: 07/21/21  3:10 AM   Result Value Ref Range    Troponin-I, QT 0.08 (H) 0.0 - 0.045 NG/ML         Radiologic Studies -   CT ABD PELV WO CONT   Final Result      No acute process. Evaluation the urinary bladder is limited due to under   distention. Correlate with urinalysis. Infrarenal abdominal aortic aneurysm measuring 3.3 x 2.7 cm. Further loss of height at L1 with there is a remote compression fracture. The left pedicular screw at L4 breech is the superior endplate and there is   suggestion of loosening of the left L4 screw. 4 mm nodule right middle lobe follow-up as per Fleischner Society protocol.      ========      Fleischner Society Pulmonary Nodule Guidelines (revised 2017): Solid nodule <6 mm:   -Low risk for lung cancer: No routine followup.   -High risk for lung cancer: Optional CT in 12 months (suspicious morphology,   upper lobe location, or both may warrant 12 month followup depending on   comorbidities and patient preference). XR CHEST PORT   Final Result   No acute process              Medical Decision Making   I am the first provider for this patient. I reviewed the vital signs, available nursing notes, past medical history, past surgical history, family history and social history. Vital Signs-Reviewed the patient's vital signs. EKG: Sinus rhythm rate of 90 bpm, frequent PVCs are present. Left axis deviation. No ST elevation or depression. Nonspecific T wave changes.   Overall sinus rhythm with frequent PVCs and nonspecific changes. Records Reviewed: Nursing Notes, Old Medical Records, Previous Radiology Studies and Previous Laboratory Studies (Time of Review: 5:02 PM)            Provider Notes (Medical Decision Making):   MDM  Number of Diagnoses or Management Options  Diagnosis management comments: 42-year-old male patient presented to the emergency department with a chief complaint of dizziness and lightheadedness and an episode of hypotension at home after discharge from the hospital earlier today. Differential would include orthostasis, sepsis, ACS, arrhythmia UTI, hemorrhagic shock symptomatic anemia, etc.    Patient does have some blood in his Jimenez bag which present earlier the more urine mixed with blood than jose de jesus blood. Results reviewed: The patient has a leukocytosis of 28,000, I am going to order CT abdomen and pelvis to look for possible stone or pyelonephritis although clinically he does not appear to be critically ill. His lactic acid also was elevated just above 2, will order him some fluids. We will also start him on broad-spectrum antibiotics. CT does not demonstrate any acute pathology in the abdomen or pelvis. Suspect this is due to his UTI. He does not have any other symptoms. Chest x-ray does not show any acute process by my interpretation however the formal read was pending at the time of disposition. Discussed with Dr. Oskar Garcia who agreed to admit the patient. Procedures    Critical Care Time: CRITICAL CARE NOTE:    I have spent 35 minutes of critical care time involved in lab review, consultations with specialist, family decision-making, and documentation. During this entire length of time I was immediately available to the patient. Critical Care:   The reason for providing this level of medical care for this critically ill patient was due a critical illness that impaired one or more vital organ systems such that there was a high probability of imminent or life threatening deterioration in the patients condition. This care involved high complexity decision making to assess, manipulate, and support vital system functions, to treat this vital organ system failure and to prevent further life threatening deterioration of the patients condition. Time is exclusive of procedural and teaching time. Kranthi Etienne DO      Core Measures:  For Hospitalized Patients:    1. Hospitalization Decision Time:  The decision to hospitalize the patient was made by Dr. Vignesh Monsalve at [facility] on 7/20/2021    2. Aspirin: Aspirin was not given because the patient did not present with a stroke at the time of their Emergency Department evaluation    8:18 PM  Patient is being admitted to the hospital by Dr. Anya Castorena. The results of their tests and reasons for their admission have been discussed with them and/or available family. They convey agreement and understanding for the need to be admitted and for their admission diagnosis. CONDITIONS ON ADMISSION:  Sepsis is present at the time of admission. Deep Vein Thrombosis is not present at the time of admission. Thrombosis is not present at the time of admission. Urinary Tract Infection is present at the time of admission. Pneumonia is not present at the time of admission. MRSA is not present at the time of admission. Wound infection is not present at the time of admission. Pressure Ulcer is not present at the time of admission. Diagnosis     Clinical Impression:   1. Urinary tract infection associated with indwelling urethral catheter, subsequent encounter    2. Leukocytosis, unspecified type    3.  Sepsis without acute organ dysfunction, due to unspecified organism [facility])        Disposition: Admit    Follow-up Information     Follow up With Specialties Details Why Contact Info Hiotellis, Gregoria Soulier, MD Family Medicine   [address] [place] [address] [address]   [phone]             Current Discharge Medication List      CONTINUE these medications which have NOT CHANGED    Details   cefpodoxime (VANTIN) 200 mg tablet Take 1 Tablet by mouth two (2) times a day for 7 days. Qty: 14 Tablet, Refills: 0      pregabalin (Lyrica) 25 mg capsule Take 75 mg by mouth. Indications: restless legs syndrome, an extreme discomfort in the calf muscles when sitting or lying down      atorvastatin (Lipitor) 20 mg tablet Take 20 mg by mouth nightly. traZODone (DESYREL) 50 mg tablet Take 50 mg by mouth nightly. metoprolol succinate (TOPROL-XL) 25 mg XL tablet Take 1 Tab by mouth nightly. aspirin 81 mg chewable tablet Take 81 mg by mouth daily. lisinopril-hydroCHLOROthiazide (PRINZIDE, ZESTORETIC) 20-25 mg per tablet Take  by mouth daily. metFORMIN (GLUCOPHAGE) 500 mg tablet Take 500 mg by mouth two (2) times daily (with meals). cholecalciferol, vitamin D3, (VITAMIN D3) 2,000 unit tab Take  by mouth.      multivitamin (ONE A DAY) tablet Take 1 Tab by mouth daily. Nina Lares DO   Emergency Medicine   July 21, 2021, 5:02 PM     This note is dictated utilizing Dragon voice recognition software. Unfortunately this leads to occasional typographical errors using the voice recognition. I apologize in advance if the situation occurs. If questions occur please do not hesitate to contact me directly.     Nina Lares, DO

## 2021-07-20 NOTE — ED NOTES
Pts wife notified via telephone per pts request that he is ready for discharge and she is going to pick him up

## 2021-07-20 NOTE — ED PROVIDER NOTES
EMERGENCY DEPARTMENT HISTORY AND PHYSICAL EXAM    11:29 AM    Date: 7/20/2021  Patient Name: Carmen Snider    History of Presenting Illness     Chief Complaint   Patient presents with    Urinary Catheter Problem       History Provided By: Patient  Location/Duration/Severity/Modifying factors   Patient is a 68-year-old male with past medical history as noted presented to the ED with complaints of suprapubic discomfort, and concerns that his Jimenez catheter is not draining adequately. Reports that it started around 6 AM this morning he has been having increasing amounts of suprapubic discomfort, and sensation of full bladder. There is some urine in the Jimenez catheter bag. Reports that this catheter in particular was placed 3 weeks ago. No fevers or chills. No chest pain or shortness of breath no nausea vomiting or diarrhea. Patient reports he is otherwise been in his usual state of health with no pertinent complaints at this time. Reports that when the pain gets more severe he is unable to ambulate. PCP: Rex Serrano MD    Current Facility-Administered Medications   Medication Dose Route Frequency Provider Last Rate Last Admin    aspirin chewable tablet 324 mg  324 mg Oral NOW Isac Colmenares,          Current Outpatient Medications   Medication Sig Dispense Refill    cefpodoxime (VANTIN) 200 mg tablet Take 1 Tablet by mouth two (2) times a day for 7 days. 14 Tablet 0    pregabalin (Lyrica) 25 mg capsule Take 75 mg by mouth. Indications: restless legs syndrome, an extreme discomfort in the calf muscles when sitting or lying down      atorvastatin (Lipitor) 20 mg tablet Take 20 mg by mouth nightly.  traZODone (DESYREL) 50 mg tablet Take 50 mg by mouth nightly.  metoprolol succinate (TOPROL-XL) 25 mg XL tablet Take 1 Tab by mouth nightly.  aspirin 81 mg chewable tablet Take 81 mg by mouth daily.       lisinopril-hydroCHLOROthiazide (PRINZIDE, ZESTORETIC) 20-25 mg per tablet Take  by mouth daily.  metFORMIN (GLUCOPHAGE) 500 mg tablet Take 500 mg by mouth two (2) times daily (with meals).  cholecalciferol, vitamin D3, (VITAMIN D3) 2,000 unit tab Take  by mouth.  multivitamin (ONE A DAY) tablet Take 1 Tab by mouth daily. Facility-Administered Medications Ordered in Other Encounters   Medication Dose Route Frequency Provider Last Rate Last Admin    cefepime (MAXIPIME) 2 g in sterile water (preservative free) 10 mL IV syringe  2 g IntraVENous Q8H Mallorie ROJAS  mL/hr at 07/20/21 1932 2 g at 07/20/21 1932    levoFLOXacin (LEVAQUIN) 750 mg in D5W IVPB  750 mg IntraVENous Q48H Santiago Osborn DO           Past History     Past Medical History:  Past Medical History:   Diagnosis Date    Atherosclerosis     Diabetes ([address] 2013    type 2    High cholesterol     Hyperlipidemia     Hypertension 2013    Muscle weakness     Neoplasm     right kidney    Spinal stenosis     Urinary retention     Vitamin D deficiency        Past Surgical History:  Past Surgical History:   Procedure Laterality Date    HX HEENT      40 years ago deviated septum    HX LUMBAR LAMINECTOMY  2017    HX ORTHOPAEDIC      Lumbar laminectomy 9/7/17    HX ORTHOPAEDIC Right     CTR       Family History:  History reviewed. No pertinent family history. Social History:  Social History     Tobacco Use    Smoking status: Former Smoker    Smokeless tobacco: Never Used   Substance Use Topics    Alcohol use: No    Drug use: No       Allergies: Allergies   Allergen Reactions    Zocor [Simvastatin] Other (comments)     Made him \"ill\"       I reviewed and confirmed the above information with patient and updated as necessary. Review of Systems     Review of Systems   Constitutional: Negative for fever. HENT: Negative for congestion and rhinorrhea. Eyes: Negative for visual disturbance. Respiratory: Negative for cough and shortness of breath.     Cardiovascular: Negative for chest pain. Gastrointestinal: Positive for abdominal pain (Suprapubic). Negative for diarrhea, nausea and vomiting. Genitourinary: Positive for difficulty urinating. Negative for urgency. Musculoskeletal: Negative for myalgias. Skin: Negative for rash. Neurological: Negative for weakness and headaches. Physical Exam     Visit Vitals  BP (!) 178/95   Pulse 77   Temp 98.1 °F (36.7 °C)   Resp 14   Ht 5' 10.98\" (1.803 m)   Wt 59 kg (130 lb)   SpO2 100%   BMI 18.14 kg/m²       Physical Exam  Constitutional:       General: He is not in acute distress. Appearance: Normal appearance. He is normal weight. He is not ill-appearing or toxic-appearing. HENT:      Head: Normocephalic and atraumatic. Right Ear: External ear normal.      Left Ear: External ear normal.      Nose: Nose normal. No rhinorrhea. Mouth/Throat:      Mouth: Mucous membranes are moist.      Pharynx: No oropharyngeal exudate or posterior oropharyngeal erythema. Eyes:      Conjunctiva/sclera: Conjunctivae normal.      Pupils: Pupils are equal, round, and reactive to light. Cardiovascular:      Rate and Rhythm: Normal rate and regular rhythm. Pulses: Normal pulses. Heart sounds: Normal heart sounds. No murmur heard. No friction rub. Pulmonary:      Effort: Pulmonary effort is normal.      Breath sounds: Normal breath sounds. No wheezing, rhonchi or rales. Abdominal:      General: Abdomen is flat. Tenderness: There is abdominal tenderness (Primarily subjective tenderness in the suprapubic region, remainder of abdomen is nontender. ). There is no guarding or rebound. Genitourinary:     Comments: Jimenez catheter in place, the penis is uncircumcised and appears normal.  There is no suprapubic swelling. There is no leakage of urine around the catheter. Musculoskeletal:         General: No swelling or tenderness. Normal range of motion. Cervical back: Normal range of motion and neck supple.       Right lower leg: No edema. Left lower leg: No edema. Skin:     General: Skin is warm and dry. Capillary Refill: Capillary refill takes less than 2 seconds. Neurological:      General: No focal deficit present. Mental Status: He is alert. Motor: No weakness.          Diagnostic Study Results     Labs -  Recent Results (from the past 24 hour(s))   URINALYSIS W/ RFLX MICROSCOPIC    Collection Time: 07/20/21  2:35 PM   Result Value Ref Range    Color YELLOW      Appearance TURBID      Specific gravity 1.017 1.005 - 1.030      pH (UA) 7.0 5.0 - 8.0      Protein 30 (A) NEG mg/dL    Glucose Negative NEG mg/dL    Ketone Negative NEG mg/dL    Bilirubin Negative NEG      Blood MODERATE (A) NEG      Urobilinogen 0.2 0.2 - 1.0 EU/dL    Nitrites Positive (A) NEG      Leukocyte Esterase LARGE (A) NEG     URINE MICROSCOPIC ONLY    Collection Time: 07/20/21  2:35 PM   Result Value Ref Range    WBC TOO NUMEROUS TO COUNT 0 - 5 /hpf    RBC 11 to 20 0 - 5 /hpf    Epithelial cells FEW 0 - 5 /lpf    Bacteria 3+ (A) NEG /hpf    Other: 2+ CALCIUM PHOSPHATE CRYSTALS    EKG, 12 LEAD, INITIAL    Collection Time: 07/20/21  5:05 PM   Result Value Ref Range    Ventricular Rate 90 BPM    Atrial Rate 90 BPM    P-R Interval 148 ms    QRS Duration 104 ms    Q-T Interval 362 ms    QTC Calculation (Bezet) 442 ms    Calculated P Axis 58 degrees    Calculated R Axis -57 degrees    Calculated T Axis -7 degrees    Diagnosis       Sinus rhythm with frequent premature ventricular complexes  Possible Left atrial enlargement  Left axis deviation  Inferior infarct (cited on or before 14-SEP-2017)  Abnormal ECG  When compared with ECG of 17-MAY-2021 12:20,  Nonspecific T wave abnormality now evident in Inferior leads  Nonspecific T wave abnormality, worse in Lateral leads     CBC WITH AUTOMATED DIFF    Collection Time: 07/20/21  5:12 PM   Result Value Ref Range    WBC 28.8 (H) 4.6 - 13.2 K/uL    RBC 3.93 (L) 4.35 - 5.65 M/uL    HGB 10.8 (L) 13.0 - 16.0 g/dL    HCT 34.5 (L) 36.0 - 48.0 %    MCV 87.8 74.0 - 97.0 FL    MCH 27.5 24.0 - 34.0 PG    MCHC 31.3 31.0 - 37.0 g/dL    RDW 17.3 (H) 11.6 - 14.5 %    PLATELET 002 474 - 140 K/uL    MPV 10.0 9.2 - 11.8 FL    NEUTROPHILS 78 (H) 40 - 73 %    BAND NEUTROPHILS 2 0 - 5 %    LYMPHOCYTES 14 (L) 21 - 52 %    MONOCYTES 6 3 - 10 %    EOSINOPHILS 0 0 - 5 %    BASOPHILS 0 0 - 2 %    ABS. NEUTROPHILS 23.1 (H) 1.8 - 8.0 K/UL    ABS. LYMPHOCYTES 4.0 (H) 0.9 - 3.6 K/UL    ABS. MONOCYTES 1.7 (H) 0.05 - 1.2 K/UL    ABS. EOSINOPHILS 0.0 0.0 - 0.4 K/UL    ABS. BASOPHILS 0.0 0.0 - 0.1 K/UL    DF MANUAL      PLATELET COMMENTS ADEQUATE PLATELETS      RBC COMMENTS ANISOCYTOSIS  1+       METABOLIC PANEL, COMPREHENSIVE    Collection Time: 07/20/21  5:12 PM   Result Value Ref Range    Sodium 140 136 - 145 mmol/L    Potassium 3.9 3.5 - 5.5 mmol/L    Chloride 105 100 - 111 mmol/L    CO2 28 21 - 32 mmol/L    Anion gap 7 3.0 - 18 mmol/L    Glucose 117 (H) 74 - 99 mg/dL    BUN 40 (H) 7.0 - 18 MG/DL    Creatinine 1.73 (H) 0.6 - 1.3 MG/DL    BUN/Creatinine ratio 23 (H) 12 - 20      GFR est AA 47 (L) >60 ml/min/1.73m2    GFR est non-AA 39 (L) >60 ml/min/1.73m2    Calcium 11.0 (H) 8.5 - 10.1 MG/DL    Bilirubin, total 0.6 0.2 - 1.0 MG/DL    ALT (SGPT) 22 16 - 61 U/L    AST (SGOT) 20 10 - 38 U/L    Alk.  phosphatase 111 45 - 117 U/L    Protein, total 7.6 6.4 - 8.2 g/dL    Albumin 3.3 (L) 3.4 - 5.0 g/dL    Globulin 4.3 (H) 2.0 - 4.0 g/dL    A-G Ratio 0.8 0.8 - 1.7     CARDIAC PANEL,(CK, CKMB & TROPONIN)    Collection Time: 07/20/21  5:12 PM   Result Value Ref Range    CK - MB 2.7 <3.6 ng/ml    CK-MB Index 1.8 0.0 - 4.0 %     39 - 308 U/L    Troponin-I, QT 0.18 (H) 0.0 - 0.045 NG/ML   POC LACTIC ACID    Collection Time: 07/20/21  5:52 PM   Result Value Ref Range    Lactic Acid (POC) 2.37 (HH) 0.40 - 2.00 mmol/L         Radiologic Studies -   No orders to display           Medical Decision Making   I am the first provider for this patient. I reviewed the vital signs, available nursing notes, past medical history, past surgical history, family history and social history. Vital Signs-Reviewed the patient's vital signs. Records Reviewed: Nursing Notes, Old Medical Records, Previous Radiology Studies and Previous Laboratory Studies (Time of Review: 11:29 AM)    ED Course: Progress Notes, Reevaluation, and Consults:  ED Course as of Jul 20 1935   Tue Jul 20, 2021   1143 Per RN, about 430 cc of urine, will try to flush the catheter. [BRIAN]      ED Course User Index  [BRIAN] Patria Henriquez DO         Provider Notes (Medical Decision Making):   MDM  Number of Diagnoses or Management Options  Jimenez catheter problem, initial encounter [facility])  Urinary retention  Urinary tract infection associated with indwelling urethral catheter, initial encounter [facility])  Diagnosis management comments: Patient presents with chief complaint of suprapubic discomfort and concern for blocked urinary catheter. Plan will be for evaluation with Noninvasive bladder scanner to see how much urine he is actually retaining if this is the issue either flush or replace the catheter. Also consider UTI or bladder spasms as the cause. Does not appear to be dealing with phimosis or paraphimosis or other strictly penile issue no signs of Marcelo's gangrene. Initially patient had around 400 retained, catheter tried to be flushed by RN staff was unsuccessful. A new Jimenez catheter was placed with return of about 800 cc of urine. Certainly consistent with a UTI as well we will treat with antibiotics. He had some blood mixed with urine as well. Patient agrees with the treatment plan. Reports feeling much much better after having the repeat catheter placed in good drainage of urine. Recommended following up with urology and returning if worse in the meantime.     At this time, patient is stable and appropriate for discharge home.  Patient demonstrates understanding of current diagnoses and is in agreement with the treatment plan. Cornelius Garcia are advised that while the likelihood of serious underlying condition is low at this point given the evaluation performed today, we cannot fully rule it out. Cornelius Garcia are advised to immediately return with any new symptoms or worsening of current condition.  All questions have been answered. Santino Urena is given educational material regarding their diagnoses, including danger symptoms and when to return to the ED. This note was dictated utilizing Dragon voice recognition software. Unfortunately this leads to occasional typographical errors. I apologize in advance if the situation occurs. If questions occur please do not hesitate to contact me directly. Uday Bocanegra, DO          Procedures    Critical Care Time: N/A    Diagnosis     Clinical Impression:   1. Jimenez catheter problem, initial encounter ([facility] [address]    2. Urinary tract infection associated with indwelling urethral catheter, initial encounter ([facility] [address]    3. Urinary retention        Disposition: Discharge    Follow-up Information     Follow up With Specialties Details Why Contact Info    Winston Pierre MD Family Medicine   [address] [place] [id number] [phone]      [facility] EMERGENCY DEPT Emergency Medicine  As needed, If symptoms worsen; or [address]  [phone]           Discharge Medication List as of 7/20/2021  2:59 PM      START taking these medications    Details   cefpodoxime (VANTIN) 200 mg tablet Take 1 Tablet by mouth two (2) times a day for 7 days. , Normal, Disp-14 Tablet, R-0         CONTINUE these medications which have NOT CHANGED    Details   pregabalin (Lyrica) 25 mg capsule Take 75 mg by mouth. Indications: restless legs syndrome, an extreme discomfort in the calf muscles when sitting or lying down, Historical Med      atorvastatin (Lipitor) 20 mg tablet Take 20 mg by mouth nightly. , Historical Med      traZODone (DESYREL) 50 mg tablet Take 50 mg by mouth nightly., Historical Med      metoprolol succinate (TOPROL-XL) 25 mg XL tablet Take 1 Tab by mouth nightly., Historical Med      aspirin 81 mg chewable tablet Take 81 mg by mouth daily. , Historical Med      lisinopril-hydroCHLOROthiazide (PRINZIDE, ZESTORETIC) 20-25 mg per tablet Take  by mouth daily. , Historical Med      metFORMIN (GLUCOPHAGE) 500 mg tablet Take 500 mg by mouth two (2) times daily (with meals). , Historical Med      cholecalciferol, vitamin D3, (VITAMIN D3) 2,000 unit tab Take  by mouth., Historical Med      multivitamin (ONE A DAY) tablet Take 1 Tab by mouth daily. , Nadia Meneses DO   Emergency Medicine   July 20, 2021, 11:29 AM     This note is dictated utilizing Dragon voice recognition software. Unfortunately this leads to occasional typographical errors using the voice recognition. I apologize in advance if the situation occurs. If questions occur please do not hesitate to contact me directly.     Amber Chávez, DO

## 2021-07-20 NOTE — Clinical Note
Status[de-identified] INPATIENT [101]   Type of Bed: Telemetry [19]   Cardiac Monitoring Required?: Yes   Inpatient Hospitalization Certified Necessary for the Following Reasons: 3.  Patient receiving treatment that can only be provided in an inpatient setting (further clarification in H&P documentation)   Admitting Diagnosis: Sepsis [facility]) [[id number]]   Admitting Diagnosis: UTI (urinary tract infection) [265998]   Admitting Physician: Noreen Gandara [id number]   Attending Physician: Noreen Pierson [[id number]]   Estimated Length of Stay: 2 Midnights   Discharge Plan[de-identified] [address] [facility]

## 2021-07-21 PROBLEM — R63.6 UNDERWEIGHT DUE TO INADEQUATE CALORIC INTAKE: Status: ACTIVE | Noted: 2021-01-01

## 2021-07-21 PROBLEM — R77.8 ELEVATED TROPONIN: Status: ACTIVE | Noted: 2021-01-01

## 2021-07-21 PROBLEM — E43 SEVERE PROTEIN-CALORIE MALNUTRITION (HCC): Status: ACTIVE | Noted: 2021-01-01

## 2021-07-21 NOTE — CONSULTS
Comprehensive Nutrition Assessment    Type and Reason for Visit: Initial    Nutrition Recommendations/Plan: Will continue to monitor PO intake of both meals and supplements    Nutrition Assessment:  Pt admitted w/ Sepsis, UTI d/t urinary indwelling Jimenez catheter, BRITT, elevated troponin, underweight d/t inadequate intake, h/o HTN. PMHx: T2DM, vit D deficiency. Malnutrition Assessment:  Malnutrition Status:  Severe malnutrition    Context:  Acute illness     Findings of the 6 clinical characteristics of malnutrition:   Energy Intake:  7 - 50% or less of est energy requirements for 5 or more days  Weight Loss:   (-4% in 3 months)     Body Fat Loss:  7 - Moderate body fat loss, Orbital, Triceps   Muscle Mass Loss:  7 - Moderate muscle mass loss, Temples (temporalis), Thigh (quadriceps), Calf    Estimated Daily Nutrient Needs:  Energy (kcal): 1420-6290; Weight Used for Energy Requirements: Current  Protein (g): 47-65; Weight Used for Protein Requirements: Current (0.8-1.1)  Fluid (ml/day): 8215-0191; Method Used for Fluid Requirements: 1 ml/kcal    Nutrition Related Findings:  Labs: GFR est AA 53, BUN 40, creatinine 1.56. Med: Vit D, MVI. No BM noted at this time. Spoke w/ pt. Pt stated not eating breakfast this morning and did not have a menu- provided a menu for pt and encouraged pt to order lunch. Pt stated PO has been okay and stated eating about 50% when at rehab. Pt stated usual weight to be 134lb and lost about 5 lbs. Noted d/c weight in May 2021 was 120lb. Pt reported normally drinking Boost x2 per day- noted ensure enlive is order BID for AM and PM snack.      Wounds:    None       Current Nutrition Therapies:  ADULT DIET Regular  ADULT ORAL NUTRITION SUPPLEMENT AM Snack, PM Snack; Standard High Calorie/High Protein    Anthropometric Measures:  · Height:  5' 11\" (180.3 cm)  · Current Body Wt:  59 kg (130 lb)   · Admission Body Wt:  130 lb    · Usual Body Wt:  61.2 kg (135 lb) (4/2021)     · Ideal Body Wt: 172 lbs:  75.6 %   · BMI Category:  Underweight (BMI less than 22) age over 72       Nutrition Diagnosis:   · Severe malnutrition related to acute injury/trauma as evidenced by moderate loss of subcutaneous fat, moderate muscle loss, poor intake prior to admission    Nutrition Interventions:   Food and/or Nutrient Delivery: Continue current diet, Continue oral nutrition supplement, Modify oral nutrition supplement  Nutrition Education and Counseling: No recommendations at this time  Coordination of Nutrition Care: Continue to monitor while inpatient    Goals:  PO intake >75% at most meals throughout the next 2-3 days       Nutrition Monitoring and Evaluation:   Behavioral-Environmental Outcomes: None identified  Food/Nutrient Intake Outcomes: Food and nutrient intake, Supplement intake  Physical Signs/Symptoms Outcomes: Biochemical data, Meal time behavior, Nutrition focused physical findings, Skin, Weight, GI status, Nausea/vomiting    Discharge Planning:    Continue oral nutrition supplement, Continue current diet     Electronically signed by Margie Fink RD on 7/21/2021 at 11:32 AM

## 2021-07-21 NOTE — PROGRESS NOTES
Pharmacy Dosing Services: Vancomycin    Consult for Vancomycin Dosing by Pharmacy by Dr. Flakita Cisneros provided for this 70y.o. year old male , for indication of UTI w/ hx of MRSA. Day of Therapy 2    Ht Readings from Last 1 Encounters:   07/21/21 180.3 cm (71\")        Wt Readings from Last 1 Encounters:   07/21/21 59 kg (130 lb)        Previous Regimen Vancomycin 500 mg IV q24h   Serum Creatinine Lab Results   Component Value Date/Time    Creatinine 1.56 (H) 07/21/2021 03:10 AM      Creatinine Clearance Estimated Creatinine Clearance: 35.2 mL/min (A) (based on SCr of 1.56 mg/dL (H)). BUN Lab Results   Component Value Date/Time    BUN 40 (H) 07/21/2021 03:10 AM      WBC Lab Results   Component Value Date/Time    WBC 21.2 (H) 07/21/2021 03:10 AM      H/H Lab Results   Component Value Date/Time    HGB 8.6 (L) 07/21/2021 03:10 AM      Platelets Lab Results   Component Value Date/Time    PLATELET 779 91/03/4574 03:10 AM      Temp 97.9 °F (36.6 °C)     Start Vancomycin therapy, with maintenance dose of 500 mg at 2200 7/21/21 every 24 hours. Dose calculated to approximate a therapeutic trough of 10-15 mcg/mL. Pharmacy to follow daily and will make changes to dose and/or frequency based on clinical status.     Pharmacist JESSICA Kaur

## 2021-07-21 NOTE — PROGRESS NOTES
Reason for Admission:  Blocked Jimenez catheter, dizziness                     RUR Score:  Low; 19%                   Plan for utilizing home health:  TBD        PCP: First and Last name:  Flor Andersen MD     Name of Practice:    Are you a current patient: Yes/No:    Approximate date of last visit:    Can you participate in a virtual visit with your PCP:                     Current Advanced Directive/Advance Care Plan: Full Code      Healthcare Decision Maker:   Click here to complete [address] including selection of the Healthcare Decision Maker Relationship (ie \"Primary\")             Primary Decision Maker: Bren Maravilla - Spouse - [phone]                  Transition of Care Plan:    SNF                Chart reviewed. Per H&P Nada Sacks is a 70 y.o. 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 He was discharged home and then became dizzy shortly after that and was brought back. He was noted to be hypotensive on arrival.  He says his Jimenez catheter is changed out once a month on and he is on prophylactic antibiotics at home. He was in a rehabilitation facility but has been back home. He denies any fever, chest pain, shortness of breath, and leg swelling. \"    Please order therapy services when appropriate to assist with identifying a transition of care. Noted pt was recently transitioned to [facility] on 5/20/21 through 7/2/21. Pt has used 43 days and if SNF is needed would reenter on day 44. Noted pt did have a COVID vaccination (Maylon Marline 4/30/21 and second dose 5/27/21). CM to continue to follow and assist.      1110:  CM met with pt at bedside to discuss transition of care. Pt is agreeable to SNF. Pt would like to return to [facility]. Please order therapy services to assist with transition of care.  CMS has been notified to assist. Care Management Interventions  Mode of Transport at Discharge: BLS  Transition of Care Consult (CM Consult): Discharge Planning, SNF  Health Maintenance Reviewed: Yes  Current Support Network: Lives with Spouse  The Plan for Transition of Care is Related to the Following Treatment Goals : SNF  The Patient and/or Patient Representative was Provided with a Choice of Provider and Agrees with the Discharge Plan?: Yes  Name of the Patient Representative Who was Provided with a Choice of Provider and Agrees with the Discharge Plan: pt  Freedom of Choice List was Provided with Basic Dialogue that Supports the Patient's Individualized Plan of Care/Goals, Treatment Preferences and Shares the Quality Data Associated with the Providers?: Yes  Discharge Location  Discharge Placement: Skilled nursing facility

## 2021-07-21 NOTE — ROUTINE PROCESS
Bedside shift change report given to Alis Sutton RN (oncoming nurse) by Avelina Rouse RN (offgoing nurse).  Report included the following information SBAR, Kardex, Intake/Output, MAR, Accordion and Cardiac Rhythm SR.

## 2021-07-21 NOTE — H&P
History & Physical    Patient: Carmen Snider MRN: [id number]  CSN: [id number]    YOB: 1945  Age: 70 y.o. Sex: male      DOA: 7/20/2021  Primary Care Provider:  Chanda Combs MD      Assessment/Plan     Patient Active Problem List   Diagnosis Code    Lumbar spinal stenosis M48.061    Lumbar spondylosis M47.816    Radiculopathy of leg M54.10    Unable to ambulate R26.2    Urinary retention R33.9    Type II diabetes mellitus with manifestations, uncontrolled ([facility] [address] E11.8, E11.65    Renal mass N28.89    UTI (urinary tract infection) due to urinary indwelling Jimenez catheter ([facility]) T83.511A, N39.0    Compression fracture of L1 vertebra ([facility]) S32.010A    Sepsis ([facility]) A41.9    BRITT (acute kidney injury) ([facility] [address] N17.9    Aneurysm of infrarenal abdominal aorta ([facility]) I71.4    HTN (hypertension) I10    Elevated troponin R77.8    Underweight due to inadequate caloric intake R616     53-year-old male with hypertension and indwelling Jimenez catheter due to 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 Of note, he also has an infrarenal abdominal aortic aneurysm.     UTI due to indwelling catheter with sepsisprior urine cultures show Pseudomonas and E. coli (pansensitive) and MRSA  Ceftriaxone and vancomycin for empiric antibiotic coverage  Follow-up urine and blood cultures    Elevated troponinmost likely due to demand ischemia from sepsis  Trend troponins  Echocardiogram  Cardiology consult    BRITT  Maintenance IV fluids  Trend creatinine    Aneurysm of infrarenal abdominal aortaprior CT scan showed ectasia and his scan from today shows an aneurysm  Outpatient vascular surgery follow-up    Hypertensionwas hypotensive due to sepsis on arrival to the ED  [facility] antihypertensives for now and add back if persistently hypertensive    Underweight due to inadequate caloric intake--lost a lot of weight in rehab as the food wasn't appetizing  --Nutritional supplements ordered  --Nutrition consult    Full code    Prophylaxis: Lovenox SQ    Estimated length of stay : 2-3 nights    Jenae Parham MD  Nocturnist  ----------------------------------------------------------------------------------------------------------------------------------------------------------------------------------------------------------------  CC: Blocked Jimenez catheter, dizziness       HPI:     Courtney Espinal is a 70 y.o. 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 He was discharged home and then became dizzy shortly after that and was brought back. He was noted to be hypotensive on arrival.  He says his Jimenez catheter is changed out once a month on and he is on prophylactic antibiotics at home. He was in a rehabilitation facility but has been back home. He denies any fever, chest pain, shortness of breath, and leg swelling.     Past Medical History:   Diagnosis Date    Atherosclerosis     Diabetes ([facility] [address] 2013    type 2    High cholesterol     Hyperlipidemia     Hypertension 2013    Muscle weakness     Neoplasm     right kidney    Spinal stenosis     Urinary retention     Vitamin D deficiency        Past Surgical History:   Procedure Laterality Date    HX HEENT      40 years ago deviated septum    HX LUMBAR LAMINECTOMY  2017    HX ORTHOPAEDIC      Lumbar laminectomy 9/7/17    HX ORTHOPAEDIC Right     CTR       Family History   Problem Relation Age of Onset    Heart Disease Father        Social History     Socioeconomic History    Marital status:      Spouse name: Not on file    Number of children: Not on file    Years of education: Not on file    Highest education level: Not on file   Tobacco Use    Smoking status: Former Smoker    Smokeless tobacco: Never Used   Substance and Sexual Activity    Alcohol use: No    Drug use: No   Other Topics Concern     Social Determinants of Health     Financial Resource Strain:     Difficulty of Paying Living Expenses:    Food Insecurity:     Worried About Running Out of Food in the Last Year:     [address] in the Last Year:    Transportation Needs:     Lack of Transportation (Medical):  Lack of Transportation (Non-Medical):    Physical Activity:     Days of Exercise per Week:     Minutes of Exercise per Session:    Stress:     Feeling of Stress :    Social Connections:     Frequency of Communication with Friends and Family:     Frequency of Social Gatherings with Friends and Family:     Attends Taoism Services:     Active Member of Clubs or Organizations:     Attends Club or Organization Meetings:     Marital Status:        Prior to Admission medications    Medication Sig Start Date End Date Taking? Authorizing Provider   cefpodoxime (VANTIN) 200 mg tablet Take 1 Tablet by mouth two (2) times a day for 7 days. 7/20/21 7/27/21  Junior Fox DO   pregabalin (Lyrica) 25 mg capsule Take 75 mg by mouth. Indications: restless legs syndrome, an extreme discomfort in the calf muscles when sitting or lying down    Provider, Historical   atorvastatin (Lipitor) 20 mg tablet Take 20 mg by mouth nightly. Provider, Historical   traZODone (DESYREL) 50 mg tablet Take 50 mg by mouth nightly. Provider, Historical   metoprolol succinate (TOPROL-XL) 25 mg XL tablet Take 1 Tab by mouth nightly. 3/21/20   Other, MD Katelynn   aspirin 81 mg chewable tablet Take 81 mg by mouth daily. Other, MD Katelynn   lisinopril-hydroCHLOROthiazide (PRINZIDE, ZESTORETIC) 20-25 mg per tablet Take  by mouth daily. Provider, Historical   metFORMIN (GLUCOPHAGE) 500 mg tablet Take 500 mg by mouth two (2) times daily (with meals). Provider, Historical   cholecalciferol, vitamin D3, (VITAMIN D3) 2,000 unit tab Take  by mouth.     Provider, Historical multivitamin (ONE A DAY) tablet Take 1 Tab by mouth daily. Provider, Historical       Allergies   Allergen Reactions    Zocor [Simvastatin] Other (comments)     Made him \"ill\"       Review of Systems  Gen: No fever, chills, malaise, weight loss/gain. Heent: No headache, rhinorrhea, sore throat. Heart: No chest pain, palpitations, CHAVARRIA, pnd, or orthopnea. Resp: No cough, hemoptysis, wheezing and shortness of breath. GI: No nausea, vomiting, diarrhea, constipation, melena or hematochezia. : +urinary obstruction from alonso, mild hematuria. Derm: No rash, new skin lesion or pruritis. Musc/skeletal: no bone or joint complaints. Vasc: No edema, cyanosis or claudication. Endo: No heat/cold intolerance, no polyuria,polydipsia or polyphagia. Neuro: No unilateral weakness, numbness, tingling. No seizures. Heme: No easy bruising or bleeding. Physical Exam:     Physical Exam:  Visit Vitals  /70   Pulse 80   Temp 97.5 °F (36.4 °C)   Resp 16   Ht 5' 11\" (1.803 m)   Wt 59 kg (130 lb)   SpO2 100%   BMI 18.13 kg/m²      O2 Device: None (Room air)    Temp (24hrs), Av.8 °F (36.6 °C), Min:97.5 °F (36.4 °C), Max:98.1 °F (36.7 °C)     1901 -  0700  In: 2290 [I.V.:2290]  Out: 150 [Urine:150]   No intake/output data recorded. General:  Awake, cooperative, no distress. Head:  Normocephalic, without obvious abnormality, atraumatic. Eyes:  Conjunctivae/corneas clear, sclera anicteric. Neck: Supple, symmetrical, trachea midline. Lungs:   Clear to auscultation bilaterally. Heart:  Regular rate and rhythm, S1, S2 normal, no murmur, click, rub or gallop. Abdomen: Soft, non-tender. Bowel sounds normal. No masses,  No organomegaly. Extremities: Extremities normal, atraumatic, no cyanosis or edema. Capillary refill normal.   Pulses: 2+ and symmetric all extremities.    Skin: Skin color pink, turgor normal. No rashes or lesions   Neurologic: No focal motor or sensory deficit. Jimenez catheter in place draining slightly pink tinged urine       Labs Reviewed: All lab results for the last 24 hours reviewed.   Recent Results (from the past 24 hour(s))   URINALYSIS W/ RFLX MICROSCOPIC    Collection Time: 07/20/21  2:35 PM   Result Value Ref Range    Color YELLOW      Appearance TURBID      Specific gravity 1.017 1.005 - 1.030      pH (UA) 7.0 5.0 - 8.0      Protein 30 (A) NEG mg/dL    Glucose Negative NEG mg/dL    Ketone Negative NEG mg/dL    Bilirubin Negative NEG      Blood MODERATE (A) NEG      Urobilinogen 0.2 0.2 - 1.0 EU/dL    Nitrites Positive (A) NEG      Leukocyte Esterase LARGE (A) NEG     URINE MICROSCOPIC ONLY    Collection Time: 07/20/21  2:35 PM   Result Value Ref Range    WBC TOO NUMEROUS TO COUNT 0 - 5 /hpf    RBC 11 to 20 0 - 5 /hpf    Epithelial cells FEW 0 - 5 /lpf    Bacteria 3+ (A) NEG /hpf    Other: 2+ CALCIUM PHOSPHATE CRYSTALS    EKG, 12 LEAD, INITIAL    Collection Time: 07/20/21  5:05 PM   Result Value Ref Range    Ventricular Rate 90 BPM    Atrial Rate 90 BPM    P-R Interval 148 ms    QRS Duration 104 ms    Q-T Interval 362 ms    QTC Calculation (Bezet) 442 ms    Calculated P Axis 58 degrees    Calculated R Axis -57 degrees    Calculated T Axis -7 degrees    Diagnosis       Sinus rhythm with frequent premature ventricular complexes  Possible Left atrial enlargement  Left axis deviation  Inferior infarct (cited on or before 14-SEP-2017)  Abnormal ECG  When compared with ECG of 17-MAY-2021 12:20,  Nonspecific T wave abnormality now evident in Inferior leads  Nonspecific T wave abnormality, worse in Lateral leads     CBC WITH AUTOMATED DIFF    Collection Time: 07/20/21  5:12 PM   Result Value Ref Range    WBC 28.8 (H) 4.6 - 13.2 K/uL    RBC 3.93 (L) 4.35 - 5.65 M/uL    HGB 10.8 (L) 13.0 - 16.0 g/dL    HCT 34.5 (L) 36.0 - 48.0 %    MCV 87.8 74.0 - 97.0 FL    MCH 27.5 24.0 - 34.0 PG    MCHC 31.3 31.0 - 37.0 g/dL    RDW 17.3 (H) 11.6 - 14.5 %    PLATELET 752 135 - 420 K/uL    MPV 10.0 9.2 - 11.8 FL    NEUTROPHILS 78 (H) 40 - 73 %    BAND NEUTROPHILS 2 0 - 5 %    LYMPHOCYTES 14 (L) 21 - 52 %    MONOCYTES 6 3 - 10 %    EOSINOPHILS 0 0 - 5 %    BASOPHILS 0 0 - 2 %    ABS. NEUTROPHILS 23.1 (H) 1.8 - 8.0 K/UL    ABS. LYMPHOCYTES 4.0 (H) 0.9 - 3.6 K/UL    ABS. MONOCYTES 1.7 (H) 0.05 - 1.2 K/UL    ABS. EOSINOPHILS 0.0 0.0 - 0.4 K/UL    ABS. BASOPHILS 0.0 0.0 - 0.1 K/UL    DF MANUAL      PLATELET COMMENTS ADEQUATE PLATELETS      RBC COMMENTS ANISOCYTOSIS  1+       METABOLIC PANEL, COMPREHENSIVE    Collection Time: 07/20/21  5:12 PM   Result Value Ref Range    Sodium 140 136 - 145 mmol/L    Potassium 3.9 3.5 - 5.5 mmol/L    Chloride 105 100 - 111 mmol/L    CO2 28 21 - 32 mmol/L    Anion gap 7 3.0 - 18 mmol/L    Glucose 117 (H) 74 - 99 mg/dL    BUN 40 (H) 7.0 - 18 MG/DL    Creatinine 1.73 (H) 0.6 - 1.3 MG/DL    BUN/Creatinine ratio 23 (H) 12 - 20      GFR est AA 47 (L) >60 ml/min/1.73m2    GFR est non-AA 39 (L) >60 ml/min/1.73m2    Calcium 11.0 (H) 8.5 - 10.1 MG/DL    Bilirubin, total 0.6 0.2 - 1.0 MG/DL    ALT (SGPT) 22 16 - 61 U/L    AST (SGOT) 20 10 - 38 U/L    Alk.  phosphatase 111 45 - 117 U/L    Protein, total 7.6 6.4 - 8.2 g/dL    Albumin 3.3 (L) 3.4 - 5.0 g/dL    Globulin 4.3 (H) 2.0 - 4.0 g/dL    A-G Ratio 0.8 0.8 - 1.7     CARDIAC PANEL,(CK, CKMB & TROPONIN)    Collection Time: 07/20/21  5:12 PM   Result Value Ref Range    CK - MB 2.7 <3.6 ng/ml    CK-MB Index 1.8 0.0 - 4.0 %     39 - 308 U/L    Troponin-I, QT 0.18 (H) 0.0 - 0.045 NG/ML   POC LACTIC ACID    Collection Time: 07/20/21  5:52 PM   Result Value Ref Range    Lactic Acid (POC) 2.37 (HH) 0.40 - 2.00 mmol/L   CARDIAC PANEL,(CK, CKMB & TROPONIN)    Collection Time: 07/20/21  8:18 PM   Result Value Ref Range    CK - MB 3.2 <3.6 ng/ml    CK-MB Index 1.7 0.0 - 4.0 %     39 - 308 U/L    Troponin-I, QT 0.15 (H) 0.0 - 0.045 NG/ML     Results  CT ABD PELV WO CONT (Accession [id number]) (Order [id number])  Allergies     Not Specified: Zocor [Simvastatin]   Exam Information    Status Exam Begun  Exam Ended    Final [99] 7/20/2021 18:15 7/20/2021  6:20 PM [id number]  6:20 PM   Result Information    Status: Final result (Exam End: 7/20/2021 18:20) Provider Status: Open   Study Result    Narrative & Impression   EXAM: CT of the abdomen and pelvis     INDICATION: Leukocytosis suprapubic pain     COMPARISON: May 17, 2021     TECHNIQUE: Axial CT imaging of the abdomen and pelvis was performed without  intravenous contrast. Multiplanar reformats were generated. One or more dose  reduction techniques were used on this CT: automated exposure control,  adjustment of the mAs and/or kVp according to patient size, and iterative  reconstruction techniques. The specific techniques used on this CT exam have  been documented in the patient's electronic medical record. Digital Imaging and  Communications in Medicine (DICOM) format image data are available to  nonaffiliated external healthcare facilities or entities on a secure, media  free, reciprocally searchable basis with patient authorization for at least a  12-month period after this study.     _______________     FINDINGS:     LOWER CHEST: There is a 4 mm nodule in the lateral segment of the right middle  lobe. Follow-up as per Fleischner Society protocol.     LIVER, BILIARY: Liver is normal. No biliary dilation. Cholecystectomy     PANCREAS: Normal.     SPLEEN: Normal.     ADRENALS: Normal.     KIDNEYS: There is a 6 cm pedunculated cyst off of the lower pole the right  kidney with rim calcification. The kidneys demonstrate no hydronephrosis or  nephrolithiasis. No ureteral obstruction.     VASCULATURE: There is severe calcific atherosclerotic present. There is  abdominal aortic aneurysm infrarenally measuring 3.3 x 2.7 cm.     LYMPH NODES: No enlarged lymph nodes.     GASTROINTESTINAL TRACT: No bowel dilation or wall thickening.  There is a large  amount of retained stool in the colon. Appendix is normal. There is no bowel  obstruction.     PELVIC ORGANS: Urinary bladder is decompressed with a Jimenez catheter in place. Correlate with urinalysis to exclude any cystitis. Prostate is mildly enlarged.     BONES: No acute or aggressive osseous abnormalities identified. Posterior fusion  spinal hardware present from level of L4-S1. The pedicular screw at L4 breech  the superior endplate of L4 on the left. There is also suggestion of loosening  of this L4 left pedicular screw.     There is a L1 superior endplate compression fracture with some further loss of  height since the prior exam. No listhesis or retropulsion seen.     OTHER: None.     _______________     IMPRESSION     No acute process. Evaluation the urinary bladder is limited due to under  distention. Correlate with urinalysis.     Infrarenal abdominal aortic aneurysm measuring 3.3 x 2.7 cm.     Further loss of height at L1 with there is a remote compression fracture.     The left pedicular screw at L4 breech is the superior endplate and there is  suggestion of loosening of the left L4 screw.     4 mm nodule right middle lobe follow-up as per Fleischner Society protocol.     ========     Fleischner Society Pulmonary Nodule Guidelines (revised 2017):     Solid nodule <6 mm:  -Low risk for lung cancer: No routine followup.  -High risk for lung cancer: Optional CT in 12 months (suspicious morphology,  upper lobe location, or both may warrant 12 month followup depending on  comorbidities and patient preference).

## 2021-07-21 NOTE — PROGRESS NOTES
Pharmacy Dosing Services: Vancomycin    Consult for Vancomycin Dosing by Pharmacy by Dr. Ashley Currie provided for this 70y.o. year old male , for indication of Complicated UTI with HX of MRSA. Day of Therapy 01    Ht Readings from Last 1 Encounters:   07/20/21 180.3 cm (71\")        Wt Readings from Last 1 Encounters:   07/20/21 59 kg (130 lb)        Other Current Antibiotics Ceftriaxone 1g q24h   Significant Cultures pending   Serum Creatinine Lab Results   Component Value Date/Time    Creatinine 1.56 (H) 07/21/2021 03:10 AM      Creatinine Clearance Estimated Creatinine Clearance: 35.2 mL/min (A) (based on SCr of 1.56 mg/dL (H)). BUN Lab Results   Component Value Date/Time    BUN 40 (H) 07/21/2021 03:10 AM      WBC Lab Results   Component Value Date/Time    WBC 21.2 (H) 07/21/2021 03:10 AM      H/H Lab Results   Component Value Date/Time    HGB 8.6 (L) 07/21/2021 03:10 AM      Platelets Lab Results   Component Value Date/Time    PLATELET 074 40/44/3482 03:10 AM      Temp 97.4 °F (36.3 °C)     Start Vancomycin therapy, with loading dose of 1250 mg IV once on [email or web address]. Followed with maintenance dose of Vancomycin 500 mg IV q24h. Dose calculated to approximate a therapeutic trough of 10-15 mcg/mL. Pharmacy to follow daily and will make changes to dose and/or frequency based on clinical status.       Pharmacist Osmany [phone]

## 2021-07-21 NOTE — PROGRESS NOTES
Mr. Dhruv Mendoza was admitted to the unit from the ED via stretcher. Report was received from ED RN prior to arrival. Harlan Hash are as follows. Patient was placed on tele box #31 and is NSR on the monitor. No complaints of pain at this time. Belongings at bedside. Kardex reviewed.  Dr. Coco Pitt to assess patient at bedside    07/20/21 2202   Vitals   Temp 97.5 °F (36.4 °C)   Temp Source Oral   Pulse (Heart Rate) 88   Resp Rate 15   Level of Consciousness Alert (0)   BP (!) 150/96   MAP (Calculated) 114   MEWS Score 1

## 2021-07-22 NOTE — ROUTINE PROCESS
Bedside and Verbal shift change report given to Dionicio Ramirez (oncoming nurse) by [facility] nurse). Report included the following information SBAR, Kardex and MAR.

## 2021-07-22 NOTE — ROUTINE PROCESS
Bedside and Verbal shift change report given to Arnel Flores RN (oncoming nurse) by Lolly Burdick RN (offgoing nurse). Report included the following information SBAR and Kardex.

## 2021-07-22 NOTE — WOUND CARE
Wound Care Note:    Chart audited for low martha score, patient with high risk for skin breakdown, no documented wounds at time of audit.      Skin Care & Pressure Relief Recommendations  Minimize layers of linen  Pads under patient to optimize support surface  Turn/reposition approximately every 2 hours  Pillow wedges  Manage incontinence   Promote continence; Skin Protective lotion/cream to buttocks and sacrum daily and as needed with incontinence care  Offload heels pillows    Consult wound care if any wounds/ skin care needs noted during admission

## 2021-07-22 NOTE — ROUTINE PROCESS
Bedside and Verbal shift change report given to Carmela Kehr, RN (oncoming nurse) by Lindsay Hairston RN (offgoing nurse). Report included the following information SBAR and Kardex.

## 2021-07-22 NOTE — CONSULTS
[facility] Consult Note      Patient: Cleve Rubinstein MRN: [id number]  SSN: [id number]    YOB: 1945  Age: 70 y.o. Sex: male    Date of Consultation: 07/22/2021  Referring Physician: Jaison Rodríguez   Reason for Consultation: Abnormal troponin    Chief complain: Urinary retension     HPI:   24-year-old gentleman came to emergency room with blocked Jimenez catheter. He was discharged and developed dizziness  At home and brought back. He denies any chest pain. He denies any orthopnea PND. He is complaining of shortness of breath on exertion. He denies any palpitation, presyncope syncope. He denies any current smoking. He is ex-smoker. He denies any alcohol abuse.   Cardiology consult called for evaluation of abnormal troponin    Past Medical History:   Diagnosis Date    Atherosclerosis     Diabetes ([address] 2013    type 2    High cholesterol     Hyperlipidemia     Hypertension 2013    Muscle weakness     Neoplasm     right kidney    Spinal stenosis     Urinary retention     Vitamin D deficiency      Past Surgical History:   Procedure Laterality Date    HX HEENT      40 years ago deviated septum    HX LUMBAR LAMINECTOMY  2017    HX ORTHOPAEDIC      Lumbar laminectomy 9/7/17    HX ORTHOPAEDIC Right     CTR     Current Facility-Administered Medications   Medication Dose Route Frequency    Vancomycin-Pharmacy to Dose  1 Each Other Rx Dosing/Monitoring    vancomycin (VANCOCIN) 750 mg in 0.9% sodium chloride 250 mL (VIAL-MATE)  750 mg IntraVENous Q24H    sodium chloride (NS) flush 5-40 mL  5-40 mL IntraVENous Q8H    sodium chloride (NS) flush 5-40 mL  5-40 mL IntraVENous PRN    acetaminophen (TYLENOL) tablet 650 mg  650 mg Oral Q6H PRN    Or    acetaminophen (TYLENOL) suppository 650 mg  650 mg Rectal Q6H PRN    polyethylene glycol (MIRALAX) packet 17 g  17 g Oral DAILY PRN    ondansetron (ZOFRAN ODT) tablet 4 mg  4 mg Oral Q8H PRN    Or    ondansetron (ZOFRAN) injection 4 mg  4 mg IntraVENous Q6H PRN    enoxaparin (LOVENOX) injection 40 mg  40 mg SubCUTAneous DAILY    cefTRIAXone (ROCEPHIN) 1 g in sterile water (preservative free) 10 mL IV syringe  1 g IntraVENous Q24H    0.9% sodium chloride infusion  50 mL/hr IntraVENous CONTINUOUS    atorvastatin (LIPITOR) tablet 20 mg  20 mg Oral QHS    aspirin chewable tablet 81 mg  81 mg Oral DAILY    cholecalciferol (VITAMIN D3) (1000 Units /25 mcg) tablet 2,000 Units  2,000 Units Oral DAILY    pregabalin (LYRICA) capsule 75 mg  75 mg Oral QHS    traZODone (DESYREL) tablet 50 mg  50 mg Oral QHS    multivitamin, tx-iron-ca-min (THERA-M w/ IRON) tablet 1 Tablet  1 Tablet Oral DAILY       Allergies and Intolerances: Allergies   Allergen Reactions    Zocor [Simvastatin] Other (comments)     Made him \"ill\"       Family History:   Family History   Problem Relation Age of Onset    Heart Disease Father        Social History:   He  reports that he has quit smoking. He has never used smokeless tobacco.  He  reports no history of alcohol use. Review of Systems:     Gen: No fever, chills, malaise, weight loss/gain. Heent: No headache, rhinorrhea, epistaxis, ear pain, hearing loss, sinus pain, neck pain/stiffness, sore throat. Heart:  Positive shortness of breath on exertion,No chest pain, palpitations,pnd, or orthopnea. Resp: No cough, hemoptysis, wheezing and  dyspnea. GI: No nausea, vomiting, diarrhea, constipation, melena or hematochezia. :  positive urinary obstruction, no dysuria or hematuria. Derm: No rash, new skin lesion or pruritis. Musc/skeletal:  positive bone or joint complains. Vasc: No edema, cyanosis or claudication. Endo: No heat/cold intolerance, no polyuria,polydipsia or polyphagia. Neuro: No unilateral weakness, numbness, tingling. No seizures. Heme: No easy bruising or bleeding.       Physical:   Patient Vitals for the past 6 hrs:   Temp Pulse Resp BP SpO2   07/22/21 0044 98 °F (36.7 °C) (!) 115 18 (!) 105/52    07/21/21 2028 98.4 °F (36.9 °C) (!) 111 16 118/70 95 %         Exam:   General Appearance: Comfortable, not using accessory muscles of respiration. HEENT: ALEE. HEAD: Atraumatic  NECK: No JVD, no thyroidomeglay. CAROTIDS: no bruit  LUNGS: Clear bilaterally. HEART: S1+S2 audible, no murmur, no pericardial rub. ABD: Non-tender, BS Audible    EXT: No edema, and no cyanosis. VASCULAR EXAM: Pulses are intact. PSYCHIATRIC EXAM: Mood is appropriate. MUSCULOSKELETAL: Grossly no joint deformity.   NEUROLOGICAL: AAO times 3, Motor and sensory sytem intact    Review of Data:   LABS:   Lab Results   Component Value Date/Time    WBC 21.2 (H) 07/21/2021 03:10 AM    HGB 8.6 (L) 07/21/2021 03:10 AM    HCT 28.1 (L) 07/21/2021 03:10 AM    PLATELET 473 23/91/8080 03:10 AM     Lab Results   Component Value Date/Time    Sodium 141 07/21/2021 03:10 AM    Potassium 4.1 07/21/2021 03:10 AM    Chloride 109 07/21/2021 03:10 AM    CO2 24 07/21/2021 03:10 AM    Glucose 71 (L) 07/21/2021 03:10 AM    BUN 40 (H) 07/21/2021 03:10 AM    Creatinine 1.56 (H) 07/21/2021 03:10 AM     Lab Results   Component Value Date/Time    Cholesterol, total 146 06/01/2016 07:51 AM    HDL Cholesterol 40 06/01/2016 07:51 AM    LDL, calculated 91.6 06/01/2016 07:51 AM    Triglyceride 72 06/01/2016 07:51 AM     No components found for: GPT  Lab Results   Component Value Date/Time    Hemoglobin A1c 5.9 (H) 05/01/2020 02:55 PM         Cardiology Procedures:   Results for orders placed or performed during the hospital encounter of 07/20/21   EKG, 12 LEAD, INITIAL   Result Value Ref Range    Ventricular Rate 90 BPM    Atrial Rate 90 BPM    P-R Interval 148 ms    QRS Duration 104 ms    Q-T Interval 362 ms    QTC Calculation (Bezet) 442 ms    Calculated P Axis 58 degrees    Calculated R Axis -57 degrees    Calculated T Axis -7 degrees    Diagnosis       Sinus rhythm with frequent premature ventricular complexes  Possible Left atrial enlargement  Left axis deviation  Inferior infarct (cited on or before 14-SEP-2017)  Abnormal ECG               Impression / Plan:    Patient Active Problem List   Diagnosis Code    Lumbar spinal stenosis M48.061    Lumbar spondylosis M47.816    Radiculopathy of leg M54.10    Unable to ambulate R26.2    Urinary retention R33.9    Type II diabetes mellitus with manifestations, uncontrolled ([facility]) E11.8, E11.65    Renal mass N28.89    UTI (urinary tract infection) due to urinary indwelling Jimenez catheter ([facility]) T83.511A, N39.0    Compression fracture of L1 vertebra ([facility]) S32.010A    Sepsis ([facility]) A41.9    BRITT (acute kidney injury) ([facility] [address] N17.9    Aneurysm of infrarenal abdominal aorta ([facility]) I71.4    HTN (hypertension) I10    Elevated troponin R77.8    Underweight due to inadequate caloric intake R63.6    Severe protein-calorie malnutrition ([facility]) E43     Pulmonary hypertension  Abnormal troponin no evidence of ACS, could be from demand ischemia   Ex smoker    Echocardiogram revealed    · Image quality for this study was technically difficult. Contrast used: DEFINITY. · Left Ventricle: Normal cavity size, wall thickness and systolic function (ejection fraction normal). The estimated EF is 60 - 65%. There is mild (grade 1) left ventricular diastolic dysfunction E/E' ratio is 5.97. Wall Scoring: The left ventricular wall motion is normal.  · Right Ventricle: Normal global systolic function. Moderately dilated right ventricle. Assessment of RV function: TAPSE = 20 mm. · Right Atrium: Mildly dilated right atrium. · Pulmonary Artery: Pulmonary arteries not well visualized. Pulmonary arterial systolic pressure (PASP) is 88 mmHg. Pulmonary hypertension found to be severe. Continue aspirin and atorvastatin. Consider pulmonologist evaluation for pulmonary hypertension. Call on-call cardiologist if needed. Continue antibiotics.   Cardiology sign off      Signed By: Esa Glaser MD     July 22, 2021

## 2021-07-22 NOTE — PROGRESS NOTES
Physical Therapy Evaluation/Treatment Attempt     Chart reviewed. Attempted Physical Therapy Evaluation, however, patient unable to be seen due to:  []  Nausea/vomiting  []  Eating  []  Pain  []  Patient too lethargic  []  Off Unit for testing/procedure  []  Dialysis treatment in progress   []  Telemetry Results  [x]  Other: Pt sleeping soundly, did not arouse to voice     Will follow up later as patient's schedule allows.    Thank you for this referral.    Rian Chávez, PT, DPT

## 2021-07-22 NOTE — PROGRESS NOTES
Hospitalist Progress Note    Patient: Sana Gaona MRN: [id number]  [facility]: [id number]    YOB: 1945  Age: 70 y.o. Sex: male    DOA: 7/20/2021 LOS:  LOS: 1 day                Assessment/Plan     Patient Active Problem List   Diagnosis Code    Lumbar spinal stenosis M48.061    Lumbar spondylosis M47.816    Radiculopathy of leg M54.10    Unable to ambulate R26.2    Urinary retention R33.9    Type II diabetes mellitus with manifestations, uncontrolled ([facility] [address] E11.8, E11.65    Renal mass N28.89    UTI (urinary tract infection) due to urinary indwelling Jimenez catheter ([facility]) T83.511A, N39.0    Compression fracture of L1 vertebra ([facility]) S32.010A    Sepsis ([facility]) A41.9    BRITT (acute kidney injury) ([facility] [address] N17.9    Aneurysm of infrarenal abdominal aorta ([facility]) I71.4    HTN (hypertension) I10    Elevated troponin R77.8    Underweight due to inadequate caloric intake R63.6    Severe protein-calorie malnutrition ([facility] E42            71 y/o male with h/o lumbar stenosis, lumbar spondylosis, neurogenic bladder requiring regular catherization and type II diabetes mellitus admitted for inability to walk, frequent falls admitted for complicated UTI. UTI-  Catheter associated, Past cultures grew Pseudomonas and E. coli. on cefepime. On vancomycin and ceftriaxone  Follow cultures    DM-  On sliding scale insulin    BRITT -  iVF  Follow renal function. PT/OT    DVT prophylaxis with Lovenox    Disposition : 1 to 2 days    Review of systems  General: No fevers or chills. Cardiovascular: No chest pain or pressure. No palpitations. Pulmonary: No shortness of breath. Gastrointestinal: No nausea, vomiting. Physical Exam:  General: Awake, cooperative, no acute distress    HEENT: NC, Atraumatic. PERRLA, anicteric sclerae. Lungs: CTA Bilaterally. No Wheezing/Rhonchi/Rales.   Heart:  S1 S2,  No murmur, No Rubs, No Gallops  Abdomen: Soft, Non distended, Non tender.  +Bowel sounds, Extremities: No c/c/e  Psych:   Not anxious or agitated. Neurologic:  No acute neurological deficit. Vital signs/Intake and Output:  Visit Vitals  /70   Pulse (!) 111   Temp 98.4 °F (36.9 °C)   Resp 16   Ht 5' 11\" (1.803 m)   Wt 59 kg (130 lb)   SpO2 95%   BMI 18.13 kg/m²     Current Shift:  No intake/output data recorded. Last three shifts:  07/20 0701 - 07/21 1900  In: 2702 [P.O.:720; I.V.:2690]  Out: 950 [Urine:950]            Labs: Results:       Chemistry Recent Labs     07/21/21  0310 07/20/21  1712   GLU 71* 117*    140   K 4.1 3.9    105   CO2 24 28   BUN 40* 40*   CREA 1.56* 1.73*   CA 9.5 11.0*   AGAP 8 7   BUCR 26* 23*   AP 81 111   TP 5.9* 7.6   ALB 2.5* 3.3*   GLOB 3.4 4.3*   AGRAT 0.7* 0.8      CBC w/Diff Recent Labs     07/21/21  0310 07/20/21  1712   WBC 21.2* 28.8*   RBC 3.19* 3.93*   HGB 8.6* 10.8*   HCT 28.1* 34.5*    267   GRANS  --  78*   LYMPH  --  14*   EOS  --  0      Cardiac Enzymes Recent Labs     07/21/21  1015 07/21/21 0310    196   CKND1 1.1 1.5      Coagulation No results for input(s): PTP, INR, APTT, INREXT, INREXT in the last 72 hours. Lipid Panel Lab Results   Component Value Date/Time    Cholesterol, total 146 06/01/2016 07:51 AM    HDL Cholesterol 40 06/01/2016 07:51 AM    LDL, calculated 91.6 06/01/2016 07:51 AM    VLDL, calculated 14.4 06/01/2016 07:51 AM    Triglyceride 72 06/01/2016 07:51 AM    CHOL/HDL Ratio 3.7 06/01/2016 07:51 AM      BNP No results for input(s): BNPP in the last 72 hours.    Liver Enzymes Recent Labs     07/21/21  0310   TP 5.9*   ALB 2.5*   AP 81      Thyroid Studies No results found for: T4, T3U, TSH, TSHEXT, TSHEXT     Procedures/imaging: see electronic medical records for all procedures/Xrays and details which were not copied into this note but were reviewed prior to creation of Plan

## 2021-07-22 NOTE — PROGRESS NOTES
0018-Resting in bed, eyes closed, arouses with moderate effort. Stable. No complaints voiced. Assessment complete. Call light and personal items within reach. Assessment complete. 0436-Stable, no change from initial assessment. Resting in bed, no complaints voiced. Personal items and call light within reach. 0600-Resting in bed, stable. Call light and personal items within reach. Assisted with toileting. Shift Summary:  Uneventful shift, rested quietly. Stable at shift change report.

## 2021-07-22 NOTE — PROGRESS NOTES
1940 - Bedside report received from [facility]. Patient in bed. Pain 0/10.     2000 - Patient in bed at this time. IV to L FA  intact and patent. + CMS. Pt A & O x 4. LS clear, on RA. Abdomen soft, NT and ND. + BS to all 4 quadrants. Denies nausea. Pain 0/10. Jimenez patent with cl. Yellow urine. Call light within reach. Pt had an uneventful shift. Remains stable. No issues/concerns at this time.  Call bell within reach

## 2021-07-22 NOTE — PROGRESS NOTES
Problem: Self Care Deficits Care Plan (Adult)  Goal: *Acute Goals and Plan of Care (Insert Text)  Description: Occupational Therapy Goals  Initiated 7/22/2021 within 7 day(s). 1.  Patient will perform grooming with minimal assistance/contact guard assist standing at sink for 5 minutes or more. 2.  Patient will perform upper body dressing with supervision/set-up. 3.  Patient will perform lower body dressing with supervision/set-up. 4.  Patient will perform toilet transfers with minimal assistance/contact guard assist.  5.  Patient will perform all aspects of toileting with minimal assistance/contact guard assist.  6.  Patient will participate in upper extremity therapeutic exercise/activities with minimal assistance/contact guard assist for 10 minutes. 7.  Patient will utilize energy conservation techniques during functional activities with verbal, visual, and tactile cues. OCCUPATIONAL THERAPY EVALUATION    Patient: Ellie Squires (89 y.o. male)  Date: 7/22/2021  Primary Diagnosis: Sepsis ([facility] [address] [A41.9]  UTI (urinary tract infection) [N39.0]        Precautions:   Fall  PLOF: min-mod A for ADLs and transfers at rehab facility (per pt)    ASSESSMENT :  Based on the objective data described below, the patient presents with decreased strength, endurance and balance for carryover of ADLs and transfers following above mentioned medical diagnosis. Pt presented supine in bed, pleasantly confused and agrees to participate with therapy. Pt requires mod A for bed mobility, demo poor core strength with poor dynamic and static sitting balance. Max A for LB dressing and scooting to head of bed at the end of session. Pt was left in bed with bed placed in chair position at the end of session to eat lunch. Patient will benefit from skilled intervention to address the above impairments.   Patient's rehabilitation potential is considered to be Fair  Factors which may influence rehabilitation potential include:   [] None noted  [x]             Mental ability/status  [x]             Medical condition  []             Home/family situation and support systems  [x]             Safety awareness  []             Pain tolerance/management  []             Other:      PLAN :  Recommendations and Planned Interventions:   [x]               Self Care Training                  [x]      Therapeutic Activities  [x]               Functional Mobility Training   []      Cognitive Retraining  [x]               Therapeutic Exercises           [x]      Endurance Activities  [x]               Balance Training                    []      Neuromuscular Re-Education  []               Visual/Perceptual Training     [x]      Home Safety Training  [x]               Patient Education                   [x]      Family Training/Education  []               Other (comment):    Frequency/Duration: Patient will be followed by occupational therapy 1-2 times per day/4-7 days per week to address goals. Discharge Recommendations: Ziyad Chung  Further Equipment Recommendations for Discharge: N/A     SUBJECTIVE:   Patient stated  I can try to get up.     OBJECTIVE DATA SUMMARY:     Past Medical History:   Diagnosis Date    Atherosclerosis     Diabetes ([facility] [address] 2013    type 2    High cholesterol     Hyperlipidemia     Hypertension 2013    Muscle weakness     Neoplasm     right kidney    Spinal stenosis     Urinary retention     Vitamin D deficiency      Past Surgical History:   Procedure Laterality Date    HX HEENT      40 years ago deviated septum    HX LUMBAR LAMINECTOMY  2017    HX ORTHOPAEDIC      Lumbar laminectomy 9/7/17    HX ORTHOPAEDIC Right     CTR     Barriers to Learning/Limitations: None  Compensate with: visual, verbal, tactile, kinesthetic cues/model    Home Situation:   Home Situation  Home Environment: Rehabilitation facility  []  Right hand dominant   []  Left hand dominant    Cognitive/Behavioral Status:  Neurologic State: Alert;Confused  Orientation Level: Oriented X4  Cognition: Decreased attention/concentration; Follows commands; Impaired decision making  Safety/Judgement: Fall prevention    Skin: intact  Edema: none    Vision/Perceptual:    Tracking: Able to track stimulus in all quadrants w/o difficulty    Coordination: BUE  Coordination: Generally decreased, functional  Fine Motor Skills-Upper: Left Intact; Right Intact    Gross Motor Skills-Upper: Left Intact; Right Intact  Balance:  Sitting: Impaired; With support  Sitting - Static: Poor (constant support)  Sitting - Dynamic: Poor (constant support)  Standing:  (NOT TESTED)  Strength: BUE  Strength: Generally decreased, functional  Tone & Sensation: BUE  Sensation: Intact  Range of Motion: BUE  AROM: Generally decreased, functional  Functional Mobility and Transfers for ADLs:  Bed Mobility:  Rolling: Moderate assistance; Additional time;Assist x1  Supine to Sit: Moderate assistance; Additional time;Assist x1  Sit to Supine: Moderate assistance;Assist x1;Additional time  Scooting: Moderate assistance  Transfers:    ADL Assessment:   Feeding: Setup    Oral Facial Hygiene/Grooming: Setup    Upper Body Dressing: Setup    Lower Body Dressing: Maximum assistance    Toileting: Maximum assistance    ADL Intervention:  Lower Body Dressing Assistance  Dressing Assistance: Total assistance(dependent)  Socks: Total assistance (dependent)  Leg Crossed Method Used: No  Position Performed: Seated edge of bed  Cues: Don    Cognitive Retraining  Safety/Judgement: Fall prevention    Pain:  Pain level pre-treatment: 0/10   Pain level post-treatment: 0/10   Pain Intervention(s): Medication (see MAR); Rest, Ice, Repositioning   Response to intervention: Nurse notified, See doc flow    Activity Tolerance:   Poor     Please refer to the flowsheet for vital signs taken during this treatment.   After treatment:   [] Patient left in no apparent distress sitting up in chair  [x] Patient left in no apparent distress in bed  [x] Call bell left within reach  [x] Nursing notified  [] Caregiver present  [] Bed alarm activated    COMMUNICATION/EDUCATION:   [x] Role of Occupational Therapy in the acute care setting  [] Home safety education was provided and the patient/caregiver indicated understanding. [x] Patient/family have participated as able in goal setting and plan of care. [x] Patient/family agree to work toward stated goals and plan of care. [] Patient understands intent and goals of therapy, but is neutral about his/her participation. [] Patient is unable to participate in goal setting and plan of care. Thank you for this referral.  Jeff Justice, OTR/L  Time Calculation: 25 mins    Eval Complexity: History: MEDIUM Complexity : Expanded review of history including physical, cognitive and psychosocial  history ; Examination: MEDIUM Complexity : 3-5 performance deficits relating to physical, cognitive , or psychosocial skils that result in activity limitations and / or participation restrictions; Decision Making:MEDIUM Complexity : Patient may present with comorbidities that affect occupational performnce.  Miniml to moderate modification of tasks or assistance (eg, physical or verbal ) with assesment(s) is necessary to enable patient to complete evaluation

## 2021-07-22 NOTE — PROGRESS NOTES
Discharge Planning: SNF vs home with home health, family assist and MD follow-up    CM spoke with the patient regarding plans for discharge. The patient states that he is agreeable to SNF upon discharge if recommended by therapy. FOC provided and the patient gives this CM permission to send clinical information to all local [facility] [place] SNF/rehab facilities so that said facilities may evaluate the patient's eligibility for admission. CM to do so and report back to the patient which facilities are able to accept for admission so that the patient can chose which facility he prefers in the event that SNF/rehab is recommended upon discharge. CM awaiting PT/OT recommendations and will send the patient's referral accordingly when said recommendations are available.     Care Management Interventions  Mode of Transport at Discharge: BLS  Transition of Care Consult (CM Consult): Discharge Planning, SNF  Health Maintenance Reviewed: Yes  Current Support Network: Lives with Spouse  The Plan for Transition of Care is Related to the Following Treatment Goals : SNF  The Patient and/or Patient Representative was Provided with a Choice of Provider and Agrees with the Discharge Plan?: Yes  Name of the Patient Representative Who was Provided with a Choice of Provider and Agrees with the Discharge Plan: pt  Freedom of Choice List was Provided with Basic Dialogue that Supports the Patient's Individualized Plan of Care/Goals, Treatment Preferences and Shares the Quality Data Associated with the Providers?: Yes  Discharge Location  Discharge Placement: Skilled nursing facility

## 2021-07-22 NOTE — ROUTINE PROCESS
Bedside and Verbal shift change report given to SASKIA Burroughs (oncoming nurse) by Don Hernandez RN (offgoing nurse). Report included the following information SBAR and Kardex.

## 2021-07-23 NOTE — PROGRESS NOTES
Discharge Planning: Home with home health vs SNF    CM met with the patient at the bedside to further discuss plans for discharge. At this time the patient has only been accepted by [facility] and they are unable to accept any admissions this weekend. CM notes that PT is currently recommending HH vs SNF and the patient states that he is currently active with [facility]. The patient states should therapy continue to recommend [facility] is is agreeable to discharging home with home health and resuming care with [facility]. In the event that the discharge plan is SNF then the patient will need to remain hospitalized through the weekend.     Care Management Interventions  Mode of Transport at Discharge: BLS  Transition of Care Consult (CM Consult): Discharge Planning, SNF  Health Maintenance Reviewed: Yes  Current Support Network: Lives with Spouse  The Plan for Transition of Care is Related to the Following Treatment Goals : SNF  The Patient and/or Patient Representative was Provided with a Choice of Provider and Agrees with the Discharge Plan?: Yes  Name of the Patient Representative Who was Provided with a Choice of Provider and Agrees with the Discharge Plan: aHrsh Umana  Freedom of Choice List was Provided with Basic Dialogue that Supports the Patient's Individualized Plan of Care/Goals, Treatment Preferences and Shares the Quality Data Associated with the Providers?: Yes  Discharge Location  Discharge Placement: Home with home health (vs SNF)

## 2021-07-23 NOTE — PROGRESS NOTES
Hospitalist Progress Note    Patient: Lizbeth Castellano MRN: [id number]  CSN: [id number]    YOB: 1945  Age: 70 y.o. Sex: male    DOA: 7/20/2021 LOS:  LOS: 2 days                Assessment/Plan     Patient Active Problem List   Diagnosis Code    Lumbar spinal stenosis M48.061    Lumbar spondylosis M47.816    Radiculopathy of leg M54.10    Unable to ambulate R26.2    Urinary retention R33.9    Type II diabetes mellitus with manifestations, uncontrolled ([facility] E11.8, E11.65    Renal mass N28.89    UTI (urinary tract infection) due to urinary indwelling Jimenez catheter ([facility]) T83.511A, N39.0    Compression fracture of L1 vertebra ([facility]) S32.010A    Sepsis ([facility]) A41.9    BRITT (acute kidney injury) ([facility] N17.9    Aneurysm of infrarenal abdominal aorta ([facility]) I71.4    HTN (hypertension) I10    Elevated troponin R77.8    Underweight due to inadequate caloric intake R63.6    Severe protein-calorie malnutrition ([facility] [facility] E42            69 y/o male with h/o lumbar stenosis, lumbar spondylosis, neurogenic bladder requiring regular catherization and type II diabetes mellitus admitted for inability to walk, frequent falls admitted for complicated UTI. UTI-  Catheter associated, Past cultures grew Pseudomonas and E. coli. On vancomycin and ceftriaxone  Follow cultures    DM-  On sliding scale insulin    BRITT -  iVF  Follow renal function. PT/OT    DVT prophylaxis with Lovenox    Disposition : 1 to 2 days    Review of systems  General: No fevers or chills. Cardiovascular: No chest pain or pressure. No palpitations. Pulmonary: No shortness of breath. Gastrointestinal: No nausea, vomiting. Physical Exam:  General: Awake, cooperative, no acute distress    HEENT: NC, Atraumatic. PERRLA, anicteric sclerae. Lungs: CTA Bilaterally. No Wheezing/Rhonchi/Rales.   Heart:  S1 S2,  No murmur, No Rubs, No Gallops  Abdomen: Soft, Non distended, Non tender.  +Bowel sounds,   Extremities: No c/c/e  Psych:   Not anxious or agitated. Neurologic:  No acute neurological deficit. Vital signs/Intake and Output:  Visit Vitals  /69   Pulse (!) 110   Temp 97.7 °F (36.5 °C)   Resp 17   Ht 5' 11\" (1.803 m)   Wt 59 kg (130 lb)   SpO2 95%   BMI 18.13 kg/m²     Current Shift:  07/22 1901 - 07/23 0700  In: -   Out: 300 [Urine:300]  Last three shifts:  07/21 0701 - 07/22 1900  In: 1149 [P.O.:1440; I.V.:900]  Out: 6554 [Urine:2200]            Labs: Results:       Chemistry Recent Labs     07/22/21 0226 07/21/21 0310 07/20/21  1712   * 71* 117*    141 140   K 3.9 4.1 3.9    109 105   CO2 25 24 28   BUN 39* 40* 40*   CREA 1.57* 1.56* 1.73*   CA 8.6 9.5 11.0*   AGAP 5 8 7   BUCR 25* 26* 23*   AP 87 81 111   TP 5.7* 5.9* 7.6   ALB 2.2* 2.5* 3.3*   GLOB 3.5 3.4 4.3*   AGRAT 0.6* 0.7* 0.8      CBC w/Diff Recent Labs     07/22/21 0226 07/21/21 0310 07/20/21  1712   WBC 18.2* 21.2* 28.8*   RBC 3.24* 3.19* 3.93*   HGB 8.7* 8.6* 10.8*   HCT 28.2* 28.1* 34.5*    210 267   GRANS  --   --  78*   LYMPH  --   --  14*   EOS  --   --  0      Cardiac Enzymes Recent Labs     07/21/21  1015 07/21/21 0310    196   CKND1 1.1 1.5      Coagulation No results for input(s): PTP, INR, APTT, INREXT, INREXT in the last 72 hours. Lipid Panel Lab Results   Component Value Date/Time    Cholesterol, total 146 06/01/2016 07:51 AM    HDL Cholesterol 40 06/01/2016 07:51 AM    LDL, calculated 91.6 06/01/2016 07:51 AM    VLDL, calculated 14.4 06/01/2016 07:51 AM    Triglyceride 72 06/01/2016 07:51 AM    CHOL/HDL Ratio 3.7 06/01/2016 07:51 AM      BNP No results for input(s): BNPP in the last 72 hours.    Liver Enzymes Recent Labs     07/22/21  0226   TP 5.7*   ALB 2.2*   AP 87      Thyroid Studies No results found for: T4, T3U, TSH, TSHEXT, TSHEXT     Procedures/imaging: see electronic medical records for all procedures/Xrays and details which were not copied into this note but were reviewed prior to creation of Plan

## 2021-07-23 NOTE — PROGRESS NOTES
Nutrition Assessment     Type and Reason for Visit: Reassess    Nutrition Recommendations/Plan: Other: continue w/ POC    Nutrition Assessment:  Pt admitted w/ sepsis, UTI, BRITT, underweight, hx of DM, hx of HTN. Malnutrition Assessment:  Malnutrition Status: Severe malnutrition     Estimated Daily Nutrient Needs:  Energy (kcal):  2190-6070  Protein (g):  47-65       Fluid (ml/day):  6718-7816    Nutrition Related Findings:  Labs reviewed. Med: mvi, vit d. +3BM 7/22. PO intake % most meals. Pt was eating breakfast this AM. Stated eating all of yesterday's breakfast, lunch and dinner. Pt reported drinking all of the ensure enlive as well.       Current Nutrition Therapies:  ADULT DIET Regular  ADULT ORAL NUTRITION SUPPLEMENT AM Snack, PM Snack; Standard High Calorie/High Protein    Anthropometric Measures:  · Height:  5' 11\" (180.3 cm)  · Current Body Wt:  59 kg (130 lb 1.1 oz)  · BMI: 18.1    Nutrition Diagnosis:   · Underweight related to inadequate protein-energy intake as evidenced by BMI (18.1)    Nutrition Intervention:  Food and/or Nutrient Delivery: Continue current diet, Continue oral nutrition supplement  Nutrition Education and Counseling: No recommendations at this time  Coordination of Nutrition Care: Continue to monitor while inpatient    Goals:  Continue PO intake >75% at most meals throughout the next 3-5 days       Nutrition Monitoring and Evaluation:   Behavioral-Environmental Outcomes: None identified  Food/Nutrient Intake Outcomes: Food and nutrient intake, Supplement intake  Physical Signs/Symptoms Outcomes: Biochemical data, Meal time behavior, Skin, Weight, GI status, Nausea/vomiting, Nutrition focused physical findings    Discharge Planning:    Continue oral nutrition supplement, Continue current diet     Electronically signed by Rios Deras RD on 7/23/2021 at 9:05 AM

## 2021-07-23 NOTE — PROGRESS NOTES
Problem: Mobility Impaired (Adult and Pediatric)  Goal: *Acute Goals and Plan of Care (Insert Text)  Description: Physical Therapy Goals   Initiated 7/23/2021 and to be accomplished within 5-7 day(s)  1. Patient will move from supine <> sit with S in prep for out of bed activity and change of position. 2.  Patient will perform sit<> stand with SBA/RW in prep for transfers/ambulation. 3.  Patient will transfer from bed <> chair with SBA/RW for time up in chair for completion of ADL activity. 4.  Patient will ambulate 70 feet with SBA/RW for improved functional mobility at discharge. Outcome: Progressing Towards Goal    PHYSICAL THERAPY EVALUATION    Patient: Erica Cardenas (14 y.o. male)  Date: 7/23/2021  Primary Diagnosis: Sepsis ([address] [A41.9]  UTI (urinary tract infection) [N39.0]  Precautions:   Fall  PLOF: amb with rollator w mod I PTA. Pt at SNF for rehab and discharge ~2wk PTA; was receiving HHPT. ASSESSMENT :  Based on the objective data described below, the patient is seen on telemetry unit following admission for above dx. Pt presents with weakness BLE's grossly 3-/5 and with subsequent decr'd independence in functional mobility with regard to bed mobility, transfers, gait and with decr'd balance and decreased activity tolerance. Patient reports np pain this session. Pt found supine in bed with O2 at 3 Lnc, IV and urinary catheter in place. Demonstrates log rolling with CGA, transition to sit EOB with min A and exhibits fair sitting balance EOB. Pt transfers to stand with RW/mod A and bed ht elevated. Pt gt limited to small steps laterally toward [facility] with RW/min A/additional time with slow aurea and forward bent posture (present since back surgery a few years ago. Pt assisted back supine with mod A for management of LE's and left with bed in chair positionwith all needs in reach. Nurse BODØ notified of above and pt may use BSC with assistance.   Pt not safe for bathroom transfers. Answered pt questions regarding PT and mobility. Recommend pt resume HHPT  vs SNF for f/up PT upon discharge, pending progress. Patient will benefit from skilled intervention to address the above impairments. Pt Education: Role of physical therapy in acute care setting, fall prevention and safety/technique during functional mobility tasks    Patient's rehabilitation potential is considered to be Fair  Factors which may influence rehabilitation potential include:   []         None noted  []         Mental ability/status  [x]         Medical condition  [x]         Home/family situation and support systems  []         Safety awareness  []         Pain tolerance/management  []         Other:      PLAN :  Recommendations and Planned Interventions:   [x]           Bed Mobility Training             [x]    Neuromuscular Re-Education  [x]           Transfer Training                   []    Orthotic/Prosthetic Training  [x]           Gait Training                          []    Modalities  [x]           Therapeutic Exercises           []    Edema Management/Control  [x]           Therapeutic Activities            []    Family Training/Education  [x]           Patient Education  []           Other (comment):    Frequency/Duration: Patient will be followed by physical therapy 1-2 times per day/4-7 days per week to address goals. Discharge Recommendations: Home Physical Therapy vs [facility] pending progress  Further Equipment Recommendations for Discharge: N/A     SUBJECTIVE:   Patient stated Arcelia Parekh in there.     OBJECTIVE DATA SUMMARY:     Past Medical History:   Diagnosis Date    Atherosclerosis     Diabetes ([facility] 2013    type 2    High cholesterol     Hyperlipidemia     Hypertension 2013    Muscle weakness     Neoplasm     right kidney    Spinal stenosis     Urinary retention     Vitamin D deficiency      Past Surgical History:   Procedure Laterality Date    HX HEENT      40 years ago deviated septum    HX LUMBAR LAMINECTOMY  2017    HX ORTHOPAEDIC      Lumbar laminectomy 9/7/17    HX ORTHOPAEDIC Right     CTR     Barriers to Learning/Limitations: None  Compensate with: N/A  Home Situation:  Home Situation  Home Environment: Private residence  # Steps to Enter: 0  Wheelchair Ramp: No  One/Two Story Residence: One story  Living Alone: No  Support Systems: Spouse/Significant Other/Partner  Patient Expects to be Discharged to[de-identified] [employer]  Current DME Used/Available at Home: Walker, rollator, Grab bars, Raised toilet seat  Tub or Shower Type:  (walkin with built in seat and grab bars)  Critical Behavior:  Neurologic State: Alert; Appropriate for age  Orientation Level: Oriented to person;Oriented to place;Oriented to situation;Oriented to time  Cognition: Follows commands  Safety/Judgement: Awareness of environment; Fall prevention  Psychosocial  Patient Behaviors: Calm; Cooperative  Purposeful Interaction: Yes  Pt Identified Daily Priority: Clinical issues (comment)  Caritas Process: Nurture loving kindness;Establish trust;Teaching/learning; Attend basic human needs;Create healing environment  Caring Interventions: Reassure  Reassure: Therapeutic listening; Informing; Acceptance  Therapeutic Modalities: Humor  Skin Condition/Temp: Warm;Dry  Skin Integrity: Intact  Skin Integumentary  Skin Color: Appropriate for ethnicity  Skin Condition/Temp: Warm;Dry  Skin Integrity: Intact  Turgor: Non-tenting  Strength:    Strength: Generally decreased, functional (LE's grossly 3-/5)  Tone & Sensation:   Tone: Normal  Sensation: Intact  Range Of Motion:  AROM: Generally decreased, functional  PROM: Generally decreased, functional  Functional Mobility:  Bed Mobility:  Rolling: Contact guard assistance  Supine to Sit: Minimum assistance  Sit to Supine: Moderate assistance  Scooting: Moderate assistance  Transfers:  Sit to Stand:  Moderate assistance (with HOB elevated)  Stand to Sit: Minimum assistance  Balance: Sitting: Intact  Sitting - Static: Good (unsupported)  Sitting - Dynamic: Fair (occasional)  Standing: Impaired; With support  Standing - Static: Fair  Standing - Dynamic : Fair  Ambulation/Gait Training:  Distance (ft): 2 Feet (ft) (lateral steps at side of bed)  Assistive Device: Gait belt;Walker, rolling  Ambulation - Level of Assistance: Minimal assistance; Additional time  Gait Description (WDL): Exceptions to WDL  Gait Abnormalities: Decreased step clearance  Speed/Nicky: Slow  Interventions: Safety awareness training; Tactile cues; Verbal cues  Therapeutic Exercise:  Supine AA: HS x5, Bridging x 10 with assist to hold LE's in place    Pain:  Pain level pre-treatment: 0/10   Pain level post-treatment: 0/10   Pain Intervention(s) : Medication (see MAR); Rest, Ice, Repositioning  Response to intervention: Nurse notified, See doc flow  Activity Tolerance:   Fair   Please refer to the flowsheet for vital signs taken during this treatment. After treatment:   []         Patient left in no apparent distress sitting up in chair  [x]         Patient left in no apparent distress in bed with bed in chair position  [x]         Call bell left within reach  [x]         Nursing notified-[place]  []         Caregiver present  []         Bed alarm activated  []         SCDs applied    COMMUNICATION/EDUCATION:   [x]         Role of Physical Therapy in the acute care setting. [x]         Fall prevention education was provided and the patient/caregiver indicated understanding. [x]         Patient/family have participated as able in goal setting and plan of care. [x]         Patient/family agree to work toward stated goals and plan of care. []         Patient understands intent and goals of therapy, but is neutral about his/her participation. []         Patient is unable to participate in goal setting/plan of care: ongoing with therapy staff.  []         Other:     Thank you for this referral.  Sara Beard, PT   Time Calculation: 32 mins      Eval Complexity: History: HIGH Complexity :3+ comorbidities / personal factors will impact the outcome/ POC Exam:MEDIUM Complexity : 3 Standardized tests and measures addressing body structure, function, activity limitation and / or participation in recreation  Presentation: MEDIUM Complexity : Evolving with changing characteristics  Clinical Decision Making:Medium Complexity    Overall Complexity:MEDIUM

## 2021-07-23 NOTE — ROUTINE PROCESS
Bedside and Verbal shift change report given to Noemi Juarez RN by Shameka Koch. Report included the following information SBAR, Kardex, OR Summary, Intake/Output and MAR.

## 2021-07-24 NOTE — PROGRESS NOTES
Problem: Mobility Impaired (Adult and Pediatric)  Goal: *Acute Goals and Plan of Care (Insert Text)  Description: Physical Therapy Goals   Initiated 7/23/2021 and to be accomplished within 5-7 day(s)  1. Patient will move from supine <> sit with S in prep for out of bed activity and change of position. 2.  Patient will perform sit<> stand with SBA/RW in prep for transfers/ambulation. 3.  Patient will transfer from bed <> chair with SBA/RW for time up in chair for completion of ADL activity. 4.  Patient will ambulate 70 feet with SBA/RW for improved functional mobility at discharge. Outcome: Progressing Towards Goal  physical Therapy TREATMENT    Patient: Myra Land (11 y.o. male)  Date: 7/24/2021  Diagnosis: Sepsis ([address] [A41.9]  UTI (urinary tract infection) [N39.0] Sepsis ([facility] [address]  Precautions: Fall   Chart, physical therapy assessment, plan of care and goals were reviewed. ASSESSMENT:  Pt seen with OT for second set of skilled hands. Continues to require CGA/min assist/additional time for supine<>sit due to weakness LE's. Sitting balance impaired due to overall core weakness. Gait limited to small steps laterally at bedside with RWm/min A of 2 and vc/tc for safety/posture correction. Tolerated static standing ex for trunk strengthening/posture correction x 5min. Returned to sit EOB >supine and left with all needs in reach. Note pt with frequent nasal drainage and congested cough during session. Encouraged use of IS. Rec SNF at discharge. Progression toward goals:  []      Improving appropriately and progressing toward goals  [x]      Improving slowly and progressing toward goals  []      Not making progress toward goals and plan of care will be adjusted     PLAN:  Patient continues to benefit from skilled intervention to address the above impairments. Continue treatment per established plan of care.   Discharge Recommendations:  [address] Recommendations for Discharge:  N/A     SUBJECTIVE:   Patient stated My legs are really weak.     OBJECTIVE DATA SUMMARY:   Critical Behavior:  Neurologic State: Alert, Appropriate for age  Orientation Level: Appropriate for age, Oriented X4  Cognition: Follows commands  Safety/Judgement: Awareness of environment, Fall prevention  Functional Mobility Training:  Bed Mobility:  Rolling: Stand-by assistance  Supine to Sit: Contact guard assistance; Additional time  Sit to Supine: Minimum assistance; Additional time  Transfers:  Sit to Stand: Moderate assistance; Additional time  Stand to Sit: Minimum assistance; Additional time  Balance:  Sitting: Impaired  Sitting - Static: Fair (occasional)  Sitting - Dynamic: Fair (occasional)  Standing: Impaired; With support  Standing - Static: Fair;Constant support  Standing - Dynamic : Fair;Constant support  Ambulation/Gait Training:  Distance (ft): 2 Feet (ft)  Assistive Device: Gait belt;Walker, rolling  Ambulation - Level of Assistance: Minimal assistance;Assist x2; Additional time  Gait Abnormalities: Decreased step clearance; Step to gait  Speed/Nicky: Slow  Interventions: Safety awareness training; Tactile cues; Verbal cues; Visual/Demos  Therapeutic Exercises:   Marching seated x 8  Pain:  Pain Scale 1: Numeric (0 - 10)  Pain Intensity 1: 0  Activity Tolerance:   Fair   Please refer to the flowsheet for vital signs taken during this treatment.   After treatment:   [] Patient left in no apparent distress sitting up in chair  [x] Patient left in no apparent distress in bed  [x] Call bell left within reach  [x] Nursing notified  [] Caregiver present  [] Bed alarm activated      Orma Shallow, PT   Time Calculation: 24 mins

## 2021-07-24 NOTE — ROUTINE PROCESS
Bedside and Verbal shift change report given to VAHE Reynoso RN (oncoming nurse) by Dave Storm RN (offgoing nurse). Report included the following information SBAR, Kardex, Intake/Output and MAR.

## 2021-07-24 NOTE — PROGRESS NOTES
Hospitalist Progress Note    Patient: Erica Cardenas MRN: [id number]  CSN: [id number]    YOB: 1945  Age: 70 y.o. Sex: male    DOA: 7/20/2021 LOS:  LOS: 4 days                Assessment and Plan:    Principal Problem:    Sepsis ([address] (7/20/2021)    Active Problems:    UTI (urinary tract infection) due to urinary indwelling Jimenez catheter ([address] (5/1/2020)      BRITT (acute kidney injury) ([address] (7/20/2021)      Aneurysm of infrarenal abdominal aorta ([address] (7/20/2021)      HTN (hypertension) (7/20/2021)      Elevated troponin (7/21/2021)      Underweight due to inadequate caloric intake (7/21/2021)      Severe protein-calorie malnutrition ([address] (7/21/2021)      UTI-  Catheter associated, Past cultures grew Pseudomonas and E. coli. On vancomycin and ceftriaxone day 4    Sensitivity is back. . Will narrow to levofloxacin     DM-  On sliding scale insulin     BRITT -  iVF  Follow renal function.     PT/OT     DVT prophylaxis with Lovenox     TUms for GERd per patient request      Chief complaint:  69 y/o male with h/o lumbar stenosis, lumbar spondylosis, neurogenic bladder requiring regular catherization and type II diabetes mellitus admitted for inability to walk, frequent falls admitted for complicated UTI. Subjective:    Feels a little better but complaining of heartburn      Review of systems:    General: No fevers or chills. Cardiovascular: No chest pain or pressure. No palpitations. Pulmonary: No shortness of breath. Gastrointestinal: No nausea, vomiting.      Objective:    Vital signs/Intake and Output:  Visit Vitals  BP (!) 159/86 (BP 1 Location: Right upper arm, BP Patient Position: At rest;Supine)   Pulse 93   Temp 98.2 °F (36.8 °C)   Resp 18   Ht 5' 11\" (1.803 m)   Wt 59 kg (130 lb)   SpO2 91%   BMI 18.13 kg/m²     Current Shift:  07/24 0701 - 07/24 1900  In: 614 [I.V.:614]  Out: 900 [Urine:900]  Last three shifts:  07/22 1901 - 07/24 0700  In: 300 [P.O.:300]  Out: 6034 [BBUBB:1437]    Physical Exam:  General: NAD, AAOx3. Non-toxic. HEENT: NC/AT. PERRLA, EOMI.  MMM. Lungs: Nml inspection. CTA B/L. No wheezing, rales or rhonchi. Heart:  S1S2 RRR,  PMI mid 5th IC space. No M/RG. Abdomen: Soft, NT/ND.  BS+. No peritoneal signs. Extremities: No C/C/E. Psych:   Nml affect. Neurologic:  2-12 intact. Strength 5/5 throughout. Sensation symmetrical.          Labs: Results:       Chemistry Recent Labs     07/23/21  0304 07/22/21  0226   * 129*    140   K 3.7 3.9   * 110   CO2 21 25   BUN 36* 39*   CREA 1.27 1.57*   CA 8.2* 8.6   AGAP 6 5   BUCR 28* 25*   AP 78 87   TP 6.0* 5.7*   ALB 2.1* 2.2*   GLOB 3.9 3.5   AGRAT 0.5* 0.6*      CBC w/Diff Recent Labs     07/23/21  0500 07/22/21  0226   WBC 13.9* 18.2*   RBC 2.87* 3.24*   HGB 7.9* 8.7*   HCT 24.8* 28.2*    218      Cardiac Enzymes Recent Labs     07/21/21  1015      CKND1 1.1      Coagulation No results for input(s): PTP, INR, APTT, INREXT in the last 72 hours. Lipid Panel Lab Results   Component Value Date/Time    Cholesterol, total 146 06/01/2016 07:51 AM    HDL Cholesterol 40 06/01/2016 07:51 AM    LDL, calculated 91.6 06/01/2016 07:51 AM    VLDL, calculated 14.4 06/01/2016 07:51 AM    Triglyceride 72 06/01/2016 07:51 AM    CHOL/HDL Ratio 3.7 06/01/2016 07:51 AM      BNP No results for input(s): BNPP in the last 72 hours.    Liver Enzymes Recent Labs     07/23/21  0304   TP 6.0*   ALB 2.1*   AP 78      Thyroid Studies No results found for: T4, T3U, TSH, TSHEXT     Procedures/imaging: see electronic medical records for all procedures/Xrays and details which were not copied into this note but were reviewed prior to creation of Plan

## 2021-07-24 NOTE — ROUTINE PROCESS
Bedside and Verbal shift change report given to DEEP Stauffer RN (oncoming nurse) by Esteban Coley RN (offgoing nurse). Report included the following information SBAR, Kardex, Intake/Output and MAR.

## 2021-07-24 NOTE — PROGRESS NOTES
Vancomycin trough level = 7.9 @ 2339 on 07/23/2021           Patient Name: Katie Cisneros   Age: 70 y.o. Sex:  male  YOB: 1945   Medical Record Number: [id number]      Antimicrobial  Vancomycin   Indication UTI (complicated)   Dose / Interval           Current Antimicrobial Therapy (168h ago, onward)       Ordered     Start Stop    07/24/21 0310  vancomycin (VANCOCIN) 1250 mg in  ml infusion  1,250 mg,   IntraVENous,   EVERY 24 HOURS      07/24/21 2200 --    07/20/21 2042  cefTRIAXone (ROCEPHIN) 1 g in sterile water (preservative free) 10 mL IV syringe  1 g,   IntraVENous,   EVERY 24 HOURS      07/21/21 1500 07/28/21 1459                     Last Level (if applicable) No results found for: DOSE, TMG, DTG  Vancomycin   Lab Results   Component Value Date/Time    Vancomycin,trough 7.9 (L) 07/23/2021 11:37 PM      Gentamicin   No results found for: GENP, GENT, GENR]  Tobramycin   No results found for: TOBP, TOBT, TOBR   Amikacin   No results found for: AMIKP, DAMIKP, AMIKT, DAMIKT, DAMIKR     Cultures (7 most recent)   Culture result:   Date Value Ref Range Status   07/20/2021 NO GROWTH 3 DAYS   Preliminary   07/20/2021 NO GROWTH 3 DAYS   Preliminary   07/20/2021 PSEUDOMONAS AERUGINOSA (A)   Final   05/17/2021 MIXED UROGENITAL TJ ISOLATED   Final   04/24/2021 PSEUDOMONAS AERUGINOSA (PREDOMINATING) (A)   Final   04/24/2021 ESCHERICHIA COLI (A)   Final   03/31/2021 PSEUDOMONAS AERUGINOSA (A)   Final   03/31/2021 ESCHERICHIA COLI (A)   Final      Renal Overview (72 hr)         Recent Labs     07/23/21  0304 07/22/21  0226   BUN 36* 39*   CREA 1.27 1.57*       CrCl (Current): Estimated Creatinine Clearance: 43.2 mL/min (based on SCr of 1.27 mg/dL).       Lactic Acid    (Sepsis Criteria: >2.0 mmol/L) Lab Results   Component Value Date/Time    Lactic acid 1.9 05/01/2020 02:55 PM    Lactic acid 1.0 09/14/2017 02:30 PM      Procalcitonin (0.10-0.49 NG/ML) Lab Results   Component Value Date/Time Procalcitonin 0.12 2021 08:28 PM      Hepatic Overview (72 hr) Recent Labs     21  0304 21  0226 21  022   ALT 16   < > 16   AP 78  --  87    < > = values in this interval not displayed. Temp (24-hr Max) Temp (24hrs), Av.1 °F (36.7 °C), Min:97.7 °F (36.5 °C), Max:98.4 °F (36.9 °C)       Hematology Recent Labs     21  0500 21  022   WBC 13.9* 18.2*   HGB 7.9* 8.7*   HCT 24.8* 28.2*    218        DOT  4     Notes     A/P:  1. Recommend vancomycin 1250 mg IV Q24hrs (21.2 mg/kg)  2. [Discuss when next vancomycin level(s) should be obtained based on clinical factors and/or institution policy]  3. Monitor renal function (urine output, BUN/SCr). Dose adjustments may be necessary with a significant change in renal function.            Jonatan Lang [facility]  Clinical Pharmacist  [phone]

## 2021-07-24 NOTE — PROGRESS NOTES
Problem: Self Care Deficits Care Plan (Adult)  Goal: *Acute Goals and Plan of Care (Insert Text)  Description: Occupational Therapy Goals  Initiated 7/22/2021 within 7 day(s). 1.  Patient will perform grooming with minimal assistance/contact guard assist standing at sink for 5 minutes or more. 2.  Patient will perform upper body dressing with supervision/set-up. 3.  Patient will perform lower body dressing with supervision/set-up. 4.  Patient will perform toilet transfers with minimal assistance/contact guard assist.  5.  Patient will perform all aspects of toileting with minimal assistance/contact guard assist.  6.  Patient will participate in upper extremity therapeutic exercise/activities with minimal assistance/contact guard assist for 10 minutes. 7.  Patient will utilize energy conservation techniques during functional activities with verbal, visual, and tactile cues. Outcome: Progressing Towards Goal    OCCUPATIONAL THERAPY TREATMENT    Patient: Osmany Melgar (40 y.o. male)  Date: 7/24/2021  Diagnosis: Sepsis ([facility] [address] [A41.9]  UTI (urinary tract infection) [N39.0] Sepsis ([facility] [address]       Precautions: Fall  PLOF:     Chart, occupational therapy assessment, plan of care, and goals were reviewed. ASSESSMENT:  Pt presented supine in bed at the beginning of session. Pt co-treat with PT for the need of another set of skilled hands and safety with transfers/ADLs. Pt participate with bed mobility, supine<>sit with mod A, sit<>stand x2 with min-mod A, and performed lateral stepping towards HOB during this session. Pt continues to demo fair-/poor dynamic sitting and standing balance at this time. Pt was left supine in bed at the end of session in NAD.    Progression toward goals:  []          Improving appropriately and progressing toward goals  [x]          Improving slowly and progressing toward goals  []          Not making progress toward goals and plan of care will be adjusted     PLAN:  Patient continues to benefit from skilled intervention to address the above impairments. Continue treatment per established plan of care. Discharge Recommendations:  Home Health vs [facility]  Further Equipment Recommendations for Discharge:  N/A     SUBJECTIVE:   Patient stated I am catching up now.     OBJECTIVE DATA SUMMARY:   Cognitive/Behavioral Status:  Neurologic State: Alert  Orientation Level: Oriented X4  Cognition: Appropriate for age attention/concentration, Follows commands  Safety/Judgement: Fall prevention    Functional Mobility and Transfers for ADLs:   Bed Mobility:  Rolling: Stand-by assistance  Supine to Sit: Contact guard assistance; Additional time  Sit to Supine: Minimum assistance; Additional time   Transfers:  Sit to Stand: Moderate assistance; Additional time  Stand to Sit: Minimum assistance; Additional time  Balance:  Sitting: Impaired  Sitting - Static: Fair (occasional)  Sitting - Dynamic: Fair (occasional)  Standing: Impaired; With support  Standing - Static: Fair;Constant support  Standing - Dynamic : Fair;Constant support    ADL Intervention:  Grooming  Grooming Assistance: Set-up  Washing Hands: Set-up    Cognitive Retraining  Safety/Judgement: Fall prevention    Pain:  Pain level pre-treatment: 0/10   Pain level post-treatment: 0/10  Pain Intervention(s): Medication (see MAR); Rest, Ice, Repositioning   Response to intervention: Nurse notified, See doc flow    Activity Tolerance:    Fair     Please refer to the flowsheet for vital signs taken during this treatment. After treatment:   []  Patient left in no apparent distress sitting up in chair  [x]  Patient left in no apparent distress in bed  [x]  Call bell left within reach  [x]  Nursing notified  []  Caregiver present  []  Bed alarm activated    COMMUNICATION/EDUCATION:   [x] Role of Occupational Therapy in the acute care setting  [x] Home safety education was provided and the patient/caregiver indicated understanding.   [x] Patient/family have participated as able in working towards goals and plan of care. [x] Patient/family agree to work toward stated goals and plan of care. [] Patient understands intent and goals of therapy, but is neutral about his/her participation. [] Patient is unable to participate in goal setting and plan of care.       Thank you for this referral.  Terrance Aguilera OTR/L  Time Calculation: 24 mins

## 2021-07-24 NOTE — PROGRESS NOTES
2158-alert and oriented x 3. Lungs CTA on RA. BS active x 4 quads. IV access to left forearm with NS infusing at 50 ml/hr without difficulty. On tele monitor 31 showing SR-ST. Denies pain at this time. 2337-Blood drawn for Vancomycin trough. Shift summary-No c/o pain through the noc. No changes to health status.

## 2021-07-24 NOTE — PROGRESS NOTES
5035 - Patient in bed at this time. A/O x 4. IV to left FA  intact and patent. Lovenox is anticoagulant. Patient reports preexisting neuropathy causing  Numbness/tingling to bilateral feet. Pedal pulses palpable. Lungs clear. Bowel sounds ative to all quadrants. Patient able to get to 1500 on the incentive spirometer. Pain 0/10.     1503-1 tablet 500 mg TUMS given at this time. 1920 - Bedside and Verbal shift change report given to Anushka KRUGER by Florin Mcneill RN. Report included the following information SBAR, Kardex, OR Summary, Intake/Output and MAR.

## 2021-07-24 NOTE — PROGRESS NOTES
Hospitalist Progress Note    Patient: Lonnie Mena MRN: [id number]  CSN: [id number]    YOB: 1945  Age: 70 y.o. Sex: male    DOA: 7/20/2021 LOS:  LOS: 3 days                Assessment/Plan     Patient Active Problem List   Diagnosis Code    Lumbar spinal stenosis M48.061    Lumbar spondylosis M47.816    Radiculopathy of leg M54.10    Unable to ambulate R26.2    Urinary retention R33.9    Type II diabetes mellitus with manifestations, uncontrolled ([facility] [address] E11.8, E11.65    Renal mass N28.89    UTI (urinary tract infection) due to urinary indwelling Jimenez catheter ([facility]) T83.511A, N39.0    Compression fracture of L1 vertebra ([facility]) S32.010A    Sepsis ([facility]) A41.9    BRITT (acute kidney injury) ([facility] [address] N17.9    Aneurysm of infrarenal abdominal aorta ([facility]) I71.4    HTN (hypertension) I10    Elevated troponin R77.8    Underweight due to inadequate caloric intake R63.6    Severe protein-calorie malnutrition ([facility] [address] E42            69 y/o male with h/o lumbar stenosis, lumbar spondylosis, neurogenic bladder requiring regular catherization and type II diabetes mellitus admitted for inability to walk, frequent falls admitted for complicated UTI. UTI-  Catheter associated, Past cultures grew Pseudomonas and E. coli. On vancomycin and ceftriaxone  Follow cultures    DM-  On sliding scale insulin    BRITT -  iVF  Follow renal function. PT/OT    DVT prophylaxis with Lovenox    Disposition : 1 to 2 days    Review of systems  General: No fevers or chills. Cardiovascular: No chest pain or pressure. No palpitations. Pulmonary: No shortness of breath. Gastrointestinal: No nausea, vomiting. Physical Exam:  General: Awake, cooperative, no acute distress    HEENT: NC, Atraumatic. PERRLA, anicteric sclerae. Lungs: CTA Bilaterally. No Wheezing/Rhonchi/Rales.   Heart:  S1 S2,  No murmur, No Rubs, No Gallops  Abdomen: Soft, Non distended, Non tender.  +Bowel sounds,   Extremities: No c/c/e  Psych:   Not anxious or agitated. Neurologic:  No acute neurological deficit. Vital signs/Intake and Output:  Visit Vitals  BP (!) 160/84   Pulse (!) 103   Temp 98.4 °F (36.9 °C)   Resp 18   Ht 5' 11\" (1.803 m)   Wt 59 kg (130 lb)   SpO2 92%   BMI 18.13 kg/m²     Current Shift:  No intake/output data recorded. Last three shifts:  07/22 0701 - 07/23 1900  In: 1020 [P.O.:1020]  Out: 2427 [Urine:2425]            Labs: Results:       Chemistry Recent Labs     07/23/21  0304 07/22/21  0226 07/21/21  0310   * 129* 71*    140 141   K 3.7 3.9 4.1   * 110 109   CO2 21 25 24   BUN 36* 39* 40*   CREA 1.27 1.57* 1.56*   CA 8.2* 8.6 9.5   AGAP 6 5 8   BUCR 28* 25* 26*   AP 78 87 81   TP 6.0* 5.7* 5.9*   ALB 2.1* 2.2* 2.5*   GLOB 3.9 3.5 3.4   AGRAT 0.5* 0.6* 0.7*      CBC w/Diff Recent Labs     07/23/21  0500 07/22/21  0226 07/21/21  0310   WBC 13.9* 18.2* 21.2*   RBC 2.87* 3.24* 3.19*   HGB 7.9* 8.7* 8.6*   HCT 24.8* 28.2* 28.1*    218 210      Cardiac Enzymes Recent Labs     07/21/21  1015 07/21/21  0310    196   CKND1 1.1 1.5      Coagulation No results for input(s): PTP, INR, APTT, INREXT, INREXT in the last 72 hours. Lipid Panel Lab Results   Component Value Date/Time    Cholesterol, total 146 06/01/2016 07:51 AM    HDL Cholesterol 40 06/01/2016 07:51 AM    LDL, calculated 91.6 06/01/2016 07:51 AM    VLDL, calculated 14.4 06/01/2016 07:51 AM    Triglyceride 72 06/01/2016 07:51 AM    CHOL/HDL Ratio 3.7 06/01/2016 07:51 AM      BNP No results for input(s): BNPP in the last 72 hours.    Liver Enzymes Recent Labs     07/23/21  0304   TP 6.0*   ALB 2.1*   AP 78      Thyroid Studies No results found for: T4, T3U, TSH, TSHEXT, TSHEXT     Procedures/imaging: see electronic medical records for all procedures/Xrays and details which were not copied into this note but were reviewed prior to creation of Plan

## 2021-07-24 NOTE — PROGRESS NOTES
0730 Bedside and Verbal shift change report given to VAHE Paz (oncoming nurse) by Marcos Tyler RN (offgoing nurse). Report included the following information SBAR, Kardex, Intake/Output, and MAR. Pt in bed, call light in reach. 0753 Pt assessed. No signs of acute distress. Pt in bed. Pt O2 88%, O2 increased to 3L NC.    1126 Pt assessed. No signs of acute distress. Pt in bed. Pt weaned to 2 L NC. O2 94%    1624 Pt assessed. No signs of acute distress. Pt in bed. Pt weaned off O2, 93-96%. 1930 Bedside and Verbal shift change report given to Shauna Rangel RN (oncoming nurse) by Maryam Kapoor. Adonis Painting RN (offgoing nurse). Report included the following information SBAR, Kardex, Intake/Output, and MAR. Pt in bed, call light in reach.

## 2021-07-25 NOTE — PROGRESS NOTES
Hospitalist Progress Note    Patient: Hafsa Alba MRN: [id number]  CSN: [id number]    YOB: 1945  Age: 70 y.o. Sex: male    DOA: 7/20/2021 LOS:  LOS: 5 days                Assessment and Plan:    Principal Problem:    Sepsis ([address] (7/20/2021)    Active Problems:    UTI (urinary tract infection) due to urinary indwelling Jimenez catheter ([address] (5/1/2020)      BRITT (acute kidney injury) ([address] (7/20/2021)      Aneurysm of infrarenal abdominal aorta ([address] (7/20/2021)      HTN (hypertension) (7/20/2021)      Elevated troponin (7/21/2021)      Underweight due to inadequate caloric intake (7/21/2021)      Severe protein-calorie malnutrition ([address] (7/21/2021)      UTI-  Catheter associated,t cultures grew Pseudomonas On ab day 5  . Narrowed to levofloxacin yesterday      DM-  On sliding scale insulin     BRITT -  iVF  Follow renal function which has improved. Will stop the ivfluids     PT/OT     DVT prophylaxis with Lovenox     TUms for GERd per patient request  seem to have worked     Chief complaint:  71 y/o male with h/o lumbar stenosis, lumbar spondylosis, neurogenic bladder requiring regular catherization and type II diabetes mellitus admitted for inability to walk, frequent falls admitted for complicated UTI. Subjective:    *talking on the phone. Ate his lunch without problems      Review of systems:    General: No fevers or chills. Cardiovascular: No chest pain or pressure. No palpitations. Pulmonary: No shortness of breath. Gastrointestinal: No nausea, vomiting.      Objective:    Vital signs/Intake and Output:  Visit Vitals  /79 (BP 1 Location: Right upper arm, BP Patient Position: At rest;Supine)   Pulse 92   Temp 97.7 °F (36.5 °C)   Resp 18   Ht 5' 11\" (1.803 m)   Wt 59 kg (130 lb)   SpO2 90%   BMI 18.13 kg/m²     Current Shift:  07/25 0701 - 07/25 1900  In: 237 [P.O.:237]  Out: 600 [Urine:600]  Last three shifts:  07/23 1901 - 07/25 0700  In: 0598 [P.O.:960; I.V.:614]  Out: 1901 [Urine:1900]    Physical Exam:  General: NAD, AAOx3. Non-toxic. HEENT: NC/AT. PERRLA, EOMI.  MMM. Lungs: Nml inspection. CTA B/L. No wheezing, rales or rhonchi. Heart:  S1S2 RRR,  PMI mid 5th IC space. No M/RG. Abdomen: Soft, NT/ND.  BS+. No peritoneal signs. Extremities: No C/C/E. Psych:   Nml affect. Neurologic:  2-12 intact. Strength 5/5 throughout. Sensation symmetrical.          Labs: Results:       Chemistry Recent Labs     07/23/21  0304   *      K 3.7   *   CO2 21   BUN 36*   CREA 1.27   CA 8.2*   AGAP 6   BUCR 28*   AP 78   TP 6.0*   ALB 2.1*   GLOB 3.9   AGRAT 0.5*      CBC w/Diff Recent Labs     07/23/21  0500   WBC 13.9*   RBC 2.87*   HGB 7.9*   HCT 24.8*         Cardiac Enzymes No results for input(s): CPK, CKND1, LUH in the last 72 hours. No lab exists for component: CKRMB, TROIP   Coagulation No results for input(s): PTP, INR, APTT, INREXT in the last 72 hours. Lipid Panel Lab Results   Component Value Date/Time    Cholesterol, total 146 06/01/2016 07:51 AM    HDL Cholesterol 40 06/01/2016 07:51 AM    LDL, calculated 91.6 06/01/2016 07:51 AM    VLDL, calculated 14.4 06/01/2016 07:51 AM    Triglyceride 72 06/01/2016 07:51 AM    CHOL/HDL Ratio 3.7 06/01/2016 07:51 AM      BNP No results for input(s): BNPP in the last 72 hours.    Liver Enzymes Recent Labs     07/23/21  0304   TP 6.0*   ALB 2.1*   AP 78      Thyroid Studies No results found for: T4, T3U, TSH, TSHEXT     Procedures/imaging: see electronic medical records for all procedures/Xrays and details which were not copied into this note but were reviewed prior to creation of Plan

## 2021-07-25 NOTE — ROUTINE PROCESS
Bedside and Verbal shift change report given to SHAYLEE Peck RN (oncoming nurse) by SHAHBAZ Quintanilla RN (offgoing nurse). Report included the following information SBAR, Kardex, ED Summary, Intake/Output, MAR and Recent Results.

## 2021-07-25 NOTE — PROGRESS NOTES
3979 - Bedside shift report received from [facility]. Assumed care of patient. 2608 - Head to toe assessment completed as per flowsheet. Patient alert and oriented x4. Respirations even and unlabored. Jimenez catheter intact and draining dark yellow urine. Denies any pain/discomfort at this time. Visitor present. Patient resting in bed with call light in reach. 2998 - Patient c/o heartburn. PRN Tums administered.

## 2021-07-25 NOTE — PROGRESS NOTES
Problem: Pressure Injury - Risk of  Goal: *Prevention of pressure injury  Description: Document Regan Scale and appropriate interventions in the flowsheet. Outcome: Progressing Towards Goal  Note: Pressure Injury Interventions:  Sensory Interventions: Pressure redistribution bed/mattress (bed type)    Moisture Interventions: Absorbent underpads    Activity Interventions: Increase time out of bed, Pressure redistribution bed/mattress(bed type)    Mobility Interventions: Pressure redistribution bed/mattress (bed type)    Nutrition Interventions: Document food/fluid/supplement intake    Friction and Shear Interventions: HOB 30 degrees or less, Sit at 90-degree angle                Problem: Patient Education: Go to Patient Education Activity  Goal: Patient/Family Education  Outcome: Progressing Towards Goal     Problem: Falls - Risk of  Goal: *Absence of Falls  Description: Document Tushar Fall Risk and appropriate interventions in the flowsheet. Outcome: Progressing Towards Goal  Note: Fall Risk Interventions:  Mobility Interventions: Patient to call before getting OOB    Mentation Interventions: Adequate sleep, hydration, pain control    Medication Interventions: Patient to call before getting OOB    Elimination Interventions: Call light in reach    History of Falls Interventions:  Investigate reason for fall, Room close to nurse's station         Problem: Patient Education: Go to Patient Education Activity  Goal: Patient/Family Education  Outcome: Progressing Towards Goal     Problem: Nutrition Deficit  Goal: *Optimize nutritional status  Outcome: Progressing Towards Goal     Problem: Patient Education: Go to Patient Education Activity  Goal: Patient/Family Education  Outcome: Progressing Towards Goal     Problem: Patient Education: Go to Patient Education Activity  Goal: Patient/Family Education  Outcome: Progressing Towards Goal     Problem: Pain  Goal: *Control of Pain  Outcome: Progressing Towards Goal Problem: Patient Education: Go to Patient Education Activity  Goal: Patient/Family Education  Outcome: Progressing Towards Goal

## 2021-07-25 NOTE — PROGRESS NOTES
Problem: Mobility Impaired (Adult and Pediatric)  Goal: *Acute Goals and Plan of Care (Insert Text)  Description: Physical Therapy Goals   Initiated 7/23/2021 and to be accomplished within 5-7 day(s)  1. Patient will move from supine <> sit with S in prep for out of bed activity and change of position. 2.  Patient will perform sit<> stand with SBA/RW in prep for transfers/ambulation. 3.  Patient will transfer from bed <> chair with SBA/RW for time up in chair for completion of ADL activity. 4.  Patient will ambulate 70 feet with SBA/RW for improved functional mobility at discharge. Note:     PHYSICAL THERAPY TREATMENT    Patient: Harsh Umana (2 y.o. male)  Date: 7/25/2021  Diagnosis: Sepsis ([facility] [address] [A41.9]  UTI (urinary tract infection) [N39.0] Sepsis ([facility] [address]       Precautions: Fall    ASSESSMENT:  Pt supine in bed on PT entry, declined to attempt OOB mobility, reporting fatigue from earlier OT session, agreeable to supine therapeutic exercise. Therapeutic exercises performed for LE strength and ROM as described below. Pt encouraged to perform exercises to tolerance throughout the day to incr B LE strength, also encouraged to sit EOB. Progression toward goals:   []      Improving appropriately and progressing toward goals  [x]      Improving slowly and progressing toward goals  []      Not making progress toward goals and plan of care will be adjusted     PLAN:  Patient continues to benefit from skilled intervention to address the above impairments. Continue treatment per established plan of care. Discharge Recommendations:  Ziyad Chung  Further Equipment Recommendations for Discharge:  TBD     SUBJECTIVE:   Patient stated that standing was really hard.     OBJECTIVE DATA SUMMARY:   Critical Behavior:  Neurologic State: Alert, Appropriate for age  Orientation Level: Oriented X4  Cognition: Decreased attention/concentration  Safety/Judgement: Fall prevention  Functional Mobility Training:  Bed Mobility:  Scooting: Stand-by assistance  Transfers:  Sit to Stand: Moderate assistance  Stand to Sit: Minimum assistance  Balance:  Sitting: Impaired  Sitting - Static: Occassional  Sitting - Dynamic: Occassional  Standing: Impaired; With support  Standing - Static: Fair;Constant support  Standing - Dynamic : Fair;Constant support  Therapeutic Exercises:       EXERCISE   Sets   Reps   Active Active Assist   Passive Self ROM   Comments   Ankle Pumps 1 15  [x] [] [] []    Quad Sets/Glut Sets 1 5  [x] [] [] [] Hold for 5 secs   Hamstring Sets   [] [] [] []    Short Arc Quads   [] [] [] []    Heel Slides 1 10 [x] [] [] []    Straight Leg Raises 1 5 [x] [] [] []    Hip Add   [] [] [] [] Hold for 5 secs, w/ pillow squeeze   Long Arc Quads   [] [] [] []    Seated Marching   [] [] [] []    Standing Marching   [] [] [] []       [] [] [] []      Pain:  Pain level pre-treatment: 0/10  Pain level post-treatment: 0/10   Pain Intervention(s): Medication (see MAR); Rest, Ice, Repositioning   Response to intervention: Nurse notified, See doc flow  Activity Tolerance:   Pt declined to attempt OOB mobility, tolerated supine therapeutic exercises with no pain. Please refer to the flowsheet for vital signs taken during this treatment. After treatment:   [] Patient left in no apparent distress sitting up in chair  [x] Patient left in no apparent distress in bed  [x] Call bell left within reach  [x] Nursing notified  [] Caregiver present  [] Bed alarm activated  [] SCDs applied      COMMUNICATION/EDUCATION:   [x]         Role of Physical Therapy in the acute care setting. [x]         Fall prevention education was provided and the patient/caregiver indicated understanding. [x]         Patient/family have participated as able in working toward goals and plan of care. [x]         Patient/family agree to work toward stated goals and plan of care.   []         Patient understands intent and goals of therapy, but is neutral about his/her participation. []         Patient is unable to participate in stated goals/plan of care: ongoing with therapy staff.  []         Other:         Rebekah Nguyễn   Time Calculation: 9 mins

## 2021-07-25 NOTE — PROGRESS NOTES
OCCUPATIONAL THERAPY TREATMENT    Problem: Self Care Deficits Care Plan (Adult)  Goal: *Acute Goals and Plan of Care (Insert Text)  Description: Occupational Therapy Goals  Initiated 7/22/2021 within 7 day(s). 1.  Patient will perform grooming with minimal assistance/contact guard assist standing at sink for 5 minutes or more. 2.  Patient will perform upper body dressing with supervision/set-up. 3.  Patient will perform lower body dressing with supervision/set-up. 4.  Patient will perform toilet transfers with minimal assistance/contact guard assist.  5.  Patient will perform all aspects of toileting with minimal assistance/contact guard assist.  6.  Patient will participate in upper extremity therapeutic exercise/activities with minimal assistance/contact guard assist for 10 minutes. 7.  Patient will utilize energy conservation techniques during functional activities with verbal, visual, and tactile cues. Outcome: Progressing Towards Goal     Patient: Nanette Newton (2 y.o. male)  Date: 7/25/2021  Diagnosis: Sepsis ([facility] [address] [A41.9]  UTI (urinary tract infection) [N39.0] Sepsis ([facility] [address]       Precautions: Fall      Chart, occupational therapy assessment, plan of care, and goals were reviewed. ASSESSMENT:  Pt received supine in bed, agreeable to therapy, needs occasional redirecting to maintain attention to task at hand. Pt able to perform supine to sit with Min A, pt on 3L O2, no SOB or dizziness noted through session. Pt able to perform seated lateral reaching tasks to work on rotation and core stability while performing ADL tasks. Pt notes he wants to shave today, offered to perform that now however pt declined. Pt able to perform resistive unilateral arm exercises provided by therapist to work on seated balance. 4 sit to stands attempted with FWW, first 2 requiring max A with no success, last 2 able to perform mod A and maintain supported standing with therapist and FWW.  Pt able to perform 2 reps of lifting hand off walker to mimic functional reach during grooming tasks, needs mod support to prevent forward lean and verbal cues to maintain knee extension. Pt able to perform 2 side steps to [facility] with mod A. Pt returns to sitting supine in bed with min A for LE management. All needs left in reach. Progression toward goals:  [x]          Improving appropriately and progressing toward goals  []          Improving slowly and progressing toward goals  []          Not making progress toward goals and plan of care will be adjusted     PLAN:  Patient continues to benefit from skilled intervention to address the above impairments. Continue treatment per established plan of care. Discharge Recommendations:  Ziyad Chung  Further Equipment Recommendations for Discharge:  N/A     SUBJECTIVE:   Patient stated Ross Dandy.     OBJECTIVE DATA SUMMARY:   Cognitive/Behavioral Status:  Neurologic State: Alert, Appropriate for age  Orientation Level: Oriented X4  Cognition: Decreased attention/concentration  Safety/Judgement: Fall prevention    Functional Mobility and Transfers for ADLs:      Transfers:  Sit to Stand: Moderate assistance      Balance:  Sitting: Impaired  Sitting - Static: Occassional  Sitting - Dynamic: Occassional  Standing: Impaired; With support  Standing - Static: Fair;Constant support  Standing - Dynamic : Fair;Constant support    ADL Intervention:      Cognitive Retraining  Executive Functions: Managing time;Regulating behavior  Safety/Judgement: Fall prevention      Pain:  Pain level pre-treatment: 0/10   Pain level post-treatment: 0/10  Pain Intervention(s): Medication administered by RN (see MAR); Rest, Ice, Repositioning   Response to intervention: Nurse notified, See doc flow sheet    Activity Tolerance:    Fair, pt able to perform tasks with redirection to maintain attention, no LOB noted. Please refer to the flowsheet for vital signs taken during this treatment.   After treatment:   [] Patient left in no apparent distress sitting up in chair  [x]  Patient left in no apparent distress in bed  [x]  Call bell left within reach  [x]  Nursing notified  []  Caregiver present  []  Bed alarm activated    COMMUNICATION/EDUCATION:   [x] Role of Occupational Therapy in the acute care setting  [x] Home safety education was provided and the patient/caregiver indicated understanding. [x] Patient/family have participated as able in working towards goals and plan of care. [x] Patient/family agree to work toward stated goals and plan of care. [] Patient understands intent and goals of therapy, but is neutral about his/her participation. [] Patient is unable to participate in goal setting and plan of care.       Thank you for this referral.  Napoleon DUARTE, OTR/L   Time Calculation: 27 mins

## 2021-07-25 NOTE — PROGRESS NOTES
1911 Assumed patient care at this time. Report received from Sandor Wick RN. Patient in bed in lowest position with wheels locked. Call light within reach. 2107 Shift assessment completed. 8458 Bedside and verbal shift change report given to Robert Garcia RN (oncoming nurse) by Rosana Hughes RN (offgoing nurse). Report included the following information: SBAR, Kardex, MAR, and recent results.

## 2021-07-25 NOTE — PROGRESS NOTES
Pharmacy Dosing Services: Renal Dosing    The following medication: Pepcid was automatically dose-adjusted per THE [facility] P&T Committee Protocol, with respect to renal function. Consult provided for this   70 y.o. , male , for the indication of SUP. Pt Weight:   Wt Readings from Last 1 Encounters:   07/21/21 59 kg (130 lb)         Previous Regimen Pepcid 20 mg tab BID   Serum Creatinine Lab Results   Component Value Date/Time    Creatinine 1.27 07/23/2021 03:04 AM       Creatinine Clearance Estimated Creatinine Clearance: 43.2 mL/min (by C-G formula based on SCr of 1.27 mg/dL). BUN Lab Results   Component Value Date/Time    BUN 36 (H) 07/23/2021 03:04 AM       Dosage changed to:  Pepcid 20 mg tab daily    Pharmacy to continue to monitor patient daily. Will make dosage adjustments based upon changing renal function.   Signed Peter Avila, [address]

## 2021-07-26 NOTE — PROGRESS NOTES
Hospitalist Progress Note    Patient: Laura Velázquez MRN: [id number]  CSN: [id number]    YOB: 1945  Age: 70 y.o. Sex: male    DOA: 7/20/2021 LOS:  LOS: 6 days                Assessment/Plan     Patient Active Problem List   Diagnosis Code    Lumbar spinal stenosis M48.061    Lumbar spondylosis M47.816    Radiculopathy of leg M54.10    Unable to ambulate R26.2    Urinary retention R33.9    Type II diabetes mellitus with manifestations, uncontrolled ([facility] [address] E11.8, E11.65    Renal mass N28.89    UTI (urinary tract infection) due to urinary indwelling Jimenez catheter ([facility]) T83.511A, N39.0    Compression fracture of L1 vertebra ([facility]) S32.010A    Sepsis ([facility]) A41.9    BRITT (acute kidney injury) ([facility] [address] N17.9    Aneurysm of infrarenal abdominal aorta ([facility]) I71.4    HTN (hypertension) I10    Elevated troponin R77.8    Underweight due to inadequate caloric intake R63.6    Severe protein-calorie malnutrition ([facility] [address] E42            71 y/o male with h/o lumbar stenosis, lumbar spondylosis, neurogenic bladder requiring regular catherization and type II diabetes mellitus admitted for inability to walk, frequent falls admitted for complicated UTI. UTI-  Catheter associated cultures grew Pseudomonas. On levaquin  Jimenez changed    DM-  On sliding scale insulin    BRITT -  iVF  Follow renal function. PT/OT    DVT prophylaxis with Lovenox    Disposition : 1 to 2 days    Review of systems  General: No fevers or chills. Cardiovascular: No chest pain or pressure. No palpitations. Pulmonary: No shortness of breath. Gastrointestinal: No nausea, vomiting. Physical Exam:  General: Awake, cooperative, no acute distress    HEENT: NC, Atraumatic. PERRLA, anicteric sclerae. Lungs: CTA Bilaterally. No Wheezing/Rhonchi/Rales.   Heart:  S1 S2,  No murmur, No Rubs, No Gallops  Abdomen: Soft, Non distended, Non tender.  +Bowel sounds,   Extremities: No c/c/e  Psych:   Not anxious or agitated. Neurologic:  No acute neurological deficit. Vital signs/Intake and Output:  Visit Vitals  BP (!) 153/81   Pulse 99   Temp 97.9 °F (36.6 °C)   Resp 16   Ht 5' 11\" (1.803 m)   Wt 59 kg (130 lb)   SpO2 99%   BMI 18.13 kg/m²     Current Shift:  No intake/output data recorded. Last three shifts:  07/25 0701 - 07/26 1900  In: 737 [P.O.:737]  Out: 1900 [Urine:1900]            Labs: Results:       Chemistry Recent Labs     07/26/21 0315   GLU 90      K 4.9      CO2 24   BUN 40*   CREA 1.17   CA 8.0*   AGAP 6   BUCR 34*   AP 82   TP 5.8*   ALB 2.1*   GLOB 3.7   AGRAT 0.6*      CBC w/Diff Recent Labs     07/26/21 0315   WBC 10.1   RBC 2.96*   HGB 7.9*   HCT 26.1*      GRANS 58   LYMPH 28   EOS 6*      Cardiac Enzymes No results for input(s): CPK, CKND1, LUH in the last 72 hours. No lab exists for component: CKRMB, TROIP   Coagulation No results for input(s): PTP, INR, APTT, INREXT, INREXT in the last 72 hours. Lipid Panel Lab Results   Component Value Date/Time    Cholesterol, total 146 06/01/2016 07:51 AM    HDL Cholesterol 40 06/01/2016 07:51 AM    LDL, calculated 91.6 06/01/2016 07:51 AM    VLDL, calculated 14.4 06/01/2016 07:51 AM    Triglyceride 72 06/01/2016 07:51 AM    CHOL/HDL Ratio 3.7 06/01/2016 07:51 AM      BNP No results for input(s): BNPP in the last 72 hours.    Liver Enzymes Recent Labs     07/26/21 0315   TP 5.8*   ALB 2.1*   AP 82      Thyroid Studies No results found for: T4, T3U, TSH, TSHEXT, TSHEXT     Procedures/imaging: see electronic medical records for all procedures/Xrays and details which were not copied into this note but were reviewed prior to creation of Plan

## 2021-07-26 NOTE — PROGRESS NOTES
65- Pt denies having his covid vaccination card with him, stated  It's at home spouse can bring it tomorrow     Cm spoke with pt regarding d/c plan, if medically cleared for discharge and rehab is still recommended pt would like [address] which has accepted.

## 2021-07-26 NOTE — PROGRESS NOTES
7/26/2021  PT treatment not completed due to:  [] Off Unit for testing/procedure  [] Pain  [] Eating  [] Patient too lethargic  [] Nausea/vomiting  [] Dialysis treatment in progress   [x] Other: nurse Harleen Lindsey with pt attending catheter. Will f/u at later time as pt schedule allows. Thank you. Carlos Goodman, PT       17:56 PT attempted. Pt declines participation stating \"I've had a hell of a day\". Pt with multiple c/o not being discharged from last Friday to today. Continues to decline participation at this time.   Carlos Goodman, PT

## 2021-07-26 NOTE — PROGRESS NOTES
0730   Pt is sleeping on rounds. Pt has alonso cath  draining clear yellow urine. Pt has O2 at 4 L nc.     1040   Pt is resting in bed. Awake, Oriented x4. Breathing comfortably. Lungs are clear. On O2 at 4L nc. Abdomen soft with hypoactive BS.  1233  Resting in bed. No complaints. 1409  Pt was turned and positioned and pads /brief were straightened . Seen by Dr Stefany Galvan. With order to change alonso. [address] cath d/rocío. New alonso f 16 inserted  Under aseptic technique with 2nd Nurse Candance Melena assisting. Pt refused to put catheter secure on. Pt is awake, alert, oriented x4. Resting in bed. As per Dr Stefany Galvan, pt to go back to rehab. Pt medicated with TUMS for heartburn.   1800   Pt not going to [address] [place] [address] today as per Dr Stefany Galvan

## 2021-07-26 NOTE — PROGRESS NOTES
1945 - Bedside report received from Kyler, [facility], [facility]. Patient in bed. Pain 0/10.     2025 - Patient in bed at this time. IV to L FA  intact and patent. + CMS. Pt A & O x 4. LS clear, on 4L NC. Abdomen soft, NT and ND. + BS to all 4 quadrants. Denies nausea. Pain 0/10. Jimenez with clear yellow urine. Call light within reach. Pt had an uneventful shift. Been anxious about going to SNF today, kept asking if paperwork is ready. No issues/concerns at this time.  Call bell within reach

## 2021-07-27 PROBLEM — A41.9 SEPSIS (HCC): Status: RESOLVED | Noted: 2021-01-01 | Resolved: 2021-01-01

## 2021-07-27 PROBLEM — N17.9 AKI (ACUTE KIDNEY INJURY) (HCC): Status: RESOLVED | Noted: 2021-01-01 | Resolved: 2021-01-01

## 2021-07-27 PROBLEM — R77.8 ELEVATED TROPONIN: Status: RESOLVED | Noted: 2021-01-01 | Resolved: 2021-01-01

## 2021-07-27 NOTE — PROGRESS NOTES
1943  Bedside and Verbal shift change report given to Shruti Reina RN (oncoming nurse) by Hai Villa RN (offgoing nurse). Report included the following information Olive TINOCO and MAR.

## 2021-07-27 NOTE — PROGRESS NOTES
Problem: Mobility Impaired (Adult and Pediatric)  Goal: *Acute Goals and Plan of Care (Insert Text)  Description: Physical Therapy Goals   Initiated 7/23/2021 and to be accomplished within 5-7 day(s)  1. Patient will move from supine <> sit with S in prep for out of bed activity and change of position. 2.  Patient will perform sit<> stand with SBA/RW in prep for transfers/ambulation. 3.  Patient will transfer from bed <> chair with SBA/RW for time up in chair for completion of ADL activity. 4.  Patient will ambulate 70 feet with SBA/RW for improved functional mobility at discharge. Outcome: Progressing Towards Goal   physical Therapy TREATMENT    Patient: Luz Gonzalez (55 y.o. male)  Date: 7/27/2021  Diagnosis: Sepsis ([address] [A41.9]  UTI (urinary tract infection) [N39.0] Sepsis ([facility] [address]       Precautions: Fall   Chart, physical therapy assessment, plan of care and goals were reviewed. ASSESSMENT:  Pt eager to continue rehab at discharge to SNF. Performed BLE strengthening ex as noted below. L LE weaker than R grossly. Able to pull self up to [facility] with pt in trendelenburg position. Left with nurse Humaira Rubi present to give meds and perform hygiene care. Progression toward goals:  []      Improving appropriately and progressing toward goals  [x]      Improving slowly and progressing toward goals  []      Not making progress toward goals and plan of care will be adjusted     PLAN:  Patient continues to benefit from skilled intervention to address the above impairments. Continue treatment per established plan of care. Discharge Recommendations:  Skilled Nursing Facility  Further Equipment Recommendations for Discharge:  N/A     SUBJECTIVE:   Patient stated I'm supposed to leave at three o'clock.     OBJECTIVE DATA SUMMARY:   Critical Behavior:  Neurologic State: Alert  Orientation Level: Appropriate for age  Cognition: Appropriate for age attention/concentration  Safety/Judgement: Fall prevention  Functional Mobility Training:  Bed Mobility:  Scooting: Stand-by assistance;Bed Modified (to  move to HOB supine)  Therapeutic Exercises:     EXERCISE   Sets   Reps   Active Active Assist   Passive Self ROM   Comments   Ankle Pumps 1 20  [x] [] [] []    Heel Slides 2 5 [x] [] [] []    Straight Leg Raises 1 5 [x] [] [] []    Hip Abd/Add 1 10 [x] [x] [] []    Bridging 2 5 [] [x] [] []      Pain:  Pain Scale 1: Numeric (0 - 10)  Pain Intensity 1: 0  Activity Tolerance:   Fair   Please refer to the flowsheet for vital signs taken during this treatment.   After treatment:   [] Patient left in no apparent distress sitting up in chair  [x] Patient left in no apparent distress in bed  [x] Call bell left within reach  [x] Nursing notified-Milton present  [] Caregiver present  [] Bed alarm activated      Jayne Hashimoto, PT   Time Calculation: 16 mins

## 2021-07-27 NOTE — ROUTINE PROCESS
Bedside and Verbal shift change report given to [facility], RN by Elvis Murray. Report included the following information SBAR, Kardex, OR Summary, Intake/Output and MAR.

## 2021-07-27 NOTE — PROGRESS NOTES
1920 Assumed patient care at this time. Report received from Dio Simental RN. Patient in bed in lowest position with wheels locked. Call light within reach. 2012 PRN hydralazine administered for systolic blood pressure over 160. Assessment complete. 2016 PRN TUMS and Tylenol administered. 3216 Patient complaining of bladder pain. Repositioned alonso and began draining. Irrigated with 10 mL sterile water. 300 of yellow urine removed from alonso. Bladder scan performed with 0 mL found. Patient stated pain is better, almost gone. Refusing alonso leg anchor. 0559 PRN TUMS and Tylenol administered. 6480 Bedside and verbal shift change report [facility] (oncoming nurse) by Sergio Bautista RN (offgoing nurse). Report included the following information: SBAR, Kardex, MAR, and recent results.

## 2021-07-27 NOTE — PROGRESS NOTES
Nutrition Assessment     Type and Reason for Visit: Reassess    Nutrition Recommendations/Plan: Other: continue w/ POC    Nutrition Assessment:  Pt admitted w/ sepsis, UTI, BRITT, underweight, hx of DM, hx of HTN. Malnutrition Assessment:  Malnutrition Status: Severe malnutrition     Estimated Daily Nutrient Needs:  Energy (kcal):  0502-6019  Protein (g):  47-65       Fluid (ml/day):  6235-9080    Nutrition Related Findings:  med: pepcid, toprol xl, TUMS, Vit D, mvi. +BM 7/27. Noted PO this AM: % per nursing documentation. Spoke w/ pt- reported eating good and eating 100% of what he orders. Pt also reported drinking both Ensure enlive.       Current Nutrition Therapies:  ADULT DIET Regular  ADULT ORAL NUTRITION SUPPLEMENT AM Snack, PM Snack; Standard High Calorie/High Protein    Anthropometric Measures:  · Height:  5' 11\" (180.3 cm)  · Current Body Wt:  59 kg (130 lb 1.1 oz)  · BMI: 18.1    Nutrition Diagnosis:   · Underweight related to inadequate protein-energy intake as evidenced by BMI    Nutrition Intervention:  Food and/or Nutrient Delivery: Continue current diet, Continue oral nutrition supplement  Nutrition Education and Counseling: No recommendations at this time  Coordination of Nutrition Care: Continue to monitor while inpatient    Goals:  Continue PO intake >75% at most meals throughout the next 4-7 days       Nutrition Monitoring and Evaluation:   Behavioral-Environmental Outcomes: None identified  Food/Nutrient Intake Outcomes: Food and nutrient intake, Supplement intake  Physical Signs/Symptoms Outcomes: Biochemical data, Meal time behavior, Nutrition focused physical findings, Skin, Weight, GI status, Nausea/vomiting    Discharge Planning:    Continue current diet, Continue oral nutrition supplement     Electronically signed by Zuleyma Barnett RD on 7/27/2021 at 10:55 AM

## 2021-07-27 NOTE — PROGRESS NOTES
IV to PO Conversion Recommendation     Patient: Nicho Johnson   MRN#: [id number]   Admission Date: 072021     IV TO PO  Medication(s) Levofloxacin   Oral Meds  Yes   Diet:     Active Orders   Diet    ADULT DIET Regular      TEMP 98.4 °F (36.9 °C)   WBC Lab Results   Component Value Date/Time    WBC 10.1 07/26/2021 03:15 AM           Pharmacy Dosing Services:  Summary:   Does the patient meet all criteria for IV to PO switch as listed below? Yes   If no, list:     Is the patient excluded from IV to PO automatic switch based on criteria listed below? No   If yes, list:       Criteria for IV to PO switch - Antibiotics   Received IV therapy for at least 24 hours   2. Functioning GI tract   Taking scheduled oral medications  Tolerating diet more advanced than clear liquids   3. No signs/symptoms of shock  No vasopressor support    Criteria for IV to PO switch  Nonantibiotics   Functioning GI tract   Taking scheduled oral medications  Toleratind duet more advanced than clear liquids  2. No signs/symptoms of shock  No vasopressor support        3. No seizures for >72 hours (antiepileptic medications only)    Exclusion Criteria   Patient is being treated for an infection that requires IV therapy such as: endocarditis, osteomyelitis, meningitis, pancreatitis (antibiotics only)   NG tube with continous suction   Nausea and/or vomiting or severe diarrhea within past 24 hours  Malabsorption syndromes, partial or total gastrectomy, ileus, gastric outlet or bowel obstruction  Active GI bleeding   Significant painful oral ulceration  Unable to swallow  On total parenteral nutrition with a NPO order  NPO  Febrile in last 24 hours (antibiotics only)  Clinically deteriorating or unstable (antibiotics only)      Assessment/Recommendation:    Levaquin 500 mg po q 24 hrs x 2 doses to finish 5 day therapy    This IV to PO conversion is based on the [facility] P&T approved automatic conversion policy for eligible patients.       Please call with questions.     Mellisa Petersen [facility]  Clinical Pharmacist  [phone]

## 2021-07-27 NOTE — PROGRESS NOTES
Problem: Pressure Injury - Risk of  Goal: *Prevention of pressure injury  Description: Document Regan Scale and appropriate interventions in the flowsheet.   Outcome: Progressing Towards Goal  Note: Pressure Injury Interventions:  Sensory Interventions: Assess changes in LOC    Moisture Interventions: Absorbent underpads    Activity Interventions: Increase time out of bed, Pressure redistribution bed/mattress(bed type), PT/OT evaluation    Mobility Interventions: HOB 30 degrees or less, Pressure redistribution bed/mattress (bed type), PT/OT evaluation    Nutrition Interventions: Document food/fluid/supplement intake    Friction and Shear Interventions: Apply protective barrier, creams and emollients

## 2021-07-27 NOTE — PROGRESS NOTES
[facility]:  Received notification from nurse manager Kvng at [facility] [place] that they were unaware of receiving patient today despite this care manager giving verbal information to admissions coordinator at 1400 that patient was coming today with  at 1500. They can accept patient tomorrow morning. Discharge summary faxed to facility but addendum will need to be done prior to patient discharge tomorrow. Transition of Care Plan:     Communication to Patient/Family:  Met with patient and family, and they are agreeable to the transition plan. The Plan for Transition of Care is related to the following treatment goals: continued rehab    The Patient and wife was provided with a choice of provider and agrees   with the discharge plan. Yes [x] No []    Freedom of choice list was provided with basic dialogue that supports the patient's individualized plan of care/goals and shares the quality data associated with the providers. Yes [x] No []    SNF/Rehab Transition:  Patient has been accepted to [facility] at [address] [facility], [address] [place] [address] for SNF and meets criteria for admission. Patient will transported by medical transport and expected to leave when discharge summary is completed and report has been called - transport has been set for 1500 and facility has been notified. Communication to SNF/Rehab:  Bedside RN, Gabby Almaraz,  has been notified to update the transition plan to the facility and call report [phone]. Discharge information has been updated on the AVS and communicated through [facility] or [facility]. Discharge instructions to be fax'd to facility at [phone]     Please include all hard scripts for controlled substances, med rec and dc summary, and AVS in packet. Please medicate for pain prior to dc if possible and needed to help offset delay when patient first arrives to facility.     Reviewed and confirmed with facility, [facility] can manage the patient care needs for the following:     [place] with (X) only those applicable:  Medication:  [x]Medications are available at the facility  []IV Antibiotics    []Controlled Substance  hard copies available sent. []Weekly Labs    Equipment:  []CPAP/BiPAP  []Wound Vacuum  []Jimenez or Urinary Device  []PICC/Central Line  []Nebulizer  []Ventilator    Treatment:  []Isolation (for MRSA, VRE, etc.)  []Surgical Drain Management  []Tracheostomy Care  []Dressing Changes  []Dialysis with transportation  []PEG Care  [x]Oxygen  []Daily Weights for Heart Failure    Dietary:  []Any diet limitations  []Tube Feedings   []Total Parenteral Management (TPN)    Financial Resources:  []Medicaid Application Completed    []UAI Completed and copy given to pt/family. []A screening has previously been completed. []Level II Completed    [] Private pay individual who will not become   financially eligible for Medicaid within 6 months from admission to a [address] [place] [address] facility. [] Individual refused to have screening conducted. []Medicaid Application Completed    []The screening denied because it was determined individual did not need/did not qualify for nursing facility level of care. [] Out of state residents seeking direct admission to a [address] facility. [] Individuals who are inpatients of an out of state hospital, or in state or out of state veterans/ hospital and seek direct admission to a [address] facility  [] Individuals who are pateints or residents of a state owned/operated facility that is licensed by [employer] of [employer] and seek direct admission to [address] facility  [x] A screening not required for enrollment in [address] [address] [address] services as set out in [address] [place] [address] 30-  [] [facility]) staff shall perform screenings of the [facility] clients. Advanced Care Plan:  []Surrogate Decision Maker of Care  []POA  []Communicated Code Status and copy sent.     Other:

## 2021-07-27 NOTE — DISCHARGE SUMMARY
Discharge Summary    Patient: Nilesh Perez MRN: [id number]  CSN: [id number]    YOB: 1945  Age: 70 y.o.   Sex: male    DOA: 7/20/2021 LOS:  LOS: 7 days   Discharge Date:      Primary Care Provider:  Perez Butt MD    Admission Diagnoses: Sepsis ([facility] [A41.9]  UTI (urinary tract infection) [N39.0]    Discharge Diagnoses:    Problem List as of 7/27/2021 Date Reviewed: 7/20/2021        Codes Class Noted - Resolved    Severe pulmonary hypertension ([facility] ICD-10-CM: I27.20  ICD-9-CM: 416.8  Unknown - Present        Underweight due to inadequate caloric intake ICD-10-CM: R63.6  ICD-9-CM: 783.22  7/21/2021 - Present        Severe protein-calorie malnutrition ([facility] ICD-10-CM: E43  ICD-9-CM: 262  7/21/2021 - Present        Aneurysm of infrarenal abdominal aorta ([facility] ICD-10-CM: I71.4  ICD-9-CM: 441.4  7/20/2021 - Present        HTN (hypertension) ICD-10-CM: I10  ICD-9-CM: 401.9  7/20/2021 - Present        Compression fracture of L1 vertebra ([facility] ICD-10-CM: S32.010A  ICD-9-CM: 805.4  5/17/2021 - Present        UTI (urinary tract infection) due to urinary indwelling Jimenez catheter [facility]) ICD-10-CM: T83.511A, N39.0  ICD-9-CM: 996.64, 599.0  5/1/2020 - Present        Type II diabetes mellitus with manifestations, uncontrolled ([facility] ICD-10-CM: E11.8, E11.65  ICD-9-CM: 250.92  9/16/2017 - Present        Renal mass ICD-10-CM: N28.89  ICD-9-CM: 593.9  9/16/2017 - Present        Unable to ambulate ICD-10-CM: R26.2  ICD-9-CM: 719.7  9/14/2017 - Present        Urinary retention ICD-10-CM: R33.9  ICD-9-CM: 788.20  9/14/2017 - Present        Lumbar spinal stenosis ICD-10-CM: M48.061  ICD-9-CM: 724.02  9/5/2017 - Present        Lumbar spondylosis ICD-10-CM: M47.816  ICD-9-CM: 721.3  9/5/2017 - Present        Radiculopathy of leg ICD-10-CM: M54.10  ICD-9-CM: 724.4  9/5/2017 - Present        RESOLVED: Elevated troponin ICD-10-CM: R77.8  ICD-9-CM: 790.6  7/21/2021 - 7/27/2021        * (Principal) RESOLVED: Sepsis ([facility] ICD-10-CM: A41.9  ICD-9-CM: 038.9, 995.91  7/20/2021 - 7/27/2021        RESOLVED: BRITT (acute kidney injury) ([facility] ICD-10-CM: N17.9  ICD-9-CM: 584.9  7/20/2021 - 7/27/2021        RESOLVED: Blurred vision ICD-10-CM: H53.8  ICD-9-CM: 368.8  10/13/2020 - 10/13/2020        RESOLVED: Anemia ICD-10-CM: D64.9  ICD-9-CM: 285.9  9/16/2017 - 5/1/2020        RESOLVED: Dehydration ICD-10-CM: E86.0  ICD-9-CM: 276.51  9/14/2017 - 5/1/2020        RESOLVED: Hyponatremia ICD-10-CM: E87.1  ICD-9-CM: 276.1  9/14/2017 - 5/1/2020        RESOLVED: DM (diabetes mellitus) ([facility] (Chronic) ICD-10-CM: E11.9  ICD-9-CM: 250.00  9/7/2017 - 5/1/2020              Discharge Medications:     Current Discharge Medication List      START taking these medications    Details   levoFLOXacin (LEVAQUIN) 500 mg tablet Take 1 Tablet by mouth every twenty-four (24) hours for 7 days. Qty: 7 Tablet, Refills: 0  Start date: 7/27/2021, End date: 8/3/2021         CONTINUE these medications which have NOT CHANGED    Details   pregabalin (Lyrica) 25 mg capsule Take 75 mg by mouth. Indications: restless legs syndrome, an extreme discomfort in the calf muscles when sitting or lying down      atorvastatin (Lipitor) 20 mg tablet Take 20 mg by mouth nightly. traZODone (DESYREL) 50 mg tablet Take 50 mg by mouth nightly. metoprolol succinate (TOPROL-XL) 25 mg XL tablet Take 1 Tab by mouth nightly. aspirin 81 mg chewable tablet Take 81 mg by mouth daily. lisinopril-hydroCHLOROthiazide (PRINZIDE, ZESTORETIC) 20-25 mg per tablet Take  by mouth daily. metFORMIN (GLUCOPHAGE) 500 mg tablet Take 500 mg by mouth two (2) times daily (with meals). cholecalciferol, vitamin D3, (VITAMIN D3) 2,000 unit tab Take  by mouth.      multivitamin (ONE A DAY) tablet Take 1 Tab by mouth daily.          STOP taking these medications       cefpodoxime (VANTIN) 200 mg tablet Comments:   Reason for Stopping:               Discharge Condition: Good    Procedures : None    Consults: Cardiology      PHYSICAL EXAM   Visit Vitals  BP (!) 149/83 (BP 1 Location: Right upper arm, BP Patient Position: At rest;Supine)   Pulse 98   Temp 98.4 °F (36.9 °C)   Resp 16   Ht 5' 11\" (1.803 m)   Wt 59 kg (130 lb)   SpO2 92%   BMI 18.13 kg/m²     General: Awake, cooperative, no acute distress    HEENT: NC, Atraumatic. PERRLA, EOMI. Anicteric sclerae. Lungs:  CTA Bilaterally. No Wheezing/Rhonchi/Rales. Heart:  Regular  rhythm,  No murmur, No Rubs, No Gallops  Abdomen: Soft, Non distended, Non tender. +Bowel sounds,   Extremities: No c/c/e  Psych:   Not anxious or agitated. Neurologic:  No acute neurological deficits. Admission HPI :   Myra Land is a 70 y.o. 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 He was discharged home and then became dizzy shortly after that and was brought back. He was noted to be hypotensive on arrival.  He says his Jimenez catheter is changed out once a month on and he is on prophylactic antibiotics at home. He was in a rehabilitation facility but has been back home. He denies any fever, chest pain, shortness     Hospital Course :   Mr. Abby Dias was admitted to monitored floor, he was seen by cardiology, he did not had any acute events during hospitalization. Catheter associated UTI-  He was started on IV antibiotics, his urine cultures grew Pseudomonas. His antibiotics changed to oral Levaquin. His Jimenez catheter exchanged. At discharge we will discharge on Levaquin to complete total 14-day course. Elevated troponin-  Seen by cardiology, no evidence of ACS, likely demand ischemia. Echo with normal left ventricular systolic function, EF of 60 to 65%, mild grade 1 diastolic dysfunction. Severe pulmonary hypertension.     Severe pulmonary hypertension-  Need follow-up with pulmonary for pulmonary hypertension work-up. Acute kidney injury-  Resolved with gentle IVF. DM-  Placed on sliding scale insulin, ADA diet. Activity: Activity as tolerated    Diet: Diabetic Diet    Follow-up: PCP, Rehab    Disposition: home    Minutes spent on discharge: 45       Labs: Results:       Chemistry Recent Labs     07/26/21 0315   GLU 90      K 4.9      CO2 24   BUN 40*   CREA 1.17   CA 8.0*   AGAP 6   BUCR 34*   AP 82   TP 5.8*   ALB 2.1*   GLOB 3.7   AGRAT 0.6*      CBC w/Diff Recent Labs     07/26/21 0315   WBC 10.1   RBC 2.96*   HGB 7.9*   HCT 26.1*      GRANS 58   LYMPH 28   EOS 6*      Cardiac Enzymes No results for input(s): CPK, CKND1, LUH in the last 72 hours. No lab exists for component: CKRMB, TROIP   Coagulation No results for input(s): PTP, INR, APTT, INREXT, INREXT in the last 72 hours. Lipid Panel Lab Results   Component Value Date/Time    Cholesterol, total 146 06/01/2016 07:51 AM    HDL Cholesterol 40 06/01/2016 07:51 AM    LDL, calculated 91.6 06/01/2016 07:51 AM    VLDL, calculated 14.4 06/01/2016 07:51 AM    Triglyceride 72 06/01/2016 07:51 AM    CHOL/HDL Ratio 3.7 06/01/2016 07:51 AM      BNP No results for input(s): BNPP in the last 72 hours. Liver Enzymes Recent Labs     07/26/21 0315   TP 5.8*   ALB 2.1*   AP 82      Thyroid Studies No results found for: T4, T3U, TSH, TSHEXT, TSHEXT         Significant Diagnostic Studies: CT ABD PELV WO CONT    Result Date: 7/20/2021  EXAM: CT of the abdomen and pelvis INDICATION: Leukocytosis suprapubic pain COMPARISON: May 17, 2021 TECHNIQUE: Axial CT imaging of the abdomen and pelvis was performed without intravenous contrast. Multiplanar reformats were generated. One or more dose reduction techniques were used on this CT: automated exposure control, adjustment of the mAs and/or kVp according to patient size, and iterative reconstruction techniques.   The specific techniques used on this CT exam have been documented in the patient's electronic medical record. Digital Imaging and Communications in Medicine (DICOM) format image data are available to nonaffiliated external healthcare facilities or entities on a secure, media free, reciprocally searchable basis with patient authorization for at least a 12-month period after this study. _______________ FINDINGS: LOWER CHEST: There is a 4 mm nodule in the lateral segment of the right middle lobe. Follow-up as per Fleischner Society protocol. LIVER, BILIARY: Liver is normal. No biliary dilation. Cholecystectomy PANCREAS: Normal. SPLEEN: Normal. ADRENALS: Normal. KIDNEYS: There is a 6 cm pedunculated cyst off of the lower pole the right kidney with rim calcification. The kidneys demonstrate no hydronephrosis or nephrolithiasis. No ureteral obstruction. VASCULATURE: There is severe calcific atherosclerotic present. There is abdominal aortic aneurysm infrarenally measuring 3.3 x 2.7 cm. LYMPH NODES: No enlarged lymph nodes. GASTROINTESTINAL TRACT: No bowel dilation or wall thickening. There is a large amount of retained stool in the colon. Appendix is normal. There is no bowel obstruction. PELVIC ORGANS: Urinary bladder is decompressed with a Jimenez catheter in place. Correlate with urinalysis to exclude any cystitis. Prostate is mildly enlarged. BONES: No acute or aggressive osseous abnormalities identified. Posterior fusion spinal hardware present from level of L4-S1. The pedicular screw at L4 breech the superior endplate of L4 on the left. There is also suggestion of loosening of this L4 left pedicular screw. There is a L1 superior endplate compression fracture with some further loss of height since the prior exam. No listhesis or retropulsion seen. OTHER: None. _______________     No acute process. Evaluation the urinary bladder is limited due to under distention. Correlate with urinalysis. Infrarenal abdominal aortic aneurysm measuring 3.3 x 2.7 cm.  Further loss of height at L1 with there is a remote compression fracture. The left pedicular screw at L4 breech is the superior endplate and there is suggestion of loosening of the left L4 screw. 4 mm nodule right middle lobe follow-up as per Fleischner Society protocol. ======== Fleischner Society Pulmonary Nodule Guidelines (revised 2017): Solid nodule <6 mm: -Low risk for lung cancer: No routine followup. -High risk for lung cancer: Optional CT in 12 months (suspicious morphology, upper lobe location, or both may warrant 12 month followup depending on comorbidities and patient preference). XR CHEST PORT    Result Date: 7/20/2021  EXAM:  AP Portable Chest X-ray 1 view INDICATION: Leukocytosis COMPARISON: May 17, 2021 _______________ FINDINGS:  Heart and mediastinal contours are within normal limits for portable radiograph. Lungs are clear of active disease. There are no pleural effusions. No acute osseous findings. ________________      No acute process    ECHO ADULT COMPLETE    Result Date: 7/21/2021  · Image quality for this study was technically difficult. Contrast used: DEFINITY. · Left Ventricle: Normal cavity size, wall thickness and systolic function (ejection fraction normal). The estimated EF is 60 - 65%. There is mild (grade 1) left ventricular diastolic dysfunction E/E' ratio is 5.97. Wall Scoring: The left ventricular wall motion is normal. · Right Ventricle: Normal global systolic function. Moderately dilated right ventricle. Assessment of RV function: TAPSE = 20 mm. · Right Atrium: Mildly dilated right atrium. · Pulmonary Artery: Pulmonary arteries not well visualized. Pulmonary arterial systolic pressure (PASP) is 88 mmHg. Pulmonary hypertension found to be severe. No results found for this or any previous visit. Please note that this dictation was completed with [employer] the [employer] voice recognition software.   Quite often unanticipated grammatical, syntax, homophones, and other interpretive errors are inadvertently transcribed by the computer software. Please disregard these errors. Please excuse any errors that have escaped final proofreading.      CC: Jayro Ibarra MD

## 2021-07-27 NOTE — PROGRESS NOTES
1459-Called for report no answer from facility. 1511-Report given to Summer at [facility]. 1526-Facility [facility] said they can't accept pt because they don't have papers on him, informed CM Cammie Levin. She said pt will have to leave tomorrow. 1550-Informed MD pt couldn't leave today and CM said he will leave tomorrow and MD needs to do addendum to discharge summary    1925-Verbal shift change report given to [facility] by Elvira Manzanares RN. Report included the following information SBAR, Kardex, Summary, Intake/Output and MAR.

## 2021-07-28 NOTE — ROUTINE PROCESS
1323: Report given to Nurse Melvin Camacho at [facility] , waiting for patients transportation at this time.

## 2021-07-28 NOTE — PROGRESS NOTES
Hospitalist Progress Note    Patient: Nilesh Perez MRN: [id number]  CSN: [id number]    YOB: 1945  Age: 70 y.o. Sex: male    DOA: 7/20/2021 LOS:  LOS: 8 days                Assessment/Plan     Patient Active Problem List   Diagnosis Code    Lumbar spinal stenosis M48.061    Lumbar spondylosis M47.816    Radiculopathy of leg M54.10    Unable to ambulate R26.2    Urinary retention R33.9    Type II diabetes mellitus with manifestations, uncontrolled ([address] E11.8, E11.65    Renal mass N28.89    UTI (urinary tract infection) due to urinary indwelling Jimenez catheter ([facility] T83.511A, N39.0    Compression fracture of L1 vertebra ([facility]) S32.010A    Aneurysm of infrarenal abdominal aorta ([facility]) I71.4    HTN (hypertension) I10    Underweight due to inadequate caloric intake R63.6    Severe protein-calorie malnutrition ([facility]) E43    Severe pulmonary hypertension ([facility]) I27.20            69 y/o male with h/o lumbar stenosis, lumbar spondylosis, neurogenic bladder requiring regular catherization and type II diabetes mellitus admitted for inability to walk, frequent falls admitted for complicated UTI. Discharge held yesterday as nursing facility did not accept yesterday. No change from discharge summary done yesterday. UTI-  Catheter associated cultures grew Pseudomonas. On levaquin  Jimenez changed    DM-  On sliding scale insulin    BRITT -  iVF  Follow renal function. PT/OT    DVT prophylaxis with Lovenox    Disposition :today    Review of systems  General: No fevers or chills. Cardiovascular: No chest pain or pressure. No palpitations. Pulmonary: No shortness of breath. Gastrointestinal: No nausea, vomiting. Physical Exam:  General: Awake, cooperative, no acute distress    HEENT: NC, Atraumatic. PERRLA, anicteric sclerae. Lungs: CTA Bilaterally. No Wheezing/Rhonchi/Rales.   Heart:  S1 S2,  No murmur, No Rubs, No Gallops  Abdomen: Soft, Non distended, Non tender.  +Bowel sounds,   Extremities: No c/c/e  Psych:   Not anxious or agitated. Neurologic:  No acute neurological deficit. Vital signs/Intake and Output:  Visit Vitals  /85 (BP 1 Location: Right upper arm, BP Patient Position: At rest;Supine)   Pulse 99   Temp 97.9 °F (36.6 °C)   Resp 18   Ht 5' 11\" (1.803 m)   Wt 59 kg (130 lb)   SpO2 97%   BMI 18.13 kg/m²     Current Shift:  07/28 0701 - 07/28 1900  In: 240 [P.O.:240]  Out: -   Last three shifts:  07/26 1901 - 07/28 0700  In: 352 [P.O.:342]  Out: 2400 [Urine:2400]            Labs: Results:       Chemistry Recent Labs     07/26/21 0315   GLU 90      K 4.9      CO2 24   BUN 40*   CREA 1.17   CA 8.0*   AGAP 6   BUCR 34*   AP 82   TP 5.8*   ALB 2.1*   GLOB 3.7   AGRAT 0.6*      CBC w/Diff Recent Labs     07/26/21 0315   WBC 10.1   RBC 2.96*   HGB 7.9*   HCT 26.1*      GRANS 58   LYMPH 28   EOS 6*      Cardiac Enzymes No results for input(s): CPK, CKND1, LUH in the last 72 hours. No lab exists for component: CKRMB, TROIP   Coagulation No results for input(s): PTP, INR, APTT, INREXT, INREXT in the last 72 hours. Lipid Panel Lab Results   Component Value Date/Time    Cholesterol, total 146 06/01/2016 07:51 AM    HDL Cholesterol 40 06/01/2016 07:51 AM    LDL, calculated 91.6 06/01/2016 07:51 AM    VLDL, calculated 14.4 06/01/2016 07:51 AM    Triglyceride 72 06/01/2016 07:51 AM    CHOL/HDL Ratio 3.7 06/01/2016 07:51 AM      BNP No results for input(s): BNPP in the last 72 hours.    Liver Enzymes Recent Labs     07/26/21 0315   TP 5.8*   ALB 2.1*   AP 82      Thyroid Studies No results found for: T4, T3U, TSH, TSHEXT, TSHEXT     Procedures/imaging: see electronic medical records for all procedures/Xrays and details which were not copied into this note but were reviewed prior to creation of Plan

## 2021-07-28 NOTE — PROGRESS NOTES
Problem: Falls - Risk of  Goal: *Absence of Falls  Description: Document Corazon Manan Fall Risk and appropriate interventions in the flowsheet.   Outcome: Resolved/Met

## 2021-07-28 NOTE — PROGRESS NOTES
.DC Plan: 2 [facility]    Transition of Care Plan:      Communication to Patient/Family:  Met with patient and family, and they are agreeable to the transition plan. The Plan for Transition of Care is related to the following treatment goals: continued rehab     The Patient and wife was provided with a choice of provider and agrees   with the discharge plan.  Yes [x]? No []?     Freedom of choice list was provided with basic dialogue that supports the patient's individualized plan of care/goals and shares the quality data associated with the providers.     Yes [x]? No []?     SNF/Rehab Transition:  Patient has been accepted to [facility] at [facility], [address] [place] [address] for SNF and meets criteria for admission. Patient will transported by medical transport and expected to leave when discharge summary is completed and report has been called - transport has been set for 1330 and facility has been notified via email.     Communication to SNF/Rehab:  Bedside RN, [facility],  has been notified to update the transition plan to the facility and call report [phone].      Discharge information has been updated on the AVS and communicated through [facility] or [facility] Link. Discharge instructions to be fax'd to facility at [phone]      Please include all hard scripts for controlled substances, med rec and dc summary, and AVS in packet. Please medicate for pain prior to dc if possible and needed to help offset delay when patient first arrives to facility.     Reviewed and confirmed with facility, [address] [place] [address] can manage the patient care needs for the following:      [place] with (X) only those applicable:  Medication:  [x]? Medications are available at the facility  []? IV Antibiotics    []? Controlled Substance  hard copies available sent.  []? Weekly Labs     Equipment:  []?CPAP/BiPAP  []? Wound Vacuum  []? Jimenez or Urinary Device  []? PICC/Central Line  []? Nebulizer  []? Ventilator     Treatment:  []? Isolation (for MRSA, VRE, etc.)  []? Surgical Drain Management  []? Tracheostomy Care  []? Dressing Changes  []? Dialysis with transportation  []? PEG Care  [x]? Oxygen  []? Daily Weights for Heart Failure     Dietary:  []? Any diet limitations  []? Tube Feedings   []? Total Parenteral Management (TPN)     Financial Resources:  []? Medicaid Application Completed     []? [employer] Completed and copy given to pt/family.     []? A screening has previously been completed.     []? Level II Completed     []? Private pay individual who will not become   financially eligible for Medicaid within 6 months from admission to a [address] [place] [address] facility.      []? Individual refused to have screening conducted.      []? Medicaid Application Completed     []? The screening denied because it was determined individual did not need/did not qualify for nursing facility level of care. []? Out of state residents seeking direct admission to a [address] facility.  []? Individuals who are inpatients of an out of state hospital, or in state or out of state veterans/ hospital and seek direct admission to a [address] facility  []? Individuals who are pateints or residents of a state owned/operated facility that is licensed by Department of Behavioral Services (DBHDS) and seek direct admission to [address] facility  [x]? A screening not required for enrollment in [facility] services as set out in [facility]-  []? [address] [facility]) staff shall perform screenings of the [facility] clients.      Advanced Care Plan:  []? Surrogate Decision Maker of Care  []? POA  []? Communicated Code Status and copy sent.     Other:

## 2021-08-06 NOTE — PROGRESS NOTES
Post Acute Facility Update     *The information contained in this note was received during a weekly care coordination call with the post acute facility*      This patient was discharged from [place] [address] to [facility] [place] ([facility])   on 7/27/21. Hospital Discharge Diagnosis per Dr. Kamila Howe:    Sepsis  UTI  Severe Pulm HTN  HTN  Compression Fx of L1 Vertebra  Diabetes    Current Facility: [address] ([facility])  Anticipated Discharge Date: TBD    Facility Nursing Update: no new orders  Facility Social Work Update: lives with wife, active with [facility]  Bundle Program Status: none  Upper Extremity Assistance: Contact Guard Assist - hands on patient for balance   Lower Extremity Assistance: Minimal Assistance   Bed Mobility: Contact Guard Assist - hands on patient for balance   Transfers: Minimal Assistance   Ambulation: Minimal Assistance   How far (in feet) is the patient ambulating? 45-50 feet  Device Used: walker  Barriers to Discharge: [facility] will be providing 7-3 care for pt and wife 7 days/ week.     Other:

## 2021-08-13 NOTE — PROGRESS NOTES
Post Acute Facility Update     *The information contained in this note was received during a weekly care coordination call with the post acute facility*      This patient was discharged from [facility] to [facility] [place] ([facility])   on 7/27/21. Hospital Discharge Diagnosis per Dr. Amirah Escalante:    Sepsis  UTI  Severe Pulm HTN  HTN  Compression Fx of L1 Vertebra  Diabetes    Current Facility: [address] ([facility])  Anticipated Discharge Date: TBD    Facility Nursing Update: no new orders  Facility Social Work Update: lives with wife, active with [facility]  Bundle Program Status: none  Upper Extremity Assistance: Contact Guard Assist - hands on patient for balance   Lower Extremity Assistance: Minimal Assistance   Bed Mobility: Independent  Transfers: Contact Guard Assist - hands on patient for balance   Ambulation: Minimal Assistance   How far (in feet) is the patient ambulating? 45-50 feet  Device Used: walker  Barriers to Discharge: [facility] will be providing 7-3 care for pt and wife 7 days/ week.     Other:  Therapy is recommending pt return home at w/c level due to pain

## 2021-08-20 NOTE — PROGRESS NOTES
Post Acute Facility Update     *The information contained in this note was received during a weekly care coordination call with the post acute facility*      [facility] [place] ([facility])   on 7/27/21. Hospital Discharge Diagnosis per Dr. Luis Stauffer:    Sepsis  UTI  Severe Pulm HTN  HTN  Compression Fx of L1 Vertebra  Diabetes    Current Facility: [address] ([facility])  Anticipated Discharge Date: 8/19/21    Facility Nursing Update: no new orders  Facility Social Work Update: lives with wife  Bundle Program Status: none  Upper Extremity Assistance: Contact Guard Assist - hands on patient for balance   Lower Extremity Assistance: Minimal Assistance   Bed Mobility: Independent  Transfers: Contact Guard Assist - hands on patient for balance   Ambulation: Minimal Assistance   How far (in feet) is the patient ambulating? 45-50 feet  Device Used: walker  Barriers to Discharge: wife had lined up PCA but then dismissed then and will be just taking pt home with [facility].     Other:  Therapy is recommending pt return home at w/c level due to pain

## 2021-08-23 NOTE — PROGRESS NOTES
Care Transitions Initial Call    Call within 2 business days of discharge: Yes     Patient: Harsh Umana Patient : 1945 MRN: [id number]    Last Discharge [address]       Complaint Diagnosis Description Type Department Provider    21 Dizziness Urinary tract infection associated with indwelling urethral catheter, subsequent encounter . .. ED to Hosp-Admission (Discharged) (ADMIT) Juni Waldron MD; Valentino Gunner,... 21 Urinary Catheter Problem Jimenez catheter problem, initial encounter ([facility] [address] . .. ED (DISCHARGE) GIACOMO Marsh DO        This patient was discharged from [facility] to [facility] [place] ([facility])   on 21. He was discharged home from [facility] on 21. Was this an external facility discharge? No     Challenges to be reviewed by the provider   Additional needs identified to be addressed with provider: no  none         Method of communication with provider : none     Advance Care Planning:   Does patient have an Advance Directive:  not on file    Inpatient Readmission Risk score: Unplanned Readmit Risk Score: 25    Was this a readmission? no       Patients top risk factors for readmission: medical condition-chronic back pain leading to significant weakness   Interventions to address risk factors: Scheduled appointment with PCP-pt saw PCP this am    Care Transition Nurse (CTN) contacted the patient by telephone to perform post hospital discharge assessment. Verified name and  with patient as identifiers. Provided introduction to self, and explanation of the CTN role. CTN reviewed discharge instructions, medical action plan and red flags with patient who verbalized understanding. Were discharge instructions available to patient? yes.  Reviewed appropriate site of care based on symptoms and resources available to patient including: PCP, Urgent Care Clinics and Home Health. Patient given an opportunity to ask questions and does not have any further questions or concerns at this time. The patient agrees to contact the PCP office for questions related to their healthcare. Medication reconciliation was not performed since pt saw PCP this am.  Jacqlyn Curling  obtaining a 90-day supply of all daily and as-needed medications. Referral to Pharm D needed: no     Home Health/Outpatient orders at discharge: home health care, PT, OT and [address]: [facility]  Date of initial visit: Today, 8/23/21    Durable Medical Equipment ordered at discharge: [address] received: yes    Covid Risk Education    Educated patient about risk for severe COVID-19 due to risk factors according to CDC guidelines. ACM reviewed discharge instructions, medical action plan and red flag symptoms with the patient who verbalized understanding. Discussed COVID vaccination status: yes. Patient and his wife are both fully vaccinated. Discussed exposure protocols and quarantine with CDC Guidelines. Patient was given an opportunity to verbalize any questions and concerns and agrees to contact ACM or health care provider for questions related to their healthcare. Discussed follow-up appointments. If no appointment was previously scheduled, appointment scheduling offered: no. Is follow up appointment scheduled within 7 days of discharge? yes.  - Pt saw PCP this Am 8/23  [facility] follow up appointment(s): No future appointments. Non-[facility] follow up appointment(s): back to PCP in 4-6 weeks. Plan for follow-up call in 10-14 days based on severity of symptoms and risk factors. Plan for next call: self management-to see if strength is improving with home therapy  ACM  provided contact information for future needs.

## 2021-09-10 NOTE — PROGRESS NOTES
Care Transitions Follow Up Call    Challenges to be reviewed by the provider   Additional needs identified to be addressed with provider: no  none           Method of communication with provider : none    Care Transition Nurse (CTN) contacted the patient by telephone to follow up after admission on 21. Verified name and  with patient as identifiers. Addressed changes since last contact: PT- continues to work with therapy and states getting stronger slowly   Follow up appointment completed? yes. Was follow up appointment scheduled within 7 days of discharge? no.     Advance Care Planning:   Does patient have an Advance Directive:  not on file    CTN reviewed discharge instructions, medical action plan and red flags with patient and discussed any barriers to care and/or understanding of plan of care after discharge. Discussed appropriate site of care based on symptoms and resources available to patient including: Urgent Care Clinics and [address] [place] [address] Quique Cheatham. The patient agrees to contact the PCP office for questions related to their healthcare. Patients top risk factors for readmission: medical condition-Chronic back pain   Interventions to address risk factors: continues receiving home therapy      Patient has graduated from the Transitions of Care Coordination  program on 9/10/21. Patient's symptoms are stable at this time. Patient/family has the ability to self-manage. Care management goals have been completed at this time. No further nurse navigator follow up scheduled. Pt has ACM's contact information for any further questions, concerns, or needs. Patients upcoming visits:  No future appointments.

## 2021-11-10 NOTE — DISCHARGE SUMMARY
Discharge Summary    Patient: Ramirez Mann               Sex: male          DOA: 9/14/2017         YOB: 1945      Age:  73 y.o.        LOS:  LOS: 3 days                Admit Date: 9/14/2017    Discharge Date: 9/18/2017    Admission Diagnoses: Unable to ambulate  Hyponatremia  Dehydration    Discharge Diagnoses:    Problem List as of 9/17/2017  Date Reviewed: 9/17/2017          Codes Class Noted - Resolved    Type II diabetes mellitus with manifestations, uncontrolled ([address] ICD-10-CM: E11.8, E11.65  ICD-9-CM: 250.92  9/16/2017 - Present        Anemia ICD-10-CM: D64.9  ICD-9-CM: 285.9  9/16/2017 - Present        Renal mass ICD-10-CM: N28.89  ICD-9-CM: 593.9  9/16/2017 - Present        Dehydration ICD-10-CM: E86.0  ICD-9-CM: 276.51  9/14/2017 - Present        Hyponatremia ICD-10-CM: E87.1  ICD-9-CM: 276.1  9/14/2017 - Present        Unable to ambulate ICD-10-CM: R26.2  ICD-9-CM: 719.7  9/14/2017 - Present        * (Principal)Urinary retention ICD-10-CM: R33.9  ICD-9-CM: 788.20  9/14/2017 - Present        DM (diabetes mellitus) ([address] (Chronic) ICD-10-CM: E11.9  ICD-9-CM: 250.00  9/7/2017 - Present        Lumbar spinal stenosis ICD-10-CM: M48.06  ICD-9-CM: 724.02  9/5/2017 - Present        Lumbar spondylosis ICD-10-CM: M47.816  ICD-9-CM: 721.3  9/5/2017 - Present        Radiculopathy of leg ICD-10-CM: M54.10  ICD-9-CM: 724.4  9/5/2017 - Present              Discharge Condition: stable    Hospital Course: The patient was transferred to the floor for post-operative and medical care. He   was alert and oriented times 3. He was neurologically intact in the lower extremities, and calves are non-tender. He was c/o of stable, mild-to-moderate joint symptoms intermittently, reasonably well controlled by current meds . He began Physical therapy and were up and ambulatory with their walker. Their Hemoglobin was stable. Their pain medications were continued for pain control.   On post-op day #2, Stable, repeat imaging 5/2022   He  Increased their activity with PT and were out of bed to the chair. Their dressing was changed. On post-op day #3, all lines/drains were discontinued prior to discharge. The patient was felt to be orthopaedically and medically stable for discharge. He will be discharged Monday after seen by Care Management for placement. Consults: Urology secondary to Bladder retention and Renal Mass    Significant Diagnostic Studies: none    Discharge Medications:     Current Discharge Medication List      START taking these medications    Details   HYDROcodone-acetaminophen (NORCO) 5-325 mg per tablet Take 1 Tab by mouth every four (4) hours as needed. Max Daily Amount: 6 Tabs. Qty: 60 Tab, Refills: 0      nicotine (NICODERM CQ) 21 mg/24 hr 1 Patch by TransDERmal route daily for 30 days. Qty: 30 Patch, Refills: 0         CONTINUE these medications which have NOT CHANGED    Details   cyclobenzaprine (FLEXERIL) 10 mg tablet Take 1 Tab by mouth three (3) times daily as needed for Muscle Spasm(s). Qty: 40 Tab, Refills: 1      atorvastatin (LIPITOR) 20 mg tablet Take 20 mg by mouth daily. lisinopril-hydroCHLOROthiazide (PRINZIDE, ZESTORETIC) 20-25 mg per tablet Take  by mouth daily. metFORMIN (GLUCOPHAGE) 500 mg tablet Take 500 mg by mouth daily (with breakfast). cholecalciferol, vitamin D3, (VITAMIN D3) 2,000 unit tab Take  by mouth.      multivitamin (ONE A DAY) tablet Take 1 Tab by mouth daily. STOP taking these medications       oxyCODONE-acetaminophen (PERCOCET) 5-325 mg per tablet Comments:   Reason for Stopping:               Activity: No Lifting, driving or strenuous activity for 2 weeks    Diet: Regular    Wound Care: Keep wound clean and dry. Change dressing as needed. Follow-up: Pt should follow-up in the office in 10 days - 2 weeks. They should not get their incision wet until they see us back. The patient will be sent to a SNF for post-operative care.   They will have dressing changes daily and physical therapy. With physical therapy, they should be up and ambulatory weight bear as tolerated. They will be sent to SNF on Hydrocodone and Flexaril. They will also take all of the medications on their discharge medical reconciliation form. They should avoid any lifting, anti-inflammatories or tobacco use. They will call with any and all concerns. Their medications are on the chart. Urology has directed that Jimenez Cath stay in place for one week. Orders for cath care and timing of removal will be needed from them.

## 2021-11-23 PROBLEM — A41.9 SEPSIS (HCC): Status: ACTIVE | Noted: 2021-01-01

## 2021-11-23 PROBLEM — N39.0 UTI (URINARY TRACT INFECTION): Status: ACTIVE | Noted: 2021-01-01

## 2021-11-23 NOTE — Clinical Note
Status[de-identified] INPATIENT [101]   Type of Bed: Telemetry [19]   Cardiac Monitoring Required?: Yes   Inpatient Hospitalization Certified Necessary for the Following Reasons: 3.  Patient receiving treatment that can only be provided in an inpatient setting (further clarification in H&P documentation)   Admitting Diagnosis: Sepsis [facility]) [[id number]]   Admitting Diagnosis: UTI (urinary tract infection) [[id number]]   Admitting Physician: Airam Plaza [[id number]]   Attending Physician: Airam Plaza [[id number]]   Estimated Length of Stay: 2 Midnights   Discharge Plan[de-identified] Home with Office Follow-up

## 2021-11-23 NOTE — Clinical Note
TRANSFER - OUT REPORT:     Verbal report given to: RN. Report consisted of patient's Situation, Background, Assessment and   Recommendations(SBAR). Opportunity for questions and clarification was provided. Patient transported with a Registered Nurse.

## 2021-11-23 NOTE — Clinical Note
Sheath #1: Closed using manual compression. Site secured by Tegaderm. Pressure held for: 15 minutes.

## 2021-11-23 NOTE — Clinical Note
Bilateral groin and right radial clipped and draped. Wet prep solution dried at: 5. Wet prep elapsed drying time: 5 mins.

## 2021-11-23 NOTE — Clinical Note
TRANSFER - IN REPORT:     Verbal report received from: RN. Report consisted of patient's Situation, Background, Assessment and   Recommendations(SBAR). Opportunity for questions and clarification was provided. Assessment completed upon patient's arrival to unit and care assumed.

## 2021-11-23 NOTE — ED PROVIDER NOTES
EMERGENCY DEPARTMENT HISTORY AND PHYSICAL EXAM    Date: 11/23/2021  Patient Name: Ewa Tinajero    History of Presenting Illness     Chief Complaint   Patient presents with    Fatigue         History Provided By: Patient     Ewa Tinajero is a 79-year-old male with past medical history of lumbar stenosis, lumbar spondylosis, neurogenic bladder requiring indwelling catheterization presents for evaluation of altered mental status, generalized weakness. History obtained from EMS, patient. Patient's wife called out for increased confusion over the last several days. On EMS arrival, patient was found to be awake, oriented. Patient has had a history of recurrent UTIs in the past.  He denies any headache, chest pain, shortness of breath, fever, abdominal pain. He has a chronic indwelling Jimenez in place and is otherwise without complaints. PCP: Rex Serrano MD    Current Facility-Administered Medications   Medication Dose Route Frequency Provider Last Rate Last Admin    sodium chloride (NS) flush 5-10 mL  5-10 mL IntraVENous PRN Kan Tolentino MD        cefepime (MAXIPIME) 2 g in sterile water (preservative free) 10 mL IV syringe  2 g IntraVENous Q8H Kelsy Mchugh  mL/hr at 11/23/21 1452 2 g at 11/23/21 1452     Current Outpatient Medications   Medication Sig Dispense Refill    pregabalin (Lyrica) 25 mg capsule Take 75 mg by mouth. Indications: restless legs syndrome, an extreme discomfort in the calf muscles when sitting or lying down      atorvastatin (Lipitor) 20 mg tablet Take 20 mg by mouth nightly.  traZODone (DESYREL) 50 mg tablet Take 50 mg by mouth nightly.  metoprolol succinate (TOPROL-XL) 25 mg XL tablet Take 1 Tab by mouth nightly.  aspirin 81 mg chewable tablet Take 81 mg by mouth daily.  lisinopril-hydroCHLOROthiazide (PRINZIDE, ZESTORETIC) 20-25 mg per tablet Take  by mouth daily.       metFORMIN (GLUCOPHAGE) 500 mg tablet Take 500 mg by mouth two (2) times daily (with meals). (Patient not taking: Reported on 8/23/2021)      cholecalciferol, vitamin D3, (VITAMIN D3) 2,000 unit tab Take  by mouth.  multivitamin (ONE A DAY) tablet Take 1 Tab by mouth daily. Past History     Past Medical History:  Past Medical History:   Diagnosis Date    Atherosclerosis     Diabetes ([address] 2013    type 2    High cholesterol     Hyperlipidemia     Hypertension 2013    Muscle weakness     Neoplasm     right kidney    Severe pulmonary hypertension ([address]     Spinal stenosis     Urinary retention     Vitamin D deficiency        Past Surgical History:  Past Surgical History:   Procedure Laterality Date    HX HEENT      40 years ago deviated septum    HX LUMBAR LAMINECTOMY  2017    HX ORTHOPAEDIC      Lumbar laminectomy 9/7/17    HX ORTHOPAEDIC Right     CTR       Family History:  Family History   Problem Relation Age of Onset    Heart Disease Father        Social History:  Social History     Tobacco Use    Smoking status: Former Smoker    Smokeless tobacco: Never Used   Substance Use Topics    Alcohol use: No    Drug use: No       Allergies: Allergies   Allergen Reactions    Zocor [Simvastatin] Other (comments)     Made him \"ill\"         Review of Systems   Review of Systems   Constitutional: Negative for activity change and fever. HENT: Negative for congestion and sore throat. Eyes: Negative for discharge. Respiratory: Negative for apnea. Cardiovascular: Negative for chest pain. Gastrointestinal: Negative for abdominal distention. Genitourinary: Negative for dysuria and flank pain. Musculoskeletal: Negative for arthralgias. Skin: Negative for rash. Neurological: Negative for dizziness and weakness. Hematological: Negative for adenopathy. Psychiatric/Behavioral: Negative for agitation. All other systems reviewed and are negative.       Physical Exam     Vitals:    11/23/21 1334 11/23/21 1352 11/23/21 1452 11/23/21 1530   BP: 131/83  (!) 141/80 (!) 152/91   Pulse:  98 100 94   Resp: 20 17 17 17   Temp: 98.7 °F (37.1 °C)      SpO2: 98%      Weight: 59 kg (130 lb)      Height: 5' 11\" (1.803 m)        Physical Exam    Nursing notes and vital signs reviewed    Constitutional: Non toxic appearing, no acute distress  Head: Normocephalic, Atraumatic  Eyes: EOMI  Neck: Supple  Cardiovascular: Regular rate and rhythm, no murmurs, rubs, or gallops  Chest: Normal work of breathing and chest excursion bilaterally  Lungs: Clear to ausculation bilaterally  Abdomen: Soft, non tender, non distended, normoactive bowel sounds  Back: No evidence of trauma or deformity  Extremities: No evidence of trauma or deformity, no LE edema  Skin: Warm and dry, normal cap refill  Neuro: Alert and appropriate, CN intact, normal speech, strength and sensation full and symmetric bilaterally, normal coordination  Psychiatric: Normal mood and affect      Diagnostic Study Results     Labs -     Recent Results (from the past 12 hour(s))   EKG, 12 LEAD, INITIAL    Collection Time: 11/23/21  1:37 PM   Result Value Ref Range    Ventricular Rate 98 BPM    Atrial Rate 98 BPM    P-R Interval 140 ms    QRS Duration 100 ms    Q-T Interval 348 ms    QTC Calculation (Bezet) 444 ms    Calculated P Axis 46 degrees    Calculated R Axis -62 degrees    Calculated T Axis 55 degrees    Diagnosis       Normal sinus rhythm  Left axis deviation  Nonspecific ST abnormality  Abnormal ECG  When compared with ECG of 20-JUL-2021 17:05,  premature ventricular complexes are no longer present  Nonspecific T wave abnormality no longer evident in Inferior leads  Confirmed by Darron Dawn MD, -- ([id number]) on 11/23/2021 3:01:59 PM     URINALYSIS W/ RFLX MICROSCOPIC    Collection Time: 11/23/21  1:45 PM   Result Value Ref Range    Color DARK YELLOW      Appearance TURBID      Specific gravity 1.015 1.005 - 1.030      pH (UA) 6.0 5.0 - 8.0      Protein 300 (A) NEG mg/dL    Glucose Negative NEG mg/dL Ketone Negative NEG mg/dL    Bilirubin Negative NEG      Blood LARGE (A) NEG      Urobilinogen 1.0 0.2 - 1.0 EU/dL    Nitrites Positive (A) NEG      Leukocyte Esterase LARGE (A) NEG     METABOLIC PANEL, COMPREHENSIVE    Collection Time: 11/23/21  1:45 PM   Result Value Ref Range    Sodium 140 136 - 145 mmol/L    Potassium 4.2 3.5 - 5.5 mmol/L    Chloride 105 100 - 111 mmol/L    CO2 28 21 - 32 mmol/L    Anion gap 7 3.0 - 18 mmol/L    Glucose 141 (H) 74 - 99 mg/dL    BUN 33 (H) 7.0 - 18 MG/DL    Creatinine 1.85 (H) 0.6 - 1.3 MG/DL    BUN/Creatinine ratio 18 12 - 20      GFR est AA 44 (L) >60 ml/min/1.73m2    GFR est non-AA 36 (L) >60 ml/min/1.73m2    Calcium 10.8 (H) 8.5 - 10.1 MG/DL    Bilirubin, total 0.8 0.2 - 1.0 MG/DL    ALT (SGPT) 22 16 - 61 U/L    AST (SGOT) 25 10 - 38 U/L    Alk. phosphatase 125 (H) 45 - 117 U/L    Protein, total 8.6 (H) 6.4 - 8.2 g/dL    Albumin 3.6 3.4 - 5.0 g/dL    Globulin 5.0 (H) 2.0 - 4.0 g/dL    A-G Ratio 0.7 (L) 0.8 - 1.7     CBC WITH AUTOMATED DIFF    Collection Time: 11/23/21  1:45 PM   Result Value Ref Range    WBC 22.9 (H) 4.6 - 13.2 K/uL    RBC 4.83 4.35 - 5.65 M/uL    HGB 12.7 (L) 13.0 - 16.0 g/dL    HCT 41.3 36.0 - 48.0 %    MCV 85.5 78.0 - 100.0 FL    MCH 26.3 24.0 - 34.0 PG    MCHC 30.8 (L) 31.0 - 37.0 g/dL    RDW 18.8 (H) 11.6 - 14.5 %    PLATELET 038 354 - 908 K/uL    MPV 10.2 9.2 - 11.8 FL    NRBC 0.0 0  WBC    ABSOLUTE NRBC 0.00 0.00 - 0.01 K/uL    NEUTROPHILS 83 (H) 40 - 73 %    LYMPHOCYTES 7 (L) 21 - 52 %    MONOCYTES 9 3 - 10 %    EOSINOPHILS 0 0 - 5 %    BASOPHILS 0 0 - 2 %    IMMATURE GRANULOCYTES 1 (H) 0.0 - 0.5 %    ABS. NEUTROPHILS 19.0 (H) 1.8 - 8.0 K/UL    ABS. LYMPHOCYTES 1.6 0.9 - 3.6 K/UL    ABS. MONOCYTES 2.1 (H) 0.05 - 1.2 K/UL    ABS. EOSINOPHILS 0.0 0.0 - 0.4 K/UL    ABS. BASOPHILS 0.0 0.0 - 0.1 K/UL    ABS. IMM.  GRANS. 0.2 (H) 0.00 - 0.04 K/UL    DF AUTOMATED      RBC COMMENTS ANISOCYTOSIS  1+        RBC COMMENTS POIKILOCYTOSIS  1+       URINE MICROSCOPIC ONLY    Collection Time: 11/23/21  1:45 PM   Result Value Ref Range    WBC TOO NUMEROUS TO COUNT 0 - 5 /hpf    RBC 21 to 35 0 - 5 /hpf    Epithelial cells FEW 0 - 5 /lpf    Bacteria 2+ (A) NEG /hpf   POC LACTIC ACID    Collection Time: 11/23/21  1:51 PM   Result Value Ref Range    Lactic Acid (POC) 3.30 (HH) 0.40 - 2.00 mmol/L   POC LACTIC ACID    Collection Time: 11/23/21  2:23 PM   Result Value Ref Range    Lactic Acid (POC) 3.13 (HH) 0.40 - 2.00 mmol/L       Radiologic Studies -   CT HEAD WO CONT   Final Result      No acute intracranial abnormality. XR CHEST PORT   Final Result      No acute cardiopulmonary abnormality. CT Results  (Last 48 hours)               11/23/21 1435  CT HEAD WO CONT Final result    Impression:      No acute intracranial abnormality. Narrative:  EXAM: CT head       INDICATION: Headache. COMPARISON: 5/17/2021       TECHNIQUE: Axial CT imaging of the head was performed without intravenous   contrast. Standard multiplanar coronal and sagittal reformatted images were   obtained and are included in interpretation. One or more dose reduction techniques were used on this CT: automated exposure   control, adjustment of the mAs and/or kVp according to patient size, and   iterative reconstruction techniques. The specific techniques used on this CT   exam have been documented in the patient's electronic medical record. Digital   Imaging and Communications in Medicine (DICOM) format image data are available   to nonaffiliated external healthcare facilities or entities on a secure, media   free, reciprocally searchable basis with patient authorization for at least a   12-month period after this study. _______________       FINDINGS:       BRAIN AND POSTERIOR FOSSA: Cerebral atrophy. Patchy periventricular, deep and   subcortical white matter hypoattenuation which is nonspecific but likely   represents chronic ischemic changes.   No evidence of acute large vessel   transcortical infarct or acute parenchymal hemorrhage. No midline shift or   hydrocephalus. Unchanged cluster of calcifications in the central mat. EXTRA-AXIAL SPACES AND MENINGES: There are no abnormal extra-axial fluid   collections. CALVARIUM: Intact. SINUSES: Clear. OTHER: None.       _______________               CXR Results  (Last 48 hours)               11/23/21 1408  XR CHEST PORT Final result    Impression:      No acute cardiopulmonary abnormality. Narrative:  EXAM: XR CHEST PORT       CLINICAL INDICATION/HISTORY: ams   -Additional: None       COMPARISON: 7/20/2021       TECHNIQUE: Portable frontal view of the chest       _______________       FINDINGS:       SUPPORT DEVICES: None. HEART AND MEDIASTINUM: Cardiomediastinal silhouette within normal limits. LUNGS AND PLEURAL SPACES: No dense consolidation, large effusion or   pneumothorax.       _______________                 Medications given in the ED-  Medications   sodium chloride (NS) flush 5-10 mL (has no administration in time range)   cefepime (MAXIPIME) 2 g in sterile water (preservative free) 10 mL IV syringe (2 g IntraVENous New Bag 11/23/21 1452)   sodium chloride 0.9 % bolus infusion 1,000 mL (1,000 mL IntraVENous New Bag 11/23/21 1343)     Followed by   sodium chloride 0.9 % bolus infusion 770 mL (770 mL IntraVENous New Bag 11/23/21 1454)         Medical Decision Making   I am the first provider for this patient. I reviewed the vital signs, available nursing notes, past medical history, past surgical history, family history and social history. Vital Signs-Reviewed the patient's vital signs. Pulse Oximetry Analysis - 98% on room air, not hypoxic     Cardiac Monitor:  Rate: 94 bpm  Rhythm: NSR    Records Reviewed:  Old Medical Records    Provider Notes (Medical Decision Making): Moises Gonzalez is a 77-year-old male with past medical history as above presents for evaluation of confusion, reported fever with EMS. On arrival patient is afebrile, nontoxic-appearing and hemodynamically stable. Patient is without overt evidence of altered mental status. Since given reported fever at home and history of multiple UTIs in the past with indwelling Jimenez in place, sepsis order set initiated with blood cultures, lactate, IV fluids, chest x-ray, CT head. Patient found to have a lactate of 3 with urinalysis suggestive of UTI. Reviewed previous microbiology in which patient has had pseudomonal urinary tract infections in the past, which have been susceptible to cefepime. Patient has significant leukocytosis to 22. Patient will be admitted to the hospitalist for further IV antibiotics given generalized weakness, slight confusion, sepsis without evidence of septic shock. Discussed with Dr. Roddy Goldberg who has accepted patient for admission. Procedures:  Procedures    ED Course:     Diagnosis and Disposition   _______________________________  ADMISSION NOTE:  3:33 PM  Patient is being admitted to the hospital by Dr. Roddy Goldberg. The results of their tests and reasons for their admission have been discussed with them and/or available family. They convey agreement and understanding for the need to be admitted and for their admission diagnosis. Written by Jessi Wyman MD    CONDITIONS ON ADMISSION:  Deep Vein Thrombosis is not present at the time of admission. Thrombosis is not present at the time of admission. Urinary Tract Infection is present at the time of admission. Pneumonia is not present at the time of admission. MRSA is not present at the time of admission. Wound infection is not present at the time of admission. Pressure Ulcer is not present at the time of admission. CLINICAL IMPRESSION    1. Sepsis without acute organ dysfunction, due to unspecified organism ([facility] [address]    2.  Urinary tract infection associated with indwelling urethral catheter, initial encounter ([facility] [address]

## 2021-11-23 NOTE — PROGRESS NOTES
Pharmacy Renal Dosing Services:     Cefepime was automatically dose-adjusted per [facility] P[facility]T Committee Protocol, with respect to renal function. Consult provided for this   78 y.o. , male , for the indication of UTI. Dose adjusted to:  Cefepime 2 grams IV q24h     Pt Weight:   Wt Readings from Last 1 Encounters:   11/23/21 59 kg (130 lb)     Previous Regimen   Cefepime 2 grams IV q8h   Serum Creatinine Lab Results   Component Value Date/Time    Creatinine 1.85 (H) 11/23/2021 01:45 PM       Creatinine Clearance Estimated Creatinine Clearance: 29.2 mL/min (A) (based on SCr of 1.85 mg/dL (H)). BUN Lab Results   Component Value Date/Time    BUN 33 (H) 11/23/2021 01:45 PM         Pharmacy to continue to monitor patient daily. Will make dosage adjustments based upon changing renal function.   Signed Pro Siddiqi information:   [phone]

## 2021-11-23 NOTE — ED TRIAGE NOTES
Pt brought in by [facility], initial call was for AMS however patient is alert and oriented but states \" I do feel weaker than normal, I can't walk like I normally do with my rollator walker. \"

## 2021-11-23 NOTE — ED NOTES
TRANSFER - OUT REPORT:    Verbal report given to Purnima KRUGER(name) on Ben Bradley  being transferred to Telemetry(unit) for routine progression of care       Report consisted of patients Situation, Background, Assessment and   Recommendations(SBAR). Information from the following report(s) SBAR, Kardex, ED Summary, MAR, Recent Results and Cardiac Rhythm sinus rhythm was reviewed with the receiving nurse. Lines:   Peripheral IV 11/23/21 Left; Posterior Forearm (Active)       Peripheral IV 11/23/21 Left Antecubital (Active)   Site Assessment Clean, dry, & intact 11/23/21 1355   Phlebitis Assessment 0 11/23/21 1355   Infiltration Assessment 0 11/23/21 1355   Dressing Status Clean, dry, & intact 11/23/21 1355   Dressing Type Transparent 11/23/21 1355   Hub Color/Line Status Flushed 11/23/21 1355   Action Taken Blood drawn 11/23/21 1355   Alcohol Cap Used Yes 11/23/21 1355        Opportunity for questions and clarification was provided.       Patient transported with:   Monitor  Registered Nurse

## 2021-11-24 PROBLEM — J43.9 PULMONARY EMPHYSEMA (HCC): Status: ACTIVE | Noted: 2021-01-01

## 2021-11-24 PROBLEM — Z87.891 HISTORY OF TOBACCO ABUSE: Status: ACTIVE | Noted: 2021-01-01

## 2021-11-24 PROBLEM — I95.9 HYPOTENSION: Status: ACTIVE | Noted: 2021-01-01

## 2021-11-24 PROBLEM — J96.01 ACUTE HYPOXEMIC RESPIRATORY FAILURE (HCC): Status: ACTIVE | Noted: 2021-01-01

## 2021-11-24 PROBLEM — I27.20 PULMONARY HYPERTENSION (HCC): Status: ACTIVE | Noted: 2021-01-01

## 2021-11-24 NOTE — PROGRESS NOTES
OT orders received, pt chart reviewed. Pt transferred to ICU due to hypotension this AM. Will hold OT evaluation today, and follow up with patient when medically appropriate.      Thank you for this referral,  Jax Whitt OTR/L

## 2021-11-24 NOTE — CONSULTS
[facility]  Pulmonary, Critical Care, and Sleep Medicine    Name: Rufus Walker MRN: [id number]   : 1945 Hospital: [facility] [place]    Date: 2021  Room: 103/01     [facility] Note                                              Consult requesting physician: Dr. Lennox Walker    Reason for Consult: hypotension, sepsis, hypoxemia, pulmonary hypertension      IMPRESSION:   · Sepsis  · Hypotension  · Hypoxemic respiratory failure  · Pulmonary hypertension  · Suspect COPD exacerbation  ·   Patient Active Problem List   Diagnosis Code    Lumbar spinal stenosis M48.061    Lumbar spondylosis M47.816    Radiculopathy of leg M54.10    Unable to ambulate R26.2    Urinary retention R33.9    Type II diabetes mellitus with manifestations, uncontrolled ([facility] [address] E11.8, E11.65    Renal mass N28.89    UTI (urinary tract infection) due to urinary indwelling Jimenez catheter ([facility]) T83.511A, N39.0    Compression fracture of L1 vertebra ([facility]) S32.010A    Aneurysm of infrarenal abdominal aorta ([facility]) I71.4    HTN (hypertension) I10    Severe protein-calorie malnutrition ([facility]) E43    Severe pulmonary hypertension ([facility]) I27.20    Sepsis ([facility]) A41.9    Hypotension I95.9 ·       · Code status: Full code       RECOMMENDATIONS:   Respiratory:   Acute hypoxemic respiratory failure  3-5L of oxygen  CXR pulmonary congestion  covid 19  Pending I ordered stat, suspition is low but had only 1 vaccination  No acute issues. Protecting airway. Ventilator bundle & Sedation protocol followed. Daily sedation holiday, assessment for readiness for SBT and then re-titrate if required. Chlorhexidine mouth washes. Keep SPO2 >=92%. HOB 30 degree elevation all the time. Aggressive pulmonary toileting. Aspiration precautions. Incentive spirometry. CVS: History of diastolic heart failure and pulmonary hypertension of unclear etiology PTH very high 80s? Maybe from COPD?   No history of PE  No previous evaluation for pulmonary hypertension  Repeat echo start work up   Recurrent UTI paraplegia previous Pseudomonas infections. Suspect primary reason for hypotension however got last night beta-blockers so could be contributed to hypotension stop all blood pressure medications give gentle hydration with close monitoring has pulmonary hypertension  Continue albumin. If blood pressure less than 90 and MAP less than 65 resume Levophed had briefly today in the morning. Currently place midline. Check echo troponins EKG. ID: Urosepsis elevated white blood cells follow lactic acid sepsis protocol. Vancomycin and cefepime in the past had Pseudomonas sensitive to cefepime. Sepsis bundle and protocol followed. Follow serial lactic acid, frequent BMP check, fluid resuscitation. Follow cultures. Deescalate antibiotic when appropriate. Hematology/Oncology: WBC elevated  Check for chronic PE, PVL as PTH  Renal: acute on chronic renal failure , paraplegia with cath at home   GI/: PPI  Endocrine: Monitor BS. SSI. Neurology:  Paraplegia from spinal stenosis  Toxicology:none  Pain/Sedation: prn  Skin/Wound: evaluate wound care since bed ridden   Electrolytes: Replace electrolytes per ICU electrolyte replacement protocol. IVF: albumins   Nutrition:npo  Prophylaxis: DVT Prophylaxis: SCD/ GI Prophylaxis: Protonix/  Restraints: none  Wrist soft restraints for patient interfering with medical therapy/management and patient safety. Lines/Tubes: PIVher:  PT/OT eval and treat. OOB. Advance Directive/Palliative Care: consulted    Will defer respective systems problem management to primary and other respective consultant and follow patient in ICU with primary and other medical team.  Further recommendations will be based on the patient's response to recommended treatment and results of the investigation ordered. Quality Care: PPI, DVT prophylaxis, HOB elevated, Infection control all reviewed and addressed.     Care of plan d/w hospitalist team, RN, RT, MDR.  D/w patient (answered all questions to satisfaction). High complexity decision making was performed during the evaluation of this patient at high risk for decompensation with multiple organ involvement. Total critical care time spent rendering care exclusive of procedures/family discussion/coordination of care: 80 minutes. Subjective/History of Present Illness:     Patient is a 78 y.o. male with PMHx significant for      11/24/2021:  Transferred         I/O last 24 hrs: Intake/Output Summary (Last 24 hours) at 11/24/2021 1155  Last data filed at 11/24/2021 0930  Gross per 24 hour   Intake 1780 ml   Output 1300 ml   Net 480 ml             History taken from patient and chart     Review of Systems:  A comprehensive review of systems was negative except for that written in the HPI.        Review of Systems:   HEENT: No epistaxis, no nasal drainage, no difficulty in swallowing, no redness in eyes  Respiratory: as above  Cardiovascular: no chest pain, no palpitations, no chronic leg edema, no syncope  Gastrointestinal: no abd pain, no vomiting, no diarrhea, no bleeding symptoms  Genitourinary: No urinary symptoms or hematuria  Musculoskeletal: Neg  Neurological: No focal weakness, no seizures, no headaches  Behvioral/Psych: No anxiety, no depression  General : No fever, no chills, no weight loss, no night sweats     Allergies   Allergen Reactions    Zocor [Simvastatin] Other (comments)     Made him \"ill\"      Past Medical History:   Diagnosis Date    Atherosclerosis     Diabetes ([facility] [address] 2013    type 2    High cholesterol     Hyperlipidemia     Hypertension 2013    Muscle weakness     Neoplasm     right kidney    Severe pulmonary hypertension ([facility] [address]     Spinal stenosis     Urinary retention     Vitamin D deficiency       Past Surgical History:   Procedure Laterality Date    HX HEENT      40 years ago deviated septum    HX LUMBAR LAMINECTOMY  2017    HX ORTHOPAEDIC      Lumbar laminectomy 9/7/17    HX ORTHOPAEDIC Right     CTR      Social History     Tobacco Use    Smoking status: Former Smoker    Smokeless tobacco: Never Used   Substance Use Topics    Alcohol use: No      Family History   Problem Relation Age of Onset    Heart Disease Father       Prior to Admission medications    Medication Sig Start Date End Date Taking? Authorizing Provider   pregabalin (Lyrica) 25 mg capsule Take 75 mg by mouth. Indications: restless legs syndrome, an extreme discomfort in the calf muscles when sitting or lying down    Provider, Historical   atorvastatin (Lipitor) 20 mg tablet Take 20 mg by mouth nightly. Provider, Historical   traZODone (DESYREL) 50 mg tablet Take 50 mg by mouth nightly. Provider, Historical   metoprolol succinate (TOPROL-XL) 25 mg XL tablet Take 1 Tab by mouth nightly. 3/21/20   Other, MD Katelynn   aspirin 81 mg chewable tablet Take 81 mg by mouth daily. Other, MD Katelynn   lisinopril-hydroCHLOROthiazide (PRINZIDE, ZESTORETIC) 20-25 mg per tablet Take  by mouth daily. Provider, Historical   metFORMIN (GLUCOPHAGE) 500 mg tablet Take 500 mg by mouth two (2) times daily (with meals). Patient not taking: Reported on 8/23/2021    Provider, Historical   cholecalciferol, vitamin D3, (Vitamin D3) 50 mcg (2,000 unit) tab Take  by mouth. Provider, Historical   multivitamin (ONE A DAY) tablet Take 1 Tab by mouth daily.     Provider, Historical     Current Facility-Administered Medications   Medication Dose Route Frequency    albumin human 25% (BUMINATE) solution 25 g  25 g IntraVENous Q6H    NOREPINephrine (LEVOPHED) 8,000 mcg in 0.9% sodium chloride 250 mL infusion  0.5-16 mcg/min IntraVENous TITRATE    cefepime (MAXIPIME) 2 g in sterile water (preservative free) 10 mL IV syringe  2 g IntraVENous Q24H    aspirin chewable tablet 81 mg  81 mg Oral DAILY    [Held by provider] metoprolol succinate (TOPROL-XL) XL tablet 25 mg  25 mg Oral QHS    pregabalin (LYRICA) capsule 75 mg  75 mg Oral BID    traZODone (DESYREL) tablet 50 mg  50 mg Oral QHS    heparin (porcine) injection 5,000 Units  5,000 Units SubCUTAneous Q8H    [Held by provider] lisinopril/hydroCHLOROthiazide(PRINZIDE/ZESTORETIC) 20/25 mg   Oral DAILY    lactated Ringers infusion  100 mL/hr IntraVENous CONTINUOUS         Objective:   Vital Signs:    Visit Vitals  BP (!) 115/49   Pulse 89   Temp 97.6 °F (36.4 °C)   Resp 17   Ht 5' 11\" (1.803 m)   Wt 59 kg (130 lb)   SpO2 98%   BMI 18.13 kg/m²       O2 Device: Nasal cannula   O2 Flow Rate (L/min): 3 l/min   Temp (24hrs), Av.9 °F (36.6 °C), Min:97.5 °F (36.4 °C), Max:98.7 °F (37.1 °C)       Intake/Output:   Last shift:       07 - 1900  In: -   Out: 600 [Urine:600]    Last 3 shifts: 1901 -  0700  In: 8338 [I.V.:1780]  Out: 700 [Urine:700]      Intake/Output Summary (Last 24 hours) at 2021 1155  Last data filed at 2021 0930  Gross per 24 hour   Intake 1780 ml   Output 1300 ml   Net 480 ml       Last 3 Recorded Weights in this Encounter    21 1334   Weight: 59 kg (130 lb)             Recent Labs     21  1054   PHI 7.45   PCO2I 33.7*   PO2I 82   HCO3I 23.2   FIO2I 4       Physical Exam:     Impresson general : Alert, Awake, in mild respiratory distress  Head:   Normocephalic,atraumatic. ENT:   EOM , no scleral icterus, no pallor, no cyanosis. Nose:   No sinus tenderness  Throat:  Oropharynx ,mucosa, and tongue normal. No oral thrush. Neck:   Supple, symmetric. Lymph nodes. Trachea is midline  Lung: Moderate air entry bilateral equal  eboni  rales. No rhonchi. eboni mild  wheezing. No stridors. No prolongded expiration. No accessory muscle use. Heart:   Regular  rate & rhythm. S1 S2 present. No murmur. No JVD. Abdomen:  Soft. NT. ND. +BS. No masses. liver  and spleen  Extremities:  No pedal edema. No cyanosis. No clubbing. Pulses: 2+ and symmetric in DP.  Capillary refill: normal  Lymphatic: neck and supraclavicular    Musculoskeletal: No joint swelling. No tenderness. Skin:   Lesion Color, texture, turgor normal. No rashes or lesions.       Data:       Recent Results (from the past 24 hour(s))   EKG, 12 LEAD, INITIAL    Collection Time: 11/23/21  1:37 PM   Result Value Ref Range    Ventricular Rate 98 BPM    Atrial Rate 98 BPM    P-R Interval 140 ms    QRS Duration 100 ms    Q-T Interval 348 ms    QTC Calculation (Bezet) 444 ms    Calculated P Axis 46 degrees    Calculated R Axis -62 degrees    Calculated T Axis 55 degrees    Diagnosis       Normal sinus rhythm  Left axis deviation  Nonspecific ST abnormality  Abnormal ECG  When compared with ECG of 20-JUL-2021 17:05,  premature ventricular complexes are no longer present  Nonspecific T wave abnormality no longer evident in Inferior leads  Confirmed by Martin Davis MD, -- ([id number]) on 11/23/2021 3:01:59 PM     CULTURE, BLOOD    Collection Time: 11/23/21  1:45 PM    Specimen: Blood   Result Value Ref Range    Special Requests: NO SPECIAL REQUESTS      Culture result: NO GROWTH AFTER 16 HOURS     CULTURE, BLOOD    Collection Time: 11/23/21  1:45 PM    Specimen: Blood   Result Value Ref Range    Special Requests: NO SPECIAL REQUESTS      Culture result: NO GROWTH AFTER 16 HOURS     URINALYSIS W/ RFLX MICROSCOPIC    Collection Time: 11/23/21  1:45 PM   Result Value Ref Range    Color DARK YELLOW      Appearance TURBID      Specific gravity 1.015 1.005 - 1.030      pH (UA) 6.0 5.0 - 8.0      Protein 300 (A) NEG mg/dL    Glucose Negative NEG mg/dL    Ketone Negative NEG mg/dL    Bilirubin Negative NEG      Blood LARGE (A) NEG      Urobilinogen 1.0 0.2 - 1.0 EU/dL    Nitrites Positive (A) NEG      Leukocyte Esterase LARGE (A) NEG     METABOLIC PANEL, COMPREHENSIVE    Collection Time: 11/23/21  1:45 PM   Result Value Ref Range    Sodium 140 136 - 145 mmol/L    Potassium 4.2 3.5 - 5.5 mmol/L    Chloride 105 100 - 111 mmol/L    CO2 28 21 - 32 mmol/L    Anion gap 7 3.0 - 18 mmol/L    Glucose 141 (H) 74 - 99 mg/dL    BUN 33 (H) 7.0 - 18 MG/DL    Creatinine 1.85 (H) 0.6 - 1.3 MG/DL    BUN/Creatinine ratio 18 12 - 20      GFR est AA 44 (L) >60 ml/min/1.73m2    GFR est non-AA 36 (L) >60 ml/min/1.73m2    Calcium 10.8 (H) 8.5 - 10.1 MG/DL    Bilirubin, total 0.8 0.2 - 1.0 MG/DL    ALT (SGPT) 22 16 - 61 U/L    AST (SGOT) 25 10 - 38 U/L    Alk. phosphatase 125 (H) 45 - 117 U/L    Protein, total 8.6 (H) 6.4 - 8.2 g/dL    Albumin 3.6 3.4 - 5.0 g/dL    Globulin 5.0 (H) 2.0 - 4.0 g/dL    A-G Ratio 0.7 (L) 0.8 - 1.7     CBC WITH AUTOMATED DIFF    Collection Time: 11/23/21  1:45 PM   Result Value Ref Range    WBC 22.9 (H) 4.6 - 13.2 K/uL    RBC 4.83 4.35 - 5.65 M/uL    HGB 12.7 (L) 13.0 - 16.0 g/dL    HCT 41.3 36.0 - 48.0 %    MCV 85.5 78.0 - 100.0 FL    MCH 26.3 24.0 - 34.0 PG    MCHC 30.8 (L) 31.0 - 37.0 g/dL    RDW 18.8 (H) 11.6 - 14.5 %    PLATELET 142 059 - 280 K/uL    MPV 10.2 9.2 - 11.8 FL    NRBC 0.0 0  WBC    ABSOLUTE NRBC 0.00 0.00 - 0.01 K/uL    NEUTROPHILS 83 (H) 40 - 73 %    LYMPHOCYTES 7 (L) 21 - 52 %    MONOCYTES 9 3 - 10 %    EOSINOPHILS 0 0 - 5 %    BASOPHILS 0 0 - 2 %    IMMATURE GRANULOCYTES 1 (H) 0.0 - 0.5 %    ABS. NEUTROPHILS 19.0 (H) 1.8 - 8.0 K/UL    ABS. LYMPHOCYTES 1.6 0.9 - 3.6 K/UL    ABS. MONOCYTES 2.1 (H) 0.05 - 1.2 K/UL    ABS. EOSINOPHILS 0.0 0.0 - 0.4 K/UL    ABS. BASOPHILS 0.0 0.0 - 0.1 K/UL    ABS. IMM.  GRANS. 0.2 (H) 0.00 - 0.04 K/UL    DF AUTOMATED      RBC COMMENTS ANISOCYTOSIS  1+        RBC COMMENTS POIKILOCYTOSIS  1+       URINE MICROSCOPIC ONLY    Collection Time: 11/23/21  1:45 PM   Result Value Ref Range    WBC TOO NUMEROUS TO COUNT 0 - 5 /hpf    RBC 21 to 35 0 - 5 /hpf    Epithelial cells FEW 0 - 5 /lpf    Bacteria 2+ (A) NEG /hpf   POC LACTIC ACID    Collection Time: 11/23/21  1:51 PM   Result Value Ref Range    Lactic Acid (POC) 3.30 (HH) 0.40 - 2.00 mmol/L   POC LACTIC ACID    Collection Time: 11/23/21  2:23 PM   Result Value Ref Range Lactic Acid (POC) 3.13 (HH) 0.40 - 2.00 mmol/L   GLUCOSE, POC    Collection Time: 11/23/21  5:34 PM   Result Value Ref Range    Glucose (POC) 92 70 - 110 mg/dL   GLUCOSE, POC    Collection Time: 11/23/21  9:07 PM   Result Value Ref Range    Glucose (POC) 116 (H) 70 - 110 mg/dL   LACTIC ACID    Collection Time: 11/23/21  9:45 PM   Result Value Ref Range    Lactic acid 1.4 0.4 - 2.0 MMOL/L   METABOLIC PANEL, BASIC    Collection Time: 11/24/21  3:00 AM   Result Value Ref Range    Sodium 142 136 - 145 mmol/L    Potassium 4.0 3.5 - 5.5 mmol/L    Chloride 110 100 - 111 mmol/L    CO2 26 21 - 32 mmol/L    Anion gap 6 3.0 - 18 mmol/L    Glucose 84 74 - 99 mg/dL    BUN 32 (H) 7.0 - 18 MG/DL    Creatinine 1.60 (H) 0.6 - 1.3 MG/DL    BUN/Creatinine ratio 20 12 - 20      GFR est AA 51 (L) >60 ml/min/1.73m2    GFR est non-AA 42 (L) >60 ml/min/1.73m2    Calcium 8.6 8.5 - 10.1 MG/DL   CBC WITH AUTOMATED DIFF    Collection Time: 11/24/21  3:00 AM   Result Value Ref Range    WBC 16.3 (H) 4.6 - 13.2 K/uL    RBC 3.31 (L) 4.35 - 5.65 M/uL    HGB 8.5 (L) 13.0 - 16.0 g/dL    HCT 27.5 (L) 36.0 - 48.0 %    MCV 83.1 78.0 - 100.0 FL    MCH 25.7 24.0 - 34.0 PG    MCHC 30.9 (L) 31.0 - 37.0 g/dL    RDW 18.8 (H) 11.6 - 14.5 %    PLATELET 375 948 - 226 K/uL    MPV 10.8 9.2 - 11.8 FL    NRBC 0.0 0  WBC    ABSOLUTE NRBC 0.00 0.00 - 0.01 K/uL    NEUTROPHILS 69 40 - 73 %    LYMPHOCYTES 17 (L) 21 - 52 %    MONOCYTES 12 (H) 3 - 10 %    EOSINOPHILS 2 0 - 5 %    BASOPHILS 0 0 - 2 %    IMMATURE GRANULOCYTES 0 0.0 - 0.5 %    ABS. NEUTROPHILS 11.2 (H) 1.8 - 8.0 K/UL    ABS. LYMPHOCYTES 2.8 0.9 - 3.6 K/UL    ABS. MONOCYTES 2.0 (H) 0.05 - 1.2 K/UL    ABS. EOSINOPHILS 0.2 0.0 - 0.4 K/UL    ABS. BASOPHILS 0.1 0.0 - 0.1 K/UL    ABS. IMM.  GRANS. 0.1 (H) 0.00 - 0.04 K/UL    DF AUTOMATED     MAGNESIUM    Collection Time: 11/24/21  3:00 AM   Result Value Ref Range    Magnesium 1.7 1.6 - 2.6 mg/dL   GLUCOSE, POC    Collection Time: 11/24/21  6:09 AM   Result Value Ref Range    Glucose (POC) 96 70 - 110 mg/dL   BLOOD GAS, ARTERIAL POC    Collection Time: 11/24/21 10:54 AM   Result Value Ref Range    Device: NASAL CANNULA      FIO2 (POC) 4 %    pH (POC) 7.45 7.35 - 7.45      pCO2 (POC) 33.7 (L) 35.0 - 45.0 MMHG    pO2 (POC) 82 80 - 100 MMHG    HCO3 (POC) 23.2 22 - 26 MMOL/L    sO2 (POC) 96.6 92 - 97 %    Base deficit (POC) 0.7 mmol/L    Allens test (POC) Positive      Site RIGHT RADIAL      Specimen type (POC) ARTERIAL      Performed by [facility]ber[facility]    EKG, 12 LEAD, INITIAL    Collection Time: 11/24/21 10:54 AM   Result Value Ref Range    Ventricular Rate 92 BPM    Atrial Rate 92 BPM    P-R Interval 152 ms    QRS Duration 100 ms    Q-T Interval 388 ms    QTC Calculation (Bezet) 479 ms    Calculated P Axis 78 degrees    Calculated R Axis -18 degrees    Calculated T Axis 43 degrees    Diagnosis       Sinus rhythm with frequent premature ventricular complexes in a pattern of   bigeminy  Otherwise normal ECG  When compared with ECG of 23-NOV-2021 13:37,  premature ventricular complexes are now present  QRS axis shifted right           Chemistry Recent Labs     11/24/21  0300 11/23/21  1345   GLU 84 141*    140   K 4.0 4.2    105   CO2 26 28   BUN 32* 33*   CREA 1.60* 1.85*   CA 8.6 10.8*   MG 1.7  --    AGAP 6 7   BUCR 20 18   AP  --  125*   TP  --  8.6*   ALB  --  3.6   GLOB  --  5.0*   AGRAT  --  0.7*        Lactic Acid Lactic acid   Date Value Ref Range Status   11/23/2021 1.4 0.4 - 2.0 MMOL/L Final     Recent Labs     11/23/21  2145   LAC 1.4        Liver Enzymes Protein, total   Date Value Ref Range Status   11/23/2021 8.6 (H) 6.4 - 8.2 g/dL Final     Albumin   Date Value Ref Range Status   11/23/2021 3.6 3.4 - 5.0 g/dL Final     Globulin   Date Value Ref Range Status   11/23/2021 5.0 (H) 2.0 - 4.0 g/dL Final     A-G Ratio   Date Value Ref Range Status   11/23/2021 0.7 (L) 0.8 - 1.7   Final     Alk.  phosphatase   Date Value Ref Range Status 11/23/2021 125 (H) 45 - 117 U/L Final     Recent Labs     11/23/21  1345   TP 8.6*   ALB 3.6   GLOB 5.0*   AGRAT 0.7*   *        CBC w/Diff Recent Labs     11/24/21  0300 11/23/21  1345   WBC 16.3* 22.9*   RBC 3.31* 4.83   HGB 8.5* 12.7*   HCT 27.5* 41.3    279   GRANS 69 83*   LYMPH 17* 7*   EOS 2 0        Cardiac Enzymes No results found for: CPK, CK, CKMMB, CKMB, RCK3, CKMBT, CKNDX, CKND1, LUH, TROPT, TROIQ, TRACY, TROPT, TNIPOC, BNP, BNPP     BNP No results found for: BNP, BNPP, XBNPT     Coagulation No results for input(s): PTP, INR, APTT, INREXT in the last 72 hours. Thyroid  No results found for: T4, T3U, TSH, TSHEXT    No results found for: T4     Urinalysis Lab Results   Component Value Date/Time    Color DARK YELLOW 11/23/2021 01:45 PM    Appearance TURBID 11/23/2021 01:45 PM    Specific gravity 1.015 11/23/2021 01:45 PM    Specific gravity 0.000 (L) 08/18/2021 05:55 AM    pH (UA) 6.0 11/23/2021 01:45 PM    Protein 300 (A) 11/23/2021 01:45 PM    Glucose Negative 11/23/2021 01:45 PM    Ketone Negative 11/23/2021 01:45 PM    Bilirubin Negative 11/23/2021 01:45 PM    Urobilinogen 1.0 11/23/2021 01:45 PM    Nitrites Positive (A) 11/23/2021 01:45 PM    Leukocyte Esterase LARGE (A) 11/23/2021 01:45 PM    Epithelial cells FEW 11/23/2021 01:45 PM    Bacteria 2+ (A) 11/23/2021 01:45 PM    WBC TOO NUMEROUS TO COUNT 11/23/2021 01:45 PM    RBC 21 to 35 11/23/2021 01:45 PM        Micro  Recent Labs     11/23/21  1345   CULT NO GROWTH AFTER 16 HOURS  NO GROWTH AFTER 16 HOURS     Recent Labs     11/23/21  1345   CULT NO GROWTH AFTER 16 HOURS  NO GROWTH AFTER 16 HOURS          Culture data during this hospitalization.    All Micro Results     Procedure Component Value Units Date/Time    CULTURE, BLOOD [[id number]] Collected: 11/23/21 1345    Order Status: Completed Specimen: Blood Updated: 11/24/21 0651     Special Requests: NO SPECIAL REQUESTS        Culture result: NO GROWTH AFTER 16 HOURS CULTURE, BLOOD [[id number]] Collected: 11/23/21 1345    Order Status: Completed Specimen: Blood Updated: 11/24/21 0651     Special Requests: NO SPECIAL REQUESTS        Culture result: NO GROWTH AFTER 16 HOURS       CULTURE, URINE [[id number]] Collected: 11/23/21 2015    Order Status: Completed Specimen: Urine from Clean catch Updated: 11/23/21 2054               PFT       Ultrasound       LE Doppler       ECHO       Images report reviewed by me:  CT (Most Recent) (CT chest reviewed by me) Results from [place] encounter on 11/23/21    CT HEAD WO CONT    Narrative  EXAM: CT head    INDICATION: Headache. COMPARISON: 5/17/2021    TECHNIQUE: Axial CT imaging of the head was performed without intravenous  contrast. Standard multiplanar coronal and sagittal reformatted images were  obtained and are included in interpretation. One or more dose reduction techniques were used on this CT: automated exposure  control, adjustment of the mAs and/or kVp according to patient size, and  iterative reconstruction techniques. The specific techniques used on this CT  exam have been documented in the patient's electronic medical record. Digital  Imaging and Communications in Medicine (DICOM) format image data are available  to nonaffiliated external healthcare facilities or entities on a secure, media  free, reciprocally searchable basis with patient authorization for at least a  12-month period after this study. _______________    FINDINGS:    BRAIN AND POSTERIOR FOSSA: Cerebral atrophy. Patchy periventricular, deep and  subcortical white matter hypoattenuation which is nonspecific but likely  represents chronic ischemic changes. No evidence of acute large vessel  transcortical infarct or acute parenchymal hemorrhage. No midline shift or  hydrocephalus. Unchanged cluster of calcifications in the central mat. EXTRA-AXIAL SPACES AND MENINGES: There are no abnormal extra-axial fluid  collections.     CALVARIUM: Intact. SINUSES: Clear. OTHER: None.    _______________    Impression  No acute intracranial abnormality. CXR reviewed by me:  XR (Most Recent). CXR  reviewed by me and compared with previous CXR Results from Hospital Encounter encounter on 11/23/21    XR CHEST PORT    Narrative  EXAM: One-view chest    CLINICAL HISTORY: Altered level of consciousness, to assess for fluid overload ,    COMPARISON: None    FINDINGS:    Frontal view of the chest demonstrate minimal prominence of interstitial  markings otherwise clear. . Cardiac silhouette is normal in size and contour. No  acute bony or soft tissue abnormality. Impression  Minimal prominence of the interstitial markings could suggest slight  interstitial edema. Otherwise unremarkable.            Aster Engel MD  11/24/2021

## 2021-11-24 NOTE — PROGRESS NOTES
Entered pts chart to assist primary nurse; spoke with wife of pt, she was able to verify pts name  home address and SSN, security pass code provided to wife of pt at this time

## 2021-11-24 NOTE — PROGRESS NOTES
11/24/2021 PT note: consult received and chart reviewed. Noted pt for transfer to ICU this am.  Will hold PT evaluation at this time and f/u as appropriate.    Thank you for this referral.   Severiano Bayley, PT

## 2021-11-24 NOTE — PROGRESS NOTES
I spoke to Hospitalist levophed now off, was started on the floor ? No BP low patient will be coming to ICU  I ordered blood work, ECg, Echo  Blood culure already done  Midline requested if not better I will place TLC  Full note to follow  LORY Pablo MD

## 2021-11-24 NOTE — PROGRESS NOTES
Pt's BP is low this morning. He received metoprolol last night and is likely septic/bacteremic. I will give another bolus (has already gotten albumin and 1 bolus) but he may need to be in the ICU temporarily for levophed. Update: after the last bolus, his oxygen requirement went up to 5L. No more fluids, just levophed at this time. Medication should wear off by tonight and he can transfer back upstairs.

## 2021-11-24 NOTE — PROGRESS NOTES
Hospitalist Progress Note    Patient: Ovidio Larson MRN: [id number]  CSN: [id number]    YOB: 1945  Age: 78 y.o. Sex: male    DOA: 11/23/2021 LOS:  LOS: 1 day          Chief Complaint:        Assessment/Plan     Hypotension-  Systolic blood pressure dropped to the 70s due to sepsis and being given beta-blocker, plan was to transfer patient to ICU and start levo  Transport to ICU halted because blood pressure improved into the 100s - 120s  However it was unknown if this improvement was due to patient having been started on levo while still on telemetry floor.   Once levo stopped, blood pressure dropped again  Currently attempting to prevent the need for ICU transfer: Giving IV fluid boluses, albumin and added midodrine, patient also has Jimenez in place  Will monitor closely and transfer to ICU if needed    Sepsis -  Secondary to CAUTI  Lactic acid improved  WBC improving     Acute on chronic kidney disease, stage III -  Likely due to CAUTI  Creatinine improving     UTI -  Catheter associated   Past cultures grew Pseudomonas.     Follow cultures  On cefepime  Jimenez changed in ER     DM-  On sliding scale insulin.     HTN -   Continue with home meds,   Monitor BP     CKD stage 3 -  Follow renal function.     DVT prophylaxis with heparin     PT/OT     Estimated length of stay : 2-3 days    Disposition :  Patient Active Problem List   Diagnosis Code    Lumbar spinal stenosis M48.061    Lumbar spondylosis M47.816    Radiculopathy of leg M54.10    Unable to ambulate R26.2    Urinary retention R33.9    Type II diabetes mellitus with manifestations, uncontrolled ([facility] [address] E11.8, E11.65    Renal mass N28.89    UTI (urinary tract infection) due to urinary indwelling Jimenez catheter (HCC) T83.511A, N39.0    Compression fracture of L1 vertebra (formerly [facility]) S32.010A    Aneurysm of infrarenal abdominal aorta (formerly [facility]) I71.4    HTN (hypertension) I10    Severe protein-calorie malnutrition (HCC) E43    Severe pulmonary hypertension (HCC) I27.20    Sepsis ([facility] [address] A41.9       Subjective:    \"I feel fine\"   Denies cp, sob, dizziness, lightheadness    Review of systems:    Constitutional: denies fevers, chills, myalgias  Respiratory: denies SOB, cough  Cardiovascular: denies chest pain, palpitations  Gastrointestinal: denies nausea, vomiting, diarrhea      Vital signs/Intake and Output:  Visit Vitals  BP (!) 114/49   Pulse 77   Temp 97.7 °F (36.5 °C)   Resp 16   Ht 5' 11\" (1.803 m)   Wt 59 kg (130 lb)   SpO2 100%   BMI 18.13 kg/m²     Current Shift:  No intake/output data recorded. Last three shifts:  11/22 1901 - 11/24 0700  In: 1780 [I.V.:1780]  Out: 700 [Urine:700]    Exam:    General: Well developed, alert, older WM, NAD, OX3  Head/Neck: NCAT, supple, No masses, No lymphadenopathy  CVS:Regular rate and rhythm, no M/R/G, S1/S2 heard, no thrill  Lungs:Clear to auscultation bilaterally, no wheezes, rhonchi, or rales  Abdomen: Soft, Nontender, No distention, Normal Bowel sounds, No hepatomegaly  Extremities: No C/C/E, pulses palpable 2+  Skin:normal texture and turgor, no rashes, no lesions  Neuro:grossly normal , follows commands  Psych:appropriate                Labs: Results:       Chemistry Recent Labs     11/24/21  0300 11/23/21  1345   GLU 84 141*    140   K 4.0 4.2    105   CO2 26 28   BUN 32* 33*   CREA 1.60* 1.85*   CA 8.6 10.8*   AGAP 6 7   BUCR 20 18   AP  --  125*   TP  --  8.6*   ALB  --  3.6   GLOB  --  5.0*   AGRAT  --  0.7*      CBC w/Diff Recent Labs     11/24/21  0300 11/23/21  1345   WBC 16.3* 22.9*   RBC 3.31* 4.83   HGB 8.5* 12.7*   HCT 27.5* 41.3    279   GRANS 69 83*   LYMPH 17* 7*   EOS 2 0      Cardiac Enzymes No results for input(s): CPK, CKND1, LUH in the last 72 hours. No lab exists for component: CKRMB, TROIP   Coagulation No results for input(s): PTP, INR, APTT, INREXT in the last 72 hours.     Lipid Panel Lab Results   Component Value Date/Time    Cholesterol, total 146 06/01/2016 07:51 AM    HDL Cholesterol 40 06/01/2016 07:51 AM    LDL, calculated 91.6 06/01/2016 07:51 AM    VLDL, calculated 14.4 06/01/2016 07:51 AM    Triglyceride 72 06/01/2016 07:51 AM    CHOL/HDL Ratio 3.7 06/01/2016 07:51 AM      BNP No results for input(s): BNPP in the last 72 hours.    Liver Enzymes Recent Labs     11/23/21  1345   TP 8.6*   ALB 3.6   *      Thyroid Studies No results found for: T4, T3U, TSH, TSHEXT     Procedures/imaging: see electronic medical records for all procedures/Xrays and details which were not copied into this note but were reviewed prior to creation of Renu Reeder MD

## 2021-11-24 NOTE — H&P
History & Physical    Patient: Shraddha Francois MRN: [id number]  [facility]: [id number]    YOB: 1945  Age: 78 y.o. Sex: male      DOA: 11/23/2021  Primary Care Provider:  Symone Coates MD      Assessment/Plan     Patient Active Problem List   Diagnosis Code    Lumbar spinal stenosis M48.061    Lumbar spondylosis M47.816    Radiculopathy of leg M54.10    Unable to ambulate R26.2    Urinary retention R33.9    Type II diabetes mellitus with manifestations, uncontrolled ([facility] [address] E11.8, E11.65    Renal mass N28.89    UTI (urinary tract infection) due to urinary indwelling Jimenez catheter ([facility]) T83.511A, N39.0    Compression fracture of L1 vertebra ([facility]) S32.010A    Aneurysm of infrarenal abdominal aorta ([facility]) I71.4    HTN (hypertension) I10    Severe protein-calorie malnutrition ([facility]) E43    Severe pulmonary hypertension ([facility]) I27.20    Sepsis ([facility]) A41.9     Admit to monitored floor    Sepsis -  Secondary to CAUTI  Lactic acid improving  Follow WBC    UTI -  Catheter associated   Past cultures  grew Pseudomonas. Follow cultures  On cefepime  Jimenez changed in ER     DM-  On sliding scale insulin. HTN -   Continue with home meds,   Monitor BP     CKD stage 3 -  Follow renal function. DVT prophylaxis with heparin     PT/OT    Estimated length of stay : 2-3 days    CC: Chills, fatigue       HPI:     Shraddha Francois is a 78 y.o. male with diabetes, hypertension, CKD stage III, hyperlipidemia, chronic indwelling Jimenez catheter presents to ER with concerns of fatigue, chills, confusion. Patient reports that he has been having chills. Poor historian not able to provide detailed history. Per reports EMS was called and per wife patient has been confused for the past several days, generalized weakness and fatigue. He has history of recurrent UTI in the past secondary to catheter. His past cultures grew Pseudomonas.   He denies any chest pain, shortness of breath, fever, abdominal pain, nausea, vomiting. In ER his vital signs relatively stable. His WBC elevated at 22.9, BUN/creatinine of 33/1. 85. UA with evidence of UTI    Past Medical History:   Diagnosis Date    Atherosclerosis     Diabetes ([address] 2013    type 2    High cholesterol     Hyperlipidemia     Hypertension 2013    Muscle weakness     Neoplasm     right kidney    Severe pulmonary hypertension (HCC)     Spinal stenosis     Urinary retention     Vitamin D deficiency        Past Surgical History:   Procedure Laterality Date    HX HEENT      40 years ago deviated septum    HX LUMBAR LAMINECTOMY  2017    HX ORTHOPAEDIC      Lumbar laminectomy 9/7/17    HX ORTHOPAEDIC Right     CTR       Family History   Problem Relation Age of Onset    Heart Disease Father        Social History     Socioeconomic History    Marital status:    Tobacco Use    Smoking status: Former Smoker    Smokeless tobacco: Never Used   Substance and Sexual Activity    Alcohol use: No    Drug use: No       Prior to Admission medications    Medication Sig Start Date End Date Taking? Authorizing Provider   pregabalin (Lyrica) 25 mg capsule Take 75 mg by mouth. Indications: restless legs syndrome, an extreme discomfort in the calf muscles when sitting or lying down    Provider, Historical   atorvastatin (Lipitor) 20 mg tablet Take 20 mg by mouth nightly. Provider, Historical   traZODone (DESYREL) 50 mg tablet Take 50 mg by mouth nightly. Provider, Historical   metoprolol succinate (TOPROL-XL) 25 mg XL tablet Take 1 Tab by mouth nightly. 3/21/20   Other, MD Katelynn   aspirin 81 mg chewable tablet Take 81 mg by mouth daily. Other, MD Katelynn   lisinopril-hydroCHLOROthiazide (PRINZIDE, ZESTORETIC) 20-25 mg per tablet Take  by mouth daily. Provider, Historical   metFORMIN (GLUCOPHAGE) 500 mg tablet Take 500 mg by mouth two (2) times daily (with meals).   Patient not taking: Reported on 8/23/2021    Provider, Historical   cholecalciferol, vitamin D3, (VITAMIN D3) 2,000 unit tab Take  by mouth. Provider, Historical   multivitamin (ONE A DAY) tablet Take 1 Tab by mouth daily. Provider, Historical       Allergies   Allergen Reactions    Zocor [Simvastatin] Other (comments)     Made him \"ill\"       Review of Systems  Gen: No fever, chills, malaise, weight loss/gain. Heent: No headache, rhinorrhea, epistaxis, ear pain, hearing loss, sinus pain, neck pain/stiffness, sore throat. Heart: No chest pain, palpitations, CHAVARRIA, pnd, or orthopnea. Resp: No cough, hemoptysis, wheezing and shortness of breath. GI: No nausea, vomiting, diarrhea, constipation, melena or hematochezia. : See above  Derm: No rash, new skin lesion or pruritis. Musc/skeletal: no bone or joint complains. Vasc: No edema, cyanosis or claudication. Endo: No heat/cold intolerance, no polyuria,polydipsia or polyphagia. Neuro: No unilateral weakness, numbness, tingling. No seizures. Heme: No easy bruising or bleeding. Physical Exam:     Physical Exam:  Visit Vitals  BP (!) 123/56 (BP 1 Location: Right upper arm, BP Patient Position: At rest)   Pulse 88   Temp 97.8 °F (36.6 °C)   Resp 18   Ht 5' 11\" (1.803 m)   Wt 59 kg (130 lb)   SpO2 97%   BMI 18.13 kg/m²      O2 Device: None (Room air)    Temp (24hrs), Av.1 °F (36.7 °C), Min:97.8 °F (36.6 °C), Max:98.7 °F (37.1 °C)    No intake/output data recorded.  0701 -  1900  In: 7325 [I.V.:1780]  Out: -     General:  Awake, cooperative, no distress. Head:  Normocephalic, without obvious abnormality, atraumatic. Eyes:  Conjunctivae/corneas clear, sclera anicteric, PERRL, EOMs intact. Nose: Nares normal. No drainage or sinus tenderness. Throat: Lips, mucosa, and tongue normal.    Neck: Supple, symmetrical, trachea midline, no adenopathy. Lungs:   Clear to auscultation bilaterally. Heart:   S1, S2, no murmur, click, rub or gallop. Abdomen: Soft, non-tender.  Bowel sounds normal. No masses,  No organomegaly. Extremities: Extremities normal, atraumatic, no cyanosis or edema. Capillary refill normal.   Pulses: 2+ and symmetric all extremities. Skin: Skin color pink, turgor normal. No rashes or lesions   Neurologic: CNII-XII intact. No focal motor or sensory deficit. Labs Reviewed:    CMP:   Lab Results   Component Value Date/Time     11/23/2021 01:45 PM    K 4.2 11/23/2021 01:45 PM     11/23/2021 01:45 PM    CO2 28 11/23/2021 01:45 PM    AGAP 7 11/23/2021 01:45 PM     (H) 11/23/2021 01:45 PM    BUN 33 (H) 11/23/2021 01:45 PM    CREA 1.85 (H) 11/23/2021 01:45 PM    GFRAA 44 (L) 11/23/2021 01:45 PM    GFRNA 36 (L) 11/23/2021 01:45 PM    CA 10.8 (H) 11/23/2021 01:45 PM    ALB 3.6 11/23/2021 01:45 PM    TP 8.6 (H) 11/23/2021 01:45 PM    GLOB 5.0 (H) 11/23/2021 01:45 PM    AGRAT 0.7 (L) 11/23/2021 01:45 PM    ALT 22 11/23/2021 01:45 PM     CBC:   Lab Results   Component Value Date/Time    WBC 22.9 (H) 11/23/2021 01:45 PM    HGB 12.7 (L) 11/23/2021 01:45 PM    HCT 41.3 11/23/2021 01:45 PM     11/23/2021 01:45 PM         Procedures/imaging: see electronic medical records for all procedures/Xrays and details which were not copied into this note but were reviewed prior to creation of Plan    Please note that this dictation was completed with grabHalo, the [employer] voice recognition software. Quite often unanticipated grammatical, syntax, homophones, and other interpretive errors are inadvertently transcribed by the computer software. Please disregard these errors. Please excuse any errors that have escaped final proofreading.         CC: Aramis Maurer MD

## 2021-11-24 NOTE — PROGRESS NOTES
Comprehensive Nutrition Assessment    Type and Reason for Visit: Initial    Nutrition Recommendations/Plan: Will order Glucerna TID to help meet nutritional needs. Nutrition Assessment:  PMHx: T2DM, HTN, CKD stage 3, hyperlipidemia, chronic indwelling alonso catheter. Presented with fatigue, chills, and confusion. Patient admitted with sepsis and UTI. Malnutrition Assessment:  Malnutrition Status: At risk for malnutrition    Estimated Daily Nutrient Needs:  Energy (kcal): 1770; Weight Used for Energy Requirements: Current  Protein (g): 47-59; Weight Used for Protein Requirements: Current (0.8-1g)  Fluid (ml/day): 1770; Method Used for Fluid Requirements: 1 ml/kcal    Nutrition Related Findings:  Lab: GFR 42, BUN 32, creatinine 1.60. med: lactated ringers. No BM yet. patient currently on regular diet with 4 CHO. No PO documented at this time. Review pt weight hx, did not note any significant weight loss (130lb per last RD note). Attempted to see pt but was busy. This RDN seen the pt in 7/2021 and noted severe malnutrition - suspects the pt will be severely malnourished during this admission as well. Will follow up with pt at a better time to complete NFPE.      Wounds:    None       Current Nutrition Therapies:  ADULT DIET Regular; 4 carb choices (60 gm/meal)    Anthropometric Measures:  · Height:  5' 11\" (180.3 cm)  · Current Body Wt:  59 kg (130 lb 1.1 oz)   · Admission Body Wt:  130 lb    · Usual Body Wt:  61.2 kg (135 lb) (4/2021)     · Ideal Body Wt:  172 lbs:  75.6 %   · BMI Category:  Underweight (BMI less than 22) age over 72       Nutrition Diagnosis:   · Underweight related to inadequate protein-energy intake as evidenced by BMI    Nutrition Interventions:   Food and/or Nutrient Delivery: Continue current diet, Start oral nutrition supplement  Nutrition Education and Counseling: No recommendations at this time  Coordination of Nutrition Care: Continue to monitor while inpatient    Goals:  PO intake >50% of estimated nutritional needs within the next 2-3 days.        Nutrition Monitoring and Evaluation:   Behavioral-Environmental Outcomes: None identified  Food/Nutrient Intake Outcomes: Food and nutrient intake, Supplement intake  Physical Signs/Symptoms Outcomes: Biochemical data, Meal time behavior, Nutrition focused physical findings, Skin, Weight, GI status, Nausea/vomiting    Discharge Planning:    Continue current diet     Electronically signed by Tiffanie Begum RD on 11/24/2021 at 10:57 AM

## 2021-11-24 NOTE — PROGRESS NOTES
Bedside and Verbal shift change report given to Niyah Hinojosa RN (oncoming nurse) by Malik Rubio (offgoing nurse). Report included the following information SBAR, Kardex, ED Summary, Intake/Output, MAR, Recent Results, Med Rec Status and Cardiac Rhythm NSR.     1930 Patient assessment completed. Patient is resting quietly with eyes wide open and chest rising and falling evenly. No signs of pain or complaints of discomfort. 2100 Hospitalist called due to blood in urine and heparin subcutaneous ordered. Heparin will be held until further evaluation and no evidence of bleeding. Fluids  Started due to high lactic levels. Patient is resting quietly with eyes wide open and chest rising and falling evenly. 0500 Blood pressure is 77/42. Hospitalist notified and orders were received. 9236 After the first bolus, BP was still low. Started a second bolus and O2 dropped. Patient is now on 5 L NC with BP still low.

## 2021-11-24 NOTE — PROGRESS NOTES
Patient transferred to ICU to monitor blood pressure. Patient in bed with call bell in reach. Bed in locked/lowest position. Patient able to turn himself to prevent skin breakdown. Patient remains pain free at this time.

## 2021-11-24 NOTE — PROGRESS NOTES
Care Management    Reason for Admission: Sepsis    Chart reviewed. Per H&P: Yas Miller is a 78 y.o. male with diabetes, hypertension, CKD stage III, hyperlipidemia, chronic indwelling Jimenez catheter presents to ER with concerns of fatigue, chills, confusion. Patient reports that he has been having chills. Poor historian not able to provide detailed history. Per reports EMS was called and per wife patient has been confused for the past several days, generalized weakness and fatigue. He has history of recurrent UTI in the past secondary to catheter. His past cultures grew Pseudomonas. He denies any chest pain, shortness of breath, fever, abdominal pain, nausea, vomiting. In ER his vital signs relatively stable. His WBC elevated at 22.9, BUN/creatinine of 33/1. 85. UA with evidence of UTI    Prior to admission patient was living: with spouse    Prior to admission patient was using the following DME:  Rollator and walker                   RUR Score:           %         Plan for utilizing home health:   TBD, has [address] Vaccine Status: fully vaccinated    PCP: First and Last name: Duong Bautista MD    Name of Practice:    Are you a current patient: Yes/No: yes   Approximate date of last visit: April/May   Can you participate in a virtual visit with your PCP: Yes                    Current Advanced Directive/Advance Care Plan: Full Code    Healthcare Decision Maker:   Primary Decision Maker: Avelina Hennessy - Spouse - [phone]  Click here to complete [employer] including selection of the Healthcare Decision Maker Relationship (ie \"Primary\")                         Transition of Care Plan: In progress    Care Management/Patient Conversation:CM spoke with patient's spouse who confirmed contact info and PCP, whom the patient last saw in April/May.  Spouse states that patient uses a rollator and walker at home, does not drive and will have a ride available at discharge. The spouse states that the patient is active with [facility], whom come out o the home once every 4 weeks to change the patient's urinary catheter. Spouse states that she has asked the [address] [place] to come out more frequently as sometime the patient will \"collapse on the floor\" and she wants help to get him up but she states the [address] [place] can not always come to help. CM asked if patient still wants to use [facility] and she stated that she did want to stay with them. Patient is fully vaccinated against Covid with [facility]. Patient does not use home O2. Care Management Interventions  PCP Verified by CM: Yes  Last Visit to PCP: 05/03/21  Mode of Transport at Discharge:  (family)  Transition of Care Consult (CM Consult): Discharge Planning, [address]: No  Reason Outside [place]: Patient already serviced by other home care/hospice agency  Support Systems: Spouse/Significant Other  Confirm Follow Up Transport: Family  The Plan for Transition of Care is Related to the Following Treatment Goals : TBD; home with phsycian follow up and [facility] with possible home O2  Discharge Location  Discharge Placement:  (home with physician follow up and [facility] and possible home O2)      .

## 2021-11-25 NOTE — PROGRESS NOTES
Hospitalist Progress Note    Patient: Hiral Myles MRN: [id number]  CSN: [id number]    YOB: 1945  Age: 78 y.o.   Sex: male    DOA: 11/23/2021 LOS:  LOS: 2 days          Chief Complaint:    Hypotension     Assessment/Plan   Sepsis  Hypotension   Hypoxemic respiratory failure  Pulmonary hypertension   Suspect COPD exacerbation     covid-19 rapid test negative    Hypotension-  Transferred to ICU 11/24/21  BP improved significantly overnight, stable to be transferred back to telemetry     Sepsis -  Secondary to CAUTI  Lactic acid improved  WBC improving     Acute on chronic kidney disease, stage III -  Likely due to CAUTI  Creatinine improving     UTI -  Catheter associated   Past cultures grew Pseudomonas.     Follow cultures  On cefepime  Jimenez changed in ER     DM-  On sliding scale insulin.     HTN -   Continue with home meds,   Monitor BP     CKD stage 3 -  Follow renal function.     DVT prophylaxis with heparin     PT/OT     Estimated length of stay : 2-3 days    Disposition :  Patient Active Problem List   Diagnosis Code    Lumbar spinal stenosis M48.061    Lumbar spondylosis M47.816    Radiculopathy of leg M54.10    Unable to ambulate R26.2    Urinary retention R33.9    Type II diabetes mellitus with manifestations, uncontrolled ([facility] [address] E11.8, E11.65    Renal mass N28.89    UTI (urinary tract infection) due to urinary indwelling Jimenez catheter ([facility]) T83.511A, N39.0    Compression fracture of L1 vertebra ([facility]) S32.010A    Aneurysm of infrarenal abdominal aorta ([facility]) I71.4    HTN (hypertension) I10    Severe protein-calorie malnutrition (HCC) E43    Severe pulmonary hypertension (HCC) I27.20    Sepsis (HCC) A41.9    Hypotension I95.9    Pulmonary hypertension (HCC) I27.20    History of tobacco abuse Z87.891    Pulmonary emphysema (HCC) J43.9    Acute hypoxemic respiratory failure (HCC) J96.01       Subjective:    Very happy to be leaving the ICU  Denies cp, sob, dizziness, lightheadness    Review of systems:    Constitutional: denies fevers, chills, myalgias  Respiratory: denies SOB, cough  Cardiovascular: denies chest pain, palpitations  Gastrointestinal: denies nausea, vomiting, diarrhea      Vital signs/Intake and Output:  Visit Vitals  BP (!) 143/76   Pulse 86   Temp 97.8 °F (36.6 °C)   Resp 19   Ht 5' 11\" (1.803 m)   Wt 59 kg (130 lb)   SpO2 100%   BMI 18.13 kg/m²     Current Shift:  11/25 0701 - 11/25 1900  In: 294.2 [I.V.:294.2]  Out: -   Last three shifts:  11/23 1901 - 11/25 0700  In: 497.9 [I.V.:497.9]  Out: 2900 [Urine:2900]    Exam:    General: Well developed, alert, older WM, NAD, OX3  Head/Neck: NCAT, supple, No masses, No lymphadenopathy  CVS:Regular rate and rhythm, no M/R/G, S1/S2 heard, no thrill  Lungs:Clear to auscultation bilaterally, no wheezes, rhonchi, or rales  Abdomen: Soft, Nontender, No distention, Normal Bowel sounds, No hepatomegaly  Extremities: No C/C/E, pulses palpable 2+  Skin:normal texture and turgor, no rashes, no lesions  Neuro:grossly normal , follows commands  Psych:appropriate                Labs: Results:       Chemistry Recent Labs     11/25/21  0443 11/24/21  2316 11/24/21  1315 11/24/21  0300 11/24/21  0300 11/23/21  1345 11/23/21  1345   GLU 88  --   --   --  84  --  141*     --   --   --  142  --  140   K 4.5 4.2 3.8   < > 4.0   < > 4.2   *  --   --   --  110  --  105   CO2 26  --   --   --  26  --  28   BUN 32*  --   --   --  32*  --  33*   CREA 1.34*  --   --   --  1.60*  --  1.85*   CA 8.7  --   --   --  8.6  --  10.8*   AGAP 6  --   --   --  6  --  7   BUCR 24*  --   --   --  20  --  18   AP  --   --   --   --   --   --  125*   TP  --   --   --   --   --   --  8.6*   ALB  --   --   --   --   --   --  3.6   GLOB  --   --   --   --   --   --  5.0*   AGRAT  --   --   --   --   --   --  0.7*    < > = values in this interval not displayed.       CBC w/Diff Recent Labs     11/25/21  0443 11/24/21  0300 11/23/21  5467 WBC 12.7 16.3* 22.9*   RBC 2.99* 3.31* 4.83   HGB 8.0* 8.5* 12.7*   HCT 25.9* 27.5* 41.3    189 279   GRANS 65 69 83*   LYMPH 19* 17* 7*   EOS 4 2 0      Cardiac Enzymes Recent Labs     11/24/21  1247      CKND1 1.0      Coagulation Recent Labs     11/24/21  1432   PTP 15.8*   INR 1.3*       Lipid Panel Lab Results   Component Value Date/Time    Cholesterol, total 146 06/01/2016 07:51 AM    HDL Cholesterol 40 06/01/2016 07:51 AM    LDL, calculated 91.6 06/01/2016 07:51 AM    VLDL, calculated 14.4 06/01/2016 07:51 AM    Triglyceride 72 06/01/2016 07:51 AM    CHOL/HDL Ratio 3.7 06/01/2016 07:51 AM      BNP No results for input(s): BNPP in the last 72 hours.    Liver Enzymes Recent Labs     11/23/21  1345   TP 8.6*   ALB 3.6   *      Thyroid Studies No results found for: T4, T3U, TSH, TSHEXT, TSHEXT     Procedures/imaging: see electronic medical records for all procedures/Xrays and details which were not copied into this note but were reviewed prior to creation of Renu Reeder MD

## 2021-11-25 NOTE — PROGRESS NOTES
Patient Name: Jason Alexander   Age: 78 y.o. Sex:  male  YOB: 1945   Medical Record Number: [id number]      Antimicrobial  Vancomycin   Indication UTI   Dose / Interval           Current Antimicrobial Therapy (168h ago, onward)       Ordered     Start Stop    11/25/21 0643  vancomycin (VANCOCIN) 750 mg in 0.9% sodium chloride 250 mL (VIAL-MATE)  750 mg,   IntraVENous,   EVERY 18 HOURS        References:&nbsp;&nbsp;&nbsp;&nbsp; Lexicomp    11/25/21 0900 12/02/21 2059 11/23/21 1746  cefepime (MAXIPIME) 2 g in sterile water (preservative free) 10 mL IV syringe  2 g,   IntraVENous,   EVERY 24 HOURS        References:&nbsp;&nbsp;&nbsp;&nbsp; Lexicomp    11/24/21 1500 --                     Last Level (if applicable) No results found for: DOSE, TMG, DTG  Vancomycin   Lab Results   Component Value Date/Time    Vancomycin,trough 7.9 (L) 07/23/2021 11:37 PM    Vancomycin, random 5.5 11/25/2021 04:43 AM      Gentamicin   No results found for: GENP, GENT, GENR]  Tobramycin   No results found for: TOBP, TOBT, TOBR   Amikacin   No results found for: AMIKP, DAMIKP, AMIKT, DAMIKT, DAMIKR     Cultures (7 most recent)   Culture result:   Date Value Ref Range Status   11/23/2021 NO GROWTH AFTER 16 HOURS   Preliminary   11/23/2021 NO GROWTH AFTER 16 HOURS   Preliminary   08/18/2021 No growth (<1,000 CFU/ML)   Final   08/06/2021 NEHEMIAS ALBICANS (A)   Final   07/20/2021 NO GROWTH 6 DAYS   Final   07/20/2021 NO GROWTH 6 DAYS   Final   07/20/2021 PSEUDOMONAS AERUGINOSA (A)   Final      Renal Overview (72 hr)         Recent Labs     11/25/21  0443 11/24/21  0300 11/23/21  1345   BUN 32* 32* 33*   CREA 1.34* 1.60* 1.85*       CrCl (Current): Estimated Creatinine Clearance: 40.4 mL/min (A) (based on SCr of 1.34 mg/dL (H)).       Lactic Acid    (Sepsis Criteria: >2.0 mmol/L) Lab Results   Component Value Date/Time    Lactic acid 2.2 (HH) 11/24/2021 12:47 PM    Lactic acid 1.4 11/23/2021 09:45 PM    Lactic acid 1.9 2020 02:55 PM      Procalcitonin (0.10-0.49 NG/ML) Lab Results   Component Value Date/Time    Procalcitonin 0.12 2021 08:28 PM      Hepatic Overview (72 hr) Recent Labs     21  1345   ALT 22   *      Temp (24-hr Max) Temp (24hrs), Av.8 °F (36.6 °C), Min:97.6 °F (36.4 °C), Max:98.2 °F (36.8 °C)       Hematology Recent Labs     21  0443 21  1247 21  0300 21  2145 21  1345   WBC 12.7  --  16.3*  --  22.9*   HGB 8.0*  --  8.5*  --  12.7*   HCT 25.9*  --  27.5*  --  41.3     --  189  --  279   GRANS 65  --  69  --  83*   ANEU 8.2*  --  11.2*  --  19.0*   LAC  --  2.2*  --  1.4  --         DOT  1     Notes   Regimen: 750 mg IV every 18 hours.   Start time: 06:44 on 2021  Exposure target: AUC24 (range)400-600 mg/L.hr   AUC24,ss: 493 mg/L.hr  Probability of AUC24 > 400: 74 %  Ctrough,ss: 16.1 mg/L  Probability of Ctrough,ss > 20: 28 %  Probability of nephrotoxicity (Lodise DAYRON ): 11 %           [facility]  Clinical Pharmacist  [phone]

## 2021-11-25 NOTE — CONSULTS
Cardiolology  Inpatient Consult      Patient: Yuli Powers               Sex: male          DOA: 11/23/2021       YOB: 1945      Age:  78 y.o.        LOS:  LOS: 2 days      Yuli Powers is a 78 y.o. male admitted for Sepsis ([address] [A41.9]  UTI (urinary tract infection) [N39.0]     Recommendations:  · Agree with Right heart cath. Plan for early next week.      Impression:  · Sepsis  · Hypotension now resolved  · Multiple other problems as enumerated below    Patient Active Problem List    Diagnosis Date Noted    Hypotension 11/24/2021    Pulmonary hypertension ([address] 11/24/2021    History of tobacco abuse 11/24/2021    Pulmonary emphysema ([address] 11/24/2021    Acute hypoxemic respiratory failure ([address] 11/24/2021    Sepsis ([address] 11/23/2021    Severe pulmonary hypertension ([address]     Severe protein-calorie malnutrition ([address] 07/21/2021    Aneurysm of infrarenal abdominal aorta ([address] 07/20/2021    HTN (hypertension) 07/20/2021    Compression fracture of L1 vertebra ([address] 05/17/2021    UTI (urinary tract infection) due to urinary indwelling Jimenez catheter ([address] 05/01/2020    Type II diabetes mellitus with manifestations, uncontrolled ([address] 09/16/2017    Renal mass 09/16/2017    Unable to ambulate 09/14/2017    Urinary retention 09/14/2017    Lumbar spinal stenosis 09/05/2017    Lumbar spondylosis 09/05/2017    Radiculopathy of leg 09/05/2017      Rex Serrano MD  Past Medical History:   Diagnosis Date    Atherosclerosis     Diabetes ([address] 2013    type 2    High cholesterol     Hyperlipidemia     Hypertension 2013    Muscle weakness     Neoplasm     right kidney    Severe pulmonary hypertension ([address]     Spinal stenosis     Urinary retention     Vitamin D deficiency       Past Surgical History:   Procedure Laterality Date    HX HEENT      40 years ago deviated septum    HX LUMBAR LAMINECTOMY  2017    HX ORTHOPAEDIC      Lumbar laminectomy 9/7/17    HX ORTHOPAEDIC Right     CTR     Allergies   Allergen Reactions    Zocor [Simvastatin] Other (comments)     Made him \"ill\"      Family History   Problem Relation Age of Onset    Heart Disease Father       Current Facility-Administered Medications   Medication Dose Route Frequency    vancomycin (VANCOCIN) 750 mg in 0.9% sodium chloride 250 mL (VIAL-MATE)  750 mg IntraVENous Q18H    [START ON 11/26/2021] Vancomycin Random Level 11/26/21 1000  1 Each Other ONCE    furosemide (LASIX) injection 20 mg  20 mg IntraVENous DAILY    albumin human 25% (BUMINATE) solution 25 g  25 g IntraVENous Q6H    Vancomycin-Pharmacy to dose  1 Each Other Rx Dosing/Monitoring    ELECTROLYTE REPLACEMENT PROTOCOL - Potassium Standard Dosing   1 Each Other PRN    ELECTROLYTE REPLACEMENT PROTOCOL - Magnesium   1 Each Other PRN    ELECTROLYTE REPLACEMENT PROTOCOL - Phosphorus  Standard Dosing  1 Each Other PRN    ELECTROLYTE REPLACEMENT PROTOCOL - Calcium   1 Each Other PRN    arformoteroL (BROVANA) neb solution 15 mcg  15 mcg Nebulization BID RT    budesonide (PULMICORT) 500 mcg/2 ml nebulizer suspension  500 mcg Nebulization BID RT    sodium chloride (NS) flush 5-10 mL  5-10 mL IntraVENous PRN    cefepime (MAXIPIME) 2 g in sterile water (preservative free) 10 mL IV syringe  2 g IntraVENous Q24H    aspirin chewable tablet 81 mg  81 mg Oral DAILY    [Held by provider] metoprolol succinate (TOPROL-XL) XL tablet 25 mg  25 mg Oral QHS    pregabalin (LYRICA) capsule 75 mg  75 mg Oral BID    traZODone (DESYREL) tablet 50 mg  50 mg Oral QHS    heparin (porcine) injection 5,000 Units  5,000 Units SubCUTAneous Q8H    [Held by provider] lisinopril/hydroCHLOROthiazide(PRINZIDE/ZESTORETIC) 20/25 mg   Oral DAILY    lactated Ringers infusion  25 mL/hr IntraVENous CONTINUOUS         Review of Symptoms:     Review of Systems:   HEENT: No epistaxis, no nasal drainage, no difficulty in swallowing, no redness in eyes  Respiratory: as above  Cardiovascular: no chest pain, no palpitations, no chronic leg edema, no syncope  Gastrointestinal: no abd pain, no vomiting, no diarrhea, no bleeding symptoms  Genitourinary: No urinary symptoms or hematuria  Musculoskeletal: Neg  Neurological: No focal weakness, no seizures, no headaches  Behvioral/Psych: No anxiety, no depression  General : No fever, no chills, no weight loss, no night sweats        Subjective:  Yas Miller is a 78 y.o. male with diabetes, hypertension, CKD stage III, hyperlipidemia, chronic indwelling Jimenez catheter presents to ER with concerns of fatigue, chills, confusion. Patient reports that he has been having chills. Poor historian not able to provide detailed history. Per reports EMS was called and per wife patient has been confused for the past several days, generalized weakness and fatigue. He has history of recurrent UTI in the past secondary to catheter. His past cultures grew Pseudomonas. He denies any chest pain, shortness of breath, fever, abdominal pain, nausea, vomiting. In ER his vital signs relatively stable. His WBC elevated at 22.9, BUN/creatinine of 33/1. 85. UA with evidence of UTI. Now hypotension has resolved. Echo shows severe pulm hypertension worse than on previous echo. I was asked to see for right heart cath. Plan right heart cath early next week. .  Cardiac risk factors: extant. Physical Exam    Visit Vitals  BP (!) 121/107   Pulse (!) 106   Temp 97.8 °F (36.6 °C)   Resp 22   Ht 5' 11\" (1.803 m)   Wt 59 kg (130 lb)   SpO2 92%   BMI 18.13 kg/m²     Impresson general : Alert, Awake, in mild respiratory distress  Head:               Normocephalic,atraumatic. ENT:                EOM , no scleral icterus, no pallor, no cyanosis. Nose:               No sinus tenderness  Throat:             Oropharynx ,mucosa, and tongue normal. No oral thrush. Neck:               Supple, symmetric. Lymph nodes.  Trachea is midline  Lung: Moderate air entry bilateral equal  eboni  rales. No rhonchi. eboni mild  wheezing. No stridors. No prolongded expiration. No accessory muscle use. Heart:              Regular  rate & rhythm. S1 S2 present. No murmur. No JVD. Abdomen:        Soft. NT. ND. +BS. No masses. liver  and spleen  Extremities:     No pedal edema. No cyanosis. No clubbing. Pulses:            2+ and symmetric in DP. Capillary refill: normal  Lymphatic:       neck and supraclavicular          Musculoskeletal: No joint swelling. No tenderness. Skin:                Lesion Color, texture, turgor normal. No rashes or lesions.                                   Cardiographics    Telemetry: normal sinus rhythm  ECG: No acute change  Echocardiogram: See report    Recent radiology, intake/output and wt reviewed    Labs:   Recent Results (from the past 48 hour(s))   EKG, 12 LEAD, INITIAL    Collection Time: 11/23/21  1:37 PM   Result Value Ref Range    Ventricular Rate 98 BPM    Atrial Rate 98 BPM    P-R Interval 140 ms    QRS Duration 100 ms    Q-T Interval 348 ms    QTC Calculation (Bezet) 444 ms    Calculated P Axis 46 degrees    Calculated R Axis -62 degrees    Calculated T Axis 55 degrees    Diagnosis       Normal sinus rhythm  Left axis deviation  Nonspecific ST abnormality  Abnormal ECG  When compared with ECG of 20-JUL-2021 17:05,  premature ventricular complexes are no longer present  Nonspecific T wave abnormality no longer evident in Inferior leads  Confirmed by Rosana Galeano MD, -- ([id number]) on 11/23/2021 3:01:59 PM     CULTURE, BLOOD    Collection Time: 11/23/21  1:45 PM    Specimen: Blood   Result Value Ref Range    Special Requests: NO SPECIAL REQUESTS      Culture result: NO GROWTH 2 DAYS     CULTURE, BLOOD    Collection Time: 11/23/21  1:45 PM    Specimen: Blood   Result Value Ref Range    Special Requests: NO SPECIAL REQUESTS      Culture result: NO GROWTH 2 DAYS     URINALYSIS W/ RFLX MICROSCOPIC    Collection Time: 11/23/21  1:45 PM Result Value Ref Range    Color DARK YELLOW      Appearance TURBID      Specific gravity 1.015 1.005 - 1.030      pH (UA) 6.0 5.0 - 8.0      Protein 300 (A) NEG mg/dL    Glucose Negative NEG mg/dL    Ketone Negative NEG mg/dL    Bilirubin Negative NEG      Blood LARGE (A) NEG      Urobilinogen 1.0 0.2 - 1.0 EU/dL    Nitrites Positive (A) NEG      Leukocyte Esterase LARGE (A) NEG     METABOLIC PANEL, COMPREHENSIVE    Collection Time: 11/23/21  1:45 PM   Result Value Ref Range    Sodium 140 136 - 145 mmol/L    Potassium 4.2 3.5 - 5.5 mmol/L    Chloride 105 100 - 111 mmol/L    CO2 28 21 - 32 mmol/L    Anion gap 7 3.0 - 18 mmol/L    Glucose 141 (H) 74 - 99 mg/dL    BUN 33 (H) 7.0 - 18 MG/DL    Creatinine 1.85 (H) 0.6 - 1.3 MG/DL    BUN/Creatinine ratio 18 12 - 20      GFR est AA 44 (L) >60 ml/min/1.73m2    GFR est non-AA 36 (L) >60 ml/min/1.73m2    Calcium 10.8 (H) 8.5 - 10.1 MG/DL    Bilirubin, total 0.8 0.2 - 1.0 MG/DL    ALT (SGPT) 22 16 - 61 U/L    AST (SGOT) 25 10 - 38 U/L    Alk. phosphatase 125 (H) 45 - 117 U/L    Protein, total 8.6 (H) 6.4 - 8.2 g/dL    Albumin 3.6 3.4 - 5.0 g/dL    Globulin 5.0 (H) 2.0 - 4.0 g/dL    A-G Ratio 0.7 (L) 0.8 - 1.7     CBC WITH AUTOMATED DIFF    Collection Time: 11/23/21  1:45 PM   Result Value Ref Range    WBC 22.9 (H) 4.6 - 13.2 K/uL    RBC 4.83 4.35 - 5.65 M/uL    HGB 12.7 (L) 13.0 - 16.0 g/dL    HCT 41.3 36.0 - 48.0 %    MCV 85.5 78.0 - 100.0 FL    MCH 26.3 24.0 - 34.0 PG    MCHC 30.8 (L) 31.0 - 37.0 g/dL    RDW 18.8 (H) 11.6 - 14.5 %    PLATELET 178 121 - 220 K/uL    MPV 10.2 9.2 - 11.8 FL    NRBC 0.0 0  WBC    ABSOLUTE NRBC 0.00 0.00 - 0.01 K/uL    NEUTROPHILS 83 (H) 40 - 73 %    LYMPHOCYTES 7 (L) 21 - 52 %    MONOCYTES 9 3 - 10 %    EOSINOPHILS 0 0 - 5 %    BASOPHILS 0 0 - 2 %    IMMATURE GRANULOCYTES 1 (H) 0.0 - 0.5 %    ABS. NEUTROPHILS 19.0 (H) 1.8 - 8.0 K/UL    ABS. LYMPHOCYTES 1.6 0.9 - 3.6 K/UL    ABS. MONOCYTES 2.1 (H) 0.05 - 1.2 K/UL    ABS.  EOSINOPHILS 0.0 0.0 - 0.4 K/UL    ABS. BASOPHILS 0.0 0.0 - 0.1 K/UL    ABS. IMM.  GRANS. 0.2 (H) 0.00 - 0.04 K/UL    DF AUTOMATED      RBC COMMENTS ANISOCYTOSIS  1+        RBC COMMENTS POIKILOCYTOSIS  1+       URINE MICROSCOPIC ONLY    Collection Time: 11/23/21  1:45 PM   Result Value Ref Range    WBC TOO NUMEROUS TO COUNT 0 - 5 /hpf    RBC 21 to 35 0 - 5 /hpf    Epithelial cells FEW 0 - 5 /lpf    Bacteria 2+ (A) NEG /hpf   POC LACTIC ACID    Collection Time: 11/23/21  1:51 PM   Result Value Ref Range    Lactic Acid (POC) 3.30 (HH) 0.40 - 2.00 mmol/L   POC LACTIC ACID    Collection Time: 11/23/21  2:23 PM   Result Value Ref Range    Lactic Acid (POC) 3.13 (HH) 0.40 - 2.00 mmol/L   GLUCOSE, POC    Collection Time: 11/23/21  5:34 PM   Result Value Ref Range    Glucose (POC) 92 70 - 110 mg/dL   CULTURE, URINE    Collection Time: 11/23/21  8:15 PM    Specimen: Clean catch; Urine   Result Value Ref Range    Special Requests: NO SPECIAL REQUESTS      Culture result: No significant growth, <10,000 CFU/mL     GLUCOSE, POC    Collection Time: 11/23/21  9:07 PM   Result Value Ref Range    Glucose (POC) 116 (H) 70 - 110 mg/dL   LACTIC ACID    Collection Time: 11/23/21  9:45 PM   Result Value Ref Range    Lactic acid 1.4 0.4 - 2.0 MMOL/L   METABOLIC PANEL, BASIC    Collection Time: 11/24/21  3:00 AM   Result Value Ref Range    Sodium 142 136 - 145 mmol/L    Potassium 4.0 3.5 - 5.5 mmol/L    Chloride 110 100 - 111 mmol/L    CO2 26 21 - 32 mmol/L    Anion gap 6 3.0 - 18 mmol/L    Glucose 84 74 - 99 mg/dL    BUN 32 (H) 7.0 - 18 MG/DL    Creatinine 1.60 (H) 0.6 - 1.3 MG/DL    BUN/Creatinine ratio 20 12 - 20      GFR est AA 51 (L) >60 ml/min/1.73m2    GFR est non-AA 42 (L) >60 ml/min/1.73m2    Calcium 8.6 8.5 - 10.1 MG/DL   CBC WITH AUTOMATED DIFF    Collection Time: 11/24/21  3:00 AM   Result Value Ref Range    WBC 16.3 (H) 4.6 - 13.2 K/uL    RBC 3.31 (L) 4.35 - 5.65 M/uL    HGB 8.5 (L) 13.0 - 16.0 g/dL    HCT 27.5 (L) 36.0 - 48.0 %    MCV 83.1 78.0 - 100.0 FL    MCH 25.7 24.0 - 34.0 PG    MCHC 30.9 (L) 31.0 - 37.0 g/dL    RDW 18.8 (H) 11.6 - 14.5 %    PLATELET 503 665 - 807 K/uL    MPV 10.8 9.2 - 11.8 FL    NRBC 0.0 0  WBC    ABSOLUTE NRBC 0.00 0.00 - 0.01 K/uL    NEUTROPHILS 69 40 - 73 %    LYMPHOCYTES 17 (L) 21 - 52 %    MONOCYTES 12 (H) 3 - 10 %    EOSINOPHILS 2 0 - 5 %    BASOPHILS 0 0 - 2 %    IMMATURE GRANULOCYTES 0 0.0 - 0.5 %    ABS. NEUTROPHILS 11.2 (H) 1.8 - 8.0 K/UL    ABS. LYMPHOCYTES 2.8 0.9 - 3.6 K/UL    ABS. MONOCYTES 2.0 (H) 0.05 - 1.2 K/UL    ABS. EOSINOPHILS 0.2 0.0 - 0.4 K/UL    ABS. BASOPHILS 0.1 0.0 - 0.1 K/UL    ABS. IMM.  GRANS. 0.1 (H) 0.00 - 0.04 K/UL    DF AUTOMATED     MAGNESIUM    Collection Time: 11/24/21  3:00 AM   Result Value Ref Range    Magnesium 1.7 1.6 - 2.6 mg/dL   GLUCOSE, POC    Collection Time: 11/24/21  6:09 AM   Result Value Ref Range    Glucose (POC) 96 70 - 110 mg/dL   BLOOD GAS, ARTERIAL POC    Collection Time: 11/24/21 10:54 AM   Result Value Ref Range    Device: NASAL CANNULA      FIO2 (POC) 4 %    pH (POC) 7.45 7.35 - 7.45      pCO2 (POC) 33.7 (L) 35.0 - 45.0 MMHG    pO2 (POC) 82 80 - 100 MMHG    HCO3 (POC) 23.2 22 - 26 MMOL/L    sO2 (POC) 96.6 92 - 97 %    Base deficit (POC) 0.7 mmol/L    Allens test (POC) Positive      Site RIGHT RADIAL      Specimen type (POC) ARTERIAL      Performed by Sadia Lozano Respiratory    EKG, 12 LEAD, INITIAL    Collection Time: 11/24/21 10:54 AM   Result Value Ref Range    Ventricular Rate 92 BPM    Atrial Rate 92 BPM    P-R Interval 152 ms    QRS Duration 100 ms    Q-T Interval 388 ms    QTC Calculation (Bezet) 479 ms    Calculated P Axis 78 degrees    Calculated R Axis -18 degrees    Calculated T Axis 43 degrees    Diagnosis       Sinus rhythm with frequent premature ventricular complexes in a pattern of   bigeminy  Otherwise normal ECG  When compared with ECG of 23-NOV-2021 13:37,  premature ventricular complexes are now present  QRS axis shifted right  Confirmed by Tuan Del Rio MD, -- ([id number]) on 11/24/2021 12:45:36 PM     LACTIC ACID    Collection Time: 11/24/21 12:47 PM   Result Value Ref Range    Lactic acid 2.2 (HH) 0.4 - 2.0 MMOL/L   CARDIAC PANEL,(CK, CKMB & TROPONIN)    Collection Time: 11/24/21 12:47 PM   Result Value Ref Range    CK - MB 2.3 <3.6 ng/ml    CK-MB Index 1.0 0.0 - 4.0 %     39 - 308 U/L    Troponin-I, QT <0.02 0.0 - 0.045 NG/ML   POTASSIUM    Collection Time: 11/24/21  1:15 PM   Result Value Ref Range    Potassium 3.8 3.5 - 5.5 mmol/L   MAGNESIUM    Collection Time: 11/24/21  1:15 PM   Result Value Ref Range    Magnesium 1.5 (L) 1.6 - 2.6 mg/dL   NT-PRO BNP    Collection Time: 11/24/21  1:15 PM   Result Value Ref Range    NT pro-BNP 2,400 (H) 0 - 900 PG/ML   ECHO ADULT COMPLETE    Collection Time: 11/24/21  1:43 PM   Result Value Ref Range    IVSd 1.11 (A) 0.6 - 1.0 cm    LVIDd 3.71 (A) 4.2 - 5.9 cm    LVIDs 2.79 cm    LVOT d 2.07 cm    LVPWd 0.77 0.6 - 1.0 cm    Global Longitudinal Strain 2 Chamber -25. 8 percent    Global Longitudinal Strain 4 Chamber -11.7 percent    Global Longitudinal Strain Long Axis -13.5 percent    Global Longitudinal Strain -17.0 percent    LV Ejection Fraction MOD 4C 59 percent    LV ED Vol A4C 126.69 mL    LV ES Vol A4C 51.51 mL    LVOT Cardiac Output 11.77 liter/minute    LVOT Peak Gradient 3.01 mmHg    Left Ventricular Outflow Tract Mean Gradient 1.58 mmHg    LVOT SV 60.5 mL    LVOT Peak Velocity 86.81 cm/s    LVOT VTI 18.02 cm    RVIDd 3.28 cm    Tapse 1.98 1.5 - 2.0 cm    Left Atrium Major Axis 2.72 cm    LA Area 4C 18.04 cm2    LA Vol 4C 49.40 18 - 58 mL    Right Atrial Area 4C 22.01 cm2    Aortic Valve Area by Continuity of Peak Velocity 2.18 cm2    Aortic Valve Area by Continuity of VTI 2.77 cm2    AoV PG 7.16 mmHg    Aortic Valve Systolic Mean Gradient 6.60 mmHg    Aortic Valve Systolic Peak Velocity 501.96 cm/s    AoV VTI 21.85 cm    MV A Vicente 92.47 cm/s    MV E Vicente 53.92 cm/s    E/E' ratio (averaged) 6.81     E/E' lateral 6.13     E/E' septal 7.48     LV E' Lateral Velocity 8.79 cm/s    LV E' Septal Velocity 7.21 cm/s    Triscuspid Valve Regurgitation Peak Gradient 78.22 mmHg    TR Max Velocity 442.21 cm/s    Ao Root D 2.94 cm    IVC proximal 2.34 cm    MV E/A 0.58     LV Mass .4 88 - 224 g    LV Mass AL Index 58.8 49 - 115 g/m2    Left Atrium Minor Axis 1.55 cm    LA Vol Index 28.07 16 - 28 ml/m2    LVED Vol Index A4C 72.0 mL/m2    LVES Vol Index A4C 29.3 mL/m2    RJ/BSA Pk Vicente 1.2 cm2/m2    JR/BSA VTI 1.6 cm2/m2    Est. RA Pressure 8.0 mmHg   COVID-19 RAPID TEST    Collection Time: 11/24/21  2:15 PM   Result Value Ref Range    Specimen source NOSE      COVID-19 rapid test Not detected NOTD     PROTHROMBIN TIME + INR    Collection Time: 11/24/21  2:32 PM   Result Value Ref Range    Prothrombin time 15.8 (H) 11.5 - 15.2 sec    INR 1.3 (H) 0.8 - 1.2     POTASSIUM    Collection Time: 11/24/21 11:16 PM   Result Value Ref Range    Potassium 4.2 3.5 - 5.5 mmol/L   MAGNESIUM    Collection Time: 11/24/21 11:16 PM   Result Value Ref Range    Magnesium 2.1 1.6 - 2.6 mg/dL   METABOLIC PANEL, BASIC    Collection Time: 11/25/21  4:43 AM   Result Value Ref Range    Sodium 144 136 - 145 mmol/L    Potassium 4.5 3.5 - 5.5 mmol/L    Chloride 112 (H) 100 - 111 mmol/L    CO2 26 21 - 32 mmol/L    Anion gap 6 3.0 - 18 mmol/L    Glucose 88 74 - 99 mg/dL    BUN 32 (H) 7.0 - 18 MG/DL    Creatinine 1.34 (H) 0.6 - 1.3 MG/DL    BUN/Creatinine ratio 24 (H) 12 - 20      GFR est AA >60 >60 ml/min/1.73m2    GFR est non-AA 52 (L) >60 ml/min/1.73m2    Calcium 8.7 8.5 - 10.1 MG/DL   CBC WITH AUTOMATED DIFF    Collection Time: 11/25/21  4:43 AM   Result Value Ref Range    WBC 12.7 4.6 - 13.2 K/uL    RBC 2.99 (L) 4.35 - 5.65 M/uL    HGB 8.0 (L) 13.0 - 16.0 g/dL    HCT 25.9 (L) 36.0 - 48.0 %    MCV 86.6 78.0 - 100.0 FL    MCH 26.8 24.0 - 34.0 PG    MCHC 30.9 (L) 31.0 - 37.0 g/dL    RDW 19.4 (H) 11.6 - 14.5 %    PLATELET 475 345 - 657 K/uL    MPV 10.8 9.2 - 11.8 FL    NRBC 0.0 0  WBC    ABSOLUTE NRBC 0.00 0.00 - 0.01 K/uL    NEUTROPHILS 65 40 - 73 %    LYMPHOCYTES 19 (L) 21 - 52 %    MONOCYTES 11 (H) 3 - 10 %    EOSINOPHILS 4 0 - 5 %    BASOPHILS 0 0 - 2 %    IMMATURE GRANULOCYTES 1 (H) 0.0 - 0.5 %    ABS. NEUTROPHILS 8.2 (H) 1.8 - 8.0 K/UL    ABS. LYMPHOCYTES 2.4 0.9 - 3.6 K/UL    ABS. MONOCYTES 1.4 (H) 0.05 - 1.2 K/UL    ABS. EOSINOPHILS 0.5 (H) 0.0 - 0.4 K/UL    ABS. BASOPHILS 0.1 0.0 - 0.1 K/UL    ABS. IMM.  GRANS. 0.1 (H) 0.00 - 0.04 K/UL    DF AUTOMATED     VANCOMYCIN, RANDOM    Collection Time: 11/25/21  4:43 AM   Result Value Ref Range    Vancomycin, random 5.5 5.0 - 40.0 UG/ML           Marii Gauthier MD

## 2021-11-25 NOTE — PROGRESS NOTES
Pharmacy Dosing Services: Renal Dose Adjustment    Pharmacist Renal Dosing Note:Cefepime   Physician: Fly Peterson    Indication: UTI  Previous Regimen Cefepime 2g IV Q24H   Serum Creatinine Lab Results   Component Value Date/Time    Creatinine 1.34 (H) 11/25/2021 04:43 AM      Creatinine Clearance Estimated Creatinine Clearance: 40.4 mL/min (A) (based on SCr of 1.34 mg/dL (H)). BUN Lab Results   Component Value Date/Time    BUN 32 (H) 11/25/2021 04:43 AM         Cefepime 2g IV Q24H adjusted to Cefepime 2g IV Q12H due to improving renal function. Pharmacy to continue to monitor daily.     Kierra Ocampo, PharmD  [phone]

## 2021-11-25 NOTE — PROGRESS NOTES
Bedside shift change report given to Radha Miranda RN (oncoming nurse) by Ayse Marroquin RN (offgoing nurse). Report included the following information SBAR, Kardex and MAR.

## 2021-11-25 NOTE — PROGRESS NOTES
Problem: Self Care Deficits Care Plan (Adult)  Goal: *Acute Goals and Plan of Care (Insert Text)  Description: Initial Occupational Therapy Goals (11/25/2021) Within 7 day(s):    1. Patient will perform grooming standing at sink with CGA for increased independence with ADLs. 2. Patient will perform UB dressing with supervision for increased independence with ADLs. 3. Patient will perform LB dressing with SBA & A/E PRN for increased independence with ADLs. 4. Patient will perform all aspects of toileting with CGA for increased independence in ADLs  5. Patient will independently apply energy conservation techniques with 1 verbal cue(s) for increased independence with ADLs. 6. Patient will utilize good body mechanics during ADLs with 1 verbal cue(s). Outcome: Progressing Towards Goal   OCCUPATIONAL THERAPY EVALUATION    Patient: Jim Stauffer (89 y.o. male)  Date: 11/25/2021  Primary Diagnosis: Sepsis ([facility] [address] [A41.9]  UTI (urinary tract infection) [N39.0]        Precautions: Fall  PLOF: pt mod I for ADLs/functional mobility, ambulates with rollator. Pt lives with spouse in one story home, pt reports only staying within the home. Pt also has L AFO present he received ~3 weeks ago    ASSESSMENT :  Based on the objective data described below, the patient presents with decreased strength, endurance, balance limiting independence with ADLs. Pt found supine in bed, reporting pain 0/10, agreeable to therapy. /69, SpO2 remained in mid 90s throughout session. Pt was able to sit up to EOB with min A and HOB elevated for support. BUE strength grossly 4-/5. Pt was able to sit at EOB ~10 minutes and completed simple grooming tasks with setup and CGA/SBA. Pt's sitting balance impaired, pt required UE support on bed to maintain. BP in seated 121/107. Pt returned to supine with min/mod A, CGA for scooting/repositioning in bed. BP reassessed at 156/87 in supine. RN notified of above.  Pt left supine in bed, call bell/phone within reach. Education: role of OT and POC, fall prevention    Patient will benefit from skilled intervention to address the above impairments. Patient's rehabilitation potential is considered to be Good  Factors which may influence rehabilitation potential include:   []             None noted  []             Mental ability/status  [x]             Medical condition  []             Home/family situation and support systems  []             Safety awareness  []             Pain tolerance/management  []             Other:      PLAN :  Recommendations and Planned Interventions:   [x]               Self Care Training                  [x]      Therapeutic Activities  [x]               Functional Mobility Training   []      Cognitive Retraining  [x]               Therapeutic Exercises           [x]      Endurance Activities  [x]               Balance Training                    []      Neuromuscular Re-Education  []               Visual/Perceptual Training     [x]      Home Safety Training  [x]               Patient Education                   [x]      Family Training/Education  []               Other (comment):    Frequency/Duration: Patient will be followed by occupational therapy 1-2 times per day/4-7 days per week to address goals. Discharge Recommendations: [facility] (vs. Home health pending hospital progress)  Further Equipment Recommendations for Discharge: N/A     SUBJECTIVE:   Patient stated I haven't sat up in a couple days.     OBJECTIVE DATA SUMMARY:     Past Medical History:   Diagnosis Date    Atherosclerosis     Diabetes ([facility] [address] 2013    type 2    High cholesterol     Hyperlipidemia     Hypertension 2013    Muscle weakness     Neoplasm     right kidney    Severe pulmonary hypertension ([facility] [address]     Spinal stenosis     Urinary retention     Vitamin D deficiency      Past Surgical History:   Procedure Laterality Date    HX HEENT      40 years ago deviated septum    HX LUMBAR LAMINECTOMY  2017    HX ORTHOPAEDIC      Lumbar laminectomy 9/7/17    HX ORTHOPAEDIC Right     CTR     Barriers to Learning/Limitations: yes;  physical  Compensate with: visual, verbal, tactile, kinesthetic cues/model    Home Situation:   Home Situation  Home Environment: Private residence  # Steps to Enter: 0  One/Two Story Residence: One story  Living Alone: No  Support Systems: Spouse/Significant Other  Patient Expects to be Discharged to[de-identified] Unknown  Current DME Used/Available at Home: Adaptive dressing aides, Cane, straight, Walker, rolling, Walker, rollator, Safety frame toliet  Tub or Shower Type: Shower  [x]  Right hand dominant   []  Left hand dominant    Cognitive/Behavioral Status:  Neurologic State: Alert  Orientation Level: Oriented to person; Oriented to place; Oriented to situation  Cognition: Follows commands  Safety/Judgement: Awareness of environment    Coordination: BUE  Coordination: Generally decreased, functional  Fine Motor Skills-Upper: Left Intact; Right Intact    Gross Motor Skills-Upper: Left Intact; Right Intact    Balance:  Sitting: Impaired; With support    Strength: BUE  Strength: Generally decreased, functional    Tone & Sensation: BUE  Tone: Normal  Sensation: Intact    Range of Motion: BUE  AROM: Generally decreased, functional    Functional Mobility and Transfers for ADLs:  Bed Mobility:  Supine to Sit: Minimum assistance; Additional time; Bed Modified (vc)  Sit to Supine: Minimum assistance; Moderate assistance; Additional time (vc)  Scooting: Contact guard assistance; Additional time (vc)    ADL Assessment:   Feeding: Setup; Modified independent  Oral Facial Hygiene/Grooming: Contact guard assistance  Bathing: Moderate assistance  Upper Body Dressing: Minimum assistance  Lower Body Dressing: Maximum assistance  Toileting:  Moderate assistance    ADL Intervention:  Grooming  Grooming Assistance: Contact guard assistance; Set-up  Position Performed: Seated edge of bed  Washing Face: Contact guard assistance  Washing Hands: Contact guard assistance    Cognitive Retraining  Safety/Judgement: Awareness of environment    Pain:  Pain level pre-treatment: 0/10   Pain level post-treatment: 0/10   Pain Intervention(s): Medication provided by Nursing (see MAR); Rest, Ice, Repositioning   Response to intervention: Nurse notified, See doc flow sheet    Activity Tolerance:   Fair-. Patient able to sit EOB ~10 minute(s). Patient requires intermittent rest breaks. Patient limited by strength, balance, endurance. Patient unsteady. Please refer to the flowsheet for vital signs taken during this treatment. After treatment:   [] Patient left in no apparent distress sitting up in chair  [x] Patient left in no apparent distress in bed  [x] Call bell left within reach  [x] Nursing notified  [] Caregiver present  [] Bed alarm activated    COMMUNICATION/EDUCATION:   [x] Role of Occupational Therapy in the acute care setting  [x] Home safety education was provided and the patient/caregiver indicated understanding. [x] Patient/family have participated as able in goal setting and plan of care. [x] Patient/family agree to work toward stated goals and plan of care. [] Patient understands intent and goals of therapy, but is neutral about his/her participation. [] Patient is unable to participate in goal setting and plan of care. Thank you for this referral.  Jax Whitt OTR/L  Time Calculation: 27 mins    Eval Complexity: History: MEDIUM Complexity : Expanded review of history including physical, cognitive and psychosocial  history ; Examination: MEDIUM Complexity : 3-5 performance deficits relating to physical, cognitive , or psychosocial skils that result in activity limitations and / or participation restrictions; Decision Making:MEDIUM Complexity : Patient may present with comorbidities that affect occupational performnce.  Miniml to moderate modification of tasks or assistance (eg, physical or verbal ) with assesment(s) is necessary to enable patient to complete evaluation

## 2021-11-25 NOTE — PROGRESS NOTES
Problem: Mobility Impaired (Adult and Pediatric)  Goal: *Acute Goals and Plan of Care (Insert Text)  Description: Physical TherapyGoals   Initiated 11/25/2021 to be met within 5-7 days  1. Bed mobility: Rolling, and supine <> sit with CGA with use of HR for positioning. 2. Activity Tolerance: Tolerate chair sitting 30 minutes for ADL/balance activities. 3. Transfer: Sit <> stand with min/mod A with RW and L AFO in prep for ambulation. 4. Ambulation:  Ambulate 20 ft. Min/mod A with RW/L AFO for increased functional mobility. Outcome: Progressing Towards Goal  PHYSICAL THERAPY EVALUATION    Patient: Priya Ramon (7 y.o. male)  Date: 11/25/2021  Primary Diagnosis: Sepsis ([address] [A41.9]  UTI (urinary tract infection) [N39.0]  Precautions:   Fall  PLOF: amb with Rollator and L AFO (due to knee hyperextension per pt report) PTA    ASSESSMENT :  Based on the objective data described below, the patient is seen in ICU following transfer to [facility] on yesterday. Present with decr'd functional strength, ROM, balance and activity tolerance, all impacting independence in functional mobility. Pt with VS as follows: HR low 100's, /80, O2 sat 98%-100%. Pt reports no pain at this time. Demonstrates bed mobility with min/mod assist, requiring mod A to LE's during sit>supine. Able to sit EOB with fair balance unsupported. Scoots along side of bed while seated with CGA. C/o light headedness; O2 sat as low as 93% with supplemental O2 in place. Pt returned to supine and left with HOB elevated, bed alarm engaged and l needs in reach. Nurse notified. Educated in [address] Recommend SNF vs HHPT for follow up physical therapy, pending progress. Patient will benefit from skilled intervention to address the above impairments.     Pt Education: Role of physical therapy in acute care setting, fall prevention and safety/technique during functional mobility tasks    Patient's rehabilitation potential is considered to be Fair  Factors which may influence rehabilitation potential include:   []         None noted  []         Mental ability/status  [x]         Medical condition  [x]         Home/family situation and support systems  []         Safety awareness  []         Pain tolerance/management  []         Other:      PLAN :  Recommendations and Planned Interventions:   [x]           Bed Mobility Training             [x]    Neuromuscular Re-Education  [x]           Transfer Training                   []    Orthotic/Prosthetic Training  [x]           Gait Training                          []    Modalities  [x]           Therapeutic Exercises           []    Edema Management/Control  [x]           Therapeutic Activities            []    Family Training/Education  [x]           Patient Education  []           Other (comment):    Frequency/Duration: Patient will be followed by physical therapy 1-2 times per day/4-7 days per week to address goals. Discharge Recommendations: Home Health and [address] Recommendations for Discharge: N/A     SUBJECTIVE:   Patient stated So far so good.     OBJECTIVE DATA SUMMARY:     Past Medical History:   Diagnosis Date    Atherosclerosis     Diabetes ([facility] [address] 2013    type 2    High cholesterol     Hyperlipidemia     Hypertension 2013    Muscle weakness     Neoplasm     right kidney    Severe pulmonary hypertension (HCC)     Spinal stenosis     Urinary retention     Vitamin D deficiency      Past Surgical History:   Procedure Laterality Date    HX HEENT      40 years ago deviated septum    HX LUMBAR LAMINECTOMY  2017    HX ORTHOPAEDIC      Lumbar laminectomy 9/7/17    HX ORTHOPAEDIC Right     CTR     Barriers to Learning/Limitations: None  Compensate with: N/A  Home Situation:  Home Situation  Home Environment: Private residence  # Steps to Enter: 0  One/Two Story Residence: One story  Living Alone: No  Support Systems: Spouse/Significant Other  Patient Expects to be Discharged to[de-identified] House  Current DME Used/Available at Home: Adaptive dressing aides, Kevyn Crown, straight, Walker, rolling, Walker, rollator, Safety frame toliet  Tub or Shower Type: Shower  Critical Behavior:  Neurologic State: Alert  Orientation Level: Oriented to person; Oriented to place; Oriented to situation; Oriented to time  Cognition: Follows commands  Safety/Judgement: Awareness of environment  Psychosocial  Patient Behaviors: Calm; Cooperative  Purposeful Interaction: Yes  Pt Identified Daily Priority: Clinical issues (comment)  Caritas Process: Nurture loving kindness  Caring Interventions: Reassure  Reassure: Therapeutic listening  Therapeutic Modalities: Intentional therapeutic touch  Strength:    Strength: Generally decreased, functional  Tone & Sensation:   Tone: Normal  Sensation:  (LE's h/o back surgery 4 yrs ago)  Range Of Motion:  AROM: Generally decreased, functional  Functional Mobility:  Bed Mobility:  Supine to Sit: Minimum assistance; Additional time  Sit to Supine: Moderate assistance (to LE's)  Scooting: Contact guard assistance  Balance:   Sitting: Impaired  Sitting - Static: Fair (occasional); Unsupported  Sitting - Dynamic: Fair (occasional)  Pain:  Pain level pre-treatment: 0/10   Pain level post-treatment: 0/10   Pain Intervention(s) : Medication (see MAR); Rest, Ice, Repositioning  Response to intervention: Nurse notified, See doc flow  Activity Tolerance:   Fair   Please refer to the flowsheet for vital signs taken during this treatment. After treatment:   []         Patient left in no apparent distress sitting up in chair  [x]         Patient left in no apparent distress in bed  [x]         Call bell left within reach  [x]         Nursing notified  []         Caregiver present  [x]         Bed alarm activated  []         SCDs applied    COMMUNICATION/EDUCATION:   [x]         Role of Physical Therapy in the acute care setting.   [x]         Fall prevention education was provided and the patient/caregiver indicated understanding. [x]         Patient/family have participated as able in goal setting and plan of care. [x]         Patient/family agree to work toward stated goals and plan of care. []         Patient understands intent and goals of therapy, but is neutral about his/her participation. []         Patient is unable to participate in goal setting/plan of care: ongoing with therapy staff.  []         Other:     Thank you for this referral.  Chelsea Ward, PT   Time Calculation: 23 mins      Eval Complexity: History: HIGH Complexity :3+ comorbidities / personal factors will impact the outcome/ POC Exam:MEDIUM Complexity : 3 Standardized tests and measures addressing body structure, function, activity limitation and / or participation in recreation  Presentation: MEDIUM Complexity : Evolving with changing characteristics  Clinical Decision Making:Medium Complexity    Overall Complexity:MEDIUM

## 2021-11-25 NOTE — PROGRESS NOTES
1930- Report and care received, assessment completed per flow sheet. Alert, oriented, NAD. Denies pain or SOB. 0000- Reassessment without change. 0300- Reassessment without change.

## 2021-11-25 NOTE — PROGRESS NOTES
Pulmonary Specialists  Pulmonary, Critical Care, and Sleep Medicine    Name: Az Durham MRN: [id number]   : 1945 Hospital: [facility] [place]    Date: 2021  Room: 103/01     [facility] Note                                              Consult requesting physician: Dr. Candis Monaco    Reason for Consult: hypotension, sepsis, hypoxemia, pulmonary hypertension      IMPRESSION:   · Sepsis  · Hypotension  · Hypoxemic respiratory failure  · Pulmonary hypertension  · Suspect COPD exacerbation  ·   Patient Active Problem List   Diagnosis Code    Lumbar spinal stenosis M48.061    Lumbar spondylosis M47.816    Radiculopathy of leg M54.10    Unable to ambulate R26.2    Urinary retention R33.9    Type II diabetes mellitus with manifestations, uncontrolled ([facility] [address] E11.8, E11.65    Renal mass N28.89    UTI (urinary tract infection) due to urinary indwelling Jimenez catheter ([facility]) T83.511A, N39.0    Compression fracture of L1 vertebra ([facility]) S32.010A    Aneurysm of infrarenal abdominal aorta ([facility]) I71.4    HTN (hypertension) I10    Severe protein-calorie malnutrition (HCC) E43    Severe pulmonary hypertension (HCC) I27.20    Sepsis (HCC) A41.9    Hypotension I95.9    Pulmonary hypertension ([facility]) I27.20    History of tobacco abuse Z87.891    Pulmonary emphysema (HCC) J43.9    Acute hypoxemic respiratory failure ([facility]) J96.01       · Code status: Full code       RECOMMENDATIONS:   Respiratory:   Acute hypoxemic respiratory failure  3-5L of oxygen  CXR pulmonary congestion  covid 19  Pending I ordered stat, suspition is low but had only 1 vaccination  No acute issues. Protecting airway. Ventilator bundle & Sedation protocol followed. Daily sedation holiday, assessment for readiness for SBT and then re-titrate if required. Chlorhexidine mouth washes. Keep SPO2 >=92%. HOB 30 degree elevation all the time. Aggressive pulmonary toileting. Aspiration precautions.  Incentive spirometry. CVS: History of diastolic heart failure and pulmonary hypertension of unclear etiology PTH very high 80s? Maybe from COPD? No history of PE  Add low dose lasix  Consult cardiology once stable from UTI for righ sided cardiac cath  No PE  Need PFT outpatint   Echo   Result status: Final result  · LV: Estimated LVEF is 55 - 60%. Normal cavity size, wall thickness and systolic function (ejection fraction normal). Mild (grade 1) left ventricular diastolic dysfunction E/E'= 6.81.  · TV: Moderate to severe tricuspid valve regurgitation is present. · PA: Pulmonary arterial systolic pressure is 86 mmHg. · IVC: Moderately elevated central venous pressure (8 mmHg); IVC diameter is larger than 21 mm and collapses more than 50% with respiration. No previous evaluation for pulmonary hypertension  Repeat echo start work up   Recurrent UTI paraplegia previous Pseudomonas infections. Suspect primary reason for hypotension however got last night beta-blockers so could be contributed to hypotension stop all blood pressure medications give gentle hydration with close monitoring has pulmonary hypertension  Continue albumin. If blood pressure less than 90 and MAP less than 65 resume Levophed had briefly today in the morning. Currently place midline. Check echo troponins EKG. NP elevated most likely from pulmonary hypertension add low-dose Lasix  ID: Improved   Urosepsis elevated white blood cells follow lactic acid sepsis protocol. Vancomycin and cefepime in the past had Pseudomonas sensitive to cefepime. Sepsis bundle and protocol followed. Follow serial lactic acid, frequent BMP check, fluid resuscitation. Follow cultures. Deescalate antibiotic when appropriate. Hematology/Oncology: WBC elevated  Check for chronic PE, PVL as PTH  Renal: acute on chronic renal failure , paraplegia with cath at home   GI/: PPI  Endocrine: Monitor BS. SSI.   Neurology:  Paraplegia from spinal stenosis  Toxicology:none  Pain/Sedation: prn  Skin/Wound: evaluate wound care since bed ridden   Electrolytes: Replace electrolytes per ICU electrolyte replacement protocol. IVF: albumins   Nutrition:npo  Prophylaxis: DVT Prophylaxis: SCD/ GI Prophylaxis: Protonix/  Restraints: none  Wrist soft restraints for patient interfering with medical therapy/management and patient safety. Lines/Tubes: PIVher:  PT/OT eval and treat. OOB. Advance Directive/Palliative Care: consulted    Will defer respective systems problem management to primary and other respective consultant and follow patient in ICU with primary and other medical team.  Further recommendations will be based on the patient's response to recommended treatment and results of the investigation ordered. Quality Care: PPI, DVT prophylaxis, HOB elevated, Infection control all reviewed and addressed. Care of plan d/w hospitalist team, RN, RT, MDR.  D/w patient (answered all questions to satisfaction). High complexity decision making was performed during the evaluation of this patient at high risk for decompensation with multiple organ involvement. Subjective/History of Present Illness:     Patient is a 78 y.o. male with PMHx significant for      11/25/2021:  Remains in ICU bed 3  Remains to have very severe pulmonary hypertension can be transferred out. VQ scan negative for PE. We will consult cardiology for known Argent right-sided cardiac catheterization to confirm that he has if severe pulmonary hypertension. Patient also should have pulmonary function test outpatient to see the degree of COPD    I/O last 24 hrs: Intake/Output Summary (Last 24 hours) at 11/25/2021 1005  Last data filed at 11/25/2021 0841  Gross per 24 hour   Intake 792.09 ml   Output 1600 ml   Net -807.91 ml             History taken from patient and chart     Review of Systems:  A comprehensive review of systems was negative except for that written in the HPI. Review of Systems:   HEENT: No epistaxis, no nasal drainage, no difficulty in swallowing, no redness in eyes  Respiratory: as above  Cardiovascular: no chest pain, no palpitations, no chronic leg edema, no syncope  Gastrointestinal: no abd pain, no vomiting, no diarrhea, no bleeding symptoms  Genitourinary: No urinary symptoms or hematuria  Musculoskeletal: Neg  Neurological: No focal weakness, no seizures, no headaches  Behvioral/Psych: No anxiety, no depression  General : No fever, no chills, no weight loss, no night sweats     Allergies   Allergen Reactions    Zocor [Simvastatin] Other (comments)     Made him \"ill\"      Past Medical History:   Diagnosis Date    Atherosclerosis     Diabetes ([facility] [address] 2013    type 2    High cholesterol     Hyperlipidemia     Hypertension 2013    Muscle weakness     Neoplasm     right kidney    Severe pulmonary hypertension ([facility] [address]     Spinal stenosis     Urinary retention     Vitamin D deficiency       Past Surgical History:   Procedure Laterality Date    HX HEENT      40 years ago deviated septum    HX LUMBAR LAMINECTOMY  2017    HX ORTHOPAEDIC      Lumbar laminectomy 9/7/17    HX ORTHOPAEDIC Right     CTR      Social History     Tobacco Use    Smoking status: Former Smoker    Smokeless tobacco: Never Used   Substance Use Topics    Alcohol use: No      Family History   Problem Relation Age of Onset    Heart Disease Father       Prior to Admission medications    Medication Sig Start Date End Date Taking? Authorizing Provider   calcium carbonate (TUMS) 200 mg calcium (500 mg) chew Take 1 Tablet by mouth two (2) times a day. Indications: heartburn   Yes Provider, Historical   pregabalin (Lyrica) 25 mg capsule Take 75 mg by mouth. Indications: restless legs syndrome, an extreme discomfort in the calf muscles when sitting or lying down    Provider, Historical   atorvastatin (Lipitor) 20 mg tablet Take 20 mg by mouth nightly.     Provider, Historical   traZODone (DESYREL) 50 mg tablet Take 50 mg by mouth nightly. Provider, Historical   metoprolol succinate (TOPROL-XL) 25 mg XL tablet Take 1 Tab by mouth nightly. 3/21/20   Other, MD Katelynn   aspirin 81 mg chewable tablet Take 81 mg by mouth daily. Other, MD Katelynn   lisinopril-hydroCHLOROthiazide (PRINZIDE, ZESTORETIC) 20-25 mg per tablet Take  by mouth daily. Provider, Historical   metFORMIN (GLUCOPHAGE) 500 mg tablet Take 500 mg by mouth two (2) times daily (with meals). Patient not taking: Reported on 8/23/2021    Provider, Historical   cholecalciferol, vitamin D3, (Vitamin D3) 50 mcg (2,000 unit) tab Take  by mouth. Provider, Historical   multivitamin (ONE A DAY) tablet Take 1 Tab by mouth daily.     Provider, Historical     Current Facility-Administered Medications   Medication Dose Route Frequency    vancomycin (VANCOCIN) 750 mg in 0.9% sodium chloride 250 mL (VIAL-MATE)  750 mg IntraVENous Q18H    [START ON 11/26/2021] Vancomycin Random Level 11/26/21 1000  1 Each Other ONCE    albumin human 25% (BUMINATE) solution 25 g  25 g IntraVENous Q6H    NOREPINephrine (LEVOPHED) 8,000 mcg in 0.9% sodium chloride 250 mL infusion  0.5-16 mcg/min IntraVENous TITRATE    Vancomycin-Pharmacy to dose  1 Each Other Rx Dosing/Monitoring    arformoteroL (BROVANA) neb solution 15 mcg  15 mcg Nebulization BID RT    budesonide (PULMICORT) 500 mcg/2 ml nebulizer suspension  500 mcg Nebulization BID RT    cefepime (MAXIPIME) 2 g in sterile water (preservative free) 10 mL IV syringe  2 g IntraVENous Q24H    aspirin chewable tablet 81 mg  81 mg Oral DAILY    [Held by provider] metoprolol succinate (TOPROL-XL) XL tablet 25 mg  25 mg Oral QHS    pregabalin (LYRICA) capsule 75 mg  75 mg Oral BID    traZODone (DESYREL) tablet 50 mg  50 mg Oral QHS    heparin (porcine) injection 5,000 Units  5,000 Units SubCUTAneous Q8H    [Held by provider] lisinopril/hydroCHLOROthiazide(PRINZIDE/ZESTORETIC) 20/25 mg   Oral DAILY    lactated Ringers infusion  25 mL/hr IntraVENous CONTINUOUS         Objective:   Vital Signs:    Visit Vitals  BP (!) 143/76   Pulse 86   Temp 97.8 °F (36.6 °C)   Resp 19   Ht 5' 11\" (1.803 m)   Wt 59 kg (130 lb)   SpO2 100%   BMI 18.13 kg/m²       O2 Device: Nasal cannula   O2 Flow Rate (L/min): 4 l/min   Temp (24hrs), Av.9 °F (36.6 °C), Min:97.6 °F (36.4 °C), Max:98.2 °F (36.8 °C)       Intake/Output:   Last shift:       0701 - 1900  In: 294.2 [I.V.:294.2]  Out: -     Last 3 shifts: 1901 -  0700  In: 497.9 [I.V.:497.9]  Out: 2900 [Urine:2900]      Intake/Output Summary (Last 24 hours) at 2021 1005  Last data filed at 2021 0841  Gross per 24 hour   Intake 792.09 ml   Output 1600 ml   Net -807.91 ml       Last 3 Recorded Weights in this Encounter    21 1334 21 1343   Weight: 59 kg (130 lb) 59 kg (130 lb)             Recent Labs     21  1054   PHI 7.45   PCO2I 33.7*   PO2I 82   HCO3I 23.2   FIO2I 4       Physical Exam:     Impresson general : Alert, Awake, in mild respiratory distress  Head:   Normocephalic,atraumatic. ENT:   EOM , no scleral icterus, no pallor, no cyanosis. Nose:   No sinus tenderness  Throat:  Oropharynx ,mucosa, and tongue normal. No oral thrush. Neck:   Supple, symmetric. Lymph nodes. Trachea is midline  Lung: Moderate air entry bilateral equal  eboni  rales. No rhonchi. eboni mild  wheezing. No stridors. No prolongded expiration. No accessory muscle use. Heart:   Regular  rate & rhythm. S1 S2 present. No murmur. No JVD. Abdomen:  Soft. NT. ND. +BS. No masses. liver  and spleen  Extremities:  No pedal edema. No cyanosis. No clubbing. Pulses: 2+ and symmetric in DP. Capillary refill: normal  Lymphatic:  neck and supraclavicular    Musculoskeletal: No joint swelling. No tenderness. Skin:   Lesion Color, texture, turgor normal. No rashes or lesions.       Data:       Recent Results (from the past 24 hour(s))   BLOOD GAS, ARTERIAL POC Collection Time: 11/24/21 10:54 AM   Result Value Ref Range    Device: NASAL CANNULA      FIO2 (POC) 4 %    pH (POC) 7.45 7.35 - 7.45      pCO2 (POC) 33.7 (L) 35.0 - 45.0 MMHG    pO2 (POC) 82 80 - 100 MMHG    HCO3 (POC) 23.2 22 - 26 MMOL/L    sO2 (POC) 96.6 92 - 97 %    Base deficit (POC) 0.7 mmol/L    Allens test (POC) Positive      Site RIGHT RADIAL      Specimen type (POC) ARTERIAL      Performed by Irish Murillo Respiratory    EKG, 12 LEAD, INITIAL    Collection Time: 11/24/21 10:54 AM   Result Value Ref Range    Ventricular Rate 92 BPM    Atrial Rate 92 BPM    P-R Interval 152 ms    QRS Duration 100 ms    Q-T Interval 388 ms    QTC Calculation (Bezet) 479 ms    Calculated P Axis 78 degrees    Calculated R Axis -18 degrees    Calculated T Axis 43 degrees    Diagnosis       Sinus rhythm with frequent premature ventricular complexes in a pattern of   bigeminy  Otherwise normal ECG  When compared with ECG of 23-NOV-2021 13:37,  premature ventricular complexes are now present  QRS axis shifted right  Confirmed by Flor Juarez MD, -- ([id number]) on 11/24/2021 12:45:36 PM     LACTIC ACID    Collection Time: 11/24/21 12:47 PM   Result Value Ref Range    Lactic acid 2.2 (HH) 0.4 - 2.0 MMOL/L   CARDIAC PANEL,(CK, CKMB & TROPONIN)    Collection Time: 11/24/21 12:47 PM   Result Value Ref Range    CK - MB 2.3 <3.6 ng/ml    CK-MB Index 1.0 0.0 - 4.0 %     39 - 308 U/L    Troponin-I, QT <0.02 0.0 - 0.045 NG/ML   POTASSIUM    Collection Time: 11/24/21  1:15 PM   Result Value Ref Range    Potassium 3.8 3.5 - 5.5 mmol/L   MAGNESIUM    Collection Time: 11/24/21  1:15 PM   Result Value Ref Range    Magnesium 1.5 (L) 1.6 - 2.6 mg/dL   NT-PRO BNP    Collection Time: 11/24/21  1:15 PM   Result Value Ref Range    NT pro-BNP 2,400 (H) 0 - 900 PG/ML   ECHO ADULT COMPLETE    Collection Time: 11/24/21  1:43 PM   Result Value Ref Range    IVSd 1.11 (A) 0.6 - 1.0 cm    LVIDd 3.71 (A) 4.2 - 5.9 cm    LVIDs 2.79 cm    LVOT d 2.07 cm    LVPWd 0. 77 0.6 - 1.0 cm    Global Longitudinal Strain 2 Chamber -25. 8 percent    Global Longitudinal Strain 4 Chamber -11.7 percent    Global Longitudinal Strain Long Axis -13.5 percent    Global Longitudinal Strain -17.0 percent    LV Ejection Fraction MOD 4C 59 percent    LV ED Vol A4C 126.69 mL    LV ES Vol A4C 51.51 mL    LVOT Cardiac Output 11.77 liter/minute    LVOT Peak Gradient 3.01 mmHg    Left Ventricular Outflow Tract Mean Gradient 1.58 mmHg    LVOT SV 60.5 mL    LVOT Peak Velocity 86.81 cm/s    LVOT VTI 18.02 cm    RVIDd 3.28 cm    Tapse 1.98 1.5 - 2.0 cm    Left Atrium Major Axis 2.72 cm    LA Area 4C 18.04 cm2    LA Vol 4C 49.40 18 - 58 mL    Right Atrial Area 4C 22.01 cm2    Aortic Valve Area by Continuity of Peak Velocity 2.18 cm2    Aortic Valve Area by Continuity of VTI 2.77 cm2    AoV PG 7.16 mmHg    Aortic Valve Systolic Mean Gradient 3.16 mmHg    Aortic Valve Systolic Peak Velocity 147.06 cm/s    AoV VTI 21.85 cm    MV A Vicente 92.47 cm/s    MV E Vicente 53.92 cm/s    E/E' ratio (averaged) 6.81     E/E' lateral 6.13     E/E' septal 7.48     LV E' Lateral Velocity 8.79 cm/s    LV E' Septal Velocity 7.21 cm/s    Triscuspid Valve Regurgitation Peak Gradient 78.22 mmHg    TR Max Velocity 442.21 cm/s    Ao Root D 2.94 cm    IVC proximal 2.34 cm    MV E/A 0.58     LV Mass .4 88 - 224 g    LV Mass AL Index 58.8 49 - 115 g/m2    Left Atrium Minor Axis 1.55 cm    LA Vol Index 28.07 16 - 28 ml/m2    LVED Vol Index A4C 72.0 mL/m2    LVES Vol Index A4C 29.3 mL/m2    RJ/BSA Pk Vicente 1.2 cm2/m2    RJ/BSA VTI 1.6 cm2/m2    Est. RA Pressure 8.0 mmHg   COVID-19 RAPID TEST    Collection Time: 11/24/21  2:15 PM   Result Value Ref Range    Specimen source NOSE      COVID-19 rapid test Not detected NOTD     PROTHROMBIN TIME + INR    Collection Time: 11/24/21  2:32 PM   Result Value Ref Range    Prothrombin time 15.8 (H) 11.5 - 15.2 sec    INR 1.3 (H) 0.8 - 1.2     POTASSIUM    Collection Time: 11/24/21 11:16 PM   Result Value Ref Range    Potassium 4.2 3.5 - 5.5 mmol/L   MAGNESIUM    Collection Time: 11/24/21 11:16 PM   Result Value Ref Range    Magnesium 2.1 1.6 - 2.6 mg/dL   METABOLIC PANEL, BASIC    Collection Time: 11/25/21  4:43 AM   Result Value Ref Range    Sodium 144 136 - 145 mmol/L    Potassium 4.5 3.5 - 5.5 mmol/L    Chloride 112 (H) 100 - 111 mmol/L    CO2 26 21 - 32 mmol/L    Anion gap 6 3.0 - 18 mmol/L    Glucose 88 74 - 99 mg/dL    BUN 32 (H) 7.0 - 18 MG/DL    Creatinine 1.34 (H) 0.6 - 1.3 MG/DL    BUN/Creatinine ratio 24 (H) 12 - 20      GFR est AA >60 >60 ml/min/1.73m2    GFR est non-AA 52 (L) >60 ml/min/1.73m2    Calcium 8.7 8.5 - 10.1 MG/DL   CBC WITH AUTOMATED DIFF    Collection Time: 11/25/21  4:43 AM   Result Value Ref Range    WBC 12.7 4.6 - 13.2 K/uL    RBC 2.99 (L) 4.35 - 5.65 M/uL    HGB 8.0 (L) 13.0 - 16.0 g/dL    HCT 25.9 (L) 36.0 - 48.0 %    MCV 86.6 78.0 - 100.0 FL    MCH 26.8 24.0 - 34.0 PG    MCHC 30.9 (L) 31.0 - 37.0 g/dL    RDW 19.4 (H) 11.6 - 14.5 %    PLATELET 993 029 - 612 K/uL    MPV 10.8 9.2 - 11.8 FL    NRBC 0.0 0  WBC    ABSOLUTE NRBC 0.00 0.00 - 0.01 K/uL    NEUTROPHILS 65 40 - 73 %    LYMPHOCYTES 19 (L) 21 - 52 %    MONOCYTES 11 (H) 3 - 10 %    EOSINOPHILS 4 0 - 5 %    BASOPHILS 0 0 - 2 %    IMMATURE GRANULOCYTES 1 (H) 0.0 - 0.5 %    ABS. NEUTROPHILS 8.2 (H) 1.8 - 8.0 K/UL    ABS. LYMPHOCYTES 2.4 0.9 - 3.6 K/UL    ABS. MONOCYTES 1.4 (H) 0.05 - 1.2 K/UL    ABS. EOSINOPHILS 0.5 (H) 0.0 - 0.4 K/UL    ABS. BASOPHILS 0.1 0.0 - 0.1 K/UL    ABS. IMM.  GRANS. 0.1 (H) 0.00 - 0.04 K/UL    DF AUTOMATED     VANCOMYCIN, RANDOM    Collection Time: 11/25/21  4:43 AM   Result Value Ref Range    Vancomycin, random 5.5 5.0 - 40.0 UG/ML         Chemistry Recent Labs     11/25/21  0443 11/24/21  2316 11/24/21  1315 11/24/21  0300 11/24/21  0300 11/23/21  1345 11/23/21  1345   GLU 88  --   --   --  84  --  141*     --   --   --  142  --  140   K 4.5 4.2 3.8   < > 4.0   < > 4.2   *  --   -- --  110  --  105   CO2 26  --   --   --  26  --  28   BUN 32*  --   --   --  32*  --  33*   CREA 1.34*  --   --   --  1.60*  --  1.85*   CA 8.7  --   --   --  8.6  --  10.8*   MG  --  2.1 1.5*  --  1.7  --   --    AGAP 6  --   --   --  6  --  7   BUCR 24*  --   --   --  20  --  18   AP  --   --   --   --   --   --  125*   TP  --   --   --   --   --   --  8.6*   ALB  --   --   --   --   --   --  3.6   GLOB  --   --   --   --   --   --  5.0*   AGRAT  --   --   --   --   --   --  0.7*    < > = values in this interval not displayed. Lactic Acid Lactic acid   Date Value Ref Range Status   11/24/2021 2.2 (HH) 0.4 - 2.0 MMOL/L Final     Comment:     CALLED TO AND CORRECTLY REPEATED BY:  WALTER ALEJO, RN ICU ON 11/24/2021 1338 TO 5087       Recent Labs     11/24/21  1247 11/23/21  2145   LAC 2.2* 1.4        Liver Enzymes Protein, total   Date Value Ref Range Status   11/23/2021 8.6 (H) 6.4 - 8.2 g/dL Final     Albumin   Date Value Ref Range Status   11/23/2021 3.6 3.4 - 5.0 g/dL Final     Globulin   Date Value Ref Range Status   11/23/2021 5.0 (H) 2.0 - 4.0 g/dL Final     A-G Ratio   Date Value Ref Range Status   11/23/2021 0.7 (L) 0.8 - 1.7   Final     Alk.  phosphatase   Date Value Ref Range Status   11/23/2021 125 (H) 45 - 117 U/L Final     Recent Labs     11/23/21  1345   TP 8.6*   ALB 3.6   GLOB 5.0*   AGRAT 0.7*   *        CBC w/Diff Recent Labs     11/25/21  0443 11/24/21  0300 11/23/21  1345   WBC 12.7 16.3* 22.9*   RBC 2.99* 3.31* 4.83   HGB 8.0* 8.5* 12.7*   HCT 25.9* 27.5* 41.3    189 279   GRANS 65 69 83*   LYMPH 19* 17* 7*   EOS 4 2 0        Cardiac Enzymes Lab Results   Component Value Date/Time     11/24/2021 12:47 PM    CKMB 2.3 11/24/2021 12:47 PM    CKND1 1.0 11/24/2021 12:47 PM    TROIQ <0.02 11/24/2021 12:47 PM        BNP No results found for: BNP, BNPP, XBNPT     Coagulation Recent Labs     11/24/21  1432   PTP 15.8*   INR 1.3*         Thyroid  No results found for: T4, T3U, TSH, TSHEXT, TSHEXT    No results found for: T4     Urinalysis Lab Results   Component Value Date/Time    Color DARK YELLOW 11/23/2021 01:45 PM    Appearance TURBID 11/23/2021 01:45 PM    Specific gravity 1.015 11/23/2021 01:45 PM    Specific gravity 0.000 (L) 08/18/2021 05:55 AM    pH (UA) 6.0 11/23/2021 01:45 PM    Protein 300 (A) 11/23/2021 01:45 PM    Glucose Negative 11/23/2021 01:45 PM    Ketone Negative 11/23/2021 01:45 PM    Bilirubin Negative 11/23/2021 01:45 PM    Urobilinogen 1.0 11/23/2021 01:45 PM    Nitrites Positive (A) 11/23/2021 01:45 PM    Leukocyte Esterase LARGE (A) 11/23/2021 01:45 PM    Epithelial cells FEW 11/23/2021 01:45 PM    Bacteria 2+ (A) 11/23/2021 01:45 PM    WBC TOO NUMEROUS TO COUNT 11/23/2021 01:45 PM    RBC 21 to 35 11/23/2021 01:45 PM        Micro  Recent Labs     11/23/21 2015 11/23/21  1345   CULT No significant growth, <10,000 CFU/mL NO GROWTH AFTER 16 HOURS  NO GROWTH AFTER 16 HOURS     Recent Labs     11/23/21 2015 11/23/21  1345   CULT No significant growth, <10,000 CFU/mL NO GROWTH AFTER 16 HOURS  NO GROWTH AFTER 16 HOURS          Culture data during this hospitalization. All Micro Results     Procedure Component Value Units Date/Time    CULTURE, URINE [[id number]] Collected: 11/23/21 2015    Order Status: Completed Specimen: Urine from Clean catch Updated: 11/25/21 2976     Special Requests: NO SPECIAL REQUESTS        Culture result:       No significant growth, <10,000 CFU/mL          COVID-19 RAPID TEST [[id number]] Collected: 11/24/21 1415    Order Status: Completed Specimen: Nasopharyngeal Updated: 11/24/21 1503     Specimen source NOSE        COVID-19 rapid test Not detected        Comment: Rapid Abbott ID Now       Rapid NAAT:  The specimen is NEGATIVE for SARS-CoV-2, the novel coronavirus associated with COVID-19.        Negative results should be treated as presumptive and, if inconsistent with clinical signs and symptoms or necessary for patient management, should be tested with an alternative molecular assay. Negative results do not preclude SARS-CoV-2 infection and should not be used as the sole basis for patient management decisions. This test has been authorized by the FDA under an Emergency Use Authorization (EUA) for use by authorized laboratories. Fact sheet for Healthcare Providers: ConventionUpdate.co.nz  Fact sheet for Patients: ConventionUpdate.co.nz       Methodology: Isothermal Nucleic Acid Amplification         CULTURE, BLOOD [[id number]] Collected: 11/23/21 1345    Order Status: Completed Specimen: Blood Updated: 11/24/21 0651     Special Requests: NO SPECIAL REQUESTS        Culture result: NO GROWTH AFTER 16 HOURS       CULTURE, BLOOD [[id number]] Collected: 11/23/21 1345    Order Status: Completed Specimen: Blood Updated: 11/24/21 0651     Special Requests: NO SPECIAL REQUESTS        Culture result: NO GROWTH AFTER 16 HOURS                  PFT       Ultrasound       LE Doppler       ECHO       Images report reviewed by me:  CT (Most Recent) (CT chest reviewed by me) Results from [place] encounter on 11/23/21    CT HEAD WO CONT    Narrative  EXAM: CT head    INDICATION: Headache. COMPARISON: 5/17/2021    TECHNIQUE: Axial CT imaging of the head was performed without intravenous  contrast. Standard multiplanar coronal and sagittal reformatted images were  obtained and are included in interpretation. One or more dose reduction techniques were used on this CT: automated exposure  control, adjustment of the mAs and/or kVp according to patient size, and  iterative reconstruction techniques. The specific techniques used on this CT  exam have been documented in the patient's electronic medical record.   Digital  Imaging and Communications in Medicine (DICOM) format image data are available  to nonaffiliated external healthcare facilities or entities on a secure, media  free, reciprocally searchable basis with patient authorization for at least a  12-month period after this study. _______________    FINDINGS:    BRAIN AND POSTERIOR FOSSA: Cerebral atrophy. Patchy periventricular, deep and  subcortical white matter hypoattenuation which is nonspecific but likely  represents chronic ischemic changes. No evidence of acute large vessel  transcortical infarct or acute parenchymal hemorrhage. No midline shift or  hydrocephalus. Unchanged cluster of calcifications in the central mat. EXTRA-AXIAL SPACES AND MENINGES: There are no abnormal extra-axial fluid  collections. CALVARIUM: Intact. SINUSES: Clear. OTHER: None.    _______________    Impression  No acute intracranial abnormality. CXR reviewed by me:  XR (Most Recent). CXR  reviewed by me and compared with previous CXR Results from Hospital Encounter encounter on 11/23/21    XR CHEST PORT    Narrative  EXAM: One-view chest    CLINICAL HISTORY: Altered level of consciousness, to assess for fluid overload ,    COMPARISON: None    FINDINGS:    Frontal view of the chest demonstrate minimal prominence of interstitial  markings otherwise clear. . Cardiac silhouette is normal in size and contour. No  acute bony or soft tissue abnormality. Impression  Minimal prominence of the interstitial markings could suggest slight  interstitial edema. Otherwise unremarkable.            Yasmani Braga MD  11/25/2021

## 2021-11-26 NOTE — PROGRESS NOTES
DC plan: home with [facility] services previously arranged    Care manager met with patient, noting that PT was recommending SNF; per patient, has been to SNF 'many times before' and patient is adamant he wants to return home with [facility]; will continue to follow for discharge needs. Care Management Interventions  PCP Verified by CM:  Yes  Last Visit to PCP: 05/03/21  Mode of Transport at Discharge:  (family)  Transition of Care Consult (CM Consult): Discharge Planning, [address]: No  Reason Outside [place]: Patient already serviced by other home care/hospice agency  Support Systems: Spouse/Significant Other  Confirm Follow Up Transport: Family  The Plan for Transition of Care is Related to the Following Treatment Goals : TBD; home with phsycian follow up and [facility] with possible home O2  Discharge Location  Discharge Placement:  (home with physician follow up and [facility] and possible home O2)

## 2021-11-26 NOTE — PROGRESS NOTES
1930- Report and care received, assessment completed per flow sheet. Alert, oriented, NAD, denies pain. 2120- SPO2 down to 83-84%, found with nasal cannula flow turned off, increased to 4 liters, however, SPO2 only recovered to 88-91% despite deep breathing.  updated, orders received, states okay to still transfer to telemetry. 2300- Reassessment without change except increased ectopy noted. Put out 1 liter s/p lasix.  updated, lab orders received. NAD, SPO2 improved. Bath and linen change completed. 0345- Reassessment without change. 3367- Report given to receiving RN. Transferred via bed with cardiac monitor in place, telemetry monitor applied, receiving RN in to see. NAD.

## 2021-11-26 NOTE — PROGRESS NOTES
Bedside shift change report given to SASKIA Queen (oncoming nurse) by Adarsh Hdez RN (offgoing nurse). Report included the following information SBAR, Kardex and MAR.

## 2021-11-26 NOTE — PROGRESS NOTES
Hospitalist Progress Note    Patient: Can Gar MRN: [id number]  CSN: [id number]    YOB: 1945  Age: 78 y.o.   Sex: male    DOA: 11/23/2021 LOS:  LOS: 3 days          Chief Complaint:    Hypotension     Assessment/Plan   Sepsis  Hypotension   Hypoxemic respiratory failure  Pulmonary hypertension   Suspect COPD exacerbation     Severe pulm hypertension -  Cardiology agrees with need for right heart cath     covid-19 rapid test negative    Hypotension-  resolved    Sepsis -  Secondary to CAUTI  Lactic acid improved  WBC improving     Acute on chronic kidney disease, stage III -  Likely due to CAUTI  Creatinine improving     UTI -  Catheter associated   Past cultures grew Pseudomonas.     Follow cultures  On cefepime  Jimenez changed in ER     DM-  On sliding scale insulin.     HTN -   Continue with home meds,   Monitor BP     CKD stage 3 -  Follow renal function.     DVT prophylaxis with heparin     PT/OT     Estimated length of stay : 2-3 days    Disposition :  Patient Active Problem List   Diagnosis Code    Lumbar spinal stenosis M48.061    Lumbar spondylosis M47.816    Radiculopathy of leg M54.10    Unable to ambulate R26.2    Urinary retention R33.9    Type II diabetes mellitus with manifestations, uncontrolled ([facility] [address] E11.8, E11.65    Renal mass N28.89    UTI (urinary tract infection) due to urinary indwelling Jimenez catheter ([facility]) T83.511A, N39.0    Compression fracture of L1 vertebra ([facility]) S32.010A    Aneurysm of infrarenal abdominal aorta ([facility]) I71.4    HTN (hypertension) I10    Severe protein-calorie malnutrition (HCC) E43    Severe pulmonary hypertension (HCC) I27.20    Sepsis (HCC) A41.9    Hypotension I95.9    Pulmonary hypertension (HCC) I27.20    History of tobacco abuse Z87.891    Pulmonary emphysema (HCC) J43.9    Acute hypoxemic respiratory failure (HCC) J96.01       Subjective:    No complaints    Review of systems:    Constitutional: denies fevers, chills, myalgias  Respiratory: denies SOB, cough  Cardiovascular: denies chest pain, palpitations  Gastrointestinal: denies nausea, vomiting, diarrhea      Vital signs/Intake and Output:  Visit Vitals  BP (!) 141/65 (BP 1 Location: Left upper arm, BP Patient Position: At rest)   Pulse (!) 114   Temp 97.8 °F (36.6 °C)   Resp 24   Ht 5' 11\" (1.803 m)   Wt 59 kg (130 lb)   SpO2 95%   BMI 18.13 kg/m²     Current Shift:  11/26 0701 - 11/26 1900  In: 600 [P.O.:600]  Out: -   Last three shifts:  11/24 1901 - 11/26 0700  In: 1695 [I.V.:1695]  Out: 7487 [Urine:4175]    Exam:    General: Well developed, alert, older WM, NAD, OX3  Head/Neck: NCAT, supple, No masses, No lymphadenopathy  CVS:Regular rate and rhythm, no M/R/G, S1/S2 heard, no thrill  Lungs:Clear to auscultation bilaterally, no wheezes, rhonchi, or rales  Abdomen: Soft, Nontender, No distention, Normal Bowel sounds, No hepatomegaly  Extremities: No C/C/E, pulses palpable 2+  Skin:normal texture and turgor, no rashes, no lesions  Neuro:grossly normal , follows commands  Psych:appropriate                Labs: Results:       Chemistry Recent Labs     11/26/21  0510 11/25/21  2315 11/25/21  0443 11/24/21  1315 11/24/21  0300   *  --  88  --  84     --  144  --  142   K 3.9 3.8 4.5   < > 4.0     --  112*  --  110   CO2 27  --  26  --  26   BUN 36*  --  32*  --  32*   CREA 1.63*  --  1.34*  --  1.60*   CA 8.9  --  8.7  --  8.6   AGAP 6  --  6  --  6   BUCR 22*  --  24*  --  20    < > = values in this interval not displayed.       CBC w/Diff Recent Labs     11/26/21  0510 11/25/21  0443 11/24/21  0300   WBC 12.3 12.7 16.3*   RBC 2.93* 2.99* 3.31*   HGB 7.8* 8.0* 8.5*   HCT 24.7* 25.9* 27.5*    159 189   GRANS 61 65 69   LYMPH 22 19* 17*   EOS 3 4 2      Cardiac Enzymes Recent Labs     11/24/21  1247      CKND1 1.0      Coagulation Recent Labs     11/24/21  1432   PTP 15.8*   INR 1.3*       Lipid Panel Lab Results   Component Value Date/Time Cholesterol, total 146 06/01/2016 07:51 AM    HDL Cholesterol 40 06/01/2016 07:51 AM    LDL, calculated 91.6 06/01/2016 07:51 AM    VLDL, calculated 14.4 06/01/2016 07:51 AM    Triglyceride 72 06/01/2016 07:51 AM    CHOL/HDL Ratio 3.7 06/01/2016 07:51 AM      BNP No results for input(s): BNPP in the last 72 hours. Liver Enzymes No results for input(s): TP, ALB, TBIL, AP in the last 72 hours.     No lab exists for component: SGOT, GPT, DBIL   Thyroid Studies No results found for: T4, T3U, TSH, TSHEXT, TSHEXT     Procedures/imaging: see electronic medical records for all procedures/Xrays and details which were not copied into this note but were reviewed prior to creation of Tyra Nunez MD

## 2021-11-26 NOTE — PROGRESS NOTES
[facility] Pharmacokinetic Monitoring Service - Vancomycin    Consulting Provider: Shikha Lama   Indication: UTI x 7 days  Target Concentration: Goal AUC/BENJAMÍN 400-600 mg*hr/L  Day of Therapy: 3  Additional Antimicrobials: Cefepime    Pertinent Laboratory Values: Wt Readings from Last 1 Encounters:   11/24/21 59 kg (130 lb)     Temp Readings from Last 1 Encounters:   11/26/21 97.5 °F (36.4 °C)     No components found for: PROCAL  Estimated Creatinine Clearance: 33.2 mL/min (A) (based on SCr of 1.63 mg/dL (H)). Recent Labs     11/26/21  0510 11/25/21  0443   WBC 12.3 12.7       Assessment:  Date/Time Current Dose Concentration Timing of Concentration (h) AUC   11/26/21 Vancomycin 750 mg IV q 18 hr 14.5 11/26 @ 0510 306   Note: Serum concentrations collected for AUC dosing may appear elevated if collected in close proximity to the dose administered, this is not necessarily an indication of toxicity    Plan:  Current dosing regimen is supra-therapeutic, AUC would have reached 601.   Decrease dose to  Vancomycin 750 mg IV q 24 hr with first dose at 2100 on 11/26/21  This regimen should produce an AUC ~ 456 and a trough ~ 15.6  Pharmacy will continue to monitor patient and adjust therapy as indicated    Thank you for the consult,  Bryson Roman [facility]  11/26/2021 11:39 AM                   Contact Info :   [phone]

## 2021-11-26 NOTE — PROGRESS NOTES
Nutrition Assessment     Type and Reason for Visit: Reassess    Nutrition Recommendations/Plan: Continue current regular diet w/ 4 CHO and Glucerna TID to meet nutritional needs. Nutrition Assessment:  PMHx: T2DM, HTN, CKD stage 3, hyperlipidemia, chronic indwelling alonso catheter. Presented with fatigue, chills, and confusion. Patient admitted with sepsis- lactic acid and WBC improving, and UTI.     11/26/21 1042   Malnutrition Assessment   Context of Malnutrition Acute illness   Acute Illness - Energy Intake No significant decrease in energy intake   Acute Illness - Weight Loss No significant weight loss   Acute Illness - Body Fat Loss 7 - Moderate body fat loss   Acute Illness - Body Fat Loss Locations Triceps; Buccal region; Orbital   Acute Illness - Muscle Mass Loss 7 - Moderate muscle mass loss   Acute Illness - Muscle Mass Locations Temples (temporalis); Thigh (quadriceps); Calf   Acute Illness - Fluid Accumulation Unable to assess   Acute Illness -  Strength Not performed   Acute Illness - Malnutrition Score 14   Malnutrition Status Severe malnutrition     Estimated Daily Nutrient Needs:  Energy (kcal):  1770  Protein (g):  47-59       Fluid (ml/day):  1770    Nutrition Related Findings:  Lab: GFR 42, BUN 36, creatinine 1.63. Med: toprol xl, lactated ringers, lasix. No BM noted x3 days. No PO documented. Spoke with pt this AM- just started eating breakfast (eggs, toast, glucerna). Stated no change in appetite and does eat >50% of meals. Did not stated any weight changes. Completed NFPE.       Current Nutrition Therapies:  ADULT DIET Regular; 4 carb choices (60 gm/meal)  ADULT ORAL NUTRITION SUPPLEMENT Breakfast, Lunch, Dinner; Diabetic Supplement    Anthropometric Measures:  Height:  5' 11\" (180.3 cm)  Current Body Wt:  59 kg (130 lb 1.1 oz) (11/24)  BMI: 18.1    Nutrition Diagnosis:   Severe malnutrition related to other (specify) (predicted inadequate energy-intake) as evidenced by moderate loss of subcutaneous fat, moderate muscle loss    Nutrition Intervention:  Food and/or Nutrient Delivery: Continue current diet, Continue oral nutrition supplement  Nutrition Education and Counseling: No recommendations at this time  Coordination of Nutrition Care: Continue to monitor while inpatient    Goals:  Continue PO intake >50% of most meals and supplements throughout the next 3-4 days.        Nutrition Monitoring and Evaluation:   Behavioral-Environmental Outcomes: None identified  Food/Nutrient Intake Outcomes: Food and nutrient intake, Supplement intake  Physical Signs/Symptoms Outcomes: Biochemical data, Meal time behavior, Nutrition focused physical findings, Skin, Weight, GI status, Nausea/vomiting    Discharge Planning:    Continue current diet, Continue oral nutrition supplement     Electronically signed by Madelyn Pisano RD on 11/26/2021 at 10:46 AM

## 2021-11-26 NOTE — PROGRESS NOTES
Cardiology Progress Note      11/26/2021 11:28 AM    Admit Date: 11/23/2021    Admit Diagnosis: Sepsis ([address] [A41.9]  UTI (urinary tract infection) [N39.0]      Subjective:     Rozina Encarnacion denies chest pain, chest pressure/discomfort.     Visit Vitals  /81 (BP 1 Location: Left upper arm, BP Patient Position: At rest)   Pulse 99   Temp 97.5 °F (36.4 °C)   Resp 22   Ht 5' 11\" (1.803 m)   Wt 59 kg (130 lb)   SpO2 93%   BMI 18.13 kg/m²     Current Facility-Administered Medications   Medication Dose Route Frequency    vancomycin (VANCOCIN) 750 mg in 0.9% sodium chloride 250 mL (VIAL-MATE)  750 mg IntraVENous Q18H    Vancomycin Random Level 11/26/21 1000  1 Each Other ONCE    furosemide (LASIX) injection 20 mg  20 mg IntraVENous DAILY    cefepime (MAXIPIME) 2 g in sterile water (preservative free) 10 mL IV syringe  2 g IntraVENous Q12H    albumin human 25% (BUMINATE) solution 25 g  25 g IntraVENous Q6H    Vancomycin-Pharmacy to dose  1 Each Other Rx Dosing/Monitoring    ELECTROLYTE REPLACEMENT PROTOCOL - Potassium Standard Dosing   1 Each Other PRN    ELECTROLYTE REPLACEMENT PROTOCOL - Magnesium   1 Each Other PRN    ELECTROLYTE REPLACEMENT PROTOCOL - Phosphorus  Standard Dosing  1 Each Other PRN    ELECTROLYTE REPLACEMENT PROTOCOL - Calcium   1 Each Other PRN    arformoteroL (BROVANA) neb solution 15 mcg  15 mcg Nebulization BID RT    budesonide (PULMICORT) 500 mcg/2 ml nebulizer suspension  500 mcg Nebulization BID RT    sodium chloride (NS) flush 5-10 mL  5-10 mL IntraVENous PRN    aspirin chewable tablet 81 mg  81 mg Oral DAILY    [Held by provider] metoprolol succinate (TOPROL-XL) XL tablet 25 mg  25 mg Oral QHS    pregabalin (LYRICA) capsule 75 mg  75 mg Oral BID    traZODone (DESYREL) tablet 50 mg  50 mg Oral QHS    heparin (porcine) injection 5,000 Units  5,000 Units SubCUTAneous Q8H    [Held by provider] lisinopril/hydroCHLOROthiazide(PRINZIDE/ZESTORETIC) 20/25 mg   Oral DAILY    lactated Ringers infusion  25 mL/hr IntraVENous CONTINUOUS         Objective:      Physical Exam:  Impresson general : Alert, Awake, in mild respiratory distress  Head:               Normocephalic,atraumatic. ENT:                EOM , no scleral icterus, no pallor, no cyanosis. Nose:               No sinus tenderness  Throat:             Oropharynx ,mucosa, and tongue normal. No oral thrush. Neck:               Supple, symmetric. Lymph nodes. Trachea is midline  Lung:               VRBAZCTC air entry bilateral equal  eboni  rales. No rhonchi. eboni mild  wheezing. No stridors. No prolongded expiration. No accessory muscle use. Heart:              Regular  rate & rhythm. S1 S2 present. No murmur.  No JVD. Abdomen:        Soft. NT. ND. +BS. No masses. liver  and spleen  Extremities:     No pedal edema. No cyanosis. No clubbing. Pulses:            2+ and symmetric in DP. Capillary refill: normal  Lymphatic:       neck and supraclavicular          Musculoskeletal: No joint swelling. No tenderness. Skin:                Lesion Color, texture, turgor normal. No rashes or lesions.     Data Review:   Labs:    Recent Results (from the past 24 hour(s))   POTASSIUM    Collection Time: 11/25/21 11:15 PM   Result Value Ref Range    Potassium 3.8 3.5 - 5.5 mmol/L   MAGNESIUM    Collection Time: 11/25/21 11:15 PM   Result Value Ref Range    Magnesium 2.2 1.6 - 2.6 mg/dL   VANCOMYCIN, RANDOM    Collection Time: 11/26/21  5:10 AM   Result Value Ref Range    Vancomycin, random 14.5 5.0 - 40.0 UG/ML   CBC WITH AUTOMATED DIFF    Collection Time: 11/26/21  5:10 AM   Result Value Ref Range    WBC 12.3 4.6 - 13.2 K/uL    RBC 2.93 (L) 4.35 - 5.65 M/uL    HGB 7.8 (L) 13.0 - 16.0 g/dL    HCT 24.7 (L) 36.0 - 48.0 %    MCV 84.3 78.0 - 100.0 FL    MCH 26.6 24.0 - 34.0 PG    MCHC 31.6 31.0 - 37.0 g/dL    RDW 19.5 (H) 11.6 - 14.5 %    PLATELET 582 900 - 125 K/uL    MPV 10.8 9.2 - 11.8 FL    NRBC 0.0 0  WBC    ABSOLUTE NRBC 0.00 0.00 - 0.01 K/uL    NEUTROPHILS 61 40 - 73 %    LYMPHOCYTES 22 21 - 52 %    MONOCYTES 13 (H) 3 - 10 %    EOSINOPHILS 3 0 - 5 %    BASOPHILS 1 0 - 2 %    IMMATURE GRANULOCYTES 1 (H) 0.0 - 0.5 %    ABS. NEUTROPHILS 7.5 1.8 - 8.0 K/UL    ABS. LYMPHOCYTES 2.6 0.9 - 3.6 K/UL    ABS. MONOCYTES 1.6 (H) 0.05 - 1.2 K/UL    ABS. EOSINOPHILS 0.4 0.0 - 0.4 K/UL    ABS. BASOPHILS 0.1 0.0 - 0.1 K/UL    ABS. IMM. GRANS. 0.1 (H) 0.00 - 0.04 K/UL    DF AUTOMATED     METABOLIC PANEL, BASIC    Collection Time: 11/26/21  5:10 AM   Result Value Ref Range    Sodium 141 136 - 145 mmol/L    Potassium 3.9 3.5 - 5.5 mmol/L    Chloride 108 100 - 111 mmol/L    CO2 27 21 - 32 mmol/L    Anion gap 6 3.0 - 18 mmol/L    Glucose 101 (H) 74 - 99 mg/dL    BUN 36 (H) 7.0 - 18 MG/DL    Creatinine 1.63 (H) 0.6 - 1.3 MG/DL    BUN/Creatinine ratio 22 (H) 12 - 20      GFR est AA 50 (L) >60 ml/min/1.73m2    GFR est non-AA 42 (L) >60 ml/min/1.73m2    Calcium 8.9 8.5 - 10.1 MG/DL       Telemetry: normal sinus rhythm      Assessment:     Principal Problem:    Sepsis ([facility] [address] (11/23/2021)    Active Problems:    UTI (urinary tract infection) due to urinary indwelling Jimenez catheter ([address] (5/1/2020)      Hypotension (11/24/2021)      Pulmonary hypertension ([address] (11/24/2021)      History of tobacco abuse (11/24/2021)      Pulmonary emphysema ([address] (11/24/2021)      Acute hypoxemic respiratory failure ([address] (11/24/2021)        Plan:     Right heart cath on Monday.         Sam Fishman MD

## 2021-11-26 NOTE — ROUTINE PROCESS
Entered pt room pt sitting on bathroom floor This RN and CNA and primary RN Lilly Mays assessed pt for injuries. Vincent Perez from PT helped get pt back to bed.

## 2021-11-26 NOTE — PROGRESS NOTES
Problem: Mobility Impaired (Adult and Pediatric)  Goal: *Acute Goals and Plan of Care (Insert Text)  Description: Physical TherapyGoals   Initiated 11/25/2021 to be met within 5-7 days  1. Bed mobility: Rolling, and supine <> sit with CGA with use of HR for positioning. 2. Activity Tolerance: Tolerate chair sitting 30 minutes for ADL/balance activities. 3. Transfer: Sit <> stand with min/mod A with RW and L AFO in prep for ambulation. 4. Ambulation:  Ambulate 20 ft. Min/mod A with RW/L AFO for increased functional mobility. Outcome: Progressing Towards Goal  physical Therapy TREATMENT    Patient: Can Gar (15 y.o. male)  Date: 11/26/2021  Diagnosis: Sepsis ([facility] [address] [A41.9]  UTI (urinary tract infection) [N39.0]   Sepsis ([facility] [address]       Precautions: Fall   Chart, physical therapy assessment, plan of care and goals were reviewed. ASSESSMENT:  RN reports pt down on bathroom floor, but NSG able to assist to sitting on toilet. Educated NSG on fact that pt had not reached standing with PT at this point and that OOB was not suggested. Pt refused to randell AFO. Session initiated to safely assist back to bedside. Pt found to be leaning on toilet, attempting to digitally relieve himself of BM. After thurough hand cleansing, pt max assisted to standing. Posture is poor (flexed at hips and trunk) and DEA is very narrow. Assist is required for entire trip to bedside. Donnia Pilar continues to be unsafe for NSG, but if mandatory, BSC and 2 person assist is suggested. Progression toward goals:  []      Improving appropriately and progressing toward goals  [x]      Improving slowly and progressing toward goals  []      Not making progress toward goals and plan of care will be adjusted     PLAN:  Patient continues to benefit from skilled intervention to address the above impairments. Continue treatment per established plan of care.   Discharge Recommendations:  SNF  Further Equipment Recommendations for Discharge:  gait belt and rolling walker     SUBJECTIVE:   Patient stated I'm not wearing that.      OBJECTIVE DATA SUMMARY:   Critical Behavior:  Neurologic State: (P) Eyes open spontaneously  Orientation Level: (P) Oriented X4  Cognition: (P) Appropriate decision making, Appropriate for age attention/concentration, Appropriate safety awareness, Follows commands  Safety/Judgement: Awareness of environment  Functional Mobility Training:  Bed Mobility:  Sit to Supine: Minimum assistance  Scooting: Contact guard assistance    Transfers:  Sit to Stand: Moderate assistance; Maximum assistance  Stand to Sit: Moderate assistance  Balance:  Sitting: Impaired  Sitting - Static: Fair (occasional); Poor (constant support)  Sitting - Dynamic: Poor (constant support)  Ambulation/Gait Training:  Distance (ft): 15 Feet (ft)  Assistive Device: Gait belt; Walker, rolling  Ambulation - Level of Assistance: Minimal assistance  Gait Abnormalities: Antalgic; Ataxic (flexed trunk)  Base of Support: Narrowed  Speed/Nicky: Slow; Shuffled  Step Length: Right shortened; Left shortened  Pain:  Pain Scale 1: Numeric (0 - 10)  Pain Intensity 1: 0  Pain out: 0  Activity Tolerance:   Fair-  Please refer to the flowsheet for vital signs taken during this treatment.   After treatment:   [] Patient left in no apparent distress sitting up in chair  [x] Patient left in no apparent distress in bed  [x] Call bell left within reach  [x] Nurse present  [] Caregiver present  [] Bed alarm activated      Saul Gonzalez PTA   Time Calculation: 14 mins

## 2021-11-26 NOTE — PROGRESS NOTES
Pulmonary Specialists  Pulmonary, Critical Care, and Sleep Medicine    Name: Jason Alexander MRN: [id number]   : 1945 Hospital: [facility] [place]    Date: 2021  Room: [facility]/[address] [place] [address] Note                                              Consult requesting physician: Dr. Bill Hernandez    Reason for Consult: hypotension, sepsis, hypoxemia, pulmonary hypertension      IMPRESSION:   · Sepsis  · Hypotension  · Hypoxemic respiratory failure  · Pulmonary hypertension  · Suspect COPD exacerbation  ·   Patient Active Problem List   Diagnosis Code    Lumbar spinal stenosis M48.061    Lumbar spondylosis M47.816    Radiculopathy of leg M54.10    Unable to ambulate R26.2    Urinary retention R33.9    Type II diabetes mellitus with manifestations, uncontrolled ([facility] [address] E11.8, E11.65    Renal mass N28.89    UTI (urinary tract infection) due to urinary indwelling Jimenez catheter ([facility]) T83.511A, N39.0    Compression fracture of L1 vertebra ([facility]) S32.010A    Aneurysm of infrarenal abdominal aorta ([facility]) I71.4    HTN (hypertension) I10    Severe protein-calorie malnutrition (HCC) E43    Severe pulmonary hypertension (HCC) I27.20    Sepsis (HCC) A41.9    Hypotension I95.9    Pulmonary hypertension (HCC) I27.20    History of tobacco abuse Z87.891    Pulmonary emphysema (HCC) J43.9    Acute hypoxemic respiratory failure ([facility]) J96.01       · Code status: Full code       RECOMMENDATIONS:   Respiratory:   Acute hypoxemic respiratory failure  3-5L of oxygen  CXR pulmonary congestion  Pulmonary hypertension  Cardiac cath pending   covid 19  Pending I ordered stat, suspition is low but had only 1 vaccination  No acute issues. Protecting airway. Ventilator bundle & Sedation protocol followed. Daily sedation holiday, assessment for readiness for SBT and then re-titrate if required. Chlorhexidine mouth washes. Keep SPO2 >=92%. HOB 30 degree elevation all the time.  Aggressive pulmonary toileting. Aspiration precautions. Incentive spirometry. CVS: History of diastolic heart failure and pulmonary hypertension of unclear etiology PTH very high 80s? Maybe from COPD? No history of PE  Add low dose lasix  Consult cardiology once stable from UTI for righ sided cardiac cath  No PE  Need PFT outpatint   Echo   Result status: Final result  · LV: Estimated LVEF is 55 - 60%. Normal cavity size, wall thickness and systolic function (ejection fraction normal). Mild (grade 1) left ventricular diastolic dysfunction E/E'= 6.81.  · TV: Moderate to severe tricuspid valve regurgitation is present. · PA: Pulmonary arterial systolic pressure is 86 mmHg. · IVC: Moderately elevated central venous pressure (8 mmHg); IVC diameter is larger than 21 mm and collapses more than 50% with respiration. No previous evaluation for pulmonary hypertension  Repeat echo start work up   Recurrent UTI paraplegia previous Pseudomonas infections. Suspect primary reason for hypotension however got last night beta-blockers so could be contributed to hypotension stop all blood pressure medications give gentle hydration with close monitoring has pulmonary hypertension  Continue albumin. If blood pressure less than 90 and MAP less than 65 resume Levophed had briefly today in the morning. Currently place midline. Check echo troponins EKG. NP elevated most likely from pulmonary hypertension add low-dose Lasix  ID: Improved   Urosepsis elevated white blood cells follow lactic acid sepsis protocol. Vancomycin and cefepime in the past had Pseudomonas sensitive to cefepime. Sepsis bundle and protocol followed. Follow serial lactic acid, frequent BMP check, fluid resuscitation. Follow cultures. Deescalate antibiotic when appropriate. Hematology/Oncology: WBC elevated  Check for chronic PE, PVL as PTH  Renal: acute on chronic renal failure , paraplegia with cath at home   GI/: PPI  Endocrine: Monitor BS. SSI.   Neurology: Paraplegia from spinal stenosis  Toxicology:none  Pain/Sedation: prn  Skin/Wound: evaluate wound care since bed ridden   Electrolytes: Replace electrolytes per ICU electrolyte replacement protocol. IVF: albumins   Nutrition:npo  Prophylaxis: DVT Prophylaxis: SCD/ GI Prophylaxis: Protonix/  Restraints: none  Wrist soft restraints for patient interfering with medical therapy/management and patient safety. Lines/Tubes: PIVher:  PT/OT eval and treat. OOB. Advance Directive/Palliative Care: consulted    Will defer respective systems problem management to primary and other respective consultant and follow patient in ICU with primary and other medical team.  Further recommendations will be based on the patient's response to recommended treatment and results of the investigation ordered. Quality Care: PPI, DVT prophylaxis, HOB elevated, Infection control all reviewed and addressed. Care of plan d/w hospitalist team, RN, RT, MDR.  D/w patient (answered all questions to satisfaction). High complexity decision making was performed during the evaluation of this patient at high risk for decompensation with multiple organ involvement. Subjective/History of Present Illness:     Patient is a 78 y.o. male with PMHx significant for      11/26/2021:  Stable on minimal oxygen  CXR better mild CHF  patient needs to have outpatient PFT for copd and right sided cardiac cath for pulmonary hypertension  Continue copd tx outpatient   I will sign off  Please call me if new issues      I/O last 24 hrs: Intake/Output Summary (Last 24 hours) at 11/26/2021 1027  Last data filed at 11/26/2021 7302  Gross per 24 hour   Intake 902.91 ml   Output 3650 ml   Net -2747.09 ml             History taken from patient and chart     Review of Systems:  A comprehensive review of systems was negative except for that written in the HPI.        Review of Systems:   HEENT: No epistaxis, no nasal drainage, no difficulty in swallowing, no redness in eyes  Respiratory: as above  Cardiovascular: no chest pain, no palpitations, no chronic leg edema, no syncope  Gastrointestinal: no abd pain, no vomiting, no diarrhea, no bleeding symptoms  Genitourinary: No urinary symptoms or hematuria  Musculoskeletal: Neg  Neurological: No focal weakness, no seizures, no headaches  Behvioral/Psych: No anxiety, no depression  General : No fever, no chills, no weight loss, no night sweats     Allergies   Allergen Reactions    Zocor [Simvastatin] Other (comments)     Made him \"ill\"      Past Medical History:   Diagnosis Date    Atherosclerosis     Diabetes ([facility] [address] 2013    type 2    High cholesterol     Hyperlipidemia     Hypertension 2013    Muscle weakness     Neoplasm     right kidney    Severe pulmonary hypertension ([facility] [address]     Spinal stenosis     Urinary retention     Vitamin D deficiency       Past Surgical History:   Procedure Laterality Date    HX HEENT      40 years ago deviated septum    HX LUMBAR LAMINECTOMY  2017    HX ORTHOPAEDIC      Lumbar laminectomy 9/7/17    HX ORTHOPAEDIC Right     CTR      Social History     Tobacco Use    Smoking status: Former Smoker    Smokeless tobacco: Never Used   Substance Use Topics    Alcohol use: No      Family History   Problem Relation Age of Onset    Heart Disease Father       Prior to Admission medications    Medication Sig Start Date End Date Taking? Authorizing Provider   calcium carbonate (TUMS) 200 mg calcium (500 mg) chew Take 1 Tablet by mouth two (2) times a day. Indications: heartburn   Yes Provider, Historical   pregabalin (Lyrica) 25 mg capsule Take 75 mg by mouth. Indications: restless legs syndrome, an extreme discomfort in the calf muscles when sitting or lying down    Provider, Historical   atorvastatin (Lipitor) 20 mg tablet Take 20 mg by mouth nightly. Provider, Historical   traZODone (DESYREL) 50 mg tablet Take 50 mg by mouth nightly.     Provider, Historical   metoprolol succinate (TOPROL-XL) 25 mg XL tablet Take 1 Tab by mouth nightly. 3/21/20   Other, MD Katelynn   aspirin 81 mg chewable tablet Take 81 mg by mouth daily. Manuel, MD Katelynn   lisinopril-hydroCHLOROthiazide (PRINZIDE, ZESTORETIC) 20-25 mg per tablet Take  by mouth daily. Provider, Historical   metFORMIN (GLUCOPHAGE) 500 mg tablet Take 500 mg by mouth two (2) times daily (with meals). Patient not taking: Reported on 8/23/2021    Provider, Historical   cholecalciferol, vitamin D3, (Vitamin D3) 50 mcg (2,000 unit) tab Take  by mouth. Provider, Historical   multivitamin (ONE A DAY) tablet Take 1 Tab by mouth daily. Provider, Historical     Current Facility-Administered Medications   Medication Dose Route Frequency    vancomycin (VANCOCIN) 750 mg in 0.9% sodium chloride 250 mL (VIAL-MATE)  750 mg IntraVENous Q18H    Vancomycin Random Level 11/26/21 1000  1 Each Other ONCE    furosemide (LASIX) injection 20 mg  20 mg IntraVENous DAILY    cefepime (MAXIPIME) 2 g in sterile water (preservative free) 10 mL IV syringe  2 g IntraVENous Q12H    albumin human 25% (BUMINATE) solution 25 g  25 g IntraVENous Q6H    Vancomycin-Pharmacy to dose  1 Each Other Rx Dosing/Monitoring    arformoteroL (BROVANA) neb solution 15 mcg  15 mcg Nebulization BID RT    budesonide (PULMICORT) 500 mcg/2 ml nebulizer suspension  500 mcg Nebulization BID RT    aspirin chewable tablet 81 mg  81 mg Oral DAILY    [Held by provider] metoprolol succinate (TOPROL-XL) XL tablet 25 mg  25 mg Oral QHS    pregabalin (LYRICA) capsule 75 mg  75 mg Oral BID    traZODone (DESYREL) tablet 50 mg  50 mg Oral QHS    heparin (porcine) injection 5,000 Units  5,000 Units SubCUTAneous Q8H    [Held by provider] lisinopril/hydroCHLOROthiazide(PRINZIDE/ZESTORETIC) 20/25 mg   Oral DAILY    lactated Ringers infusion  25 mL/hr IntraVENous CONTINUOUS         Objective:   Vital Signs:    Visit Vitals  /81 (BP 1 Location: Left upper arm, BP Patient Position:  At rest)   Pulse 99   Temp 97.5 °F (36.4 °C)   Resp 22   Ht 5' 11\" (1.803 m)   Wt 59 kg (130 lb)   SpO2 93%   BMI 18.13 kg/m²       O2 Device: Nasal cannula   O2 Flow Rate (L/min): 4 l/min   Temp (24hrs), Av.2 °F (36.8 °C), Min:97.5 °F (36.4 °C), Max:98.9 °F (37.2 °C)       Intake/Output:   Last shift:      No intake/output data recorded. Last 3 shifts:  1901 -  0700  In: 3345 [I.V.:1695]  Out: 4175 [Urine:4175]      Intake/Output Summary (Last 24 hours) at 2021 1027  Last data filed at 2021 0523  Gross per 24 hour   Intake 902.91 ml   Output 3650 ml   Net -2747.09 ml       Last 3 Recorded Weights in this Encounter    21 1334 21 1343   Weight: 59 kg (130 lb) 59 kg (130 lb)             Recent Labs     21  1054   PHI 7.45   PCO2I 33.7*   PO2I 82   HCO3I 23.2   FIO2I 4       Physical Exam:     Impresson general : Alert, Awake, in mild respiratory distress  Head:   Normocephalic,atraumatic. ENT:   EOM , no scleral icterus, no pallor, no cyanosis. Nose:   No sinus tenderness  Throat:  Oropharynx ,mucosa, and tongue normal. No oral thrush. Neck:   Supple, symmetric. Lymph nodes. Trachea is midline  Lung: Moderate air entry bilateral equal  eboni  rales. No rhonchi. eboni mild  wheezing. No stridors. No prolongded expiration. No accessory muscle use. Heart:   Regular  rate & rhythm. S1 S2 present. No murmur. No JVD. Abdomen:  Soft. NT. ND. +BS. No masses. liver  and spleen  Extremities:  No pedal edema. No cyanosis. No clubbing. Pulses: 2+ and symmetric in DP. Capillary refill: normal  Lymphatic:  neck and supraclavicular    Musculoskeletal: No joint swelling. No tenderness. Skin:   Lesion Color, texture, turgor normal. No rashes or lesions.       Data:       Recent Results (from the past 24 hour(s))   POTASSIUM    Collection Time: 21 11:15 PM   Result Value Ref Range    Potassium 3.8 3.5 - 5.5 mmol/L   MAGNESIUM    Collection Time: 21 11:15 PM   Result Value Ref Range    Magnesium 2.2 1.6 - 2.6 mg/dL   VANCOMYCIN, RANDOM    Collection Time: 11/26/21  5:10 AM   Result Value Ref Range    Vancomycin, random 14.5 5.0 - 40.0 UG/ML   CBC WITH AUTOMATED DIFF    Collection Time: 11/26/21  5:10 AM   Result Value Ref Range    WBC 12.3 4.6 - 13.2 K/uL    RBC 2.93 (L) 4.35 - 5.65 M/uL    HGB 7.8 (L) 13.0 - 16.0 g/dL    HCT 24.7 (L) 36.0 - 48.0 %    MCV 84.3 78.0 - 100.0 FL    MCH 26.6 24.0 - 34.0 PG    MCHC 31.6 31.0 - 37.0 g/dL    RDW 19.5 (H) 11.6 - 14.5 %    PLATELET 272 061 - 940 K/uL    MPV 10.8 9.2 - 11.8 FL    NRBC 0.0 0  WBC    ABSOLUTE NRBC 0.00 0.00 - 0.01 K/uL    NEUTROPHILS 61 40 - 73 %    LYMPHOCYTES 22 21 - 52 %    MONOCYTES 13 (H) 3 - 10 %    EOSINOPHILS 3 0 - 5 %    BASOPHILS 1 0 - 2 %    IMMATURE GRANULOCYTES 1 (H) 0.0 - 0.5 %    ABS. NEUTROPHILS 7.5 1.8 - 8.0 K/UL    ABS. LYMPHOCYTES 2.6 0.9 - 3.6 K/UL    ABS. MONOCYTES 1.6 (H) 0.05 - 1.2 K/UL    ABS. EOSINOPHILS 0.4 0.0 - 0.4 K/UL    ABS. BASOPHILS 0.1 0.0 - 0.1 K/UL    ABS. IMM.  GRANS. 0.1 (H) 0.00 - 0.04 K/UL    DF AUTOMATED     METABOLIC PANEL, BASIC    Collection Time: 11/26/21  5:10 AM   Result Value Ref Range    Sodium 141 136 - 145 mmol/L    Potassium 3.9 3.5 - 5.5 mmol/L    Chloride 108 100 - 111 mmol/L    CO2 27 21 - 32 mmol/L    Anion gap 6 3.0 - 18 mmol/L    Glucose 101 (H) 74 - 99 mg/dL    BUN 36 (H) 7.0 - 18 MG/DL    Creatinine 1.63 (H) 0.6 - 1.3 MG/DL    BUN/Creatinine ratio 22 (H) 12 - 20      GFR est AA 50 (L) >60 ml/min/1.73m2    GFR est non-AA 42 (L) >60 ml/min/1.73m2    Calcium 8.9 8.5 - 10.1 MG/DL         Chemistry Recent Labs     11/26/21  0510 11/25/21 2315 11/25/21  0443 11/24/21  2316 11/24/21  2316 11/24/21  1315 11/24/21  1315 11/24/21  0300 11/24/21  0300 11/23/21  1345 11/23/21  1345   *  --  88  --   --   --   --   --  84   < > 141*     --  144  --   --   --   --   --  142   < > 140   K 3.9 3.8 4.5   < > 4.2   < > 3.8   < > 4.0   < > 4.2     --  112*  -- --   --   --   --  110   < > 105   CO2 27  --  26  --   --   --   --   --  26   < > 28   BUN 36*  --  32*  --   --   --   --   --  32*   < > 33*   CREA 1.63*  --  1.34*  --   --   --   --   --  1.60*   < > 1.85*   CA 8.9  --  8.7  --   --   --   --   --  8.6   < > 10.8*   MG  --  2.2  --   --  2.1  --  1.5*   < > 1.7  --   --    AGAP 6  --  6  --   --   --   --   --  6   < > 7   BUCR 22*  --  24*  --   --   --   --   --  20   < > 18   AP  --   --   --   --   --   --   --   --   --   --  125*   TP  --   --   --   --   --   --   --   --   --   --  8.6*   ALB  --   --   --   --   --   --   --   --   --   --  3.6   GLOB  --   --   --   --   --   --   --   --   --   --  5.0*   AGRAT  --   --   --   --   --   --   --   --   --   --  0.7*    < > = values in this interval not displayed. Lactic Acid Lactic acid   Date Value Ref Range Status   11/24/2021 2.2 (HH) 0.4 - 2.0 MMOL/L Final     Comment:     CALLED TO AND CORRECTLY REPEATED BY:  WALTER ALEJO RN ICU ON 11/24/2021 1338 TO 5079       Recent Labs     11/24/21  1247 11/23/21  2145   LAC 2.2* 1.4        Liver Enzymes Protein, total   Date Value Ref Range Status   11/23/2021 8.6 (H) 6.4 - 8.2 g/dL Final     Albumin   Date Value Ref Range Status   11/23/2021 3.6 3.4 - 5.0 g/dL Final     Globulin   Date Value Ref Range Status   11/23/2021 5.0 (H) 2.0 - 4.0 g/dL Final     A-G Ratio   Date Value Ref Range Status   11/23/2021 0.7 (L) 0.8 - 1.7   Final     Alk.  phosphatase   Date Value Ref Range Status   11/23/2021 125 (H) 45 - 117 U/L Final     Recent Labs     11/23/21  1345   TP 8.6*   ALB 3.6   GLOB 5.0*   AGRAT 0.7*   *        CBC w/Diff Recent Labs     11/26/21  0510 11/25/21  0443 11/24/21  0300   WBC 12.3 12.7 16.3*   RBC 2.93* 2.99* 3.31*   HGB 7.8* 8.0* 8.5*   HCT 24.7* 25.9* 27.5*    159 189   GRANS 61 65 69   LYMPH 22 19* 17*   EOS 3 4 2        Cardiac Enzymes No results found for: CPK, CK, CKMMB, CKMB, RCK3, CKMBT, CKNDX, CKND1, LUH, TROPT, TROIQ, TRACY, TROPT, TNIPOC, BNP, BNPP     BNP No results found for: BNP, BNPP, XBNPT     Coagulation Recent Labs     11/24/21  1432   PTP 15.8*   INR 1.3*         Thyroid  No results found for: T4, T3U, TSH, TSHEXT, TSHEXT    No results found for: T4     Urinalysis Lab Results   Component Value Date/Time    Color DARK YELLOW 11/23/2021 01:45 PM    Appearance TURBID 11/23/2021 01:45 PM    Specific gravity 1.015 11/23/2021 01:45 PM    Specific gravity 0.000 (L) 08/18/2021 05:55 AM    pH (UA) 6.0 11/23/2021 01:45 PM    Protein 300 (A) 11/23/2021 01:45 PM    Glucose Negative 11/23/2021 01:45 PM    Ketone Negative 11/23/2021 01:45 PM    Bilirubin Negative 11/23/2021 01:45 PM    Urobilinogen 1.0 11/23/2021 01:45 PM    Nitrites Positive (A) 11/23/2021 01:45 PM    Leukocyte Esterase LARGE (A) 11/23/2021 01:45 PM    Epithelial cells FEW 11/23/2021 01:45 PM    Bacteria 2+ (A) 11/23/2021 01:45 PM    WBC TOO NUMEROUS TO COUNT 11/23/2021 01:45 PM    RBC 21 to 35 11/23/2021 01:45 PM        Micro  Recent Labs     11/23/21 2015 11/23/21  1345   CULT No significant growth, <10,000 CFU/mL NO GROWTH 3 DAYS  NO GROWTH 3 DAYS     Recent Labs     11/23/21 2015 11/23/21  1345   CULT No significant growth, <10,000 CFU/mL NO GROWTH 3 DAYS  NO GROWTH 3 DAYS          Culture data during this hospitalization.    All Micro Results     Procedure Component Value Units Date/Time    CULTURE, BLOOD [[id number]] Collected: 11/23/21 1345    Order Status: Completed Specimen: Blood Updated: 11/26/21 0719     Special Requests: NO SPECIAL REQUESTS        Culture result: NO GROWTH 3 DAYS       CULTURE, BLOOD [[id number]] Collected: 11/23/21 1345    Order Status: Completed Specimen: Blood Updated: 11/26/21 0719     Special Requests: NO SPECIAL REQUESTS        Culture result: NO GROWTH 3 DAYS       CULTURE, URINE [[id number]] Collected: 11/23/21 2015    Order Status: Completed Specimen: Urine from Clean catch Updated: 11/25/21 0756     Special Requests: NO SPECIAL REQUESTS        Culture result:       No significant growth, <10,000 CFU/mL          COVID-19 RAPID TEST [[id number]] Collected: 11/24/21 1415    Order Status: Completed Specimen: Nasopharyngeal Updated: 11/24/21 1503     Specimen source NOSE        COVID-19 rapid test Not detected        Comment: Rapid Abbott ID Now       Rapid NAAT:  The specimen is NEGATIVE for SARS-CoV-2, the novel coronavirus associated with COVID-19. Negative results should be treated as presumptive and, if inconsistent with clinical signs and symptoms or necessary for patient management, should be tested with an alternative molecular assay. Negative results do not preclude SARS-CoV-2 infection and should not be used as the sole basis for patient management decisions. This test has been authorized by the FDA under an Emergency Use Authorization (EUA) for use by authorized laboratories. Fact sheet for Healthcare Providers: ConventionUpdate.co.nz  Fact sheet for Patients: ConventionUpdate.co.nz       Methodology: Isothermal Nucleic Acid Amplification                    PFT       Ultrasound       LE Doppler       ECHO       Images report reviewed by me:  CT (Most Recent) (CT chest reviewed by me) Results from Hospital Encounter encounter on 11/23/21    CT HEAD WO CONT    Narrative  EXAM: CT head    INDICATION: Headache. COMPARISON: 5/17/2021    TECHNIQUE: Axial CT imaging of the head was performed without intravenous  contrast. Standard multiplanar coronal and sagittal reformatted images were  obtained and are included in interpretation. One or more dose reduction techniques were used on this CT: automated exposure  control, adjustment of the mAs and/or kVp according to patient size, and  iterative reconstruction techniques. The specific techniques used on this CT  exam have been documented in the patient's electronic medical record.   Digital  Imaging and Communications in Medicine (DICOM) format image data are available  to nonaffiliated external healthcare facilities or entities on a secure, media  free, reciprocally searchable basis with patient authorization for at least a  12-month period after this study. _______________    FINDINGS:    BRAIN AND POSTERIOR FOSSA: Cerebral atrophy. Patchy periventricular, deep and  subcortical white matter hypoattenuation which is nonspecific but likely  represents chronic ischemic changes. No evidence of acute large vessel  transcortical infarct or acute parenchymal hemorrhage. No midline shift or  hydrocephalus. Unchanged cluster of calcifications in the central mat. EXTRA-AXIAL SPACES AND MENINGES: There are no abnormal extra-axial fluid  collections. CALVARIUM: Intact. SINUSES: Clear. OTHER: None.    _______________    Impression  No acute intracranial abnormality. CXR reviewed by me:  XR (Most Recent). CXR  reviewed by me and compared with previous CXR Results from Hospital Encounter encounter on 11/23/21    XR CHEST PORT    Narrative  EXAM: One-view chest    CLINICAL HISTORY: hypoxia ,    COMPARISON: X-ray 11/20/2021    FINDINGS:    Frontal view of the chest demonstrate minimal prominence of interstitial  markings. There has been interval increase in hazy opacity at the right lung  base with slight blunting of the costophrenic angle. Left costophrenic angle  remains sharp. Cardiac silhouette is normal in size and contour. No acute bony  or soft tissue abnormality. Impression  Minimal prominence of the interstitial markings could suggest slight  interstitial edema without appreciable change. Interval small right pleural  effusion with right basilar edema/infiltrate/atelectasis.            Nathalia Moralez MD  11/26/2021

## 2021-11-27 NOTE — ROUTINE PROCESS
Bedside and Verbal shift change report given to Lashawn Duncan RN  (oncoming nurse) by Mary Falcon RN  (offgoing nurse). Report given with SBAR, Kardex, Intake/Output and Recent Results.

## 2021-11-27 NOTE — PROGRESS NOTES
Cardiology Progress Note      11/27/2021 1:34 PM    Admit Date: 11/23/2021    Admit Diagnosis: Sepsis ([facility] [address] [A41.9]  UTI (urinary tract infection) [N39.0]      Subjective:     Kiki Danielle denies chest pain, chest pressure/discomfort, palpitations, irregular heart beats, syncope. Visit Vitals  BP (!) 142/78   Pulse 60   Temp 98.2 °F (36.8 °C)   Resp 16   Ht 5' 11\" (1.803 m)   Wt 59 kg (130 lb)   SpO2 95%   BMI 18.13 kg/m²     Current Facility-Administered Medications   Medication Dose Route Frequency    0.9% sodium chloride infusion 250 mL  250 mL IntraVENous PRN    0.9% sodium chloride infusion 250 mL  250 mL IntraVENous PRN    furosemide (LASIX) injection 20 mg  20 mg IntraVENous DAILY    arformoteroL (BROVANA) neb solution 15 mcg  15 mcg Nebulization BID RT    budesonide (PULMICORT) 500 mcg/2 ml nebulizer suspension  500 mcg Nebulization BID RT    sodium chloride (NS) flush 5-10 mL  5-10 mL IntraVENous PRN    aspirin chewable tablet 81 mg  81 mg Oral DAILY    [Held by provider] metoprolol succinate (TOPROL-XL) XL tablet 25 mg  25 mg Oral QHS    pregabalin (LYRICA) capsule 75 mg  75 mg Oral BID    traZODone (DESYREL) tablet 50 mg  50 mg Oral QHS    heparin (porcine) injection 5,000 Units  5,000 Units SubCUTAneous Q8H    [Held by provider] lisinopril/hydroCHLOROthiazide(PRINZIDE/ZESTORETIC) 20/25 mg   Oral DAILY    lactated Ringers infusion  25 mL/hr IntraVENous CONTINUOUS         Objective:      Physical Exam:  Impresson general : Alert, Awake, in mild respiratory distress  Head:               Normocephalic,atraumatic. ENT:                EOM , no scleral icterus, no pallor, no cyanosis. Nose:               No sinus tenderness  Throat:             Oropharynx ,mucosa, and tongue normal. No oral thrush. Neck:               Supple, symmetric. Lymph nodes. Trachea is midline  Lung:               KELMOFCW air entry bilateral equal  eboni  rales. No rhonchi. eboni mild  wheezing. No stridors.  No prolongded expiration. No accessory muscle use. Heart:              Regular  rate & rhythm. S1 S2 present. No murmur.  No JVD. Abdomen:        Soft. NT. ND. +BS. No masses. liver  and spleen  Extremities:     No pedal edema. No cyanosis. No clubbing. Pulses:            2+ and symmetric in DP. Capillary refill: normal  Lymphatic:       neck and supraclavicular          Musculoskeletal: No joint swelling. No tenderness. Skin:                Lesion Color, texture, turgor normal. No rashes or lesions. Data Review:   Labs:    Recent Results (from the past 24 hour(s))   CBC WITH AUTOMATED DIFF    Collection Time: 11/27/21  1:20 AM   Result Value Ref Range    WBC 9.0 4.6 - 13.2 K/uL    RBC 2.71 (L) 4.35 - 5.65 M/uL    HGB 6.9 (L) 13.0 - 16.0 g/dL    HCT 22.6 (L) 36.0 - 48.0 %    MCV 83.4 78.0 - 100.0 FL    MCH 25.5 24.0 - 34.0 PG    MCHC 30.5 (L) 31.0 - 37.0 g/dL    RDW 19.8 (H) 11.6 - 14.5 %    PLATELET 854 389 - 562 K/uL    MPV 11.2 9.2 - 11.8 FL    NRBC 0.0 0  WBC    ABSOLUTE NRBC 0.00 0.00 - 0.01 K/uL    NEUTROPHILS 54 40 - 73 %    LYMPHOCYTES 24 21 - 52 %    MONOCYTES 15 (H) 3 - 10 %    EOSINOPHILS 6 (H) 0 - 5 %    BASOPHILS 0 0 - 2 %    IMMATURE GRANULOCYTES 1 (H) 0.0 - 0.5 %    ABS. NEUTROPHILS 4.9 1.8 - 8.0 K/UL    ABS. LYMPHOCYTES 2.2 0.9 - 3.6 K/UL    ABS. MONOCYTES 1.4 (H) 0.05 - 1.2 K/UL    ABS. EOSINOPHILS 0.5 (H) 0.0 - 0.4 K/UL    ABS. BASOPHILS 0.0 0.0 - 0.1 K/UL    ABS. IMM.  GRANS. 0.1 (H) 0.00 - 0.04 K/UL    DF AUTOMATED     METABOLIC PANEL, BASIC    Collection Time: 11/27/21  1:20 AM   Result Value Ref Range    Sodium 140 136 - 145 mmol/L    Potassium 3.9 3.5 - 5.5 mmol/L    Chloride 106 100 - 111 mmol/L    CO2 24 21 - 32 mmol/L    Anion gap 10 3.0 - 18 mmol/L    Glucose 107 (H) 74 - 99 mg/dL    BUN 43 (H) 7.0 - 18 MG/DL    Creatinine 1.52 (H) 0.6 - 1.3 MG/DL    BUN/Creatinine ratio 28 (H) 12 - 20      GFR est AA 55 (L) >60 ml/min/1.73m2    GFR est non-AA 45 (L) >60 ml/min/1.73m2 Calcium 8.5 8.5 - 10.1 MG/DL       Telemetry: normal sinus rhythm      Assessment:     Principal Problem:    Sepsis ([address] (11/23/2021)    Active Problems:    UTI (urinary tract infection) due to urinary indwelling Jimenez catheter ([address] (5/1/2020)      Hypotension (11/24/2021)      Pulmonary hypertension ([address] (11/24/2021)      History of tobacco abuse (11/24/2021)      Pulmonary emphysema ([address] (11/24/2021)      Acute hypoxemic respiratory failure ([address] (11/24/2021)        Plan:     Stable. Right heart cath on Monday.        Lindsay Morgan MD

## 2021-11-27 NOTE — PROGRESS NOTES
1708 Blood Transfusion started at this time. Tolerating well    1900 Bedside and Verbal shift change report given to Kvng Ramsey RN (oncoming nurse) by Meena Borjas RN (offgoing nurse). Report included the following information SBAR, Kardex, MAR, Recent Results and Cardiac Rhythm .

## 2021-11-27 NOTE — PROGRESS NOTES
Hospitalist Progress Note    Patient: Yuli Powers MRN: [id number]  CSN: [id number]    YOB: 1945  Age: 78 y.o. Sex: male    DOA: 11/23/2021 LOS:  LOS: 4 days                Assessment and Plan:    Principal Problem:    Sepsis ([address] (11/23/2021)    Active Problems:    UTI (urinary tract infection) due to urinary indwelling Jimenez catheter ([address] (5/1/2020)      Hypotension (11/24/2021)      Pulmonary hypertension ([address] (11/24/2021)      History of tobacco abuse (11/24/2021)      Pulmonary emphysema ([address] (11/24/2021)      Acute hypoxemic respiratory failure ([address] (11/24/2021)        Sepsis  Hypotension   Hypoxemic respiratory failure  Pulmonary hypertension   Suspect COPD exacerbation      Severe pulm hypertension -  Cardiology to do  right heart cath Monday       covid-19 rapid test negative     Hypotension- resolved     Sepsis -  Secondary to CAUTI? Will stop antibiotics and watch labs    Lactic acid improved  WBC continues to improve      Acute on chronic kidney disease, stage III -  Likely due to CAUTI  Creatinine improving     UTI -  Catheter associated   No significant growth  Will stop antibiotics         DM-  On sliding scale insulin.     HTN -   Continue with home meds,   Monitor BP     CKD stage 3 -  Follow renal function. Creatinine continues to improve       DVT prophylaxis with heparin     PT/OT     Estimated length of stay : 2-3 days      Chief complaint:  Hypotension        Subjective:    Fees pretty good overall        Review of systems:    General: No fevers or chills. Cardiovascular: No chest pain or pressure. No palpitations. Pulmonary: No shortness of breath. Gastrointestinal: No nausea, vomiting. Objective:    Vital signs/Intake and Output:  Visit Vitals  /76   Pulse 81   Temp 98 °F (36.7 °C)   Resp 18   Ht 5' 11\" (1.803 m)   Wt 59 kg (130 lb)   SpO2 92%   BMI 18.13 kg/m²     Current Shift:  No intake/output data recorded.   Last three shifts:  11/25 1901 - 11/27 0700  In: 1209.6 [P.O.:600; I.V.:609.6]  Out: 3550 [Urine:3550]    Physical Exam:  General: NAD, AAOx3. Non-toxic. HEENT: NC/AT. PERRLA, EOMI.  MMM. Lungs: Nml inspection. CTA B/L. No wheezing, rales or rhonchi. Heart:  S1S2 RRR,  PMI mid 5th IC space. No M/RG. Abdomen: Soft, NT/ND.  BS+. No peritoneal signs. Extremities: No C/C/E. Psych:   Nml affect. Neurologic:  2-12 intact. Strength 5/5 throughout. Sensation symmetrical.          Labs: Results:       Chemistry Recent Labs     11/27/21  0120 11/26/21  0510 11/25/21  2315 11/25/21 0443 11/25/21 0443   * 101*  --   --  88    141  --   --  144   K 3.9 3.9 3.8   < > 4.5    108  --   --  112*   CO2 24 27  --   --  26   BUN 43* 36*  --   --  32*   CREA 1.52* 1.63*  --   --  1.34*   CA 8.5 8.9  --   --  8.7   AGAP 10 6  --   --  6   BUCR 28* 22*  --   --  24*    < > = values in this interval not displayed. CBC w/Diff Recent Labs     11/27/21  0120 11/26/21  0510 11/25/21 0443   WBC 9.0 12.3 12.7   RBC 2.71* 2.93* 2.99*   HGB 6.9* 7.8* 8.0*   HCT 22.6* 24.7* 25.9*    167 159   GRANS 54 61 65   LYMPH 24 22 19*   EOS 6* 3 4      Cardiac Enzymes Recent Labs     11/24/21  1247      CKND1 1.0      Coagulation Recent Labs     11/24/21  1432   PTP 15.8*   INR 1.3*       Lipid Panel Lab Results   Component Value Date/Time    Cholesterol, total 146 06/01/2016 07:51 AM    HDL Cholesterol 40 06/01/2016 07:51 AM    LDL, calculated 91.6 06/01/2016 07:51 AM    VLDL, calculated 14.4 06/01/2016 07:51 AM    Triglyceride 72 06/01/2016 07:51 AM    CHOL/HDL Ratio 3.7 06/01/2016 07:51 AM      BNP No results for input(s): BNPP in the last 72 hours. Liver Enzymes No results for input(s): TP, ALB, TBIL, AP in the last 72 hours.     No lab exists for component: SGOT, GPT, DBIL   Thyroid Studies No results found for: T4, T3U, TSH, TSHEXT     Procedures/imaging: see electronic medical records for all procedures/Xrays and details which were not copied into this note but were reviewed prior to creation of Plan

## 2021-11-27 NOTE — PROGRESS NOTES
Occupational Therapy Treatment Attempt     Chart reviewed. Attempted Occupational Therapy Treatment, however, patient unable to be seen due to:  []  Nausea/vomiting  [x]  Eating  []  Pain  []  Patient too lethargic  []  Off Unit for testing/procedure  []  Dialysis treatment in progress  []  Telemetry Results  [x]  Other:  Noted pt Hgb 6.9 and pt about to get blood transfusion. Will hold therapy for today.       Es Chong, OTR/L

## 2021-11-27 NOTE — PROGRESS NOTES
[id number] Shift Summary: Pt rested well overnight with no complaints. No new clinical concerns noted.      Nightshift Chart Audit Completed

## 2021-11-27 NOTE — ROUTINE PROCESS
Bedside and Verbal shift change report given to Umang Roper RN (oncoming nurse) by Deonna Gimenez RN (offgoing nurse). Report included the following information SBAR, Kardex, MAR and Recent Results.

## 2021-11-27 NOTE — PROGRESS NOTES
Problem: Mobility Impaired (Adult and Pediatric)  Goal: *Acute Goals and Plan of Care (Insert Text)  Description: Physical TherapyGoals   Initiated 11/25/2021 to be met within 5-7 days  1. Bed mobility: Rolling, and supine <> sit with CGA with use of HR for positioning. 2. Activity Tolerance: Tolerate chair sitting 30 minutes for ADL/balance activities. 3. Transfer: Sit <> stand with min/mod A with RW and L AFO in prep for ambulation. 4. Ambulation:  Ambulate 20 ft. Min/mod A with RW/L AFO for increased functional mobility. Outcome: Progressing Towards Goal   PHYSICAL THERAPY TREATMENT    Patient: Can Gar (19 y.o. male)  Date: 11/27/2021  Diagnosis: Sepsis ([facility] [address] [A41.9]  UTI (urinary tract infection) [N39.0]   Sepsis ([facility] [address]    Precautions: Fall  PLOF: ambulatory with a rollator    ASSESSMENT:  Pt supine in bed upon arrival.  Increased time needed to sit up EOB. ModA needed for (B)LEs, and min/modA to sit upright. Sat EOB for extended time to acclimate to change in position. Elevated bed and modA for sit to stand. (B) flexed knees. VC/MC to stand erect and performed TKE on (B)Knees for increased stability. Performed side steps for 3ft before needing to sit back down. Performed seated ROM strengthening exercises. ModA back to supine. Progression toward goals:   []      Improving appropriately and progressing toward goals  [x]      Improving slowly and progressing toward goals  []      Not making progress toward goals and plan of care will be adjusted     PLAN:  Patient continues to benefit from skilled intervention to address the above impairments. Continue treatment per established plan of care. Discharge Recommendations:  Skilled Nursing Facility  Further Equipment Recommendations for Discharge:  N/A     SUBJECTIVE:   Patient stated I am not going to rehab.     OBJECTIVE DATA SUMMARY:   Critical Behavior:  Neurologic State: Eyes open spontaneously  Orientation Level: Oriented X4  Cognition: Appropriate decision making, Appropriate for age attention/concentration, Appropriate safety awareness, Follows commands  Safety/Judgement: Awareness of environment  Functional Mobility Training:  Bed Mobility:    Supine to Sit: Minimum assistance; Moderate assistance; Additional time  Sit to Supine: Minimum assistance  Scooting: Supervision  Transfers:  Sit to Stand: Moderate assistance  Stand to Sit: Minimum assistance  Balance:  Sitting: Intact; With support  Sitting - Static: Fair (occasional)  Sitting - Dynamic: Fair (occasional)  Standing: Impaired; With support  Standing - Static: Poor  Standing - Dynamic : Poor   Ambulation/Gait Training:  Distance (ft): 3 Feet (ft)  Assistive Device: Gait belt; Walker, rolling  Ambulation - Level of Assistance: Moderate assistance  Gait Abnormalities: Decreased step clearance  Speed/Nicky: Delayed  Step Length: Right shortened; Left shortened  Therapeutic Exercises:   (B)LEs      EXERCISE   Sets   Reps   Active Active Assist   Passive Self ROM   Comments   HR/TR  10ea  [x] [] [] []    Quad Sets/Glut Sets    [] [] [] [] Hold for 5 secs   Hamstring Sets   [] [] [] []    Short Arc Quads   [] [] [] []    Heel Slides   [] [] [] []    Straight Leg Raises   [] [] [] []    Hip Add  10 [x] [] [] [] Hold for 5 secs, w/ pillow squeeze   Long Arc Quads  10 [x] [] [] []    Seated Marching  10 [x] [] [] []    Standing Marching   [] [] [] []       [] [] [] []        Pain:  Pain level pre-treatment: 0/10  Pain level post-treatment: 0/10   Pain Intervention(s): Medication (see MAR); Rest, Ice, Repositioning   Response to intervention: Nurse notified, See doc flow    Activity Tolerance:   Fair-  Please refer to the flowsheet for vital signs taken during this treatment.   After treatment:   [] Patient left in no apparent distress sitting up in chair  [x] Patient left in no apparent distress in bed  [x] Call bell left within reach  [] Nursing notified  [] Caregiver present  [x] Bed alarm activated  [] SCDs applied      COMMUNICATION/EDUCATION:   [x]         Role of Physical Therapy in the acute care setting. [x]         Fall prevention education was provided and the patient/caregiver indicated understanding. [x]         Patient/family have participated as able in working toward goals and plan of care. [x]         Patient/family agree to work toward stated goals and plan of care. []         Patient understands intent and goals of therapy, but is neutral about his/her participation.   []         Patient is unable to participate in stated goals/plan of care: ongoing with therapy staff.  []         Other:        Vikas Wilkins PTA   Time Calculation: 24 mins

## 2021-11-28 NOTE — PROGRESS NOTES
OCCUPATIONAL THERAPY TREATMENT    Problem: Self Care Deficits Care Plan (Adult)  Goal: *Acute Goals and Plan of Care (Insert Text)  Description: Initial Occupational Therapy Goals (11/25/2021) Within 7 day(s):    1. Patient will perform grooming standing at sink with CGA for increased independence with ADLs. 2. Patient will perform UB dressing with supervision for increased independence with ADLs. 3. Patient will perform LB dressing with SBA & A/E PRN for increased independence with ADLs. 4. Patient will perform all aspects of toileting with CGA for increased independence in ADLs  5. Patient will independently apply energy conservation techniques with 1 verbal cue(s) for increased independence with ADLs. 6. Patient will utilize good body mechanics during ADLs with 1 verbal cue(s). Outcome: Progressing Towards Goal     Patient: Shraddha Francois (23 y.o. male)  Date: 11/28/2021  Diagnosis: Sepsis ([address] [A41.9]  UTI (urinary tract infection) [N39.0]   Sepsis ([facility] [address]       Precautions: Fall      Chart, occupational therapy assessment, plan of care, and goals were reviewed. ASSESSMENT:  Pt presents supine in bed, agreeable to therapy. Able to perform supine to sit EOB with CGA and extra time. Performed seated brushing teeth and bathing tasks with verbal cues for positioning in order to prevent left lateral lean/ LOB. Pt able to perform upright with no LOB with verbal cues. Pt able to perform LB dressing sock don/doff with same verbal cues however had 3 instances of LOB to left side. Pt requires rest breaks in between. Following don/doff with min A, perform seated dynamic reaching tasks in order to work on sitting and core balance when reaching across mid line. Pt able to perform with no LOB. Reports fatigue and is able to perform sit to supine with min A for leg positioning. Pt left with all needs in reach.    Progression toward goals:  []          Improving appropriately and progressing toward goals  [x] Improving slowly and progressing toward goals  []          Not making progress toward goals and plan of care will be adjusted     PLAN:  Patient continues to benefit from skilled intervention to address the above impairments. Continue treatment per established plan of care. Discharge Recommendations:  Home Health  Further Equipment Recommendations for Discharge:  N/A     SUBJECTIVE:   Patient stated I cant get up.     OBJECTIVE DATA SUMMARY:   Cognitive/Behavioral Status:  Neurologic State: Alert, Appropriate for age  Orientation Level: Oriented X4  Cognition: Appropriate decision making  Safety/Judgement: Awareness of environment, Fall prevention    Functional Mobility and Transfers for ADLs:   Bed Mobility:     Supine to Sit: Contact guard assistance  Sit to Supine: Minimum assistance  Scooting: Supervision       Balance:  Sitting: Intact  Sitting - Static: Fair (occasional)  Sitting - Dynamic: Fair (occasional)    ADL Intervention:       Grooming  Grooming Assistance: Contact guard assistance  Position Performed: Seated edge of bed  Brushing Teeth: Contact guard assistance    Upper Body Bathing  Bathing Assistance: Minimum assistance  Position Performed: Seated edge of bed    Lower Body Bathing  Bathing Assistance: Contact guard assistance  Lower Body : Contact guard assistance  Position Performed: Seated edge of bed         Lower Body Dressing Assistance  Dressing Assistance: Minimum assistance  Socks: Minimum assistance  Leg Crossed Method Used: Yes  Position Performed: Seated edge of bed         Cognitive Retraining  Executive Functions: Managing time; Regulating behavior  Safety/Judgement: Awareness of environment; Fall prevention        Pain:  Pain level pre-treatment: 0/10   Pain level post-treatment: 0/10  Pain Intervention(s): Medication administered by RN (see MAR);  Rest,  Repositioning   Response to intervention: Nurse notified, See doc flow sheet    Activity Tolerance:    Fair, pt requires rest breaks between ADLs, verbal cues for deep breathing. Please refer to the flowsheet for vital signs taken during this treatment. After treatment:   []  Patient left in no apparent distress sitting up in chair  [x]  Patient left in no apparent distress in bed  [x]  Call bell left within reach  [x]  Nursing notified  []  Caregiver present  [x]  Bed alarm activated    COMMUNICATION/EDUCATION:   [x] Role of Occupational Therapy in the acute care setting  [x] Home safety education was provided and the patient/caregiver indicated understanding. [x] Patient/family have participated as able in working towards goals and plan of care. [x] Patient/family agree to work toward stated goals and plan of care. [] Patient understands intent and goals of therapy, but is neutral about his/her participation. [] Patient is unable to participate in goal setting and plan of care.       Thank you for this referral.  Anjum DAURTE, OTR/L   Time Calculation: 29 mins

## 2021-11-28 NOTE — PROGRESS NOTES
Hospitalist Progress Note    Patient: Niki Nielson MRN: [id number]  [facility]: [id number]    YOB: 1945  Age: 78 y.o. Sex: male    DOA: 11/23/2021 LOS:  LOS: 5 days                Assessment and Plan:    Principal Problem:    Sepsis ([address] (11/23/2021)    Active Problems:    UTI (urinary tract infection) due to urinary indwelling Jimenez catheter ([address] (5/1/2020)      Hypotension (11/24/2021)      Pulmonary hypertension ([address] (11/24/2021)      History of tobacco abuse (11/24/2021)      Pulmonary emphysema ([address] (11/24/2021)      Acute hypoxemic respiratory failure ([address] (11/24/2021)        Sepsis  Hypotension   Hypoxemic respiratory failure  Pulmonary hypertension   Suspect COPD exacerbation      Severe pulm hypertension -  Cardiology to do  right heart cath Monday       covid-19 rapid test negative     Hypotension- resolved     Sepsis -  Secondary to CAUTI? Will stop antibiotics and watch labs    Lactic acid improved  WBC continues to stay normal     Acute on chronic kidney disease, stage III -  Likely due to CAUTI  Creatinine still  improving     UTI -  Catheter associated   No significant growth  Will stop antibiotics          DM-  On sliding scale insulin. AIC was 5.8     HTN -   Will resart the mtoprolol and possibly the other medicine if blood pressure still elevated   Monitor BP as it is going up       CKD stage 3 -  Follow renal function. Creatinine continues to improve       DVT prophylaxis with heparin     PT/OT     Estimated length of stay : 2-3 days        Chief complaint:  Hypotension      Subjective:    Feels pretty good overall        Review of systems:    General: No fevers or chills. Cardiovascular: No chest pain or pressure. No palpitations. Pulmonary: No shortness of breath. Gastrointestinal: No nausea, vomiting.      Objective:    Vital signs/Intake and Output:  Visit Vitals  BP (!) 149/79   Pulse 97   Temp 97.4 °F (36.3 °C)   Resp 16   Ht 5' 11\" (1.803 m)   Wt 62.9 kg (138 lb 11.2 oz)   SpO2 100%   BMI 19.34 kg/m²     Current Shift:  No intake/output data recorded. Last three shifts:  11/26 1901 - 11/28 0700  In: 2300.3 [P.O.:720; I.V.:1237.5]  Out: 3200 [Urine:3200]    Physical Exam:  General: NAD, AAOx3. Non-toxic. HEENT: NC/AT. PERRLA, EOMI.  MMM. Lungs: Nml inspection. CTA B/L. No wheezing, rales or rhonchi. Heart:  S1S2 RRR,  PMI mid 5th IC space. No M/RG. Abdomen: Soft, NT/ND.  BS+. No peritoneal signs. Extremities: No C/C/E. Psych:   Nml affect. Neurologic:  2-12 intact. Strength 5/5 throughout. Sensation symmetrical.          Labs: Results:       Chemistry Recent Labs     11/28/21  0201 11/27/21  0120 11/26/21  0510   GLU 99 107* 101*    140 141   K 4.3 3.9 3.9    106 108   CO2 25 24 27   BUN 48* 43* 36*   CREA 1.37* 1.52* 1.63*   CA 8.4* 8.5 8.9   AGAP 6 10 6   BUCR 35* 28* 22*   AP 76  --   --    TP 7.2  --   --    ALB 3.9  --   --    GLOB 3.3  --   --    AGRAT 1.2  --   --       CBC w/Diff Recent Labs     11/28/21  0201 11/27/21  0120 11/26/21  0510   WBC 9.6 9.0 12.3   RBC 3.14* 2.71* 2.93*   HGB 8.3* 6.9* 7.8*   HCT 26.3* 22.6* 24.7*    166 167   GRANS 55 54 61   LYMPH 21 24 22   EOS 7* 6* 3      Cardiac Enzymes No results for input(s): CPK, CKND1, LUH in the last 72 hours. No lab exists for component: CKRMB, TROIP   Coagulation No results for input(s): PTP, INR, APTT, INREXT in the last 72 hours. Lipid Panel Lab Results   Component Value Date/Time    Cholesterol, total 146 06/01/2016 07:51 AM    HDL Cholesterol 40 06/01/2016 07:51 AM    LDL, calculated 91.6 06/01/2016 07:51 AM    VLDL, calculated 14.4 06/01/2016 07:51 AM    Triglyceride 72 06/01/2016 07:51 AM    CHOL/HDL Ratio 3.7 06/01/2016 07:51 AM      BNP No results for input(s): BNPP in the last 72 hours.    Liver Enzymes Recent Labs     11/28/21  0201   TP 7.2   ALB 3.9   AP 76      Thyroid Studies No results found for: T4, T3U, TSH, TSHEXT     Procedures/imaging: see electronic medical records for all procedures/Xrays and details which were not copied into this note but were reviewed prior to creation of Plan

## 2021-11-28 NOTE — ROUTINE PROCESS
Bedside and Verbal shift change report given to LORY Bernal RN (oncoming nurse) by HUGH Taylor RN (offgoing nurse). Report included the following information SBAR, Kardex, Intake/Output, MAR and Recent Results.

## 2021-11-28 NOTE — PROGRESS NOTES
Cardiology Progress Note      11/28/2021 3:18 PM    Admit Date: 11/23/2021    Admit Diagnosis: Sepsis ([facility] [address] [A41.9]  UTI (urinary tract infection) [N39.0]      Subjective:     Shaylee Pierre denies chest pain, chest pressure/discomfort, palpitations, syncope, orthopnea. Visit Vitals  BP (!) 143/87   Pulse 97   Temp 97.7 °F (36.5 °C)   Resp 14   Ht 5' 11\" (1.803 m)   Wt 62.9 kg (138 lb 11.2 oz)   SpO2 95%   BMI 19.34 kg/m²     Current Facility-Administered Medications   Medication Dose Route Frequency    0.9% sodium chloride infusion 250 mL  250 mL IntraVENous PRN    0.9% sodium chloride infusion 250 mL  250 mL IntraVENous PRN    furosemide (LASIX) injection 20 mg  20 mg IntraVENous DAILY    arformoteroL (BROVANA) neb solution 15 mcg  15 mcg Nebulization BID RT    budesonide (PULMICORT) 500 mcg/2 ml nebulizer suspension  500 mcg Nebulization BID RT    sodium chloride (NS) flush 5-10 mL  5-10 mL IntraVENous PRN    aspirin chewable tablet 81 mg  81 mg Oral DAILY    metoprolol succinate (TOPROL-XL) XL tablet 25 mg  25 mg Oral QHS    pregabalin (LYRICA) capsule 75 mg  75 mg Oral BID    traZODone (DESYREL) tablet 50 mg  50 mg Oral QHS    heparin (porcine) injection 5,000 Units  5,000 Units SubCUTAneous Q8H    [Held by provider] lisinopril/hydroCHLOROthiazide(PRINZIDE/ZESTORETIC) 20/25 mg   Oral DAILY    lactated Ringers infusion  25 mL/hr IntraVENous CONTINUOUS         Objective:      Physical Exam:  Impresson general : Alert, Awake, in mild respiratory distress  Head:               Normocephalic,atraumatic. ENT:                EOM , no scleral icterus, no pallor, no cyanosis. Nose:               No sinus tenderness  Throat:             Oropharynx ,mucosa, and tongue normal. No oral thrush. Neck:               Supple, symmetric. Lymph nodes. Trachea is midline  Lung:               GOJWQRUI air entry bilateral equal  eboni  rales. No rhonchi. eboni mild  wheezing. No stridors. No prolongded expiration. No accessory muscle use. Heart:              Regular  rate & rhythm. S1 S2 present. No murmur.  No JVD. Abdomen:        Soft. NT. ND. +BS. No masses. liver  and spleen  Extremities:     No pedal edema. No cyanosis. No clubbing. Pulses:            2+ and symmetric in DP. Capillary refill: normal  Lymphatic:       neck and supraclavicular          Musculoskeletal: No joint swelling. No tenderness. Skin:                ZFANTX Color, texture, turgor normal. No rashes or lesions.       Data Review:   Labs:    Recent Results (from the past 24 hour(s))   CBC WITH AUTOMATED DIFF    Collection Time: 11/28/21  2:01 AM   Result Value Ref Range    WBC 9.6 4.6 - 13.2 K/uL    RBC 3.14 (L) 4.35 - 5.65 M/uL    HGB 8.3 (L) 13.0 - 16.0 g/dL    HCT 26.3 (L) 36.0 - 48.0 %    MCV 83.8 78.0 - 100.0 FL    MCH 26.4 24.0 - 34.0 PG    MCHC 31.6 31.0 - 37.0 g/dL    RDW 19.1 (H) 11.6 - 14.5 %    PLATELET 725 857 - 938 K/uL    MPV 10.7 9.2 - 11.8 FL    NRBC 0.0 0  WBC    ABSOLUTE NRBC 0.00 0.00 - 0.01 K/uL    NEUTROPHILS 55 40 - 73 %    LYMPHOCYTES 21 21 - 52 %    MONOCYTES 16 (H) 3 - 10 %    EOSINOPHILS 7 (H) 0 - 5 %    BASOPHILS 1 0 - 2 %    IMMATURE GRANULOCYTES 0 0.0 - 0.5 %    ABS. NEUTROPHILS 5.3 1.8 - 8.0 K/UL    ABS. LYMPHOCYTES 2.0 0.9 - 3.6 K/UL    ABS. MONOCYTES 1.5 (H) 0.05 - 1.2 K/UL    ABS. EOSINOPHILS 0.7 (H) 0.0 - 0.4 K/UL    ABS. BASOPHILS 0.1 0.0 - 0.1 K/UL    ABS. IMM.  GRANS. 0.0 0.00 - 0.04 K/UL    DF AUTOMATED     METABOLIC PANEL, COMPREHENSIVE    Collection Time: 11/28/21  2:01 AM   Result Value Ref Range    Sodium 139 136 - 145 mmol/L    Potassium 4.3 3.5 - 5.5 mmol/L    Chloride 108 100 - 111 mmol/L    CO2 25 21 - 32 mmol/L    Anion gap 6 3.0 - 18 mmol/L    Glucose 99 74 - 99 mg/dL    BUN 48 (H) 7.0 - 18 MG/DL    Creatinine 1.37 (H) 0.6 - 1.3 MG/DL    BUN/Creatinine ratio 35 (H) 12 - 20      GFR est AA >60 >60 ml/min/1.73m2    GFR est non-AA 51 (L) >60 ml/min/1.73m2    Calcium 8.4 (L) 8.5 - 10.1 MG/DL Bilirubin, total 1.1 (H) 0.2 - 1.0 MG/DL    ALT (SGPT) 48 16 - 61 U/L    AST (SGOT) 52 (H) 10 - 38 U/L    Alk. phosphatase 76 45 - 117 U/L    Protein, total 7.2 6.4 - 8.2 g/dL    Albumin 3.9 3.4 - 5.0 g/dL    Globulin 3.3 2.0 - 4.0 g/dL    A-G Ratio 1.2 0.8 - 1.7         Telemetry: normal sinus rhythm      Assessment:     Principal Problem:    Sepsis ([address] (11/23/2021)    Active Problems:    UTI (urinary tract infection) due to urinary indwelling Jimenez catheter ([address] (5/1/2020)      Hypotension (11/24/2021)      Pulmonary hypertension ([address] (11/24/2021)      History of tobacco abuse (11/24/2021)      Pulmonary emphysema ([address] (11/24/2021)      Acute hypoxemic respiratory failure ([address] (11/24/2021)        Plan:     Right heart cath tomorrow.         Mikel Urbano MD

## 2021-11-28 NOTE — PROGRESS NOTES
Problem: General Medical Care Plan  Goal: *Vital signs within specified parameters  Outcome: Progressing Towards Goal  Goal: *Labs within defined limits  Outcome: Progressing Towards Goal  Goal: *Absence of infection signs and symptoms  Outcome: Progressing Towards Goal  Goal: *Optimal pain control at patient's stated goal  Outcome: Progressing Towards Goal  Goal: *Skin integrity maintained  Outcome: Progressing Towards Goal  Goal: *Fluid volume balance  Outcome: Progressing Towards Goal  Goal: *Optimize nutritional status  Outcome: Progressing Towards Goal  Goal: *Anxiety reduced or absent  Outcome: Progressing Towards Goal  Goal: *Progressive mobility and function (eg: ADL's)  Outcome: Progressing Towards Goal     Problem: Pain  Goal: *Control of Pain  Outcome: Progressing Towards Goal  Goal: *PALLIATIVE CARE:  Alleviation of Pain  Outcome: Progressing Towards Goal

## 2021-11-28 NOTE — PROGRESS NOTES
Cm met with pt at bedside to discuss d/c planning and PT recommendation for SNF, at this time pt still refusing SNF stated \" I got money to hire extra help\" pt denies having family or children that could assist with his home care,pt feels he's just been away from home long enough, pt would like  to check if [facility] has personal care aides, pt also stated that his wife is agreeable with plan.

## 2021-11-29 PROBLEM — I07.1 TRICUSPID REGURGITATION: Status: ACTIVE | Noted: 2021-01-01

## 2021-11-29 NOTE — ROUTINE PROCESS
TRANSFER - OUT REPORT:    Verbal report given to SHAYLEE Aly RN(name) on Deon Mabry  being transferred to [facility] (unit) for routine progression of care       Report consisted of patients Situation, Background, Assessment and   Recommendations(SBAR). Information from the following report(s) SBAR, Kardex, Intake/Output, MAR, Recent Results and Cardiac Rhythm SR was reviewed with the receiving nurse. Lines:       Opportunity for questions and clarification was provided.       Patient transported with:   Monitor  O2 @ 2 liters   RN  [facility] [facility]

## 2021-11-29 NOTE — PROGRESS NOTES
Hospitalist Progress Note    Patient: Missy Conteh MRN: [id number]  CSN: [id number]    YOB: 1945  Age: 78 y.o. Sex: male    DOA: 11/23/2021 LOS:  LOS: 6 days                Assessment/Plan     Patient Active Problem List   Diagnosis Code    Lumbar spinal stenosis M48.061    Lumbar spondylosis M47.816    Radiculopathy of leg M54.10    Unable to ambulate R26.2    Urinary retention R33.9    Type II diabetes mellitus with manifestations, uncontrolled ([facility] [address] E11.8, E11.65    Renal mass N28.89    UTI (urinary tract infection) due to urinary indwelling Jimenez catheter ([facility]) T83.511A, N39.0    Compression fracture of L1 vertebra ([facility]) S32.010A    Aneurysm of infrarenal abdominal aorta ([facility]) I71.4    HTN (hypertension) I10    Severe protein-calorie malnutrition ([facility]) E43    Severe pulmonary hypertension ([facility]) I27.20    Sepsis ([facility]) A41.9    Hypotension I95.9    Pulmonary hypertension ([facility]) I27.20    History of tobacco abuse Z87.891    Pulmonary emphysema ([facility]) J43.9    Acute hypoxemic respiratory failure ([facility]) J96.01    Tricuspid regurgitation I07.1        Chief complaint :  Chills, fatigue  78 y.o. male with diabetes, hypertension, CKD stage III, hyperlipidemia, chronic indwelling Jimenez catheter presents to ER with concerns of fatigue, chills, confusion. Severe pulmonary HTN  Mod - severe TR  For C    Sepsis -  Secondary to CAUTI  resolved     UTI -  Catheter associated   UA with evidence of UTI  Past cultures  grew Pseudomonas.     Urine cultures no growth  Stopped antibiotics  Jimenez changed in ER     DM-  On sliding scale insulin.     HTN -   Continue with home meds,   Monitor BP     CKD stage 3 -  Follow renal function.     DVT prophylaxis with heparin     PT/OT       Disposition : 1-2 days,home health    Review of systems  General: No fevers or chills. Cardiovascular: No chest pain or pressure. No palpitations. Pulmonary: No shortness of breath.    Gastrointestinal: No nausea, vomiting. Physical Exam:  General: Awake, cooperative, no acute distress    HEENT: NC, Atraumatic. PERRLA, anicteric sclerae. Lungs: CTA Bilaterally. No Wheezing/Rhonchi/Rales. Heart:  S1 S2,  No murmur, No Rubs, No Gallops  Abdomen: Soft, Non distended, Non tender.  +Bowel sounds,   Extremities: No c/c/e  Psych:   Not anxious or agitated. Neurologic:  No acute neurological deficit. Vital signs/Intake and Output:  Visit Vitals  BP (!) 147/88   Pulse 78   Temp 97.5 °F (36.4 °C)   Resp 16   Ht 5' 11\" (1.803 m)   Wt 62.9 kg (138 lb 11.2 oz)   SpO2 95%   BMI 19.34 kg/m²     Current Shift:  No intake/output data recorded. Last three shifts:  11/27 1901 - 11/29 0700  In: 3087.8 [P.O.:940; I.V.:1805]  Out: 3000 [Urine:3000]            Labs: Results:       Chemistry Recent Labs     11/29/21 0142 11/28/21 0201 11/27/21  0120   * 99 107*    139 140   K 4.4 4.3 3.9    108 106   CO2 24 25 24   BUN 50* 48* 43*   CREA 1.19 1.37* 1.52*   CA 8.7 8.4* 8.5   AGAP 7 6 10   BUCR 42* 35* 28*   AP 85 76  --    TP 7.2 7.2  --    ALB 3.7 3.9  --    GLOB 3.5 3.3  --    AGRAT 1.1 1.2  --       CBC w/Diff Recent Labs     11/29/21 0142 11/28/21 0201 11/27/21  0120   WBC 9.2 9.6 9.0   RBC 3.28* 3.14* 2.71*   HGB 8.6* 8.3* 6.9*   HCT 27.8* 26.3* 22.6*    177 166   GRANS 50 55 54   LYMPH 24 21 24   EOS 8* 7* 6*      Cardiac Enzymes No results for input(s): CPK, CKND1, LUH in the last 72 hours. No lab exists for component: CKRMB, TROIP   Coagulation No results for input(s): PTP, INR, APTT, INREXT, INREXT in the last 72 hours.     Lipid Panel Lab Results   Component Value Date/Time    Cholesterol, total 146 06/01/2016 07:51 AM    HDL Cholesterol 40 06/01/2016 07:51 AM    LDL, calculated 91.6 06/01/2016 07:51 AM    VLDL, calculated 14.4 06/01/2016 07:51 AM    Triglyceride 72 06/01/2016 07:51 AM    CHOL/HDL Ratio 3.7 06/01/2016 07:51 AM      BNP No results for input(s): BNPP in the last 72 hours.   Liver Enzymes Recent Labs     11/29/21  0142   TP 7.2   ALB 3.7   AP 85      Thyroid Studies No results found for: T4, T3U, TSH, TSHEXT, TSHEXT     Procedures/imaging: see electronic medical records for all procedures/Xrays and details which were not copied into this note but were reviewed prior to creation of Plan

## 2021-11-29 NOTE — PROGRESS NOTES
Date of Surgery Update:  Yas Miller was seen and examined. History and physical has been reviewed. The patient has been examined. There have been no significant clinical changes since the completion of the originally dated History and Physical.  Plan right heart cath with possible conscious sedation.     Signed By: Grant Lockett MD     November 29, 2021 9:13 AM

## 2021-11-29 NOTE — PROGRESS NOTES
Bedside and Verbal shift change report given to Cassidy Handley RN (oncoming nurse) by Viridiana Lemus RN (offgoing nurse). Report included the following information SBAR, Kardex, MAR, Recent Results and Cardiac Rhythm .

## 2021-11-29 NOTE — PROGRESS NOTES
Right heart cath showed moderate pulmoary hypertension with PA systolic pressure of 59 mm Hg. See report. Will sign off. Patient to follow up with Dr. Corey Miller.     Ayesha Flanagan MD 10: 27 to 11: 00. Chart reviewed, ok to be seen by PT as confirmed with RN, pt encountered seated EOB, NAD, +02 4L via nc

## 2021-11-29 NOTE — PROGRESS NOTES
36 Pt arrived to carte unit S/P C. Pt awake and alert and did not received any sedation. Rt femoral accessed, tegaderm dressing to site, dry and intact. Donna Rock with HOB flat for two hours reinforced with pt . Pt verbalized all understandings. PD and PT Pulses palpable. ..    1100 Pt completed bedrest and recovery uneventful. Report called to primary nurse. Pt will be taken back to room.

## 2021-11-29 NOTE — PROGRESS NOTES
Assumed are of pt from April B RN.  1122: Received report from Care Unit [facility] RN. Pt tolerated procedure well. Will return to unit around noon.

## 2021-11-29 NOTE — PROGRESS NOTES
Comprehensive Nutrition Assessment    Type and Reason for Visit: Reassess    Nutrition Recommendations/Plan: Continue current diet and ONS. Malnutrition Assessment   Context of Malnutrition Acute illness   Acute Illness - Energy Intake No significant decrease in energy intake   Acute Illness - Weight Loss No significant weight loss   Acute Illness - Body Fat Loss 7 - Moderate body fat loss   Acute Illness - Body Fat Loss Locations Triceps; Buccal region; Orbital   Acute Illness - Muscle Mass Loss 7 - Moderate muscle mass loss   Acute Illness - Muscle Mass Locations Temples (temporalis); Thigh (quadriceps); Calf   Acute Illness - Fluid Accumulation Unable to assess   Acute Illness -  Strength Not performed   Acute Illness - Malnutrition Score 14   Malnutrition Status Severe malnutrition     Nutrition Assessment:  PMHx: T2DM, HTN, CKD stage 3, hyperlipidemia, chronic indwelling alonso catheter. Presented with fatigue, chills, and confusion. Patient admitted with sepsis- lactic acid and WBC improving, and UTI. S/P right heart catheterization 11/29. Estimated Daily Nutrient Needs:  Energy (kcal): 1887; Weight Used for Energy Requirements: Current  Protein (g): 63; Weight Used for Protein Requirements: Current (1g)  Fluid (ml/day): 1887; Method Used for Fluid Requirements: 1 ml/kcal    Nutrition Related Findings:  Labs- Glucose (103) + 2/4 recent POC tests H (avg. 132). Meds- hydrochlorothiazide, lasix, phenergan. Last BM11/28. Pt diet regular following catheterization procedure. PO documentation since last note shows 2x %, 2x 51-75%, and 2x 26-50%. No data for Glucerna. Went to see pt- was asleep. Intake has been mostly >50%, will follow up with pt next time.       Wounds:    Surgical incision       Current Nutrition Therapies:  ADULT ORAL NUTRITION SUPPLEMENT Breakfast, Lunch, Dinner; Diabetic Supplement  ADULT DIET Regular    Anthropometric Measures:  Height:  5' 11\" (180.3 cm)  Current Body Wt:  62.9 kg (138 lb 11.2 oz)   Admission Body Wt:  130 lb    Usual Body Wt:  61.2 kg (135 lb) (4/2021)     Ideal Body Wt:  172 lbs:  80.6 %   BMI Category:  Underweight (BMI less than 22) age over 72       Nutrition Diagnosis:   Severe malnutrition, In context of acute illness or injury related to other (specify) (predicted inadequate energy-intake) as evidenced by moderate loss of subcutaneous fat, moderate muscle loss    Nutrition Interventions:   Food and/or Nutrient Delivery: Continue current diet, Continue oral nutrition supplement  Nutrition Education and Counseling: No recommendations at this time  Coordination of Nutrition Care: Continue to monitor while inpatient    Goals:  Continue PO intake >50% for most meals and supplements throughout the next 2-3 days.        Nutrition Monitoring and Evaluation:   Behavioral-Environmental Outcomes: None identified  Food/Nutrient Intake Outcomes: Food and nutrient intake, Supplement intake  Physical Signs/Symptoms Outcomes: Biochemical data, Meal time behavior, GI status, Weight, Skin, Nutrition focused physical findings    Discharge Planning:    (P) Continue oral nutrition supplement, Continue current diet     Electronically signed by Sarika Hodge RD on 11/29/2021 at 4:56 PM

## 2021-11-29 NOTE — PROGRESS NOTES
Bedside shift change report given to Kin (oncoming nurse) by Cassidy Mejia RN (offgoing nurse). Report included the following information SBAR, Kardex and Recent Results.

## 2021-11-30 NOTE — PROGRESS NOTES
Problem: Falls - Risk of  Goal: *Absence of Falls  Description: Document Manuel Conteh Fall Risk and appropriate interventions in the flowsheet.   Outcome: Progressing Towards Goal  Note: Fall Risk Interventions:  Mobility Interventions: Bed/chair exit alarm         Medication Interventions: Teach patient to arise slowly    Elimination Interventions: Call light in reach    History of Falls Interventions: Bed/chair exit alarm

## 2021-11-30 NOTE — PROGRESS NOTES
DC Plan: home with [address] manager rounded on patient who stated he was feeling well and ready for discharge. 1500: verified with MD that patient is stable for discharge; per patient, although he has not ambulated with PT he will be fine to go home with his rollator. FOC obtained for previous [facility] services, CMS requested to place referral and CM contacted [facility] [place] to let them know patient is discharging today. 1600: discussed with patient who now told CM that he would be discharged tomorrow so that his wife can pick him up. Care Management Interventions  PCP Verified by CM:  Yes  Last Visit to PCP: 05/03/21  Mode of Transport at Discharge:  (family)  Transition of Care Consult (CM Consult): Discharge Planning, [address]: No  Reason Outside [place]: Patient already serviced by other home care/hospice agency  Support Systems: Spouse/Significant Other  Confirm Follow Up Transport: Family  The Plan for Transition of Care is Related to the Following Treatment Goals : TBD; home with phsycian follow up and [place] with possible home O2  Discharge Location  Discharge Placement:  (home with physician follow up and [place] and possible home O2)

## 2021-11-30 NOTE — ROUTINE PROCESS
Bedside and Verbal shift change report given to April RN (oncoming nurse) by Wanda Ortiz RN (offgoing nurse). Report included the following information SBAR, Kardex, MAR and Recent Results.

## 2021-11-30 NOTE — PROGRESS NOTES
0033  Bedside and verbal shift change report given to Glenda Adler RN (on coming nurse) by SASKIA Benjamin (off going nurse). Report included the following information SBAR, Kardex, OR Summary, Intake/Output and MAR.    0719  Bedside and verbal shift change report given by SASKIA Vides (off going nurse) to SASKIA Grimm(on coming nurse). Report included the following information SBAR, Kardex, OR Summary, Intake/Output and MAR.

## 2021-12-01 NOTE — PROGRESS NOTES
Hospitalist Progress Note    Patient: Deon Mabry MRN: [id number]  CSN: [id number]    YOB: 1945  Age: 78 y.o. Sex: male    DOA: 11/23/2021 LOS:  LOS: 7 days                Assessment/Plan     Patient Active Problem List   Diagnosis Code    Lumbar spinal stenosis M48.061    Lumbar spondylosis M47.816    Radiculopathy of leg M54.10    Unable to ambulate R26.2    Urinary retention R33.9    Type II diabetes mellitus with manifestations, uncontrolled ([facility] [address] E11.8, E11.65    Renal mass N28.89    UTI (urinary tract infection) due to urinary indwelling Jimenez catheter ([facility]) T83.511A, N39.0    Compression fracture of L1 vertebra ([facility]) S32.010A    Aneurysm of infrarenal abdominal aorta ([facility]) I71.4    HTN (hypertension) I10    Severe protein-calorie malnutrition ([facility]) E43    Severe pulmonary hypertension ([facility]) I27.20    Sepsis ([facility]) A41.9    Hypotension I95.9    Pulmonary hypertension ([facility]) I27.20    History of tobacco abuse Z87.891    Pulmonary emphysema ([facility]) J43.9    Acute hypoxemic respiratory failure ([facility]) J96.01    Tricuspid regurgitation I07.1        Chief complaint :  Chills, fatigue  78 y.o. male with diabetes, hypertension, CKD stage III, hyperlipidemia, chronic indwelling Jimenez catheter presents to ER with concerns of fatigue, chills, confusion. Severe pulmonary HTN  Mod - severe TR  For C    Sepsis -  Secondary to CAUTI  resolved     UTI -  Catheter associated   UA with evidence of UTI  Past cultures  grew Pseudomonas.     Urine cultures no growth  Stopped antibiotics  Jimenez changed in ER     DM-  On sliding scale insulin.     HTN -   Continue with home meds,   Monitor BP     CKD stage 3 -  Follow renal function.     DVT prophylaxis with heparin     PT/OT       Disposition : 1-2 days,home health    Review of systems  General: No fevers or chills. Cardiovascular: No chest pain or pressure. No palpitations. Pulmonary: No shortness of breath.    Gastrointestinal: No nausea, vomiting. Physical Exam:  General: Awake, cooperative, no acute distress    HEENT: NC, Atraumatic. PERRLA, anicteric sclerae. Lungs: CTA Bilaterally. No Wheezing/Rhonchi/Rales. Heart:  S1 S2,  No murmur, No Rubs, No Gallops  Abdomen: Soft, Non distended, Non tender.  +Bowel sounds,   Extremities: No c/c/e  Psych:   Not anxious or agitated. Neurologic:  No acute neurological deficit. Vital signs/Intake and Output:  Visit Vitals  /80 (BP 1 Location: Left upper arm, BP Patient Position: Supine)   Pulse 94   Temp 97.5 °F (36.4 °C)   Resp 18   Ht 5' 11\" (1.803 m)   Wt 60 kg (132 lb 4.4 oz)   SpO2 92%   BMI 18.45 kg/m²     Current Shift:  11/30 1901 - 12/01 0700  In: 960 [P.O.:150; I.V.:495]  Out: 800 [Urine:800]  Last three shifts:  11/29 0701 - 11/30 1900  In: 1333.4 [P.O.:700; I.V.:633.4]  Out: 1750 [QRHTQ:5710]            Labs: Results:       Chemistry Recent Labs     11/29/21 0142 11/28/21  0201   * 99    139   K 4.4 4.3    108   CO2 24 25   BUN 50* 48*   CREA 1.19 1.37*   CA 8.7 8.4*   AGAP 7 6   BUCR 42* 35*   AP 85 76   TP 7.2 7.2   ALB 3.7 3.9   GLOB 3.5 3.3   AGRAT 1.1 1.2      CBC w/Diff Recent Labs     11/29/21 0142 11/28/21  0201   WBC 9.2 9.6   RBC 3.28* 3.14*   HGB 8.6* 8.3*   HCT 27.8* 26.3*    177   GRANS 50 55   LYMPH 24 21   EOS 8* 7*      Cardiac Enzymes No results for input(s): CPK, CKND1, LUH in the last 72 hours. No lab exists for component: CKRMB, TROIP   Coagulation No results for input(s): PTP, INR, APTT, INREXT, INREXT in the last 72 hours. Lipid Panel Lab Results   Component Value Date/Time    Cholesterol, total 146 06/01/2016 07:51 AM    HDL Cholesterol 40 06/01/2016 07:51 AM    LDL, calculated 91.6 06/01/2016 07:51 AM    VLDL, calculated 14.4 06/01/2016 07:51 AM    Triglyceride 72 06/01/2016 07:51 AM    CHOL/HDL Ratio 3.7 06/01/2016 07:51 AM      BNP No results for input(s): BNPP in the last 72 hours.    Liver Enzymes Recent Labs     11/29/21  0142   TP 7.2   ALB 3.7   AP 85      Thyroid Studies No results found for: T4, T3U, TSH, TSHEXT, TSHEXT     Procedures/imaging: see electronic medical records for all procedures/Xrays and details which were not copied into this note but were reviewed prior to creation of Plan

## 2021-12-01 NOTE — PROGRESS NOTES
Physician Progress Note      Cory Lara  CSN #:                  [id number]  :                       1945  ADMIT DATE:       2021 1:30 PM  [address] [place] DATE:  RESPONDING  PROVIDER #:        Brian Galvan MD          QUERY TEXT:    Pt admitted with sepsis . Pt noted to have hypotension possible, please document in the progress notes and discharge summary if you are evaluating and/or treating any of the following: The medical record reflects the following:  Risk Factors: sepsis  Clinical Indicators: sepsis, respiratory failure, BP, 77/42. 78/36, 85/36, 94/40, 84/51. 90/58, 86/44  Treatment 2000 ml ns bolus. levophed, albumin human 25%    Thank You  Amanda Rodriguez RN CDI CRCR  [phone]  Options provided:  -- Cardiogenic Shock  -- Septic Shock  -- Hypovolemic Shock  -- Hypovolemia without Shock  -- Hypotension without Shock  -- Other - I will add my own diagnosis  -- Disagree - Not applicable / Not valid  -- Disagree - Clinically unable to determine / Unknown  -- Refer to Clinical Documentation Reviewer    PROVIDER RESPONSE TEXT:    This patient has Septic Shock. Query created by: Jason Matias on 2021 3:53 PM      QUERY TEXT:    Pt admitted with Sepsis secondary to CAUTI. Noted documentation of severe malnutrition by Registered Dietician on 21.   If possible, please document in progress notes and discharge summary:    The medical record reflects the following:  Risk Factors: DM2, CKD3, chronic alonso,  history of severe malnutrition  Clinical Indicators: BMI 18.1, Nutrition assessment performed on  by RD:  Nutrition Diagnosis:  Severe malnutrition related to other (specify) (predicted inadequate energy-intake) as evidenced by moderate loss of subcutaneous fat, moderate muscle loss    Treatment: RD consult, regular diet with 4 CHO and Glucerna TID    Riccardo Lindsey, SASKIA, BSN, CCDS  CDI  [phone]  Options provided:  -- Severe malnutrition confirmed present on admission  -- Other - I will add my own diagnosis  -- Disagree - Not applicable / Not valid  -- Disagree - Clinically unable to determine / Unknown  -- Refer to Clinical Documentation Reviewer    PROVIDER RESPONSE TEXT:    The diagnosis of severe malnutrition was confirmed as present on admission.     Query created by: Daria Kocher on 11/26/2021 11:30 AM      Electronically signed by:  Linh Alatorre MD 12/1/2021 9:57 AM

## 2021-12-01 NOTE — PROGRESS NOTES
Problem: Mobility Impaired (Adult and Pediatric)  Goal: *Acute Goals and Plan of Care (Insert Text)  Description: Physical TherapyGoals   Initiated 11/25/2021 to be met within 5-7 days  1. Bed mobility: Rolling, and supine <> sit with CGA with use of HR for positioning. 2. Activity Tolerance: Tolerate chair sitting 30 minutes for ADL/balance activities. 3. Transfer: Sit <> stand with min/mod A with RW and L AFO in prep for ambulation. 4. Ambulation:  Ambulate 20 ft. Min/mod A with RW/L AFO for increased functional mobility. Outcome: Progressing Towards Goal    physical Therapy TREATMENT    Patient: Can Gar (15 y.o. male)  Date: 11/30/2021 (late entry)  Diagnosis: Sepsis ([address] [A41.9]  UTI (urinary tract infection) [N39.0]   Sepsis ([facility] [address]  Procedure(s) (LRB):  RIGHT HEART CATH (N/A) 2 Days Post-Op  Precautions: Fall  Chart, physical therapy assessment, plan of care and goals were reviewed. ASSESSMENT:  Pt supine in bed on arrival and willing to participate with therapy session. Seen with OT for second set of skilled hands/max pt safety. Required CGA/additional time for performance of bed mobility tasks. Initially requires bilat UE support, progressed to GIAN support during static/dynamic sitting activity. BLE's remain significantly weak at major muscle groups noted during functional activity and therapeutic ex. STS with RW/min A of 2. Gait limited to lateral steps toward [facility] with mod A of 2. Flexed posture and flexed knees bilaterally. Improves briefly with vc/tc. Returned to bed and left supine with all needs in reach. Recommend SNF for rehab at discharge. Progression toward goals:  []      Improving appropriately and progressing toward goals  [x]      Improving slowly and progressing toward goals  []      Not making progress toward goals and plan of care will be adjusted     PLAN:  Patient continues to benefit from skilled intervention to address the above impairments.   Continue treatment per established plan of care. Discharge Recommendations:  Skilled Nursing Facility  Further Equipment Recommendations for Discharge:  N/A     SUBJECTIVE:   Patient stated \"okay.     OBJECTIVE DATA SUMMARY:   Critical Behavior:  Neurologic State: Alert, Appropriate for age  Orientation Level: Oriented X4  Cognition: Appropriate decision making, Appropriate for age attention/concentration, Appropriate safety awareness  Safety/Judgement: Fall prevention  Functional Mobility Training:  Bed Mobility:  Supine to Sit: Contact guard assistance, Additional time  Sit to Supine: Contact guard assistance, Additional time  Scooting: Supervision  Transfers:  Sit to Stand: Minimum assistance  Stand to Sit: Minimum assistance, Contact guard assistance  Balance:  Sitting: Impaired, With support  Sitting - Static: Good (unsupported)  Sitting - Dynamic: Fair (occasional)  Standing: Impaired, With support  Standing - Static: Fair (-)  Standing - Dynamic : Poor (+)  Ambulation/Gait Training:  Distance (ft): 3 Feet (ft)  Assistive Device: Gait belt, Walker, rolling  Ambulation - Level of Assistance: Minimal assistance, Moderate assistance, Assist x2  Gait Abnormalities: Decreased step clearance, Other (flexed trunk and knees)  Base of Support: Narrowed  Speed/Nicky: Slow, Shuffled  Step Length: Right shortened, Left shortened  Swing Pattern: Right asymmetrical, Left asymmetrical  Interventions: Safety awareness training, Tactile cues, Verbal cues, Visual/Demos  Therapeutic Exercises:        Partial LAQ and AP's x 5-10  Pain:  Pain Scale 1: Numeric (0 - 10)  Pain Intensity 1: 0  Activity Tolerance:   Fair   Please refer to the flowsheet for vital signs taken during this treatment.   After treatment:   [] Patient left in no apparent distress sitting up in chair  [x] Patient left in no apparent distress in bed  [x] Call bell left within reach  [x] Nursing notified  [] Caregiver present  [] Bed alarm activated      Jessica La PT   Time Calculation: 23 mins

## 2021-12-01 NOTE — ROUTINE PROCESS
Bedside and Verbal shift change report given to April RN (oncoming nurse) by Manoj Varela RN (offgoing nurse). Report included the following information SBAR, Kardex, MAR and Recent Results.

## 2021-12-01 NOTE — PROGRESS NOTES
Discharge instructions have been reviewed with the patient via the teachback method. Patient verbalizes understanding. Leg bag has been attached to patient.

## 2021-12-01 NOTE — PROGRESS NOTES
Bedside shift change report given to Tc Davis (oncoming nurse) by Cassidy Mejia RN (offgoing nurse). Report included the following information SBAR, Kardex and Recent Results.

## 2021-12-01 NOTE — PROGRESS NOTES
Problem: Self Care Deficits Care Plan (Adult)  Goal: *Acute Goals and Plan of Care (Insert Text)  Description: Initial Occupational Therapy Goals (11/25/2021) Within 7 day(s):    1. Patient will perform grooming standing at sink with CGA for increased independence with ADLs. 2. Patient will perform UB dressing with supervision for increased independence with ADLs. 3. Patient will perform LB dressing with SBA & A/E PRN for increased independence with ADLs. 4. Patient will perform all aspects of toileting with CGA for increased independence in ADLs  5. Patient will independently apply energy conservation techniques with 1 verbal cue(s) for increased independence with ADLs. 6. Patient will utilize good body mechanics during ADLs with 1 verbal cue(s). Outcome: Progressing Towards Goal    OCCUPATIONAL THERAPY TREATMENT    Patient: Tamara Vidal (89 y.o. male)  Date: 11/30/2021  Diagnosis: Sepsis ([facility] [address] [A41.9]  UTI (urinary tract infection) [N39.0]   Sepsis ([facility] [address]  Procedure(s) (LRB):  RIGHT HEART CATH (N/A) 1 Day Post-Op  Precautions: Fall  PLOF:     Chart, occupational therapy assessment, plan of care, and goals were reviewed. ASSESSMENT:  Pt presented supine in bed at the beginning of session and agrees to participate with therapy. Pt co-treat with PT for the need of another set of skilled hands and safety with transfers/ADLs. Pt participate with UB dressing, bed mobility, supine<>sit, sit<>stand transfers x2 and lateral stepping towards HOB before returning to bed in supine. Requires frequent verbal cues to maintain midline in sitting and standing during the session. Pt reports no pain during the session.      Progression toward goals:  []          Improving appropriately and progressing toward goals  [x]          Improving slowly and progressing toward goals  []          Not making progress toward goals and plan of care will be adjusted     PLAN:  Patient continues to benefit from skilled intervention to address the above impairments. Continue treatment per established plan of care. Discharge Recommendations:  Home Health vs [facility]  Further Equipment Recommendations for Discharge:  N/A     SUBJECTIVE:   Patient stated  I have been wondering about it too.  When asked how he was feeling today    OBJECTIVE DATA SUMMARY:   Cognitive/Behavioral Status:  Neurologic State: Alert  Orientation Level: Oriented X4  Cognition: Follows commands  Safety/Judgement: Fall prevention    Functional Mobility and Transfers for ADLs:   Bed Mobility:  Supine to Sit: Contact guard assistance; Additional time  Sit to Supine: Contact guard assistance; Additional time   Transfers:  Sit to Stand: Minimum assistance  Stand to Sit: Minimum assistance; Contact guard assistance  Balance:  Sitting: Impaired; With support  Sitting - Static: Good (unsupported)  Sitting - Dynamic: Fair (occasional)  Standing: Impaired; With support  Standing - Static: Fair (-)  Standing - Dynamic : Poor (+)  ADL Intervention:  Cognitive Retraining  Safety/Judgement: Fall prevention  Pain:  Pain level pre-treatment: 0/10   Pain level post-treatment: 0/10  Pain Intervention(s): Medication (see MAR); Rest, Ice, Repositioning   Response to intervention: Nurse notified, See doc flow    Activity Tolerance:    Fair-  Please refer to the flowsheet for vital signs taken during this treatment. After treatment:   []  Patient left in no apparent distress sitting up in chair  [x]  Patient left in no apparent distress in bed  [x]  Call bell left within reach  [x]  Nursing notified  []  Caregiver present  []  Bed alarm activated    COMMUNICATION/EDUCATION:   [x] Role of Occupational Therapy in the acute care setting  [x] Home safety education was provided and the patient/caregiver indicated understanding. [x] Patient/family have participated as able in working towards goals and plan of care.   [x] Patient/family agree to work toward stated goals and plan of care.  [] Patient understands intent and goals of therapy, but is neutral about his/her participation. [] Patient is unable to participate in goal setting and plan of care.       Thank you for this referral.  Nathalia Lin OTR/L  Time Calculation: 18 mins

## 2021-12-01 NOTE — DISCHARGE SUMMARY
Discharge Summary    Patient: Basim Medina MRN: [id number]  CSN: [id number]    YOB: 1945  Age: 78 y.o.   Sex: male    DOA: 11/23/2021 LOS:  LOS: 8 days   Discharge Date:      Primary Care Provider:  Marlin Rodríguez MD    Admission Diagnoses: Sepsis ([facility] [A41.9]  UTI (urinary tract infection) [N39.0]    Discharge Diagnoses:    Problem List as of 12/1/2021 Date Reviewed: 7/20/2021          Codes Class Noted - Resolved    Tricuspid regurgitation ICD-10-CM: I07.1  ICD-9-CM: 397.0  11/29/2021 - Present        Hypotension ICD-10-CM: I95.9  ICD-9-CM: 458.9  11/24/2021 - Present        Pulmonary hypertension ([facility] ICD-10-CM: I27.20  ICD-9-CM: 416.8  11/24/2021 - Present        History of tobacco abuse ICD-10-CM: Z87.891  ICD-9-CM: V15.82  11/24/2021 - Present        Pulmonary emphysema ([facility] ICD-10-CM: J43.9  ICD-9-CM: 492.8  11/24/2021 - Present        Acute hypoxemic respiratory failure ([facility] ICD-10-CM: J96.01  ICD-9-CM: 518.81  11/24/2021 - Present        * (Principal) Sepsis ([facility] ICD-10-CM: A41.9  ICD-9-CM: 038.9, 995.91  11/23/2021 - Present        Severe pulmonary hypertension ([facility] ICD-10-CM: I27.20  ICD-9-CM: 416.8  Unknown - Present        Severe protein-calorie malnutrition ([facility] ICD-10-CM: E43  ICD-9-CM: 262  7/21/2021 - Present        Aneurysm of infrarenal abdominal aorta ([facility] ICD-10-CM: I71.4  ICD-9-CM: 441.4  7/20/2021 - Present        HTN (hypertension) ICD-10-CM: I10  ICD-9-CM: 401.9  7/20/2021 - Present        Compression fracture of L1 vertebra ([facility] ICD-10-CM: S32.010A  ICD-9-CM: 805.4  5/17/2021 - Present        UTI (urinary tract infection) due to urinary indwelling Jimenez catheter [facility]) ICD-10-CM: T83.511A, N39.0  ICD-9-CM: 996.64, 599.0  5/1/2020 - Present        Type II diabetes mellitus with manifestations, uncontrolled ([facility] ICD-10-CM: E11.8, E11.65  ICD-9-CM: 250.92  9/16/2017 - Present        Renal mass ICD-10-CM: N28.89  ICD-9-CM: 593.9  9/16/2017 - Present Unable to ambulate ICD-10-CM: R26.2  ICD-9-CM: 719.7  9/14/2017 - Present        Urinary retention ICD-10-CM: R33.9  ICD-9-CM: 788.20  9/14/2017 - Present        Lumbar spinal stenosis ICD-10-CM: M48.061  ICD-9-CM: 724.02  9/5/2017 - Present        Lumbar spondylosis ICD-10-CM: M47.816  ICD-9-CM: 721.3  9/5/2017 - Present        Radiculopathy of leg ICD-10-CM: M54.10  ICD-9-CM: 724.4  9/5/2017 - Present        RESOLVED: Elevated troponin ICD-10-CM: R77.8  ICD-9-CM: 790.6  7/21/2021 - 7/27/2021        RESOLVED: Sepsis ([facility] ICD-10-CM: A41.9  ICD-9-CM: 038.9, 995.91  7/20/2021 - 7/27/2021        RESOLVED: BRITT (acute kidney injury) ([facility] ICD-10-CM: N17.9  ICD-9-CM: 584.9  7/20/2021 - 7/27/2021        RESOLVED: Blurred vision ICD-10-CM: H53.8  ICD-9-CM: 368.8  10/13/2020 - 10/13/2020        RESOLVED: Anemia ICD-10-CM: D64.9  ICD-9-CM: 285.9  9/16/2017 - 5/1/2020        RESOLVED: Dehydration ICD-10-CM: E86.0  ICD-9-CM: 276.51  9/14/2017 - 5/1/2020        RESOLVED: Hyponatremia ICD-10-CM: E87.1  ICD-9-CM: 276.1  9/14/2017 - 5/1/2020        RESOLVED: DM (diabetes mellitus) ([facility] (Chronic) ICD-10-CM: E11.9  ICD-9-CM: 250.00  9/7/2017 - 5/1/2020              Discharge Medications:     Current Discharge Medication List      START taking these medications    Details   omeprazole (PRILOSEC) 40 mg capsule Take 1 Capsule by mouth daily. Qty: 30 Capsule, Refills: 0  Start date: 12/1/2021         CONTINUE these medications which have NOT CHANGED    Details   pregabalin (Lyrica) 25 mg capsule Take 75 mg by mouth. Indications: restless legs syndrome, an extreme discomfort in the calf muscles when sitting or lying down      atorvastatin (Lipitor) 20 mg tablet Take 20 mg by mouth nightly. traZODone (DESYREL) 50 mg tablet Take 50 mg by mouth nightly. metoprolol succinate (TOPROL-XL) 25 mg XL tablet Take 1 Tab by mouth nightly. aspirin 81 mg chewable tablet Take 81 mg by mouth daily. lisinopril-hydroCHLOROthiazide (PRINZIDE, ZESTORETIC) 20-25 mg per tablet Take  by mouth daily. multivitamin (ONE A DAY) tablet Take 1 Tab by mouth daily. STOP taking these medications       calcium carbonate (TUMS) 200 mg calcium (500 mg) chew Comments:   Reason for Stopping:         metFORMIN (GLUCOPHAGE) 500 mg tablet Comments:   Reason for Stopping:         cholecalciferol, vitamin D3, (Vitamin D3) 50 mcg (2,000 unit) tab Comments:   Reason for Stopping:               Discharge Condition: Good    Procedures : RHC    Consults: Cardiology and Pulmonary/Critical Care      PHYSICAL EXAM   Visit Vitals  BP (!) 142/81   Pulse 71   Temp 97.5 °F (36.4 °C)   Resp 18   Ht 5' 11\" (1.803 m)   Wt 60 kg (132 lb 4.4 oz)   SpO2 99%   BMI 18.45 kg/m²     General: Awake, cooperative, no acute distress    HEENT: NC, Atraumatic. PERRLA, EOMI. Anicteric sclerae. Lungs:  CTA Bilaterally. No Wheezing/Rhonchi/Rales. Heart:  Regular  rhythm,  No murmur, No Rubs, No Gallops  Abdomen: Soft, Non distended, Non tender. +Bowel sounds,   Extremities: No c/c/e  Psych:   Not anxious or agitated. Neurologic:  No acute neurological deficits. Admission HPI :   Lenore Madrid is a 78 y.o. male with diabetes, hypertension, CKD stage III, hyperlipidemia, chronic indwelling Jimenez catheter presents to ER with concerns of fatigue, chills, confusion. Patient reports that he has been having chills. Poor historian not able to provide detailed history. Per reports EMS was called and per wife patient has been confused for the past several days, generalized weakness and fatigue. He has history of recurrent UTI in the past secondary to catheter. His past cultures grew Pseudomonas. He denies any chest pain, shortness of breath, fever, abdominal pain, nausea, vomiting. In ER his vital signs relatively stable. His WBC elevated at 22.9, BUN/creatinine of 33/1. 85.   UA with evidence of UTI.    Hospital Course :   Mr. Cheryl Claros was initially admitted to monitored floor, his blood pressure initially stable however he became hypotensive, he was transferred to intensive care unit for septic shock. He required to be started on Levophed. Once his condition improved, he was transferred back to monitored floor. Sepsis/septic shock-  Secondary to catheter associated UTI. He received vancomycin and cefepime. Catheter associated UTI-  He was started on cefepime and later vancomycin added. His past cultures grew Pseudomonas. His present urine cultures did not show any growth likely sample taken after administration of antibiotics. He completed course of antibiotics. Acute respiratory failure-  He developed respiratory failure after fluid resuscitation. Chest x-ray showed pulmonary congestion. His fluids stopped    Pulmonary hypertension-  Echocardiogram showed normal left ventricular systolic function, EF of 55 to 31%, grade 1 diastolic dysfunction. There is moderate to severe tricuspid regurgitation. Pulmonary hypertension. Pulmonary requested right heart catheterization, this showed moderate pulmonary hypertension with PA systolic pressure of 59 mmHg. Recommended to follow-up with pulmonary as outpatient. Anemia-  Received blood transfusion with improvement in his H&H. Stool for occult blood ordered however patient has not provided sample yet. Have started him on PPI. He is eager to go home today. His H&H has been stable posttransfusion. He does not remember when was his last colonoscopy. Recommend follow-up with PCP and referral to GI.         Activity: Activity as tolerated    Diet: Diabetic Diet    Follow-up: PCP, pulmonary    Disposition: home with home health    Minutes spent on discharge: 45       Labs: Results:       Chemistry Recent Labs     11/29/21  0142   *      K 4.4      CO2 24   BUN 50*   CREA 1.19   CA 8.7   AGAP 7   BUCR 42*   AP 85   TP 7.2 ALB 3.7   GLOB 3.5   AGRAT 1.1      CBC w/Diff Recent Labs     12/01/21  1020 11/29/21  0142   WBC  --  9.2   RBC  --  3.28*   HGB 9.3* 8.6*   HCT 29.9* 27.8*   PLT  --  180   GRANS  --  50   LYMPH  --  24   EOS  --  8*      Cardiac Enzymes No results for input(s): CPK, CKND1, LUH in the last 72 hours. No lab exists for component: CKRMB, TROIP   Coagulation No results for input(s): PTP, INR, APTT, INREXT in the last 72 hours. Lipid Panel Lab Results   Component Value Date/Time    Cholesterol, total 146 06/01/2016 07:51 AM    HDL Cholesterol 40 06/01/2016 07:51 AM    LDL, calculated 91.6 06/01/2016 07:51 AM    VLDL, calculated 14.4 06/01/2016 07:51 AM    Triglyceride 72 06/01/2016 07:51 AM    CHOL/HDL Ratio 3.7 06/01/2016 07:51 AM      BNP No results for input(s): BNPP in the last 72 hours. Liver Enzymes Recent Labs     11/29/21  0142   TP 7.2   ALB 3.7   AP 85      Thyroid Studies No results found for: T4, T3U, TSH, TSHEXT         Significant Diagnostic Studies: NM LUNG SCAN PERF    Result Date: 11/24/2021  Perfusion only lung scan History:  78 old patient with hypoxemia Comparison:  Portable chest earlier the same day Technique: Perfusion imaging was performed after intravenous injection of 6.4 millicuries of Tc 87F MAA followed by imaging in multiple projections. Injection site: Right antecubital fossa vein. Findings: Perfusion imaging shows mild inhomogeneity along the peripheral aspect of the lungs. No focal segmental perfusion defect is seen. No definite perfusional abnormalities demonstrated to suggest the presence of pulmonary embolism. CT HEAD WO CONT    Result Date: 11/23/2021  EXAM: CT head INDICATION: Headache. COMPARISON: 5/17/2021 TECHNIQUE: Axial CT imaging of the head was performed without intravenous contrast. Standard multiplanar coronal and sagittal reformatted images were obtained and are included in interpretation.  One or more dose reduction techniques were used on this CT: automated exposure control, adjustment of the mAs and/or kVp according to patient size, and iterative reconstruction techniques. The specific techniques used on this CT exam have been documented in the patient's electronic medical record. Digital Imaging and Communications in Medicine (DICOM) format image data are available to nonaffiliated external healthcare facilities or entities on a secure, media free, reciprocally searchable basis with patient authorization for at least a 12-month period after this study. _______________ FINDINGS: BRAIN AND POSTERIOR FOSSA: Cerebral atrophy. Patchy periventricular, deep and subcortical white matter hypoattenuation which is nonspecific but likely represents chronic ischemic changes. No evidence of acute large vessel transcortical infarct or acute parenchymal hemorrhage. No midline shift or hydrocephalus. Unchanged cluster of calcifications in the central mat. EXTRA-AXIAL SPACES AND MENINGES: There are no abnormal extra-axial fluid collections. CALVARIUM: Intact. SINUSES: Clear. OTHER: None. _______________     No acute intracranial abnormality. US RETROPERITONEUM COMP    Result Date: 11/25/2021  EXAM:  US RETROPERITONEUM COMP INDICATION:  jarad COMPARISON: 9/15/2017, correlation with CT abdomen/pelvis on 7/20/2021 TECHNIQUE: Real-time sonography of the kidneys, retroperitoneum and bladder was performed with multiple static images obtained. FINDINGS: Right kidney: Length of 9.7 cm. Normal renal echotexture, no hydronephrosis. Exophytic lower pole benign simple cyst redemonstrated measuring 1.7 cm. Exophytic lower pole complex lesion measuring 7.3 x 5.8 cm with heterogeneous internal echoes and internal vascularity previously measured 5.8 x 5.6 cm. A few scattered renal sinus echogenic foci are present, vascular versus nonobstructing calculi. Left kidney: Length of 10.1 cm. Normal renal echotexture, no hydronephrosis. No space-occupying lesions.  Small renal sinus echogenic calcifications are present, vascular versus nonobstructing calculi. Mild bilateral perinephric fluid is present. The urinary bladder is by Jimenez catheter. Small right pleural effusion noted. 1. Interval growth of known lower pole right renal mass suspicious for malignant neoplasm. 2. Normal renal echotexture without hydronephrosis. 3. Renal sinus small calcifications bilaterally, probably vascular, nonobstructing calculi not completely excluded. XR CHEST PORT    Result Date: 11/26/2021  EXAM: One-view chest CLINICAL HISTORY: hypoxia , COMPARISON: X-ray 11/20/2021 FINDINGS: Frontal view of the chest demonstrate minimal prominence of interstitial markings. There has been interval increase in hazy opacity at the right lung base with slight blunting of the costophrenic angle. Left costophrenic angle remains sharp. Cardiac silhouette is normal in size and contour. No acute bony or soft tissue abnormality. Minimal prominence of the interstitial markings could suggest slight interstitial edema without appreciable change. Interval small right pleural effusion with right basilar edema/infiltrate/atelectasis. XR CHEST PORT    Result Date: 11/24/2021  EXAM: One-view chest CLINICAL HISTORY: Altered level of consciousness, to assess for fluid overload , COMPARISON: None FINDINGS: Frontal view of the chest demonstrate minimal prominence of interstitial markings otherwise clear. . Cardiac silhouette is normal in size and contour. No acute bony or soft tissue abnormality. Minimal prominence of the interstitial markings could suggest slight interstitial edema. Otherwise unremarkable. XR CHEST PORT    Result Date: 11/23/2021  EXAM: XR CHEST PORT CLINICAL INDICATION/HISTORY: ams -Additional: None COMPARISON: 7/20/2021 TECHNIQUE: Portable frontal view of the chest _______________ FINDINGS: SUPPORT DEVICES: None. HEART AND MEDIASTINUM: Cardiomediastinal silhouette within normal limits.  LUNGS AND PLEURAL SPACES: No dense consolidation, large effusion or pneumothorax. _______________     No acute cardiopulmonary abnormality. ECHO ADULT COMPLETE    Result Date: 11/24/2021  · LV: Estimated LVEF is 55 - 60%. Normal cavity size, wall thickness and systolic function (ejection fraction normal). Mild (grade 1) left ventricular diastolic dysfunction E/E'= 6.81. · TV: Moderate to severe tricuspid valve regurgitation is present. · PA: Pulmonary arterial systolic pressure is 86 mmHg. · IVC: Moderately elevated central venous pressure (8 mmHg); IVC diameter is larger than 21 mm and collapses more than 50% with respiration. CARDIAC PROCEDURE    Result Date: 11/30/2021  · Moderately severe pulmonasry hypertension which did not respond to adenosine. Moderate Pulmonary Hypertension. DUPLEX LOWER EXT VENOUS BILAT    Result Date: 11/25/2021  · No evidence of deep vein thrombosis in the right lower extremity. · No evidence of deep vein thrombosis in the left lower extremity. No results found for this or any previous visit. Please note that this dictation was completed with [employer], the computer voice recognition software. Quite often unanticipated grammatical, syntax, homophones, and other interpretive errors are inadvertently transcribed by the computer software. Please disregard these errors. Please excuse any errors that have escaped final proofreading.      CC: Marlin Rodríguez MD

## 2021-12-02 NOTE — ROUTINE PROCESS
Discharge instructions given to patient. Verbalized understanding. My Medications      ASK your doctor about these medications      Instructions Each Dose to Equal Morning Noon Evening Bedtime   aspirin 81 mg chewable tablet    Your last dose was: Your next dose is: Take 81 mg by mouth daily. 81 mg                 Lipitor 20 mg tablet  Generic drug: atorvastatin    Your last dose was: Your next dose is: Take 20 mg by mouth nightly. 20 mg                 lisinopril-hydroCHLOROthiazide 20-25 mg per tablet  Commonly known as: Heike Osbornein    Your last dose was: Your next dose is: Take  by mouth daily. Lyrica 25 mg capsule  Generic drug: pregabalin    Your last dose was: Your next dose is: Take 75 mg by mouth. Indications: restless legs syndrome, an extreme discomfort in the calf muscles when sitting or lying down   75 mg                 metoprolol succinate 25 mg XL tablet  Commonly known as: TOPROL-XL    Your last dose was: Your next dose is: Take 1 Tab by mouth nightly. 1 Tablet                 multivitamin tablet  Commonly known as: ONE A DAY    Your last dose was: Your next dose is: Take 1 Tab by mouth daily. 1 Tablet                 omeprazole 40 mg capsule  Commonly known as: PRILOSEC    Your last dose was: Your next dose is: Take 1 Capsule by mouth daily. 40 mg                 traZODone 50 mg tablet  Commonly known as: DESYREL    Your last dose was: Your next dose is: Take 50 mg by mouth nightly. 50 mg                      Discussed above medications  Given opportunity to ask questions. Left with all discharge papers.

## 2021-12-02 NOTE — ED TRIAGE NOTES
Patient arrives via EMS with c/c of alonso catheter came out. He is unsure when it did come out, he thinks its already been out since he was discharged last night from this facility.

## 2021-12-02 NOTE — ED PROVIDER NOTES
EMERGENCY DEPARTMENT HISTORY AND PHYSICAL EXAM    Date: 12/2/2021  Patient Name: Hermilo Barnett    History of Presenting Illness     Chief Complaint   Patient presents with    Urinary Catheter Problem         History Provided By: Patient and EMS    2:34 AM  Hermilo Barnett is a 78 y.o. male with PMHX of sepsis, hypertension, urinary retention who presents to the emergency department C/O Jiemnez catheter dislodgment. Patient reports that his Jimenez came out tonight. He was discharged with the Jimenez yesterday from this hospital.  He is not sure when it came out but he is having some suprapubic pain and is unable to urinate. He denies any fever, chest pain, shortness of breath, vomiting, other complaints. No other relieving or exacerbating factors identified. [place] PCP: Rex Serrano MD    Current Facility-Administered Medications   Medication Dose Route Frequency Provider Last Rate Last Admin    lidocaine (URO-JET/ GLYDO) 2 % jelly   Urethral NOW Bailey Pierce MD         Current Outpatient Medications   Medication Sig Dispense Refill    omeprazole (PRILOSEC) 40 mg capsule Take 1 Capsule by mouth daily. 30 Capsule 0    pregabalin (Lyrica) 25 mg capsule Take 75 mg by mouth. Indications: restless legs syndrome, an extreme discomfort in the calf muscles when sitting or lying down      atorvastatin (Lipitor) 20 mg tablet Take 20 mg by mouth nightly.  traZODone (DESYREL) 50 mg tablet Take 50 mg by mouth nightly.  metoprolol succinate (TOPROL-XL) 25 mg XL tablet Take 1 Tab by mouth nightly.  aspirin 81 mg chewable tablet Take 81 mg by mouth daily.  lisinopril-hydroCHLOROthiazide (PRINZIDE, ZESTORETIC) 20-25 mg per tablet Take  by mouth daily.  multivitamin (ONE A DAY) tablet Take 1 Tab by mouth daily.          Past History       Past Medical History:  Past Medical History:   Diagnosis Date    Atherosclerosis     Diabetes ([facility] [address] 2013    type 2    High cholesterol     Hyperlipidemia     Hypertension 2013    Muscle weakness     Neoplasm     right kidney    Severe pulmonary hypertension (HCC)     Spinal stenosis     Urinary retention     Vitamin D deficiency        Past Surgical History:  Past Surgical History:   Procedure Laterality Date    HX HEENT      40 years ago deviated septum    HX LUMBAR LAMINECTOMY  2017    HX ORTHOPAEDIC      Lumbar laminectomy 9/7/17    HX ORTHOPAEDIC Right     CTR       Family History:  Family History   Problem Relation Age of Onset    Heart Disease Father        Social History:  Social History     Tobacco Use    Smoking status: Former Smoker    Smokeless tobacco: Never Used   Substance Use Topics    Alcohol use: No    Drug use: No       Allergies: Allergies   Allergen Reactions    Zocor [Simvastatin] Other (comments)     Made him \"ill\"         Review of Systems   Review of Systems   Constitutional: Negative for fever. Respiratory: Negative for shortness of breath. Cardiovascular: Negative for chest pain. Gastrointestinal: Positive for abdominal pain. Genitourinary: Positive for difficulty urinating. All other systems reviewed and are negative.         Physical Exam     Vitals:    12/02/21 0243 12/02/21 0300   BP: (!) 161/89 113/73   Pulse: 88    Resp: 18    Temp: 98 °F (36.7 °C)    SpO2: 95% 92%   Weight: 62.1 kg (137 lb)    Height: 5' 11\" (1.803 m)      Physical Exam    Nursing notes and vital signs reviewed    Constitutional: Elderly appearing, moderate distress  Head: Normocephalic, Atraumatic  Eyes: EOMI  Neck: Supple  Cardiovascular: Regular rate and rhythm, no murmurs, rubs, or gallops  Chest: Normal work of breathing and chest excursion bilaterally  Lungs: Clear to ausculation bilaterally  Abdomen: Soft, suprapubic tenderness and fullness, otherwise nontender, non distended  Back: No evidence of trauma or deformity  Extremities: No evidence of trauma or deformity  Skin: Warm and dry, normal cap refill  Neuro: Alert and appropriate  Psychiatric: Normal mood and affect      Diagnostic Study Results     Labs -     Recent Results (from the past 12 hour(s))   URINALYSIS W/ RFLX MICROSCOPIC    Collection Time: 12/02/21  2:51 AM   Result Value Ref Range    Color YELLOW      Appearance CLEAR      Specific gravity 1.017 1.005 - 1.030      pH (UA) 5.0 5.0 - 8.0      Protein 100 (A) NEG mg/dL    Glucose Negative NEG mg/dL    Ketone Negative NEG mg/dL    Bilirubin Negative NEG      Blood LARGE (A) NEG      Urobilinogen 0.2 0.2 - 1.0 EU/dL    Nitrites Negative NEG      Leukocyte Esterase TRACE (A) NEG     URINE MICROSCOPIC ONLY    Collection Time: 12/02/21  2:51 AM   Result Value Ref Range    WBC 0 to 3 0 - 5 /hpf    RBC 21 to 35 0 - 5 /hpf    Epithelial cells Negative 0 - 5 /lpf    Bacteria FEW (A) NEG /hpf       Radiologic Studies -   No orders to display     CT Results  (Last 48 hours)    None        CXR Results  (Last 48 hours)    None          Medications given in the ED-  Medications   lidocaine (URO-JET/ GLYDO) 2 % jelly (has no administration in time range)         Medical Decision Making   I am the first provider for this patient. I reviewed the vital signs, available nursing notes, past medical history, past surgical history, family history and social history. Vital Signs-Reviewed the patient's vital signs. Pulse Oximetry Analysis - 94% on room air, not hypoxic     Records Reviewed: Nursing Notes    Provider Notes (Medical Decision Making): Deon Mabry is a 78 y.o. male presenting for dislodgment of his Jimenez catheter. Catheter successfully replaced here. UA bland. Patient without any other complaints. Plan for discharge with primary care and urology follow-up with return precautions. Patient understands and agrees with this plan. Procedures:  Procedures    ED Course:       Diagnosis and Disposition     Critical Care: None    DISCHARGE NOTE:    Criselda Riddle's  results have been reviewed with him.   He has been counseled regarding his diagnosis, treatment, and plan. He verbally conveys understanding and agreement of the signs, symptoms, diagnosis, treatment and prognosis and additionally agrees to follow up as discussed. He also agrees with the care-plan and conveys that all of his questions have been answered. I have also provided discharge instructions for him that include: educational information regarding their diagnosis and treatment, and list of reasons why they would want to return to the ED prior to their follow-up appointment, should his condition change. He has been provided with education for proper emergency department utilization. CLINICAL IMPRESSION:    1. Jimenez catheter problem, initial encounter ([facility] [address]        PLAN:  1. D/C Home  2. Current Discharge Medication List        3. Follow-up Information     Follow up With Specialties Details Why Contact Mary Espino MD Family Medicine Schedule an appointment as soon as possible for a visit   [address] [facility] [address]  [phone]      Sole Stovall MD Urology Schedule an appointment as soon as possible for a visit   [address] [address]  [phone]      [facility] EMERGENCY DEPT Emergency Medicine  If symptoms worsen [address] [place] [address]  [phone]        _______________________________      Please note that this dictation was completed with [employer], the computer voice recognition software. Quite often unanticipated grammatical, syntax, homophones, and other interpretive errors are inadvertently transcribed by the computer software. Please disregard these errors. Please excuse any errors that have escaped final proofreading.

## 2021-12-05 NOTE — ED PROVIDER NOTES
79 y/o M with hx of DM, HTN, HLD, and urinary retention presents to the ED via EMS after wife called due to fatigue for the past week. Patient was recently admitted and discharge for urosepsis. He was discharged from the hospital with a alonso catheter in place due to obstruction, this became dislodged on 12/2 and was replaced in this ED. At that time patient had no findings of ongoing infection.             Past Medical History:   Diagnosis Date    Atherosclerosis     Diabetes ([facility] [address] 2013    type 2    High cholesterol     Hyperlipidemia     Hypertension 2013    Muscle weakness     Neoplasm     right kidney    Severe pulmonary hypertension (HCC)     Spinal stenosis     Urinary retention     Vitamin D deficiency        Past Surgical History:   Procedure Laterality Date    HX HEENT      40 years ago deviated septum    HX LUMBAR LAMINECTOMY  2017    HX ORTHOPAEDIC      Lumbar laminectomy 9/7/17    HX ORTHOPAEDIC Right     CTR         Family History:   Problem Relation Age of Onset    Heart Disease Father        Social History     Socioeconomic History    Marital status:      Spouse name: Not on file    Number of children: Not on file    Years of education: Not on file    Highest education level: Not on file   Occupational History    Not on file   Tobacco Use    Smoking status: Former Smoker    Smokeless tobacco: Never Used   Substance and Sexual Activity    Alcohol use: No    Drug use: No    Sexual activity: Not on file   Other Topics Concern     Service Not Asked    Blood Transfusions Not Asked    Caffeine Concern Not Asked    Occupational Exposure Not Asked    Hobby Hazards Not Asked    Sleep Concern Not Asked    Stress Concern Not Asked    Weight Concern Not Asked    Special Diet Not Asked    Back Care Not Asked    Exercise Not Asked    Bike Helmet Not Asked   [address] Not Asked    Self-Exams Not Asked   Social History Narrative    Not on file     Social Determinants of Health     Financial Resource Strain:     Difficulty of Paying Living Expenses: Not on file   Food Insecurity:     Worried About Running Out of Food in the Last Year: Not on file    [place] of Food in the Last Year: Not on file   Transportation Needs:     Lack of Transportation (Medical): Not on file    Lack of Transportation (Non-Medical): Not on file   Physical Activity:     Days of Exercise per Week: Not on file    Minutes of Exercise per Session: Not on file   Stress:     Feeling of Stress : Not on file   Social Connections:     Frequency of Communication with Friends and Family: Not on file    Frequency of Social Gatherings with Friends and Family: Not on file    Attends Buddhism Services: Not on file    Active Member of [address] [place] [address] [employer] or Organizations: Not on file    Attends Club or Organization Meetings: Not on file    Marital Status: Not on file   Intimate Partner Violence:     Fear of Current or Ex-Partner: Not on file    Emotionally Abused: Not on file    Physically Abused: Not on file    Sexually Abused: Not on file   Housing Stability:     Unable to Pay for Housing in the Last Year: Not on file    Number of [place] in the Last Year: Not on file    Unstable Housing in the Last Year: Not on file         ALLERGIES: Zocor [simvastatin]    Review of Systems   Constitutional: Positive for activity change and fatigue. Negative for appetite change, chills, diaphoresis, fever and unexpected weight change. HENT: Negative. Respiratory: Negative. Cardiovascular: Negative. Genitourinary: Negative. Musculoskeletal: Negative. Neurological: Negative. Hematological: Negative. Vitals:    12/05/21 1324   BP: (!) 140/83   Pulse: 63   Resp: 15   Temp: 97.7 °F (36.5 °C)   SpO2: 98%   Weight: 61.2 kg (135 lb)   Height: 5' 11\" (1.803 m)            Physical Exam  Vitals and nursing note reviewed. Constitutional:       Appearance: Normal appearance.  He is not toxic-appearing. HENT:      Head: Normocephalic and atraumatic. Mouth/Throat:      Mouth: Mucous membranes are moist.      Pharynx: Oropharynx is clear. Eyes:      Extraocular Movements: Extraocular movements intact. Pupils: Pupils are equal, round, and reactive to light. Cardiovascular:      Rate and Rhythm: Regular rhythm. Pulses: Normal pulses. Heart sounds: Murmur heard. Pulmonary:      Effort: Pulmonary effort is normal.      Breath sounds: Normal breath sounds. Abdominal:      General: Abdomen is flat. Bowel sounds are normal.      Palpations: Abdomen is soft. Musculoskeletal:         General: Normal range of motion. Skin:     General: Skin is warm and dry. Capillary Refill: Capillary refill takes less than 2 seconds. Neurological:      General: No focal deficit present. Mental Status: He is alert and oriented to person, place, and time. Psychiatric:         Mood and Affect: Mood normal.         Behavior: Behavior normal.          MDM  Number of Diagnoses or Management Options  Urinary tract infection associated with indwelling urethral catheter, initial encounter [facility])  Diagnosis management comments: 79 y/o M presents to the ED via EMS for complaint from wife of fatigue. Pt was recently admitted and discharge (1 week) for urosepsis. He was additionally seen on 12/2 for alonso placed 2nd to obstruction was dysfunctioning. Alonso was replaced and pt disposed. Pt has no acute subjective complaints. When asked why he is here states, he is too weak to walk alone and his wife cannot take care of him. Pt is afebrile in no acute distress with GCS of 15. Exam is largely unremarkable. Labs show possible UTI, sent for culture and given rocephin. CXR shows no infilitrates or pneumonia (there was question of infiltrate on portable but on AP/LAT no findings). Alonso was replaced 2nd to possible infectious source. No criteria for admission at this time.  Will be discharged on keflex         Procedures

## 2021-12-05 NOTE — ED TRIAGE NOTES
Pt presents for 1 week of generalized weakness. Recently discharged with UTI finished abx. Wife unable to take care of him. Catheter changed yesterday.  A&Ox4

## 2021-12-11 NOTE — ED NOTES
Pt had another BM, large and soft- incontinence care completed. Blanchable small red area noted to coccyx, barrier cream applied. Pt A&Ox4. Resting comfortably in bed. o2 NC in place. Call bell within reach.

## 2021-12-11 NOTE — PROGRESS NOTES
Problem: Falls - Risk of  Goal: *Absence of Falls  Description: Document [place] Reason Fall Risk and appropriate interventions in the flowsheet. Outcome: Progressing Towards Goal  Note: Fall Risk Interventions:                 Elimination Interventions: Call light in reach    History of Falls Interventions: Room close to nurse's station         Problem: Patient Education: Go to Patient Education Activity  Goal: Patient/Family Education  Outcome: Progressing Towards Goal     Problem: Pressure Injury - Risk of  Goal: *Prevention of pressure injury  Description: Document Regan Scale and appropriate interventions in the flowsheet.   Outcome: Progressing Towards Goal  Note: Pressure Injury Interventions:  Sensory Interventions: Assess changes in LOC    Moisture Interventions: Absorbent underpads    Activity Interventions: Pressure redistribution bed/mattress(bed type)    Mobility Interventions: Pressure redistribution bed/mattress (bed type)    Nutrition Interventions: Document food/fluid/supplement intake    Friction and Shear Interventions: HOB 30 degrees or less                Problem: Patient Education: Go to Patient Education Activity  Goal: Patient/Family Education  Outcome: Progressing Towards Goal

## 2021-12-11 NOTE — ED NOTES
Pt ate <25% of meal. Assisted pt with feeding and he states he is just not hungry and has no appetite. Pt did accept liquids on tray with much encouragement. Alonso leg bag soiled-and was replaced with regular alonso drainage bag.

## 2021-12-11 NOTE — H&P
History & Physical    Patient: Blanca Burgess MRN: [id number]  [facility]: [id number]    YOB: 1945  Age: 78 y.o. Sex: male      DOA: 12/11/2021  Primary Care Provider:  Brooks Potts MD    Assessment/Plan     Blanca Burgess is a 78 y.o. male who multiple medical problems including diabetes, hypertension, pulmonary hypertension, pulmonary emphysema brought into the ED via EMS from home with an dwelling Jimenez catheter, fever and altered mental status admitted for sepsis likely due to urinary source.     Sepsis due to urinary source  Patient has a chronic indwelling Jimenez  Continue IV Zosyn  Leukocytosis greater than 22,000  Follow daily CBC follow urine culture  Continues IV fluid  Lactic acid within normal limits    Acute metabolic encephalopathy  Likely due to sepsis    BRITT  Creatinine 1.7  On 11/29/2021 creatinine was normal  Likely due to sepsis due to urinary source, expect improvement  Avoid nephrotoxic agents    Tachycardia  Likely due to sepsis  Continue fluid hydration    Constipation  Tapwater enema until clear    Diabetes  ADA, SSI, fingerstick blood glucose before every meal and nightly    Hypertension  Monitor blood pressure, no antihypertensives initially in setting of sepsis    DVT prophylaxis with heparin and Pepcid    Patient Active Problem List   Diagnosis Code    Lumbar spinal stenosis M48.061    Lumbar spondylosis M47.816    Radiculopathy of leg M54.10    Unable to ambulate R26.2    Urinary retention R33.9    Type II diabetes mellitus with manifestations, uncontrolled ([facility] [address] E11.8, E11.65    Renal mass N28.89    UTI (urinary tract infection) due to urinary indwelling Jimenez catheter ([facility]) T83.511A, N39.0    Compression fracture of L1 vertebra ([facility]) S32.010A    BRITT (acute kidney injury) ([facility] [address] N17.9    Aneurysm of infrarenal abdominal aorta ([facility]) I71.4    HTN (hypertension) I10    Severe protein-calorie malnutrition (HCC) E43    Severe pulmonary hypertension (HCC) I27.20    Sepsis ([address] A41.9    Hypotension I95.9    Pulmonary hypertension (HCC) I27.20    History of tobacco abuse Z87.891    Pulmonary emphysema (HCC) J43.9    Acute hypoxemic respiratory failure (HCC) J96.01    Tricuspid regurgitation I07.1    Acute metabolic encephalopathy D94.09    Constipation K59.00     Estimated length of stay :    CC:        HPI:     Basim Medina is a 78 y.o. male who multiple medical problems including diabetes, hypertension, pulmonary hypertension, pulmonary emphysema brought into the ED via EMS from home with an dwelling Jimenez catheter, fever and altered mental status. Wife also relayed to EMS the patient has been dizzy. Wife relates to EMS that her 's temperature was 101.3 at home. Patient confused and unable to give much history when he arrived. However he was found to have an elevated white blood count at 22.8. Just on the fifth he was normal.  Lactic acid obtained and was normal.  In emergency room urine culture was sent patient was started on Zosyn IV for urinary tract infection in the setting of indwelling Jimenez. Patient was also initially tachycardic with a heart rate as high as 122 but improved with fluid rehydration. Patient had a CT abdomen chest and pelvis which showed \"wall thickening of the rectum possibly due to colitis/proctitis and a relatively large amount of fecal material throughout the colon which could also be a source of inflammation.     Past Medical History:   Diagnosis Date    Atherosclerosis     Diabetes ([facility] [address] 2013    type 2    High cholesterol     Hyperlipidemia     Hypertension 2013    Muscle weakness     Neoplasm     right kidney    Severe pulmonary hypertension ([facility] [address]     Spinal stenosis     Urinary retention     Vitamin D deficiency        Past Surgical History:   Procedure Laterality Date    HX HEENT      40 years ago deviated septum    HX LUMBAR LAMINECTOMY  2017    HX ORTHOPAEDIC      Lumbar laminectomy 9/7/17    HX ORTHOPAEDIC Right     CTR       Family History   Problem Relation Age of Onset    Heart Disease Father        Social History     Socioeconomic History    Marital status:    Tobacco Use    Smoking status: Former Smoker    Smokeless tobacco: Never Used   Vaping Use    Vaping Use: Never used   Substance and Sexual Activity    Alcohol use: No    Drug use: No       Prior to Admission medications    Medication Sig Start Date End Date Taking? Authorizing Provider   cephALEXin (Keflex) 500 mg capsule Take 1 Capsule by mouth four (4) times daily for 7 days. 12/5/21 12/12/21 Yes Andres Last MD   omeprazole (PRILOSEC) 40 mg capsule Take 1 Capsule by mouth daily. 12/1/21  Yes Ty Sahni MD   pregabalin (Lyrica) 25 mg capsule Take 75 mg by mouth. Indications: restless legs syndrome, an extreme discomfort in the calf muscles when sitting or lying down   Yes Provider, Historical   atorvastatin (Lipitor) 20 mg tablet Take 20 mg by mouth nightly. Yes Provider, Historical   traZODone (DESYREL) 50 mg tablet Take 50 mg by mouth nightly. Yes Provider, Historical   metoprolol succinate (TOPROL-XL) 25 mg XL tablet Take 1 Tab by mouth nightly. 3/21/20  Yes Manuel, MD Katelynn   aspirin 81 mg chewable tablet Take 81 mg by mouth daily. Yes Other, MD Katelynn   multivitamin (ONE A DAY) tablet Take 1 Tab by mouth daily. Yes Provider, Historical   lisinopril-hydroCHLOROthiazide (PRINZIDE, ZESTORETIC) 20-25 mg per tablet Take  by mouth daily. Patient not taking: Reported on 12/11/2021    Provider, Historical       Allergies   Allergen Reactions    Zocor [Simvastatin] Other (comments)     Made him \"ill\"       Review of Systems  Gen: No fever, chills, malaise, weight loss/gain. Heent: No headache, rhinorrhea, epistaxis, ear pain, hearing loss, sinus pain, neck pain/stiffness, sore throat. Heart: No chest pain, palpitations, CHAVARRIA, pnd, or orthopnea.    Resp: No cough, hemoptysis, wheezing and shortness of breath. GI: No nausea, vomiting, diarrhea, constipation, melena or hematochezia. : No urinary obstruction, dysuria or hematuria. Derm: No rash, new skin lesion or pruritis. Musc/skeletal: no bone or joint complains. Vasc: No edema, cyanosis or claudication. Endo: No heat/cold intolerance, no polyuria,polydipsia or polyphagia. Neuro: No unilateral weakness, numbness, tingling. No seizures. Heme: No easy bruising or bleeding. Physical Exam:     Physical Exam:  Visit Vitals  /81 (BP 1 Location: Right upper arm, BP Patient Position: At rest)   Pulse 73   Temp 98.3 °F (36.8 °C)   Resp 18   Ht 5' 11\" (1.803 m)   Wt 61.2 kg (135 lb)   SpO2 99%   BMI 18.83 kg/m²    O2 Flow Rate (L/min): 4 l/min O2 Device: Nasal cannula    Temp (24hrs), Av.2 °F (36.8 °C), Min:98 °F (36.7 °C), Max:98.3 °F (36.8 °C)    1901 -  07  In: 100 [I.V.:100]  Out: 150 [Urine:150]   12/10 0701 - 1900  In: 240 [P.O.:240]  Out: 701 [Urine:200]    General:  Awake, cooperative, no distress. Head:  Normocephalic, without obvious abnormality, atraumatic. Eyes:  Conjunctivae/corneas clear, sclera anicteric, PERRL, EOMs intact. Nose: Nares normal. No drainage or sinus tenderness. Throat: Lips, mucosa, and tongue normal.    Neck: Supple, symmetrical, trachea midline, no adenopathy. Lungs:   Clear to auscultation bilaterally. Heart:  Regular rate and rhythm, S1, S2 normal, no murmur, click, rub or gallop. Abdomen: Soft, non-tender. Bowel sounds normal. No masses,  No organomegaly. Extremities: Extremities normal, atraumatic, no cyanosis or edema. Capillary refill normal.   Pulses: 2+ and symmetric all extremities. Skin: Skin color pink, turgor normal. No rashes or lesions   Neurologic: CNII-XII intact. No focal motor or sensory deficit.        Labs Reviewed:  Recent Results (from the past 24 hour(s))   EKG, 12 LEAD, INITIAL    Collection Time: 21  3:21 AM Result Value Ref Range    Ventricular Rate 96 BPM    Atrial Rate 96 BPM    P-R Interval 150 ms    QRS Duration 106 ms    Q-T Interval 342 ms    QTC Calculation (Bezet) 432 ms    Calculated P Axis 55 degrees    Calculated R Axis -56 degrees    Calculated T Axis 12 degrees    Diagnosis       Sinus rhythm  Possible Left atrial enlargement  Left axis deviation  Abnormal ECG  When compared with ECG of 24-NOV-2021 10:54,  QT has shortened  Confirmed by Danni Chen MD. ([id number]) on 12/11/2021 26:60:74 PM     METABOLIC PANEL, BASIC    Collection Time: 12/11/21  3:27 AM   Result Value Ref Range    Sodium 140 136 - 145 mmol/L    Potassium 4.5 3.5 - 5.5 mmol/L    Chloride 110 100 - 111 mmol/L    CO2 24 21 - 32 mmol/L    Anion gap 6 3.0 - 18 mmol/L    Glucose 96 74 - 99 mg/dL    BUN 39 (H) 7.0 - 18 MG/DL    Creatinine 1.70 (H) 0.6 - 1.3 MG/DL    BUN/Creatinine ratio 23 (H) 12 - 20      GFR est AA 48 (L) >60 ml/min/1.73m2    GFR est non-AA 40 (L) >60 ml/min/1.73m2    Calcium 9.3 8.5 - 10.1 MG/DL   CBC WITH AUTOMATED DIFF    Collection Time: 12/11/21  3:27 AM   Result Value Ref Range    WBC 22.8 (H) 4.6 - 13.2 K/uL    RBC 3.92 (L) 4.35 - 5.65 M/uL    HGB 10.4 (L) 13.0 - 16.0 g/dL    HCT 33.7 (L) 36.0 - 48.0 %    MCV 86.0 78.0 - 100.0 FL    MCH 26.5 24.0 - 34.0 PG    MCHC 30.9 (L) 31.0 - 37.0 g/dL    RDW 18.7 (H) 11.6 - 14.5 %    PLATELET 382 355 - 379 K/uL    MPV 9.9 9.2 - 11.8 FL    NRBC 0.0 0  WBC    ABSOLUTE NRBC 0.00 0.00 - 0.01 K/uL    NEUTROPHILS 78 (H) 40 - 73 %    LYMPHOCYTES 9 (L) 21 - 52 %    MONOCYTES 11 (H) 3 - 10 %    EOSINOPHILS 1 0 - 5 %    BASOPHILS 0 0 - 2 %    IMMATURE GRANULOCYTES 1 (H) 0.0 - 0.5 %    ABS. NEUTROPHILS 17.8 (H) 1.8 - 8.0 K/UL    ABS. LYMPHOCYTES 2.1 0.9 - 3.6 K/UL    ABS. MONOCYTES 2.5 (H) 0.05 - 1.2 K/UL    ABS. EOSINOPHILS 0.1 0.0 - 0.4 K/UL    ABS. BASOPHILS 0.1 0.0 - 0.1 K/UL    ABS. IMM.  GRANS. 0.3 (H) 0.00 - 0.04 K/UL    DF AUTOMATED     CARDIAC PANEL,(CK, CKMB & TROPONIN) Collection Time: 12/11/21  3:27 AM   Result Value Ref Range    CK - MB <1.0 <3.6 ng/ml    CK-MB Index  0.0 - 4.0 %     CALCULATION NOT PERFORMED WHEN RESULT IS BELOW LINEAR LIMIT    CK 45 39 - 308 U/L    Troponin-High Sensitivity 16 0 - 78 ng/L   LACTIC ACID    Collection Time: 12/11/21  3:27 AM   Result Value Ref Range    Lactic acid 1.7 0.4 - 2.0 MMOL/L   CULTURE, BLOOD    Collection Time: 12/11/21  3:28 AM    Specimen: Blood   Result Value Ref Range    Special Requests: NO SPECIAL REQUESTS      Culture result: NO GROWTH AFTER 3 HOURS     CULTURE, BLOOD    Collection Time: 12/11/21  3:28 AM    Specimen: Blood   Result Value Ref Range    Special Requests: NO SPECIAL REQUESTS      Culture result: NO GROWTH AFTER 3 HOURS     GLUCOSE, POC    Collection Time: 12/11/21  3:29 AM   Result Value Ref Range    Glucose (POC) 100 70 - 110 mg/dL   URINALYSIS W/ RFLX MICROSCOPIC    Collection Time: 12/11/21  3:35 AM   Result Value Ref Range    Color YELLOW      Appearance CLEAR      Specific gravity 1.018 1.005 - 1.030      pH (UA) 5.0 5.0 - 8.0      Protein 30 (A) NEG mg/dL    Glucose Negative NEG mg/dL    Ketone Negative NEG mg/dL    Bilirubin Negative NEG      Blood Negative NEG      Urobilinogen 0.2 0.2 - 1.0 EU/dL    Nitrites Negative NEG      Leukocyte Esterase MODERATE (A) NEG     URINE MICROSCOPIC ONLY    Collection Time: 12/11/21  3:35 AM   Result Value Ref Range    WBC 50 to 60 0 - 5 /hpf    RBC 0 to 5 0 - 5 /hpf    Epithelial cells Negative 0 - 5 /lpf    Bacteria 1+ (A) NEG /hpf    Yeast 3+ (A) NEG       Procedures/imaging: see electronic medical records for all procedures/Xrays and details which were not copied into this note but were reviewed prior to creation of Plan      CC: Farrel Essex, MD

## 2021-12-11 NOTE — Clinical Note
Status[de-identified] INPATIENT [101]   Type of Bed: Remote Telemetry [29]   Cardiac Monitoring Required?: No   Inpatient Hospitalization Certified Necessary for the Following Reasons: 3.  Patient receiving treatment that can only be provided in an inpatient setting (further clarification in H&P documentation)   Admitting Diagnosis: Sepsis [facility]) [[id number]]   Admitting Physician: Tatum Denson [[id number]]   Attending Physician: Tatum Denson [[id number]]   Estimated Length of Stay: 3-4 Midnights   Discharge Plan[de-identified] Home with Office Follow-up

## 2021-12-11 NOTE — ED NOTES
Titrated pt o2 via NC down and o2 sats 90% on room air. Pt denies any SOB and denies any chest pain. Pt is A&Ox4 and states he does not normally wear o2 at home. o2 at 2L via NC remains on pt for comfort and pt tolerating well. o2 WDL at 97% via 2L. Incontinence care provided. Jimenez patent and draining, 500cc urine output noted.

## 2021-12-11 NOTE — ED TRIAGE NOTES
Pt brought in by EMS from home with indwelling alonso catheter, pt with fever, EMS reports that the pts wife is unable to care for the pt.

## 2021-12-11 NOTE — ED NOTES
TRANSFER - OUT REPORT:    Verbal report given to [facility], RN(name) on Ashley Mckenna  being transferred to Medical(unit) for routine progression of care       Report consisted of patients Situation, Background, Assessment and   Recommendations(SBAR). Information from the following report(s) SBAR, Kardex, ED Summary, Intake/Output, MAR, Accordion, Recent Results and Cardiac Rhythm sinus was reviewed with the receiving nurse. Lines:   Peripheral IV 12/11/21 Right (Active)       Peripheral IV 12/11/21 Left Antecubital (Active)        Opportunity for questions and clarification was provided.       Patient transported with:   Monitor  O2 @ 2 liters  Registered Nurse

## 2021-12-11 NOTE — ED PROVIDER NOTES
EMERGENCY DEPARTMENT HISTORY AND PHYSICAL EXAM      Date: 12/11/2021  Patient Name: Karlene Price    History of Presenting Illness     Chief Complaint   Patient presents with    Fever       History (Context): Karlene Price is a 78 y. o. with indwelling Jimenez, recently diagnosed with UTI, on Keflex, presents for dizziness, fatigue, wife states that she is unable to take care of him any longer. He denies any complaints at this time, is alert and oriented, has received approximately 200 cc of fluids prior to arrival, is normotensive, not tachycardic. Did have a bowel movement upon arrival, he was not aware that he was having a bowel movement, but denies any abdominal pain. Reportedly febrile to 101 at home. No longer febrile here. He has had recent admissions for sepsis    On review of systems, the patient denies fever, chills, rashes, cough, abdominal pain, nausea, vomiting, dysuria, hematuria, neck stiffness, headache. PCP: Rex Serrano MD    Current Outpatient Medications   Medication Sig Dispense Refill    cephALEXin (Keflex) 500 mg capsule Take 1 Capsule by mouth four (4) times daily for 7 days. 28 Capsule 0    omeprazole (PRILOSEC) 40 mg capsule Take 1 Capsule by mouth daily. 30 Capsule 0    pregabalin (Lyrica) 25 mg capsule Take 75 mg by mouth. Indications: restless legs syndrome, an extreme discomfort in the calf muscles when sitting or lying down      atorvastatin (Lipitor) 20 mg tablet Take 20 mg by mouth nightly.  traZODone (DESYREL) 50 mg tablet Take 50 mg by mouth nightly.  metoprolol succinate (TOPROL-XL) 25 mg XL tablet Take 1 Tab by mouth nightly.  aspirin 81 mg chewable tablet Take 81 mg by mouth daily.  lisinopril-hydroCHLOROthiazide (PRINZIDE, ZESTORETIC) 20-25 mg per tablet Take  by mouth daily.  multivitamin (ONE A DAY) tablet Take 1 Tab by mouth daily.          Past History     Past Medical History:  Past Medical History:   Diagnosis Date  Atherosclerosis     Diabetes ([facility] [address] 2013    type 2    High cholesterol     Hyperlipidemia     Hypertension 2013    Muscle weakness     Neoplasm     right kidney    Severe pulmonary hypertension ([facility] [address]     Spinal stenosis     Urinary retention     Vitamin D deficiency        Past Surgical History:  Past Surgical History:   Procedure Laterality Date    HX HEENT      40 years ago deviated septum    HX LUMBAR LAMINECTOMY  2017    HX ORTHOPAEDIC      Lumbar laminectomy 9/7/17    HX ORTHOPAEDIC Right     CTR       Family History:  Family History   Problem Relation Age of Onset    Heart Disease Father        Social History:  Social History     Tobacco Use    Smoking status: Former Smoker    Smokeless tobacco: Never Used   Substance Use Topics    Alcohol use: No    Drug use: No       Allergies: Allergies   Allergen Reactions    Zocor [Simvastatin] Other (comments)     Made him \"ill\"       PMH, PSH, family history, social history, allergies reviewed with the patient with significant items noted above. Review of Systems   As per HPI, otherwise reviewed and negative. Physical Exam   There were no vitals filed for this visit. Gen: Ill-appearing, acute on chronic  HEENT: Normocephalic, sclera anicteric  Cardiovascular: Tachycardic, regular rhythm, no murmurs, rubs, gallops. Pulses intact and equal distally. Pulmonary: No respiratory distress. No stridor. Clear lungs. ABD: Soft, nontender, nondistended. Neuro: Alert. Not Oriented. Normal speech. Full strength and sensation throughout. Psych: Normal thought content and thought processes. : No CVA tenderness  EXT: No cyanosis or clubbing. Skin: Warm and well-perfused.           Diagnostic Study Results     Labs -     Recent Results (from the past 12 hour(s))   EKG, 12 LEAD, INITIAL    Collection Time: 12/11/21  3:21 AM   Result Value Ref Range    Ventricular Rate 96 BPM    Atrial Rate 96 BPM    P-R Interval 150 ms    QRS Duration 106 ms Q-T Interval 342 ms    QTC Calculation (Bezet) 432 ms    Calculated P Axis 55 degrees    Calculated R Axis -56 degrees    Calculated T Axis 12 degrees    Diagnosis       Sinus rhythm with premature ventricular complexes or fusion complexes  Possible Left atrial enlargement  Left axis deviation  Abnormal ECG  When compared with ECG of 24-NOV-2021 10:54,  fusion complexes are now present  QT has shortened     GLUCOSE, POC    Collection Time: 12/11/21  3:29 AM   Result Value Ref Range    Glucose (POC) 100 70 - 110 mg/dL       Radiologic Studies -   CT HEAD WO CONT    (Results Pending)   XR CHEST PORT    (Results Pending)     CT Results  (Last 48 hours)    None        CXR Results  (Last 48 hours)    None            Medical Decision Making   I am the first provider for this patient. I reviewed the vital signs, available nursing notes, past medical history, past surgical history, family history and social history. Vital Signs-Reviewed the patient's vital signs. EKG: Interpreted by myself.  EKG non diagnostic, without acute ischemic changes, arrhythmia, prolonged QT, or evidence of Brugada       Records Reviewed: Personally, on initial evaluation    MDM:     DDX considered: Sepsis, vs uti with ams, now improved  DDX thought to be less likely but also considered due to high risk condition: PE, meningitis, stroke    Patient condition on initial evaluation: Severely ill    Plan:   Fluid resuscitation  Close Observation  Initiation of sepsis bundle    Orders as below:  Orders Placed This Encounter    CULTURE, URINE    CULTURE, BLOOD    CULTURE, BLOOD    CT HEAD WO CONT    XR CHEST PORT    BASIC METABOLIC PANEL    CBC WITH AUTOMATED DIFF    URINALYSIS W/ RFLX MICROSCOPIC    CARDIAC PANEL,(CK, CKMB & TROPONIN)    LACTIC ACID    POC GLUCOSE    VITAL SIGNS - PER UNIT ROUTINE    STRICT I & O    NEUROLOGIC STATUS ASSESSMENT - PER UNIT ROUTINE    GLUCOSE, POC    EKG, 12 LEAD, INITIAL    EKG, 12 LEAD, INITIAL    SALINE LOCK IV ONE TIME STAT        ED Course:   ED Course as of 12/14/21 2023   Sat Dec 11, 2021   0606 CT head w/o acute abnormality   [DM]   0755 7:55 AM  Will plan to admit for sepsis with ams. The patient understands and agrees with the plan. Spoke with Dr. Audi Sin the on call admitting clinician who agrees to admit patient. Appreciate the assistance in the care of this patient. [DM]      ED Course User Index  [DM] Brooke Gambino MD             Disposition  Admit    Follow-up Information    None         Current Discharge Medication List          Procedures:  Critical Care  Performed by: Brooke Gambino MD  Authorized by: Brooke Gambino MD     Critical care provider statement:     Critical care time (minutes):  35    Critical care time was exclusive of:  Separately billable procedures and treating other patients    Critical care was necessary to treat or prevent imminent or life-threatening deterioration of the following conditions:  Toxidrome, sepsis and CNS failure or compromise    Critical care was time spent personally by me on the following activities:  Evaluation of patient's response to treatment, discussions with primary provider, ordering and review of radiographic studies, ordering and review of laboratory studies, ordering and performing treatments and interventions, review of old charts, re-evaluation of patient's condition and examination of patient          Critical Care Time:       Diagnosis     Clinical Impression: No diagnosis found. Ceci Gannon MD, [facility]  Emergency Medicine  12/11/2021, 3:33 AM      As a voice dictation software was utilized to dictate this note, minor word transpositions can occur. I apologize for confusing wording and typographic errors. Please feel free to contact me for clarification.

## 2021-12-11 NOTE — ROUTINE PROCESS
TRANSFER - IN REPORT:    Verbal report received from Chetan James on Hermilo Barnett  being received from ED for routine progression of care      Report consisted of patients Situation, Background, Assessment and   Recommendations(SBAR). Information from the following report(s) SBAR and MAR was reviewed with the receiving nurse. Opportunity for questions and clarification was provided. Assessment completed upon patients arrival to unit and care assumed. Second skin assessment completed with Brii Berg. Patient has skin tear to right hip - bottom is red but blanchable - no open areas. Peeling skin to anterior bilateral toes ; redness to bilateral heels - redness noted more on left heel. Patient alert with flat affect. Had BM x 1 - incontinence care provided. 0607  Patient ate 50% of dinner. BM x 1 - stool soft and formed. 200 cc of urine out. Pt has no additional needs at this time. 1915  Bedside and Verbal shift change report given to [facility]elda  by Annabelle Ardon RN. Report included the following information SBAR, Kardex, OR Summary, Intake/Output and MAR.

## 2021-12-12 PROBLEM — K59.00 CONSTIPATION: Status: ACTIVE | Noted: 2021-01-01

## 2021-12-12 PROBLEM — G93.41 ACUTE METABOLIC ENCEPHALOPATHY: Status: ACTIVE | Noted: 2021-01-01

## 2021-12-12 NOTE — ROUTINE PROCESS
Bedside and Verbal shift change report given to SASKIA Ocampo (oncoming nurse) by Sadiq Abreu (offgoing nurse). Report included the following information SBAR, Kardex and MAR.

## 2021-12-12 NOTE — PROGRESS NOTES
Care Management    Please consider therapy when appropriate to assist in identifying any discharge planning needs as patient has had difficulty ambulating at home. Reason for Admission: Spesis    Chart reviewed. Per H&P: Randal Reynoso is a 78 y.o. male who multiple medical problems including diabetes, hypertension, pulmonary hypertension, pulmonary emphysema brought into the ED via EMS from home with an dwelling Jimenez catheter, fever and altered mental status. Wife also relayed to EMS the patient has been dizzy. Wife relates to EMS that her 's temperature was 101.3 at home. Patient confused and unable to give much history when he arrived. However he was found to have an elevated white blood count at 22.8. Just on the fifth he was normal.  Lactic acid obtained and was normal.  In emergency room urine culture was sent patient was started on Zosyn IV for urinary tract infection in the setting of indwelling Jimenez. Patient was also initially tachycardic with a heart rate as high as 122 but improved with fluid rehydration. Patient had a CT abdomen chest and pelvis which showed \"wall thickening of the rectum possibly due to colitis/proctitis and a relatively large amount of fecal material throughout the colon which could also be a source of inflammation.     Prior to admission patient was living:     Prior to admission patient was using the following DME:                     RUR Score:                    Plan for utilizing home health:          COVID Vaccine Status:     PCP: First and Last name: Symone Coates MD    Name of Practice:    Are you a current patient: Yes/No:    Approximate date of last visit:    Can you participate in a virtual visit with your PCP:                     Current Advanced Directive/Advance Care Plan: Full Code    Healthcare Decision Maker:   Primary Decision Maker: Umm Jerez - Spouse - [phone]  Click here to [facility] including selection of the Healthcare Decision Maker Relationship (ie \"Primary\")                         Transition of Care Plan: In progress       Care Management/Patient Conversation: CM spoke with patient's spouse at length. APouse confirmed contact info and PCP, and stated that he patient has not seen Dr. De Kennying his PCP in [facility] long time\" because the spouse can not get him into the car to go. Patient has 2-3 rollators at home and a walker and does not use home O2. CM asked if there was anything that could have been done differently during his previous admission to prevent this admission. Spouse stated that the patient \"just keeps getting these UTI's. \" The spouse states the patient has no problems taking his medications, but that he has not seen his PCP or any other physicians because \"I can't move hime and can't get him there. \" Spouse stated that when he was discharged [facility] the last time, she had to get a friend and a rollator to help him get out of the car and into the house. The spouse states that the patient has become progressively weak since his last discharge and that on Wednesday or Thursday he was walking in the leger with a rollator and \"all of a sudden\" his legs would not work and he had to be helped to bed. The spouse stated that within the past week she has had to call the fire department to help him get from the chair into bed since she can't lift hiim. Spouse states that patient has been to rehab at [address] [place] [address], and would prefer [facility] if SNF is recommended. The patient had [facility] with  [facility], with a nurse who came out monthy for the past 4 years to change his catheter. A therapist came out to work with the henryn and informed the spouse that the patient's frequent UTI's could be causing his confusion. The spouse stated that the confusion is NOT his baseline, and she noticed it on Friday morning, 12/10/2021.  Patient will most likely need medical transport when he discharges, either to home or a facility. Care Management Interventions  PCP Verified by CM:  Yes  Transition of Care Consult (CM Consult): Discharge Planning  Support Systems: Spouse/Significant Other  Confirm Follow Up Transport: Family  Discharge Location  Discharge Placement:  (SNF versus HH)

## 2021-12-12 NOTE — PROGRESS NOTES
Problem: Falls - Risk of  Goal: *Absence of Falls  Description: Document Dundy Flow Fall Risk and appropriate interventions in the flowsheet. Outcome: Progressing Towards Goal  Note: Fall Risk Interventions:       Mentation Interventions: Adequate sleep, hydration, pain control         Elimination Interventions: Call light in reach    History of Falls Interventions: Bed/chair exit alarm         Problem: Patient Education: Go to Patient Education Activity  Goal: Patient/Family Education  Outcome: Progressing Towards Goal     Problem: Pressure Injury - Risk of  Goal: *Prevention of pressure injury  Description: Document Regan Scale and appropriate interventions in the flowsheet.   Outcome: Progressing Towards Goal  Note: Pressure Injury Interventions:  Sensory Interventions: Assess changes in LOC, Pressure redistribution bed/mattress (bed type)    Moisture Interventions: Absorbent underpads, Apply protective barrier, creams and emollients, Internal/External urinary devices    Activity Interventions: Assess need for specialty bed, Pressure redistribution bed/mattress(bed type)    Mobility Interventions: Assess need for specialty bed    Nutrition Interventions: Document food/fluid/supplement intake    Friction and Shear Interventions: Apply protective barrier, creams and emollients                Problem: Patient Education: Go to Patient Education Activity  Goal: Patient/Family Education  Outcome: Progressing Towards Goal

## 2021-12-12 NOTE — PROGRESS NOTES
Problem: Mobility Impaired (Adult and Pediatric)  Goal: *Acute Goals and Plan of Care (Insert Text)  Outcome: Progressing Towards Goal  Note: Physical Therapy Goals  Initiated 12/12/2021 and to be accomplished within 7 day(s)  1. Patient will move from supine to sit and sit to supine  in bed with min A.    2.  Patient will transfer from bed to chair and chair to bed with min A using the least restrictive device. 3.  Patient will perform sit to stand with min A.  4.  Patient will ambulate with min A for 50 feet with the least restrictive device. Prior Level of Function:   Patient reports he needs min to mod A for all mobility including gait using rollator. Patient lives with wife in a [facility]. Pt has a walk-in shower, with shower chair. Per chart review: \"EMS reports that the pts wife is unable to care for the pt\"     PHYSICAL THERAPY EVALUATION    Patient: Niki Marieent (43 y.o. male)  Date: 12/12/2021  Primary Diagnosis: Sepsis ([facility] [address] [A41.9]       Precautions: Fall    ASSESSMENT :  Based on the objective data described below, the patient presents with decreased activity tolerance, strength, balance limiting functional mobility. Supine to sit at R EOB with min A  STS with RW, GB, initially mod A progressing to min A with repeated reps and significantly increasing bed height to assist w/ mechanical advantage as pt is tall. Pt has multiple episode of fecal incontinence. After extending time working on sitting and standing balance during multiple change of linens, pt assisted to return to supine with min A. Left with all needs within reach, RN updated. Patient will benefit from skilled intervention to address the above impairments.   Patient's rehabilitation potential is considered to be Fair  Factors which may influence rehabilitation potential include:   []         None noted  [x]         Mental ability/status  [x]         Medical condition  [x]         Home/family situation and support systems  [x]         Safety awareness  []         Pain tolerance/management  []         Other:      PLAN :  Recommendations and Planned Interventions:   [x]           Bed Mobility Training             [x]    Neuromuscular Re-Education  [x]           Transfer Training                   []    Orthotic/Prosthetic Training  [x]           Gait Training                          []    Modalities  [x]           Therapeutic Exercises           []    Edema Management/Control  [x]           Therapeutic Activities            [x]    Family Training/Education  [x]           Patient Education  []           Other (comment):    Frequency/Duration: Patient will be followed by physical therapy 1-2 times per day/4-7 days per week to address goals. Discharge Recommendations: Skilled Nursing Facility  Further Equipment Recommendations for Discharge: TBD at discharge from rehab     SUBJECTIVE:   Patient stated I need help to get up.     OBJECTIVE DATA SUMMARY:     Past Medical History:   Diagnosis Date    Atherosclerosis     Diabetes ([facility] [address] 2013    type 2    High cholesterol     Hyperlipidemia     Hypertension 2013    Muscle weakness     Neoplasm     right kidney    Severe pulmonary hypertension (HCC)     Spinal stenosis     Urinary retention     Vitamin D deficiency      Past Surgical History:   Procedure Laterality Date    HX HEENT      40 years ago deviated septum    HX LUMBAR LAMINECTOMY  2017    HX ORTHOPAEDIC      Lumbar laminectomy 9/7/17    HX ORTHOPAEDIC Right     CTR     Barriers to Learning/Limitations: yes;  physical  Compensate with: Visual Cues, Verbal Cues and Tactile Cues  Home Situation:  Home Situation  Home Environment: Private residence  # Steps to Enter: 0  Rails to Enter: No  Wheelchair Ramp: No  One/Two Story Residence: One story  Lift Chair Available: No  Living Alone: No  Support Systems: Spouse/Significant Other  Patient Expects to be Discharged to[de-identified] House  Current DME Used/Available at Home: Yazan Medina, rollator, Shower chair  Tub or Shower Type: Tub/Shower combination  Critical Behavior:  Neurologic State: Confused  Orientation Level: Disoriented to situation; Disoriented to time  Cognition: Follows commands  Safety/Judgement: Decreased awareness of environment; Fall prevention  Psychosocial  Patient Behaviors: Calm; Cooperative  Purposeful Interaction: Yes  Pt Identified Daily Priority: Clinical issues (comment)  Caritas Process: Nurture loving kindness; Establish trust; Teaching/learning; Attend basic human needs  Caring Interventions: Reassure  Reassure: Therapeutic listening  Therapeutic Modalities: Deep breathing  Skin Integrity: Other (comment); Abrasion  Skin Integumentary  Skin Integrity: Other (comment); Abrasion  Strength:    Strength: Generally decreased, functional  Tone & Sensation:   Tone: Normal  Sensation: Intact  Range Of Motion:  AROM: Generally decreased, functional  Functional Mobility:  Bed Mobility:  Supine to Sit: Minimum assistance  Sit to Supine: Minimum assistance  Scooting: Minimum assistance  Transfers:  Sit to Stand: Minimum assistance; Adaptive equipment; Additional time (bed elevated)  Stand to Sit: Minimum assistance  Balance:   Sitting: Intact  Standing: Impaired; With support  Standing - Static: Fair  Standing - Dynamic : Fair   Ambulation/Gait Training:  Unable    Pain:  Pain level pre-treatment: 0/10   Pain level post-treatment: 0/10     Activity Tolerance:   Fair    Please refer to the flowsheet for vital signs taken during this treatment. After treatment:   []         Patient left in no apparent distress sitting up in chair  [x]         Patient left in no apparent distress in bed  [x]         Call bell left within reach  [x]         Nursing notified  []         Caregiver present  []         Bed alarm activated  []         SCDs applied    COMMUNICATION/EDUCATION:   [x]         Role of Physical Therapy in the acute care setting.   [x]         Fall prevention education was provided and the patient/caregiver indicated understanding. [x]         Patient/family have participated as able in goal setting and plan of care. [x]         Patient/family agree to work toward stated goals and plan of care. []         Patient understands intent and goals of therapy, but is neutral about his/her participation. []         Patient is unable to participate in goal setting/plan of care: ongoing with therapy staff.  []         Other:     Thank you for this referral.  Rey Saeed, PT   Time Calculation: 25 mins      Eval Complexity: History: MEDIUM  Complexity : 1-2 comorbidities / personal factors will impact the outcome/ POC Exam:MEDIUM Complexity : 3 Standardized tests and measures addressing body structure, function, activity limitation and / or participation in recreation  Presentation: MEDIUM Complexity : Evolving with changing characteristics  Clinical Decision Making:Medium Complexity   Overall Complexity:MEDIUM

## 2021-12-12 NOTE — ROUTINE PROCESS
Bedside and Verbal shift change report given to Horatio Goodpasture, RN (oncoming nurse) by ERIN Martinez RN (offgoing nurse). Report included the following information SBAR, Kardex, Intake/Output, MAR and Recent Results.

## 2021-12-12 NOTE — PROGRESS NOTES
0710  Bedside and Verbal shift change report given to Magdalena Urbano, RN by [employer], RN     . Report included the following information SBAR, Kardex, OR Summary, Intake/Output and MAR.     2931  Patient participating with PT. Had x 1 large BM. Incontinence care provided by PT team.     1400  Resting in bed. Denies pain and/or distress. Has no additional needs at this time. 1438  Patient reports taking Ensure at home to aid with weight gain. Preference is chocolate. Dr. Elayne Lynn paged and made aware. Order obtained for Ensure TID with meals. 1622  Large BM - soft, formed and brown. Incontinence + catheter care done. 1930  Bedside and Verbal shift change report given to Phoenix Velasquez RN  by Magdalena Urbano RN. Report included the following information SBAR, Kardex, OR Summary, Intake/Output and MAR.

## 2021-12-12 NOTE — PROGRESS NOTES
OCCUPATIONAL THERAPY EVALUATION    Problem: Self Care Deficits Care Plan (Adult)  Goal: *Acute Goals and Plan of Care (Insert Text)  Outcome: Progressing Towards Goal  Note:   FUNCTIONAL STATUS PRIOR TO ADMISSION:  Pt was mod A in self care tasks, had wife present living at home to assist with ADL tasks    HOME SUPPORT: The patient lived with wife. Initial Occupational Therapy Goals (12/12/2021) Within 7 day(s):  1. Patient will perform perform grooming seated EOB for 5 minutes for increased independence in ADLs. 2. Patient will perform UB dressing with set up seated EOB for increased independence with ADLs. 3. Patient will perform LB dressing with min A & A/E PRN for increased independence with ADLs. 4. Patient will perform all aspects of toileting with mod A for increased independence with ADLs. 5. Patient will perform LE ADLs utilizing body mechanics & adaptive strategies with 1 verbal cue for increased safety in ADLs. 6. Patient will independently apply energy conservation techniques with 2 verbal cue(s) for increased independence with ADLs. Patient: Shaylee Pierre (25 y.o. male)  Date: 12/12/2021  Primary Diagnosis: Sepsis ([facility] [address] [A41.9]        Precautions:   Fall  PLOF: Pt reports living with wife in one story home, received help in ADLs of dressing, bathing, and toileting from her at home, reports using a rollator full time. ASSESSMENT :  Based on the objective data described below, the patient presents with weakness, decreased independence in self care tasks 2/2 confusion, decreased stability with sitting and standing, decreased management of bowel and bladder. Pt seen with PT for second set of skilled hands. Pt presents supine in bed. Disoriented to place however follows commands and willing to work. Min A supine to sit.  Pt has soiled brief, requires sit to stand to change, initial sit to stand requires mod A, however following 4-5 sit to stands during brief changes move from min A to CGA using FWW. Pt has persistend leaking bowel during brief changes, requires full bed change, is unable to assist with cleaning or clothing management in order to maintain balance. Pt is cleaned and changed full, full linen change. Pt is able to sit at EOB doff R sock however loses balance to L side and backwards. Pt unable to perform on L side, requires mod A to change socks as they were soiled. Pt returns to supine position with all needs in reach, call bell in lap. Education: Pt educated on role of OT in acute setting, educated on safety when performing sit to stand using FWW. Patient will benefit from skilled intervention to address the above impairments. Patient's rehabilitation potential is considered to be Fair  Factors which may influence rehabilitation potential include:   []             None noted  []             Mental ability/status  [x]             Medical condition  [x]             Home/family situation and support systems  [x]             Safety awareness  [x]             Pain tolerance/management  []             Other:      PLAN : Pt will be seen by skilled OT daily  Recommendations and Planned Interventions:   [x]               Self Care Training                  [x]      Therapeutic Activities  [x]               Functional Mobility Training   [x]      Cognitive Retraining  [x]               Therapeutic Exercises           [x]      Endurance Activities  [x]               Balance Training                    [x]      Neuromuscular Re-Education  []               Visual/Perceptual Training     [x]      Home Safety Training  [x]               Patient Education                   [x]      Family Training/Education  []               Other (comment):    Frequency/Duration: Patient will be followed by occupational therapy daily to address goals.   Discharge Recommendations: TBD, Home health vs SNF depending on home support  Further Equipment Recommendations for Discharge: N/A     SUBJECTIVE: Patient stated Yelena Love can do that.     OBJECTIVE DATA SUMMARY:     Past Medical History:   Diagnosis Date    Atherosclerosis     Diabetes ([facility] [address] 2013    type 2    High cholesterol     Hyperlipidemia     Hypertension 2013    Muscle weakness     Neoplasm     right kidney    Severe pulmonary hypertension ([facility] [address]     Spinal stenosis     Urinary retention     Vitamin D deficiency      Past Surgical History:   Procedure Laterality Date    HX HEENT      40 years ago deviated septum    HX LUMBAR LAMINECTOMY  2017    HX ORTHOPAEDIC      Lumbar laminectomy 9/7/17    HX ORTHOPAEDIC Right     CTR     Barriers to Learning/Limitations: None  Compensate with: visual, verbal, tactile, kinesthetic cues/model    Home Situation:   Home Situation  Home Environment: Private residence  One/Two Story Residence: One story  Living Alone: No  Support Systems: Spouse/Significant Other  Patient Expects to be Discharged to[de-identified] House  Current DME Used/Available at Home: Lolita Budd, rollator, [id number] chair  Tub or Shower Type: Tub/Shower combination  [x]  Right hand dominant   []  Left hand dominant    Cognitive/Behavioral Status:  Neurologic State: Confused  Orientation Level: Disoriented to situation; Disoriented to time  Cognition: Follows commands  Safety/Judgement: Decreased awareness of environment; Fall prevention    Skin: intact  Edema: none noted     Vision/Perceptual:    Tracking: Able to track stimulus in all quadrants w/o difficulty      Coordination: BUE  Coordination: Within functional limits  Fine Motor Skills-Upper: Left Intact; Right Intact    Gross Motor Skills-Upper: Left Intact; Right Intact    Balance:  Sitting: Intact  Standing: Impaired;  With support  Standing - Static: Fair  Standing - Dynamic : Fair    Strength: BUE  Strength: Generally decreased, functional      Tone & Sensation: BUE  Tone: Normal  Sensation: Intact    Range of Motion: BUE  AROM: Generally decreased, functional       Functional Mobility and Transfers for ADLs:  Bed Mobility:     Supine to Sit: Minimum assistance  Sit to Supine: Minimum assistance  Scooting: Minimum assistance  Transfers:  Sit to Stand: Minimum assistance      ADL Assessment:   Feeding: Setup    Oral Facial Hygiene/Grooming: Minimum assistance    Bathing: Moderate assistance    Upper Body Dressing: Minimum assistance    Lower Body Dressing: Moderate assistance    Toileting: Maximum assistance        ADL Intervention:    Lower Body Dressing Assistance  Dressing Assistance: Moderate assistance  Socks: Moderate assistance  Leg Crossed Method Used: Yes  Position Performed: Seated edge of bed    Toileting  Toileting Assistance: Maximum assistance  Bowel Hygiene: Maximum assistance  Clothing Management: Maximum assistance    Cognitive Retraining  Executive Functions: Managing time; Regulating behavior  Safety/Judgement: Decreased awareness of environment; Fall prevention      Pain:  Pain level pre-treatment: 0/10   Pain level post-treatment: 0/10   Pain Intervention(s): Medication provided by Nursing (see MAR); Rest, Ice, Repositioning   Response to intervention: Nurse notified, See doc flow sheet    Activity Tolerance:   Fair. Patient able to stand 7 minute(s). Patient able to complete ADLs with rest breaks. Please refer to the flowsheet for vital signs taken during this treatment. After treatment:   [] Patient left in no apparent distress sitting up in chair  [x] Patient left in no apparent distress in bed  [x] Call bell left within reach  [x] Nursing notified  [] Caregiver present  [] Bed alarm activated    COMMUNICATION/EDUCATION:   [x] Role of Occupational Therapy in the acute care setting  [x] Home safety education was provided and the patient/caregiver indicated understanding. [x] Patient/family have participated as able in goal setting and plan of care. [x] Patient/family agree to work toward stated goals and plan of care.   [] Patient understands intent and goals of therapy, but is neutral about his/her participation. [] Patient is unable to participate in goal setting and plan of care. Thank you for this referral.  Gely Raza OTD, OTR/L   Time Calculation: 27 mins    Eval Complexity: History: MEDIUM Complexity : Expanded review of history including physical, cognitive and psychosocial  history ; Examination: MEDIUM Complexity : 3-5 performance deficits relating to physical, cognitive , or psychosocial skils that result in activity limitations and / or participation restrictions; Decision Making:MEDIUM Complexity : Patient may present with comorbidities that affect occupational performnce.  Miniml to moderate modification of tasks or assistance (eg, physical or verbal ) with assesment(s) is necessary to enable patient to complete evaluation

## 2021-12-12 NOTE — PROGRESS NOTES
Pharmacy Dosing Services:     Pharmacist Renal Dosing Progress Note for Zosyn  Physician Philippe     The following medication: Zosyn was automatically dose-adjusted per [facility] P&T Committee Protocol, with respect to renal function. Consult provided for this   78 y.o. , male , for the indication of UTI (Pseudomonas aeruginosa - Urine 12/5/21. Pt Weight:   Wt Readings from Last 1 Encounters:   12/11/21 61.2 kg (135 lb)         Previous Regimen 3.375 gm IV every 6 hours   Serum Creatinine Lab Results   Component Value Date/Time    Creatinine 1.98 (H) 12/12/2021 04:55 AM       Creatinine Clearance Estimated Creatinine Clearance: 28.3 mL/min (A) (based on SCr of 1.98 mg/dL (H)). BUN Lab Results   Component Value Date/Time    BUN 45 (H) 12/12/2021 04:55 AM       Dosage changed to:  4.5 gm IV every 8 hours      Pharmacy to continue to monitor patient daily. Will make dosage adjustments based upon changing renal function.   Signed Jona Mane PHARMD. Contact information:

## 2021-12-12 NOTE — PROGRESS NOTES
2318 - Head to toe assessment completed at this time. Pt denies any chest pain or SOB. Pt denies any numbness or tingling or extremities. Pt is alert to self and place but not with time. Pt has clear lung sounds and abdomen is soft. Pt left in bed with call light within reach, bed in the low position, and wheels locked. Pt encouraged to call for assistance. Will continue to monitor.

## 2021-12-13 PROBLEM — K52.9 COLITIS: Status: ACTIVE | Noted: 2021-01-01

## 2021-12-13 NOTE — PROGRESS NOTES
Hospitalist Progress Note    Patient: Ashley Mckenna MRN: [id number]  [facility]: [id number]    YOB: 1945  Age: 78 y.o. Sex: male    DOA: 12/11/2021 LOS:  LOS: 1 day          Chief Complaint:    fever and altered mental status admitted for sepsis likely due to urinary source.       Assessment/Plan     Ashley Mckenna is a 78 y.o. male who multiple medical problems including diabetes, hypertension, pulmonary hypertension, pulmonary emphysema brought into the ED via EMS from home with an dwelling Jimenez catheter, fever and altered mental status admitted for sepsis likely due to urinary source.     Sepsis due to urinary source  Patient has a chronic indwelling Jimenez  Continue IV Zosyn  Leukocytosis greater than 22,000  Follow daily CBC follow urine culture  Continues IV fluid  Lactic acid within normal limits     Acute metabolic encephalopathy  Likely due to sepsis  Improving      BRITT -  Creatinine worsening 1.98  HR increased  Give ns bolus   Start NS at 150cc/hr  On 11/29/2021 creatinine was normal  Likely due to sepsis due to urinary source, expect improvement  Avoid nephrotoxic agents     Tachycardia  Likely due to sepsis  Continue fluid hydration     Constipation  Tapwater enema until clear     Diabetes  ADA, SSI, fingerstick blood glucose before every meal and nightly     Hypertension  Monitor blood pressure, no antihypertensives initially in setting of sepsis     DVT prophylaxis with heparin and Pepcid  Disposition :  Patient Active Problem List   Diagnosis Code    Lumbar spinal stenosis M48.061    Lumbar spondylosis M47.816    Radiculopathy of leg M54.10    Unable to ambulate R26.2    Urinary retention R33.9    Type II diabetes mellitus with manifestations, uncontrolled ([facility]) E11.8, E11.65    Renal mass N28.89    UTI (urinary tract infection) due to urinary indwelling Jimenez catheter ([facility]) T83.511A, N39.0    Compression fracture of L1 vertebra ([facility]) S32.010A    BRITT (acute kidney injury) ([facility] N17.9    Aneurysm of infrarenal abdominal aorta (HCC) I71.4    HTN (hypertension) I10    Severe protein-calorie malnutrition ([facility] E43    Severe pulmonary hypertension (HCC) I27.20    Sepsis ([facility] A41.9    Hypotension I95.9    Pulmonary hypertension (HCC) I27.20    History of tobacco abuse Z87.891    Pulmonary emphysema (HCC) J43.9    Acute hypoxemic respiratory failure (HCC) J96.01    Tricuspid regurgitation I07.1    Acute metabolic encephalopathy N02.86    Constipation K59.00       Subjective:    Has no complaints    Review of systems:    Constitutional: denies fevers, chills, myalgias  Respiratory: denies SOB, cough  Cardiovascular: denies chest pain, palpitations  Gastrointestinal: denies nausea, vomiting, diarrhea      Vital signs/Intake and Output:  Visit Vitals  BP (!) 111/57   Pulse (!) 109   Temp 98.5 °F (36.9 °C)   Resp 17   Ht 5' 11\" (1.803 m)   Wt 61.2 kg (135 lb)   SpO2 98%   BMI 18.83 kg/m²     Current Shift:  No intake/output data recorded.   Last three shifts:  12/11 0701 - 12/12 1900  In: 1210 [P.O.:960; I.V.:250]  Out: 852 [Urine:350]    Exam:    General: older, debilitated, white male, alert, NAD  Head/Neck: NCAT, supple, No masses, No lymphadenopathy  CVS:Regular rate and rhythm, no M/R/G, S1/S2 heard, no thrill  Lungs:Clear to auscultation bilaterally, no wheezes, rhonchi, or rales  Abdomen: Soft, Nontender, No distention, Normal Bowel sounds, No hepatomegaly  Extremities: No C/C/E, pulses palpable 2+  Skin:normal texture and turgor, no rashes, no lesions  Neuro:grossly normal , follows commands  Psych:appropriate                Labs: Results:       Chemistry Recent Labs     12/12/21  0455 12/11/21  0327   GLU 94 96    140   K 4.3 4.5   * 110   CO2 25 24   BUN 45* 39*   CREA 1.98* 1.70*   CA 8.7 9.3   AGAP 7 6   BUCR 23* 23*      CBC w/Diff Recent Labs     12/12/21  0455 12/11/21  0327   WBC 22.2* 22.8*   RBC 3.56* 3.92*   HGB 9.3* 10.4*   HCT 31.3* 33.7*    362   GRANS  --  78*   LYMPH  --  9*   EOS  --  1      Cardiac Enzymes Recent Labs     12/11/21  0327   CPK 45   CKND1 CALCULATION NOT PERFORMED WHEN RESULT IS BELOW LINEAR LIMIT      Coagulation No results for input(s): PTP, INR, APTT, INREXT in the last 72 hours. Lipid Panel Lab Results   Component Value Date/Time    Cholesterol, total 146 06/01/2016 07:51 AM    HDL Cholesterol 40 06/01/2016 07:51 AM    LDL, calculated 91.6 06/01/2016 07:51 AM    VLDL, calculated 14.4 06/01/2016 07:51 AM    Triglyceride 72 06/01/2016 07:51 AM    CHOL/HDL Ratio 3.7 06/01/2016 07:51 AM      BNP No results for input(s): BNPP in the last 72 hours. Liver Enzymes No results for input(s): TP, ALB, TBIL, AP in the last 72 hours.     No lab exists for component: SGOT, GPT, DBIL   Thyroid Studies No results found for: T4, T3U, TSH, TSHEXT     Procedures/imaging: see electronic medical records for all procedures/Xrays and details which were not copied into this note but were reviewed prior to creation of Xiomara Chen MD

## 2021-12-13 NOTE — PROGRESS NOTES
Comprehensive Nutrition Assessment    Type and Reason for Visit: Initial, Positive nutrition screen    Nutrition Recommendations/Plan: Continue with current diet and ensure enlive TID    Nutrition Assessment:  PMHx: diabetes, HTN, pulmonary hypertension, pulmonary emphysema. Presented to ED with dwelling alonso catheter, fever, AMS. Admitted with sepsis due to urinary source, metabolic encephalopathy- improving, BRITT. Malnutrition Assessment:  Malnutrition Status: At risk for malnutrition (weight loss of 2.6% x2 week)      Estimated Daily Nutrient Needs:  Energy (kcal): 1830; Weight Used for Energy Requirements: Current  Protein (g): 61; Weight Used for Protein Requirements: Current (1g)  Fluid (ml/day): 7617; Method Used for Fluid Requirements: 1 ml/kcal    Nutrition Related Findings:  lab: GFR est nonAA 35, K 3.4. Med: pepcid. +BM 12/13. Attempted to sleep with pt but was asleep. Will revisit pt at a better time. Wounds:    None       Current Nutrition Therapies:  ADULT DIET Regular; Low Fat/Low Chol/High Fiber/EVENS  ADULT ORAL NUTRITION SUPPLEMENT Breakfast, Lunch, Dinner; Other Supplement;  Ensure    Anthropometric Measures:  Height:  5' 11\" (180.3 cm)  Current Body Wt:  61 kg (134 lb 7.7 oz) (12/13/2021)   Admission Body Wt:  134 lb    Usual Body Wt:  62.6 kg (138 lb) (Last RD note 11/29/2021; -2.6% x2 weeks)     Ideal Body Wt:  172 lbs:  78.2 %    BMI Category:  Underweight (BMI less than 22) age over 72       Nutrition Diagnosis:   Unintended weight loss related to inadequate protein-energy intake as evidenced by other (specify) (-2.6% x 2 weeks)    Nutrition Interventions:   Food and/or Nutrient Delivery: Continue current diet, Continue oral nutrition supplement  Nutrition Education and Counseling: No recommendations at this time  Coordination of Nutrition Care: Continue to monitor while inpatient    Goals:  PO intake >75% of estimated nutritional needs throughout the next 2-3 days       Nutrition Monitoring and Evaluation:   Behavioral-Environmental Outcomes: None identified  Food/Nutrient Intake Outcomes: Food and nutrient intake, Supplement intake  Physical Signs/Symptoms Outcomes: Biochemical data, Meal time behavior, Skin, Weight, GI status, Nausea/vomiting    Discharge Planning:    Continue current diet, Continue oral nutrition supplement     Electronically signed by Nelson Doshi RD on 12/13/2021 at 4:46 PM

## 2021-12-13 NOTE — PROGRESS NOTES
Problem: Mobility Impaired (Adult and Pediatric)  Goal: *Acute Goals and Plan of Care (Insert Text)  Outcome: Progressing Towards Goal  Note: Physical Therapy Goals  Initiated 12/12/2021 and to be accomplished within 7 day(s)  1. Patient will move from supine to sit and sit to supine  in bed with min A.    2.  Patient will transfer from bed to chair and chair to bed with min A using the least restrictive device. 3.  Patient will perform sit to stand with min A.  4.  Patient will ambulate with min A for 50 feet with the least restrictive device. Prior Level of Function:   Patient reports he needs min to mod A for all mobility including gait using rollator. Patient lives with wife in a [facility], [address]. Pt has a walk-in shower, with shower chair. Per chart review: \"EMS reports that the pts wife is unable to care for the pt\"    physical Therapy TREATMENT    Patient: Lenore Madrid (32 y.o. male)  Date: 12/13/2021  Diagnosis: Sepsis ([facility] [A41.9]   Sepsis ([facility]  Precautions: Fall   Chart, physical therapy assessment, plan of care and goals were reviewed. ASSESSMENT:  Pt found reclined in bed and c/o not doing well due to feeling light headed. /62, , O2 sat initially 92% (cannuli outside nostrils), incr'd to 97% once back in place at 3.5L ([address] notified). Pt agreeable to participate with PT session. Required mod A/vc/tc for efficient use of UE's, plus additional time on supine >sit. Pt declined STS at this time. Able to scoot along side of bed with mod assist toward [facility]. Max A to LE's sit >supine. Recommend SNF at discharge in view of recent discharge and current admission.       Progression toward goals:  []      Improving appropriately and progressing toward goals  [x]      Improving slowly and progressing toward goals  []      Not making progress toward goals and plan of care will be adjusted     PLAN:  Patient continues to benefit from skilled intervention to address the above impairments. Continue treatment per established plan of care. Discharge Recommendations:  Skilled Nursing Facility  Further Equipment Recommendations for Discharge:  N/A     SUBJECTIVE:   Patient stated Not too good; c/o feeling light headed.     OBJECTIVE DATA SUMMARY:   Critical Behavior:  Neurologic State: Alert  Orientation Level: Oriented X4  Cognition: Follows commands  Safety/Judgement: Decreased awareness of environment, Fall prevention  Functional Mobility Training:  Bed Mobility:  Supine to Sit: Moderate assistance; Bed Modified  Sit to Supine: Maximum assistance  Scooting: Moderate assistance (seated EOB)  Balance:  Sitting: Impaired  Sitting - Static: Fair (occasional) (UE support with rounded trunk)  Sitting - Dynamic: Fair (occasional) (UE support)  Pain:  Pain Scale 1: Numeric (0 - 10)  Pain Intensity 1: 6  Pain Location 1: Back  Pain Orientation 1: Lower; Right  Pain Description 1: Constant; Aching  Activity Tolerance:   Fair   Please refer to the flowsheet for vital signs taken during this treatment.   After treatment:   [] Patient left in no apparent distress sitting up in chair  [x] Patient left in no apparent distress in bed  [x] Call bell left within reach  [x] Nursing notified  [] Caregiver present  [] Bed alarm activated      Leslye Self PT   Time Calculation: 34 mins

## 2021-12-13 NOTE — PROGRESS NOTES
1915- Verbal shift change report given to RERE Trevino RN (oncoming nurse) by Rosario Casey RN (offgoing nurse). Report included the following information SBAR, Kardex, Intake/Output, MAR and Recent Results. 2055- Assessment complete. VSS. Plan of care reviewed with pt. All needs addressed at this time. Bed in lowest position. Call bell within reach. 2120- Medication administered per order. Incontinence care complete. All needs addressed at this time. Bed in lowest position. Call bell within reach. 2159- CHRYSTAL Sadler notified of blood sugar resulting 196 with no sliding scale orders. MD to place orders. 0020- Reassessment complete, no change to previous assessment. All needs addressed at this time. Bed in lowest position. Call bell within reach. 0130- Pt resting comfortable in bed, awake and alert. No needs expressed at this time. Bed in lowest position. Call bell within reach. 0157- Medication administered per order. All needs addressed at this time. Bed in lowest position. Call bell within reach. 0300- Reassessment complete, no change to previous assessment. All needs addressed at this time. Bed in lowest position. Call bell within reach. 4648- Medication administered per order. All needs addressed at this time. Bed in lowest position. Call bell within reach. 2701- Medication administered per order. All needs addressed at this time. Bed in lowest position. Call bell within reach. 9993- Pt resting comfortably in bed. Chest rise, fall and unlabored respirations observed. Bed in lowest position. Call bell within reach. 0745- Verbal shift change report given to LORY Nielson (oncoming nurse) by Alannah Tabares RN (offgoing nurse). Report included the following information SBAR, Kardex, Intake/Output, MAR and Recent Results. Opportunities for questions was provided.

## 2021-12-13 NOTE — PROGRESS NOTES
Problem: Falls - Risk of  Goal: *Absence of Falls  Description: Document Gabino Stephenson Fall Risk and appropriate interventions in the flowsheet. Outcome: Progressing Towards Goal     Problem: Patient Education: Go to Patient Education Activity  Goal: Patient/Family Education  Outcome: Progressing Towards Goal     Problem: Pressure Injury - Risk of  Goal: *Prevention of pressure injury  Description: Document Regan Scale and appropriate interventions in the flowsheet.   Outcome: Progressing Towards Goal     Problem: Patient Education: Go to Patient Education Activity  Goal: Patient/Family Education  Outcome: Progressing Towards Goal     Problem: General Medical Care Plan  Goal: *Vital signs within specified parameters  Outcome: Progressing Towards Goal  Goal: *Labs within defined limits  Outcome: Progressing Towards Goal  Goal: *Absence of infection signs and symptoms  Outcome: Progressing Towards Goal  Goal: *Optimal pain control at patient's stated goal  Outcome: Progressing Towards Goal  Goal: *Skin integrity maintained  Outcome: Progressing Towards Goal  Goal: *Fluid volume balance  Outcome: Progressing Towards Goal  Goal: *Optimize nutritional status  Outcome: Progressing Towards Goal  Goal: *Anxiety reduced or absent  Outcome: Progressing Towards Goal  Goal: *Progressive mobility and function (eg: ADL's)  Outcome: Progressing Towards Goal     Problem: Patient Education: Go to Patient Education Activity  Goal: Patient/Family Education  Outcome: Progressing Towards Goal     Problem: Pain  Goal: *Control of Pain  Outcome: Progressing Towards Goal

## 2021-12-13 NOTE — PROGRESS NOTES
Hospitalist Progress Note    Patient: Rozina Encarnacion MRN: [id number]  [facility]: [id number]    YOB: 1945  Age: 78 y.o. Sex: male    DOA: 12/11/2021 LOS:  LOS: 2 days          Chief Complaint:    fever and altered mental status admitted for sepsis likely due to urinary source. Assessment/Plan     Rozina Encarnacion is a 78 y.o. male who multiple medical problems including diabetes, hypertension, pulmonary hypertension, pulmonary emphysema brought into the ED via EMS from home with an dwelling Jimenez catheter, fever and altered mental status admitted for sepsis likely due to urinary source.     Sepsis due to urinary source  PSEUDOMONAS AERUGINOSA, grew from urine culture dated 12/5/2021  No growth (<1,000 CFU/ML) his urine culture results from 12/11/2021 which was the date of this current admission  Since his source of infection does not appear to be his urine may have been proctitis/colitis which was seen on his scan and was thought to be a possible source of infection.    patient has a chronic indwelling Jimenez  Continue IV Zosyn  Leukocytosis improving   Follow daily CBC follow urine culture  Continues IV fluid  Lactic acid within normal limits     Acute metabolic encephalopathy  Likely due to sepsis  Improving      BRITT -  Creatinine worsened yesterday, but mild improvement today with robust IV hydration overnight   HR increased  Give ns bolus   Start NS at 150cc/hr  On 11/29/2021 creatinine was normal  Likely due to sepsis due to urinary source, expect improvement  Avoid nephrotoxic agents     Tachycardia  Likely due to sepsis  Continue fluid hydration     Constipation  Tapwater enema until clear     Diabetes  ADA, SSI, fingerstick blood glucose before every meal and nightly     Hypertension  Monitor blood pressure, no antihypertensives initially in setting of sepsis     DVT prophylaxis with heparin and Pepcid    Disposition :  Patient Active Problem List   Diagnosis Code    Lumbar spinal stenosis M48.061    Lumbar spondylosis M47.816    Radiculopathy of leg M54.10    Unable to ambulate R26.2    Urinary retention R33.9    Type II diabetes mellitus with manifestations, uncontrolled ([facility]) E11.8, E11.65    Renal mass N28.89    UTI (urinary tract infection) due to urinary indwelling Jimenez catheter ([facility]) T83.511A, N39.0    Compression fracture of L1 vertebra ([facility]) S32.010A    BIRTT (acute kidney injury) ([facility] N17.9    Aneurysm of infrarenal abdominal aorta ([facility]) I71.4    HTN (hypertension) I10    Severe protein-calorie malnutrition ([facility]) E43    Severe pulmonary hypertension ([facility]) I27.20    Sepsis ([facility]) A41.9    Hypotension I95.9    Pulmonary hypertension ([facility]) I27.20    History of tobacco abuse Z87.891    Pulmonary emphysema ([facility]) J43.9    Acute hypoxemic respiratory failure ([facility]) J96.01    Tricuspid regurgitation I07.1    Acute metabolic encephalopathy I66.63    Constipation K59.00       Subjective:    Has no complaints    Review of systems:    Constitutional: denies fevers, chills, myalgias  Respiratory: denies SOB, cough  Cardiovascular: denies chest pain, palpitations  Gastrointestinal: denies nausea, vomiting, diarrhea      Vital signs/Intake and Output:  Visit Vitals  /72 (BP 1 Location: Right arm, BP Patient Position: At rest)   Pulse (!) 106   Temp 97.8 °F (36.6 °C)   Resp 17   Ht 5' 11\" (1.803 m)   Wt 61.2 kg (135 lb)   SpO2 100%   BMI 18.83 kg/m²     Current Shift:  No intake/output data recorded. Last three shifts:  12/11 1901 - 12/13 0700  In: 2478.3 [P.O.:720;  I.V.:1758.3]  Out: 1951 [Urine:1950]    Exam:    General: older, debilitated, white male, alert, NAD  Head/Neck: NCAT, supple, No masses, No lymphadenopathy  CVS:Regular rate and rhythm, no M/R/G, S1/S2 heard, no thrill  Lungs:Clear to auscultation bilaterally, no wheezes, rhonchi, or rales  Abdomen: Soft, Nontender, No distention, Normal Bowel sounds, No hepatomegaly  Extremities: No C/C/E, pulses palpable 2+  Skin:normal texture and turgor, no rashes, no lesions  Neuro:grossly normal , follows commands  Psych:appropriate                Labs: Results:       Chemistry Recent Labs     12/13/21 0450 12/12/21 0455 12/11/21 0327   * 94 96    145 140   K 3.4* 4.3 4.5   * 113* 110   CO2 22 25 24   BUN 49* 45* 39*   CREA 1.91* 1.98* 1.70*   CA 8.2* 8.7 9.3   AGAP 7 7 6   BUCR 26* 23* 23*      CBC w/Diff Recent Labs     12/13/21 0450 12/12/21 0455 12/11/21 0327   WBC 20.4* 22.2* 22.8*   RBC 3.19* 3.56* 3.92*   HGB 8.6* 9.3* 10.4*   HCT 27.8* 31.3* 33.7*    307 362   GRANS  --   --  78*   LYMPH  --   --  9*   EOS  --   --  1      Cardiac Enzymes Recent Labs     12/11/21 0327   CPK 45   CKND1 CALCULATION NOT PERFORMED WHEN RESULT IS BELOW LINEAR LIMIT      Coagulation No results for input(s): PTP, INR, APTT, INREXT, INREXT in the last 72 hours. Lipid Panel Lab Results   Component Value Date/Time    Cholesterol, total 146 06/01/2016 07:51 AM    HDL Cholesterol 40 06/01/2016 07:51 AM    LDL, calculated 91.6 06/01/2016 07:51 AM    VLDL, calculated 14.4 06/01/2016 07:51 AM    Triglyceride 72 06/01/2016 07:51 AM    CHOL/HDL Ratio 3.7 06/01/2016 07:51 AM      BNP No results for input(s): BNPP in the last 72 hours. Liver Enzymes No results for input(s): TP, ALB, TBIL, AP in the last 72 hours.     No lab exists for component: SGOT, GPT, DBIL   Thyroid Studies No results found for: T4, T3U, TSH, TSHEXT, TSHEXT     Procedures/imaging: see electronic medical records for all procedures/Xrays and details which were not copied into this note but were reviewed prior to creation of Tato Gannon MD

## 2021-12-14 NOTE — CONSULTS
[facility]  (A Division of [address] [place] [address])                                                                                                                      Dioni Dick MD  Office #: - Option # [phone]  Fax #: [phone]     Date of Admission: 12/11/2021Date of Note: 12/14/2021      Reason for Consult: Evaluation and antibiotic management of Leucocytosis. Thank you for involving me in the care of this patient. Please do not hesitate to contact me on the above number if question or concern. Current Antimicrobials:    Prior Antimicrobials:  Zosyn 12/11 to date    Immunosuppressive drugs: NA        Assessment- ID related:  --------------------------------------------------------------------------  · New Leucocytosis- suspect occult abscess/ infection. No focal complaint/exam  · Recent Psedumonas UTI12/5/21  · Urinary retention, with indwelling alonso  CT CAP WO contrast: didn't reveal major infectious finding    Other Medical Issues- Mx per respective team:  · Acute enecephalopathy  · DM   · CKD  · Hx of lumbar fusion/ L1 laminectomy  · R renal cyst/calcification. L renal stone-non obstructing     Recommendation for ID issues I am following:  ------------------------------------------------------------------------------    FU blood  culture until final  Cont with Zosyn for now    CT AP with contrast-IV if possible. Available CT is sub optimal to review for occult infection or abscess. DW with Dr Shea Phillips. Will follow           HPI:  Mary Carmona is a 78 y.o. WHITE/NON- with PMH of DM, CKD, recent admission with Pseudmonas UTI, urinary retention with indwelling Alonso came in with fever and confusion per chart review and ED note states wife brought him, unable to take care of him. He was found to have tachycardia 122, soft BP and WBC was high to 23,3K.  CT CAP done WO contrast, some changes of colitis, significant finding seen except large BM in colon, and changes in kidney that didn't suggest infectious source. Was placed on zosyn and cultures so far no growth. Patient is oriented to self and place during interview. Feels ok, doesn't have focal comlaint of HA/neck or back pain, chest pain/SOB/Abd pain. Indwelling alonso in place. RN reports soft and frequent BM. No marked change in WBC and remains in 20K's. No rash/itching.         Active Hospital Problems    Diagnosis Date Noted    Colitis 12/13/2021    Acute metabolic encephalopathy 40/62/7116    Constipation 12/12/2021    Sepsis ([address] 11/23/2021    BRITT (acute kidney injury) ([address] 07/20/2021    UTI (urinary tract infection) due to urinary indwelling Alonso catheter ([address] 05/01/2020    Type II diabetes mellitus with manifestations, uncontrolled ([address] 09/16/2017     Past Medical History:   Diagnosis Date    Atherosclerosis     Diabetes ([address] 2013    type 2    High cholesterol     Hyperlipidemia     Hypertension 2013    Muscle weakness     Neoplasm     right kidney    Severe pulmonary hypertension ([address]     Spinal stenosis     Urinary retention     Vitamin D deficiency      Past Surgical History:   Procedure Laterality Date    HX HEENT      40 years ago deviated septum    HX LUMBAR LAMINECTOMY  2017    HX ORTHOPAEDIC      Lumbar laminectomy 9/7/17    HX ORTHOPAEDIC Right     CTR     Family History   Problem Relation Age of Onset    Heart Disease Father      Social History     Socioeconomic History    Marital status:      Spouse name: Not on file    Number of children: Not on file    Years of education: Not on file    Highest education level: Not on file   Occupational History    Not on file   Tobacco Use    Smoking status: Former Smoker    Smokeless tobacco: Never Used   Vaping Use    Vaping Use: Never used   Substance and Sexual Activity    Alcohol use: No    Drug use: No    Sexual activity: Not on file   Other Topics Concern     Service Not Asked    Blood Transfusions Not Asked    Caffeine Concern Not Asked    Occupational Exposure Not Asked    Hobby Hazards Not Asked    Sleep Concern Not Asked    Stress Concern Not Asked    Weight Concern Not Asked    Special Diet Not Asked    Back Care Not Asked    Exercise Not Asked    Bike Helmet Not Asked   [address] Not Asked    Self-Exams Not Asked   Social History Narrative    Not on file     Social Determinants of Health     Financial Resource Strain:     Difficulty of Paying Living Expenses: Not on file   Food Insecurity:     Worried About Running Out of Food in the Last Year: Not on file    [place] of Food in the Last Year: Not on file   Transportation Needs:     Lack of Transportation (Medical): Not on file    Lack of Transportation (Non-Medical):  Not on file   Physical Activity:     Days of Exercise per Week: Not on file    Minutes of Exercise per Session: Not on file   Stress:     Feeling of Stress : Not on file   Social Connections:     Frequency of Communication with Friends and Family: Not on file    Frequency of Social Gatherings with Friends and Family: Not on file    Attends Jainism Services: Not on file    Active Member of [address] [place] [address] or Organizations: Not on file    Attends Club or Organization Meetings: Not on file    Marital Status: Not on file   Intimate Partner Violence:     Fear of Current or Ex-Partner: Not on file    Emotionally Abused: Not on file    Physically Abused: Not on file    Sexually Abused: Not on file   Housing Stability:     Unable to Pay for Housing in the Last Year: Not on file    Number of [place] in the Last Year: Not on file    Unstable Housing in the Last Year: Not on file       Allergies:  Zocor [simvastatin]     Medications:  Current Facility-Administered Medications   Medication Dose Route Frequency    potassium chloride (K-DUR, KLOR-CON M20) SR tablet 40 mEq  40 mEq Oral Q4H    insulin lispro (HUMALOG) injection SubCUTAneous AC&HS    glucose chewable tablet 16 g  4 Tablet Oral PRN    glucagon (GLUCAGEN) injection 1 mg  1 mg IntraMUSCular PRN    dextrose (D50W) injection syrg 12.5-25 g  25-50 mL IntraVENous PRN    piperacillin-tazobactam (ZOSYN) 4.5 g in 0.9% sodium chloride (MBP/ADV) 100 mL MBP  4.5 g IntraVENous Q8H    0.9% sodium chloride infusion  125 mL/hr IntraVENous CONTINUOUS    sodium chloride (NS) flush 5-40 mL  5-40 mL IntraVENous Q8H    sodium chloride (NS) flush 5-40 mL  5-40 mL IntraVENous PRN    acetaminophen (TYLENOL) tablet 650 mg  650 mg Oral Q6H PRN    Or    acetaminophen (TYLENOL) suppository 650 mg  650 mg Rectal Q6H PRN    polyethylene glycol (MIRALAX) packet 17 g  17 g Oral DAILY PRN    ondansetron (ZOFRAN ODT) tablet 4 mg  4 mg Oral Q8H PRN    Or    ondansetron (ZOFRAN) injection 4 mg  4 mg IntraVENous Q6H PRN    famotidine (PEPCID) tablet 20 mg  20 mg Oral DAILY    heparin (porcine) injection 5,000 Units  5,000 Units SubCUTAneous Q8H    alum-mag hydroxide-simeth (MYLANTA) oral suspension 15 mL  15 mL Oral Q6H PRN    bisacodyL (DULCOLAX) tablet 5 mg  5 mg Oral DAILY PRN        ROS:  Pertinent items are noted in the History of Present Illness. Physical Exam:    Temp (24hrs), Av.2 °F (36.8 °C), Min:98 °F (36.7 °C), Max:98.5 °F (36.9 °C)    Visit Vitals  /68 (BP 1 Location: Right upper arm, BP Patient Position: At rest)   Pulse 100   Temp 98 °F (36.7 °C)   Resp 18   Ht 5' 11\" (1.803 m)   Wt 65.2 kg (143 lb 12.8 oz)   SpO2 100%   BMI 20.06 kg/m²          GEN: WD chronically ill looking, on RA. HEENT: Unicteric. EOMI intact  CHEST: Non laboured breathing  CVS:RRR, no mur/gallop  ABD: Obese/soft. Non tender. JOHANNA: Deferred  EXT: No apparent swelling or redness on UE/LE joints. Skin: Dry and intact. No rash, no redness. CNS: A, OX3. Moves all extremity. CN grossly ok.       Microbiology  All Micro Results     Procedure Component Value Units Date/Time    CULTURE, BLOOD [[id number]] Collected: 12/11/21 0328    Order Status: Completed Specimen: Blood Updated: 12/14/21 0725     Special Requests: NO SPECIAL REQUESTS        Culture result: NO GROWTH 3 DAYS       CULTURE, BLOOD [[id number]] Collected: 12/11/21 0328    Order Status: Completed Specimen: Blood Updated: 12/14/21 0725     Special Requests: NO SPECIAL REQUESTS        Culture result: NO GROWTH 3 DAYS       CULTURE, URINE [[id number]] Collected: 12/11/21 0335    Order Status: Completed Specimen: Urine from Clean catch Updated: 12/12/21 1916     Special Requests: NO SPECIAL REQUESTS        Culture result: No growth (<1,000 CFU/ML)            Lab results:    Chemistry  Recent Labs     12/14/21 0620 12/13/21 0450 12/12/21 0455   * 111* 94    143 145   K 3.5 3.4* 4.3   * 114* 113*   CO2 22 22 25   BUN 33* 49* 45*   CREA 1.39* 1.91* 1.98*   CA 8.1* 8.2* 8.7   AGAP 8 7 7   BUCR 24* 26* 23*       CBC w/ Diff  Recent Labs     12/14/21 0620 12/13/21 0450 12/12/21 0455   WBC 23.3* 20.4* 22.2*   RBC 3.32* 3.19* 3.56*   HGB 8.8* 8.6* 9.3*   HCT 28.6* 27.8* 31.3*    278 307       Imaging: report posted below as per radiologist   CT CAP WO        Wall thickening of the rectum. This may be due to colitis/proctitis. Relatively  large amount of fecal material throughout the colon. This could also be a source  of inflammation.     Otherwise no evidence of acute abnormality.     Atherosclerotic vascular disease. Emphysema.  Large right renal cyst    CXR: No acute preocess    Total duration of time spent with patient interview and exam and discussion of plan of care, review of chart in care everywhere, discussion with medical staff- nursing/attending and additional specialities as indicated: 75 minutes

## 2021-12-14 NOTE — DIABETES MGMT
Diabetes/ Glycemic Control Plan of Care  Recommendations:    Subcutaneous insulin protocol - to include POC glucose AC&HS, and Corrective Insulin AC&HS     Assessment:  Patient with known T2DM. POC glucose 196 on admission, fasting lab glucose within target. Discussed with Dr. Malissa Tilley, insulin protocol initiated to monitor glycemic control while hospitalized. DX:   1. Type II diabetes mellitus with manifestations, uncontrolled ([address]     2. Pulmonary hypertension ([address]     3. Urinary tract infection associated with indwelling urethral catheter, initial encounter ([address]     4. Acute metabolic encephalopathy     5. Pulmonary emphysema, unspecified emphysema type ([address]           Recent Glucose Results:   Lab Results   Component Value Date/Time     (H) 12/14/2021 06:20 AM     Blood glucose values: Within target range (70-180mg/dL): No  Current insulin orders:   Corrective Humalog AC&HS - Normal sensitivity scale  Total Daily Dose previous 24 hours = none    Current A1c:   Lab Results   Component Value Date/Time    Hemoglobin A1c 5.8 (H) 11/27/2021 01:20 AM      equivalent  to ave Blood Glucose of 120 mg/dl for 2-3 months prior to admission  Adequate glycemic control PTA:  Yes    Nutrition/Diet:   Active Orders   Diet    ADULT DIET Regular; Low Fat/Low Chol/High Fiber/EVENS      Home diabetes medications:   Key Antihyperglycemic Medications     Patient is on no antihyperglycemic meds. Plan/Goals:   Blood glucose will be within target of 70 - 180 mg/dl within 72 hours  Reinforce dietary and medication compliance at home.        Education:  [] Refer to Diabetes Education Record                       [x] Education not indicated at this time       Belinda Bearden RN, BSN, [facility] [employer]  Professional   Glycemic Control Team   Phone:  [phone]  Tues - Thurs 8:30 - 4:30

## 2021-12-14 NOTE — PROGRESS NOTES
Problem: Falls - Risk of  Goal: *Absence of Falls  Description: Document Amirah Chow Fall Risk and appropriate interventions in the flowsheet. Outcome: Progressing Towards Goal     Problem: Patient Education: Go to Patient Education Activity  Goal: Patient/Family Education  Outcome: Progressing Towards Goal     Problem: Pressure Injury - Risk of  Goal: *Prevention of pressure injury  Description: Document Regan Scale and appropriate interventions in the flowsheet.   Outcome: Progressing Towards Goal     Problem: Patient Education: Go to Patient Education Activity  Goal: Patient/Family Education  Outcome: Progressing Towards Goal     Problem: General Medical Care Plan  Goal: *Vital signs within specified parameters  Outcome: Progressing Towards Goal  Goal: *Labs within defined limits  Outcome: Progressing Towards Goal  Goal: *Absence of infection signs and symptoms  Outcome: Progressing Towards Goal  Goal: *Optimal pain control at patient's stated goal  Outcome: Progressing Towards Goal  Goal: *Skin integrity maintained  Outcome: Progressing Towards Goal  Goal: *Fluid volume balance  Outcome: Progressing Towards Goal  Goal: *Optimize nutritional status  Outcome: Progressing Towards Goal  Goal: *Anxiety reduced or absent  Outcome: Progressing Towards Goal  Goal: *Progressive mobility and function (eg: ADL's)  Outcome: Progressing Towards Goal     Problem: Patient Education: Go to Patient Education Activity  Goal: Patient/Family Education  Outcome: Progressing Towards Goal     Problem: Pain  Goal: *Control of Pain  Outcome: Progressing Towards Goal

## 2021-12-14 NOTE — PROGRESS NOTES
Call received from CT. Tech questioning if MD is aware of patient's labs and if they might want to adjust dosing for dye. Informed tech that MD did not discuss labs with this nurse and if she had questions, she could page MD to clarify order. Fernandez stated she would do so. Awaiting call back.

## 2021-12-14 NOTE — PROGRESS NOTES
1050: Pt refusing PT stating he is extremely lightheaded. Nasal cannula not in nose, adjusted and stressed the need to keep in nose. 1249: Pt refusing to participate in PT, will follow up as schedule permits.

## 2021-12-14 NOTE — PROGRESS NOTES
Hospitalist Progress Note    Patient: Jigar Lebron MRN: [id number]  CSN: [id number]    YOB: 1945  Age: 78 y.o. Sex: male    DOA: 12/11/2021 LOS:  LOS: 3 days          Chief Complaint:    fever and altered mental status admitted for sepsis likely due to urinary source. Assessment/Plan     Jigar Lebron is a 78 y.o. male who multiple medical problems including diabetes, hypertension, pulmonary hypertension, pulmonary emphysema brought into the ED via EMS from home with an dwelling Jimenez catheter, fever and altered mental status admitted for sepsis likely due to urinary source.     Sepsis due to urinary source  PSEUDOMONAS AERUGINOSA, grew from urine culture dated 12/5/2021  No growth (<1,000 CFU/ML) his urine culture results from 12/11/2021 which was the date of this current admission  Since his source of infection does not appear to be his urine may have been proctitis/colitis which was seen on his scan and was thought to be a possible source of infection.   Patient denies pain and is having normal bowel movement   patient has a chronic indwelling Jimenez  Continue IV Zosyn  Leukocytosis not really improving will get ID consult   Follow daily CBC follow urine culture   Continues IV fluid  Lactic acid within normal limits     Acute metabolic encephalopathy  Likely due to sepsis  Improving      BRITT -  Creatinine worsened yesterday, but mild improvement today with robust IV hydration overnight   HR increased  Give ns bolus   Start NS at 125cc/hr  On 11/29/2021 creatinine was normal  Likely due to sepsis due to urinary source, expect improvement  Avoid nephrotoxic agents however CT may aide in source of leukocytosis  Will order one today and monitor hydrate as needed monitor for worsening CHF      Tachycardia  Likely due to sepsis  Continue fluid hydration     Constipation  Tapwater enema until clear     Diabetes  ADA, SSI, fingerstick blood glucose before every meal and nightly     Hypertension  Monitor blood pressure, no antihypertensives initially in setting of sepsis     DVT prophylaxis with heparin and Pepcid   Since has lung cancer will also check for PE    Disposition :  Patient Active Problem List   Diagnosis Code    Lumbar spinal stenosis M48.061    Lumbar spondylosis M47.816    Radiculopathy of leg M54.10    Unable to ambulate R26.2    Urinary retention R33.9    Type II diabetes mellitus with manifestations, uncontrolled ([facility] E11.8, E11.65    Renal mass N28.89    UTI (urinary tract infection) due to urinary indwelling Jimenez catheter ([facility]) T83.511A, N39.0    Compression fracture of L1 vertebra ([facility]) S32.010A    BRITT (acute kidney injury) ([facility] N17.9    Aneurysm of infrarenal abdominal aorta ([facility]) I71.4    HTN (hypertension) I10    Severe protein-calorie malnutrition ([facility]) E43    Severe pulmonary hypertension (H[facility]) I27.20    Sepsis ([facility]) A41.9    Hypotension I95.9    Pulmonary hypertension ([facility]) I27.20    History of tobacco abuse Z87.891    Pulmonary emphysema (HCC) J43.9    Acute hypoxemic respiratory failure ([facility]) J96.01    Tricuspid regurgitation I07.1    Acute metabolic encephalopathy Z39.79    Constipation K59.00    Colitis K52.9       Subjective:    Has no complaints wife at bedside all questions answered  Will     Review of systems:    Constitutional: denies fevers, chills, myalgias  Respiratory: denies SOB, cough  Cardiovascular: denies chest pain, palpitations  Gastrointestinal: denies nausea, vomiting, diarrhea      Vital signs/Intake and Output:  Visit Vitals  BP (!) 111/58 (BP 1 Location: Right upper arm, BP Patient Position: At rest)   Pulse 90   Temp 98 °F (36.7 °C)   Resp 16   Ht 5' 11\" (1.803 m)   Wt 65.2 kg (143 lb 12.8 oz)   SpO2 98%   BMI 20.06 kg/m²     Current Shift:  No intake/output data recorded. Last three shifts:  12/12 1901 - 12/14 0700  In: 5108.3 [P.O.:1800;  I.V.:3308.3]  Out: 3500 [Urine:3500]    Exam:    General: older, debilitated, white male, alert, NAD  Head/Neck: NCAT, supple, No masses, No lymphadenopathy  CVS:Regular rate and rhythm, no M/R/G, S1/S2 heard, no thrill  Lungs:Clear to auscultation bilaterally, no wheezes, rhonchi, or rales  Abdomen: Soft, Nontender, No distention, Normal Bowel sounds, No hepatomegaly  Extremities: No C/C/E, pulses palpable 2+  Skin:normal texture and turgor, no rashes, no lesions  Neuro:grossly normal , follows commands  Psych:appropriate                Labs: Results:       Chemistry Recent Labs     12/14/21 0620 12/13/21 0450 12/12/21  0455   * 111* 94    143 145   K 3.5 3.4* 4.3   * 114* 113*   CO2 22 22 25   BUN 33* 49* 45*   CREA 1.39* 1.91* 1.98*   CA 8.1* 8.2* 8.7   AGAP 8 7 7   BUCR 24* 26* 23*      CBC w/Diff Recent Labs     12/14/21 0620 12/13/21 0450 12/12/21  0455   WBC 23.3* 20.4* 22.2*   RBC 3.32* 3.19* 3.56*   HGB 8.8* 8.6* 9.3*   HCT 28.6* 27.8* 31.3*    278 307      Cardiac Enzymes No results for input(s): CPK, CKND1, LUH in the last 72 hours. No lab exists for component: CKRMB, TROIP   Coagulation No results for input(s): PTP, INR, APTT, INREXT, INREXT in the last 72 hours. Lipid Panel Lab Results   Component Value Date/Time    Cholesterol, total 146 06/01/2016 07:51 AM    HDL Cholesterol 40 06/01/2016 07:51 AM    LDL, calculated 91.6 06/01/2016 07:51 AM    VLDL, calculated 14.4 06/01/2016 07:51 AM    Triglyceride 72 06/01/2016 07:51 AM    CHOL/HDL Ratio 3.7 06/01/2016 07:51 AM      BNP No results for input(s): BNPP in the last 72 hours. Liver Enzymes No results for input(s): TP, ALB, TBIL, AP in the last 72 hours.     No lab exists for component: SGOT, GPT, DBIL   Thyroid Studies No results found for: T4, T3U, TSH, TSHEXT, TSHEXT     Procedures/imaging: see electronic medical records for all procedures/Xrays and details which were not copied into this note but were reviewed prior to creation of Martha Pereira MD

## 2021-12-14 NOTE — PROGRESS NOTES
D/C Plan: [facility] (formerly [facility])    CM contacted pt's wife, Nikolas Gomes ([phone]; [phone] cell), to discuss transition of care. Pt/family has been made aware of recommendation for SNF. Family has confirmed they would like pt to transition to the above facilities. Pt has been accepted to the above facility. CM discussed and offered to assist with  a UAI/LTSS screening. Pt's wife would like to discuss this with the pt as he has always taken care of the financial in the home. CM will follow up with pt's wife to inquire about the need for a screening. CM to continue to follow and assist.    Care Management Interventions  PCP Verified by CM: Yes  Mode of Transport at Discharge: BLS  Transition of Care Consult (CM Consult): SNF  Partner SNF: Yes  Health Maintenance Reviewed: Yes  Physical Therapy Consult: Yes  Occupational Therapy Consult: Yes  Support Systems: Spouse/Significant Other  Confirm Follow Up Transport: Family  The Plan for Transition of Care is Related to the Following Treatment Goals :  [facility] ([facility]  The Patient and/or Patient Representative was Provided with a Choice of Provider and Agrees with the Discharge Plan?: Yes  Name of the Patient Representative Who was Provided with a Choice of Provider and Agrees with the Discharge Plan: spouse  Freedom of Choice List was Provided with Basic Dialogue that Supports the Patient's Individualized Plan of Care/Goals, Treatment Preferences and Shares the Quality Data Associated with the Providers?: Yes  Discharge Location  Discharge Placement: Skilled nursing facility

## 2021-12-14 NOTE — PROGRESS NOTES
Problem: Falls - Risk of  Goal: *Absence of Falls  Description: Document Aldo Ramos Fall Risk and appropriate interventions in the flowsheet.   Outcome: Progressing Towards Goal  Note: Fall Risk Interventions:  Mobility Interventions: Communicate number of staff needed for ambulation/transfer    Mentation Interventions: Room close to nurse's station    Medication Interventions: Patient to call before getting OOB    Elimination Interventions: Call light in reach    History of Falls Interventions: Room close to nurse's station         Problem: Patient Education: Go to Patient Education Activity  Goal: Patient/Family Education  Outcome: Progressing Towards Goal     Problem: Patient Education: Go to Patient Education Activity  Goal: Patient/Family Education  Outcome: Progressing Towards Goal     Problem: Patient Education: Go to Patient Education Activity  Goal: Patient/Family Education  Outcome: Progressing Towards Goal     Problem: General Medical Care Plan  Goal: *Optimal pain control at patient's stated goal  Outcome: Progressing Towards Goal     Problem: Patient Education: Go to Patient Education Activity  Goal: Patient/Family Education  Outcome: Progressing Towards Goal     Problem: Pain  Goal: *Control of Pain  Outcome: Progressing Towards Goal

## 2021-12-14 NOTE — PROGRESS NOTES
2516  Bedside and Verbal shift change report given to Hermann Gama, RN by Aleisha Nair RN     . Report included the following information SBAR, Kardex, OR Summary, Intake/Output and MAR.     1027  IP consult to infectious disease. Order for oral potassium replacement - K - 3.5.    1304  Glucose checks ac and hs now in place. Administer insulin per parameters. 1713  CT of chest, abdomen and pelvis order STAT. 1930  Bedside and Verbal shift change report given to Aleisha Nair., by Hermann Gama RN. Report included the following information SBAR, Kardex, OR Summary, Intake/Output and MAR. Also made aware patient has CT pending.

## 2021-12-14 NOTE — PROGRESS NOTES
0815:SBAR report received from off going nurse. Patient is alert  tolerated scheduled medications. No signs of distress observed. 1130: Writer made aware by therapy patient c/o of dizziness X 1 .      1400:Writer reassessed patient he denied dizziness and pain BP remain WNL. Writer will update receiving RN with SBAR.

## 2021-12-14 NOTE — PROGRESS NOTES
1910- Verbal shift change report given to RERE Rivera (oncoming nurse) by Tobin Bower, SASKIA (offgoing nurse). Report included the following information SBAR, Kardex, Intake/Output, MAR and Recent Results. 2200- Assessment complete. VSS. Pt oriented to self and time, unsure of situation or place. Plan of care reviewed with pt. Pt in agreement with plan of care. Pt encouraged to frequently turn to prevent pressure injury, encouraged to call nursing for assistance. All needs addressed at this time. Bed in lowest position. Call bell within reach. 0115- Reassessment complete, no change to previous assessment. No needs expressed at this time. Bed in lowest position. Call bell within reach. 0207- Medication administered per order. All needs addressed at this time. Bed in lowest position. Call bell within reach. 5234- Incontinence care complete. Linen changed. All needs addressed at this time. 0730- Verbal shift change report given to Rosario Casey RN (oncoming nurse) by Ambar Ramos RN (offgoing nurse). Report included the following information SBAR, Kardex, Intake/Output, MAR and Recent Results. Opportunities for questions was provided.      Patient Vitals for the past 24 hrs:   Temp Pulse Resp BP SpO2   12/14/21 0742 98 °F (36.7 °C) 90 16 (!) 111/58 98 %   12/13/21 2240 98.3 °F (36.8 °C) (!) 107 16 118/67 95 %   12/13/21 1906 98.5 °F (36.9 °C) (!) 113 16 133/74 97 %

## 2021-12-15 NOTE — CONSULTS
[facility]    Subjective:     Date of Consultation:  December 15, 2021    Referring Physician: Geri Myles MD    Reason for Consultation:      History of Present Illness:   Patient is a 78 y.o.  male who is being seen for right renal mass. He was admitted to the hospital for Sepsis [facility]) [A41.9] possibly related to colitis. Hx of UTI 12/5/21. Pt was noted on CT scan to have right renal mass. Pt is familiar to me and I have followed his renal mass for about 4 years now. It has not changed in size significantly. He had elected for active surveillance. He has poor performance status and no evidence of metastatic disease. He is alonso catheter dependent since his back surgery years ago and was using a walker. CT scan 12/14/21  Complex lesion lower pole right kidney. There is a more solid appearing  component at its superior margin. Overall lesion measures 6.5 x 5.4 x 6.6 cm. Kidneys otherwise unremarkable      Renal US 2017  Status: Final result (Exam End: 9/15/2017 17:33) Provider Status: Open       US RETROPERITONEUM COMP: Patient Communication    Not Released Not seen     Study Result    Narrative & Impression   Retroperitoneal ultrasound complete     HISTORY: Renal mass seen on MRI     FINDINGS:     Right kidney: 9.8 cm in length without hydronephrosis. Normal cortical thickness  and echogenicity. Interpolar cyst measures 1.8 x 1.6 x 1.5 cm. Lower pole  partially obscured by bowel gas and cannot be optimally evaluated. There is a  complex mass with both solid and fluid containing elements in lower pole  measuring 5.6 x 5.8 x 4.8 cm. The internal solid elements are vascular.     Left kidney: 10.2 cm in length without hydronephrosis. . Calculus in the  interpolar region measures about 5 mm. Hypoechoic area upper pole probably cyst  measuring 2.2 x 2.6 x 1.8 cm.          BMP:   Lab Results   Component Value Date/Time     12/15/2021 10:35 AM    K 3.8 12/15/2021 10:35 AM     (H) 12/15/2021 10:35 AM    CO2 19 (L) 12/15/2021 10:35 AM    AGAP 7 12/15/2021 10:35 AM     (H) 12/15/2021 10:35 AM    BUN 24 (H) 12/15/2021 10:35 AM    CREA 1.30 12/15/2021 10:35 AM    GFRAA >60 12/15/2021 10:35 AM    GFRNA 54 (L) 12/15/2021 10:35 AM     CMP:   Lab Results   Component Value Date/Time     12/15/2021 10:35 AM    K 3.8 12/15/2021 10:35 AM     (H) 12/15/2021 10:35 AM    CO2 19 (L) 12/15/2021 10:35 AM    AGAP 7 12/15/2021 10:35 AM     (H) 12/15/2021 10:35 AM    BUN 24 (H) 12/15/2021 10:35 AM    CREA 1.30 12/15/2021 10:35 AM    GFRAA >60 12/15/2021 10:35 AM    GFRNA 54 (L) 12/15/2021 10:35 AM    CA 7.7 (L) 12/15/2021 10:35 AM    ALB 2.0 (L) 12/15/2021 10:35 AM    TP 5.8 (L) 12/15/2021 10:35 AM    GLOB 3.8 12/15/2021 10:35 AM    AGRAT 0.5 (L) 12/15/2021 10:35 AM    ALT 19 12/15/2021 10:35 AM     CBC:   Lab Results   Component Value Date/Time    WBC 25.6 (H) 12/15/2021 10:35 AM    HGB 8.6 (L) 12/15/2021 10:35 AM    HCT 28.2 (L) 12/15/2021 10:35 AM     12/15/2021 10:35 AM       Past Medical History:   Diagnosis Date    Atherosclerosis     Diabetes ([facility] 2013    type 2    High cholesterol     Hyperlipidemia     Hypertension 2013    Muscle weakness     Neoplasm     right kidney    Severe pulmonary hypertension ([facility]     Spinal stenosis     Urinary retention     Vitamin D deficiency       Past Surgical History:   Procedure Laterality Date    HX HEENT      40 years ago deviated septum    HX LUMBAR LAMINECTOMY  2017    HX ORTHOPAEDIC      Lumbar laminectomy 9/7/17    HX ORTHOPAEDIC Right     CTR      Family History   Problem Relation Age of Onset    Heart Disease Father       Social History     Tobacco Use    Smoking status: Former Smoker    Smokeless tobacco: Never Used   Substance Use Topics    Alcohol use: No     Allergies   Allergen Reactions    Zocor [Simvastatin] Other (comments)     Made him \"ill\"      Prior to Admission medications    Medication Sig Start Date End Date Taking? Authorizing Provider   omeprazole (PRILOSEC) 40 mg capsule Take 1 Capsule by mouth daily. 12/1/21  Yes Gladys Ho MD   pregabalin (Lyrica) 25 mg capsule Take 75 mg by mouth. Indications: restless legs syndrome, an extreme discomfort in the calf muscles when sitting or lying down   Yes Provider, Historical   atorvastatin (Lipitor) 20 mg tablet Take 20 mg by mouth nightly. Yes Provider, Historical   traZODone (DESYREL) 50 mg tablet Take 50 mg by mouth nightly. Yes Provider, Historical   metoprolol succinate (TOPROL-XL) 25 mg XL tablet Take 1 Tab by mouth nightly. 3/21/20  Yes Manuel, MD Katelynn   aspirin 81 mg chewable tablet Take 81 mg by mouth daily. Yes Other, MD Katelynn   multivitamin (ONE A DAY) tablet Take 1 Tab by mouth daily. Yes Provider, Historical   lisinopril-hydroCHLOROthiazide (PRINZIDE, ZESTORETIC) 20-25 mg per tablet Take  by mouth daily. Patient not taking: Reported on 12/11/2021    Provider, Historical         REVIEW OF SYSTEMS  System Neg/Pos Details   Constitutional Negative Chills and fever. ENMT Negative Ear infections and sore throat. Eyes Negative Blurred vision, double vision and eye pain. Respiratory Negative Asthma, chronic cough, dyspnea and wheezing. Cardio Negative Chest pain. GI Positive Constipation. GI Negative Decreased appetite, diarrhea, nausea and vomiting.  Positive Incomplete emptying, Intermittent urinary stream, Slow stream, Urinary straining.  Negative Dysuria, hematuria, pelvic pain, pelvic pressure, pressure, suprapubic pain, urgency, urinary dribbling, urinary frequency and urinary incontinence. Endocrine Negative Cold intolerance, heat intolerance, increased thirst and weight loss. Neuro Negative Headache and tremors. Psych Negative Anxiety and depression. Integumentary Negative Itching skin and rash. MS Negative Back pain and joint pain. Hema/Lymph Negative Easy bleeding.          Objective:     Patient Vitals for the past 8 hrs:   BP Temp Pulse Resp SpO2 Height   12/15/21 1252      5' 11\" (1.803 m)   12/15/21 1247   87      12/15/21 1156 109/64 98.3 °F (36.8 °C) (!) 116 18 99 %    12/15/21 0829 105/60 98.1 °F (36.7 °C) (!) 105 18 97 %      Temp (24hrs), Av.1 °F (36.7 °C), Min:97.6 °F (36.4 °C), Max:98.7 °F (37.1 °C)        Intake and Output:    1901 - 12/15 0700  In: 74443.9 [P.O.:3500; I.V.:8743.9]  Out: 3100 [Urine:3100]    Physical Exam:  Constitutional Normal Well developed. Neck Exam Normal Inspection - Normal. Palpation - Normal. Thyroid gland - Normal.   Respiratory Normal Inspection - No labored breathing   Abdomen Normal No abdominal tenderness. No hepatic enlargement. No splenic enlargement. No hernia. Genitourinary Normal No Suprapubic tenderness. No CVA tenderness. No Flank mass  Alonso cath urine clear yellow   Skin Normal Inspection - Normal.   Musculoskeletal  In bed   Extremity Normal No Edema. Neurological abnormal Level of consciousness - confused   Psychiatric abnormal confused         Assessment:     Principal Problem:    Sepsis ([address] (2021)    Active Problems:    Type II diabetes mellitus with manifestations, uncontrolled ([address] (2017)      UTI (urinary tract infection) due to urinary indwelling Alonso catheter ([address] (2020)      BRITT (acute kidney injury) ([address] (2021)      Acute metabolic encephalopathy ()      Constipation (2021)      Colitis (2021)    Right complex renal mass suspicious for malignancy    Plan:     -cont active surveillance for the right renal mass. Nothing to do at this time.   -cont care per hospitalist and ID  -cont alonso cath for chronic urinary retention.  -Call with questions.      Signed By: Arlette Manning MD                         December 15, 2021

## 2021-12-15 NOTE — PROGRESS NOTES
Nutrition Assessment     Type and Reason for Visit: Reassess    Nutrition Recommendations/Plan: Continue w/ current diet and ONS TID    Nutrition Assessment:  PMHx: diabetes, HTN, pulmonary hypertension, pulmonary emphysema. Presented to ED with dwelling alonso catheter, fever, AMS. Admitted with sepsis due to urinary source, metabolic encephalopathy- improving, BRITT. 12/15/21 1302   Malnutrition Assessment   Context of Malnutrition Acute illness   Acute Illness - Energy Intake 7 - 50% or less of est energy requirements for 5 or more days   Acute Illness - Weight Loss No significant weight loss   Acute Illness - Body Fat Loss 7 - Moderate body fat loss   Acute Illness - Body Fat Loss Locations Orbital;Triceps;Buccal region   Acute Illness - Muscle Mass Loss 7 - Moderate muscle mass loss   Acute Illness - Muscle Mass Locations Temples (temporalis); Thigh (quadriceps); Hand (interosseous); Calf   Acute Illness - Fluid Accumulation Unable to assess   Acute Illness -  Strength Not performed   Acute Illness - Malnutrition Score 21   Malnutrition Status Severe malnutrition     Estimated Daily Nutrient Needs:  Energy (kcal):  5397-4062  Protein (g):  52-65       Fluid (ml/day):  3368-2033    Nutrition Related Findings:  lab: GFR est nonAA 54, POC glucose 114-218. Med: humalog, pepcid, lipitor. +BM 12/14. Pt weighted 143lb 12/14 bedscale (+8lb since last assessment). Spoke with pt- breakfast this AM consisted of pancakes, eggs, water, juice (about 50% of meal). Stated appetite has been about 50% and no changes. Stated drinking ensure and enjoys it. Pt was not aware of any weight changes. Assessed pt for fat and muscle loss- assessment above. Current Nutrition Therapies:  ADULT DIET Regular; Low Fat/Low Chol/High Fiber/EVENS  ADULT ORAL NUTRITION SUPPLEMENT Breakfast, Lunch, Dinner; Other Supplement;  Ensure    Anthropometric Measures:  Height:  5' 11\" (180.3 cm)  Current Body Wt:  65.3 kg (143 lb 15.4 oz) (12/14/21)  BMI: 20.1    Nutrition Diagnosis:   Severe malnutrition related to inadequate protein-energy intake as evidenced by moderate loss of subcutaneous fat,moderate muscle loss (estimated intake about 50%)    Nutrition Intervention:  Food and/or Nutrient Delivery: Continue current diet,Continue oral nutrition supplement  Nutrition Education and Counseling: No recommendations at this time  Coordination of Nutrition Care: Continue to monitor while inpatient    Goals:  meet >75% of estimated nutritional needs throughout the next 3-6 days       Nutrition Monitoring and Evaluation:   Behavioral-Environmental Outcomes: None identified  Food/Nutrient Intake Outcomes: Food and nutrient intake,Supplement intake  Physical Signs/Symptoms Outcomes: Biochemical data,Meal time behavior,Skin,Weight,GI status,Nausea/vomiting,Nutrition focused physical findings    Discharge Planning:    Continue current diet,Continue oral nutrition supplement     Electronically signed by Unruly Zaman RD on 12/15/2021 at 1:05 PM

## 2021-12-15 NOTE — PROGRESS NOTES
1916- Verbal shift change report given to RERE Hernandez RN (oncoming nurse) by Erica Robertson RN (offgoing nurse). Report included the following information SBAR, Kardex, Intake/Output, MAR and Recent Results. 2037- Called CT regarding CT orders, non answer. 2102- Called CT regarding CT orders. CT states they will call RN when available for patient. 2140- Assessment complete. VSS. All needs addressed at this time. Bed in lowest position. Call bell within reach. 2158- Pt transported to CT via bed.     2228- Pt transported back from CT to unit. 2335- Bedside and Verbal shift change report given to Jasper Hawk RN (oncoming nurse) by Lazara Lo RN (offgoing nurse). Report included the following information SBAR, Kardex, Intake/Output, MAR and Recent Results. Opportunities for questions was provided.

## 2021-12-15 NOTE — PROGRESS NOTES
[facility]  (A Division of [address] [place] [address])                                                                                                                      Amie Frazier MD  Office #: - [phone]  Fax #: [phone]     Date of Admission: 12/11/2021Date of Note: 12/15/2021  Reason for Follow Up: Evaluation and antibiotic management of Leucocytosis. Current Antimicrobials:    Prior Antimicrobials:  Zosyn 12/11 to date    Immunosuppressive drugs: NA        Assessment- ID related:  --------------------------------------------------------------------------  · New Leucocytosis- suspect occult abscess( none on CT)/ infection Vs Maligancy  · Pan-colitis on CT with contrast: Have to consider C.diff in the DDX as at risk, however, pt doesn't look as toxix/septic with this degree of colitis  · Right Kidney lesion worrisome for malignancy- on CT/Ulstrasound  · Recent Psedumonas UTI12/5/21  · Urinary retention, with indwelling alonso  CT CAP WO contrast: didn't reveal major infectious finding  12/11  BCX: NGSF  UCX: NG  Other Medical Issues- Mx per respective team:  · Acute enecephalopathy  · DM   · CKD  · Hx of lumbar fusion/ L1 laminectomy  · R renal cyst/calcification. L renal stone-non obstructing     Recommendation for ID issues I am following:  ------------------------------------------------------------------------------    Reviewed CT with radiologist. Pancolitis picture on bowel + complex right Kidney lesion -worrisome for tumor    --plan to check C.diff if loose/watery BM. If soft or formed, no need to test  --start emperic Vancomycin and Flagyl     Monitor CBC and cmp. Follow progress  Urology consult for renal lesion- per primary. MYAH RN, will DW with Dr Jairon Sosa                Subjective:  Afebrile, comfortable on RA.  Voices no abd/chest complaint   WBC remains elevated- 25K, with 8% bands  Notes/Labs/Imaging reviewed  Reviewed CT scan images-serial with radiologist     HPI:  Ben Bradley is a 78 y.o. WHITE/NON- with PMH of DM, CKD, recent admission with Pseudmonas UTI, urinary retention with indwelling Alonso came in with fever and confusion per chart review and ED note states wife brought him, unable to take care of him. He was found to have tachycardia 122, soft BP and WBC was high to 23,3K. CT CAP done WO contrast, some changes of colitis, significant finding seen except large BM in colon, and changes in kidney that didn't suggest infectious source. Was placed on zosyn and cultures so far no growth. Patient is oriented to self and place during interview. Feels ok, doesn't have focal comlaint of HA/neck or back pain, chest pain/SOB/Abd pain. Indwelling alonso in place. RN reports soft and frequent BM. No marked change in WBC and remains in 20K's. No rash/itching.         Active Hospital Problems    Diagnosis Date Noted    Colitis 12/13/2021    Acute metabolic encephalopathy 02/56/5346    Constipation 12/12/2021    Sepsis ([address] 11/23/2021    BRITT (acute kidney injury) ([address] 07/20/2021    UTI (urinary tract infection) due to urinary indwelling Alonso catheter ([address] 05/01/2020    Type II diabetes mellitus with manifestations, uncontrolled ([address] 09/16/2017     Past Medical History:   Diagnosis Date    Atherosclerosis     Diabetes ([address] 2013    type 2    High cholesterol     Hyperlipidemia     Hypertension 2013    Muscle weakness     Neoplasm     right kidney    Severe pulmonary hypertension ([address]     Spinal stenosis     Urinary retention     Vitamin D deficiency      Past Surgical History:   Procedure Laterality Date    HX HEENT      40 years ago deviated septum    HX LUMBAR LAMINECTOMY  2017    HX ORTHOPAEDIC      Lumbar laminectomy 9/7/17    HX ORTHOPAEDIC Right     CTR     Family History   Problem Relation Age of Onset    Heart Disease Father      Social History     Socioeconomic History    Marital status:      Spouse name: Not on file    Number of children: Not on file    Years of education: Not on file    Highest education level: Not on file   Occupational History    Not on file   Tobacco Use    Smoking status: Former Smoker    Smokeless tobacco: Never Used   Vaping Use    Vaping Use: Never used   Substance and Sexual Activity    Alcohol use: No    Drug use: No    Sexual activity: Not on file   Other Topics Concern     Service Not Asked    Blood Transfusions Not Asked    Caffeine Concern Not Asked    Occupational Exposure Not Asked    Hobby Hazards Not Asked    Sleep Concern Not Asked    Stress Concern Not Asked    Weight Concern Not Asked    Special Diet Not Asked    Back Care Not Asked    Exercise Not Asked    Bike Helmet Not Asked    Seat Belt Not Asked    Self-Exams Not Asked   Social History Narrative    Not on file     Social Determinants of Health     Financial Resource Strain:     Difficulty of Paying Living Expenses: Not on file   Food Insecurity:     Worried About Running Out of Food in the Last Year: Not on file    Osmany of Food in the Last Year: Not on file   Transportation Needs:     Lack of Transportation (Medical): Not on file    Lack of Transportation (Non-Medical):  Not on file   Physical Activity:     Days of Exercise per Week: Not on file    Minutes of Exercise per Session: Not on file   Stress:     Feeling of Stress : Not on file   Social Connections:     Frequency of Communication with Friends and Family: Not on file    Frequency of Social Gatherings with Friends and Family: Not on file    Attends Caodaism Services: Not on file    Active Member of Clubs or Organizations: Not on file    Attends Club or Organization Meetings: Not on file    Marital Status: Not on file   Intimate Partner Violence:     Fear of Current or Ex-Partner: Not on file    Emotionally Abused: Not on file    Physically Abused: Not on file    Sexually Abused: Not on file   Housing Stability:     Unable to Pay for Housing in the Last Year: Not on file    Number of Places Lived in the Last Year: Not on file    Unstable Housing in the Last Year: Not on file       Allergies:  Zocor [simvastatin]     Medications:  Current Facility-Administered Medications   Medication Dose Route Frequency    aspirin chewable tablet 81 mg  81 mg Oral DAILY    atorvastatin (LIPITOR) tablet 20 mg  20 mg Oral QHS    metoprolol succinate (TOPROL-XL) XL tablet 25 mg  25 mg Oral QHS    traZODone (DESYREL) tablet 50 mg  50 mg Oral QHS    metroNIDAZOLE (FLAGYL) IVPB premix 500 mg  500 mg IntraVENous Q6H    vancomycin 50 mg/mL oral solution (compounded) 500 mg  500 mg Oral Q6H    insulin lispro (HUMALOG) injection   SubCUTAneous AC&HS    glucose chewable tablet 16 g  4 Tablet Oral PRN    glucagon (GLUCAGEN) injection 1 mg  1 mg IntraMUSCular PRN    dextrose (D50W) injection syrg 12.5-25 g  25-50 mL IntraVENous PRN    0.9% sodium chloride infusion  125 mL/hr IntraVENous CONTINUOUS    sodium chloride (NS) flush 5-40 mL  5-40 mL IntraVENous Q8H    sodium chloride (NS) flush 5-40 mL  5-40 mL IntraVENous PRN    acetaminophen (TYLENOL) tablet 650 mg  650 mg Oral Q6H PRN    Or    acetaminophen (TYLENOL) suppository 650 mg  650 mg Rectal Q6H PRN    polyethylene glycol (MIRALAX) packet 17 g  17 g Oral DAILY PRN    ondansetron (ZOFRAN ODT) tablet 4 mg  4 mg Oral Q8H PRN    Or    ondansetron (ZOFRAN) injection 4 mg  4 mg IntraVENous Q6H PRN    famotidine (PEPCID) tablet 20 mg  20 mg Oral DAILY    heparin (porcine) injection 5,000 Units  5,000 Units SubCUTAneous Q8H    alum-mag hydroxide-simeth (MYLANTA) oral suspension 15 mL  15 mL Oral Q6H PRN    bisacodyL (DULCOLAX) tablet 5 mg  5 mg Oral DAILY PRN        ROS:  Pertinent items are noted in the History of Present Illness.            Physical Exam:    Temp (24hrs), Av.1 °F (36.7 °C), Min:97.6 °F (36.4 °C), Max:98.7 °F (37.1 °C)    Visit Vitals  /64 (BP 1 Location: Right arm, BP Patient Position: At rest)   Pulse 87   Temp 98.3 °F (36.8 °C)   Resp 18   Ht 5' 11\" (1.803 m)   Wt 65.3 kg (143 lb 14.4 oz)   SpO2 99%   BMI 20.07 kg/m²          GEN: WD chronically ill looking, on RA. HEENT: Unicteric. EOMI intact  CHEST: Non laboured breathing  CVS:RRR, no mur/gallop  ABD: Obese/soft. Grimaces on deep palpatin- Lower abd. Non tense abd, no reboud tenderness  JOHANNA:alonso in place  EXT: No apparent swelling or redness on UE/LE joints. Skin: Dry and intact. No rash, no redness. CNS: A, OX3. Moves all extremity. CN grossly ok.       Microbiology  All Micro Results     Procedure Component Value Units Date/Time    CULTURE, BLOOD [[id number]] Collected: 12/11/21 0328    Order Status: Completed Specimen: Blood Updated: 12/15/21 0824     Special Requests: NO SPECIAL REQUESTS        Culture result: NO GROWTH 4 DAYS       CULTURE, BLOOD [[id number]] Collected: 12/11/21 0328    Order Status: Completed Specimen: Blood Updated: 12/15/21 0824     Special Requests: NO SPECIAL REQUESTS        Culture result: NO GROWTH 4 DAYS       CULTURE, URINE [[id number]] Collected: 12/11/21 0335    Order Status: Completed Specimen: Urine from Clean catch Updated: 12/12/21 1916     Special Requests: NO SPECIAL REQUESTS        Culture result: No growth (<1,000 CFU/ML)            Lab results:    Chemistry  Recent Labs     12/15/21  1035 12/15/21  0720 12/14/21  0620   * 91 102*    144 143   K 3.8 3.7 3.5   * 118* 113*   CO2 19* 19* 22   BUN 24* 25* 33*   CREA 1.30 1.26 1.39*   CA 7.7* 7.9* 8.1*   AGAP 7 7 8   BUCR 18 20 24*     --   --    TP 5.8*  --   --    ALB 2.0*  --   --    GLOB 3.8  --   --    AGRAT 0.5*  --   --        CBC w/ Diff  Recent Labs     12/15/21  1035 12/15/21  0720 12/14/21  0620   WBC 25.6* 24.8* 23.3*   RBC 3.23* 3.18* 3.32*   HGB 8.6* 8.6* 8.8*   HCT 28.2* 28.0* 28.6*    265 276   GRANS 77* 76*  --    LYMPH 12* 8*  --    EOS 0 1 --        Imaging: report posted below as per radiologist   CT CAP WO        Wall thickening of the rectum. This may be due to colitis/proctitis. Relatively  large amount of fecal material throughout the colon. This could also be a source  of inflammation.     Otherwise no evidence of acute abnormality.     Atherosclerotic vascular disease. Emphysema. Large right renal cyst    CXR: No acute process    12/15/21  CTA chest:  Exam limited by motion artifact. No pulmonary emboli identified   Minimal bilateral pleural effusions. Emphysema      CT AP with contrast:    Pancolitis. Interval progression of wall thickening to involve nearly the entire  colon. Thumb printing present raising the possibility of C. difficile. This  involves 2 vascular territories making ischemic colitis unlikely     Enhancing renal mass lower pole right kidney as seen on prior ultrasound     No hydronephrosis.

## 2021-12-15 NOTE — PROGRESS NOTES
2330  Bedside and verbal shift change report given to Noah Pereira RN (on coming nurse) by Jasen George RN (off going nurse). Report included the following information SBAR, Kardex, OR Summary, Intake/Output and MAR.    0758  Bedside and verbal shift change report given by SASKIA Vides (off going nurse) to Abraham Curran RN(on coming nurse). Report included the following information SBAR, Kardex, OR Summary, Intake/Output and MAR. Not an eventful shift.

## 2021-12-15 NOTE — PROGRESS NOTES
Occupational Therapy Treatment Attempt     Chart reviewed. Attempted Occupational Therapy Treatment, however, patient unable to be seen due to:  []  Nausea/vomiting  []  Eating  []  Pain  []  Patient too lethargic  []  Off Unit for testing/procedure  []  Dialysis treatment in progress  []  Telemetry Results  [x]  Other: pt refusing participation with therapy at this time. Will f/u later as patient's schedule allows.   Ericka Bai, OTR/L

## 2021-12-15 NOTE — PROGRESS NOTES
Problem: Falls - Risk of  Goal: *Absence of Falls  Description: Document Leafy Ye Fall Risk and appropriate interventions in the flowsheet. Outcome: Progressing Towards Goal     Problem: Patient Education: Go to Patient Education Activity  Goal: Patient/Family Education  Outcome: Progressing Towards Goal     Problem: Pressure Injury - Risk of  Goal: *Prevention of pressure injury  Description: Document Regan Scale and appropriate interventions in the flowsheet.   Outcome: Progressing Towards Goal     Problem: Patient Education: Go to Patient Education Activity  Goal: Patient/Family Education  Outcome: Progressing Towards Goal     Problem: General Medical Care Plan  Goal: *Vital signs within specified parameters  Outcome: Progressing Towards Goal  Goal: *Labs within defined limits  Outcome: Progressing Towards Goal  Goal: *Absence of infection signs and symptoms  Outcome: Progressing Towards Goal  Goal: *Optimal pain control at patient's stated goal  Outcome: Progressing Towards Goal  Goal: *Skin integrity maintained  Outcome: Progressing Towards Goal  Goal: *Fluid volume balance  Outcome: Progressing Towards Goal  Goal: *Optimize nutritional status  Outcome: Progressing Towards Goal  Goal: *Anxiety reduced or absent  Outcome: Progressing Towards Goal  Goal: *Progressive mobility and function (eg: ADL's)  Outcome: Progressing Towards Goal     Problem: Patient Education: Go to Patient Education Activity  Goal: Patient/Family Education  Outcome: Progressing Towards Goal     Problem: Pain  Goal: *Control of Pain  Outcome: Progressing Towards Goal

## 2021-12-15 NOTE — PROGRESS NOTES
Problem: Mobility Impaired (Adult and Pediatric)  Goal: *Acute Goals and Plan of Care (Insert Text)  Description: Problem: Mobility Impaired (Adult and Pediatric)  Goal: *Acute Goals and Plan of Care (Insert Text)  Outcome: Progressing Towards Goal  Note: Physical Therapy Goals  Initiated 12/12/2021 and to be accomplished within 7 day(s)  1. Patient will move from supine to sit and sit to supine  in bed with min A.    2.  Patient will transfer from bed to chair and chair to bed with min A using the least restrictive device. 3.  Patient will perform sit to stand with min A.  4.  Patient will ambulate with min A for 50 feet with the least restrictive device. Prior Level of Function:   Patient reports he needs min to mod A for all mobility including gait using rollator. Patient lives with wife in a [facility]. Pt has a walk-in shower, with shower chair. Per chart review: \"EMS reports that the pts wife is unable to care for the pt\"  Outcome: Progressing Towards Goal   PHYSICAL THERAPY TREATMENT    Patient: Niki Nielson (87 y.o. male)  Date: 12/15/2021  Diagnosis: Sepsis ([facility] [A41.9] Sepsis ([facility]    Precautions: Fall  PLOF:     ASSESSMENT:  Pt initially refusing participation but with coaxing pt agreed. Raul and increased time pt able to sit up EOB. Kyphotic posture in sitting. Elevated bed to assist with sit to stand and mod/maxA. Pt with kyphotic posture in standing also. Performed side steps for 1ft with RW then returned to sitting EOB. Mod/maxA to returned to supine. Set pt up with lunch tray. Progression toward goals:   []      Improving appropriately and progressing toward goals  [x]      Improving slowly and progressing toward goals  []      Not making progress toward goals and plan of care will be adjusted     PLAN:  Patient continues to benefit from skilled intervention to address the above impairments. Continue treatment per established plan of care.   Discharge Recommendations:  Skilled Nursing Facility  Further Equipment Recommendations for Discharge:  rolling walker     SUBJECTIVE:   Patient stated I can't today, It's going to be a couple of days.     OBJECTIVE DATA SUMMARY:   Critical Behavior:  Neurologic State: Alert,Confused  Orientation Level: Disoriented to situation,Disoriented to time,Oriented to person,Oriented to place  Cognition: Follows commands  Safety/Judgement: Decreased awareness of environment,Fall prevention  Functional Mobility Training:  Bed Mobility:    Supine to Sit: Minimum assistance; Additional time  Sit to Supine: Moderate assistance;Maximum assistance  Scooting: Minimum assistance  Transfers:  Sit to Stand: Moderate assistance  Stand to Sit: Minimum assistance  Balance:  Sitting: Intact; With support  Sitting - Static: Fair (occasional)  Sitting - Dynamic: Fair (occasional)  Standing: Impaired; With support  Standing - Static: Poor  Standing - Dynamic : Poor   Ambulation/Gait Training:  Distance (ft): 1 Feet (ft)  Assistive Device: Gait belt;Walker, rolling  Ambulation - Level of Assistance: Moderate assistance  Gait Abnormalities: Decreased step clearance        Pain:  Pain level pre-treatment: 5/10  Pain level post-treatment: 5/10   Pain Intervention(s): Medication (see MAR); Rest, Ice, Repositioning   Response to intervention: Nurse notified, See doc flow    Activity Tolerance:   Poor  Please refer to the flowsheet for vital signs taken during this treatment. After treatment:   [] Patient left in no apparent distress sitting up in chair  [x] Patient left in no apparent distress in bed  [x] Call bell left within reach  [] Nursing notified  [] Caregiver present  [] Bed alarm activated  [] SCDs applied      COMMUNICATION/EDUCATION:   [x]         Role of Physical Therapy in the acute care setting. [x]         Fall prevention education was provided and the patient/caregiver indicated understanding.   [x]         Patient/family have participated as able in working toward goals and plan of care. [x]         Patient/family agree to work toward stated goals and plan of care. []         Patient understands intent and goals of therapy, but is neutral about his/her participation.   []         Patient is unable to participate in stated goals/plan of care: ongoing with therapy staff.  []         Other:        Rani Carvalho, PTA   Time Calculation: 13 mins

## 2021-12-15 NOTE — PROGRESS NOTES
Hospitalist Progress Note    Patient: Az Durham MRN: [id number]  CSN: [id number]    YOB: 1945  Age: 78 y.o. Sex: male    DOA: 12/11/2021 LOS:  LOS: 4 days          Chief Complaint:    sepsis      Assessment/Plan     Peng Heart a 78 y. o. male who multiple medical problems including diabetes, hypertension, pulmonary hypertension, pulmonary emphysema brought into the ED via EMS from home with an dwelling Jimenez catheter, fever and altered mental status admitted for sepsis     Sepsis   PSEUDOMONAS AERUGINOSA, grew from urine culture dated 12/5/2021  No growth urine cx here  colitis via CT scan  Suspicion for c diff also  patient has a chronic indwelling Jimenez  PO vanco and IV flagyl per ID  Leukocytosis prominent  Follow daily CBC w/ diff  Lower IVF rate    Right renal mass on CT scan, will ask urology to see     Acute metabolic encephalopathy  Likely due to sepsis  Improved     BRITT -  improved     Tachycardia  Likely due to sepsis  better     Constipation  treated     Diabetestype 2  ADA, SSI, fingerstick blood glucose before every meal and nightly     Hypertension  Monitor blood pressure, no antihypertensives initially in setting of sepsis     DVT prophylaxis with heparin and Pepcid     Disposition :  Patient Active Problem List   Diagnosis Code    Lumbar spinal stenosis M48.061    Lumbar spondylosis M47.816    Radiculopathy of leg M54.10    Unable to ambulate R26.2    Urinary retention R33.9    Type II diabetes mellitus with manifestations, uncontrolled ([facility]) E11.8, E11.65    Renal mass N28.89    UTI (urinary tract infection) due to urinary indwelling Jimenez catheter ([facility]) T83.511A, N39.0    Compression fracture of L1 vertebra ([facility]) S32.010A    BRITT (acute kidney injury) ([facility] N17.9    Aneurysm of infrarenal abdominal aorta ([facility]) I71.4    HTN (hypertension) I10    Severe protein-calorie malnutrition (HCC) E43    Severe pulmonary hypertension ([facility]) I27.20    Sepsis ([facility] A41.9    Hypotension I95.9    Pulmonary hypertension (HCC) I27.20    History of tobacco abuse Z87.891    Pulmonary emphysema (HCC) J43.9    Acute hypoxemic respiratory failure (HCC) J96.01    Tricuspid regurgitation I07.1    Acute metabolic encephalopathy H42.40    Constipation K59.00    Colitis K52.9       Subjective:    Denies abd pain, nausea, diarrhea    Review of systems:    Constitutional: denies fevers, chills  Respiratory: denies SOB  Cardiovascular: denies chest pain, palpitations  Gastrointestinal: denies nausea, vomiting, diarrhea      Vital signs/Intake and Output:  Visit Vitals  /64 (BP 1 Location: Right arm, BP Patient Position: At rest)   Pulse 87   Temp 98.3 °F (36.8 °C)   Resp 18   Ht 5' 11\" (1.803 m)   Wt 65.3 kg (143 lb 14.4 oz)   SpO2 99%   BMI 20.07 kg/m²     Current Shift:  No intake/output data recorded. Last three shifts:  12/13 1901 - 12/15 0700  In: 94449.9 [P.O.:3500;  I.V.:8743.9]  Out: 3100 [Urine:3100]    Exam:    General: elderly WM, alert, NAD, OX3  CVS:Regular rate and rhythm, no M/R/G, S1/S2 heard, no thrill  Lungs:Clear to auscultation bilaterally, no wheezes, rhonchi, or rales  Abdomen: Soft, Nontender, No distention, Normal Bowel sounds  Extremities: No C/C/E, pulses palpable 2+  Neuro:grossly normal , follows commands  Psych:appropriate                Labs: Results:       Chemistry Recent Labs     12/15/21  1035 12/15/21  0720 12/14/21  0620   * 91 102*    144 143   K 3.8 3.7 3.5   * 118* 113*   CO2 19* 19* 22   BUN 24* 25* 33*   CREA 1.30 1.26 1.39*   CA 7.7* 7.9* 8.1*   AGAP 7 7 8   BUCR 18 20 24*     --   --    TP 5.8*  --   --    ALB 2.0*  --   --    GLOB 3.8  --   --    AGRAT 0.5*  --   --       CBC w/Diff Recent Labs     12/15/21  1035 12/15/21  0720 12/14/21  0620   WBC 25.6* 24.8* 23.3*   RBC 3.23* 3.18* 3.32*   HGB 8.6* 8.6* 8.8*   HCT 28.2* 28.0* 28.6*    265 276   GRANS 77* 76*  --    LYMPH 12* 8*  --    EOS 0 1  --       Cardiac Enzymes No results for input(s): CPK, CKND1, LUH in the last 72 hours. No lab exists for component: CKRMB, TROIP   Coagulation No results for input(s): PTP, INR, APTT, INREXT, INREXT in the last 72 hours. Lipid Panel Lab Results   Component Value Date/Time    Cholesterol, total 146 06/01/2016 07:51 AM    HDL Cholesterol 40 06/01/2016 07:51 AM    LDL, calculated 91.6 06/01/2016 07:51 AM    VLDL, calculated 14.4 06/01/2016 07:51 AM    Triglyceride 72 06/01/2016 07:51 AM    CHOL/HDL Ratio 3.7 06/01/2016 07:51 AM      BNP No results for input(s): BNPP in the last 72 hours.    Liver Enzymes Recent Labs     12/15/21  1035   TP 5.8*   ALB 2.0*         Thyroid Studies No results found for: T4, T3U, TSH, TSHEXT, TSHEXT     Procedures/imaging: see electronic medical records for all procedures/Xrays and details which were not copied into this note but were reviewed prior to creation of Kathryn Ferguson MD

## 2021-12-15 NOTE — PROGRESS NOTES
Problem: Falls - Risk of  Goal: *Absence of Falls  Description: Document Bard  Fall Risk and appropriate interventions in the flowsheet. Outcome: Progressing Towards Goal  Note: Fall Risk Interventions:  Mobility Interventions: Communicate number of staff needed for ambulation/transfer    Mentation Interventions: Adequate sleep, hydration, pain control    Medication Interventions: Teach patient to arise slowly    Elimination Interventions:  Toileting schedule/hourly rounds    History of Falls Interventions: Room close to nurse's station

## 2021-12-16 NOTE — PROGRESS NOTES
1313: Pt eating lunch, will follow up again for PT.    1416: Pt refusing PT but will not answer any questions as to why or any other question.

## 2021-12-16 NOTE — PROGRESS NOTES
0800-Received pt resting in bed with eyes open, no sign of distress, vss, patient alert, disoriented to time and year, will continue to monitor  1300-Bedside and Verbal shift change report given to Lilly Rene (oncoming nurse) by Thu Stevenson RN   (offgoing nurse). Report included the following information SBAR, Kardex, Intake/Output, MAR and Recent Results.

## 2021-12-16 NOTE — PROGRESS NOTES
D/C Plan: [facility] ([facility])    CM spoke with pt's wife and notified her that pt will likely be medically stable for transfer to [facility] tomorrow. CM contacted pt's wife, Kareem Bautista ([phone]; [phone] cell), to notify her of the above. Family is in agreement. CM has completed a LTSS/UAI screening. CM has requested transport be arranged tomorrow between 1:00pm at 3:00pm to the facility above. Transition of Care Plan: [facility]    Communication to Patient/Family:  Met with patient and family, and they are agreeable to the transition plan. The Plan for Transition of Care is related to the following treatment goals: SNF    The Patient and/or patient representative was provided with a choice of provider and agrees   with the discharge plan. Yes [x] No []    Freedom of choice list was provided with basic dialogue that supports the patient's individualized plan of care/goals and shares the quality data associated with the providers. Yes [x] No []    SNF/Rehab Transition:  Patient has been accepted to [address] [facility]) at [address] [place] [address], [address] [place] [address] for SNF and meets criteria for admission. Patient will transported by medical transport and expected to leave at 3:30 (12/17/21). Communication to SNF/Rehab:  Bedside RN,  has been notified to update the transition plan to the facility and call report [phone]. Discharge information has been updated on the AVS and communicated through [facility] or [facility]. Discharge instructions to be fax'd to facility at [phone]     Please include all hard scripts for controlled substances, med rec and dc summary, and AVS in packet. Please medicate for pain prior to dc if possible and needed to help offset delay when patient first arrives to facility.     Reviewed and confirmed with facility, [facility] can manage the patient care needs for the following:     Job Foots with (X) only those applicable:  Medication:  []Medications are available at the facility  []IV Antibiotics    []Controlled Substance  hard copies available sent. []Weekly Labs    Equipment:  []CPAP/BiPAP  []Wound Vacuum  []Jimenez or Urinary Device  []PICC/Central Line  []Nebulizer  []Ventilator    Treatment:  []Isolation (for MRSA, VRE, etc.)  []Surgical Drain Management  []Tracheostomy Care  []Dressing Changes  []Dialysis with transportation  []PEG Care  []Oxygen  []Daily Weights for Heart Failure    Dietary:  []Any diet limitations  []Tube Feedings   []Total Parenteral Management (TPN)    Financial Resources:  []Medicaid Application Completed    [x]UAI Completed and copy given to pt/family. []A screening has previously been completed. []Level II Completed    [] Private pay individual who will not become   financially eligible for Medicaid within 6 months from admission to a [address] [place] [address] facility. [] Individual refused to have screening conducted. []Medicaid Application Completed    []The screening denied because it was determined individual did not need/did not qualify for nursing facility level of care. [] Out of state residents seeking direct admission to a [address] facility. [] Individuals who are inpatients of an out of state hospital, or in state or out of state veterans/ hospital and seek direct admission to a [address] facility  [] Individuals who are pateints or residents of a state owned/operated facility that is licensed by Department of [employer] ([employer]) and seek direct admission to [address] [place] [address]  [] A screening not required for enrollment in [facility] services as set out in [address] [place] [address] 30-  [] [facility]) staff shall perform screenings of the [facility] clients. Advanced Care Plan:  []Surrogate Decision Maker of Care  []POA  []Communicated Code Status and copy sent.     Other:         Care Management Interventions  PCP Verified by CM: Yes  Mode of Transport at Discharge: BLS  Transition of Care Consult (CM Consult): SNF  Partner SNF: Yes  Health Maintenance Reviewed: Yes  Physical Therapy Consult: Yes  Occupational Therapy Consult: Yes  Support Systems: Spouse/Significant Other  Confirm Follow Up Transport: Family  The Plan for Transition of Care is Related to the Following Treatment Goals :  [facility] (formerly [address] [place] [address])  The Patient and/or Patient Representative was Provided with a Choice of Provider and Agrees with the Discharge Plan?: Yes  Name of the Patient Representative Who was Provided with a Choice of Provider and Agrees with the Discharge Plan: spouse  Freedom of Choice List was Provided with Basic Dialogue that Supports the Patient's Individualized Plan of Care/Goals, Treatment Preferences and Shares the Quality Data Associated with the Providers?: Yes  Discharge Location  Discharge Placement: Skilled nursing facility

## 2021-12-16 NOTE — PROGRESS NOTES
Hospitalist Progress Note    Patient: Shraddha Francois MRN: [id number]  [facility]: [id number]    YOB: 1945  Age: 78 y.o. Sex: male    DOA: 12/11/2021 LOS:  LOS: 5 days          Chief Complaint:    sepsis      Assessment/Plan     Sammi Guzman a 78 y. o. male who multiple medical problems including diabetes, hypertension, pulmonary hypertension, pulmonary emphysema brought into the ED via EMS from home with an dwelling Jimenez catheter, fever and altered mental status admitted for sepsis     Sepsis -resolved  PSEUDOMONAS AERUGINOSA, grew from urine culture dated 12/5/2021  No growth urine cx here  colitis via CT scan  Suspicion for c diff also  patient has a chronic indwelling Jimenez  PO vanco and IV flagyl per ID  Leukocytosis prominent still  Follow daily CBC w/ diff  Lower IVF rate     Right renal mass on CT scan, no further workup at this time per urology     Acute metabolic encephalopathy  Likely due to sepsis  Improved     BRITT -  improved     Tachycardia  Likely due to sepsis  better     Constipation  treated     Diabetestype 2  SSI PRN     Hypertension  Monitor blood pressure, no antihypertensives initially in setting of sepsis     DVT prophylaxis with heparin and Pepcid      Tried to call spouse, left message    Disposition :  Patient Active Problem List   Diagnosis Code    Lumbar spinal stenosis M48.061    Lumbar spondylosis M47.816    Radiculopathy of leg M54.10    Unable to ambulate R26.2    Urinary retention R33.9    Type II diabetes mellitus with manifestations, uncontrolled ([facility]) E11.8, E11.65    Renal mass N28.89    UTI (urinary tract infection) due to urinary indwelling Jimenez catheter ([facility]) T83.511A, N39.0    Compression fracture of L1 vertebra ([facility]) S32.010A    BRITT (acute kidney injury) ([facility] N17.9    Aneurysm of infrarenal abdominal aorta ([facility]) I71.4    HTN (hypertension) I10    Severe protein-calorie malnutrition ([facility]) E43    Severe pulmonary hypertension ([facility] I27.20    Sepsis ([facility] A41.9    Hypotension I95.9    Pulmonary hypertension (HCC) I27.20    History of tobacco abuse Z87.891    Pulmonary emphysema (HCC) J43.9    Acute hypoxemic respiratory failure (HCC) J96.01    Tricuspid regurgitation I07.1    Acute metabolic encephalopathy E48.83    Constipation K59.00    Colitis K52.9       Subjective:    He denies abd pain, cramps, diarrhea, nasuea    Review of systems:    Constitutional: denies fevers  Respiratory: denies SOB  Cardiovascular: denies chest pain  Gastrointestinal: denies nausea, vomiting      Vital signs/Intake and Output:  Visit Vitals  /67 (BP 1 Location: Right arm, BP Patient Position: At rest)   Pulse 78   Temp 97.9 °F (36.6 °C)   Resp 19   Ht 5' 11\" (1.803 m)   Wt 66.1 kg (145 lb 11.2 oz)   SpO2 99%   BMI 20.32 kg/m²     Current Shift:  No intake/output data recorded. Last three shifts:  12/14 1901 - 12/16 0700  In: 6718.9 [P.O.:900;  I.V.:5818.9]  Out: 1900 [Urine:1900]    Exam:    General: elderly thin AM, NAD  CVS:Regular rate and rhythm, no M/R/G, S1/S2 heard, no thrill  Lungs:Clear to auscultation bilaterally, no wheezes, rhonchi, or rales  Abdomen: Soft, Nontender, No distention, Normal Bowel sounds  Extremities: No C/C/E, pulses palpable 2+  Neuro:grossly normal , follows commands  Psych:appropriate                Labs: Results:       Chemistry Recent Labs     12/16/21  0502 12/15/21  1035 12/15/21  0720   * 170* 91    143 144   K 3.5 3.8 3.7   * 117* 118*   CO2 17* 19* 19*   BUN 23* 24* 25*   CREA 1.11 1.30 1.26   CA 7.9* 7.7* 7.9*   AGAP 10 7 7   BUCR 21* 18 20   AP  --  111  --    TP  --  5.8*  --    ALB  --  2.0*  --    GLOB  --  3.8  --    AGRAT  --  0.5*  --       CBC w/Diff Recent Labs     12/16/21  0502 12/15/21  1035 12/15/21  0720   WBC 26.2* 25.6* 24.8*   RBC 3.36* 3.23* 3.18*   HGB 8.8* 8.6* 8.6*   HCT 28.3* 28.2* 28.0*    270 265   GRANS  --  77* 76*   LYMPH  --  12* 8*   EOS  --  0 1 Cardiac Enzymes No results for input(s): CPK, CKND1, LUH in the last 72 hours. No lab exists for component: CKRMB, TROIP   Coagulation No results for input(s): PTP, INR, APTT, INREXT in the last 72 hours. Lipid Panel Lab Results   Component Value Date/Time    Cholesterol, total 146 06/01/2016 07:51 AM    HDL Cholesterol 40 06/01/2016 07:51 AM    LDL, calculated 91.6 06/01/2016 07:51 AM    VLDL, calculated 14.4 06/01/2016 07:51 AM    Triglyceride 72 06/01/2016 07:51 AM    CHOL/HDL Ratio 3.7 06/01/2016 07:51 AM      BNP No results for input(s): BNPP in the last 72 hours.    Liver Enzymes Recent Labs     12/15/21  1035   TP 5.8*   ALB 2.0*         Thyroid Studies No results found for: T4, T3U, TSH, TSHEXT     Procedures/imaging: see electronic medical records for all procedures/Xrays and details which were not copied into this note but were reviewed prior to creation of Monico Niño MD

## 2021-12-16 NOTE — PROGRESS NOTES
[facility]  (A Division of [address] [place] [address])                                                                                                                      Umesh Marquez MD  Office #: - Option # [phone]  Fax #: [phone]     Date of Admission: 12/11/2021Date of Note: 12/16/2021  Reason for Follow Up: Evaluation and antibiotic management of Leucocytosis. Current Antimicrobials:    Prior Antimicrobials:  Vanco oral QID 12/15 to date  Flagyl IV 12/15 to date    Immunosuppressive drugs: NA Zosyn 12/11 to 12/15       Assessment- ID related:  --------------------------------------------------------------------------  · Leucocytosis- inclining slowly 26.2K-> no occult abscess on CT but has pancolitis picture and righ renal complex mass-for suspected tumor. No diarrhea- so doubt C.diff. · Pan-colitis on CT with contrast: Have to consider C.diff in the DDX as at risk, however, pt doesn't look as toxix/septic with this degree of colitis. No acute joint swelling/pain  · Right Kidney lesion worrisome for malignancy- on CT/Ulstrasound- followed by Urology. Stable for past 4 yrs  · Recent Psedumonas UTI12/5/21  · Urinary retention, with indwelling alonso  CT CAP WO contrast: didn't reveal major infectious finding  12/11  BCX: NGSF  UCX: NG  Other Medical Issues- Mx per respective team:    · Acute enecephalopathy  · DM   · CKD  · Anemia- HB 8.8- stable  · Hx of lumbar fusion/ L1 laminectomy  · R renal cyst/calcification. L renal stone-non obstructing     Recommendation for ID issues I am following:  ------------------------------------------------------------------------------  Not clear what is driving the leucocytosis-> will additionally check flow cytometry    -BM formed, no diarrhea.  OK to DC oral Vanco and c.diff precaution- DW RN  --cont Flagyl, add ceftriaxone for colitis treatment  --monitor cbc w/diff, cmp  --blood and urine culture remain:     Supportive care per [facility]             Subjective:  Afebrile, comfortable on RA. Voices no abd/chest complaint - no joint pain or swelling,  N/V. No diarrhea- confirmed with RN. BM was formed  Wbc remains elevated - at 26K today  Alonso remains in place    Urology eval reviewed-- no additional investigation- as they have been following renal mass past 4 years since 2017. HPI:  Carleen Boyle is a 78 y.o. WHITE/NON- with PMH of DM, CKD, recent admission with Pseudmonas UTI, urinary retention with indwelling Alonso came in with fever and confusion per chart review and ED note states wife brought him, unable to take care of him. He was found to have tachycardia 122, soft BP and WBC was high to 23,3K. CT CAP done WO contrast, some changes of colitis, significant finding seen except large BM in colon, and changes in kidney that didn't suggest infectious source. Was placed on zosyn and cultures so far no growth. Patient is oriented to self and place during interview. Feels ok, doesn't have focal comlaint of HA/neck or back pain, chest pain/SOB/Abd pain. Indwelling alonso in place. RN reports soft and frequent BM. No marked change in WBC and remains in 20K's. No rash/itching.         Active Hospital Problems    Diagnosis Date Noted    Colitis 12/13/2021    Acute metabolic encephalopathy 52/25/3804    Constipation 12/12/2021    Sepsis ([address] 11/23/2021    BRITT (acute kidney injury) ([address] 07/20/2021    UTI (urinary tract infection) due to urinary indwelling Alonso catheter ([address] 05/01/2020    Type II diabetes mellitus with manifestations, uncontrolled ([address] 09/16/2017     Past Medical History:   Diagnosis Date    Atherosclerosis     Diabetes ([address] 2013    type 2    High cholesterol     Hyperlipidemia     Hypertension 2013    Muscle weakness     Neoplasm     right kidney    Severe pulmonary hypertension ([address]     Spinal stenosis     Urinary retention     Vitamin D deficiency Past Surgical History:   Procedure Laterality Date    HX HEENT      40 years ago deviated septum    HX LUMBAR LAMINECTOMY  2017    HX ORTHOPAEDIC      Lumbar laminectomy 9/7/17    HX ORTHOPAEDIC Right     CTR     Family History   Problem Relation Age of Onset    Heart Disease Father      Social History     Socioeconomic History    Marital status:      Spouse name: Not on file    Number of children: Not on file    Years of education: Not on file    Highest education level: Not on file   Occupational History    Not on file   Tobacco Use    Smoking status: Former Smoker    Smokeless tobacco: Never Used   Vaping Use    Vaping Use: Never used   Substance and Sexual Activity    Alcohol use: No    Drug use: No    Sexual activity: Not on file   Other Topics Concern     Service Not Asked    Blood Transfusions Not Asked    Caffeine Concern Not Asked    Occupational Exposure Not Asked    Hobby Hazards Not Asked    Sleep Concern Not Asked    Stress Concern Not Asked    Weight Concern Not Asked    Special Diet Not Asked    Back Care Not Asked    Exercise Not Asked    Bike Helmet Not Asked    Seat Belt Not Asked    Self-Exams Not Asked   Social History Narrative    Not on file     Social Determinants of Health     Financial Resource Strain:     Difficulty of Paying Living Expenses: Not on file   Food Insecurity:     Worried About Running Out of Food in the Last Year: Not on file    [place]sm[place] of Food in the Last Year: Not on file   Transportation Needs:     Lack of Transportation (Medical): Not on file    Lack of Transportation (Non-Medical):  Not on file   Physical Activity:     Days of Exercise per Week: Not on file    Minutes of Exercise per Session: Not on file   Stress:     Feeling of Stress : Not on file   Social Connections:     Frequency of Communication with Friends and Family: Not on file    Frequency of Social Gatherings with Friends and Family: Not on file   Aristeo.Bran Attends Hinduism Services: Not on file    Active Member of Clubs or Organizations: Not on file    Attends Club or Organization Meetings: Not on file    Marital Status: Not on file   Intimate Partner Violence:     Fear of Current or Ex-Partner: Not on file    Emotionally Abused: Not on file    Physically Abused: Not on file    Sexually Abused: Not on file   Housing Stability:     Unable to Pay for Housing in the Last Year: Not on file    Number of Places Lived in the Last Year: Not on file    Unstable Housing in the Last Year: Not on file       Allergies:  Zocor [simvastatin]     Medications:  Current Facility-Administered Medications   Medication Dose Route Frequency    cefTRIAXone (ROCEPHIN) 1 g in lidocaine (XYLOCAINE) 10 mg/mL (1 %) IM syringe  1 g IntraMUSCular Q24H    aspirin chewable tablet 81 mg  81 mg Oral DAILY    atorvastatin (LIPITOR) tablet 20 mg  20 mg Oral QHS    metoprolol succinate (TOPROL-XL) XL tablet 25 mg  25 mg Oral QHS    traZODone (DESYREL) tablet 50 mg  50 mg Oral QHS    metroNIDAZOLE (FLAGYL) IVPB premix 500 mg  500 mg IntraVENous Q6H    insulin lispro (HUMALOG) injection   SubCUTAneous AC&HS    glucose chewable tablet 16 g  4 Tablet Oral PRN    glucagon (GLUCAGEN) injection 1 mg  1 mg IntraMUSCular PRN    dextrose (D50W) injection syrg 12.5-25 g  25-50 mL IntraVENous PRN    0.9% sodium chloride infusion  50 mL/hr IntraVENous CONTINUOUS    sodium chloride (NS) flush 5-40 mL  5-40 mL IntraVENous Q8H    sodium chloride (NS) flush 5-40 mL  5-40 mL IntraVENous PRN    acetaminophen (TYLENOL) tablet 650 mg  650 mg Oral Q6H PRN    Or    acetaminophen (TYLENOL) suppository 650 mg  650 mg Rectal Q6H PRN    polyethylene glycol (MIRALAX) packet 17 g  17 g Oral DAILY PRN    ondansetron (ZOFRAN ODT) tablet 4 mg  4 mg Oral Q8H PRN    Or    ondansetron (ZOFRAN) injection 4 mg  4 mg IntraVENous Q6H PRN    famotidine (PEPCID) tablet 20 mg  20 mg Oral DAILY    heparin (porcine) injection 5,000 Units  5,000 Units SubCUTAneous Q8H    alum-mag hydroxide-simeth (MYLANTA) oral suspension 15 mL  15 mL Oral Q6H PRN    bisacodyL (DULCOLAX) tablet 5 mg  5 mg Oral DAILY PRN        ROS:  Pertinent items are noted in the History of Present Illness. Physical Exam:    Temp (24hrs), Av.5 °F (36.9 °C), Min:97.4 °F (36.3 °C), Max:99 °F (37.2 °C)    Visit Vitals  /71 (BP 1 Location: Right arm, BP Patient Position: At rest)   Pulse (!) 106   Temp 97.4 °F (36.3 °C)   Resp 18   Ht 5' 11\" (1.803 m)   Wt 66.1 kg (145 lb 11.2 oz)   SpO2 95%   BMI 20.32 kg/m²          GEN: WD chronically ill looking, on RA. HEENT: Unicteric. EOMI intact  CHEST: Non laboured breathing. CTA  CVS:RRR, no mur/gallop  ABD: Obese/soft. Soft and non distended. No reboud tenderness  JOHANNA:alonso in place  EXT: No apparent swelling or redness on UE/LE joints. No arthritis noted  Skin: Dry and intact. No rash, no redness. CNS: A, OX3. Moves all extremity. CN grossly ok.       Microbiology  All Micro Results     Procedure Component Value Units Date/Time    CULTURE, BLOOD [[id number]] Collected: 21    Order Status: Completed Specimen: Blood Updated: 21     Special Requests: NO SPECIAL REQUESTS        Culture result: NO GROWTH 5 DAYS       CULTURE, BLOOD [[id number]] Collected: 21    Order Status: Completed Specimen: Blood Updated: 21     Special Requests: NO SPECIAL REQUESTS        Culture result: NO GROWTH 5 DAYS       CULTURE, URINE [[id number]] Collected: 21    Order Status: Completed Specimen: Urine from Clean catch Updated: 21     Special Requests: NO SPECIAL REQUESTS        Culture result: No growth (<1,000 CFU/ML)            Lab results:    Chemistry  Recent Labs     21  0502 12/15/21  1035 12/15/21  0720   * 170* 91    143 144   K 3.5 3.8 3.7   * 117* 118*   CO2 17* 19* 19*   BUN 23* 24* 25*   CREA 1.11 1.30 1.26   CA 7.9* 7.7* 7.9* AGAP 10 7 7   BUCR 21* 18 20   AP  --  111  --    TP  --  5.8*  --    ALB  --  2.0*  --    GLOB  --  3.8  --    AGRAT  --  0.5*  --        CBC w/ Diff  Recent Labs     12/16/21  0502 12/15/21  1035 12/15/21  0720   WBC 26.2* 25.6* 24.8*   RBC 3.36* 3.23* 3.18*   HGB 8.8* 8.6* 8.6*   HCT 28.3* 28.2* 28.0*    270 265   GRANS  --  77* 76*   LYMPH  --  12* 8*   EOS  --  0 1       Imaging: report posted below as per radiologist   CT CAP WO        Wall thickening of the rectum. This may be due to colitis/proctitis. Relatively  large amount of fecal material throughout the colon. This could also be a source  of inflammation.     Otherwise no evidence of acute abnormality.     Atherosclerotic vascular disease. Emphysema. Large right renal cyst    CXR: No acute process    12/15/21  CTA chest:  Exam limited by motion artifact. No pulmonary emboli identified   Minimal bilateral pleural effusions. Emphysema      CT AP with contrast:    Pancolitis. Interval progression of wall thickening to involve nearly the entire  colon. Thumb printing present raising the possibility of C. difficile. This  involves 2 vascular territories making ischemic colitis unlikely     Enhancing renal mass lower pole right kidney as seen on prior ultrasound     No hydronephrosis.

## 2021-12-17 NOTE — PROGRESS NOTES
Hospitalist Progress Note    Patient: Rozina Encarnacion MRN: [id number]  CSN: [id number]    YOB: 1945  Age: 78 y.o. Sex: male    DOA: 12/11/2021 LOS:  LOS: 6 days          Chief Complaint:    sepsis      Assessment/Plan        Oneal Schaefer a 78 y. o. male who multiple medical problems including diabetes, hypertension, pulmonary hypertension, pulmonary emphysema brought into the ED via EMS from home with an dwelling Jimenez catheter, fever and altered mental status admitted for sepsis     Sepsis -resolved  PSEUDOMONAS AERUGINOSA, grew from urine culture dated 12/5/2021  No growth urine cx here  colitis via CT scan  Suspicion for c diff also  patient has a chronic indwelling Jimenez  PO vanco and IV flagyl per ID  Leukocytosis prominent still, not sure why, ? willhe need BM biopsy sampling?   Follow daily CBC w/ diff     Right renal mass on CT scan, no further workup at this time per urology     Acute metabolic encephalopathyresolved, he is nontoxic appearing  Likely due to sepsis     BRITT -  improved     Tachycardia  Likely due to sepsis  better     Constipation  treated     Diabetestype 2  SSI PRN     Hypertension  Monitor blood pressure     DVT prophylaxis with heparin and Pepcid      He will go to SNF when medically cleared  Continue treatment for infection and monitor leukocytosis     Disposition :  Patient Active Problem List   Diagnosis Code    Lumbar spinal stenosis M48.061    Lumbar spondylosis M47.816    Radiculopathy of leg M54.10    Unable to ambulate R26.2    Urinary retention R33.9    Type II diabetes mellitus with manifestations, uncontrolled ([facility]) E11.8, E11.65    Renal mass N28.89    UTI (urinary tract infection) due to urinary indwelling Jimenez catheter ([facility]) T83.511A, N39.0    Compression fracture of L1 vertebra ([facility]) S32.010A    BRITT (acute kidney injury) ([facility] N17.9    Aneurysm of infrarenal abdominal aorta ([facility]) I71.4    HTN (hypertension) I10    Severe protein-calorie malnutrition ([facility] E43    Severe pulmonary hypertension ([facility]) I27.20    Sepsis ([facility] A41.9    Hypotension I95.9    Pulmonary hypertension ([facility]) I27.20    History of tobacco abuse Z87.891    Pulmonary emphysema ([facility]) J43.9    Acute hypoxemic respiratory failure ([facility]CC) J96.01    Tricuspid regurgitation I07.1    Acute metabolic encephalopathy D45.19    Constipation K59.00    Colitis K52.9       Subjective:    I feel fine    He has no complaints  Waiting on breakfast  Denies any nausea, abd pain, SOB, fevers    Review of systems:    Constitutional: denies fevers, chills  Respiratory: denies SOB, cough  Cardiovascular: denies chest pain  Gastrointestinal: denies nausea, vomiting, diarrhea      Vital signs/Intake and Output:  Visit Vitals  /70 (BP 1 Location: Right upper arm, BP Patient Position: At rest)   Pulse 95   Temp 98.1 °F (36.7 °C)   Resp 18   Ht 5' 11\" (1.803 m)   Wt 67.4 kg (148 lb 9.4 oz)   SpO2 99%   BMI 20.72 kg/m²     Current Shift:  No intake/output data recorded.   Last three shifts:  12/15 1901 - 12/17 0700  In: -   Out: 950 [Urine:950]    Exam:    General: elderly thin WM, alert, NAD, OX3  CVS:Regular rate and rhythm, no M/R/G, S1/S2 heard, no thrill  Lungs:Clear to auscultation bilaterally, no wheezes, rhonchi, or rales  Abdomen: Soft, Nontender, No distention, Normal Bowel sounds  Extremities: No C/C/E, pulses palpable 2+  Neuro:grossly normal , follows commands  Psych:appropriate  alonso                Labs: Results:       Chemistry Recent Labs     12/17/21  0258 12/16/21  1322 12/16/21  0502 12/15/21  1035 12/15/21  1035   * 130* 109*   < > 170*   * 144 145   < > 143   K 4.1 3.5 3.5   < > 3.8   * 117* 118*   < > 117*   CO2 21 20* 17*   < > 19*   BUN 24* 24* 23*   < > 24*   CREA 1.21 1.19 1.11   < > 1.30   CA 8.1* 8.0* 7.9*   < > 7.7*   AGAP 6 7 10   < > 7   BUCR 20 20 21*   < > 18   AP  --   --   --   --  111   TP  --   --   --   --  5.8*   ALB  -- --   --   --  2.0*   GLOB  --   --   --   --  3.8   AGRAT  --   --   --   --  0.5*    < > = values in this interval not displayed. CBC w/Diff Recent Labs     12/17/21  0258 12/16/21  0502 12/15/21  1035 12/15/21  0720 12/15/21  0720   WBC 29.8* 26.2* 25.6*   < > 24.8*   RBC 3.55* 3.36* 3.23*   < > 3.18*   HGB 9.3* 8.8* 8.6*   < > 8.6*   HCT 30.5* 28.3* 28.2*   < > 28.0*    275 270   < > 265   GRANS 81*  --  77*  --  76*   LYMPH 6*  --  12*  --  8*   EOS 1  --  0  --  1    < > = values in this interval not displayed. Cardiac Enzymes No results for input(s): CPK, CKND1, LUH in the last 72 hours. No lab exists for component: CKRMB, TROIP   Coagulation No results for input(s): PTP, INR, APTT, INREXT in the last 72 hours. Lipid Panel Lab Results   Component Value Date/Time    Cholesterol, total 146 06/01/2016 07:51 AM    HDL Cholesterol 40 06/01/2016 07:51 AM    LDL, calculated 91.6 06/01/2016 07:51 AM    VLDL, calculated 14.4 06/01/2016 07:51 AM    Triglyceride 72 06/01/2016 07:51 AM    CHOL/HDL Ratio 3.7 06/01/2016 07:51 AM      BNP No results for input(s): BNPP in the last 72 hours.    Liver Enzymes Recent Labs     12/15/21  1035   TP 5.8*   ALB 2.0*         Thyroid Studies No results found for: T4, T3U, TSH, TSHEXT     Procedures/imaging: see electronic medical records for all procedures/Xrays and details which were not copied into this note but were reviewed prior to creation of Noe Granados MD

## 2021-12-17 NOTE — PROGRESS NOTES
D/C Plan: [facility]    Noted pt is not medically stable to transition from an acute care setting at this time. Anticipate pt will remain inpt through the weekend. CM to continue to follow and assist.    Care Management Interventions  PCP Verified by CM: Yes  Mode of Transport at Discharge: BLS  Transition of Care Consult (CM Consult): SNF  Partner SNF: Yes  Health Maintenance Reviewed: Yes  Physical Therapy Consult: Yes  Occupational Therapy Consult: Yes  Support Systems: Spouse/Significant Other  Confirm Follow Up Transport: Family  The Plan for Transition of Care is Related to the Following Treatment Goals :  [facility] (formerly [facility])  The Patient and/or Patient Representative was Provided with a Choice of Provider and Agrees with the Discharge Plan?: Yes  Name of the Patient Representative Who was Provided with a Choice of Provider and Agrees with the Discharge Plan: spouse  Freedom of Choice List was Provided with Basic Dialogue that Supports the Patient's Individualized Plan of Care/Goals, Treatment Preferences and Shares the Quality Data Associated with the Providers?: Yes  Discharge Location  Discharge Placement: Skilled nursing facility

## 2021-12-17 NOTE — PROGRESS NOTES
1571  Bedside and Verbal shift change report given to Jeromy Hansen, RN by Ata Yadav RN. Report included the following information SBAR, Kardex, OR Summary, Intake/Output and MAR.     1500  Call received from Dr. Ed Trujillo. Perform prompt incontinence care due to indwelling catheter. 1943  Bedside and Verbal shift change report given to Otto Biswas RN  by Jeromy Hansen RN. Report included the following information SBAR, Kardex, OR Summary, Intake/Output and MAR.

## 2021-12-17 NOTE — PROGRESS NOTES
Problem: Falls - Risk of  Goal: *Absence of Falls  Description: Document Narendra Bare Fall Risk and appropriate interventions in the flowsheet. Outcome: Progressing Towards Goal  Note: Fall Risk Interventions:  Mobility Interventions: Communicate number of staff needed for ambulation/transfer    Mentation Interventions: Bed/chair exit alarm    Medication Interventions: Patient to call before getting OOB    Elimination Interventions: Call light in reach    History of Falls Interventions: Bed/chair exit alarm         Problem: Patient Education: Go to Patient Education Activity  Goal: Patient/Family Education  Outcome: Progressing Towards Goal     Problem: Pressure Injury - Risk of  Goal: *Prevention of pressure injury  Description: Document Regan Scale and appropriate interventions in the flowsheet.   Outcome: Progressing Towards Goal  Note: Pressure Injury Interventions:  Sensory Interventions: Pressure redistribution bed/mattress (bed type)    Moisture Interventions: Absorbent underpads,Apply protective barrier, creams and emollients    Activity Interventions: Pressure redistribution bed/mattress(bed type)    Mobility Interventions: HOB 30 degrees or less,Pressure redistribution bed/mattress (bed type)    Nutrition Interventions: Document food/fluid/supplement intake    Friction and Shear Interventions: Apply protective barrier, creams and emollients,HOB 30 degrees or less,Lift sheet,Minimize layers                Problem: Patient Education: Go to Patient Education Activity  Goal: Patient/Family Education  Outcome: Progressing Towards Goal     Problem: Patient Education: Go to Patient Education Activity  Goal: Patient/Family Education  Outcome: Progressing Towards Goal     Problem: Patient Education: Go to Patient Education Activity  Goal: Patient/Family Education  Outcome: Progressing Towards Goal     Problem: General Medical Care Plan  Goal: *Vital signs within specified parameters  Outcome: Progressing Towards Goal  Goal: *Labs within defined limits  Outcome: Progressing Towards Goal  Goal: *Absence of infection signs and symptoms  Outcome: Progressing Towards Goal  Goal: *Optimal pain control at patient's stated goal  Outcome: Progressing Towards Goal  Goal: *Skin integrity maintained  Outcome: Progressing Towards Goal  Goal: *Fluid volume balance  Outcome: Progressing Towards Goal  Goal: *Optimize nutritional status  Outcome: Progressing Towards Goal  Goal: *Anxiety reduced or absent  Outcome: Progressing Towards Goal  Goal: *Progressive mobility and function (eg: ADL's)  Outcome: Progressing Towards Goal     Problem: Patient Education: Go to Patient Education Activity  Goal: Patient/Family Education  Outcome: Progressing Towards Goal     Problem: Pain  Goal: *Control of Pain  Outcome: Progressing Towards Goal     Problem: Risk for Spread of Infection  Goal: Prevent transmission of infectious organism to others  Description: Prevent the transmission of infectious organisms to other patients, staff members, and visitors.   Outcome: Progressing Towards Goal     Problem: Patient Education:  Go to Education Activity  Goal: Patient/Family Education  Outcome: Progressing Towards Goal

## 2021-12-17 NOTE — PROGRESS NOTES
Problem: Falls - Risk of  Goal: *Absence of Falls  Description: Document Earma Gertrudis Fall Risk and appropriate interventions in the flowsheet. Outcome: Progressing Towards Goal  Note: Fall Risk Interventions:  Mobility Interventions: Communicate number of staff needed for ambulation/transfer    Mentation Interventions: Bed/chair exit alarm    Medication Interventions: Patient to call before getting OOB    Elimination Interventions: Call light in reach    History of Falls Interventions: Bed/chair exit alarm         Problem: Pressure Injury - Risk of  Goal: *Prevention of pressure injury  Description: Document Regan Scale and appropriate interventions in the flowsheet.   Outcome: Progressing Towards Goal  Note: Pressure Injury Interventions:  Sensory Interventions: Pressure redistribution bed/mattress (bed type)    Moisture Interventions: Absorbent underpads,Apply protective barrier, creams and emollients    Activity Interventions: Pressure redistribution bed/mattress(bed type)    Mobility Interventions: HOB 30 degrees or less,Pressure redistribution bed/mattress (bed type)    Nutrition Interventions: Document food/fluid/supplement intake    Friction and Shear Interventions: Apply protective barrier, creams and emollients,HOB 30 degrees or less,Lift sheet,Minimize layers                Problem: Pain  Goal: *Control of Pain  Outcome: Progressing Towards Goal     Problem: General Medical Care Plan  Goal: *Optimal pain control at patient's stated goal  Outcome: Progressing Towards Goal  Goal: *Skin integrity maintained  Outcome: Progressing Towards Goal

## 2021-12-17 NOTE — PROGRESS NOTES
1009: Pt is eating breakfast, will follow up again for PT.    1214: Pt refusing to participate in PT, will follow up as schedule permits.

## 2021-12-17 NOTE — PROGRESS NOTES
Bedside and verbal shift change report given to Celestina Zamudio RN (on coming nurse) by Sam Vidal RN (off going nurse). Report included the following information SBAR, Kardex, OR Summary, Intake/Output and MAR. Bedside and verbal shift change report given by Celestina Zamudio RN (off going nurse) to Adrián Eckert RN(on coming nurse). Report included the following information SBAR, Kardex, OR Summary, Intake/Output and MAR.

## 2021-12-17 NOTE — PROGRESS NOTES
[facility]  (A Division of [address] [place] [address])                                                                                                                      Jovita Yanez MD  Office #: - Option # [phone]  Fax #: [phone]     Date of Admission: 12/11/2021Date of Note: 12/17/2021  Reason for Follow Up: Evaluation and antibiotic management of Leucocytosis. Current Antimicrobials:    Prior Antimicrobials:  Vanco oral QID 12/15 to date  Flagyl IV 12/15 to date    Immunosuppressive drugs: NA Zosyn 12/11 to 12/15       Assessment- ID related:  --------------------------------------------------------------------------  · Leucocytosis( w/neutrophilia) - inclining slowly up-> no occult abscess on CT- visceral organs including prostate look ok except hemangioma liver and R kidney)but has pancolitis change. Right renal complex mass-for suspected tumor. --No diarrhea/ tense distension- so doubt C.diff.   --has no rash  --has no focal complaint  --has no joint inflammation  --not on steroid  · Pan-colitis on CT with contrast: Have to consider C.diff in the DDX as at risk, however, pt doesn't look as toxix/septic with this degree of colitis. No acute joint swelling/pain  · Right Kidney lesion worrisome for malignancy- on CT/Ulstrasound- followed by Urology. Stable for past 4 yrs  · Recent Psedumonas UTI12/5/21  · Urinary retention, with indwelling alonso  CT CAP WO contrast: didn't reveal major infectious finding  12/11  BCX: NGSF  UCX: NG  Other Medical Issues- Mx per respective team:    · Acute enecephalopathy  · DM   · CKD  · Anemia- HB 8.8- stable  · Hx of lumbar fusion/ L1 laminectomy  · R renal cyst/calcification.  L renal stone-non obstructing     Recommendation for ID issues I am following:  ------------------------------------------------------------------------------  Still mystery what is driving the leucocytosis-> will additionally check flow cytometry    --cont emperic ABX X7 days total from admission  -- Can DC ABX Saturday/ Sunday  --FU flow cytometry  --ESR/CRP, IBD serology      FU flow cytometry  Will order MRI- C/T/L over weekend. If normal, WBC scan Monday    DW Dr Kiya Neri- suspect leucocytosis is non infectious-> pancolitis on CT not sure of significance. Maybe GI can weigh in       Subjective:  Afebrile, comfortable on RA. Voices no abd/chest complaint - no joint pain or swelling,  N/V. Appetite is good. No diarrhea-but has soft BM  Alonso in place, so asked RN to check on him frequently for BM-he doesn't call to be changed    Urology eval reviewed-- no additional investigation- as they have been following renal mass past 4 years since 2017. HPI:  Oviido Larson is a 78 y.o. WHITE/NON- with PMH of DM, CKD, recent admission with Pseudmonas UTI, urinary retention with indwelling Alonso came in with fever and confusion per chart review and ED note states wife brought him, unable to take care of him. He was found to have tachycardia 122, soft BP and WBC was high to 23,3K. CT CAP done WO contrast, some changes of colitis, significant finding seen except large BM in colon, and changes in kidney that didn't suggest infectious source. Was placed on zosyn and cultures so far no growth. Patient is oriented to self and place during interview. Feels ok, doesn't have focal comlaint of HA/neck or back pain, chest pain/SOB/Abd pain. Indwelling alonso in place. RN reports soft and frequent BM. No marked change in WBC and remains in 20K's. No rash/itching.         Active Hospital Problems    Diagnosis Date Noted    Colitis 12/13/2021    Acute metabolic encephalopathy 04/48/8738    Constipation 12/12/2021    Sepsis ([address] 11/23/2021    BRITT (acute kidney injury) ([address] 07/20/2021    UTI (urinary tract infection) due to urinary indwelling Alonso catheter ([address] 05/01/2020    Type II diabetes mellitus with manifestations, uncontrolled ([address] 09/16/2017     Past Medical History:   Diagnosis Date    Atherosclerosis     Diabetes ([facility] 2013    type 2    High cholesterol     Hyperlipidemia     Hypertension 2013    Muscle weakness     Neoplasm     right kidney    Severe pulmonary hypertension ([facility]     Spinal stenosis     Urinary retention     Vitamin D deficiency      Past Surgical History:   Procedure Laterality Date    HX HEENT      40 years ago deviated septum    HX LUMBAR LAMINECTOMY  2017    HX ORTHOPAEDIC      Lumbar laminectomy 9/7/17    HX ORTHOPAEDIC Right     CTR     Family History   Problem Relation Age of Onset    Heart Disease Father      Social History     Socioeconomic History    Marital status:      Spouse name: Not on file    Number of children: Not on file    Years of education: Not on file    Highest education level: Not on file   Occupational History    Not on file   Tobacco Use    Smoking status: Former Smoker    Smokeless tobacco: Never Used   Vaping Use    Vaping Use: Never used   Substance and Sexual Activity    Alcohol use: No    Drug use: No    Sexual activity: Not on file   Other Topics Concern     Service Not Asked    Blood Transfusions Not Asked    Caffeine Concern Not Asked    Occupational Exposure Not Asked    Hobby Hazards Not Asked    Sleep Concern Not Asked    Stress Concern Not Asked    Weight Concern Not Asked    Special Diet Not Asked    Back Care Not Asked    Exercise Not Asked    Bike Helmet Not Asked    Seat Belt Not Asked    Self-Exams Not Asked   Social History Narrative    Not on file     Social Determinants of Health     Financial Resource Strain:     Difficulty of Paying Living Expenses: Not on file   Food Insecurity:     Worried About Running Out of Food in the Last Year: Not on file    [place]smany of Food in the Last Year: Not on file   Transportation Needs:     Lack of Transportation (Medical): Not on file    Lack of Transportation (Non-Medical):  Not on file   Physical Activity:     Days of Exercise per Week: Not on file    Minutes of Exercise per Session: Not on file   Stress:     Feeling of Stress : Not on file   Social Connections:     Frequency of Communication with Friends and Family: Not on file    Frequency of Social Gatherings with Friends and Family: Not on file    Attends [employer] Services: Not on file    Active Member of Clubs or Organizations: Not on file    Attends Club or Organization Meetings: Not on file    Marital Status: Not on file   Intimate Partner Violence:     Fear of Current or Ex-Partner: Not on file    Emotionally Abused: Not on file    Physically Abused: Not on file    Sexually Abused: Not on file   Housing Stability:     Unable to Pay for Housing in the Last Year: Not on file    Number of Places Lived in the Last Year: Not on file    Unstable Housing in the Last Year: Not on file       Allergies:  Zocor [simvastatin]     Medications:  Current Facility-Administered Medications   Medication Dose Route Frequency    cefTRIAXone (ROCEPHIN) 1 g in 0.9% sodium chloride (MBP/ADV) 50 mL MBP  1 g IntraVENous Q24H    metroNIDAZOLE (FLAGYL) tablet 500 mg  500 mg Oral TID    aspirin chewable tablet 81 mg  81 mg Oral DAILY    atorvastatin (LIPITOR) tablet 20 mg  20 mg Oral QHS    metoprolol succinate (TOPROL-XL) XL tablet 25 mg  25 mg Oral QHS    traZODone (DESYREL) tablet 50 mg  50 mg Oral QHS    insulin lispro (HUMALOG) injection   SubCUTAneous AC&HS    glucose chewable tablet 16 g  4 Tablet Oral PRN    glucagon (GLUCAGEN) injection 1 mg  1 mg IntraMUSCular PRN    dextrose (D50W) injection syrg 12.5-25 g  25-50 mL IntraVENous PRN    0.9% sodium chloride infusion  50 mL/hr IntraVENous CONTINUOUS    sodium chloride (NS) flush 5-40 mL  5-40 mL IntraVENous Q8H    sodium chloride (NS) flush 5-40 mL  5-40 mL IntraVENous PRN    acetaminophen (TYLENOL) tablet 650 mg  650 mg Oral Q6H PRN    Or    acetaminophen (TYLENOL) suppository 650 mg  650 mg Rectal Q6H PRN    polyethylene glycol (MIRALAX) packet 17 g  17 g Oral DAILY PRN    ondansetron (ZOFRAN ODT) tablet 4 mg  4 mg Oral Q8H PRN    Or    ondansetron (ZOFRAN) injection 4 mg  4 mg IntraVENous Q6H PRN    famotidine (PEPCID) tablet 20 mg  20 mg Oral DAILY    heparin (porcine) injection 5,000 Units  5,000 Units SubCUTAneous Q8H    alum-mag hydroxide-simeth (MYLANTA) oral suspension 15 mL  15 mL Oral Q6H PRN    bisacodyL (DULCOLAX) tablet 5 mg  5 mg Oral DAILY PRN        ROS:  Pertinent items are noted in the History of Present Illness. Physical Exam:    Temp (24hrs), Av.4 °F (36.9 °C), Min:98.1 °F (36.7 °C), Max:99.1 °F (37.3 °C)    Visit Vitals  /76 (BP 1 Location: Left upper arm, BP Patient Position: At rest)   Pulse 99   Temp 98.3 °F (36.8 °C)   Resp 17   Ht 5' 11\" (1.803 m)   Wt 67.4 kg (148 lb 9.4 oz)   SpO2 99%   BMI 20.72 kg/m²          GEN: WD chronically ill looking, on RA. HEENT: Unicteric. EOMI intact  CHEST: Non laboured breathing. CTA  CVS:RRR, no mur/gallop  ABD: Obese/soft. Soft and non distended. No reboud tenderness  JOHANNA:alonso in place  EXT: No apparent swelling or redness on UE/LE joints. No arthritis noted  Skin: Dry and intact. No rash, no redness. CNS: A, OX3. Moves all extremity. CN grossly ok.       Microbiology  All Micro Results     Procedure Component Value Units Date/Time    CULTURE, BLOOD [[id number]] Collected: 21    Order Status: Completed Specimen: Blood Updated: 21     Special Requests: NO SPECIAL REQUESTS        Culture result: NO GROWTH 6 DAYS       CULTURE, BLOOD [[id number]] Collected: 21    Order Status: Completed Specimen: Blood Updated: 21     Special Requests: NO SPECIAL REQUESTS        Culture result: NO GROWTH 6 DAYS       CULTURE, URINE [[id number]] Collected: 21 0335    Order Status: Completed Specimen: Urine from Clean catch Updated: 21     Special Requests: NO SPECIAL REQUESTS        Culture result: No growth (<1,000 CFU/ML)            Lab results:    Chemistry  Recent Labs     12/17/21  0258 12/16/21  1322 12/16/21  0502 12/15/21  1035 12/15/21  1035   * 130* 109*   < > 170*   * 144 145   < > 143   K 4.1 3.5 3.5   < > 3.8   * 117* 118*   < > 117*   CO2 21 20* 17*   < > 19*   BUN 24* 24* 23*   < > 24*   CREA 1.21 1.19 1.11   < > 1.30   CA 8.1* 8.0* 7.9*   < > 7.7*   AGAP 6 7 10   < > 7   BUCR 20 20 21*   < > 18   AP  --   --   --   --  111   TP  --   --   --   --  5.8*   ALB  --   --   --   --  2.0*   GLOB  --   --   --   --  3.8   AGRAT  --   --   --   --  0.5*    < > = values in this interval not displayed. CBC w/ Diff  Recent Labs     12/17/21  0258 12/16/21  0502 12/15/21  1035 12/15/21  0720 12/15/21  0720   WBC 29.8* 26.2* 25.6*   < > 24.8*   RBC 3.55* 3.36* 3.23*   < > 3.18*   HGB 9.3* 8.8* 8.6*   < > 8.6*   HCT 30.5* 28.3* 28.2*   < > 28.0*    275 270   < > 265   GRANS 81*  --  77*  --  76*   LYMPH 6*  --  12*  --  8*   EOS 1  --  0  --  1    < > = values in this interval not displayed. Imaging: report posted below as per radiologist   CT CAP WO        Wall thickening of the rectum. This may be due to colitis/proctitis. Relatively  large amount of fecal material throughout the colon. This could also be a source  of inflammation.     Otherwise no evidence of acute abnormality.     Atherosclerotic vascular disease. Emphysema. Large right renal cyst    CXR: No acute process    12/15/21  CTA chest:  Exam limited by motion artifact. No pulmonary emboli identified   Minimal bilateral pleural effusions. Emphysema      CT AP with contrast:    Pancolitis. Interval progression of wall thickening to involve nearly the entire  colon. Thumb printing present raising the possibility of C. difficile.  This  involves 2 vascular territories making ischemic colitis unlikely     Enhancing renal mass lower pole right kidney as seen on prior ultrasound     No hydronephrosis.

## 2021-12-18 NOTE — PROGRESS NOTES
Hospitalist Progress Note    Patient: Blanca Burgess MRN: [id number]  [facility]: [id number]    YOB: 1945  Age: 78 y.o. Sex: male    DOA: 12/11/2021 LOS:  LOS: 7 days          Chief Complaint:    sepsis  Talking too wife on phone     Assessment/Plan        Romaine Francois a 78 y. o. male who multiple medical problems including diabetes, hypertension, pulmonary hypertension, pulmonary emphysema brought into the ED via EMS from home with an dwelling Jimenez catheter, fever and altered mental status admitted for sepsis     Sepsis -resolved  PSEUDOMONAS AERUGINOSA, grew from urine culture dated 12/5/2021  No growth urine cx here  colitis via CT scan  Suspicion for c diff also no looose stool they are formed   patient has a chronic indwelling Jimenez  PO vanco and IV flagyl per ID  Leukocytosis prominent still, not sure why, ? willhe need BM biopsy sampling?   Follow daily CBC w/ diff     Right renal mass on CT scan, no further workup at this time per urology     Acute metabolic encephalopathyresolved, he is nontoxic appearing  Likely due to sepsis     BRITT -  improved     Tachycardia  Likely due to sepsis  better     Constipation  treated     Diabetestype 2  SSI PRN     Hypertension  Monitor blood pressure     DVT prophylaxis with heparin and Pepcid      He will go to SNF when medically cleared  Continue treatment for infection and monitor leukocytosis     Disposition :  Patient Active Problem List   Diagnosis Code    Lumbar spinal stenosis M48.061    Lumbar spondylosis M47.816    Radiculopathy of leg M54.10    Unable to ambulate R26.2    Urinary retention R33.9    Type II diabetes mellitus with manifestations, uncontrolled ([facility]) E11.8, E11.65    Renal mass N28.89    UTI (urinary tract infection) due to urinary indwelling Jimenez catheter ([facility]) T83.511A, N39.0    Compression fracture of L1 vertebra ([facility]) S32.010A    BRITT (acute kidney injury) ([facility] N17.9    Aneurysm of infrarenal abdominal aorta (HCC) I71.4    HTN (hypertension) I10    Severe protein-calorie malnutrition ([facility] E43    Severe pulmonary hypertension (HCC) I27.20    Sepsis ([facility] A41.9    Hypotension I95.9    Pulmonary hypertension (HCC) I27.20    History of tobacco abuse Z87.891    Pulmonary emphysema (HCC) J43.9    Acute hypoxemic respiratory failure (HCC) J96.01    Tricuspid regurgitation I07.1    Acute metabolic encephalopathy L58.98    Constipation K59.00    Colitis K52.9       Subjective:    I feel fine    He has no complaints  Talking to wife on phone verified and answered her questions as welll  Denies any nausea, abd pain, SOB, fevers    Review of systems:    Constitutional: denies fevers, chills  Respiratory: denies SOB, cough  Cardiovascular: denies chest pain  Gastrointestinal: denies nausea, vomiting, diarrhea      Vital signs/Intake and Output:  Visit Vitals  /79 (BP 1 Location: Right upper arm, BP Patient Position: At rest)   Pulse (!) 107   Temp 97.6 °F (36.4 °C)   Resp 17   Ht 5' 11\" (1.803 m)   Wt 69.3 kg (152 lb 12.8 oz)   SpO2 95%   BMI 21.31 kg/m²     Current Shift:  No intake/output data recorded. Last three shifts:  12/16 1901 - 12/18 0700  In: 950 [P.O.:800;  I.V.:150]  Out: 1750 [Urine:1750]    Exam:    General: elderly thin WM, alert, NAD, OX3  CVS:Regular rate and rhythm, no M/R/G, S1/S2 heard, no thrill  Lungs:Clear to auscultation bilaterally, no wheezes, rhonchi, or rales  Abdomen: Soft, Nontender, No distention, Normal Bowel sounds  Extremities: No C/C/E, pulses palpable 2+  Neuro:grossly normal , follows commands  Psych:appropriate  alonso                Labs: Results:       Chemistry Recent Labs     12/18/21  0632 12/17/21  0258 12/16/21  1322   * 110* 130*    147* 144   K 3.3* 4.1 3.5   * 120* 117*   CO2 20* 21 20*   BUN 22* 24* 24*   CREA 1.09 1.21 1.19   CA 8.0* 8.1* 8.0*   AGAP 7 6 7   BUCR 20 20 20      CBC w/Diff Recent Labs     12/18/21  0632 12/17/21  0258 12/16/21  0502   WBC 22.5* 29.8* 26.2*   RBC 3.14* 3.55* 3.36*   HGB 8.2* 9.3* 8.8*   HCT 26.7* 30.5* 28.3*    298 275   GRANS 84* 81*  --    LYMPH 7* 6*  --    EOS 1 1  --       Cardiac Enzymes No results for input(s): CPK, CKND1, LUH in the last 72 hours. No lab exists for component: CKRMB, TROIP   Coagulation No results for input(s): PTP, INR, APTT, INREXT, INREXT in the last 72 hours. Lipid Panel Lab Results   Component Value Date/Time    Cholesterol, total 146 06/01/2016 07:51 AM    HDL Cholesterol 40 06/01/2016 07:51 AM    LDL, calculated 91.6 06/01/2016 07:51 AM    VLDL, calculated 14.4 06/01/2016 07:51 AM    Triglyceride 72 06/01/2016 07:51 AM    CHOL/HDL Ratio 3.7 06/01/2016 07:51 AM      BNP No results for input(s): BNPP in the last 72 hours. Liver Enzymes No results for input(s): TP, ALB, TBIL, AP in the last 72 hours.     No lab exists for component: SGOT, GPT, DBIL   Thyroid Studies No results found for: T4, T3U, TSH, TSHEXT, TSHEXT     Procedures/imaging: see electronic medical records for all procedures/Xrays and details which were not copied into this note but were reviewed prior to creation of Jun Yan MD

## 2021-12-18 NOTE — PROGRESS NOTES
Problem: Mobility Impaired (Adult and Pediatric)  Goal: *Acute Goals and Plan of Care (Insert Text)  Description: Problem: Mobility Impaired (Adult and Pediatric)  Goal: *Acute Goals and Plan of Care (Insert Text)  Outcome: Progressing Towards Goal  Note: Physical Therapy Goals  Initiated 12/12/2021 and to be accomplished within 7 day(s)  1. Patient will move from supine to sit and sit to supine  in bed with min A.    2.  Patient will transfer from bed to chair and chair to bed with min A using the least restrictive device. 3.  Patient will perform sit to stand with min A.  4.  Patient will ambulate with min A for 50 feet with the least restrictive device. Prior Level of Function:   Patient reports he needs min to mod A for all mobility including gait using rollator. Patient lives with wife in a [facility]. Pt has a walk-in shower, with shower chair. Per chart review: \"EMS reports that the pts wife is unable to care for the pt\"  Outcome: Progressing Towards Goal   PHYSICAL THERAPY TREATMENT    Patient: Priya Ramon (22 y.o. male)  Date: 12/18/2021  Diagnosis: Sepsis ([facility] [A41.9] Sepsis ([facility]    Precautions: Fall  PLOF:     ASSESSMENT:  Pt refusing PT, requires increased encouragement to participate. ModA to sit up EOB. Multiple attempts to stand with bed elevated with each attempt. Pt unable to take any steps. Pt able to scoot up to [facility] in sitting. MaxA back to supine. Progression toward goals:   []      Improving appropriately and progressing toward goals  [x]      Improving slowly and progressing toward goals  []      Not making progress toward goals and plan of care will be adjusted     PLAN:  Patient continues to benefit from skilled intervention to address the above impairments. Continue treatment per established plan of care.   Discharge Recommendations:  Ziyad Chung  Further Equipment Recommendations for Discharge:  rolling walker     SUBJECTIVE:   Patient stated Mimi Castañeda happen.     OBJECTIVE DATA SUMMARY:   Critical Behavior:  Neurologic State: Sleeping  Orientation Level: Disoriented to situation,Disoriented to time  Cognition: Follows commands  Safety/Judgement: Decreased awareness of environment,Fall prevention  Functional Mobility Training:  Bed Mobility:    Supine to Sit: Moderate assistance  Sit to Supine: Maximum assistance  Scooting: Minimum assistance; Moderate assistance  Transfers:  Sit to Stand: Maximum assistance  Stand to Sit: Minimum assistance  Balance:  Sitting: Impaired; With support  Sitting - Static: Fair (occasional)  Sitting - Dynamic: Fair (occasional)  Standing: Impaired; With support  Standing - Static: Poor         Pain:  Pain level pre-treatment: 0/10  Pain level post-treatment: 0/10   Pain Intervention(s): Medication (see MAR); Rest, Ice, Repositioning   Response to intervention: Nurse notified, See doc flow    Activity Tolerance:   poor  Please refer to the flowsheet for vital signs taken during this treatment. After treatment:   [] Patient left in no apparent distress sitting up in chair  [x] Patient left in no apparent distress in bed  [x] Call bell left within reach  [] Nursing notified  [] Caregiver present  [] Bed alarm activated  [] SCDs applied      COMMUNICATION/EDUCATION:   [x]         Role of Physical Therapy in the acute care setting. []         Fall prevention education was provided and the patient/caregiver indicated understanding. []         Patient/family have participated as able in working toward goals and plan of care. []         Patient/family agree to work toward stated goals and plan of care. [x]         Patient understands intent and goals of therapy, but is neutral about his/her participation.   []         Patient is unable to participate in stated goals/plan of care: ongoing with therapy staff.  []         Other:        Solis Mathis PTA   Time Calculation: 15 mins

## 2021-12-19 NOTE — PROGRESS NOTES
Hospitalist Progress Note    Patient: Basim Medina MRN: [id number]  CSN: [id number]    YOB: 1945  Age: 78 y.o. Sex: male    DOA: 12/11/2021 LOS:  LOS: 8 days          Chief Complaint:    sepsis  Talking too wife on phone     Assessment/Plan        Shekhar ham 78 y. o. male who multiple medical problems including diabetes, hypertension, pulmonary hypertension, pulmonary emphysema brought into the ED via EMS from home with an dwelling Jimenez catheter, fever and altered mental status admitted for sepsis     Sepsis -resolved  PSEUDOMONAS AERUGINOSA, grew from urine culture dated 12/5/2021  No growth urine cx here  colitis via CT scan  Suspicion for c diff also no looose stool they are formed   patient has a chronic indwelling Jimenez  On Rocehin and Flagyl per ID   Leukocytosis prominent still, not sure why, ? willhe need BM biopsy sampling?   Follow daily CBC w/ diff  Improving this weekend      Right renal mass on CT scan, no further workup at this time per urology     Acute metabolic encephalopathyresolved, he is nontoxic appearing  Likely due to sepsis     BRITT -  improved     Tachycardia  Likely due to sepsis  better     Constipation  treated     Diabetestype 2  SSI PRN     Hypertension  Monitor blood pressure     DVT prophylaxis with heparin and Pepcid      He will go to SNF when medically cleared  Continue treatment for infection and monitor leukocytosis     Disposition :  Patient Active Problem List   Diagnosis Code    Lumbar spinal stenosis M48.061    Lumbar spondylosis M47.816    Radiculopathy of leg M54.10    Unable to ambulate R26.2    Urinary retention R33.9    Type II diabetes mellitus with manifestations, uncontrolled ([facility]) E11.8, E11.65    Renal mass N28.89    UTI (urinary tract infection) due to urinary indwelling Jimenez catheter ([facility]) T83.511A, N39.0    Compression fracture of L1 vertebra ([facility]) S32.010A    BRITT (acute kidney injury) ([facility] [address] N17.9    Aneurysm of infrarenal abdominal aorta (HCC) I71.4    HTN (hypertension) I10    Severe protein-calorie malnutrition (HCC) E43    Severe pulmonary hypertension (HCC) I27.20    Sepsis (HCC) A41.9    Hypotension I95.9    Pulmonary hypertension (HCC) I27.20    History of tobacco abuse Z87.891    Pulmonary emphysema (HCC) J43.9    Acute hypoxemic respiratory failure (HCC) J96.01    Tricuspid regurgitation I07.1    Acute metabolic encephalopathy N25.54    Constipation K59.00    Colitis K52.9       Subjective:    I feel fine    Review of systems:    Constitutional: denies fevers, chills  Respiratory: denies SOB, cough  Cardiovascular: denies chest pain  Gastrointestinal: denies nausea, vomiting, diarrhea      Vital signs/Intake and Output:  Visit Vitals  BP (!) 159/84   Pulse 100   Temp 98.3 °F (36.8 °C)   Resp 15   Ht 5' 11\" (1.803 m)   Wt 69 kg (152 lb 3.2 oz)   SpO2 94%   BMI 21.23 kg/m²     Current Shift:  12/19 0701 - 12/19 1900  In: 793.2 [P.O.:200; I.V.:593.2]  Out: 400 [Urine:400]  Last three shifts:  12/17 1901 - 12/19 0700  In: 700 [P.O.:100;  I.V.:600]  Out: 1600 [Urine:1600]    Exam:    General: elderly thin WM, alert, NAD, OX3  CVS:Regular rate and rhythm, no M/R/G, S1/S2 heard, no thrill  Lungs:Clear to auscultation bilaterally, no wheezes, rhonchi, or rales  Abdomen: Soft, Nontender, No distention, Normal Bowel sounds  Extremities: No C/C/E, pulses palpable 2+  Neuro:grossly normal , follows commands  Psych:appropriate  alonso                Labs: Results:       Chemistry Recent Labs     12/18/21  0632 12/17/21  0258   * 110*    147*   K 3.3* 4.1   * 120*   CO2 20* 21   BUN 22* 24*   CREA 1.09 1.21   CA 8.0* 8.1*   AGAP 7 6   BUCR 20 20      CBC w/Diff Recent Labs     12/19/21  0420 12/18/21  0632 12/17/21  0258   WBC 19.1* 22.5* 29.8*   RBC 3.15* 3.14* 3.55*   HGB 8.4* 8.2* 9.3*   HCT 26.7* 26.7* 30.5*    276 298   GRANS 87* 84* 81*   LYMPH 7* 7* 6*   EOS 1 1 1 Cardiac Enzymes No results for input(s): CPK, CKND1, LUH in the last 72 hours. No lab exists for component: CKRMB, TROIP   Coagulation No results for input(s): PTP, INR, APTT, INREXT, INREXT in the last 72 hours. Lipid Panel Lab Results   Component Value Date/Time    Cholesterol, total 146 06/01/2016 07:51 AM    HDL Cholesterol 40 06/01/2016 07:51 AM    LDL, calculated 91.6 06/01/2016 07:51 AM    VLDL, calculated 14.4 06/01/2016 07:51 AM    Triglyceride 72 06/01/2016 07:51 AM    CHOL/HDL Ratio 3.7 06/01/2016 07:51 AM      BNP No results for input(s): BNPP in the last 72 hours. Liver Enzymes No results for input(s): TP, ALB, TBIL, AP in the last 72 hours.     No lab exists for component: SGOT, GPT, DBIL   Thyroid Studies No results found for: T4, T3U, TSH, TSHEXT, TSHEXT     Procedures/imaging: see electronic medical records for all procedures/Xrays and details which were not copied into this note but were reviewed prior to creation of Rocio Ramos MD

## 2021-12-19 NOTE — PROGRESS NOTES
Verbal shift change report given to [address] [place] [address] by Darleen Deleon RN. Report included the following information SBAR, Kardex, Summary, Intake/Output and MAR.

## 2021-12-19 NOTE — PROGRESS NOTES
1206-Called pt wife for him. 1731-Called MD Marimar Pappas because pt has redness on groin and buttocks barrier cream doesn't appear to be improving condition requested Nystatin powder, she said she would place an order. 1923-Verbal shift change report given to [address] by Alexander Lanes, RN. Report included the following information SBAR, Kardex, Summary, Intake/Output and MAR.

## 2021-12-19 NOTE — PROGRESS NOTES
Problem: Falls - Risk of  Goal: *Absence of Falls  Description: Document Zuleyma Zuluaga Fall Risk and appropriate interventions in the flowsheet. Outcome: Progressing Towards Goal  Note: Fall Risk Interventions:  Mobility Interventions: Patient to call before getting OOB    Mentation Interventions: Bed/chair exit alarm    Medication Interventions: Patient to call before getting OOB    Elimination Interventions: Call light in reach    History of Falls Interventions: Bed/chair exit alarm         Problem: Pressure Injury - Risk of  Goal: *Prevention of pressure injury  Description: Document Regan Scale and appropriate interventions in the flowsheet.   Outcome: Progressing Towards Goal  Note: Pressure Injury Interventions:  Sensory Interventions: Pressure redistribution bed/mattress (bed type)    Moisture Interventions: Absorbent underpads    Activity Interventions: Pressure redistribution bed/mattress(bed type)    Mobility Interventions: Pressure redistribution bed/mattress (bed type)    Nutrition Interventions: Document food/fluid/supplement intake    Friction and Shear Interventions: Apply protective barrier, creams and emollients                Problem: General Medical Care Plan  Goal: *Optimal pain control at patient's stated goal  Outcome: Progressing Towards Goal  Goal: *Skin integrity maintained  Outcome: Progressing Towards Goal

## 2021-12-19 NOTE — PROGRESS NOTES
Patient in bed with eyes closed. IV dislodged from left arm. Replaced iv to right arm, fluid running as ordered. Patient denies pain.

## 2021-12-20 NOTE — PROGRESS NOTES
OCCUPATIONAL THERAPY TREATMENT    Problem: Self Care Deficits Care Plan (Adult)  Goal: *Acute Goals and Plan of Care (Insert Text)  Outcome: Progressing Towards Goal     Patient: Jonathon Whitman (18 y.o. male)  Date: 12/20/2021  Diagnosis: Sepsis ([facility] [address] [A41.9] Sepsis ([facility] [address]       Precautions: Fall      Chart, occupational therapy assessment, plan of care, and goals were reviewed. ASSESSMENT:  Pt presents supine in bed, agreeable to therapy. Pt is confused throughout session, needs re orienting to person and situation. Pt performs supine to sit with mod A. Pt needs constant seated support by therapist to maintain upright position, pt unable to follow verbal and manual cues for hand placement. Pt continues with posterior lateral lean to L side. Pt is presented washcloth, able to perform with mod A for cuing to maintain seated balance. Pt needs mod A to brush teeth to unscrew lid as he continues to lose balance in sitting. Pt requires mod A to return to supine, mod A for bed positioning with verbal cues. Left with all needs in reach. Progression toward goals:  []          Improving appropriately and progressing toward goals  [x]          Improving slowly and progressing toward goals  []          Not making progress toward goals and plan of care will be adjusted     PLAN:  Patient continues to benefit from skilled intervention to address the above impairments. Continue treatment per established plan of care. Discharge Recommendations:  Ziyad Chung  Further Equipment Recommendations for Discharge:  N/A     SUBJECTIVE:   Patient stated Jarratt Flower makes sense.     OBJECTIVE DATA SUMMARY:   Cognitive/Behavioral Status:  Neurologic State: Confused  Orientation Level: Disoriented to person,Disoriented to situation  Cognition: Impaired decision making  Safety/Judgement: Decreased awareness of environment,Decreased awareness of need for assistance,Decreased awareness of need for safety    Functional Mobility and Transfers for ADLs:   Bed Mobility:     Supine to Sit: Minimum assistance  Sit to Supine: Maximum assistance  Scooting: Moderate assistance     Balance:  Sitting: Impaired; With support  Sitting - Static: Fair (occasional)  Sitting - Dynamic: Fair (occasional)    ADL Intervention:       Grooming  Grooming Assistance: Moderate assistance  Position Performed: Seated edge of bed  Washing Face: Moderate assistance  Brushing Teeth: Moderate assistance    Cognitive Retraining  Orientation Retraining: Reorienting  Problem Solving: Identifying the problem; Identifying the task  Executive Functions: Managing time;Regulating behavior  Safety/Judgement: Decreased awareness of environment;Decreased awareness of need for assistance;Decreased awareness of need for safety      Pain:  Pain level pre-treatment: 0/10   Pain level post-treatment: 0/10  Pain Intervention(s): Medication administered by RN (see MAR); Rest, Ice, Repositioning   Response to intervention: Nurse notified, See doc flow sheet    Activity Tolerance:    Poor sitting balance during activities leads to increase fatigue. Please refer to the flowsheet for vital signs taken during this treatment. After treatment:   []  Patient left in no apparent distress sitting up in chair  [x]  Patient left in no apparent distress in bed  [x]  Call bell left within reach  [x]  Nursing notified  []  Caregiver present  []  Bed alarm activated    COMMUNICATION/EDUCATION:   [x] Role of Occupational Therapy in the acute care setting  [x] Home safety education was provided and the patient/caregiver indicated understanding. [x] Patient/family have participated as able in working towards goals and plan of care. [x] Patient/family agree to work toward stated goals and plan of care. [] Patient understands intent and goals of therapy, but is neutral about his/her participation. [] Patient is unable to participate in goal setting and plan of care.       Thank you for this referral.  Anjum Brannon OTREYNALDO, OTR/L   Time Calculation: 18 mins

## 2021-12-20 NOTE — PROGRESS NOTES
Called by bedside RN about elevated /98, repeat was 153/94. Reviewed previous BP readings and they are elevated 150s-170s/80s-90s.    Will increase Toprol-XL to 25mg to 50mg, added one dose of 25mg to equal a total of 50mg XL

## 2021-12-20 NOTE — PROGRESS NOTES
[facility]  (A Division of [address] [place] [address])                                                                                                                      Sacha Ortiz MD  Office #: - Option # [phone]  Fax #: [phone]     Date of Admission: 12/11/2021Date of Note: 12/20/2021  Reason for Follow Up: Evaluation and antibiotic management of Leucocytosis. Current Antimicrobials:    Prior Antimicrobials:  Ceftriaxone 12/16 to date # 5  Flagyl IV 12/15 to date    Immunosuppressive drugs: NA Zosyn 12/11 to 12/15  Vanco oral QID 12/15 X1 day       Assessment- ID related:  --------------------------------------------------------------------------  · Leucocytosis( w/neutrophilia) - inclining slowly up-> no occult abscess on CT- visceral organs including prostate look ok except hemangioma liver and R kidney)but has pancolitis on CT w/Contrast. Right renal complex mass-for suspected tumor. --No diarrhea/ tense distension- so doubt C.diff.   --has no rash  --has no focal complaint  --has no joint inflammation  --not on steroid  · Pan-colitis on CT with contrast: Have to consider C.diff in the DDX as at risk, however, pt doesn't look as toxix/septic with this degree of colitis. No acute joint swelling/pain  · Right Kidney lesion worrisome for malignancy- on CT/Ulstrasound- followed by Urology. Stable for past 4 yrs  · Recent Psedumonas UTI12/5/21  · Urinary retention, with indwelling alonso  CT CAP WO contrast: didn't reveal major infectious finding  12/11  BCX: NGSF  UCX: NG  ESR 74  CRP 15.3  Other Medical Issues- Mx per respective team:    · Acute enecephalopathy  · DM   · CKD  · Anemia- HB 9.2  · Hx of lumbar fusion/ L1 laminectomy  · R renal cyst/calcification.  L renal stone-non obstructing     Recommendation for ID issues I am following:  ------------------------------------------------------------------------------  Still mystery what is driving the leucocytosis-> Flow cytometry pending, WBC in decline trend after start of Flagyl/ CTX:    --cont current emperic rx- to extend to 10 days total  --FU flow cytometry  --FU Saccrhromyces serology    Will proceed  MRI T/L for occult infection       leucocytosis could be infectious Vs non infectious source, and he looks good for sepsis from pancolitis even though-- imaging abn way out of clinical finding. May need to look into  removal of presumed renal malignancy-> have noted prior Urology eval stating stable lesion    DW RN, DW Dr Carlos Antonio today       Subjective:  Afebrile, comfortable on RA. Voices no abd/chest complaint - no joint pain or swelling,  N/V.  DW RN-- I/O doesn't chart frequency of BM- states it is loose. Checked pt- doesn't state he has BN- but soft BM present, has diaper on    Alonso in place  Afebrile, WBC declining to 17.3K from a pick of 29K on 12/16    Urology eval reviewed-- no additional investigation- as they have been following renal mass past 4 years since 2017. HPI:  Russ Moran is a 78 y.o. WHITE/NON- with PMH of DM, CKD, recent admission with Pseudmonas UTI, urinary retention with indwelling Alonso came in with fever and confusion per chart review and ED note states wife brought him, unable to take care of him. He was found to have tachycardia 122, soft BP and WBC was high to 23,3K. CT CAP done WO contrast, some changes of colitis, significant finding seen except large BM in colon, and changes in kidney that didn't suggest infectious source. Was placed on zosyn and cultures so far no growth. Patient is oriented to self and place during interview. Feels ok, doesn't have focal comlaint of HA/neck or back pain, chest pain/SOB/Abd pain. Indwelling alonso in place. RN reports soft and frequent BM. No marked change in WBC and remains in 20K's. No rash/itching.         Active Hospital Problems    Diagnosis Date Noted    Colitis 12/13/2021    Acute metabolic encephalopathy 12/12/2021    Constipation 12/12/2021    Sepsis ([facility] [address] 11/23/2021    BRITT (acute kidney injury) ([facility] [address] 07/20/2021    UTI (urinary tract infection) due to urinary indwelling Jimenez catheter ([facility] [address] 05/01/2020    Type II diabetes mellitus with manifestations, uncontrolled ([facility] [address] 09/16/2017     Past Medical History:   Diagnosis Date    Atherosclerosis     Diabetes ([facility] [address] 2013    type 2    High cholesterol     Hyperlipidemia     Hypertension 2013    Muscle weakness     Neoplasm     right kidney    Severe pulmonary hypertension ([facility] [address]     Spinal stenosis     Urinary retention     Vitamin D deficiency      Past Surgical History:   Procedure Laterality Date    HX HEENT      40 years ago deviated septum    HX LUMBAR LAMINECTOMY  2017    HX ORTHOPAEDIC      Lumbar laminectomy 9/7/17    HX ORTHOPAEDIC Right     CTR     Family History   Problem Relation Age of Onset    Heart Disease Father      Social History     Socioeconomic History    Marital status:      Spouse name: Not on file    Number of children: Not on file    Years of education: Not on file    Highest education level: Not on file   Occupational History    Not on file   Tobacco Use    Smoking status: Former Smoker    Smokeless tobacco: Never Used   Vaping Use    Vaping Use: Never used   Substance and Sexual Activity    Alcohol use: No    Drug use: No    Sexual activity: Not on file   Other Topics Concern     Service Not Asked    Blood Transfusions Not Asked    Caffeine Concern Not Asked    Occupational Exposure Not Asked    Hobby Hazards Not Asked    Sleep Concern Not Asked    Stress Concern Not Asked    Weight Concern Not Asked    Special Diet Not Asked    Back Care Not Asked    Exercise Not Asked    Bike Helmet Not Asked   [address] Not Asked    Self-Exams Not Asked   Social History Narrative    Not on file     Social Determinants of Health     Financial Resource Strain:     Difficulty of Paying Living Expenses: Not on file   Food Insecurity:     Worried About Running Out of Food in the Last Year: Not on file    Osmany of Food in the Last Year: Not on file   Transportation Needs:     Lack of Transportation (Medical): Not on file    Lack of Transportation (Non-Medical):  Not on file   Physical Activity:     Days of Exercise per Week: Not on file    Minutes of Exercise per Session: Not on file   Stress:     Feeling of Stress : Not on file   Social Connections:     Frequency of Communication with Friends and Family: Not on file    Frequency of Social Gatherings with Friends and Family: Not on file    Attends Ep[employer] Services: Not on file    Active Member of Clubs or Organizations: Not on file    Attends Club or Organization Meetings: Not on file    Marital Status: Not on file   Intimate Partner Violence:     Fear of Current or Ex-Partner: Not on file    Emotionally Abused: Not on file    Physically Abused: Not on file    Sexually Abused: Not on file   Housing Stability:     Unable to Pay for Housing in the Last Year: Not on file    Number of Places Lived in the Last Year: Not on file    Unstable Housing in the Last Year: Not on file       Allergies:  Zocor [simvastatin]     Medications:  Current Facility-Administered Medications   Medication Dose Route Frequency    metoprolol succinate (TOPROL-XL) XL tablet 25 mg  25 mg Oral DAILY    metoprolol succinate (TOPROL-XL) XL tablet 50 mg  50 mg Oral QHS    nystatin (MYCOSTATIN) 100,000 unit/gram powder   Topical BID    cefTRIAXone (ROCEPHIN) 1 g in 0.9% sodium chloride (MBP/ADV) 50 mL MBP  1 g IntraVENous Q24H    metroNIDAZOLE (FLAGYL) tablet 500 mg  500 mg Oral TID    aspirin chewable tablet 81 mg  81 mg Oral DAILY    atorvastatin (LIPITOR) tablet 20 mg  20 mg Oral QHS    traZODone (DESYREL) tablet 50 mg  50 mg Oral QHS    insulin lispro (HUMALOG) injection   SubCUTAneous AC&HS    glucose chewable tablet 16 g  4 Tablet Oral PRN    glucagon (GLUCAGEN) injection 1 mg  1 mg IntraMUSCular PRN    dextrose (D50W) injection syrg 12.5-25 g  25-50 mL IntraVENous PRN    sodium chloride (NS) flush 5-40 mL  5-40 mL IntraVENous Q8H    sodium chloride (NS) flush 5-40 mL  5-40 mL IntraVENous PRN    acetaminophen (TYLENOL) tablet 650 mg  650 mg Oral Q6H PRN    Or    acetaminophen (TYLENOL) suppository 650 mg  650 mg Rectal Q6H PRN    polyethylene glycol (MIRALAX) packet 17 g  17 g Oral DAILY PRN    ondansetron (ZOFRAN ODT) tablet 4 mg  4 mg Oral Q8H PRN    Or    ondansetron (ZOFRAN) injection 4 mg  4 mg IntraVENous Q6H PRN    famotidine (PEPCID) tablet 20 mg  20 mg Oral DAILY    heparin (porcine) injection 5,000 Units  5,000 Units SubCUTAneous Q8H    alum-mag hydroxide-simeth (MYLANTA) oral suspension 15 mL  15 mL Oral Q6H PRN    bisacodyL (DULCOLAX) tablet 5 mg  5 mg Oral DAILY PRN        ROS:  Pertinent items are noted in the History of Present Illness. Physical Exam:    Temp (24hrs), Av.3 °F (36.8 °C), Min:97.3 °F (36.3 °C), Max:99.1 °F (37.3 °C)    Visit Vitals  BP (!) 167/77 (BP 1 Location: Right upper arm, BP Patient Position: At rest)   Pulse 90   Temp 98.1 °F (36.7 °C)   Resp 20   Ht 5' 11\" (1.803 m)   Wt 69.2 kg (152 lb 9.6 oz)   SpO2 96%   BMI 21.28 kg/m²          GEN: WD chronically ill looking, not in any discomfort. 2L nasal canula    HEENT: Unicteric. EOMI intact  CHEST: Non laboured breathing. CTA  CVS:RRR, no mur/gallop  ABD: Obese/soft. Soft and non distended. No reboud tenderness. ABS  Soft BM - has dipaer on  JOHANNA:alonso in place  EXT: No apparent swelling or redness on UE/LE joints. No arthritis noted  Skin: Dry and intact. Exocriated groin area- has powder in place  CNS: A, OX3. Moves all extremity. CN grossly ok.       Microbiology  All Micro Results     Procedure Component Value Units Date/Time    CULTURE, BLOOD [[id number]] Collected: 21 0328    Order Status: Completed Specimen: Blood Updated: 21     Special Requests: NO SPECIAL REQUESTS        Culture result: NO GROWTH 6 DAYS       CULTURE, BLOOD [[id number]] Collected: 12/11/21 0328    Order Status: Completed Specimen: Blood Updated: 12/17/21 0720     Special Requests: NO SPECIAL REQUESTS        Culture result: NO GROWTH 6 DAYS       CULTURE, URINE [[id number]] Collected: 12/11/21 0335    Order Status: Completed Specimen: Urine from Clean catch Updated: 12/12/21 1916     Special Requests: NO SPECIAL REQUESTS        Culture result: No growth (<1,000 CFU/ML)            Lab results:    Chemistry  Recent Labs     12/20/21  0850 12/18/21  0632   * 109*   * 145   K 4.0 3.3*   * 118*   CO2 24 20*   BUN 20* 22*   CREA 0.92 1.09   CA 7.8* 8.0*   AGAP 5 7   BUCR 22* 20       CBC w/ Diff  Recent Labs     12/20/21  0850 12/19/21  0420 12/18/21  0632   WBC 17.3* 19.1* 22.5*   RBC 3.55* 3.15* 3.14*   HGB 9.2* 8.4* 8.2*   HCT 30.1* 26.7* 26.7*    266 276   GRANS PENDING 87* 84*   LYMPH PENDING 7* 7*   EOS PENDING 1 1       Imaging: report posted below as per radiologist   CT CAP WO        Wall thickening of the rectum. This may be due to colitis/proctitis. Relatively  large amount of fecal material throughout the colon. This could also be a source  of inflammation.     Otherwise no evidence of acute abnormality.     Atherosclerotic vascular disease. Emphysema. Large right renal cyst    CXR: No acute process    12/15/21  CTA chest:  Exam limited by motion artifact. No pulmonary emboli identified   Minimal bilateral pleural effusions. Emphysema      CT AP with contrast:    Pancolitis. Interval progression of wall thickening to involve nearly the entire  colon. Thumb printing present raising the possibility of C. difficile. This  involves 2 vascular territories making ischemic colitis unlikely     Enhancing renal mass lower pole right kidney as seen on prior ultrasound     No hydronephrosis.

## 2021-12-20 NOTE — PROGRESS NOTES
Nutrition Assessment     Type and Reason for Visit: Reassess    Nutrition Recommendations/Plan: Continue with current diet order and ensure enlive TID    Nutrition Assessment:  PMHx: diabetes, HTN, pulmonary hypertension, pulmonary emphysema. Admitted with sepsis- resolved, metabolic encephalopathy- resolved, BRITT- improved. Malnutrition Assessment:  Malnutrition Status: Severe malnutrition     Estimated Daily Nutrient Needs:  Energy (kcal):  3866-0520  Protein (g):  55-69       Fluid (ml/day):  0488-3333    Nutrition Related Findings:  Labs: Na 146, Ca 7.8. Med: humalog, pepcid, lipitor. BM 12/20. per pt- appetite is okay. Eats about 50% of meals and has been drinking ensure enlive (about 50%). Stated did not order breakfast or lunch yet. Encouraged pt to order lunch. Will continue to monitor while admitted. Current Nutrition Therapies:  ADULT DIET Regular; Low Fat/Low Chol/High Fiber/EVENS  ADULT ORAL NUTRITION SUPPLEMENT Breakfast, Lunch, Dinner; Other Supplement;  Ensure    Anthropometric Measures:  · Height:  5' 11\" (180.3 cm)  · Current Body Wt:  69.2 kg (152 lb 8.9 oz) (12/20)  · BMI: 21.3    Nutrition Diagnosis:   · Inadequate energy intake related to acute injury/trauma as evidenced by intake 26-50%    Nutrition Intervention:  Food and/or Nutrient Delivery: Continue current diet,Continue oral nutrition supplement  Nutrition Education and Counseling: No recommendations at this time  Coordination of Nutrition Care: Continue to monitor while inpatient    Goals:  Patient will continue to meet >75% of estimated nutritional needs throughout the next 3-7 days       Nutrition Monitoring and Evaluation:   Behavioral-Environmental Outcomes: None identified  Food/Nutrient Intake Outcomes: Food and nutrient intake,Supplement intake  Physical Signs/Symptoms Outcomes: Biochemical data,Meal time behavior,Nutrition focused physical findings,Skin,Weight,GI status,Nausea/vomiting    Discharge Planning:    Continue current diet,Continue oral nutrition supplement     Electronically signed by Dennise Dakins, RD on 12/20/2021 at 12:54 PM

## 2021-12-20 NOTE — PROGRESS NOTES
Hospitalist Progress Note    Patient: Rufus Walker MRN: [id number]  CSN: [id number]    YOB: 1945  Age: 78 y.o. Sex: male    DOA: 12/11/2021 LOS:  LOS: 9 days          Chief Complaint:    colitis      Assessment/Plan        Hortencia Mcmahan a 78 y. o. male who multiple medical problems including diabetes, hypertension, pulmonary hypertension, pulmonary emphysema brought into the ED via EMS from home with an dwelling Jimenez catheter, fever and altered mental status admitted for sepsis     Sepsis -resolved  No growth urine cx here  colitis via CT scan, clinically has diarrhea  No evidence for c diff  patient has a chronic indwelling Jimenez  On Rocephin and Flagyl per ID   Wbc has improved now, hopefully trend continues     Right renal mass on CT scan, no further workup at this time per urology     Acute metabolic encephalopathyresolved, he is nontoxic appearing  Likely due to sepsis     BRITT -  improved     Tachycardia  Likely due to sepsis  Better    Uncontrolled HTN-add norvasc to toprol XL      Diabetestype 2  SSI PRN      DVT prophylaxis with heparin and Pepcid     D/c plan: SNF-1-2 days if he stabilizes counts and is improving  Disposition :  Patient Active Problem List   Diagnosis Code    Lumbar spinal stenosis M48.061    Lumbar spondylosis M47.816    Radiculopathy of leg M54.10    Unable to ambulate R26.2    Urinary retention R33.9    Type II diabetes mellitus with manifestations, uncontrolled ([facility]) E11.8, E11.65    Renal mass N28.89    UTI (urinary tract infection) due to urinary indwelling Jimenez catheter ([facility]) T83.511A, N39.0    Compression fracture of L1 vertebra ([facility]) S32.010A    BRITT (acute kidney injury) ([facility] N17.9    Aneurysm of infrarenal abdominal aorta ([facility]) I71.4    HTN (hypertension) I10    Severe protein-calorie malnutrition (HCC) E43    Severe pulmonary hypertension (HCC) I27.20    Sepsis ([facility]) A41.9    Hypotension I95.9    Pulmonary hypertension ([facility] I27.20    History of tobacco abuse Z87.891    Pulmonary emphysema ([facility] J43.9    Acute hypoxemic respiratory failure (HCC) J96.01    Tricuspid regurgitation I07.1    Acute metabolic encephalopathy Y79.76    Constipation K59.00    Colitis K52.9       Subjective:    im good  Denies abd pain, nausea, diarrhea    But nurse states overnight she was told check him regularly as he has regualr loose stools and none are charted however    Review of systems:    Constitutional: denies fevers, chills  Respiratory: denies SOB, cough  Cardiovascular: denies chest pain, palpitations  Gastrointestinal: denies nausea, vomiting      Vital signs/Intake and Output:  Visit Vitals  BP (!) 167/77 (BP 1 Location: Right upper arm, BP Patient Position: At rest)   Pulse 90   Temp 98.1 °F (36.7 °C)   Resp 20   Ht 5' 11\" (1.803 m)   Wt 69.2 kg (152 lb 9.6 oz)   SpO2 96%   BMI 21.28 kg/m²     Current Shift:  12/20 0701 - 12/20 1900  In: -   Out: 350 [Urine:350]  Last three shifts:  12/18 1901 - 12/20 0700  In: 1363.2 [P.O.:320;  I.V.:1043.2]  Out: 1450 [Urine:1450]    Exam:    General: thin nontoxic elderly WM alert, NAD, looks well  CVS:Regular rate and rhythm, no M/R/G, S1/S2 heard, no thrill  Lungs:Clear to auscultation bilaterally, no wheezes, rhonchi, or rales  Abdomen: Soft, Nontender, No distention, Normal Bowel sounds  Extremities: No C/C/E, pulses palpable 2+  Neuro:grossly normal , follows commands  Psych:appropriate                Labs: Results:       Chemistry Recent Labs     12/20/21  0850 12/18/21  0632   * 109*   * 145   K 4.0 3.3*   * 118*   CO2 24 20*   BUN 20* 22*   CREA 0.92 1.09   CA 7.8* 8.0*   AGAP 5 7   BUCR 22* 20      CBC w/Diff Recent Labs     12/20/21  0850 12/19/21  0420 12/18/21  0632   WBC 17.3* 19.1* 22.5*   RBC 3.55* 3.15* 3.14*   HGB 9.2* 8.4* 8.2*   HCT 30.1* 26.7* 26.7*    266 276   GRANS 80* 87* 84*   LYMPH 6* 7* 7*   EOS 1 1 1      Cardiac Enzymes No results for input(s): CPK, CKND1, LUH in the last 72 hours. No lab exists for component: CKRMB, TROIP   Coagulation No results for input(s): PTP, INR, APTT, INREXT, INREXT in the last 72 hours. Lipid Panel Lab Results   Component Value Date/Time    Cholesterol, total 146 06/01/2016 07:51 AM    HDL Cholesterol 40 06/01/2016 07:51 AM    LDL, calculated 91.6 06/01/2016 07:51 AM    VLDL, calculated 14.4 06/01/2016 07:51 AM    Triglyceride 72 06/01/2016 07:51 AM    CHOL/HDL Ratio 3.7 06/01/2016 07:51 AM      BNP No results for input(s): BNPP in the last 72 hours. Liver Enzymes No results for input(s): TP, ALB, TBIL, AP in the last 72 hours.     No lab exists for component: SGOT, GPT, DBIL   Thyroid Studies No results found for: T4, T3U, TSH, TSHEXT, TSHEXT     Procedures/imaging: see electronic medical records for all procedures/Xrays and details which were not copied into this note but were reviewed prior to creation of Faye Lincoln MD

## 2021-12-20 NOTE — PROGRESS NOTES
D/C Plan: [facility] (formerly [facility])     Noted pt is not medically stable to transition from an acute care setting at this time. Anticipate pt transition to the above facility with in the next 24-48 hours.       Transition of Care Plan: [facility]    Communication to Patient/Family:  Met with patient and family, and they are agreeable to the transition plan. The Plan for Transition of Care is related to the following treatment goals: SNF    The Patient and/or patient representative was provided with a choice of provider and agrees   with the discharge plan. Yes [x] No []    Freedom of choice list was provided with basic dialogue that supports the patient's individualized plan of care/goals and shares the quality data associated with the providers. Yes [x] No []    SNF/Rehab Transition:  Patient has been accepted to [address] [facility]) at [address] [place] [address], [address] [place] [address] for SNF and meets criteria for admission. Patient will transported by medical transport and expected to leave with in the next 24-48 hours pending medical stability. Communication to SNF/Rehab:  Bedside RN,  has been notified to update the transition plan to the facility and call report [phone]. Discharge information has been updated on the AVS and communicated through [facility] or [facility]. Discharge instructions to be fax'd to facility at [phone]     Please include all hard scripts for controlled substances, med rec and dc summary, and AVS in packet. Please medicate for pain prior to dc if possible and needed to help offset delay when patient first arrives to facility. Reviewed and confirmed with facility, [facility] can manage the patient care needs for the following:     Sherald Spurling with (X) only those applicable:  Medication:  []Medications are available at the facility  []IV Antibiotics    []Controlled Substance  hard copies available sent.   []Weekly Labs Equipment:  []CPAP/BiPAP  []Wound Vacuum  []Jimenez or Urinary Device  []PICC/Central Line  []Nebulizer  []Ventilator    Treatment:  []Isolation (for MRSA, VRE, etc.)  []Surgical Drain Management  []Tracheostomy Care  []Dressing Changes  []Dialysis with transportation  []PEG Care  []Oxygen  []Daily Weights for Heart Failure    Dietary:  []Any diet limitations  []Tube Feedings   []Total Parenteral Management (TPN)    Financial Resources:  []Medicaid Application Completed    [x]UAI Completed and copy given to pt/family. []A screening has previously been completed. []Level II Completed    [] Private pay individual who will not become   financially eligible for Medicaid within 6 months from admission to a [address] [place] [address] facility. [] Individual refused to have screening conducted. []Medicaid Application Completed    []The screening denied because it was determined individual did not need/did not qualify for nursing facility level of care. [] Out of state residents seeking direct admission to a [address] facility. [] Individuals who are inpatients of an out of state hospital, or in state or out of state veterans/ hospital and seek direct admission to a [address] facility  [] Individuals who are pateints or residents of a state owned/operated facility that is licensed by [employer] of [employer] ([employer]) and seek direct admission to [address] [place] [address]  [] A screening not required for enrollment in [facility] as set out in [address] [place] [address] 50-  [] [facility]) staff shall perform screenings of the [facility] clients. Advanced Care Plan:  []Surrogate Decision Maker of Care  []POA  []Communicated Code Status and copy sent. Other:         Care Management Interventions  PCP Verified by CM:  Yes  Mode of Transport at Discharge: BLS  Transition of Care Consult (CM Consult): SNF  Partner SNF: Yes  Health Maintenance Reviewed: Yes  Physical Therapy Consult: Yes  Occupational Therapy Consult: Yes  Support Systems: Spouse/Significant Other  Confirm Follow Up Transport: Family  The Plan for Transition of Care is Related to the Following Treatment Goals :  [facility] (formerly [address] [place] [address])  The Patient and/or Patient Representative was Provided with a Choice of Provider and Agrees with the Discharge Plan?: Yes  Name of the Patient Representative Who was Provided with a Choice of Provider and Agrees with the Discharge Plan: spouse  Freedom of Choice List was Provided with Basic Dialogue that Supports the Patient's Individualized Plan of Care/Goals, Treatment Preferences and Shares the Quality Data Associated with the Providers?: Yes  Discharge Location  Discharge Placement: Skilled nursing facility

## 2021-12-20 NOTE — PROGRESS NOTES
Problem: Falls - Risk of  Goal: *Absence of Falls  Description: Document Adrián Sol Fall Risk and appropriate interventions in the flowsheet. Outcome: Progressing Towards Goal  Note: Fall Risk Interventions:  Mobility Interventions: PT Consult for mobility concerns    Mentation Interventions: More frequent rounding    Medication Interventions: Patient to call before getting OOB    Elimination Interventions: Call light in reach    History of Falls Interventions: Consult care management for discharge planning         Problem: Pressure Injury - Risk of  Goal: *Prevention of pressure injury  Description: Document Regan Scale and appropriate interventions in the flowsheet.   Outcome: Progressing Towards Goal  Note: Pressure Injury Interventions:  Sensory Interventions: Keep linens dry and wrinkle-free    Moisture Interventions: Apply protective barrier, creams and emollients,Absorbent underpads    Activity Interventions: Pressure redistribution bed/mattress(bed type)    Mobility Interventions: Pressure redistribution bed/mattress (bed type)    Nutrition Interventions: Document food/fluid/supplement intake    Friction and Shear Interventions: Apply protective barrier, creams and emollients                Problem: General Medical Care Plan  Goal: *Anxiety reduced or absent  Outcome: Progressing Towards Goal     Problem: Pain  Goal: *Control of Pain  Outcome: Progressing Towards Goal

## 2021-12-21 NOTE — PROGRESS NOTES
1940 - Bedside report received from [facility]. Patient in bed. Pain 0/10.     2005 - Patient in bed at this time. IV to RH  intact and patent.  + CMS. Pt A & O x 2. LS clear, on 2 L NC. Ant Confer Abdomen soft, NT and ND. + BS to all 4 quadrants. Denies nausea. Pain 0/10. Jimenez with summer urine. Call light within reach. Pt had an uneventful shift. No issues/concerns at this time. Call bell within reach  Bedside and Verbal shift change report given to Donovan Gracia by Sarah Upton. Report included the following information SBAR, Kardex, OR Summary, Intake/Output and MAR.

## 2021-12-21 NOTE — PROGRESS NOTES
OT treatment attempted, pt is off unit for testing.  Will follow up as schedule allows  Nichole Reyes OTD, OTR/L

## 2021-12-21 NOTE — PROGRESS NOTES
Hospitalist Progress Note    Patient: Deon Mabry MRN: [id number]  CSN: [id number]    YOB: 1945  Age: 78 y.o. Sex: male    DOA: 12/11/2021 LOS:  LOS: 10 days          Chief Complaint:    colitis    Assessment/Plan     Andreina Munguia a 78 y. o. male who multiple medical problems including diabetes, hypertension, pulmonary hypertension, pulmonary emphysema brought into the ED via EMS from home with an dwelling Alonso catheter, fever and altered mental status admitted for sepsis     Sepsis -resolved  No growth urine cx here  colitis via CT scan, clinically has diarrhea  Repeated CT scan shows colitis changes  But overall bandemia has resolved, neutrophil count down, overall wbc count is improved  patient has a chronic indwelling Alonso  On Rocephin and Flagyl per ID       MRI of spine ordered by ID     Right renal mass on CT scan, no further workup at this time per urology, looks like neoplasm per MRI today     Acute metabolic encephalopathyresolved, he is nontoxic appearing  Likely due to sepsis     BRITT -  Improved    Chronic urinary retention-alonso     Tachycardia  Likely due to sepsis  Better    Pulm HTN    ?mild dementia    Malnutrition, weakness     Uncontrolled HTN-added norvasc to toprol XL      Diabetestype 2  SSI PRN      DVT prophylaxis with heparin and Pepcid     Consider GI consult if they would consider scope here, but not certain with infection possible    Will go to SNF when stabilized      Disposition :  Patient Active Problem List   Diagnosis Code    Lumbar spinal stenosis M48.061    Lumbar spondylosis M47.816    Radiculopathy of leg M54.10    Unable to ambulate R26.2    Urinary retention R33.9    Type II diabetes mellitus with manifestations, uncontrolled ([facility]) E11.8, E11.65    Renal mass N28.89    UTI (urinary tract infection) due to urinary indwelling Alonso catheter ([facility]) T83.511A, N39.0    Compression fracture of L1 vertebra ([facility]) S32.010A    BRITT (acute kidney injury) ([facility] N17.9    Aneurysm of infrarenal abdominal aorta (HCC) I71.4    HTN (hypertension) I10    Severe protein-calorie malnutrition ([facility] E43    Severe pulmonary hypertension (HCC) I27.20    Sepsis ([facility] A41.9    Hypotension I95.9    Pulmonary hypertension (HCC) I27.20    History of tobacco abuse Z87.891    Pulmonary emphysema (HCC) J43.9    Acute hypoxemic respiratory failure (HCC) J96.01    Tricuspid regurgitation I07.1    Acute metabolic encephalopathy M25.80    Constipation K59.00    Colitis K52.9       Subjective:  im doing fine    No issues per nurse reported  Went for repeat CT this am      Review of systems:    Constitutional: denies fevers, chills  Respiratory: denies SOB  Cardiovascular: denies chest pain  Gastrointestinal: denies nausea, vomiting, diarrhea      Vital signs/Intake and Output:  Visit Vitals  BP (!) 149/87 (BP 1 Location: Right upper arm, BP Patient Position: At rest)   Pulse 91   Temp 97.9 °F (36.6 °C)   Resp 18   Ht 5' 11\" (1.803 m)   Wt 67.3 kg (148 lb 5.9 oz)   SpO2 95%   BMI 20.69 kg/m²     Current Shift:  No intake/output data recorded. Last three shifts:  12/19 1901 - 12/21 0700  In: 690 [P.O.:240;  I.V.:450]  Out: 1550 [Urine:1550]    Exam:    General: frail weak WM NAD nontoxic, good spirits, ox2  CVS:Regular rate and rhythm, no M/R/G, S1/S2 heard, no thrill  Lungs:Clear to auscultation bilaterally, no wheezes, rhonchi, or rales  Abdomen: Soft, Nontender, No distention, Normal Bowel sounds  Extremities: No C/C/E, pulses palpable 2+  Neuro:grossly normal , follows commands  Psych:appropriate                Labs: Results:       Chemistry Recent Labs     12/21/21  0355 12/20/21  0850   * 117*   * 146*   K 3.8 4.0   * 117*   CO2 26 24   BUN 17 20*   CREA 0.98 0.92   CA 7.9* 7.8*   AGAP 6 5   BUCR 17 22*     --    TP 5.6*  --    ALB 1.9*  --    GLOB 3.7  --    AGRAT 0.5*  --       CBC w/Diff Recent Labs     12/21/21  035 12/20/21  0850 12/19/21  0420   WBC 19.0* 17.3* 19.1*   RBC 3.53* 3.55* 3.15*   HGB 9.1* 9.2* 8.4*   HCT 30.0* 30.1* 26.7*    292 266   GRANS 72 80* 87*   LYMPH 11* 6* 7*   EOS 3 1 1      Cardiac Enzymes No results for input(s): CPK, CKND1, LUH in the last 72 hours. No lab exists for component: CKRMB, TROIP   Coagulation No results for input(s): PTP, INR, APTT, INREXT in the last 72 hours. Lipid Panel Lab Results   Component Value Date/Time    Cholesterol, total 146 06/01/2016 07:51 AM    HDL Cholesterol 40 06/01/2016 07:51 AM    LDL, calculated 91.6 06/01/2016 07:51 AM    VLDL, calculated 14.4 06/01/2016 07:51 AM    Triglyceride 72 06/01/2016 07:51 AM    CHOL/HDL Ratio 3.7 06/01/2016 07:51 AM      BNP No results for input(s): BNPP in the last 72 hours.    Liver Enzymes Recent Labs     12/21/21  0355   TP 5.6*   ALB 1.9*         Thyroid Studies No results found for: T4, T3U, TSH, TSHEXT     Procedures/imaging: see electronic medical records for all procedures/Xrays and details which were not copied into this note but were reviewed prior to creation of Kell Nicholson MD

## 2021-12-21 NOTE — PROGRESS NOTES
[facility]  (A Division of [address] [place] [address])                                                                                                                      Nicola Parekh MD  Office #: - Option # [phone]  Fax #: [phone]     Date of Admission: 12/11/2021Date of Note: 12/21/2021  Reason for Follow Up: Evaluation and antibiotic management of Leucocytosis. Current Antimicrobials:    Prior Antimicrobials:  Ceftriaxone 12/16 to date # 5  Flagyl IV 12/15 to date    Immunosuppressive drugs: NA Zosyn 12/11 to 12/15  Vanco oral QID 12/15 X1 day       Assessment- ID related:  --------------------------------------------------------------------------  · Persistent Leucocytosis( w/neutrophilia) - inclining slowly up-> no occult abscess on CT- visceral organs including prostate look ok except hemangioma liver and R kidney)but has pancolitis on CT w/Contrast on 12/14 and 12/21  ·  Right renal complex mass-C/W malignancy  --saccharomyces serology negative 12/18  --Flow cytometry: NO DIAGNOSTIC IMMUNOPHENOTYPIC ABNORMALITIES DETECTED. --No diarrhea/ tense distension- so doubt C.diff.   --has no rash  --has no focal complaint  --has no joint inflammation  --not on steroid  · Pan-colitis on CT with contrast: Have to consider C.diff in the DDX as at risk, however, pt doesn't look as toxix/septic with this degree of colitis. No acute joint swelling/pain  · Right Kidney lesion worrisome for malignancy- on CT/Ulstrasound- followed by Urology. Stable for past 4 yrs  · Recent Psedumonas UTI12/5/21  · Urinary retention, with indwelling alonso  CT CAP WO contrast: didn't reveal major infectious finding  12/11  BCX: NGSF  UCX: NG  ESR 74  CRP 15.3  Other Medical Issues- Mx per respective team:    · Acute enecephalopathy  · DM   · CKD  · Anemia- HB 9.2  · Hx of lumbar fusion/ L1 laminectomy  · R renal cyst/calcification.  L renal stone-non obstructing     Recommendation for ID issues I am following:  ------------------------------------------------------------------------------  Abn so far with pancolitis on CT and R Renal neoplasm with leucocytosis  MRI of C and L - no obvious infectious finding,  MRI of T-> pending  FU MRI of T  tomorrow    --DC ceftriaxone- 10 days of ABX Zosyn-> CTX  --cont Flagyl PO  Follow change with WBC on above  --can add oral vanco for tiral of C.diff vs Staph colitis-- though doubt that is the case  --GI opinion would be good- can discuss tomorrow  --Kidney neoplasm per Urology, may need to be done sooner    DW Dr Gifty Skinner          Subjective:  Afebrile, comfortable on RA. WBC remains elevated 19K  abd isnot distended  Frequent soft BM    Alonso in place  Afebrile, WBC declining to 17.3K from a pick of 29K on 12/16    Urology eval reviewed-- no additional investigation- as they have been following renal mass past 4 years since 2017. HPI:  Blanca Burgess is a 78 y.o. WHITE/NON- with PMH of DM, CKD, recent admission with Pseudmonas UTI, urinary retention with indwelling Alonso came in with fever and confusion per chart review and ED note states wife brought him, unable to take care of him. He was found to have tachycardia 122, soft BP and WBC was high to 23,3K. CT CAP done WO contrast, some changes of colitis, significant finding seen except large BM in colon, and changes in kidney that didn't suggest infectious source. Was placed on zosyn and cultures so far no growth. Patient is oriented to self and place during interview. Feels ok, doesn't have focal comlaint of HA/neck or back pain, chest pain/SOB/Abd pain. Indwelling alonso in place. RN reports soft and frequent BM. No marked change in WBC and remains in 20K's. No rash/itching.         Active Hospital Problems    Diagnosis Date Noted    Colitis 12/13/2021    Acute metabolic encephalopathy 00/[id number]/9672    Constipation 12/12/2021    Sepsis ([facility] 11/23/2021    BRITT (acute kidney injury) ([facility] 07/20/2021    UTI (urinary tract infection) due to urinary indwelling Jimenez catheter ([facility] 05/01/2020    Type II diabetes mellitus with manifestations, uncontrolled ([facility] 09/16/2017     Past Medical History:   Diagnosis Date    Atherosclerosis     Diabetes ([facility] 2013    type 2    High cholesterol     Hyperlipidemia     Hypertension 2013    Muscle weakness     Neoplasm     right kidney    Severe pulmonary hypertension ([facility]     Spinal stenosis     Urinary retention     Vitamin D deficiency      Past Surgical History:   Procedure Laterality Date    HX HEENT      40 years ago deviated septum    HX LUMBAR LAMINECTOMY  2017    HX ORTHOPAEDIC      Lumbar laminectomy 9/7/17    HX ORTHOPAEDIC Right     CTR     Family History   Problem Relation Age of Onset    Heart Disease Father      Social History     Socioeconomic History    Marital status:      Spouse name: Not on file    Number of children: Not on file    Years of education: Not on file    Highest education level: Not on file   Occupational History    Not on file   Tobacco Use    Smoking status: Former Smoker    Smokeless tobacco: Never Used   Vaping Use    Vaping Use: Never used   Substance and Sexual Activity    Alcohol use: No    Drug use: No    Sexual activity: Not on file   Other Topics Concern     Service Not Asked    Blood Transfusions Not Asked    Caffeine Concern Not Asked    Occupational Exposure Not Asked    Hobby Hazards Not Asked    Sleep Concern Not Asked    Stress Concern Not Asked    Weight Concern Not Asked    Special Diet Not Asked    Back Care Not Asked    Exercise Not Asked    Bike Helmet Not Asked   [address] Not Asked    Self-Exams Not Asked   Social History Narrative    Not on file     Social Determinants of Health     Financial Resource Strain:     Difficulty of Paying Living Expenses: Not on file   Food Insecurity:     Worried About Running Out of Food in the Last Year: Not on file    [address] in the Last Year: Not on file   Transportation Needs:     Lack of Transportation (Medical): Not on file    Lack of Transportation (Non-Medical):  Not on file   Physical Activity:     Days of Exercise per Week: Not on file    Minutes of Exercise per Session: Not on file   Stress:     Feeling of Stress : Not on file   Social Connections:     Frequency of Communication with Friends and Family: Not on file    Frequency of Social Gatherings with Friends and Family: Not on file    Attends [employer] Services: Not on file    Active Member of Clubs or Organizations: Not on file    Attends Club or Organization Meetings: Not on file    Marital Status: Not on file   Intimate Partner Violence:     Fear of Current or Ex-Partner: Not on file    Emotionally Abused: Not on file    Physically Abused: Not on file    Sexually Abused: Not on file   Housing Stability:     Unable to Pay for Housing in the Last Year: Not on file    Number of Places Lived in the Last Year: Not on file    Unstable Housing in the Last Year: Not on file       Allergies:  Zocor [simvastatin]     Medications:  Current Facility-Administered Medications   Medication Dose Route Frequency    metoprolol succinate (TOPROL-XL) XL tablet 50 mg  50 mg Oral QHS    amLODIPine (NORVASC) tablet 10 mg  10 mg Oral DAILY    nystatin (MYCOSTATIN) 100,000 unit/gram powder   Topical BID    cefTRIAXone (ROCEPHIN) 1 g in 0.9% sodium chloride (MBP/ADV) 50 mL MBP  1 g IntraVENous Q24H    metroNIDAZOLE (FLAGYL) tablet 500 mg  500 mg Oral TID    aspirin chewable tablet 81 mg  81 mg Oral DAILY    atorvastatin (LIPITOR) tablet 20 mg  20 mg Oral QHS    traZODone (DESYREL) tablet 50 mg  50 mg Oral QHS    insulin lispro (HUMALOG) injection   SubCUTAneous AC&HS    glucose chewable tablet 16 g  4 Tablet Oral PRN    glucagon (GLUCAGEN) injection 1 mg  1 mg IntraMUSCular PRN    dextrose (D50W) injection syrg 12.5-25 g  25-50 mL IntraVENous PRN    sodium chloride (NS) flush 5-40 mL  5-40 mL IntraVENous Q8H    sodium chloride (NS) flush 5-40 mL  5-40 mL IntraVENous PRN    acetaminophen (TYLENOL) tablet 650 mg  650 mg Oral Q6H PRN    Or    acetaminophen (TYLENOL) suppository 650 mg  650 mg Rectal Q6H PRN    polyethylene glycol (MIRALAX) packet 17 g  17 g Oral DAILY PRN    ondansetron (ZOFRAN ODT) tablet 4 mg  4 mg Oral Q8H PRN    Or    ondansetron (ZOFRAN) injection 4 mg  4 mg IntraVENous Q6H PRN    famotidine (PEPCID) tablet 20 mg  20 mg Oral DAILY    heparin (porcine) injection 5,000 Units  5,000 Units SubCUTAneous Q8H    alum-mag hydroxide-simeth (MYLANTA) oral suspension 15 mL  15 mL Oral Q6H PRN    bisacodyL (DULCOLAX) tablet 5 mg  5 mg Oral DAILY PRN        ROS:  Pertinent items are noted in the History of Present Illness. Physical Exam:    Temp (24hrs), Av °F (36.7 °C), Min:97.1 °F (36.2 °C), Max:98.8 °F (37.1 °C)    Visit Vitals  BP (!) 150/83 (BP 1 Location: Right upper arm, BP Patient Position: At rest;Supine)   Pulse (!) 102   Temp 97.1 °F (36.2 °C)   Resp 18   Ht 5' 11\" (1.803 m)   Wt 67.3 kg (148 lb 5.9 oz)   SpO2 94%   BMI 20.69 kg/m²          GEN: WD chronically ill looking, non toxic, not in any discomfort. On RA    HEENT:  EOMI intact  CHEST: Non laboured breathing. CTA  CVS:RRR, no mur/gallop  ABD: Obese/soft. Soft and non distended. No reboud tenderness. Casandra Lo in place  EXT: No apparent swelling or redness on UE/LE joints. No arthritis noted  Skin: Dry and intact. Exocriated groin area- has powder in place  CNS: A, OX3. Moves all extremity. CN grossly ok.       Microbiology  All Micro Results     Procedure Component Value Units Date/Time    CULTURE, BLOOD [[id number]] Collected: 21 0328    Order Status: Completed Specimen: Blood Updated: 21     Special Requests: NO SPECIAL REQUESTS        Culture result: NO GROWTH 6 DAYS       CULTURE, BLOOD [[id number]] Collected: 21 0328    Order Status: Completed Specimen: Blood Updated: 12/17/21 0720     Special Requests: NO SPECIAL REQUESTS        Culture result: NO GROWTH 6 DAYS       CULTURE, URINE [[id number]] Collected: 12/11/21 0335    Order Status: Completed Specimen: Urine from Clean catch Updated: 12/12/21 1916     Special Requests: NO SPECIAL REQUESTS        Culture result: No growth (<1,000 CFU/ML)            Lab results:    Chemistry  Recent Labs     12/21/21  0355 12/20/21  0850   * 117*   * 146*   K 3.8 4.0   * 117*   CO2 26 24   BUN 17 20*   CREA 0.98 0.92   CA 7.9* 7.8*   AGAP 6 5   BUCR 17 22*     --    TP 5.6*  --    ALB 1.9*  --    GLOB 3.7  --    AGRAT 0.5*  --        CBC w/ Diff  Recent Labs     12/21/21  0355 12/20/21  0850 12/19/21  0420   WBC 19.0* 17.3* 19.1*   RBC 3.53* 3.55* 3.15*   HGB 9.1* 9.2* 8.4*   HCT 30.0* 30.1* 26.7*    292 266   GRANS 72 80* 87*   LYMPH 11* 6* 7*   EOS 3 1 1       Imaging: report posted below as per radiologist   CT CAP WO        Wall thickening of the rectum. This may be due to colitis/proctitis. Relatively  large amount of fecal material throughout the colon. This could also be a source  of inflammation.     Otherwise no evidence of acute abnormality.     Atherosclerotic vascular disease. Emphysema. Large right renal cyst    CXR: No acute process    12/15/21  CTA chest:  Exam limited by motion artifact. No pulmonary emboli identified   Minimal bilateral pleural effusions. Emphysema      CT AP with contrast:    Pancolitis. Interval progression of wall thickening to involve nearly the entire  colon. Thumb printing present raising the possibility of C. difficile. This  involves 2 vascular territories making ischemic colitis unlikely     Enhancing renal mass lower pole right kidney as seen on prior ultrasound     No hydronephrosis.

## 2021-12-21 NOTE — ROUTINE PROCESS
Bedside shift change report given to Theroniit 112 (oncoming nurse) by Leon Benites RN (offgoing nurse). Report included the following information SBAR, Kardex, Intake/Output, MAR and Recent Results.

## 2021-12-22 NOTE — PROGRESS NOTES
Attempt for OT treatment session. Pt refusing participation with therapy at this time.      Rocio Jones, OTR/L

## 2021-12-22 NOTE — PROGRESS NOTES
Problem: Falls - Risk of  Goal: *Absence of Falls  Description: Document Earma Gertrudis Fall Risk and appropriate interventions in the flowsheet. Outcome: Progressing Towards Goal  Note: Fall Risk Interventions:  Mobility Interventions: Communicate number of staff needed for ambulation/transfer,Patient to call before getting OOB    Mentation Interventions: Adequate sleep, hydration, pain control    Medication Interventions: Patient to call before getting OOB,Teach patient to arise slowly    Elimination Interventions: Call light in reach,Patient to call for help with toileting needs    History of Falls Interventions: Consult care management for discharge planning         Problem: Patient Education: Go to Patient Education Activity  Goal: Patient/Family Education  Outcome: Progressing Towards Goal     Problem: Pressure Injury - Risk of  Goal: *Prevention of pressure injury  Description: Document Regan Scale and appropriate interventions in the flowsheet.   Outcome: Progressing Towards Goal  Note: Pressure Injury Interventions:  Sensory Interventions: Keep linens dry and wrinkle-free    Moisture Interventions: Apply protective barrier, creams and emollients    Activity Interventions: Increase time out of bed,Pressure redistribution bed/mattress(bed type)    Mobility Interventions: Pressure redistribution bed/mattress (bed type)    Nutrition Interventions: Document food/fluid/supplement intake    Friction and Shear Interventions: Minimize layers                Problem: Patient Education: Go to Patient Education Activity  Goal: Patient/Family Education  Outcome: Progressing Towards Goal     Problem: Patient Education: Go to Patient Education Activity  Goal: Patient/Family Education  Outcome: Progressing Towards Goal     Problem: Patient Education: Go to Patient Education Activity  Goal: Patient/Family Education  Outcome: Progressing Towards Goal     Problem: General Medical Care Plan  Goal: *Vital signs within specified parameters  Outcome: Progressing Towards Goal  Goal: *Labs within defined limits  Outcome: Progressing Towards Goal  Goal: *Absence of infection signs and symptoms  Outcome: Progressing Towards Goal  Goal: *Optimal pain control at patient's stated goal  Outcome: Progressing Towards Goal  Goal: *Skin integrity maintained  Outcome: Progressing Towards Goal  Goal: *Fluid volume balance  Outcome: Progressing Towards Goal  Goal: *Optimize nutritional status  Outcome: Progressing Towards Goal  Goal: *Anxiety reduced or absent  Outcome: Progressing Towards Goal  Goal: *Progressive mobility and function (eg: ADL's)  Outcome: Progressing Towards Goal     Problem: Patient Education: Go to Patient Education Activity  Goal: Patient/Family Education  Outcome: Progressing Towards Goal     Problem: Pain  Goal: *Control of Pain  Outcome: Progressing Towards Goal     Problem: Risk for Spread of Infection  Goal: Prevent transmission of infectious organism to others  Description: Prevent the transmission of infectious organisms to other patients, staff members, and visitors.   Outcome: Progressing Towards Goal     Problem: Patient Education:  Go to Education Activity  Goal: Patient/Family Education  Outcome: Progressing Towards Goal

## 2021-12-22 NOTE — PROGRESS NOTES
Problem: Falls - Risk of  Goal: *Absence of Falls  Description: Document Adrián Sol Fall Risk and appropriate interventions in the flowsheet. Outcome: Progressing Towards Goal  Note: Fall Risk Interventions:  Mobility Interventions: Communicate number of staff needed for ambulation/transfer,Patient to call before getting OOB    Mentation Interventions: Adequate sleep, hydration, pain control    Medication Interventions: Patient to call before getting OOB,Teach patient to arise slowly    Elimination Interventions: Call light in reach,Patient to call for help with toileting needs    History of Falls Interventions: Consult care management for discharge planning         Problem: Patient Education: Go to Patient Education Activity  Goal: Patient/Family Education  Outcome: Progressing Towards Goal     Problem: Pressure Injury - Risk of  Goal: *Prevention of pressure injury  Description: Document Regan Scale and appropriate interventions in the flowsheet.   Outcome: Progressing Towards Goal  Note: Pressure Injury Interventions:  Sensory Interventions: Keep linens dry and wrinkle-free    Moisture Interventions: Apply protective barrier, creams and emollients    Activity Interventions: Increase time out of bed,Pressure redistribution bed/mattress(bed type)    Mobility Interventions: Pressure redistribution bed/mattress (bed type)    Nutrition Interventions: Document food/fluid/supplement intake    Friction and Shear Interventions: Minimize layers                Problem: Patient Education: Go to Patient Education Activity  Goal: Patient/Family Education  Outcome: Progressing Towards Goal     Problem: Patient Education: Go to Patient Education Activity  Goal: Patient/Family Education  Outcome: Progressing Towards Goal     Problem: Patient Education: Go to Patient Education Activity  Goal: Patient/Family Education  Outcome: Progressing Towards Goal     Problem: General Medical Care Plan  Goal: *Vital signs within specified parameters  Outcome: Progressing Towards Goal  Goal: *Labs within defined limits  Outcome: Progressing Towards Goal  Goal: *Absence of infection signs and symptoms  Outcome: Progressing Towards Goal  Goal: *Optimal pain control at patient's stated goal  Outcome: Progressing Towards Goal  Goal: *Skin integrity maintained  Outcome: Progressing Towards Goal  Goal: *Fluid volume balance  Outcome: Progressing Towards Goal  Goal: *Optimize nutritional status  Outcome: Progressing Towards Goal  Goal: *Anxiety reduced or absent  Outcome: Progressing Towards Goal  Goal: *Progressive mobility and function (eg: ADL's)  Outcome: Progressing Towards Goal     Problem: Patient Education: Go to Patient Education Activity  Goal: Patient/Family Education  Outcome: Progressing Towards Goal     Problem: Pain  Goal: *Control of Pain  Outcome: Progressing Towards Goal     Problem: Risk for Spread of Infection  Goal: Prevent transmission of infectious organism to others  Description: Prevent the transmission of infectious organisms to other patients, staff members, and visitors.   Outcome: Progressing Towards Goal     Problem: Patient Education:  Go to Education Activity  Goal: Patient/Family Education  Outcome: Progressing Towards Goal

## 2021-12-22 NOTE — PROGRESS NOTES
Hospitalist Progress Note    Patient: Ashley Mckenna MRN: [id number]  CSN: [id number]    YOB: 1945  Age: 78 y.o. Sex: male    DOA: 12/11/2021 LOS:  LOS: 11 days          Chief Complaint:    colitis    Assessment/Plan     Emma Galvez a 78 y. o. male who multiple medical problems including diabetes, hypertension, pulmonary hypertension, pulmonary emphysema brought into the ED via EMS from home with an dwelling Alonso catheter, fever and altered mental status admitted for sepsis     Sepsis -resolved  No growth urine cx here  colitis via CT scan, clinically has no  diarrhea  Repeated CT scan shows colitis changes  But overall bandemia has resolved, neutrophil count down, overall wbc count is improved  patient has a chronic indwelling Alonso  On Rocephin and Flagyl and no po vanc per ID   Wbc the lowest since admission at 15 so optimistic  Will get GI consult per ID recommendation       MRI of spine ordered by ID degenerative changes      Right renal mass on CT scan, no further workup at this time per urology, looks like neoplasm per MRI today  Will see what  Urology recommend for now      Acute metabolic encephalopathyresolved, he is nontoxic appearing  Likely due to sepsis     BRITT -  Improved    Chronic urinary retention-alonso     Tachycardia  Likely due to sepsis  Better    Pulm HTN    ?mild dementia    Malnutrition, weakness     Uncontrolled HTN-added norvasc to toprol XL      Diabetestype 2  SSI PRN      DVT prophylaxis with heparin and Pepcid     Consider GI consult if they would consider scope here, but not certain with infection possible    Will go to SNF when stabilized already accepted [facility]       Disposition :  Patient Active Problem List   Diagnosis Code    Lumbar spinal stenosis M48.061    Lumbar spondylosis M47.816    Radiculopathy of leg M54.10    Unable to ambulate R26.2    Urinary retention R33.9    Type II diabetes mellitus with manifestations, uncontrolled ([email or web address] E11.8, E11.65    Renal mass N28.89    UTI (urinary tract infection) due to urinary indwelling Jimenez catheter ([facility]) T83.511A, N39.0    Compression fracture of L1 vertebra ([facility]) S32.010A    BRITT (acute kidney injury) ([email or web address] N17.9    Aneurysm of infrarenal abdominal aorta ([facility]) I71.4    HTN (hypertension) I10    Severe protein-calorie malnutrition ([email or web address] E43    Severe pulmonary hypertension ([facility]) I27.20    Sepsis ([email or web address] A41.9    Hypotension I95.9    Pulmonary hypertension ([facility]) I27.20    History of tobacco abuse Z87.891    Pulmonary emphysema ([facility]) J43.9    Acute hypoxemic respiratory failure ([facility]) J96.01    Tricuspid regurgitation I07.1    Acute metabolic encephalopathy C51.77    Constipation K59.00    Colitis K52.9       Subjective:  im doing fine    No issues per nurse reported  Went for repeat CT this am      Review of systems:    Constitutional: denies fevers, chills  Respiratory: denies SOB  Cardiovascular: denies chest pain  Gastrointestinal: denies nausea, vomiting, diarrhea      Vital signs/Intake and Output:  Visit Vitals  BP (!) 147/80 (BP 1 Location: Right arm, BP Patient Position: At rest)   Pulse 92   Temp 98.2 °F (36.8 °C)   Resp 19   Ht 5' 11\" (1.803 m)   Wt 67.3 kg (148 lb 5.9 oz)   SpO2 91%   BMI 20.69 kg/m²     Current Shift:  No intake/output data recorded.   Last three shifts:  12/20 1901 - 12/22 0700  In: 0   Out: 850 [Urine:850]    Exam:    General: frail weak WM NAD nontoxic, good spirits, ox2  CVS:Regular rate and rhythm, no M/R/G, S1/S2 heard, no thrill  Lungs:Clear to auscultation bilaterally, no wheezes, rhonchi, or rales  Abdomen: Soft, Nontender, No distention, Normal Bowel sounds  Extremities: No C/C/E, pulses palpable 2+  Neuro:grossly normal , follows commands  Psych:appropriate                Labs: Results:       Chemistry Recent Labs     12/22/21  0200 12/21/21  0355 12/20/21  0850   * 118* 117*   * 146* 146*   K 3.8 3.8 4.0 * 114* 117*   CO2 25 26 24   BUN 16 17 20*   CREA 0.94 0.98 0.92   CA 8.0* 7.9* 7.8*   AGAP 8 6 5   BUCR 17 17 22*    112  --    TP 5.6* 5.6*  --    ALB 1.9* 1.9*  --    GLOB 3.7 3.7  --    AGRAT 0.5* 0.5*  --       CBC w/Diff Recent Labs     12/22/21  0200 12/21/21  0355 12/20/21  0850   WBC 15.3* 19.0* 17.3*   RBC 3.71* 3.53* 3.55*   HGB 9.5* 9.1* 9.2*   HCT 31.6* 30.0* 30.1*    317 292   GRANS 69 72 80*   LYMPH 13* 11* 6*   EOS 2 3 1      Cardiac Enzymes No results for input(s): CPK, CKND1, LUH in the last 72 hours. No lab exists for component: CKRMB, TROIP   Coagulation No results for input(s): PTP, INR, APTT, INREXT, INREXT in the last 72 hours. Lipid Panel Lab Results   Component Value Date/Time    Cholesterol, total 146 06/01/2016 07:51 AM    HDL Cholesterol 40 06/01/2016 07:51 AM    LDL, calculated 91.6 06/01/2016 07:51 AM    VLDL, calculated 14.4 06/01/2016 07:51 AM    Triglyceride 72 06/01/2016 07:51 AM    CHOL/HDL Ratio 3.7 06/01/2016 07:51 AM      BNP No results for input(s): BNPP in the last 72 hours.    Liver Enzymes Recent Labs     12/22/21  0200   TP 5.6*   ALB 1.9*         Thyroid Studies No results found for: T4, T3U, TSH, TSHEXT, TSHEXT     Procedures/imaging: see electronic medical records for all procedures/Xrays and details which were not copied into this note but were reviewed prior to creation of Aria Choudhury MD

## 2021-12-22 NOTE — PROGRESS NOTES
Problem: Falls - Risk of  Goal: *Absence of Falls  Description: Document Bard Sin Fall Risk and appropriate interventions in the flowsheet. Outcome: Progressing Towards Goal  Note: Fall Risk Interventions:  Mobility Interventions: Communicate number of staff needed for ambulation/transfer,Patient to call before getting OOB    Mentation Interventions: Adequate sleep, hydration, pain control    Medication Interventions: Patient to call before getting OOB,Teach patient to arise slowly    Elimination Interventions: Call light in reach,Patient to call for help with toileting needs    History of Falls Interventions: Consult care management for discharge planning         Problem: Patient Education: Go to Patient Education Activity  Goal: Patient/Family Education  Outcome: Progressing Towards Goal     Problem: Pressure Injury - Risk of  Goal: *Prevention of pressure injury  Description: Document Regan Scale and appropriate interventions in the flowsheet.   Outcome: Progressing Towards Goal  Note: Pressure Injury Interventions:  Sensory Interventions: Keep linens dry and wrinkle-free    Moisture Interventions: Apply protective barrier, creams and emollients    Activity Interventions: Increase time out of bed,Pressure redistribution bed/mattress(bed type)    Mobility Interventions: Pressure redistribution bed/mattress (bed type)    Nutrition Interventions: Document food/fluid/supplement intake    Friction and Shear Interventions: Minimize layers                Problem: Patient Education: Go to Patient Education Activity  Goal: Patient/Family Education  Outcome: Progressing Towards Goal     Problem: Patient Education: Go to Patient Education Activity  Goal: Patient/Family Education  Outcome: Progressing Towards Goal     Problem: Patient Education: Go to Patient Education Activity  Goal: Patient/Family Education  Outcome: Progressing Towards Goal     Problem: General Medical Care Plan  Goal: *Vital signs within specified parameters  Outcome: Progressing Towards Goal  Goal: *Labs within defined limits  Outcome: Progressing Towards Goal  Goal: *Absence of infection signs and symptoms  Outcome: Progressing Towards Goal  Goal: *Optimal pain control at patient's stated goal  Outcome: Progressing Towards Goal  Goal: *Skin integrity maintained  Outcome: Progressing Towards Goal  Goal: *Fluid volume balance  Outcome: Progressing Towards Goal  Goal: *Optimize nutritional status  Outcome: Progressing Towards Goal  Goal: *Anxiety reduced or absent  Outcome: Progressing Towards Goal  Goal: *Progressive mobility and function (eg: ADL's)  Outcome: Progressing Towards Goal     Problem: Patient Education: Go to Patient Education Activity  Goal: Patient/Family Education  Outcome: Progressing Towards Goal     Problem: Pain  Goal: *Control of Pain  Outcome: Progressing Towards Goal     Problem: Risk for Spread of Infection  Goal: Prevent transmission of infectious organism to others  Description: Prevent the transmission of infectious organisms to other patients, staff members, and visitors.   Outcome: Progressing Towards Goal     Problem: Patient Education:  Go to Education Activity  Goal: Patient/Family Education  Outcome: Progressing Towards Goal

## 2021-12-22 NOTE — PROGRESS NOTES
7001  Bedside and Verbal shift change report given to Thelma Rojas, RN by Madeline Jones, RN     . Report included the following information SBAR, Kardex, OR Summary, Intake/Output and MAR. [id number]  Order to obtain sample for Stool WBC. 1059  Patient off unit for MRI of spine. 1732  Patient refusing heparin. Per patient \"I am done with those belly shots\". Educated patient on importance of receiving said medication but patient responded \"yeah yeah\". Pt continues to decline. 1945  Bedside and Verbal shift change report given to Madeline Jones RN by Thelma Rojas, RN. Report included the following information SBAR, Kardex, OR Summary, Intake/Output and MAR. No bowel movement this shift.

## 2021-12-22 NOTE — PROGRESS NOTES
D/C Plan: [facility] (formerly [address] [place] [address])     Noted pt is not medically stable to transition from an acute care setting at this time.  Anticipate pt transition to the above facility with in the next 24-48 hours. CM contacted pt's wife, Dany Hirsch ([phone]), and notified her of the above. CM to continue to follow and assist.      Transition of Care Plan: [facility]     Communication to Patient/Family:  Met with patient and family, and they are agreeable to the transition plan. The Plan for Transition of Care is related to the following treatment goals: SNF     The Patient and/or patient representative was provided with a choice of provider and agrees   with the discharge plan.  Yes [x]? No []?     Freedom of choice list was provided with basic dialogue that supports the patient's individualized plan of care/goals and shares the quality data associated with the providers.     Yes [x]? No []?     SNF/Rehab Transition:  Patient has been accepted to [address] [facility]) at [address] [place] [address], [address] [place] [address] for SNF and meets criteria for admission. Patient will transported by medical transport and expected to leave with in the next 24-48 hours pending medical stability.     Communication to SNF/Rehab:  Bedside RN,  has been notified to update the transition plan to the facility and call report [phone].      Discharge information has been updated on the AVS and communicated through [facility] or [facility]. Discharge instructions to be fax'd to facility at [phone]      Please include all hard scripts for controlled substances, med rec and dc summary, and AVS in packet. Please medicate for pain prior to dc if possible and needed to help offset delay when patient first arrives to facility.     Reviewed and confirmed with facility, [facility] can manage the patient care needs for the following:      [place] with (X) only those applicable:  Medication:  []? Medications are available at the facility  []? IV Antibiotics    []? Controlled Substance  hard copies available sent.  []? Weekly Labs     Equipment:  []?CPAP/BiPAP  []? Wound Vacuum  []? Jimenez or Urinary Device  []? PICC/Central Line  []? Nebulizer  []? Ventilator     Treatment:  []? Isolation (for MRSA, VRE, etc.)  []? Surgical Drain Management  []? Tracheostomy Care  []? Dressing Changes  []? Dialysis with transportation  []? PEG Care  []? Oxygen  []? Daily Weights for Heart Failure     Dietary:  []? Any diet limitations  []? Tube Feedings   []? Total Parenteral Management (TPN)     Financial Resources:  []? Medicaid Application Completed     [x]? [employer] Completed and copy given to pt/family.     []? A screening has previously been completed.     []? Level II Completed     []? Private pay individual who will not become   financially eligible for Medicaid within 6 months from admission to a [address] [place] [address] facility.      []? Individual refused to have screening conducted.      []? Medicaid Application Completed     []? The screening denied because it was determined individual did not need/did not qualify for nursing facility level of care. []? Out of state residents seeking direct admission to a [address] facility.  []? Individuals who are inpatients of an out of state hospital, or in state or out of state veterans/ hospital and seek direct admission to a [address] facility  []? Individuals who are pateints or residents of a state owned/operated facility that is licensed by Department of Behavioral Services (DBHDS) and seek direct admission to [address] facility  []? A screening not required for enrollment in [facility] Hospice services as set out in [facility] 30-  []? [address] [facility]) staff shall perform screenings of the [facility] clients.      Advanced Care Plan:  []? Surrogate Decision Maker of Care  []? POA  []? Communicated Code Status and copy sent.     Other:            Care Management Interventions  PCP Verified by CM: Yes  Mode of Transport at Discharge: BLS  Transition of Care Consult (CM Consult): SNF  Partner SNF: Yes  Health Maintenance Reviewed: Yes  Physical Therapy Consult: Yes  Occupational Therapy Consult: Yes  Support Systems: Spouse/Significant Other  Confirm Follow Up Transport: Family  The Plan for Transition of Care is Related to the Following Treatment Goals :  [facility] (formerly [address] [place] [address])  The Patient and/or Patient Representative was Provided with a Choice of Provider and Agrees with the Discharge Plan?: Yes  Name of the Patient Representative Who was Provided with a Choice of Provider and Agrees with the Discharge Plan: spouse  Freedom of Choice List was Provided with Basic Dialogue that Supports the Patient's Individualized Plan of Care/Goals, Treatment Preferences and Shares the Quality Data Associated with the Providers?: Yes  Discharge Location  Discharge Placement: Skilled nursing facility

## 2021-12-22 NOTE — PROGRESS NOTES
1046: Pt refusing PT at this time but agreeable to try later, will follow up. 1219: Pt currently off the floor, will follow up as schedule permits.

## 2021-12-22 NOTE — PROGRESS NOTES
[facility]  (A Division of [address] [place] [address])                                                                                                                      Matt Sr MD  Office #: [phone] Option # [phone]  Fax #: [phone]     Date of Admission: 12/11/2021Date of Note: 12/22/2021  Reason for Follow Up: Evaluation and antibiotic management of Leucocytosis. Current Antimicrobials:    Prior Antimicrobials:  Ceftriaxone 12/16 to date # 5  Flagyl IV 12/15 to date    Immunosuppressive drugs: NA Zosyn 12/11 to 12/15  Vanco oral QID 12/15 X1 day       Assessment- ID related:  --------------------------------------------------------------------------  · Persistent Leucocytosis( w/neutrophilia) - Declining slowly 15.3K -> no occult abscess on CT- visceral organs including prostate look ok except hemangioma liver and R kidney neopolasm) +  pancolitis on CT w/Contrast on 12/14 and 12/21  --C. diff test not done- stool was soft, not watery. --saccharomyces serology negative 12/18  --Flow cytometry: NO DIAGNOSTIC IMMUNOPHENOTYPIC ABNORMALITIES DETECTED. --No diarrhea/ tense distension- that is C/W C.diff.   --has no rash  --has no focal complaint  --has no joint inflammation  --not on steroid  · Pan-colitis on CT with contrast: Have to consider C.diff in the DDX as at risk, however, pt doesn't look as toxix/septic with this degree of colitis. No acute joint swelling/pain  · Right Kidney lesion -- malignancy- on CT/Ulstrasound- followed by Urology. · Recent Psedumonas UTI12/5/21  · Urinary retention, with indwelling alonso  CT CAP WO contrast: didn't reveal major infectious finding  12/11  BCX: NGSF  UCX: NG  ESR 74  CRP 15.3  Other Medical Issues- Mx per respective team:    · Acute enecephalopathy  · DM   · CKD  · Anemia- HB 9.2  · Hx of lumbar fusion/ L1 laminectomy  · R renal cyst/calcification.  L renal stone-non obstructing     Recommendation for ID issues I am following:  ------------------------------------------------------------------------------    Leucocytosis declining, not sure what is driving it or if declining to his current rx- Flagyl + Vanco PO. GI contacted by primary team for opinion of pancolitis finding on CT +/- scope? Renal neoplasm-> urology following    If no additional plans by other services, I suppose he can cont his current regimen and FU CBC as OP follow up. Don't have much to add from ID side    MRI of C/T/L- no evidence of discitis/OM    DW Dr Maryam Orona       Subjective:  Afebrile, comfortable on RA. WBC slowly coming down  No BM overnight per RN  He doesn't voice complaint    Alonso in place    Urology eval reviewed-- no additional investigation- as they have been following renal mass past 4 years since 2017. HPI:  Kiki Danielle is a 78 y.o. WHITE/NON- with PMH of DM, CKD, recent admission with Pseudmonas UTI, urinary retention with indwelling Alonso came in with fever and confusion per chart review and ED note states wife brought him, unable to take care of him. He was found to have tachycardia 122, soft BP and WBC was high to 23,3K. CT CAP done WO contrast, some changes of colitis, significant finding seen except large BM in colon, and changes in kidney that didn't suggest infectious source. Was placed on zosyn and cultures so far no growth. Patient is oriented to self and place during interview. Feels ok, doesn't have focal comlaint of HA/neck or back pain, chest pain/SOB/Abd pain. Indwelling alonso in place. RN reports soft and frequent BM. No marked change in WBC and remains in 20K's. No rash/itching.         Active Hospital Problems    Diagnosis Date Noted    Colitis 12/13/2021    Acute metabolic encephalopathy 28/64/5303    Constipation 12/12/2021    Sepsis ([facility] 11/23/2021    [facility]    UTI (urinary tract infection) due to urinary indwelling Alonso catheter ([facility] 05/01/2020    Type II diabetes mellitus with manifestations, uncontrolled ([facility] 09/16/2017     Past Medical History:   Diagnosis Date    Atherosclerosis     Diabetes ([facility] 2013    type 2    High cholesterol     Hyperlipidemia     Hypertension 2013    Muscle weakness     Neoplasm     right kidney    [facility]     Spinal stenosis     Urinary retention     Vitamin D deficiency      Past Surgical History:   Procedure Laterality Date    HX HEENT      40 years ago deviated septum    HX LUMBAR LAMINECTOMY  2017    HX ORTHOPAEDIC      Lumbar laminectomy 9/7/17    HX ORTHOPAEDIC Right     CTR     Family History   Problem Relation Age of Onset    Heart Disease Father      Social History     Socioeconomic History    Marital status:      Spouse name: Not on file    Number of children: Not on file    Years of education: Not on file    Highest education level: Not on file   Occupational History    Not on file   Tobacco Use    Smoking status: Former Smoker    Smokeless tobacco: Never Used   Vaping Use    Vaping Use: Never used   Substance and Sexual Activity    Alcohol use: No    Drug use: No    Sexual activity: Not on file   Other Topics Concern     Service Not Asked    Blood Transfusions Not Asked    Caffeine Concern Not Asked    Occupational Exposure Not Asked    Hobby Hazards Not Asked    Sleep Concern Not Asked    Stress Concern Not Asked    Weight Concern Not Asked    Special Diet Not Asked    Back Care Not Asked    Exercise Not Asked    Bike Helmet Not Asked    Seat Belt Not Asked    Self-Exams Not Asked   Social History Narrative    Not on file     Social Determinants of Health     Financial Resource Strain:     Difficulty of Paying Living Expenses: Not on file   Food Insecurity:     Worried About Running Out of Food in the Last Year: Not on file    [place]smany of Food in the Last Year: Not on file   Transportation Needs:     Lack of Transportation (Medical): Not on file    Lack of Transportation (Non-Medical):  Not on file   Physical Activity:     Days of Exercise per Week: Not on file    Minutes of Exercise per Session: Not on file   Stress:     Feeling of Stress : Not on file   Social Connections:     Frequency of Communication with Friends and Family: Not on file    Frequency of Social Gatherings with Friends and Family: Not on file    Attends Gnosticist Services: Not on file    Active Member of Clubs or Organizations: Not on file    Attends Club or Organization Meetings: Not on file    Marital Status: Not on file   Intimate Partner Violence:     Fear of Current or Ex-Partner: Not on file    Emotionally Abused: Not on file    Physically Abused: Not on file    Sexually Abused: Not on file   Housing Stability:     Unable to Pay for Housing in the Last Year: Not on file    Number of Places Lived in the Last Year: Not on file    Unstable Housing in the Last Year: Not on file       Allergies:  Zocor [simvastatin]     Medications:  Current Facility-Administered Medications   Medication Dose Route Frequency    vancomycin 50 mg/mL oral solution (compounded) 125 mg  125 mg Oral Q6H    metoprolol succinate (TOPROL-XL) XL tablet 50 mg  50 mg Oral QHS    amLODIPine (NORVASC) tablet 10 mg  10 mg Oral DAILY    nystatin (MYCOSTATIN) 100,000 unit/gram powder   Topical BID    metroNIDAZOLE (FLAGYL) tablet 500 mg  500 mg Oral TID    aspirin chewable tablet 81 mg  81 mg Oral DAILY    atorvastatin (LIPITOR) tablet 20 mg  20 mg Oral QHS    traZODone (DESYREL) tablet 50 mg  50 mg Oral QHS    insulin lispro (HUMALOG) injection   SubCUTAneous AC&HS    glucose chewable tablet 16 g  4 Tablet Oral PRN    glucagon (GLUCAGEN) injection 1 mg  1 mg IntraMUSCular PRN    dextrose (D50W) injection syrg 12.5-25 g  25-50 mL IntraVENous PRN    sodium chloride (NS) flush 5-40 mL  5-40 mL IntraVENous Q8H    sodium chloride (NS) flush 5-40 mL  5-40 mL IntraVENous PRN    acetaminophen (TYLENOL) tablet 650 mg  650 mg Oral Q6H PRN    Or    acetaminophen (TYLENOL) suppository 650 mg  650 mg Rectal Q6H PRN    ondansetron (ZOFRAN ODT) tablet 4 mg  4 mg Oral Q8H PRN    Or    ondansetron (ZOFRAN) injection 4 mg  4 mg IntraVENous Q6H PRN    famotidine (PEPCID) tablet 20 mg  20 mg Oral DAILY    heparin (porcine) injection 5,000 Units  5,000 Units SubCUTAneous Q8H    alum-mag hydroxide-simeth (MYLANTA) oral suspension 15 mL  15 mL Oral Q6H PRN      Physical Exam:    Temp (24hrs), Av.1 °F (36.7 °C), Min:97.1 °F (36.2 °C), Max:98.7 °F (37.1 °C)    Visit Vitals  BP (!) 149/83 (BP 1 Location: Left upper arm)   Pulse 95   Temp 98 °F (36.7 °C)   Resp 18   Ht 5' 11\" (1.803 m)   Wt 68.9 kg (152 lb)   SpO2 92%   BMI 21.20 kg/m²          GEN: WD chronically ill looking, non toxic, not in any discomfort. On RA    HEENT:  EOMI intact  CHEST: Non laboured breathing. ABD: Obese/soft. Soft and non distended. No reboud tenderness. Doc Hummingbird in place  EXT: No apparent swelling or redness on UE/LE joints. No arthritis noted  Skin: Dry and intact. Exocriated groin area- has powder in place  CNS: A, O X2. Moves all extremity. CN grossly ok.       Microbiology  All Micro Results     Procedure Component Value Units Date/Time    CULTURE, BLOOD [[id number]] Collected: 21    Order Status: Completed Specimen: Blood Updated: 21     Special Requests: NO SPECIAL REQUESTS        Culture result: NO GROWTH 6 DAYS       CULTURE, BLOOD [[id number]] Collected: 21    Order Status: Completed Specimen: Blood Updated: 21     Special Requests: NO SPECIAL REQUESTS        Culture result: NO GROWTH 6 DAYS       CULTURE, URINE [[id number]] Collected: 21    Order Status: Completed Specimen: Urine from Clean catch Updated: 21     Special Requests: NO SPECIAL REQUESTS        Culture result: No growth (<1,000 CFU/ML)            Lab results:    Chemistry  Recent Labs     12/22/21  0200 12/21/21  0355 12/20/21  0850   * 118* 117*   * 146* 146*   K 3.8 3.8 4.0   * 114* 117*   CO2 25 26 24   BUN 16 17 20*   CREA 0.94 0.98 0.92   CA 8.0* 7.9* 7.8*   AGAP 8 6 5   BUCR 17 17 22*    112  --    TP 5.6* 5.6*  --    ALB 1.9* 1.9*  --    GLOB 3.7 3.7  --    AGRAT 0.5* 0.5*  --        CBC w/ Diff  Recent Labs     12/22/21  0200 12/21/21  0355 12/20/21  0850   WBC 15.3* 19.0* 17.3*   RBC 3.71* 3.53* 3.55*   HGB 9.5* 9.1* 9.2*   HCT 31.6* 30.0* 30.1*    317 292   GRANS 69 72 80*   LYMPH 13* 11* 6*   EOS 2 3 1       Imaging: report posted below as per radiologist   CT CAP WO        Wall thickening of the rectum. This may be due to colitis/proctitis. Relatively  large amount of fecal material throughout the colon. This could also be a source  of inflammation.     Otherwise no evidence of acute abnormality.     Atherosclerotic vascular disease. Emphysema. Large right renal cyst    CXR: No acute process    12/15/21  CTA chest:  Exam limited by motion artifact. No pulmonary emboli identified   Minimal bilateral pleural effusions. Emphysema      CT AP with contrast:    Pancolitis. Interval progression of wall thickening to involve nearly the entire  colon. Thumb printing present raising the possibility of C. difficile. This  involves 2 vascular territories making ischemic colitis unlikely     Enhancing renal mass lower pole right kidney as seen on prior ultrasound     No hydronephrosis.

## 2021-12-22 NOTE — PROGRESS NOTES
Asked to review imaging as pt had MRI lumbar and thoracic and also another CT abd and pelvis 12/21/21. The right renal mass is unchanged and no change need for any urological intervention. His mass has been unchanged in size for years. No evidence of metastatic disease. Patient has poor performance status and as of now he is on active surveillance for the right renal mass. Call with questions. Thanks.      Tarsha Phoenix MD

## 2021-12-23 NOTE — PROGRESS NOTES
Problem: Falls - Risk of  Goal: *Absence of Falls  Description: Document Gio Carballo Fall Risk and appropriate interventions in the flowsheet.   Outcome: Progressing Towards Goal  Note: Fall Risk Interventions:  Mobility Interventions: Communicate number of staff needed for ambulation/transfer    Mentation Interventions: Adequate sleep, hydration, pain control    Medication Interventions: Teach patient to arise slowly    Elimination Interventions: Call light in reach    History of Falls Interventions: Bed/chair exit alarm         Problem: Patient Education: Go to Patient Education Activity  Goal: Patient/Family Education  Outcome: Progressing Towards Goal     Problem: Patient Education: Go to Patient Education Activity  Goal: Patient/Family Education  Outcome: Progressing Towards Goal     Problem: General Medical Care Plan  Goal: *Vital signs within specified parameters  Outcome: Progressing Towards Goal  Goal: *Skin integrity maintained  Outcome: Progressing Towards Goal     Problem: Pain  Goal: *Control of Pain  Outcome: Progressing Towards Goal

## 2021-12-23 NOTE — PROGRESS NOTES
Problem: Dysphagia (Adult)  Goal: *Acute Goals and Plan of Care (Insert Text)  Description: Patient will:  1. Tolerate PO trials with 0 s/s overt distress in 4/5 trials. 2. Utilize compensatory swallow strategies/maneuvers (decrease bite/sip, size/rate, alt. liq/sol) with min cues in 4/5 trials. Rec:     Easy to chew, thin liquids  Pt may require assistance with positioning and meal set up  Aspiration precautions  HOB >45 during po intake, remain >30 for 30-45 minutes after po   Small bites/sips; alternate liquid/solid with slow feeding rate   Oral care TID  Meds per pt preference   Outcome: Progressing Towards Goal  SPEECH LANGUAGE PATHOLOGY BEDSIDE SWALLOW   EVALUATION & TREATMENT     Patient: Mary Carmona (60 y.o. male)  Date: 12/23/2021  Primary Diagnosis: Sepsis ([facility] [A41.9]        Precautions: Aspiration, Fall  PLOF: Regular    ASSESSMENT :  Based on the objective data described below, the patient presents with mild oropharyngeal dysphagia characterized by poor bolus formation with delayed transit and piecemeal deglutition with delayed swallow initiation and prolonged/reduced hyolaryngeal excursion upon palpation. Patient without s/sx aspiration during the course of exam.  Recommendations as above, and have been discussed with patient, patient's nurse, Gisell Gaona. Patient would benefit from easy to chew diet and upright positioning for meals. TREATMENT :  Skilled therapy initiated; Educated pt on aspiration precautions and importance of compensatory swallow techniques to decrease aspiration risk (decrease rate of intake & sip/bite size, upright @HOB for all po intake and ~30 minutes after po); verbalized comprehension. SLP to follow as indicated. Patient will benefit from skilled intervention to address the above impairments.   Patient's rehabilitation potential is considered to be Fair  Factors which may influence rehabilitation potential include:  []            None noted  [x] Mental ability/status  [x]            Medical condition  [x]            Home/family situation and support systems  [x]            Safety awareness  []            Pain tolerance/management  []            Other:      PLAN :  Recommendations and Planned Interventions:  As above. Frequency/Duration: Patient will be followed by speech-language pathology 3 times a week to address goals. Discharge Recommendations: To Be Determined     SUBJECTIVE:   Patient stated My birthday? What year? Well, it was this year. \"    OBJECTIVE:     Past Medical History:   Diagnosis Date    Atherosclerosis     Diabetes ([facility] 2013    type 2    High cholesterol     Hyperlipidemia     Hypertension 2013    Muscle weakness     Neoplasm     right kidney    Severe pulmonary hypertension ([facility]     Spinal stenosis     Urinary retention     Vitamin D deficiency      Past Surgical History:   Procedure Laterality Date    HX HEENT      40 years ago deviated septum    HX LUMBAR LAMINECTOMY  2017    HX ORTHOPAEDIC      Lumbar laminectomy 9/7/17    HX ORTHOPAEDIC Right     CTR     Home Situation:   Home Situation  Home Environment: Private residence  # Steps to Enter: 0  Rails to Enter: No  Wheelchair Ramp: No  One/Two Story Residence: One story  Lift Chair Available: No  Living Alone: No  Support Systems: Spouse/Significant Other  Patient Expects to be Discharged to[de-identified] House  Current DME Used/Available at Home: Felicita Favia, rollator,Shower chair  Tub or Shower Type: Tub/Shower combination  Diet prior to admission: Regular  Current Diet:  Regular    Cognitive and Communication Status:  Neurologic State: Alert,Confused  Orientation Level: Disoriented to time,Oriented to situation,Oriented to place,Oriented to person  Cognition: Follows commands  Perception: Appears intact  Perseveration: No perseveration noted  Safety/Judgement: Decreased awareness of environment,Decreased awareness of need for assistance,Decreased awareness of need for safety    PAIN:  Pain level pre-treatment: 0/10   Pain level post-treatment: 0/10     After treatment:   []            Patient left in no apparent distress sitting up in chair  [x]            Patient left in no apparent distress in bed  [x]            Call bell left within reach  [x]            Nursing notified  []            Family present  []            Caregiver present  []            Bed alarm activated    COMMUNICATION/EDUCATION:   [x]            Aspiration precautions; swallow safety; compensatory techniques. []            Patient/family have participated as able in goal setting and plan of care. []            Patient/family agree to work toward stated goals and plan of care. []            Patient understands intent and goals of therapy; neutral about participation. []            Patient unable to participate in goal setting/plan of care; educ ongoing with interdisciplinary staff  []         Posted safety precautions in patient's room. Thank you for this referral.  Abbi Barone M.Ed, CCC-SLP  Time Calculation: 24 mins  Evaluation Time: 12 minutes   Treatment Time: 12 minutes

## 2021-12-23 NOTE — PROGRESS NOTES
[facility]  (A Division of [address] [place] [address])                                                                                                                      Umesh Marquez MD  Office #: [phone] Option # [phone]  Fax #: [phone]     Date of Admission: 12/11/2021Date of Note: 12/23/2021  Reason for Follow Up: Evaluation and antibiotic management of Leucocytosis. Current Antimicrobials:    Prior Antimicrobials:  Ceftriaxone 12/16 to date # 5  Flagyl IV 12/15 to date    Immunosuppressive drugs: NA Zosyn 12/11 to 12/15  Vanco oral QID 12/15 X1 day       Assessment- ID related:  --------------------------------------------------------------------------  · Persistent Leucocytosis( w/neutrophilia) - Declining slowly 15.3K -> no occult abscess on CT- visceral organs including prostate look ok except hemangioma liver and R kidney neopolasm) +  pancolitis on CT w/Contrast on 12/14 and 12/21  --C. diff test not done- stool was soft, not watery. --saccharomyces serology negative 12/18  --Flow cytometry: NO DIAGNOSTIC IMMUNOPHENOTYPIC ABNORMALITIES DETECTED. --No diarrhea/ tense distension- that is C/W C.diff.   --has no rash  --has no focal complaint  --has no joint inflammation  --not on steroid  · Pan-colitis on CT with contrast: Have to consider C.diff in the DDX as at risk, however, pt doesn't look as toxix/septic with this degree of colitis. No acute joint swelling/pain  · Right Kidney lesion -- malignancy- on CT/Ulstrasound- followed by Urology. · Recent Psedumonas UTI12/5/21  · Urinary retention, with indwelling alonso  CT CAP WO contrast: didn't reveal major infectious finding  12/11  BCX: NGSF  UCX: NG  ESR 74  CRP 15.3  Other Medical Issues- Mx per respective team:    · Acute enecephalopathy  · DM   · CKD  · Anemia- HB 9.2  · Hx of lumbar fusion/ L1 laminectomy  · R renal cyst/calcification.  L renal stone-non obstructing     Recommendation for ID issues I am following:  ------------------------------------------------------------------------------    Leucocytosis declining on current regimen. Not sure what is causing pancolitis--current rx- Flagyl + Vanco PO. Will ask GI for opinion, and if plans for CORADO-- text sent/consult placed    Renal neoplasm-> urology following    Dont have more to add from ID-- can give current med 10 more days and recheck CBC     DW Dr Patricia Ferguson       Subjective:  Afebrile, comfortable on RA. No new event  He doesn't voice complaint    Alonso in place  MYAH MULLEN MD yesterday about GI consult. Will contact MD today    Urology eval reviewed-- no additional investigation- as they have been following renal mass past 4 years since 2017. HPI:  Jennifer Figueroa is a 78 y.o. WHITE/NON- with PMH of DM, CKD, recent admission with Pseudmonas UTI, urinary retention with indwelling Alonso came in with fever and confusion per chart review and ED note states wife brought him, unable to take care of him. He was found to have tachycardia 122, soft BP and WBC was high to 23,3K. CT CAP done WO contrast, some changes of colitis, significant finding seen except large BM in colon, and changes in kidney that didn't suggest infectious source. Was placed on zosyn and cultures so far no growth. Patient is oriented to self and place during interview. Feels ok, doesn't have focal comlaint of HA/neck or back pain, chest pain/SOB/Abd pain. Indwelling alonso in place. RN reports soft and frequent BM. No marked change in WBC and remains in 20K's. No rash/itching.         Active Hospital Problems    Diagnosis Date Noted    Colitis 12/13/2021    Acute metabolic encephalopathy 53/68/0537    Constipation 12/12/2021    Sepsis ([facility] [address] 11/23/2021    BRITT (acute kidney injury) ([facility] [address] 07/20/2021    UTI (urinary tract infection) due to urinary indwelling Alonso catheter ([address] 05/01/2020    Type II diabetes mellitus with manifestations, uncontrolled ([facility] 09/16/2017 Past Medical History:   Diagnosis Date    Atherosclerosis     Diabetes ([facility] [address] 2013    type 2    High cholesterol     Hyperlipidemia     Hypertension 2013    Muscle weakness     Neoplasm     right kidney    Severe pulmonary hypertension (HCC)     Spinal stenosis     Urinary retention     Vitamin D deficiency      Past Surgical History:   Procedure Laterality Date    HX HEENT      40 years ago deviated septum    HX LUMBAR LAMINECTOMY  2017    HX ORTHOPAEDIC      Lumbar laminectomy 9/7/17    HX ORTHOPAEDIC Right     CTR     Family History   Problem Relation Age of Onset    Heart Disease Father      Social History     Socioeconomic History    Marital status:      Spouse name: Not on file    Number of children: Not on file    Years of education: Not on file    Highest education level: Not on file   Occupational History    Not on file   Tobacco Use    Smoking status: Former Smoker    Smokeless tobacco: Never Used   Vaping Use    Vaping Use: Never used   Substance and Sexual Activity    Alcohol use: No    Drug use: No    Sexual activity: Not on file   Other Topics Concern     Service Not Asked    Blood Transfusions Not Asked    Caffeine Concern Not Asked    Occupational Exposure Not Asked    Hobby Hazards Not Asked    Sleep Concern Not Asked    Stress Concern Not Asked    Weight Concern Not Asked    Special Diet Not Asked    Back Care Not Asked    Exercise Not Asked    Bike Helmet Not Asked    Seat Belt Not Asked    Self-Exams Not Asked   Social History Narrative    Not on file     Social Determinants of Health     Financial Resource Strain:     Difficulty of Paying Living Expenses: Not on file   Food Insecurity:     Worried About Running Out of Food in the Last Year: Not on file    [place] of Food in the Last Year: Not on file   Transportation Needs:     Lack of Transportation (Medical): Not on file    Lack of Transportation (Non-Medical):  Not on file Physical Activity:     Days of Exercise per Week: Not on file    Minutes of Exercise per Session: Not on file   Stress:     Feeling of Stress : Not on file   Social Connections:     Frequency of Communication with Friends and Family: Not on file    Frequency of Social Gatherings with Friends and Family: Not on file    Attends Mosque Services: Not on file    Active Member of Clubs or Organizations: Not on file    Attends Club or Organization Meetings: Not on file    Marital Status: Not on file   Intimate Partner Violence:     Fear of Current or Ex-Partner: Not on file    Emotionally Abused: Not on file    Physically Abused: Not on file    Sexually Abused: Not on file   Housing Stability:     Unable to Pay for Housing in the Last Year: Not on file    Number of Places Lived in the Last Year: Not on file    Unstable Housing in the Last Year: Not on file       Allergies:  Zocor [simvastatin]     Medications:  Current Facility-Administered Medications   Medication Dose Route Frequency    vancomycin 50 mg/mL oral solution (compounded) 125 mg  125 mg Oral Q6H    metoprolol succinate (TOPROL-XL) XL tablet 50 mg  50 mg Oral QHS    amLODIPine (NORVASC) tablet 10 mg  10 mg Oral DAILY    nystatin (MYCOSTATIN) 100,000 unit/gram powder   Topical BID    metroNIDAZOLE (FLAGYL) tablet 500 mg  500 mg Oral TID    aspirin chewable tablet 81 mg  81 mg Oral DAILY    atorvastatin (LIPITOR) tablet 20 mg  20 mg Oral QHS    traZODone (DESYREL) tablet 50 mg  50 mg Oral QHS    insulin lispro (HUMALOG) injection   SubCUTAneous AC&HS    glucose chewable tablet 16 g  4 Tablet Oral PRN    glucagon (GLUCAGEN) injection 1 mg  1 mg IntraMUSCular PRN    dextrose (D50W) injection syrg 12.5-25 g  25-50 mL IntraVENous PRN    sodium chloride (NS) flush 5-40 mL  5-40 mL IntraVENous Q8H    sodium chloride (NS) flush 5-40 mL  5-40 mL IntraVENous PRN    acetaminophen (TYLENOL) tablet 650 mg  650 mg Oral Q6H PRN    Or    acetaminophen (TYLENOL) suppository 650 mg  650 mg Rectal Q6H PRN    ondansetron (ZOFRAN ODT) tablet 4 mg  4 mg Oral Q8H PRN    Or    ondansetron (ZOFRAN) injection 4 mg  4 mg IntraVENous Q6H PRN    famotidine (PEPCID) tablet 20 mg  20 mg Oral DAILY    heparin (porcine) injection 5,000 Units  5,000 Units SubCUTAneous Q8H    alum-mag hydroxide-simeth (MYLANTA) oral suspension 15 mL  15 mL Oral Q6H PRN      Physical Exam:    Temp (24hrs), Av.2 °F (36.8 °C), Min:98.1 °F (36.7 °C), Max:98.3 °F (36.8 °C)    Visit Vitals  /72 (BP 1 Location: Right upper arm, BP Patient Position: At rest)   Pulse 93   Temp 98.2 °F (36.8 °C)   Resp 18   Ht 5' 11\" (1.803 m)   Wt 68.9 kg (152 lb)   SpO2 95%   BMI 21.20 kg/m²          GEN: WD chronically ill looking, non toxic, not in any discomfort. On RA    HEENT:  EOMI intact  CHEST: Non laboured breathing. ABD: Obese/soft. Soft and non distended. No reboud tenderness. Meriel Rail in place  EXT: No apparent swelling or redness on UE/LE joints. No arthritis noted  Skin: Dry and intact. Exocriated groin area- has powder in place  CNS: A, O X2. Moves all extremity. CN grossly ok.       Microbiology  All Micro Results     Procedure Component Value Units Date/Time    CULTURE, BLOOD [[id number]] Collected: 21    Order Status: Completed Specimen: Blood Updated: 21     Special Requests: NO SPECIAL REQUESTS        Culture result: NO GROWTH 6 DAYS       CULTURE, BLOOD [[id number]] Collected: 21    Order Status: Completed Specimen: Blood Updated: 21     Special Requests: NO SPECIAL REQUESTS        Culture result: NO GROWTH 6 DAYS       CULTURE, URINE [[id number]] Collected: 21    Order Status: Completed Specimen: Urine from Clean catch Updated: 21     Special Requests: NO SPECIAL REQUESTS        Culture result: No growth (<1,000 CFU/ML)            Lab results:    Chemistry  Recent Labs     21  0336 21  1878 12/21/21  0355   * 116* 118*   * 146* 146*   K 3.9 3.8 3.8   * 113* 114*   CO2 25 25 26   BUN 18 16 17   CREA 0.82 0.94 0.98   CA 8.0* 8.0* 7.9*   AGAP 8 8 6   BUCR 22* 17 17    112 112   TP 5.4* 5.6* 5.6*   ALB 1.9* 1.9* 1.9*   GLOB 3.5 3.7 3.7   AGRAT 0.5* 0.5* 0.5*       CBC w/ Diff  Recent Labs     12/23/21  0336 12/22/21  0200 12/21/21  0355   WBC 15.7* 15.3* 19.0*   RBC 3.60* 3.71* 3.53*   HGB 9.2* 9.5* 9.1*   HCT 30.0* 31.6* 30.0*   * 382 317   GRANS 68 69 72   LYMPH 14* 13* 11*   EOS 3 2 3       Imaging: report posted below as per radiologist   CT CAP WO        Wall thickening of the rectum. This may be due to colitis/proctitis. Relatively  large amount of fecal material throughout the colon. This could also be a source  of inflammation.     Otherwise no evidence of acute abnormality.     Atherosclerotic vascular disease. Emphysema. Large right renal cyst    CXR: No acute process    12/15/21  CTA chest:  Exam limited by motion artifact. No pulmonary emboli identified   Minimal bilateral pleural effusions. Emphysema      CT AP with contrast:    Pancolitis. Interval progression of wall thickening to involve nearly the entire  colon. Thumb printing present raising the possibility of C. difficile. This  involves 2 vascular territories making ischemic colitis unlikely     Enhancing renal mass lower pole right kidney as seen on prior ultrasound     No hydronephrosis.

## 2021-12-23 NOTE — PROGRESS NOTES
Problem: Falls - Risk of  Goal: *Absence of Falls  Description: Document Anabella Valdez Fall Risk and appropriate interventions in the flowsheet. Outcome: Progressing Towards Goal  Note: Fall Risk Interventions:  Mobility Interventions: Utilize walker, cane, or other assistive device    Mentation Interventions: Room close to nurse's station    Medication Interventions: Teach patient to arise slowly    Elimination Interventions: Call light in reach    History of Falls Interventions: Bed/chair exit alarm         Problem: Patient Education: Go to Patient Education Activity  Goal: Patient/Family Education  Outcome: Progressing Towards Goal     Problem: Pressure Injury - Risk of  Goal: *Prevention of pressure injury  Description: Document Regan Scale and appropriate interventions in the flowsheet.   Outcome: Progressing Towards Goal  Note: Pressure Injury Interventions:  Sensory Interventions: Assess changes in LOC    Moisture Interventions: Absorbent underpads    Activity Interventions: Pressure redistribution bed/mattress(bed type)    Mobility Interventions: Pressure redistribution bed/mattress (bed type)    Nutrition Interventions: Document food/fluid/supplement intake    Friction and Shear Interventions: Lift sheet                Problem: Patient Education: Go to Patient Education Activity  Goal: Patient/Family Education  Outcome: Progressing Towards Goal     Problem: Patient Education: Go to Patient Education Activity  Goal: Patient/Family Education  Outcome: Progressing Towards Goal     Problem: Patient Education: Go to Patient Education Activity  Goal: Patient/Family Education  Outcome: Progressing Towards Goal     Problem: General Medical Care Plan  Goal: *Vital signs within specified parameters  Outcome: Progressing Towards Goal  Goal: *Labs within defined limits  Outcome: Progressing Towards Goal  Goal: *Absence of infection signs and symptoms  Outcome: Progressing Towards Goal  Goal: *Optimal pain control at patient's stated goal  Outcome: Progressing Towards Goal  Goal: *Skin integrity maintained  Outcome: Progressing Towards Goal  Goal: *Fluid volume balance  Outcome: Progressing Towards Goal  Goal: *Optimize nutritional status  Outcome: Progressing Towards Goal  Goal: *Anxiety reduced or absent  Outcome: Progressing Towards Goal  Goal: *Progressive mobility and function (eg: ADL's)  Outcome: Progressing Towards Goal     Problem: Patient Education: Go to Patient Education Activity  Goal: Patient/Family Education  Outcome: Progressing Towards Goal     Problem: Pain  Goal: *Control of Pain  Outcome: Progressing Towards Goal     Problem: Risk for Spread of Infection  Goal: Prevent transmission of infectious organism to others  Description: Prevent the transmission of infectious organisms to other patients, staff members, and visitors.   Outcome: Progressing Towards Goal     Problem: Patient Education:  Go to Education Activity  Goal: Patient/Family Education  Outcome: Progressing Towards Goal

## 2021-12-23 NOTE — PROGRESS NOTES
1045: Pt needs to be cleaned up, notified CNA, will follow up again for PT.    1258: Pt refusing PT stating he is leaving today to go to rehab.

## 2021-12-23 NOTE — PROGRESS NOTES
0800  Bedside and Verbal shift change report given to Abdulaziz Goldsmith, RN by Aggie Morejon RN     . Report included the following information SBAR, Kardex, OR Summary, Intake/Output and MAR. No BM last night. Occult Blood and Stool WBC's pending. 12  MD made aware that patient has been noted to drool,hence, suction at bedside. IP Speech therapy evaluation now in place. Speech therapy called - make patient NPO until assessed by Speech. 1356  Patient seen by speech. Diet changed to Adult regular - easy to chew. 1458  IP consult to Gastroenterology. 1800  Patient continues to decline heparin administration. Per patient, \"no more shots\"   1932  Bedside and Verbal shift change report given to Aggie Morejon RN by Abdulaziz Goldsmith RN. Report included the following information SBAR, Kardex, OR Summary, Intake/Output and MAR.

## 2021-12-23 NOTE — PROGRESS NOTES
D[facility]C [facility]     Noted pt is not medically stable to transition from an acute care setting at this time.  Anticipate pt transition to the above facility when medically stable. CM contacted pt's wife, Danni Jauregui ([phone]), and confirmed the above. CM to continue to follow and assist.      Transition of Care Plan: [facility]     Communication to Patient/Family:  Met with patient and family, and they are agreeable to the transition plan. The Plan for Transition of Care is related to the following treatment goals: SNF     The Patient and/or patient representative was provided with a choice of provider and agrees   with the discharge plan.  Yes [x]? ? No []? ?     Freedom of choice list was provided with basic dialogue that supports the patient's individualized plan of care/goals and shares the quality data associated with the providers.     Yes [x]? ? No []? ?     SNF/Rehab Transition:  Patient has been accepted to [facility] ([address] [place] [address] [address] for SNF and meets criteria for admission. Delmis Kyle will transported by medical transport and expected to [address] [facility] medically stable.     Communication to SNF/Rehab:  Bedside RNGeneva Ceferino been notified to update the transition plan to the facility and call report [phone].      Discharge information has been updated on the AVS and communicated through [facility] or [facility].     Discharge instructions to be fax'd to facility at [phone]      Please include all hard scripts for controlled substances, med rec and dc summary, and AVS in packet. Please medicate for pain prior to dc if possible and needed to help offset delay when patient first arrives to facility.     Reviewed and confirmed with facility, [facility] can manage the patient care needs for the following:      [place] with (X) only those applicable:  Medication:  []??Medications are available at the facility  []? ?IV Antibiotics    []? ? Controlled Substance  hard copies available sent.  []?? Weekly Labs     Equipment:  []??CPAP/BiPAP  []? ? Wound Vacuum  []? ? Jimenez or Urinary Device  []? ?PICC/Central Line  []? ?Nebulizer  []? ? Ventilator     Treatment:  []??Isolation (for MRSA, VRE, etc.)  []? ?Surgical Drain Management  []? ? Tracheostomy Care  []? ?Dressing Changes  []? ?Dialysis with transportation  []? ?PEG Care  []? ? Oxygen  []? ?Daily Weights for Heart Failure     Dietary:  []??Any diet limitations  []? ?Tube Feedings   []? ? Total Parenteral Management (TPN)     Financial Resources:  []??Medicaid Application Completed     [x]? ? [employer] Completed and copy given to pt/family.     []? ? A screening has previously been completed.     []? ?Level II Completed     []? ? Private pay individual who will not become   financially eligible for Medicaid within 6 months from admission to a [address] [facility] [facility].      []? ? Individual refused to have screening conducted.      []? ? Medicaid Application Completed     []? ? The screening denied because it was determined individual did not need/did not qualify for nursing facility level of care. []?? Out of state residents seeking direct admission to a [facility] [address].  []?? Individuals who are inpatients of an out of state hospital, or in state or out of state veterans/ hospital and seek direct admission to a [address] facility  []? ? Individuals who are pateints or residents of a state owned/operated facility that is licensed by Department of Behavioral Services (DBHDS) and seek direct admission to [address] facility  []? ? A screening not required for enrollment in [facility] services as set out in [facility]-  []? ?[address] [facility]) staff shall perform screenings of the [facility] clients.      Advanced Care Plan:  []??Surrogate Decision Maker of Care  []? ?POA  []??Communicated Code Status and copy sent.     Other:            Care Management Interventions  PCP Verified by CM: Yes  Mode of Transport at Discharge: BLS  Transition of Care Consult (CM Consult): SNF  Partner SNF: Yes  Health Maintenance Reviewed: Yes  Physical Therapy Consult: Yes  Occupational Therapy Consult: Yes  Support Systems: Spouse/Significant Other  Confirm Follow Up Transport: Family  The Plan for Transition of Care is Related to the Following Treatment Goals :  [facility] ([facility])  The Patient and/or Patient Representative was Provided with a Choice of Provider and Agrees with the Discharge Plan?: Yes  Name of the Patient Representative Who was Provided with a Choice of Provider and Agrees with the Discharge Plan: spouse  Freedom of Choice List was Provided with Basic Dialogue that Supports the Patient's Individualized Plan of Care/Goals, Treatment Preferences and Shares the Quality Data Associated with the Providers?: Yes  Discharge Location  Discharge Placement: Skilled nursing facility

## 2021-12-23 NOTE — PROGRESS NOTES
Hospitalist Progress Note    Patient: Moises Gonzalez MRN: [id number]  CSN: [id number]    YOB: 1945  Age: 78 y.o. Sex: male    DOA: 12/11/2021 LOS:  LOS: 12 days                Assessment/Plan     Patient Active Problem List   Diagnosis Code    Lumbar spinal stenosis M48.061    Lumbar spondylosis M47.816    Radiculopathy of leg M54.10    Unable to ambulate R26.2    Urinary retention R33.9    Type II diabetes mellitus with manifestations, uncontrolled ([facility] [address] E11.8, E11.65    Renal mass N28.89    UTI (urinary tract infection) due to urinary indwelling Jimenez catheter ([facility]) T83.511A, N39.0    Compression fracture of L1 vertebra ([facility]) S32.010A    BRITT (acute kidney injury) ([facility] [address] N17.9    Aneurysm of infrarenal abdominal aorta ([facility]) I71.4    HTN (hypertension) I10    Severe protein-calorie malnutrition (HCC) E43    Severe pulmonary hypertension (HCC) I27.20    Sepsis (HCC) A41.9    Hypotension I95.9    Pulmonary hypertension (HCC) I27.20    History of tobacco abuse Z87.891    Pulmonary emphysema (HCC) J43.9    Acute hypoxemic respiratory failure (HCC) J96.01    Tricuspid regurgitation I07.1    Acute metabolic encephalopathy D95.91    Constipation K59.00    Colitis K52.9        Chief complaint :  Colitis  78 y. o. male who multiple medical problems including diabetes, hypertension, pulmonary hypertension, pulmonary emphysema brought into the ED via EMS from home with indwelling Jimenez catheter, fever and altered mental status admitted for sepsis     Sepsis -resolved  No growth urine cx here  colitis via CT scan, clinically has no  diarrhea  Repeated CT scan shows colitis changes  But overall bandemia has resolved, neutrophil count down, overall wbc count is improved  patient has a chronic indwelling Jimenez  Flagyl, oral vanco.  GI consulted        MRI of spine ordered by ID degenerative changes      Right renal mass on CT scan, no further workup at this time per urology, looks like neoplasm per MRI today  Seen by urology, follow up as outpatient.     Acute metabolic encephalopathyresolved,      BRITT -  Improved    Hypernatremia -  Encourage free water.     Chronic urinary retention-alonso     Tachycardia  Likely due to sepsis  Better     Pulm HTN     Malnutrition, weakness     Uncontrolled HTN-  on norvasc, toprol xl       Diabetestype 2  SSI PRN      DVT prophylaxis with heparin and Pepcid         Disposition : 2-3 days    Review of systems  General: No fevers or chills. Cardiovascular: No chest pain or pressure. No palpitations. Pulmonary: No shortness of breath. Gastrointestinal: No nausea, vomiting. Physical Exam:  General: Awake, cooperative, no acute distress    HEENT: NC, Atraumatic. PERRLA, anicteric sclerae. Lungs: CTA Bilaterally. No Wheezing/Rhonchi/Rales. Heart:  S1 S2,  No murmur, No Rubs, No Gallops  Abdomen: Soft, Non distended, Non tender.  +Bowel sounds,   Extremities: No c/c/e  Psych:   Not anxious or agitated. Neurologic:  No acute neurological deficit. Vital signs/Intake and Output:  Visit Vitals  BP (!) 140/77 (BP 1 Location: Right upper arm, BP Patient Position: At rest)   Pulse 81   Temp 98.2 °F (36.8 °C)   Resp 17   Ht 5' 11\" (1.803 m)   Wt 68.9 kg (152 lb)   SpO2 94%   BMI 21.20 kg/m²     Current Shift:  No intake/output data recorded.   Last three shifts:  12/21 1901 - 12/23 0700  In: 0   Out: 1050 [Urine:1050]            Labs: Results:       Chemistry Recent Labs     12/23/21  0336 12/22/21  0200 12/21/21  0355   * 116* 118*   * 146* 146*   K 3.9 3.8 3.8   * 113* 114*   CO2 25 25 26   BUN 18 16 17   CREA 0.82 0.94 0.98   CA 8.0* 8.0* 7.9*   AGAP 8 8 6   BUCR 22* 17 17    112 112   TP 5.4* 5.6* 5.6*   ALB 1.9* 1.9* 1.9*   GLOB 3.5 3.7 3.7   AGRAT 0.5* 0.5* 0.5*      CBC w/Diff Recent Labs     12/23/21  0336 12/22/21  0200 12/21/21  0355   WBC 15.7* 15.3* 19.0*   RBC 3.60* 3.71* 3.53*   HGB 9.2* 9.5* 9.1*   HCT 30.0* 31.6* 30.0*   * 382 317   GRANS 68 69 72   LYMPH 14* 13* 11*   EOS 3 2 3      Cardiac Enzymes No results for input(s): CPK, CKND1, LUH in the last 72 hours. No lab exists for component: CKRMB, TROIP   Coagulation No results for input(s): PTP, INR, APTT, INREXT in the last 72 hours. Lipid Panel Lab Results   Component Value Date/Time    Cholesterol, total 146 06/01/2016 07:51 AM    HDL Cholesterol 40 06/01/2016 07:51 AM    LDL, calculated 91.6 06/01/2016 07:51 AM    VLDL, calculated 14.4 06/01/2016 07:51 AM    Triglyceride 72 06/01/2016 07:51 AM    CHOL/HDL Ratio 3.7 06/01/2016 07:51 AM      BNP No results for input(s): BNPP in the last 72 hours.    Liver Enzymes Recent Labs     12/23/21  0336   TP 5.4*   ALB 1.9*         Thyroid Studies No results found for: T4, T3U, TSH, TSHEXT     Procedures/imaging: see electronic medical records for all procedures/Xrays and details which were not copied into this note but were reviewed prior to creation of Plan

## 2021-12-23 NOTE — PROGRESS NOTES
Physician Progress Note      Henny Lyon  [facility] #:                  [id number]  :                       1945  ADMIT DATE:       2021 3:17 AM  DISCH DATE:  RESPONDING  PROVIDER #:        Lorenzo Fortune MD          QUERY TEXT:    Pt admitted with sepsis . Pt noted to have  indwelling alonso and colitis. If possible, please document in progress notes and discharge summary the relationship, if any, between  sepsis  and  due to urinary source with indwelling alonso and colitis. The medical record reflects the following:  Risk Factors: sepsis- indwelling alonso  Clinical Indicators: Urinary retention  with indwelling alonso . Patient urine culture  PSEUDOMONAS AERUGINOSA . / urine culture no growth - - UA leukocyte esterase - moderate, WBC 50-60, bacteria 1+, yeast 3+. CT ABD-of Essentially unchanged prominence and distribution of pancolitis. Similar to prior study CT findings raise concern for C. difficile pseudomembranous colitis. Mild descending colon pericolonic mesenteric inflammatory stranding but no drainable fluid collection. Treatment: On Rocephin and Flagyl and no po vanc per ID    Thank You  Hunter Ivan RN CDI CRCR  [phone]  Options provided:  -- Sepsis due to  Sepsis due to  UTI due to indwelling alonso  -- Sepsis unrelated to  UTI due to indwelling alonso  -- Sepsis due to colitis  -- Sepsis due to UTI due to indwelling alonso  and colitis  -- Sepsis due to ,please specify, please specify  -- Other - I will add my own diagnosis  -- Disagree - Not applicable / Not valid  -- Disagree - Clinically unable to determine / Unknown  -- Refer to Clinical Documentation Reviewer    PROVIDER RESPONSE TEXT:    Provider is clinically unable to determine a response to this query. Query created by: Nilo Monroe on 2021 7:29 AM      QUERY TEXT:    Pt admitted with Sepsis .   Noted documentation of Severe Malnutrition  Per RD  on   in progress notes .  If possible, please document in progress notes and discharge summary:    The medical record reflects the following:  Risk Factors: Sepsis  Clinical Indicators: Malnutrition Status: Severe malnutrition Inadequate energy intake related to acute injury/trauma as evidenced by intake 26-50%  Treatment: Continue with current diet order and ensure enlive TID    Thank You  Michael Post RN [facility] CRCR  [phone]  Options provided:  -- Severe Malnutrition confirmed  -- Other - I will add my own diagnosis  -- Disagree - Not applicable / Not valid  -- Disagree - Clinically unable to determine / Unknown  -- Refer to Clinical Documentation Reviewer    PROVIDER RESPONSE TEXT:    The diagnosis of Severe Malnutrition was confirmed .     Query created by: Cristobal Schuster on 12/23/2021 7:40 AM      Electronically signed by:  Samreen Kellogg MD 12/23/2021 6:56 PM

## 2021-12-23 NOTE — PROGRESS NOTES
Occupational Therapy Treatment Attempt     Chart reviewed. Attempted Occupational Therapy Treatment, however, patient unable to be seen due to:  []  Nausea/vomiting  []  Eating  []  Pain  []  Patient too lethargic  []  Off Unit for testing/procedure  []  Dialysis treatment in progress  []  Telemetry Results  [x]  Other: pt refusing participation with therapy despite of providing max verbal encouragement. Will f/u later as patient's schedule allows.   Ericka Bai OTR/L

## 2021-12-23 NOTE — PROGRESS NOTES
Physician Progress Note      Roz Stone  [facility] #:                  [id number]  :                       1945  ADMIT DATE:       2021 3:17 AM  DISCH DATE:  RESPONDING  PROVIDER #:        Emily Wright MD        QUERY TEXT:    Stage of Chronic Kidney Disease: Please provide further specificity, if known. Clinical indicators include: ckd, bun  Options provided:  -- Chronic kidney disease stage 1  -- Chronic kidney disease stage 2  -- Chronic kidney disease stage 3  -- Chronic kidney disease stage 3a  -- Chronic kidney disease stage 3b  -- Chronic kidney disease stage 4  -- Chronic kidney disease stage 5  -- Chronic kidney disease stage 5, requiring dialysis  -- End stage renal disease  -- Other - I will add my own diagnosis  -- Disagree - Not applicable / Not valid  -- Disagree - Clinically Unable to determine / Unknown        PROVIDER RESPONSE TEXT:    The patient has chronic kidney disease stage 1.       Electronically signed by:  Emily Wright MD 2021 8:10 PM

## 2021-12-24 NOTE — PROGRESS NOTES
Attempt for OT session. Pt refusing participation with therapy. Pt has not been participating with therapy and cuurent orders will be discontinued at this time.  Thank you for the referral.     Donald Hdz, OTR/L

## 2021-12-24 NOTE — PROGRESS NOTES
Hospitalist Progress Note    Patient: Jason Alexander MRN: [id number]  CSN: [id number]    YOB: 1945  Age: 78 y.o. Sex: male    DOA: 12/11/2021 LOS:  LOS: 13 days                Assessment/Plan     Patient Active Problem List   Diagnosis Code    Lumbar spinal stenosis M48.061    Lumbar spondylosis M47.816    Radiculopathy of leg M54.10    Unable to ambulate R26.2    Urinary retention R33.9    Type II diabetes mellitus with manifestations, uncontrolled ([facility] [address] E11.8, E11.65    Renal mass N28.89    UTI (urinary tract infection) due to urinary indwelling Jimenez catheter ([facility]) T83.511A, N39.0    Compression fracture of L1 vertebra ([facility]) S32.010A    BRITT (acute kidney injury) ([facility] [address] N17.9    Aneurysm of infrarenal abdominal aorta ([facility]) I71.4    HTN (hypertension) I10    Severe protein-calorie malnutrition (HCC) E43    Severe pulmonary hypertension (HCC) I27.20    Sepsis (HCC) A41.9    Hypotension I95.9    Pulmonary hypertension (HCC) I27.20    History of tobacco abuse Z87.891    Pulmonary emphysema (HCC) J43.9    Acute hypoxemic respiratory failure ([facility]) J96.01    Tricuspid regurgitation I07.1    Acute metabolic encephalopathy G09.68    Constipation K59.00    Colitis K52.9        Chief complaint :  Colitis  78 y. o. male who multiple medical problems including diabetes, hypertension, pulmonary hypertension, pulmonary emphysema brought into the ED via EMS from home with indwelling Jimenez catheter, fever and altered mental status admitted for sepsis     Sepsis -resolved  No growth urine cx here  colitis via CT scan, clinically has no  diarrhea  Repeated CT scan shows colitis changes  But overall bandemia has resolved, neutrophil count down, overall wbc count is improved  patient has a chronic indwelling Jimenez  Flagyl, oral vanco.  GI consulted        MRI of spine ordered by ID degenerative changes      Right renal mass on CT scan, no further workup at this time per urology, looks like neoplasm per MRI today  Seen by urology, follow up as outpatient.     Acute metabolic encephalopathyresolved,      BRITT -  Improved    Hypernatremia -  Encourage free water.     Chronic urinary retention-alonso     Tachycardia  Likely due to sepsis  Better     Pulm HTN     Malnutrition, weakness     Uncontrolled HTN-  on norvasc, toprol xl       Diabetestype 2  SSI PRN      DVT prophylaxis with heparin and Pepcid. Disposition : 2-3 days    Review of systems  General: No fevers or chills. Cardiovascular: No chest pain or pressure. No palpitations. Pulmonary: No shortness of breath. Gastrointestinal: No nausea, vomiting. Physical Exam:  General: Awake, cooperative, no acute distress    HEENT: NC, Atraumatic. PERRLA, anicteric sclerae. Lungs: CTA Bilaterally. No Wheezing/Rhonchi/Rales. Heart:  S1 S2,  No murmur, No Rubs, No Gallops  Abdomen: Soft, Non distended, Non tender.  +Bowel sounds,   Extremities: No c/c/e  Psych:   Not anxious or agitated. Neurologic:  No acute neurological deficit.            Vital signs/Intake and Output:  Visit Vitals  BP (!) 142/78   Pulse 87   Temp 98.1 °F (36.7 °C)   Resp 16   Ht 5' 11\" (1.803 m)   Wt 66.1 kg (145 lb 12.8 oz)   SpO2 92%   BMI 20.33 kg/m²     Current Shift:  12/24 0701 - 12/24 1900  In: 700 [P.O.:700]  Out: 250 [Urine:250]  Last three shifts:  12/22 1901 - 12/24 0700  In: 720 [P.O.:720]  Out: 1000 [Urine:1000]            Labs: Results:       Chemistry Recent Labs     12/24/21  0222 12/23/21  0336 12/22/21  0200   * 122* 116*    146* 146*   K 3.8 3.9 3.8    113* 113*   CO2 27 25 25   BUN 20* 18 16   CREA 0.93 0.82 0.94   CA 8.1* 8.0* 8.0*   AGAP 6 8 8   BUCR 22* 22* 17    107 112   TP 5.6* 5.4* 5.6*   ALB 2.2* 1.9* 1.9*   GLOB 3.4 3.5 3.7   AGRAT 0.6* 0.5* 0.5*      CBC w/Diff Recent Labs     12/24/21  0222 12/23/21  0336 12/22/21  0200   WBC 15.7* 15.7* 15.3*   RBC 3.67* 3.60* 3.71*   HGB 9.3* 9.2* 9.5*   HCT 31.9* 30.0* 31.6* * 453* 382   GRANS  --  68 69   LYMPH  --  14* 13*   EOS  --  3 2      Cardiac Enzymes No results for input(s): CPK, CKND1, LUH in the last 72 hours. No lab exists for component: CKRMB, TROIP   Coagulation No results for input(s): PTP, INR, APTT, INREXT, INREXT in the last 72 hours. Lipid Panel Lab Results   Component Value Date/Time    Cholesterol, total 146 06/01/2016 07:51 AM    HDL Cholesterol 40 06/01/2016 07:51 AM    LDL, calculated 91.6 06/01/2016 07:51 AM    VLDL, calculated 14.4 06/01/2016 07:51 AM    Triglyceride 72 06/01/2016 07:51 AM    CHOL/HDL Ratio 3.7 06/01/2016 07:51 AM      BNP No results for input(s): BNPP in the last 72 hours.    Liver Enzymes Recent Labs     12/24/21  0222   TP 5.6*   ALB 2.2*         Thyroid Studies No results found for: T4, T3U, TSH, TSHEXT, TSHEXT     Procedures/imaging: see electronic medical records for all procedures/Xrays and details which were not copied into this note but were reviewed prior to creation of Plan

## 2021-12-24 NOTE — PROGRESS NOTES
Please call [phone] to schedule your echo test    Try formula 303 -homeopathic remedy for your leg cramps  Stretch regularly   Problem: Mobility Impaired (Adult and Pediatric)  Goal: *Acute Goals and Plan of Care (Insert Text)  Description: Problem: Mobility Impaired (Adult and Pediatric)  Goal: *Acute Goals and Plan of Care (Insert Text)  Outcome: Progressing Towards Goal  Note: Physical Therapy Goals  Initiated 12/12/2021 and to be accomplished within 7 day(s)  1. Patient will move from supine to sit and sit to supine  in bed with min A.    2.  Patient will transfer from bed to chair and chair to bed with min A using the least restrictive device. 3.  Patient will perform sit to stand with min A.  4.  Patient will ambulate with min A for 50 feet with the least restrictive device. Prior Level of Function:   Patient reports he needs min to mod A for all mobility including gait using rollator. Patient lives with wife in a [facility]. Pt has a walk-in shower, with shower chair. Per chart review: \"EMS reports that the pts wife is unable to care for the pt\"  Outcome: Progressing Towards Goal   PHYSICAL THERAPY TREATMENT    Patient: Jonathon Whitman (83 y.o. male)  Date: 12/24/2021  Diagnosis: Sepsis ([facility] [address] [A41.9] Sepsis ([facility] [address]    Precautions: Fall  PLOF:     ASSESSMENT:  Pt supine upon arrival.  Difficulty managing oral secretions, excessive drooling. Pt sat up EOB with Gautam and increased time to carry out. Elevated bed to assist with sit to stand, multiple attempts for sit to stand, pt unable to clear buttocks from mattress. Pt able to assist with scooting laterally up to [facility]. Mod/maxA to return to supine. Attempted (B)LE ROM strengthening exercises, pt having difficulty following direction with VC and MC. Progression toward goals:   []      Improving appropriately and progressing toward goals  [x]      Improving slowly and progressing toward goals  []      Not making progress toward goals and plan of care will be adjusted     PLAN:  Patient continues to benefit from skilled intervention to address the above impairments. Continue treatment per established plan of care. Discharge Recommendations:  Ziyad Chung  Further Equipment Recommendations for Discharge:  TBD     SUBJECTIVE:   Patient stated ok.     OBJECTIVE DATA SUMMARY:   Critical Behavior:  Neurologic State: Alert,Confused  Orientation Level: Disoriented to time,Oriented to situation,Oriented to place,Oriented to person  Cognition: Follows commands  Safety/Judgement: Decreased awareness of environment,Decreased awareness of need for assistance,Decreased awareness of need for safety  Functional Mobility Training:  Bed Mobility:    Supine to Sit: Minimum assistance  Sit to Supine: Moderate assistance;Maximum assistance  Scooting: Minimum assistance; Moderate assistance  Transfers:  Sit to Stand: Maximum assistance; Total assistance (unable to clear buttocks from bed)  Balance:  Sitting: Impaired; With support  Sitting - Static: Fair (occasional) (-)  Sitting - Dynamic: Fair (occasional) (-)  Therapeutic Exercises:   (B)LEs      EXERCISE   Sets   Reps   Active Active Assist   Passive Self ROM   Comments   Ankle Pumps    [] [] [] []    Quad Sets/Glut Sets  10  [x] [] [] [] Hold for 5 secs   Hamstring Sets   [] [] [] []    Short Arc Quads   [] [] [] []    Heel Slides  10 [x] [] [] []    Straight Leg Raises   [] [] [] []    Hip Add   [] [] [] [] Hold for 5 secs, w/ pillow squeeze   Long Arc Quads   [] [] [] []    Seated Marching   [] [] [] []    Standing Marching   [] [] [] []       [] [] [] []        Pain:  Pain level pre-treatment: 0/10  Pain level post-treatment: 0/10   Pain Intervention(s): Medication (see MAR); Rest, Ice, Repositioning   Response to intervention: Nurse notified, See doc flow    Activity Tolerance:   poor  Please refer to the flowsheet for vital signs taken during this treatment.   After treatment:   [] Patient left in no apparent distress sitting up in chair  [x] Patient left in no apparent distress in bed  [x] Call bell left within reach  [x] Nursing notified  [] Caregiver present  [] Bed alarm activated  [] SCDs applied      COMMUNICATION/EDUCATION:   [x]         Role of Physical Therapy in the acute care setting. [x]         Fall prevention education was provided and the patient/caregiver indicated understanding. [x]         Patient/family have participated as able in working toward goals and plan of care. [x]         Patient/family agree to work toward stated goals and plan of care. []         Patient understands intent and goals of therapy, but is neutral about his/her participation.   []         Patient is unable to participate in stated goals/plan of care: ongoing with therapy staff.  []         Other:        Angelica Ramirez PTA   Time Calculation: 15 mins

## 2021-12-24 NOTE — PROGRESS NOTES
Comprehensive Nutrition Assessment    Type and Reason for Visit: Reassess    Nutrition Recommendations/Plan: continue current POC    Nutrition Assessment:  Pt admitted with Sepsis resolved, metabolic encephalopathy, BRITT. Estimated Daily Nutrient Needs:  Energy (kcal): (P) 1652kcal; Weight Used for Energy Requirements: (P) Current  Protein (g): (P) 66; Weight Used for Protein Requirements: (P) Current (1g/kg)  Fluid (ml/day): (P) 1652; Method Used for Fluid Requirements: (P) 1 ml/kcal      Nutrition Related Findings:  labs and meds reviewed. Noted pt with severe malnutrition. PO intakes reviewed: 0-25%, however ensure intakes about %. No pressure area per documentation. Wounds:    None       Current Nutrition Therapies:  ADULT ORAL NUTRITION SUPPLEMENT Breakfast, Lunch, Dinner; Other Supplement;  Ensure  ADULT DIET Easy to Chew; Low Fat/Low Chol/High Fiber/EVENS    Anthropometric Measures:  Height:  5' 11\" (180.3 cm)  Current Body Wt:  66.1 kg (145 lb 11.6 oz)   Admission Body Wt:  134 lb    Usual Body Wt:  62.6 kg (138 lb)     Ideal Body Wt:  172 lbs:  84.7 %   Adjusted Body Weight:   ; Weight Adjustment for: No adjustment   Adjusted BMI:       BMI Category:  Underweight (BMI less than 22) age over 72       Nutrition Diagnosis:   (P) Inadequate oral intake related to (P) acute injury/trauma as evidenced by (P) intake 26-50%    Nutrition Interventions:   Food and/or Nutrient Delivery: (P) Continue current diet,Continue oral nutrition supplement  Nutrition Education and Counseling: (P) No recommendations at this time  Coordination of Nutrition Care: (P) Continue to monitor while inpatient    Goals:  (P) PO intakes >50% of supplements throughout the next 3-7 days       Nutrition Monitoring and Evaluation:   Behavioral-Environmental Outcomes: (P) None identified  Food/Nutrient Intake Outcomes: (P) Food and nutrient intake,Supplement intake  Physical Signs/Symptoms Outcomes: (P) Biochemical data,GI status,Fluid status or edema,Hemodynamic status,Meal time behavior,Nutrition focused physical findings,Weight    Discharge Planning:    (P) Continue oral nutrition supplement,Continue current diet     Electronically signed by Alva Corona on 12/24/2021 at 10:53 AM

## 2021-12-25 NOTE — PROGRESS NOTES
Hospitalist Progress Note    Patient: Karlene Price MRN: [id number]  [facility]: [id number]    YOB: 1945  Age: 78 y.o. Sex: male    DOA: 12/11/2021 LOS:  LOS: 14 days                Assessment/Plan     Patient Active Problem List   Diagnosis Code    Lumbar spinal stenosis M48.061    Lumbar spondylosis M47.816    Radiculopathy of leg M54.10    Unable to ambulate R26.2    Urinary retention R33.9    Type II diabetes mellitus with manifestations, uncontrolled ([facility] [address] E11.8, E11.65    Renal mass N28.89    UTI (urinary tract infection) due to urinary indwelling Jimenez catheter ([facility]) T83.511A, N39.0    Compression fracture of L1 vertebra ([facility]) S32.010A    BRITT (acute kidney injury) ([facility] [address] N17.9    Aneurysm of infrarenal abdominal aorta ([facility]) I71.4    HTN (hypertension) I10    Severe protein-calorie malnutrition (HCC) E43    Severe pulmonary hypertension (HCC) I27.20    Sepsis (HCC) A41.9    Hypotension I95.9    Pulmonary hypertension (HCC) I27.20    History of tobacco abuse Z87.891    Pulmonary emphysema (HCC) J43.9    Acute hypoxemic respiratory failure ([facility]) J96.01    Tricuspid regurgitation I07.1    Acute metabolic encephalopathy Q57.60    Constipation K59.00    Colitis K52.9        Chief complaint :  Colitis  78 y. o. male who multiple medical problems including diabetes, hypertension, pulmonary hypertension, pulmonary emphysema brought into the ED via EMS from home with indwelling Jimenez catheter, fever and altered mental status admitted for sepsis    Dysphagia-  He is choking on food,  Seen by SLP, recommend easy to chew diet, thin liquids.     Sepsis -resolved  No growth urine cx here  colitis via CT scan, clinically has no  diarrhea  Repeated CT scan shows colitis changes  But overall bandemia has resolved, neutrophil count down, overall wbc count is improved  patient has a chronic indwelling Jimenez  Flagyl, oral vanco.  GI consulted, discussed with Dr. Tc Ward, recommend getting C. difficile. However patient does not have diarrhea, stools are formed.        MRI of spine degenerative changes      Right renal mass on CT scan, no further workup at this time per urology, looks like neoplasm per MRI today  Seen by urology, follow up as outpatient.     Acute metabolic encephalopathyresolved,      BRITT -  Improved    Hypernatremia -  Encourage free water.     Chronic urinary retention-alonso     Tachycardia  Likely due to sepsis  Better     Pulm HTN     Malnutrition, weakness     Uncontrolled HTN-  on norvasc, toprol xl       Diabetestype 2  SSI PRN      DVT prophylaxis with heparin and Pepcid. Disposition : Awaiting placement    Review of systems  General: No fevers or chills. Cardiovascular: No chest pain or pressure. No palpitations. Pulmonary: No shortness of breath. Gastrointestinal: No nausea, vomiting. Physical Exam:  General: Awake, cooperative, no acute distress    HEENT: NC, Atraumatic. PERRLA, anicteric sclerae. Lungs: Occasional upper airway rattling, patient able to clear throat with coughing. Heart:  S1 S2,  No murmur, No Rubs, No Gallops  Abdomen: Soft, Non distended, Non tender.  +Bowel sounds,   Extremities: No c/c/e  Psych:   Not anxious or agitated. Neurologic:  No acute neurological deficit. Vital signs/Intake and Output:  Visit Vitals  /76   Pulse 83   Temp 97.9 °F (36.6 °C)   Resp 18   Ht 5' 11\" (1.803 m)   Wt 64 kg (141 lb 3.2 oz)   SpO2 95%   BMI 19.69 kg/m²     Current Shift:  No intake/output data recorded.   Last three shifts:  12/23 1901 - 12/25 0700  In: 700 [P.O.:700]  Out: 950 [Urine:950]            Labs: Results:       Chemistry Recent Labs     12/25/21  0436 12/24/21  0222 12/23/21  0336   * 122* 122*    143 146*   K 4.3 3.8 3.9    110 113*   CO2 26 27 25   BUN 19* 20* 18   CREA 1.02 0.93 0.82   CA 8.2* 8.1* 8.0*   AGAP 7 6 8   BUCR 19 22* 22*   AP  --  103 107   TP  --  5.6* 5.4*   ALB  --  2.2* 1.9*   GLOB --  3.4 3.5   AGRAT  --  0.6* 0.5*      CBC w/Diff Recent Labs     12/25/21  0436 12/24/21  0222 12/23/21  0336   WBC 13.5* 15.7* 15.7*   RBC 3.72* 3.67* 3.60*   HGB 9.4* 9.3* 9.2*   HCT 31.5* 31.9* 30.0*   * 529* 453*   GRANS 73  --  68   LYMPH 14*  --  14*   EOS 2  --  3      Cardiac Enzymes No results for input(s): CPK, CKND1, LUH in the last 72 hours. No lab exists for component: CKRMB, TROIP   Coagulation No results for input(s): PTP, INR, APTT, INREXT, INREXT in the last 72 hours. Lipid Panel Lab Results   Component Value Date/Time    Cholesterol, total 146 06/01/2016 07:51 AM    HDL Cholesterol 40 06/01/2016 07:51 AM    LDL, calculated 91.6 06/01/2016 07:51 AM    VLDL, calculated 14.4 06/01/2016 07:51 AM    Triglyceride 72 06/01/2016 07:51 AM    CHOL/HDL Ratio 3.7 06/01/2016 07:51 AM      BNP No results for input(s): BNPP in the last 72 hours.    Liver Enzymes Recent Labs     12/24/21 0222   TP 5.6*   ALB 2.2*         Thyroid Studies No results found for: T4, T3U, TSH, TSHEXT, TSHEXT     Procedures/imaging: see electronic medical records for all procedures/Xrays and details which were not copied into this note but were reviewed prior to creation of Plan

## 2021-12-25 NOTE — PROGRESS NOTES
1128: Pt asleep, will follow up as schedule permits. 1237: Pt asleep, will follow up as schedule permits.

## 2021-12-25 NOTE — PROGRESS NOTES
Verbal shift change report given to Lyndon Elders by Lino Tarango RN. Report included the following information SBAR, Kardex, Summary, Intake/Output and MAR.

## 2021-12-25 NOTE — PROGRESS NOTES
Problem: Falls - Risk of  Goal: *Absence of Falls  Description: Document [place] Led Fall Risk and appropriate interventions in the flowsheet.   Outcome: Progressing Towards Goal  Note: Fall Risk Interventions:  Mobility Interventions: Communicate number of staff needed for ambulation/transfer,Patient to call before getting OOB    Mentation Interventions: Adequate sleep, hydration, pain control    Medication Interventions: Assess postural VS orthostatic hypotension,Patient to call before getting OOB,Teach patient to arise slowly    Elimination Interventions: Call light in reach,Patient to call for help with toileting needs    History of Falls Interventions: Consult care management for discharge planning

## 2021-12-25 NOTE — PROGRESS NOTES
21:35 Assessment completed. O2  remains @2 LPM per NC. Lungs are clear bilat. Offers 0 c/o pain or discomfort. Resting quietly in bed with TV on.    23:00 Shift assessment completed. See nsg flow sheet for details. 03:05 Reassessed with 0 changes noted. Resting quietly in bed with eyes closed between cares. 07:10 Bedside and Verbal shift change report given to Odessa Bright RN (oncoming nurse) by Padmaja Ceballos RN (offgoing nurse). Report included the following information SBAR.

## 2021-12-25 NOTE — PROGRESS NOTES
1200-informed MD Rayo Sine that pt oxygen had to be increased to 3.5l/min, pt oxygen observed below 90% a few times pt currently 91% and breath sounds like MD yasmani said he would come see pt.    1926- Verbal shift change report given to Jaquan Brantley RN by Alexander Lanes, RN. Report included the following information SBAR, Kardex, Summary, Intake/Output and MAR.

## 2021-12-26 NOTE — PROGRESS NOTES
Hospitalist Progress Note    Patient: Hermilo Barnett MRN: [id number]  CSN: [id number]    YOB: 1945  Age: 78 y.o. Sex: male    DOA: 12/11/2021 LOS:  LOS: 15 days                Assessment/Plan     Patient Active Problem List   Diagnosis Code    Lumbar spinal stenosis M48.061    Lumbar spondylosis M47.816    Radiculopathy of leg M54.10    Unable to ambulate R26.2    Urinary retention R33.9    Type II diabetes mellitus with manifestations, uncontrolled ([facility] [address] E11.8, E11.65    Renal mass N28.89    UTI (urinary tract infection) due to urinary indwelling Jimenez catheter ([facility]) T83.511A, N39.0    Compression fracture of L1 vertebra ([facility]) S32.010A    BRITT (acute kidney injury) ([facility] [address] N17.9    Aneurysm of infrarenal abdominal aorta ([facility]) I71.4    HTN (hypertension) I10    Severe protein-calorie malnutrition (HCC) E43    Severe pulmonary hypertension (HCC) I27.20    Sepsis (HCC) A41.9    Hypotension I95.9    Pulmonary hypertension (HCC) I27.20    History of tobacco abuse Z87.891    Pulmonary emphysema (HCC) J43.9    Acute hypoxemic respiratory failure ([facility]) J96.01    Tricuspid regurgitation I07.1    Acute metabolic encephalopathy J39.42    Constipation K59.00    Colitis K52.9        Chief complaint :  Colitis  78 y. o. male who multiple medical problems including diabetes, hypertension, pulmonary hypertension, pulmonary emphysema brought into the ED via EMS from home with indwelling Jimenez catheter, fever and altered mental status admitted for sepsis    Dysphagia-  He is choking on food,  Seen by SLP, recommend easy to chew diet, thin liquids.     Sepsis -resolved  No growth urine cx here  colitis via CT scan, clinically has no  diarrhea  Repeated CT scan shows colitis changes  But overall bandemia has resolved, neutrophil count down, overall wbc count is improved  patient has a chronic indwelling Jimenez  Flagyl, oral vanco.  GI consulted, discussed with Dr. Dianne Fish, recommend getting C. difficile. However patient does not have diarrhea, stools are formed.        MRI of spine degenerative changes      Right renal mass on CT scan, no further workup at this time per urology, looks like neoplasm per MRI today  Seen by urology, follow up as outpatient.     Acute metabolic encephalopathyresolved,      BRITT -  Improved    Hypernatremia -  Encourage free water.     Chronic urinary retention-alonso     Tachycardia  Likely due to sepsis  Better     Pulm HTN     Malnutrition, weakness     Uncontrolled HTN-  on norvasc, toprol xl       Diabetestype 2  SSI PRN      DVT prophylaxis with heparin and Pepcid. Called wife and left voice message. Disposition : Awaiting placement    Review of systems  General: No fevers or chills. Cardiovascular: No chest pain or pressure. No palpitations. Pulmonary: No shortness of breath. Gastrointestinal: No nausea, vomiting. Physical Exam:  General: Awake, cooperative, no acute distress    HEENT: NC, Atraumatic. PERRLA, anicteric sclerae. Lungs: Occasional upper airway rattling, patient able to clear throat with coughing. Heart:  S1 S2,  No murmur, No Rubs, No Gallops  Abdomen: Soft, Non distended, Non tender.  +Bowel sounds,   Extremities: No c/c/e  Psych:   Not anxious or agitated. Neurologic:  No acute neurological deficit. Vital signs/Intake and Output:  Visit Vitals  /74 (BP 1 Location: Right upper arm, BP Patient Position: At rest)   Pulse 93   Temp 98.1 °F (36.7 °C)   Resp 17   Ht 5' 11\" (1.803 m)   Wt 63.5 kg (139 lb 15.9 oz)   SpO2 90%   BMI 19.52 kg/m²     Current Shift:  No intake/output data recorded.   Last three shifts:  12/24 1901 - 12/26 0700  In: -   Out: 1872 [Urine:1825]            Labs: Results:       Chemistry Recent Labs     12/26/21  0520 12/25/21  0436 12/24/21  0222   GLU 96 101* 122*    142 143   K 4.7 4.3 3.8    109 110   CO2 25 26 27   BUN 19* 19* 20*   CREA 1.06 1.02 0.93   CA 8.6 8.2* 8.1*   AGAP 8 7 6   BUCR 18 19 22*   AP  --   --  103   TP  --   --  5.6*   ALB  --   --  2.2*   GLOB  --   --  3.4   AGRAT  --   --  0.6*      CBC w/Diff Recent Labs     12/26/21  0520 12/25/21  0436 12/24/21  0222   WBC 13.9* 13.5* 15.7*   RBC 3.79* 3.72* 3.67*   HGB 9.6* 9.4* 9.3*   HCT 32.4* 31.5* 31.9*   * 562* 529*   GRANS 73 73  --    LYMPH 13* 14*  --    EOS 3 2  --       Cardiac Enzymes No results for input(s): CPK, CKND1, LUH in the last 72 hours. No lab exists for component: CKRMB, TROIP   Coagulation No results for input(s): PTP, INR, APTT, INREXT, INREXT in the last 72 hours. Lipid Panel Lab Results   Component Value Date/Time    Cholesterol, total 146 06/01/2016 07:51 AM    HDL Cholesterol 40 06/01/2016 07:51 AM    LDL, calculated 91.6 06/01/2016 07:51 AM    VLDL, calculated 14.4 06/01/2016 07:51 AM    Triglyceride 72 06/01/2016 07:51 AM    CHOL/HDL Ratio 3.7 06/01/2016 07:51 AM      BNP No results for input(s): BNPP in the last 72 hours.    Liver Enzymes Recent Labs     12/24/21  0222   TP 5.6*   ALB 2.2*         Thyroid Studies No results found for: T4, T3U, TSH, TSHEXT, TSHEXT     Procedures/imaging: see electronic medical records for all procedures/Xrays and details which were not copied into this note but were reviewed prior to creation of Plan

## 2021-12-26 NOTE — PROGRESS NOTES
21:15 Assessment completed. O2 remains @ 3 LPM per NC. Lungs are clear bilat. Occ prod. Cough but is clearing own secretions. Denies pain or discomfort. Resting quietly in bed. 22:50 Shift assessment completed. See nsg flow sheet for details. 03:00 Reassessed with 0 changes noted. Resting quietly in bed with eyes closed between cares. 08:00 Bedside and Verbal shift change report given to Bryan Hogan RN (oncoming nurse) by Karen Neri RN (offgoing nurse). Report included the following information SBAR.

## 2021-12-26 NOTE — PROGRESS NOTES
Problem: Falls - Risk of  Goal: *Absence of Falls  Description: Document [facility] Fall Risk and appropriate interventions in the flowsheet.   Outcome: Progressing Towards Goal  Note: Fall Risk Interventions:  Mobility Interventions: Communicate number of staff needed for ambulation/transfer,Patient to call before getting OOB,Utilize walker, cane, or other assistive device    Mentation Interventions: Adequate sleep, hydration, pain control    Medication Interventions: Assess postural VS orthostatic hypotension,Patient to call before getting OOB,Teach patient to arise slowly    Elimination Interventions: Call light in reach,Patient to call for help with toileting needs    History of Falls Interventions: Consult care management for discharge planning

## 2021-12-27 PROBLEM — A41.9 SEPSIS (HCC): Status: RESOLVED | Noted: 2021-01-01 | Resolved: 2021-01-01

## 2021-12-27 PROBLEM — N17.9 AKI (ACUTE KIDNEY INJURY) (HCC): Status: RESOLVED | Noted: 2021-01-01 | Resolved: 2021-01-01

## 2021-12-27 PROBLEM — J96.01 ACUTE HYPOXEMIC RESPIRATORY FAILURE (HCC): Status: RESOLVED | Noted: 2021-01-01 | Resolved: 2021-01-01

## 2021-12-27 PROBLEM — G93.41 ACUTE METABOLIC ENCEPHALOPATHY: Status: RESOLVED | Noted: 2021-01-01 | Resolved: 2021-01-01

## 2021-12-27 NOTE — DISCHARGE SUMMARY
Discharge Summary    Patient: Ashley Mckenna MRN: [id number]  CSN: [id number]    YOB: 1945  Age: 78 y.o.   Sex: male    DOA: 12/11/2021 LOS:  LOS: 16 days   Discharge Date:      Primary Care Provider:  Ashley Brooke MD    Admission Diagnoses: Sepsis ([facility] [A41.9]    Discharge Diagnoses:    Problem List as of 12/27/2021 Date Reviewed: 7/20/2021          Codes Class Noted - Resolved    Colitis ICD-10-CM: K52.9  ICD-9-CM: 558.9  12/13/2021 - Present        Tricuspid regurgitation ICD-10-CM: I07.1  ICD-9-CM: 397.0  11/29/2021 - Present        Pulmonary hypertension ([facility] ICD-10-CM: I27.20  ICD-9-CM: 416.8  11/24/2021 - Present        History of tobacco abuse ICD-10-CM: Z87.891  ICD-9-CM: V15.82  11/24/2021 - Present        Pulmonary emphysema ([facility] ICD-10-CM: J43.9  ICD-9-CM: 492.8  11/24/2021 - Present        Severe protein-calorie malnutrition ([facility] ICD-10-CM: E43  ICD-9-CM: 262  7/21/2021 - Present        Aneurysm of infrarenal abdominal aorta ([facility] ICD-10-CM: I71.4  ICD-9-CM: 441.4  7/20/2021 - Present        HTN (hypertension) ICD-10-CM: I10  ICD-9-CM: 401.9  7/20/2021 - Present        Compression fracture of L1 vertebra ([facility] ICD-10-CM: S32.010A  ICD-9-CM: 805.4  5/17/2021 - Present        Type II diabetes mellitus with manifestations, uncontrolled ([facility] ICD-10-CM: E11.8, E11.65  ICD-9-CM: 250.92  9/16/2017 - Present        Renal mass ICD-10-CM: N28.89  ICD-9-CM: 593.9  9/16/2017 - Present        Unable to ambulate ICD-10-CM: R26.2  ICD-9-CM: 719.7  9/14/2017 - Present        Urinary retention ICD-10-CM: R33.9  ICD-9-CM: 788.20  9/14/2017 - Present        Lumbar spinal stenosis ICD-10-CM: M48.061  ICD-9-CM: 724.02  9/5/2017 - Present        Lumbar spondylosis ICD-10-CM: M47.816  ICD-9-CM: 721.3  9/5/2017 - Present        Radiculopathy of leg ICD-10-CM: M54.10  ICD-9-CM: 724.4  9/5/2017 - Present        RESOLVED: Acute metabolic encephalopathy AZO-73-EL: G93.41  ICD-9-CM: 348.31 12/12/2021 - 12/27/2021        RESOLVED: Acute hypoxemic respiratory failure ([place] [id number] ICD-10-CM: J96.01  ICD-9-CM: 518.81  11/24/2021 - 12/27/2021        * (Principal) RESOLVED: Sepsis ([place] [id number] ICD-10-CM: A41.9  ICD-9-CM: 038.9, 995.91  11/23/2021 - 12/27/2021        RESOLVED: Elevated troponin ICD-10-CM: R77.8  ICD-9-CM: 790.6  7/21/2021 - 7/27/2021        RESOLVED: Sepsis ([place] [id number] ICD-10-CM: A41.9  ICD-9-CM: 038.9, 995.91  7/20/2021 - 7/27/2021        RESOLVED: BRITT (acute kidney injury) ([place] [id number] ICD-10-CM: N17.9  ICD-9-CM: 584.9  7/20/2021 - 12/27/2021        RESOLVED: Blurred vision ICD-10-CM: H53.8  ICD-9-CM: 368.8  10/13/2020 - 10/13/2020        RESOLVED: Anemia ICD-10-CM: D64.9  ICD-9-CM: 285.9  9/16/2017 - 5/1/2020        RESOLVED: Dehydration ICD-10-CM: E86.0  ICD-9-CM: 276.51  9/14/2017 - 5/1/2020        RESOLVED: Hyponatremia ICD-10-CM: E87.1  ICD-9-CM: 276.1  9/14/2017 - 5/1/2020        RESOLVED: DM (diabetes mellitus) ([place] [id number] (Chronic) ICD-10-CM: E11.9  ICD-9-CM: 250.00  9/7/2017 - 5/1/2020              Discharge Medications:     Current Discharge Medication List      START taking these medications    Details   amLODIPine (NORVASC) 10 mg tablet Take 1 Tablet by mouth daily. Qty: 30 Tablet, Refills: 0  Start date: 12/28/2021      metroNIDAZOLE (FLAGYL) 500 mg tablet Take 1 Tablet by mouth three (3) times daily for 4 days. Qty: 12 Tablet, Refills: 0  Start date: 12/27/2021, End date: 12/31/2021      vancomycin 50 mg/mL oral solution (compounded) Take 2.5 mL by mouth every six (6) hours for 18 doses. Qty: 45 mL, Refills: 0  Start date: 12/27/2021, End date: 1/1/2022         CONTINUE these medications which have CHANGED    Details   metoprolol succinate (TOPROL-XL) 50 mg XL tablet Take 1 Tablet by mouth nightly. Qty: 30 Tablet, Refills: 0  Start date: 12/27/2021         CONTINUE these medications which have NOT CHANGED    Details   omeprazole (PRILOSEC) 40 mg capsule Take 1 Capsule by mouth daily.   Qty: 30 Capsule, Refills: 0      atorvastatin (Lipitor) 20 mg tablet Take 20 mg by mouth nightly. traZODone (DESYREL) 50 mg tablet Take 50 mg by mouth nightly. aspirin 81 mg chewable tablet Take 81 mg by mouth daily. multivitamin (ONE A DAY) tablet Take 1 Tab by mouth daily. STOP taking these medications       cephALEXin (Keflex) 500 mg capsule Comments:   Reason for Stopping:         pregabalin (Lyrica) 25 mg capsule Comments:   Reason for Stopping:         lisinopril-hydroCHLOROthiazide (PRINZIDE, ZESTORETIC) 20-25 mg per tablet Comments:   Reason for Stopping:               Discharge Condition: Good    Procedures : None    Consults: Infectious Disease and Urology      PHYSICAL EXAM   Visit Vitals  /84 (BP 1 Location: Right upper arm, BP Patient Position: At rest)   Pulse 87   Temp 98.3 °F (36.8 °C)   Resp 18   Ht 5' 11\" (1.803 m)   Wt 63.5 kg (139 lb 15.9 oz)   SpO2 91%   BMI 19.52 kg/m²     General: Awake, cooperative, no acute distress    HEENT: NC, Atraumatic. PERRLA, EOMI. Anicteric sclerae. Lungs:  CTA Bilaterally. No Wheezing/Rhonchi/Rales. Heart:  Regular  rhythm,  No murmur, No Rubs, No Gallops  Abdomen: Soft, Non distended, Non tender. +Bowel sounds,   Extremities: No c/c/e  Psych:   Not anxious or agitated. Neurologic:  No acute neurological deficits. Admission HPI :   Fatoumata Cerrato is a 78 y.o. male who multiple medical problems including diabetes, hypertension, pulmonary hypertension, pulmonary emphysema brought into the ED via EMS from home with an dwelling Jimenez catheter, fever and altered mental status. Wife also relayed to EMS the patient has been dizzy. Wife relates to EMS that her 's temperature was 101.3 at home. Patient confused and unable to give much history when he arrived. However he was found to have an elevated white blood count at 22.8.   Just on the fifth he was normal.  Lactic acid obtained and was normal.  In emergency room urine culture was sent patient was started on Zosyn IV for urinary tract infection in the setting of indwelling Alonso. Patient was also initially tachycardic with a heart rate as high as 122 but improved with fluid rehydration. Patient had a CT abdomen chest and pelvis which showed \"wall thickening of the rectum possibly due to colitis/proctitis and a relatively large amount of fecal material throughout the colon which could also be a source of inflammation. Hospital Course :   Mr. Zeyad Monsalve was admitted to medical floor, he was seen and followed by ID and seen by urology. Dysphagia-  He is choking on food,  Seen by SLP, recommend easy to chew diet, thin liquids. Aspiration precautions.     Sepsis -  resolved  No growth on urine cx  colitis via CT scan, clinically has no  diarrhea  Repeated CT scan shows colitis changes  But overall bandemia has resolved, neutrophil count down, overall wbc count is improved  patient has a chronic indwelling Alonso  He is started Flagyl, oral vanco. Will complete 10 day course.      MRI of spine showed degenerative changes      Right renal mass on CT scan, no further workup at this time per urology, looks like neoplasm per MRI   Seen by urology, follow up as outpatient.     Acute metabolic encephalopathy  resolved,      BRITT -  Improved     Hypernatremia -  resolved  Encouraged free water.     Chronic urinary retention-  Has indwelling alonso     Pulm HTN -  Echo 11/24/2021 showed EF of 55-60%, grade 1 DD, pulmonary arterial systolic pressure is 00%.     Uncontrolled HTN-  Controlled on norvasc, toprol xl       Diabetestype 2  Placed on SSI. Overall prognosis is poor. High risk for re admission.       Activity: Activity as tolerated    Diet: as above    Follow-up: PCP, urology    Disposition: SNF    Minutes spent on discharge: 45       Labs: Results:       Chemistry Recent Labs     12/27/21  0535 12/26/21  0520 12/25/21  0436   GLU 92 96 101*    141 142   K 5.0 4.7 4.3    108 109   CO2 25 25 26   BUN 19* 19* 19*   CREA 1.09 1.06 1.02   CA 8.6 8.6 8.2*   AGAP 7 8 7   BUCR 17 18 19      CBC w/Diff Recent Labs     12/27/21  0535 12/26/21  0520 12/25/21  0436   WBC 13.2 13.9* 13.5*   RBC 3.60* 3.79* 3.72*   HGB 9.6* 9.6* 9.4*   HCT 31.1* 32.4* 31.5*   * 625* 562*   GRANS 72 73 73   LYMPH 13* 13* 14*   EOS 2 3 2      Cardiac Enzymes No results for input(s): CPK, CKND1, LUH in the last 72 hours. No lab exists for component: CKRMB, TROIP   Coagulation No results for input(s): PTP, INR, APTT, INREXT, INREXT in the last 72 hours. Lipid Panel Lab Results   Component Value Date/Time    Cholesterol, total 146 06/01/2016 07:51 AM    HDL Cholesterol 40 06/01/2016 07:51 AM    LDL, calculated 91.6 06/01/2016 07:51 AM    VLDL, calculated 14.4 06/01/2016 07:51 AM    Triglyceride 72 06/01/2016 07:51 AM    CHOL/HDL Ratio 3.7 06/01/2016 07:51 AM      BNP No results for input(s): BNPP in the last 72 hours. Liver Enzymes No results for input(s): TP, ALB, TBIL, AP in the last 72 hours. No lab exists for component: SGOT, GPT, DBIL   Thyroid Studies No results found for: T4, T3U, TSH, TSHEXT, TSHEXT         Significant Diagnostic Studies: XR CHEST PA LAT    Result Date: 12/5/2021  EXAM:  PA and Lateral Chest X-ray INDICATION: Abnormal finding on AP chest COMPARISON: AP chest dated same date ___________ FINDINGS: Heart and mediastinal contours are within normal limits. There is no infiltrates. There are no pleural effusions. No acute osseous findings. _____________      No infiltrates or effusion. MRI CERV SPINE WO CONT    Result Date: 12/21/2021  EXAM: MRI cervical spine without gadolinium CLINICAL INDICATION/HISTORY: occult paraspinal infection, diffuse colitis   > Additional: None. COMPARISON: None. > Reference Exam: None.  TECHNIQUE: Sagittal spin echo T1, FSE T2, and STIR sequences, and axial spin echo T2 and volume 3-D T2*GRE sequences were obtained of the cervical spine without gadolinium. _______________ FINDINGS: Study limited motion artifact especially axial sequences, but study still fairly diagnostic. There is normal alignment with no evidence of fracture or spondylolisthesis. No marrow edema or neoplastic marrow signal. Chronic appearing degenerative endplate marrow changes C6/7. Visualized base of brain unremarkable. There is mild nonspecific prevertebral/retropharyngeal increased STIR signal thickening without abnormal fluid collection. Limited evaluation of disc levels due to motion artifact on axial imaging but there is no evidence of significant canal stenosis or cord deformity. No abnormal intrinsic cord signal or cord enlargement with no abnormal mass or fluid collection within the spinal canal. ______________     1. Limited study due to motion, especially axial imaging, with otherwise diagnostic study 2. No evidence of discitis or osteomyelitis or other acute osseous finding. 3. Mild prevertebral/retropharyngeal edema nonspecific etiology. 4. No significant canal stenosis, no intrinsic cord abnormality. MRI [facility] SPINE W WO CONT    Result Date: 12/22/2021  EXAM: Thoracic spine MRI with gadolinium CLINICAL INDICATION/HISTORY: occult paraspinal infection. > Additional: Leukocytosis, pancolitis COMPARISON: None. > Reference Exam: Correlation cervical and lumbar spine MRI 12/21/2021 TECHNIQUE: Sagittal spin echo T1, ROBERT T2 and STIR sequences, and selected angled axial sequences through the discs with spin echo T1 and T2*GRE sequences were obtained of the thoracic spine. Post gadolinium sagittal and axial T1 weighted sequences were also obtained. _______________ FINDINGS: There is L1 superior endplate fracture with loss of height and marrow edema and small fluid cleft adjacent to superior endplate, as described on lumbar MRI yesterday. There is mildly pronounced kyphosis mid thoracic spine.  Mild chronic anterior wedge compression T7 vertebral body. No spondylolisthesis. No marrow edema or neoplastic marrow signal. No abnormal disc or endplate signal changes or enhancement to suggest discitis or osteomyelitis. Thoracic cord has normal size and signal with no abnormal mass or fluid collection within the spinal canal. Multilevel degenerative spondylosis without significant canal or foraminal stenosis. No abnormal enhancement. Mild to moderate bilateral pleural effusions. _______________     1. No evidence of discitis or osteomyelitis or other abnormality to suggest thoracic spine source of infection. 2. Superior endplate fracture L1 vertebral body as described on lumbar spine MRI yesterday. 3. Chronic mild anterior wedge compression T7 vertebral body, no acute osseous findings. 4. Mild to moderate bilateral pleural effusions. MRI LUMB SPINE W WO CONT    Result Date: 12/21/2021  EXAM: Lumbar MRI with gadolinium CLINICAL INDICATION/HISTORY: occult paraspinal infection   > Additional: Sepsis, diffuse colitis COMPARISON: 5/18/2021   > Reference Exam: None. TECHNIQUE: Sagittal spin echo T1, T2 and STIR sequences, axial T1 and T2 sequences, and axial and sagittal T1 post gadolinium sequences obtained of the lumbar spine. _______________ FINDINGS: There is scoliotic curvature convex to the left apex L1/2. There is previous circumferential fusion L4-S1 with susceptibility artifact from bilateral intrapedicular screws with anterior interbody spacers with solid-appearing osseous union. Again noted is superior endplate fracture L1 vertebral body with interval increased moderate anterior and central loss of height with small fluid cleft underneath the superior endplate with persistent but improved adjacent enhancing marrow edema. No significant retropulsion. No other marrow edema or neoplastic marrow signal. No abnormal signal or enhancement involving the discs or endplates to suggest discitis or osteomyelitis.  The conus medullaris is located at the L1/2 level and has a normal appearance and signal. No abnormal fluid collection or mass within the spinal canal and no abnormal paravertebral inflammatory change or fluid collection. There is an exophytic mostly solid enhancing appearing mass lower pole right kidney with small internal cystic component measuring maximally 6.9 cm seen on prior CT studies. Portion of left side of the colon visualized shows diffuse wall thickening compatible with known colitis. Several small benign-appearing right renal cysts. Visualized lower thoracic and upper sacral segments unremarkable. T12/L1 shows facet arthropathy and borderline canal stenosis. L1/2 level: Mild facet arthropathy and degenerative spondylosis without significant stenosis. L2/3 level: Mild facet arthropathy with no disc abnormality or significant stenosis. L3/4 level: Moderate facet arthropathy and mild degenerative spondylosis with mildly prominent dorsal epidural fat with borderline canal and mild right lateral recess stenosis. L4/5 level: Circumferential fusion with solid osseous union without canal stenosis with mild to moderate bilateral foraminal stenosis. L5/S1 level: Circumferential fusion with solid osseous union without significant canal or lateral recess stenosis with moderate to severe degenerative bilateral foraminal stenosis. Contrast: There is no other abnormal enhancement. _______________     1. No evidence of discitis or osteomyelitis or other acute inflammatory changes within the spine. 2. Subacute appearing superior endplate fracture L1 vertebral body with interval increased moderate loss of height, small osteonecrotic left, persistent marrow edema. 3. Solid-appearing previous circumferential fusion L4-S1 without canal stenosis, with residual bilateral foraminal stenosis. 4. Additional degenerative changes but no other significant stenosis. 5. Persistent likely neoplastic mostly solid exophytic mass lower pole right kidney.      CT HEAD WO CONT    Result Date: 12/11/2021  EXAM: CT head INDICATION: Dizziness COMPARISON: November 23, 2021 TECHNIQUE: Axial CT imaging of the head was performed without intravenous contrast. One or more dose reduction techniques were used on this CT: automated exposure control, adjustment of the mAs and/or kVp according to patient size, and iterative reconstruction techniques. The specific techniques used on this CT exam have been documented in the patient's electronic medical record. Digital Imaging and Communications in the Medicine (DICOM) format image data are available to nonaffiliated external healthcare facilities or entities on a secure, media free, reciprocally searchable basis with patient authorization for at least a 12 month period after this study. _______________ FINDINGS: BRAIN AND POSTERIOR FOSSA: The sulci, ventricles and basal cisterns  are enlarged consistent with atrophy. . There is no intracranial hemorrhage, mass effect, or midline shift. Chronic calcifications in the mat. Moderate regions of low attenuation are seen within the periventricular and deep  white matter. This is nonspecific but are typically due to small vessel ischemic change. Blanchie Bevels EXTRA-AXIAL SPACES AND MENINGES: There are no abnormal extra-axial fluid collections. CALVARIUM: Intact. SINUSES: Clear. OTHER: None. _______________     No acute intracranial abnormalities. Stable exam    CT CHEST ABD PELV WO CONT    Result Date: 12/11/2021  EXAM: CT CHEST ABD PELV WO CONT INDICATION: sepsis COMPARISON: CONTRAST:  None. TECHNIQUE: Thin axial images were obtained through the chest, abdomen and pelvis. Coronal and sagittal reconstructions were generated. Oral contrast was not administered. CT dose reduction was achieved through use of a standardized protocol tailored for this examination and automatic exposure control for dose modulation.  The absence of intravenous contrast material reduces the sensitivity for evaluation of the mediastinum and solid parenchymal organs of the abdomen. FINDINGS: THYROID: No nodule. MEDIASTINUM: No mass or lymphadenopathy. ELIF: No mass or lymphadenopathy. THORACIC AORTA: No aneurysm. MAIN PULMONARY ARTERY: Normal in caliber. TRACHEA/BRONCHI: Patent. ESOPHAGUS: No wall thickening or dilatation. HEART: Normal in size. PLEURA: No effusion or pneumothorax. LUNGS: No nodule, mass, or airspace disease. Emphysema LIVER: No mass or biliary dilatation. GALLBLADDER: Unremarkable. SPLEEN: No mass. PANCREAS: No mass or ductal dilatation. ADRENALS: Unremarkable. KIDNEYS/URETERS: No hydronephrosis. Small nonobstructing left renal calculus. Right lower pole renal cyst with thin calcifications in the wall. Jimenez in the bladder STOMACH: Unremarkable. SMALL BOWEL: No dilatation or wall thickening. COLON: Wall thickening of the rectum. Moderate stool in the sigmoid colon. APPENDIX: Unremarkable. PERITONEUM: No ascites or pneumoperitoneum. RETROPERITONEUM: No lymphadenopathy URINARY BLADDER: No mass or calculus. BONES: No destructive bone lesion. Previous lumbar fusion and laminectomy. L1 compression fracture ADDITIONAL COMMENTS: Atherosclerotic vascular calcifications in the abdominal aorta and iliac vessels. Ectasia of the abdominal aorta     Wall thickening of the rectum. This may be due to colitis/proctitis. Relatively large amount of fecal material throughout the colon. This could also be a source of inflammation. Otherwise no evidence of acute abnormality. Atherosclerotic vascular disease. Emphysema. Large right renal cyst    CTA CHEST W OR W WO CONT    Result Date: 12/14/2021  EXAM: CT Pulmonary Angiogram of the chest INDICATION: Dyspnea. Shortness of breath COMPARISON: December 11, 2021 TECHNIQUE: Axial CT imaging from the thoracic inlet through the diaphragm with intravenous contrast. Coronal and sagittal MIP reformats were generated.  One or more dose reduction techniques were used on this CT: automated exposure control, adjustment of the mAs and/or kVp according to patient size, and iterative reconstruction techniques. The specific techniques used on this CT exam have been documented in the patient's electronic medical record. Digital Imaging and Communications in the Medicine (DICOM) format image data are available to nonaffiliated external healthcare facilities or entities on a secure, media free, reciprocally searchable basis with patient authorization for at least a 12 month period after this study. _______________ FINDINGS: EXAM QUALITY: Motion artifact PULMONARY ARTERIES: No evidence of pulmonary embolism. MEDIASTINUM: Normal heart size. Aorta is unremarkable. No pericardial effusion. LUNGS: Emphysema. No focal airspace densities. Mild atelectasis PLEURA: Minimal bilateral effusions AIRWAY: Normal. LYMPH NODES: 15 mm right hilar node. Other smaller mediastinal lymph nodes UPPER ABDOMEN: Granuloma in the spleen OTHER: Compression fracture L1 similar to prior exam _______________     Exam limited by motion artifact. No pulmonary emboli identified Minimal bilateral pleural effusions. Emphysema     CT ABD PELV W CONT    Result Date: 12/21/2021  EXAM: CT ABD PELV W CONT CLINICAL INDICATION/HISTORY: Persistent epigastric abdominal pain, follow-up and colitis, evaluate for intra-abdominal abscess COMPARISON: 12/14/2021 TECHNIQUE:   CT of the abdomen and pelvis following intravenous contrast administration. Coronal and sagittal reformats were generated and reviewed. One or more dose reduction techniques were used on this CT: automated exposure control, adjustment of the mAs and/or kVp according to patient size, and iterative reconstruction techniques. The specific techniques used on this CT exam have been documented in the patient's electronic medical record.   Digital Imaging and Communications in Medicine (DICOM) format image data are available to nonaffiliated external healthcare facilities or entities on a secure, media free, reciprocally searchable basis with patient authorization for at least a 12-month period after this study. _______________ FINDINGS: LOWER THORAX:  Since prior exam are small to moderate right and small left pleural effusions, each with adjacent lower lobe passive atelectasis. No global cardiomegaly or pericardial effusion. LIVER AND BILIARY:  No specific hypodense lesion posterior right hepatic lobe. No suspicious liver lesions. No biliary dilation. Gallbladder unremarkable. SPLEEN:  Normal. PANCREAS:  Normal. ADRENALS:  Normal. KIDNEYS:  Unchanged right renal cysts, to include complex partially exophytic right lower pole lesion which appears slightly hyperdense and solid with peripheral rim calcifications. LYMPH NODES:  No mesenteric or retroperitoneal lymphadenopathy. GI TRACT:  Centrally unchanged moderately prominent circumferential mural thickening throughout the transverse, descending, and sigmoid colon with associated submucosal hyperemia. Fluid opacification of normal caliber ascending colon. Normal caliber small bowel loops. No morphology of bowel obstruction. Mild mesenteric inflammatory stranding adjacent to the descending colon. Normal caliber small and large bowel loops. No morphology of bowel obstruction. No bowel wall thickening. Normal appendix. PELVIC ORGANS:  Jimenez catheter is present within the nondistended bladder. .  No acute abnormality. VASCULATURE:  Normal caliber lower thoracic and abdominal aorta with moderately severe atherosclerotic vascular calcification. OTHER: Mild soft tissue anasarca. No ascites or free intraperitoneal air. OSSEOUS STRUCTURES:  No acute osseous abnormality. Unchanged posterior instrumentation hardware L4-S1. L1 vertebral body compression fracture deformity also appears unchanged. _______________     1. New, small to moderate right and small left pleural effusions, each with adjacent lower lobe passive atelectasis. No acute pulmonary infiltrate. Mild soft tissue anasarca.  2. Essentially unchanged prominence and distribution of pancolitis. Similar to prior study CT findings raise concern for C. difficile pseudomembranous colitis. Mild descending colon pericolonic mesenteric inflammatory stranding but no drainable fluid collection. 3. Unchanged partially solid lower pole right renal lesion. CT ABD PELV W CONT    Result Date: 12/14/2021  CLINICAL:  Elevated white count. Sepsis COMPARISON: December 11, 2021 TECHNIQUE: Axial CT imaging of the abdomen and pelvis was performed with intravenous contrast. Multiplanar reformats were generated. One or more dose reduction techniques were used on this CT: automated exposure control, adjustment of the mAs and/or kVp according to patient size, and iterative reconstruction techniques. The specific techniques used on this CT exam have been documented in the patient's electronic medical record. Digital Imaging and Communications in the Medicine (DICOM) format image data are available to nonaffiliated external healthcare facilities or entities on a secure, media free, reciprocally searchable basis with patient authorization for at least a 12 month period after this study. _______________ FINDINGS: LOWER CHEST: Emphysema. Minimal pleural effusions. LIVER, BILIARY: Hemangioma right lobe liver. Small central liver cyst. No biliary dilation. Gallbladder is unremarkable. PANCREAS: Normal. SPLEEN: Normal. ADRENALS: Normal. KIDNEYS: Complex lesion lower pole right kidney. There is a more solid appearing component at its superior margin. Overall lesion measures 6.5 x 5.4 x 6.6 cm. Kidneys otherwise unremarkable LYMPH NODES: No enlarged lymph nodes. GASTROINTESTINAL TRACT: Diffuse wall thickening of the colon with relative sparing of the cecum. This is progressed since the prior exam. Fat stranding adjacent to the colon. Trace free fluid. No significant small bowel dilatation PELVIC ORGANS: Unremarkable. VASCULATURE: Prominent atherosclerotic plaque. Aortic ectasia. BONES: Compression deformity L1. Prior lumbar fusion. Degenerative changes in the hips OTHER: None. _______________     Pancolitis. Interval progression of wall thickening to involve nearly the entire colon. Thumb printing present raising the possibility of C. difficile. This involves 2 vascular territories making ischemic colitis unlikely Enhancing renal mass lower pole right kidney as seen on prior ultrasound No hydronephrosis. XR CHEST PORT    Result Date: 12/11/2021  CLINICAL: Chest pain. COMPARISON: December 5, 2021. A portable view of the chest from 0356 hours: Skin folds over the right chest. No focal airspace density. Pleural spaces are clear. No acute process. XR CHEST PORT    Result Date: 12/5/2021  EXAM:  AP Portable Chest X-ray 1 view INDICATION: Fatigue COMPARISON: November 26, 2021 _______________ FINDINGS:  Heart and mediastinal contours are within normal limits for portable radiograph. There is new increased density in the left perihilar region. This may represent a confluence of shadows. Recommend PA and lateral radiograph for further evaluation. There are no pleural effusions. No acute osseous findings. ________________      New density in the left perihilar region. Advise PA and lateral chest x-ray for further evaluation. CARDIAC PROCEDURE    Result Date: 11/30/2021  · Moderately severe pulmonasry hypertension which did not respond to adenosine. Moderate Pulmonary Hypertension. No results found for this or any previous visit. Please note that this dictation was completed with [employer], the computer voice recognition software. Quite often unanticipated grammatical, syntax, homophones, and other interpretive errors are inadvertently transcribed by the computer software. Please disregard these errors. Please excuse any errors that have escaped final proofreading.      CC: Keith Lopez MD

## 2021-12-27 NOTE — PROGRESS NOTES
[facility]  (A Division of [address] [place] [address])                                                                                                                      Earle Fishman MD  Office #: - Option # [phone]  Fax #: [phone]     Date of Admission: 12/11/2021Date of Note: 12/27/2021  Reason for Follow Up: Evaluation and antibiotic management of Leucocytosis. Current Antimicrobials:    Prior Antimicrobials:    Flagyl IV 12/15 to date  Vanco oral 12/21 to date    Immunosuppressive drugs: NA Zosyn 12/11 to 12/15  Vanco oral QID 12/15 X1 day  Ceftriaxone 12/16 to 12/21       Assessment- ID related:  --------------------------------------------------------------------------  · Persistent Leucocytosis( w/neutrophilia) - Declining slowly 13K -> no occult abscess on CT- visceral organs including prostate look ok except hemangioma liver and R kidney neopolasm) +  pancolitis on CT w/Contrast on 12/14 and 12/21  --C. diff test not done- stool was soft, not watery. --saccharomyces serology negative 12/18  --Flow cytometry: NO DIAGNOSTIC IMMUNOPHENOTYPIC ABNORMALITIES DETECTED. --No diarrhea/ tense distension- that is C/W C.diff.   --has no rash  --has no focal complaint  --has no joint inflammation  --not on steroid  --MRI C/T/L- no evidence of discitis/OM/infection  · Pan-colitis on CT with contrast: Have to consider C.diff in the DDX as at risk, however, pt doesn't look as toxix/septic with this degree of colitis. No acute joint swelling/pain  · Right Kidney lesion -- malignancy- on CT/Ulstrasound- followed by Urology.    · Recent Psedumonas UTI12/5/21  · Urinary retention, with indwelling alonso  CT CAP WO contrast: didn't reveal major infectious finding  12/11  BCX: NGSF  UCX: NG  ESR 74  CRP 15.3    Other Medical Issues- Mx per respective team:    · Acute enecephalopathy  · DM   · CKD  · Anemia- HB 9.2  · Hx of lumbar fusion/ L1 laminectomy  · R renal cyst/calcification. L renal stone-non obstructing     Recommendation for ID issues I am following:  ------------------------------------------------------------------------------    Cont vanco PO  + Flagyl- until 12/31/21    For DC today. Will cancel GI- wouldn't hold DC as he is looking stable    Sarah Mcdermott MD  [facility])  Office #:     [phone][phone]   Office Fax: [phone]          Subjective:  No complaints    Comfortable, lying supine  Alonso in place    Urology eval reviewed-- no additional investigation- as they have been following renal mass past 4 years since 2017. HPI:  Jason Alexander is a 78 y.o. WHITE/NON- with PMH of DM, CKD, recent admission with Pseudmonas UTI, urinary retention with indwelling Alonso came in with fever and confusion per chart review and ED note states wife brought him, unable to take care of him. He was found to have tachycardia 122, soft BP and WBC was high to 23,3K. CT CAP done WO contrast, some changes of colitis, significant finding seen except large BM in colon, and changes in kidney that didn't suggest infectious source. Was placed on zosyn and cultures so far no growth. Patient is oriented to self and place during interview. Feels ok, doesn't have focal comlaint of HA/neck or back pain, chest pain/SOB/Abd pain. Indwelling alonso in place. RN reports soft and frequent BM. No marked change in WBC and remains in 20K's. No rash/itching.         Active Hospital Problems    Diagnosis Date Noted    Colitis 12/13/2021    Type II diabetes mellitus with manifestations, uncontrolled ([address] 09/16/2017     Past Medical History:   Diagnosis Date    Atherosclerosis     Diabetes ([facility] 2013    type 2    High cholesterol     Hyperlipidemia     Hypertension 2013    Muscle weakness     Neoplasm     right kidney    Severe pulmonary hypertension ([facility] [address] [facility]     Spinal stenosis     Urinary retention     Vitamin D deficiency Past Surgical History:   Procedure Laterality Date    HX HEENT      40 years ago deviated septum    HX LUMBAR LAMINECTOMY  2017    HX ORTHOPAEDIC      Lumbar laminectomy 9/7/17    HX ORTHOPAEDIC Right     CTR     Family History   Problem Relation Age of Onset    Heart Disease Father      Social History     Socioeconomic History    Marital status:      Spouse name: Not on file    Number of children: Not on file    Years of education: Not on file    Highest education level: Not on file   Occupational History    Not on file   Tobacco Use    Smoking status: Former Smoker    Smokeless tobacco: Never Used   Vaping Use    Vaping Use: Never used   Substance and Sexual Activity    Alcohol use: No    Drug use: No    Sexual activity: Not on file   Other Topics Concern     Service Not Asked    Blood Transfusions Not Asked    Caffeine Concern Not Asked    Occupational Exposure Not Asked    Hobby Hazards Not Asked    Sleep Concern Not Asked    Stress Concern Not Asked    Weight Concern Not Asked    Special Diet Not Asked    Back Care Not Asked    Exercise Not Asked    Bike Helmet Not Asked    Seat Belt Not Asked    Self-Exams Not Asked   Social History Narrative    Not on file     Social Determinants of Health     Financial Resource Strain:     Difficulty of Paying Living Expenses: Not on file   Food Insecurity:     Worried About Running Out of Food in the Last Year: Not on file    [place] of Food in the Last Year: Not on file   Transportation Needs:     Lack of Transportation (Medical): Not on file    Lack of Transportation (Non-Medical):  Not on file   Physical Activity:     Days of Exercise per Week: Not on file    Minutes of Exercise per Session: Not on file   Stress:     Feeling of Stress : Not on file   Social Connections:     Frequency of Communication with Friends and Family: Not on file    Frequency of Social Gatherings with Friends and Family: Not on file   [facility] Attends Christian Services: Not on file    Active Member of Clubs or Organizations: Not on file    Attends Club or Organization Meetings: Not on file    Marital Status: Not on file   Intimate Partner Violence:     Fear of Current or Ex-Partner: Not on file    Emotionally Abused: Not on file    Physically Abused: Not on file    Sexually Abused: Not on file   Housing Stability:     Unable to Pay for Housing in the Last Year: Not on file    Number of Places Lived in the Last Year: Not on file    Unstable Housing in the Last Year: Not on file       Allergies:  Zocor [simvastatin]     Medications:  Current Facility-Administered Medications   Medication Dose Route Frequency    vancomycin 50 mg/mL oral solution (compounded) 125 mg  125 mg Oral Q6H    metoprolol succinate (TOPROL-XL) XL tablet 50 mg  50 mg Oral QHS    amLODIPine (NORVASC) tablet 10 mg  10 mg Oral DAILY    nystatin (MYCOSTATIN) 100,000 unit/gram powder   Topical BID    metroNIDAZOLE (FLAGYL) tablet 500 mg  500 mg Oral TID    aspirin chewable tablet 81 mg  81 mg Oral DAILY    atorvastatin (LIPITOR) tablet 20 mg  20 mg Oral QHS    traZODone (DESYREL) tablet 50 mg  50 mg Oral QHS    insulin lispro (HUMALOG) injection   SubCUTAneous AC&HS    glucose chewable tablet 16 g  4 Tablet Oral PRN    glucagon (GLUCAGEN) injection 1 mg  1 mg IntraMUSCular PRN    dextrose (D50W) injection syrg 12.5-25 g  25-50 mL IntraVENous PRN    sodium chloride (NS) flush 5-40 mL  5-40 mL IntraVENous Q8H    sodium chloride (NS) flush 5-40 mL  5-40 mL IntraVENous PRN    acetaminophen (TYLENOL) tablet 650 mg  650 mg Oral Q6H PRN    Or    acetaminophen (TYLENOL) suppository 650 mg  650 mg Rectal Q6H PRN    ondansetron (ZOFRAN ODT) tablet 4 mg  4 mg Oral Q8H PRN    Or    ondansetron (ZOFRAN) injection 4 mg  4 mg IntraVENous Q6H PRN    famotidine (PEPCID) tablet 20 mg  20 mg Oral DAILY    heparin (porcine) injection 5,000 Units  5,000 Units SubCUTAneous Q8H    alum-mag hydroxide-simeth (MYLANTA) oral suspension 15 mL  15 mL Oral Q6H PRN      Physical Exam:    Temp (24hrs), Av.9 °F (36.6 °C), Min:97.5 °F (36.4 °C), Max:98.3 °F (36.8 °C)    Visit Vitals  /81 (BP 1 Location: Right upper arm, BP Patient Position: At rest)   Pulse 95   Temp 98.1 °F (36.7 °C)   Resp 18   Ht 5' 11\" (1.803 m)   Wt 63.5 kg (139 lb 15.9 oz)   SpO2 93%   BMI 19.52 kg/m²          GEN: WD chronically ill looking, non toxic, not in any discomfort. On RA- supine    HEENT:  EOMI intact  CHEST: Non laboured breathing. ABD: Obese/soft. Soft and non distended  JOHANNA:alonso in place  CNS: A, O X2. Moves all extremity. CN grossly ok.       Microbiology  All Micro Results     Procedure Component Value Units Date/Time    CULTURE, BLOOD [[id number]] Collected: 21    Order Status: Completed Specimen: Blood Updated: 21     Special Requests: NO SPECIAL REQUESTS        Culture result: NO GROWTH 6 DAYS       CULTURE, BLOOD [[id number]] Collected: 21    Order Status: Completed Specimen: Blood Updated: 21     Special Requests: NO SPECIAL REQUESTS        Culture result: NO GROWTH 6 DAYS       CULTURE, URINE [[id number]] Collected: 21    Order Status: Completed Specimen: Urine from Clean catch Updated: 21     Special Requests: NO SPECIAL REQUESTS        Culture result: No growth (<1,000 CFU/ML)            Lab results:    Chemistry  Recent Labs     21  0521  0521   GLU 92 96 101*    141 142   K 5.0 4.7 4.3    108 109   CO2    BUN 19* 19* 19*   CREA 1.09 1.06 1.02   CA 8.6 8.6 8.2*   AGAP 7 8 7   BUCR 17 18 19       CBC w/ Diff  Recent Labs     21  0535 21  0521  0436   WBC 13.2 13.9* 13.5*   RBC 3.60* 3.79* 3.72*   HGB 9.6* 9.6* 9.4*   HCT 31.1* 32.4* 31.5*   * 625* 562*   GRANS 72 73 73   LYMPH 13* 13* 14*   EOS 2 3 2       Imaging: report posted below as per radiologist CT CAP WO        Wall thickening of the rectum. This may be due to colitis/proctitis. Relatively  large amount of fecal material throughout the colon. This could also be a source  of inflammation.     Otherwise no evidence of acute abnormality.     Atherosclerotic vascular disease. Emphysema. Large right renal cyst    CXR: No acute process    12/15/21  CTA chest:  Exam limited by motion artifact. No pulmonary emboli identified   Minimal bilateral pleural effusions. Emphysema      CT AP with contrast:    Pancolitis. Interval progression of wall thickening to involve nearly the entire  colon. Thumb printing present raising the possibility of C. difficile. This  involves 2 vascular territories making ischemic colitis unlikely  Enhancing renal mass lower pole right kidney as seen on prior ultrasound  No hydronephrosis.

## 2021-12-27 NOTE — PROGRESS NOTES
[facility]  (A Division of [address] [place] [address])                                                                                                                      Chong Velazquez MD  Office #: - Option # [phone]  Fax #: [phone]     Date of Admission: 12/11/2021Date of Note: 12/26/2021  Reason for Follow Up: Evaluation and antibiotic management of Leucocytosis. Current Antimicrobials:    Prior Antimicrobials:    Flagyl IV 12/15 to date  Vanco oral 12/21 to date    Immunosuppressive drugs: NA Zosyn 12/11 to 12/15  Vanco oral QID 12/15 X1 day  Ceftriaxone 12/16 to 12/21       Assessment- ID related:  --------------------------------------------------------------------------  · Persistent Leucocytosis( w/neutrophilia) - Declining slowly 13K -> no occult abscess on CT- visceral organs including prostate look ok except hemangioma liver and R kidney neopolasm) +  pancolitis on CT w/Contrast on 12/14 and 12/21  --C. diff test not done- stool was soft, not watery. --saccharomyces serology negative 12/18  --Flow cytometry: NO DIAGNOSTIC IMMUNOPHENOTYPIC ABNORMALITIES DETECTED. --No diarrhea/ tense distension- that is C/W C.diff.   --has no rash  --has no focal complaint  --has no joint inflammation  --not on steroid  --MRI C/T/L- no evidence of discitis/OM/infection  · Pan-colitis on CT with contrast: Have to consider C.diff in the DDX as at risk, however, pt doesn't look as toxix/septic with this degree of colitis. No acute joint swelling/pain  · Right Kidney lesion -- malignancy- on CT/Ulstrasound- followed by Urology.    · Recent Psedumonas UTI12/5/21  · Urinary retention, with indwelling alonso  CT CAP WO contrast: didn't reveal major infectious finding  12/11  BCX: NGSF  UCX: NG  ESR 74  CRP 15.3    Other Medical Issues- Mx per respective team:    · Acute enecephalopathy  · DM   · CKD  · Anemia- HB 9.2  · Hx of lumbar fusion/ L1 laminectomy  · R renal cyst/calcification. L renal stone-non obstructing     Recommendation for ID issues I am following:  ------------------------------------------------------------------------------    Cont vanco PO  + Flagyl- until 12/31/21    Stool WBC/occult blood ordered after discussing with GI, GI to see for further opinion on pancolitis    MYAH RN-has BM, but tests still pending    no new suggestion         Subjective:  \" I am going home\"- has no complaints    Comfortable, lying supine  Alonso in place    Urology eval reviewed-- no additional investigation- as they have been following renal mass past 4 years since 2017. HPI:  Tamara Vidal is a 78 y.o. WHITE/NON- with PMH of DM, CKD, recent admission with Pseudmonas UTI, urinary retention with indwelling Alonso came in with fever and confusion per chart review and ED note states wife brought him, unable to take care of him. He was found to have tachycardia 122, soft BP and WBC was high to 23,3K. CT CAP done WO contrast, some changes of colitis, significant finding seen except large BM in colon, and changes in kidney that didn't suggest infectious source. Was placed on zosyn and cultures so far no growth. Patient is oriented to self and place during interview. Feels ok, doesn't have focal comlaint of HA/neck or back pain, chest pain/SOB/Abd pain. Indwelling alonso in place. RN reports soft and frequent BM. No marked change in WBC and remains in 20K's. No rash/itching.         Active Hospital Problems    Diagnosis Date Noted    Colitis 12/13/2021    Acute metabolic encephalopathy 74/71/2563    Constipation 12/12/2021    [facility]is ([facility] 11/23/2021    [facility]acute kidney [facility]) [facility] 07/20/2021    UTI (urinary tract infection) due to urinary indwelling Alonso catheter ([facility] 05/01/2020    Type II diabetes mellitus with manifestations, uncontrolled ([facility] 09/16/2017     Past Medical History:   Diagnosis Date    Atherosclerosis     Diabetes ([facility] 2013    type 2    High cholesterol     Hyperlipidemia     Hypertension 2013    Muscle weakness     Neoplasm     right kidney    Severe pulmonary hypertension (HCC)     Spinal stenosis     Urinary retention     Vitamin D deficiency      Past Surgical History:   Procedure Laterality Date    HX HEENT      40 years ago deviated septum    HX LUMBAR LAMINECTOMY  2017    HX ORTHOPAEDIC      Lumbar laminectomy 9/7/17    HX ORTHOPAEDIC Right     CTR     Family History   Problem Relation Age of Onset    Heart Disease Father      Social History     Socioeconomic History    Marital status:      Spouse name: Not on file    Number of children: Not on file    Years of education: Not on file    Highest education level: Not on file   Occupational History    Not on file   Tobacco Use    Smoking status: Former Smoker    Smokeless tobacco: Never Used   Vaping Use    Vaping Use: Never used   Substance and Sexual Activity    Alcohol use: No    Drug use: No    Sexual activity: Not on file   Other Topics Concern     Service Not Asked    Blood Transfusions Not Asked    Caffeine Concern Not Asked    Occupational Exposure Not Asked    Hobby Hazards Not Asked    Sleep Concern Not Asked    Stress Concern Not Asked    Weight Concern Not Asked    Special Diet Not Asked    Back Care Not Asked    Exercise Not Asked    Bike Helmet Not Asked    Seat Belt Not Asked    Self-Exams Not Asked   Social History Narrative    Not on file     Social Determinants of Health     Financial Resource Strain:     Difficulty of Paying Living Expenses: Not on file   Food Insecurity:     Worried About Running Out of Food in the Last Year: Not on file    Osmany of Food in the Last Year: Not on file   Transportation Needs:     Lack of Transportation (Medical): Not on file    Lack of Transportation (Non-Medical):  Not on file   Physical Activity:     Days of Exercise per Week: Not on file    Minutes of Exercise per Session: Not on file   Stress:     Feeling of Stress : Not on file   Social Connections:     Frequency of Communication with Friends and Family: Not on file    Frequency of Social Gatherings with Friends and Family: Not on file    Attends Druze Services: Not on file    Active Member of Clubs or Organizations: Not on file    Attends Club or Organization Meetings: Not on file    Marital Status: Not on file   Intimate Partner Violence:     Fear of Current or Ex-Partner: Not on file    Emotionally Abused: Not on file    Physically Abused: Not on file    Sexually Abused: Not on file   Housing Stability:     Unable to Pay for Housing in the Last Year: Not on file    Number of Places Lived in the Last Year: Not on file    Unstable Housing in the Last Year: Not on file       Allergies:  Zocor [simvastatin]     Medications:  Current Facility-Administered Medications   Medication Dose Route Frequency    vancomycin 50 mg/mL oral solution (compounded) 125 mg  125 mg Oral Q6H    metoprolol succinate (TOPROL-XL) XL tablet 50 mg  50 mg Oral QHS    amLODIPine (NORVASC) tablet 10 mg  10 mg Oral DAILY    nystatin (MYCOSTATIN) 100,000 unit/gram powder   Topical BID    metroNIDAZOLE (FLAGYL) tablet 500 mg  500 mg Oral TID    aspirin chewable tablet 81 mg  81 mg Oral DAILY    atorvastatin (LIPITOR) tablet 20 mg  20 mg Oral QHS    traZODone (DESYREL) tablet 50 mg  50 mg Oral QHS    insulin lispro (HUMALOG) injection   SubCUTAneous AC&HS    glucose chewable tablet 16 g  4 Tablet Oral PRN    glucagon (GLUCAGEN) injection 1 mg  1 mg IntraMUSCular PRN    dextrose (D50W) injection syrg 12.5-25 g  25-50 mL IntraVENous PRN    sodium chloride (NS) flush 5-40 mL  5-40 mL IntraVENous Q8H    sodium chloride (NS) flush 5-40 mL  5-40 mL IntraVENous PRN    acetaminophen (TYLENOL) tablet 650 mg  650 mg Oral Q6H PRN    Or    acetaminophen (TYLENOL) suppository 650 mg  650 mg Rectal Q6H PRN    ondansetron (ZOFRAN ODT) tablet 4 mg  4 mg Oral Q8H PRN    Or    ondansetron (ZOFRAN) injection 4 mg  4 mg IntraVENous Q6H PRN    famotidine (PEPCID) tablet 20 mg  20 mg Oral DAILY    heparin (porcine) injection 5,000 Units  5,000 Units SubCUTAneous Q8H    alum-mag hydroxide-simeth (MYLANTA) oral suspension 15 mL  15 mL Oral Q6H PRN      Physical Exam:    Temp (24hrs), Av.3 °F (36.8 °C), Min:97.9 °F (36.6 °C), Max:98.8 °F (37.1 °C)    Visit Vitals  /78 (BP 1 Location: Right upper arm, BP Patient Position: At rest)   Pulse 95   Temp 97.9 °F (36.6 °C)   Resp 17   Ht 5' 11\" (1.803 m)   Wt 63.5 kg (139 lb 15.9 oz)   SpO2 91%   BMI 19.52 kg/m²          GEN: WD chronically ill looking, non toxic, not in any discomfort. On RA- supine    HEENT:  EOMI intact  CHEST: Non laboured breathing. ABD: Obese/soft. Soft and non distended  JOHANNA:alonso in place  EXT: No apparent swelling or redness on UE/LE joints. No arthritis noted  Skin: Dry and intact. Exocriated groin area- has powder in place  CNS: A, O X2. Moves all extremity. CN grossly ok.       Microbiology  All Micro Results     Procedure Component Value Units Date/Time    CULTURE, BLOOD [[id number]] Collected: 21    Order Status: Completed Specimen: Blood Updated: 21     Special Requests: NO SPECIAL REQUESTS        Culture result: NO GROWTH 6 DAYS       CULTURE, BLOOD [[id number]] Collected: 21    Order Status: Completed Specimen: Blood Updated: 21     Special Requests: NO SPECIAL REQUESTS        Culture result: NO GROWTH 6 DAYS       CULTURE, URINE [[id number]] Collected: 21    Order Status: Completed Specimen: Urine from Clean catch Updated: 21     Special Requests: NO SPECIAL REQUESTS        Culture result: No growth (<1,000 CFU/ML)            Lab results:    Chemistry  Recent Labs     21  0520 21  0436 21  0222   GLU 96 101* 122*    142 143   K 4.7 4.3 3.8    109 110   CO2 25 26 27   BUN 19* 19* 20*   CREA 1. 06 1.02 0.93   CA 8.6 8.2* 8.1*   AGAP 8 7 6   BUCR 18 19 22*   AP  --   --  103   TP  --   --  5.6*   ALB  --   --  2.2*   GLOB  --   --  3.4   AGRAT  --   --  0.6*       CBC w/ Diff  Recent Labs     12/26/21  0520 12/25/21  0436 12/24/21  0222   WBC 13.9* 13.5* 15.7*   RBC 3.79* 3.72* 3.67*   HGB 9.6* 9.4* 9.3*   HCT 32.4* 31.5* 31.9*   * 562* 529*   GRANS 73 73  --    LYMPH 13* 14*  --    EOS 3 2  --        Imaging: report posted below as per radiologist   CT CAP WO        Wall thickening of the rectum. This may be due to colitis/proctitis. Relatively  large amount of fecal material throughout the colon. This could also be a source  of inflammation.     Otherwise no evidence of acute abnormality.     Atherosclerotic vascular disease. Emphysema. Large right renal cyst    CXR: No acute process    12/15/21  CTA chest:  Exam limited by motion artifact. No pulmonary emboli identified   Minimal bilateral pleural effusions. Emphysema      CT AP with contrast:    Pancolitis. Interval progression of wall thickening to involve nearly the entire  colon. Thumb printing present raising the possibility of C. difficile. This  involves 2 vascular territories making ischemic colitis unlikely  Enhancing renal mass lower pole right kidney as seen on prior ultrasound  No hydronephrosis.

## 2021-12-27 NOTE — PROGRESS NOTES
D/C Plan: [facility])     Noted pt is medically stable to transition to the above facility today. CM attempted contact pt's wife, Aishwarya Pichardo ([phone]), and left a message with an update regarding plan transfer today. CM met with pt at bedside and he is aware and in agreement with plan transfer today. CM to continue to follow and assist as needed.     1330:  CM spoke with pt's wife and notified her of plan transfer today to the above facility. Family is aware and in agreement.       Transition of Care Plan: [facility]     Communication to Patient/Family:  Met with patient and family, and they are agreeable to the transition plan. The Plan for Transition of Care is related to the following treatment goals: SNF     The Patient and/or patient representative was provided with a choice of provider and agrees   with the discharge plan.  Yes [x]? ?? No []???     Freedom of choice list was provided with basic dialogue that supports the patient's individualized plan of care/goals and shares the quality data associated with the providers.     Yes [x]? ?? No []???     SNF/Rehab Transition:  [facility] ([address] [place] [address]. for SNF and meets criteria for admission. Sonali Fitzpatrick will transported by medical transport and expected to [address] [facility] 3:00pm.     Communication to SNF/Rehab:  Bedside RN, Justine Bowens been notified to update the transition plan to the facility and call report [phone].      Discharge information has been updated on the AVS and communicated through [facility] or [facility].     Discharge instructions to be fax'd to facility at [phone]      Please include all hard scripts for controlled substances, med rec and dc summary, and AVS in packet.  Please medicate for pain prior to dc if possible and needed to help offset delay when patient first arrives to facility.     Reviewed and confirmed with facility, [facility] can manage the patient care needs for the following:      [place] with (X) only those applicable:  Medication:  []? ? ? Medications are available at the facility  []? ??IV Antibiotics    []? ? ? Controlled Substance  hard copies available sent.  []?? ?Weekly Labs     Equipment:  []???CPAP/BiPAP  []? ? ? Wound Vacuum  []? ? ? Jimenez or Urinary Device  []? ??PICC/Central Line  []? ? ?Nebulizer  []? ? ? Ventilator     Treatment:  []? ? ?Isolation (for MRSA, VRE, etc.)  []? ??Surgical Drain Management  []? ? ? Tracheostomy Care  []? ? ?Dressing Changes  []? ? ? Dialysis with transportation  []? ??PEG Care  []? ? ? Oxygen  []? ? ?Daily Weights for Heart Failure     Dietary:  []? ? ? Any diet limitations  []? ? ?Tube Feedings   []? ? ? Total Parenteral Management (TPN)     Financial Resources:  []? ? ? Medicaid Application Completed     [x]? ??UAI Completed and copy given to pt/family.     []? ? ? A screening has previously been completed.     []? ? ?Level II Completed     []? ?? Private pay individual who will not become   financially eligible for Medicaid within 6 months from admission to a [address] [facility] [facility].      []??? Individual refused to have screening conducted.      []? ? ? Medicaid Application Completed     []? ? ? The screening denied because it was determined individual did not need/did not qualify for nursing facility level of care.  []??? Out of state residents seeking direct admission to a [address] facility.  []??? Individuals who are inpatients of an out of state hospital, or in state or out of state veterans/ hospital and seek direct admission to a [address] facility  []? ?? Individuals who are pateints or residents of a state owned/operated facility that is licensed by [address] [place]) and seek direct admission to [address] facility  []? ?? A screening not required for enrollment in [facility] Hospice services as set out in [facility]-  []??? [facility] [address] [place] [address] - [place]) staff shall perform screenings of the [facility] clients.      Advanced Care Plan:  []? ? ?Surrogate Decision Maker of Care  []? ??POA  []? ?? Communicated Code Status and copy sent.     Other:            Care Management Interventions  PCP Verified by CM: Yes  Mode of Transport at Discharge: BLS  Transition of Care Consult (CM Consult): SNF  Partner SNF: Yes  Health Maintenance Reviewed: Yes  Physical Therapy Consult: Yes  Occupational Therapy Consult: Yes  Support Systems: Spouse/Significant Other  Confirm Follow Up Transport: Family  The Plan for Transition of Care is Related to the Following Treatment Goals :  [facility])  The Patient and/or Patient Representative was Provided with a Choice of Provider and Agrees with the Discharge Plan?: Yes  Name of the Patient Representative Who was Provided with a Choice of Provider and Agrees with the Discharge Plan: spouse  Freedom of Choice List was Provided with Basic Dialogue that Supports the Patient's Individualized Plan of Care/Goals, Treatment Preferences and Shares the Quality Data Associated with the Providers?: Yes  Discharge Location  Discharge Placement: Skilled nursing facility

## 2021-12-27 NOTE — PROGRESS NOTES
5063 Received bedside shift report from off going nurse Wilbur Chakraborty RN. Report included the following information SBAR, Kardex, Intake/Output, MAR and Recent Results. [facility] 22 report to Isauro Etienne RN. Report included the following information SBAR, Kardex, Intake/Output, MAR and Recent Results.

## 2021-12-27 NOTE — PROGRESS NOTES
RESPIRATORY NOTE:          Verbal order for NT suction on patient with dysphagia/ aspiration precautions. BBS clear diminished, gurgle noted in throat when speaking. Weak cough upon repeated request.   Initiated O2 and explained procedure. Good strong coughing with suctioning, small amount of thin white secretions which seemed to clear gurgle. Tolerated OK. Education was given on eating while in high arora position,  coughing and deep breathing exercises on a regular basis that will help help with periodic episodes of swallowing down the wrong way. RN aware.

## 2021-12-29 PROBLEM — E87.5 HYPERKALEMIA: Status: ACTIVE | Noted: 2021-01-01

## 2021-12-29 PROBLEM — N39.0 UTI (URINARY TRACT INFECTION): Status: ACTIVE | Noted: 2021-01-01

## 2021-12-29 PROBLEM — E87.20 LACTIC ACIDOSIS: Status: ACTIVE | Noted: 2021-01-01

## 2021-12-29 PROBLEM — J18.9 HCAP (HEALTHCARE-ASSOCIATED PNEUMONIA): Status: ACTIVE | Noted: 2021-01-01

## 2021-12-29 PROBLEM — R65.21 SEPTIC SHOCK (HCC): Status: ACTIVE | Noted: 2021-01-01

## 2021-12-29 PROBLEM — A41.9 SEPSIS (HCC): Status: ACTIVE | Noted: 2021-01-01

## 2021-12-29 NOTE — ED TRIAGE NOTES
Patient arrived via ems from [facility]. Patient per ems was unresponsive however, when he arrived he does respond  With words orientation is ot certain.  fsbs 128

## 2021-12-29 NOTE — PROGRESS NOTES
Post Acute Facility Update     *The information contained in this note was received during a weekly care coordination call with the post acute facility*    This patient was discharged from [facility] [place] ([facility])   on 12/27/21.     Hospital Discharge Diagnosis per Dr. Judie Mcardle:    Colitis  Pulm HTN  Sepsis  Uncontrolled Type II DM    Current Facility: [address] ([facility])  Anticipated Discharge Date: TBD    Facility Nursing Update: no new orders, unable to ambulate  Facility Social Work Update: was living with wife, family now considering LTC  Bundle Program Status: none  Upper Extremity Assistance: Maximum Assistance  Lower Extremity Assistance: Maximum Assistance  Bed Mobility: Maximum Assistance  Transfers: Maximum Assistance  Ambulation: Dependent  How far (in feet) is the patient ambulating? unable  Device Used: wheelchair  Barriers to Discharge: may transition to LTC  Other:

## 2021-12-29 NOTE — CONSULTS
[facility]  Pulmonary, Critical Care, and Sleep Medicine    Name: Stefan Leblanc MRN: [id number]   : 1945 Hospital: [facility] [place]    Date: 2021  Room: [address] Note                                              Consult requesting physician: Dr. Christian Rodarte  Reason for Consult: Sepsis with shock      Subjective/History of Present Illness:     Patient is a 78 y.o. male with PMHx significant for recent prolonged admission at [facility] with colitis, uncontrolled type 2 diabetes mellitus, history of pulmonary hypertension, emphysema, dysphagia with choking on food, right kidney mass seen by urology, metabolic encephalopathy. He was treated for colitis during recent admission, and had CT abdomen evaluations/p antibiotic therapy with Flagyl and oral vancomycin. He also had MRI of the cervical, thoracic and lumbar spine with no evidence of discitis, and chronic changesplease review discharge summary done recently by hospitalist team.  Patient was also seen by ID during recent hospitalization. Blood cultures negative during recent admission, but urine culture 2021 showed pansensitive Pseudomonas. The patient has come from rehab facility with sepsis, UTI and pneumonia. He was altered mentation on admission in the ER, but subsequently improved with fluids resuscitation. He has elevated lactic acid on admission. Patient appears to be cachectic. He is awake, and answers to simple questions. He does not appear to be short of breath. Urine outputpoor and concentrated. No hematuria noted. Telemetrysinus rhythm. Blood pressure low requiring Levophed. Stool occult blood + 2021. Patient has chronic indwelling Jimenez catheter.     Review of symptomslimited due to patient condition.       Allergies   Allergen Reactions    Zocor [Simvastatin] Other (comments)     Made him \"ill\"      Past Medical History:   Diagnosis Date    Atherosclerosis     Diabetes ([facility] [address] 2013    type 2    High cholesterol     Hyperlipidemia     Hypertension 2013    Muscle weakness     Neoplasm     right kidney    Sepsis ([facility] [address]     Severe pulmonary hypertension ([facility] [address]     Spinal stenosis     Urinary retention     Vitamin D deficiency       Past Surgical History:   Procedure Laterality Date    HX HEENT      40 years ago deviated septum    HX LUMBAR LAMINECTOMY  2017    HX ORTHOPAEDIC      Lumbar laminectomy 9/7/17    HX ORTHOPAEDIC Right     CTR      Social History     Tobacco Use    Smoking status: Former Smoker    Smokeless tobacco: Never Used   Substance Use Topics    Alcohol use: No      Family History   Problem Relation Age of Onset    Heart Disease Father       Prior to Admission medications    Medication Sig Start Date End Date Taking? Authorizing Provider   metoprolol succinate (TOPROL-XL) 50 mg XL tablet Take 1 Tablet by mouth nightly. 12/27/21   Marina Beckford MD   amLODIPine (NORVASC) 10 mg tablet Take 1 Tablet by mouth daily. 12/28/21   Marina Beckford MD   metroNIDAZOLE (FLAGYL) 500 mg tablet Take 1 Tablet by mouth three (3) times daily for 4 days. 12/27/21 12/31/21  Marina Beckford MD   vancomycin 50 mg/mL oral solution (compounded) Take 2.5 mL by mouth every six (6) hours for 18 doses. 12/27/21 1/1/22  Marina Beckford MD   omeprazole (PRILOSEC) 40 mg capsule Take 1 Capsule by mouth daily. 12/1/21   Marina Beckford MD   atorvastatin (Lipitor) 20 mg tablet Take 20 mg by mouth nightly. Provider, Historical   traZODone (DESYREL) 50 mg tablet Take 50 mg by mouth nightly. Provider, Historical   aspirin 81 mg chewable tablet Take 81 mg by mouth daily. Other, MD Katelynn   multivitamin (ONE A DAY) tablet Take 1 Tab by mouth daily.     Provider, Historical     Current Facility-Administered Medications   Medication Dose Route Frequency    0.9% sodium chloride infusion 1,000 mL  1,000 mL IntraVENous CONTINUOUS    sodium chloride 0.9 % bolus infusion 1,932 mL  30 mL/kg IntraVENous ONCE    piperacillin-tazobactam (ZOSYN) 4.5 g in 0.9% sodium chloride (MBP/ADV) 100 mL MBP  4.5 g IntraVENous Q6H    levoFLOXacin (LEVAQUIN) 750 mg in D5W IVPB  750 mg IntraVENous Q48H    sodium chloride 0.9 % bolus infusion 932 mL  932 mL IntraVENous ONCE    Vancomycin - Pharmacy to Dose  1 Each Other Rx Dosing/Monitoring    NOREPINephrine (LEVOPHED) 8 mg in 5% dextrose 250mL (32 mcg/mL) infusion  2-16 mcg/min IntraVENous TITRATE    vancomycin (VANCOCIN) 1250 mg in  ml infusion  1,250 mg IntraVENous ONCE    [START ON 12/30/2021] vancomycin (VANCOCIN) 1,000 mg in 0.9% sodium chloride 250 mL (VIAL-MATE)  1,000 mg IntraVENous Q24H    calcium gluconate 1 gram in sodium chloride (ISO-OSM) 50 mL infusion  1 g IntraVENous ONCE    albuterol (PROVENTIL VENTOLIN) nebulizer solution 10 mg  10 mg Nebulization NOW    albumin human 25% (BUMINATE) solution 12.5 g  12.5 g IntraVENous Q6H    0.9% sodium chloride infusion  150 mL/hr IntraVENous CONTINUOUS         Objective:   Vital Signs:    Visit Vitals  BP (!) 65/48   Pulse 94   Resp (!) 31   Ht 6' (1.829 m)   Wt 64.4 kg (142 lb)   SpO2 (!) 82%   BMI 19.26 kg/m²       O2 Device: Nasal cannula   O2 Flow Rate (L/min): 4 l/min   No data recorded. Intake/Output:   Last shift:      No intake/output data recorded. Last 3 shifts: No intake/output data recorded.     No intake or output data in the 24 hours ending 12/29/21 1853    Last 3 Recorded Weights in this Encounter    12/29/21 1708   Weight: 64.4 kg (142 lb)          Physical Exam:   Patient appears cachectic; on nasal cannula oxygen; acyanotic  HEENT: pupils not dilated, reactive, no scleral jaundice, moist oral mucosa, no nasal drainage; neck supple  Neck: No adenopathy or thyroid swelling  CVS: S1S2 no murmurs; JVD not elevated; telemetrysinus rhythm  RS: Mod air entry bilaterally, decreased BS at bases, no wheezes or crackles  Abd: soft, non tender, no hepatosplenomegaly, no abd distension, no guarding or rigidity, bowel sounds heard  Neuro: Awake, follows simple commands, appears to be confused  Extrm: no leg edema or swelling or clubbing  Skin: no rash  Lymphatic: no cervical or supraclavicular adenopathy  Vasc: DPs palpable bilateral  MS: Wasted extremities    Data:       Recent Results (from the past 24 hour(s))   URINALYSIS W/ RFLX MICROSCOPIC    Collection Time: 12/29/21  5:15 PM   Result Value Ref Range    Color DARK YELLOW      Appearance TURBID      Specific gravity 1.018 1.005 - 1.030      pH (UA) 5.0 5.0 - 8.0      Protein 30 (A) NEG mg/dL    Glucose Negative NEG mg/dL    Ketone TRACE (A) NEG mg/dL    Bilirubin SMALL (A) NEG      Blood Negative NEG      Urobilinogen 1.0 0.2 - 1.0 EU/dL    Nitrites Positive (A) NEG      Leukocyte Esterase LARGE (A) NEG     URINE MICROSCOPIC ONLY    Collection Time: 12/29/21  5:15 PM   Result Value Ref Range    WBC TOO NUMEROUS TO COUNT 0 - 5 /hpf    RBC NONE 0 - 5 /hpf    Epithelial cells Negative 0 - 5 /lpf    Bacteria FEW (A) NEG /hpf    Budding yeast Positive (A) NEG      Yeast w/hyphae 2+ (A) NEG   BLOOD GAS,LACTIC ACID, POC    Collection Time: 12/29/21  5:24 PM   Result Value Ref Range    Device: NASAL CANNULA      FIO2 (POC) 52 %    pH (POC) 7.28 (L) 7.35 - 7.45      pCO2 (POC) 19.4 (L) 35.0 - 45.0 MMHG    pO2 (POC) 75 (L) 80 - 100 MMHG    HCO3 (POC) 9.2 (L) 22 - 26 MMOL/L    sO2 (POC) 93.6 92 - 97 %    Base deficit (POC) 15.5 mmol/L    Allens test (POC) Positive      Site LEFT BRACHIAL      Specimen type (POC) ARTERIAL      Performed by Kelwaytamika Iraheta     Lactic Acid (POC) 11.69 (HH) 0.40 - 8.61 mmol/L   METABOLIC PANEL, COMPREHENSIVE    Collection Time: 12/29/21  5:25 PM   Result Value Ref Range    Sodium 139 136 - 145 mmol/L    Potassium 6.4 (HH) 3.5 - 5.5 mmol/L    Chloride 109 100 - 111 mmol/L    CO2 11 (L) 21 - 32 mmol/L    Anion gap 19 (H) 3.0 - 18 mmol/L    Glucose 129 (H) 74 - 99 mg/dL    BUN 47 (H) 7.0 - 18 MG/DL Creatinine 4.24 (H) 0.6 - 1.3 MG/DL    BUN/Creatinine ratio 11 (L) 12 - 20      GFR est AA 17 (L) >60 ml/min/1.73m2    GFR est non-AA 14 (L) >60 ml/min/1.73m2    Calcium 8.5 8.5 - 10.1 MG/DL    Bilirubin, total 0.5 0.2 - 1.0 MG/DL    ALT (SGPT) 29 16 - 61 U/L    AST (SGOT) 117 (H) 10 - 38 U/L    Alk. phosphatase 106 45 - 117 U/L    Protein, total 6.9 6.4 - 8.2 g/dL    Albumin 2.1 (L) 3.4 - 5.0 g/dL    Globulin 4.8 (H) 2.0 - 4.0 g/dL    A-G Ratio 0.4 (L) 0.8 - 1.7     CBC WITH AUTOMATED DIFF    Collection Time: 12/29/21  5:25 PM   Result Value Ref Range    WBC 19.5 (H) 4.6 - 13.2 K/uL    RBC 4.24 (L) 4.35 - 5.65 M/uL    HGB 11.1 (L) 13.0 - 16.0 g/dL    HCT 37.7 36.0 - 48.0 %    MCV 88.9 78.0 - 100.0 FL    MCH 26.2 24.0 - 34.0 PG    MCHC 29.4 (L) 31.0 - 37.0 g/dL    RDW 21.2 (H) 11.6 - 14.5 %    PLATELET 161 (H) 289 - 420 K/uL    MPV 9.8 9.2 - 11.8 FL    NRBC 0.2 (H) 0  WBC    ABSOLUTE NRBC 0.04 (H) 0.00 - 0.01 K/uL    NEUTROPHILS 80 (H) 40 - 73 %    BAND NEUTROPHILS 2 0 - 5 %    LYMPHOCYTES 11 (L) 21 - 52 %    MONOCYTES 6 3 - 10 %    EOSINOPHILS 0 0 - 5 %    BASOPHILS 0 0 - 2 %    METAMYELOCYTES 1 (H) 0 %    IMMATURE GRANULOCYTES 0 %    ABS. NEUTROPHILS 16.0 (H) 1.8 - 8.0 K/UL    ABS. LYMPHOCYTES 2.1 0.9 - 3.6 K/UL    ABS. MONOCYTES 1.2 0.05 - 1.2 K/UL    ABS. EOSINOPHILS 0.0 0.0 - 0.4 K/UL    ABS. BASOPHILS 0.0 0.0 - 0.1 K/UL    ABS. IMM.  GRANS. 0.0 K/UL    DF MANUAL      PLATELET COMMENTS Increased Platelets      RBC COMMENTS ANISOCYTOSIS  2+        RBC COMMENTS POLYCHROMASIA  SLIGHT  SPHEROCYTES  FEW             Chemistry Recent Labs     12/29/21  1725 12/28/21  1416 12/27/21  0535   * 122* 92    137 140   K 6.4* 4.4 5.0    108* 108   CO2 11* 21 25   BUN 47* 24 19*   CREA 4.24* 1.6* 1.09   CA 8.5 8.9 8.6   MG  --  1.9  --    AGAP 19* 8 7   BUCR 11*  --  17    117  --    TP 6.9 7.6  --    ALB 2.1* 2.4*  --    GLOB 4.8*  --   --    AGRAT 0.4*  --   --         Lactic Acid Lactic acid Date Value Ref Range Status   12/11/2021 1.7 0.4 - 2.0 MMOL/L Final     No results for input(s): LAC in the last 72 hours. Liver Enzymes Protein, total   Date Value Ref Range Status   12/29/2021 6.9 6.4 - 8.2 g/dL Final     Albumin   Date Value Ref Range Status   12/29/2021 2.1 (L) 3.4 - 5.0 g/dL Final     Globulin   Date Value Ref Range Status   12/29/2021 4.8 (H) 2.0 - 4.0 g/dL Final     A-G Ratio   Date Value Ref Range Status   12/29/2021 0.4 (L) 0.8 - 1.7   Final     Alk. phosphatase   Date Value Ref Range Status   12/29/2021 106 45 - 117 U/L Final     Recent Labs     12/29/21  1725 12/28/21  1416   TP 6.9 7.6   ALB 2.1* 2.4*   GLOB 4.8*  --    AGRAT 0.4*  --     117        CBC w/Diff Recent Labs     12/29/21  1725 12/28/21  1416 12/27/21  0535   WBC 19.5* 12.7* 13.2   RBC 4.24* 3.87 3.60*   HGB 11.1* 10.2* 9.6*   HCT 37.7 32.8* 31.1*   * 606* 597*   GRANS 80* 77.0* 72   LYMPH 11* 4.0* 13*   EOS 0  --  2        Cardiac Enzymes No results found for: CPK, CK, CKMMB, CKMB, RCK3, CKMBT, CKNDX, CKND1, LUH, TROPT, TROIQ, TRACY, TROPT, TNIPOC, BNP, BNPP     BNP No results found for: BNP, BNPP, XBNPT     Coagulation No results for input(s): PTP, INR, APTT, INREXT in the last 72 hours.       Thyroid  Lab Results   Component Value Date/Time    TSH 2.720 12/28/2021 02:16 PM       No results found for: T4     Urinalysis Lab Results   Component Value Date/Time    Color DARK YELLOW 12/29/2021 05:15 PM    Appearance TURBID 12/29/2021 05:15 PM    Specific gravity 1.018 12/29/2021 05:15 PM    Specific gravity 0.000 (L) 08/18/2021 05:55 AM    pH (UA) 5.0 12/29/2021 05:15 PM    Protein 30 (A) 12/29/2021 05:15 PM    Glucose Negative 12/29/2021 05:15 PM    Ketone TRACE (A) 12/29/2021 05:15 PM    Bilirubin SMALL (A) 12/29/2021 05:15 PM    Urobilinogen 1.0 12/29/2021 05:15 PM    Nitrites Positive (A) 12/29/2021 05:15 PM    Leukocyte Esterase LARGE (A) 12/29/2021 05:15 PM    Epithelial cells Negative 12/29/2021 05:15 PM    Bacteria FEW (A) 12/29/2021 05:15 PM    WBC TOO NUMEROUS TO COUNT 12/29/2021 05:15 PM    RBC NONE 12/29/2021 05:15 PM          Culture data during this hospitalization. All Micro Results     Procedure Component Value Units Date/Time    CULTURE, BLOOD [[id number]] Collected: 12/29/21 1730    Order Status: Completed Specimen: Blood Updated: 12/29/21 1751    CULTURE, BLOOD [[id number]]     Order Status: Sent Specimen: Blood              LE Doppler 11/24/2021  Interpretation Summary  · No evidence of deep vein thrombosis in the right lower extremity. · No evidence of deep vein thrombosis in the left lower extremity. ECHO 11/24/2021 Interpretation Summary    Result status: Final result  · LV: Estimated LVEF is 55 - 60%. Normal cavity size, wall thickness and systolic function (ejection fraction normal). Mild (grade 1) left ventricular diastolic dysfunction E/E'= 6.81.  · TV: Moderate to severe tricuspid valve regurgitation is present. · PA: Pulmonary arterial systolic pressure is 86 mmHg. · IVC: Moderately elevated central venous pressure (8 mmHg); IVC diameter is larger than 21 mm and collapses more than 50% with respiration. Images report reviewed by me:  CT (Most Recent) 12/21/2021 Results from Hospital Encounter encounter on 12/11/21    CT ABD PELV W CONT    Narrative  EXAM: CT ABD PELV W CONT    CLINICAL INDICATION/HISTORY: Persistent epigastric abdominal pain, follow-up and  colitis, evaluate for intra-abdominal abscess    COMPARISON: 12/14/2021    TECHNIQUE:   CT of the abdomen and pelvis following intravenous contrast  administration. Coronal and sagittal reformats were generated and reviewed. One or more dose reduction techniques were used on this CT: automated exposure  control, adjustment of the mAs and/or kVp according to patient size, and  iterative reconstruction techniques.   The specific techniques used on this CT  exam have been documented in the patient's electronic medical record. Digital  Imaging and Communications in Medicine (DICOM) format image data are available  to nonaffiliated external healthcare facilities or entities on a secure, media  free, reciprocally searchable basis with patient authorization for at least a  12-month period after this study. _______________    FINDINGS:    LOWER THORAX:  Since prior exam are small to moderate right and small left  pleural effusions, each with adjacent lower lobe passive atelectasis. No global  cardiomegaly or pericardial effusion. LIVER AND BILIARY:  No specific hypodense lesion posterior right hepatic lobe. No suspicious liver lesions. No biliary dilation. Gallbladder unremarkable. SPLEEN:  Normal.    PANCREAS:  Normal.    ADRENALS:  Normal.    KIDNEYS:  Unchanged right renal cysts, to include complex partially exophytic  right lower pole lesion which appears slightly hyperdense and solid with  peripheral rim calcifications. LYMPH NODES:  No mesenteric or retroperitoneal lymphadenopathy. GI TRACT:  Centrally unchanged moderately prominent circumferential mural  thickening throughout the transverse, descending, and sigmoid colon with  associated submucosal hyperemia. Fluid opacification of normal caliber ascending  colon. Normal caliber small bowel loops. No morphology of bowel obstruction. Mild mesenteric inflammatory stranding adjacent to the descending colon. Normal  caliber small and large bowel loops. No morphology of bowel obstruction. No  bowel wall thickening. Normal appendix. PELVIC ORGANS:  Jimenez catheter is present within the nondistended bladder. .  No  acute abnormality. VASCULATURE:  Normal caliber lower thoracic and abdominal aorta with moderately  severe atherosclerotic vascular calcification. OTHER: Mild soft tissue anasarca. No ascites or free intraperitoneal air. OSSEOUS STRUCTURES:  No acute osseous abnormality. Unchanged posterior  instrumentation hardware L4-S1.  L1 vertebral body compression fracture deformity  also appears unchanged. _______________    Impression  1. New, small to moderate right and small left pleural effusions, each with  adjacent lower lobe passive atelectasis. No acute pulmonary infiltrate. Mild  soft tissue anasarca. 2. Essentially unchanged prominence and distribution of pancolitis. Similar to  prior study CT findings raise concern for C. difficile pseudomembranous colitis. Mild descending colon pericolonic mesenteric inflammatory stranding but no  drainable fluid collection. 3. Unchanged partially solid lower pole right renal lesion. CTA chest 12/14/2021  FINDINGS:   EXAM QUALITY: Motion artifact   PULMONARY ARTERIES: No evidence of pulmonary embolism.   MEDIASTINUM: Normal heart size. Aorta is unremarkable. No pericardial effusion.   LUNGS: Emphysema. No focal airspace densities. Mild atelectasis   PLEURA: Minimal bilateral effusions   AIRWAY: Normal.   LYMPH NODES: 15 mm right hilar node. Other smaller mediastinal lymph nodes   UPPER ABDOMEN: Granuloma in the spleen   OTHER: Compression fracture L1 similar to prior exam   IMPRESSION   Exam limited by motion artifact. No pulmonary emboli identified   Minimal bilateral pleural effusions. Emphysema         CXR reviewed by me:  XR (Most Recent). Results from Hospital Encounter encounter on 12/29/21    XR CHEST PORT    Narrative  EXAM: Chest radiograph    CLINICAL INDICATION/HISTORY: Dyspnea  --Additional history: None. COMPARISON: 12/11/2021    TECHNIQUE: Frontal view of the chest    _______________    FINDINGS:    SUPPORT DEVICES: None. HEART AND MEDIASTINUM: Cardiac silhouette is normal in size. Normal mediastinal  contours cortical arch atherosclerosis. Normal pulmonary vasculature. LUNGS AND PLEURAL SPACES: No solid right basilar opacity. Left lung appears  clear. Sharp costophrenic sulci. No pneumothoraces.     BONY THORAX AND SOFT TISSUES: Unremarkable.    _______________    Impression  Right basilar atelectasis/pneumonia. IMPRESSION:   · Sepsis with shock  · Lactic acidosis  · PneumoniaHCAP  · UTI  · Acute renal failure  · Hyperkalemia  · Acute metabolic encephalopathy  · Pulmonary emphysema  · Pulmonary hypertension  ·   Patient Active Problem List   Diagnosis Code    Lumbar spinal stenosis M48.061    Lumbar spondylosis M47.816    Radiculopathy of leg M54.10    Unable to ambulate R26.2    Urinary retention R33.9    Type II diabetes mellitus with manifestations, uncontrolled ([facility] [address] E11.8, E11.65    Renal mass N28.89    Compression fracture of L1 vertebra ([facility]) S32.010A    Acute renal failure (ARF) ([facility]) N17.9    Aneurysm of infrarenal abdominal aorta ([facility]) I71.4    HTN (hypertension) I10    Severe protein-calorie malnutrition ([facility]) E43    Septic shock ([facility]) A41.9, R65.21    Pulmonary hypertension ([facility]) I27.20    History of tobacco abuse Z87.891    Pulmonary emphysema ([facility]) J43.9    Tricuspid regurgitation I07.1    Acute metabolic encephalopathy O62.83    Colitis K52.9    Lactic acidosis E87.2    Hyperkalemia E87.5    UTI (urinary tract infection) N39.0    HCAP (healthcare-associated pneumonia) J18.9       · Code status: Full code      RECOMMENDATIONS:   Respiratory: Patient has history of COPD; recent CTA chest negative for PEs, and showed emphysematous lung disease. Chest x-ray this admission shows right lower lobe pneumonia with emphysema findings. Continue oxygen supplemental therapy. DuoNeb 4 times daily; budesonide neb 0.5 mg twice daily per  IV steroidsSolu-Medrol 40 mg every 8 hours for now. Keep SPO2 >=92%. HOB 30 degree elevation all the time. Aggressive pulmonary toileting. Aspiration precautions. CVS: Patient has severe pulmonary hypertensionlikely group 3 from chronic emphysematous lung disease. Check troponin. Monitor EKG. PressorLevophed. Keep systolic blood pressure greater than 95 mmHg.   S/p fluid resuscitation in the ER. ID: History of Pseudomonas UTIpansensitive. Broad-spectrum antibiotics for UTI and HCAP. Levofloxacin, IV Zosyn, IV vancomycin per renal dosingpharmacy on case. Follow blood cultures; urine culture added. Follow lactic acid till normalizes. Fluid resuscitation per sepsis protocol in the ER. Hematology/Oncology: Monitor hemoglobin and platelets. Watch for any bleeding issues. Renal: Patient in acute renal failure with elevated potassium; s/p cocktail of treatment for hyperkalemia. Nephrology has been consulted. Continue fluid management; BMP every 4 hours. GI/: Patient has Jimenez catheter. Keep n.p.o. for now. PPI twice daily. Watch for GI bleedingrecent occult blood positive. Recent colitiselevated lactic acid on this admission; check CT abdomen/pelvis without contrast for any evidence of bowel ischemia. History of right kidney massseen by Dr. Sarah Beach in urology during recent admission and recommended follow-up CT surveillance. Endocrine: Monitor BS. Watch for hypoglycemia. Neurology: Altered mentation improved with IV fluids. Check CT head without contrast to evaluate for any stroke/bleed. Skin/Wound: ICU nursing care. Electrolytes: Replace electrolytes per ICU electrolyte replacement protocol; cautionrenal failure. IVF: S/p fluid bolus in the ER; maintenance  mL/h. Added albumin every 6 hours. Nutrition: Keep n.p.o. for now. Prophylaxis: DVT Prophylaxis: SCDs. GI Prophylaxis: Protonix bid. Restraints: none   Lines/Tubes: PIVs  Jimenez catheter 12/29/21 (Medically necessary for strict input/output monitoring in critically ill patient, will remove it when not needed. Jimenez bundle followed). Advance Directive/Palliative Care: consulted  Prognosis is poor; patient appears to be terminally ill. Quality Care: PPI, DVT prophylaxis, HOB elevated, Infection control all reviewed and addressed. Care of plan d/w ER physician.     High complexity decision making was performed during the evaluation of this patient at high risk for decompensation with multiple organ involvement. Total critical care time spent rendering care exclusive of procedures/family discussion/coordination of care: 40 minutes. Name Relation Home Work Jigar DEL ROSARIO Spouse [phone]     I called and discussed with patient's wife; updated patient's critically ill condition; discussed about antibiotics, fluid resuscitation, albumin etc. to keep blood pressure up; patient has severe pulmonary hypertension and emphysema in the background; high risk of cardiopulmonary deterioration. Briefly discussed CODE STATUShigh risk of cardiorespiratory decompensation needing chest compressions, CPR, intubation and mechanical ventilation; patient's wife currently would like to continue current medical care. CODE STATUSfull code. Palliative care consulted. Questions answered to satisfaction. Please note: Voice-recognition software may have been used to generate this report, which may have resulted in some phonetic-based errors in grammar and contents. Even though attempts were made to correct all the mistakes, some may have been missed, and remained in the body of the document.       Rios Chang MD  12/29/2021

## 2021-12-29 NOTE — PROGRESS NOTES
Pharmacy Dosing Services:  Levofloxacin      Indication: Sepsis of Unknown Etiology    Previous Regimen Levofloxacin 750 mg IV q24hrs   Serum Creatinine Lab Results   Component Value Date/Time    Creatinine 1.6 (H) 12/28/2021 02:16 PM      Creatinine Clearance Estimated Creatinine Clearance: 36.9 mL/min (A) (based on SCr of 1.6 mg/dL (H)). BUN Lab Results   Component Value Date/Time    BUN 24 12/28/2021 02:16 PM         Dose administration notes:   Changed to Levofloxacin 750 mg IV q48hrs per renal dosing protocol    Plan:  Continue to monitor     Napoleon Zambrano [facility]

## 2021-12-29 NOTE — ED PROVIDER NOTES
EMERGENCY DEPARTMENT HISTORY AND PHYSICAL EXAM    5:20 PM    Date: 12/29/2021  Patient Name: Cassidy Esteban    History of Presenting Illness     Chief Complaint   Patient presents with    Unresponsive       History Provided By: Patient  Location/Duration/Severity/Modifying factors   HPI   Cassidy Esteban is a 78 y.o. male with past medical history of DM, CKD, anemia, acute encephalopathy, recent hospital admission for UTI, colitis presenting for unresponsiveness. Patient has been apparently septic at his nursing home for the last 2 days, today was nonresponsive so 911 was called. On EMS arrival the patient's eyes were open but he was nonverbal which is not his baseline, ordinarily he is A&O x1. He was noted to be hypotensive to 60s over 40s, blood sugar was 120s and he was transferred to the hospital for further evaluation. Patient on arrival is alert, able to shake his head yes or no and crumble answers. He can otherwise add to his history of his illness which limits my HPI.     PCP: Rex Serrano MD    Current Facility-Administered Medications   Medication Dose Route Frequency Provider Last Rate Last Admin    0.9% sodium chloride infusion 1,000 mL  1,000 mL IntraVENous CONTINUOUS Aga Driver MD   IV Completed at 12/29/21 1927    sodium chloride (NS) flush 5-10 mL  5-10 mL IntraVENous PRN Aga Driver MD        piperacillin-tazobactam (ZOSYN) 4.5 g in 0.9% sodium chloride (MBP/ADV) 100 mL MBP  4.5 g IntraVENous Q6H Aga Driver MD        levoFLOXacin (LEVAQUIN) 750 mg in D5W IVPB  750 mg IntraVENous Q48H Aga Driver  mL/hr at 12/29/21 1903 750 mg at 12/29/21 1903    sodium chloride 0.9 % bolus infusion 932 mL  932 mL IntraVENous ONCE Aga Driver MD        Vancomycin - Pharmacy to Dose  1 Each Other Rx Dosing/Monitoring Aga Driver MD        NOREPINephrine (LEVOPHED) 8 mg in 5% dextrose 250mL (32 mcg/mL) infusion  2-16 mcg/min IntraVENous TITRATE Ivin Offer, Lala Alvarado MD 11.3 mL/hr at 12/29/21 1837 6 mcg/min at 12/29/21 1837    vancomycin (VANCOCIN) 1250 mg in  ml infusion  1,250 mg IntraVENous Mel Flores MD        [START ON 12/30/2021] vancomycin (VANCOCIN) 1,000 mg in 0.9% sodium chloride 250 mL (VIAL-MATE)  1,000 mg IntraVENous Q24H Kelsie Luong MD        dextrose (D50W) injection syrg 12.5-25 g  12.5-25 g IntraVENous PRN Kelsie Luong MD        albumin human 25% (BUMINATE) solution 12.5 g  12.5 g IntraVENous Q6H Shun Mckeon MD        0.9% sodium chloride infusion  150 mL/hr IntraVENous CONTINUOUS Shun Mckeon MD        sodium zirconium cyclosilicate (LOKELMA) powder packet 10 g  10 g Oral ONCE Kelsie Luong MD        0.9% sodium chloride infusion  100 mL/hr IntraVENous Mel Flores MD         Current Outpatient Medications   Medication Sig Dispense Refill    metoprolol succinate (TOPROL-XL) 50 mg XL tablet Take 1 Tablet by mouth nightly. 30 Tablet 0    amLODIPine (NORVASC) 10 mg tablet Take 1 Tablet by mouth daily. 30 Tablet 0    metroNIDAZOLE (FLAGYL) 500 mg tablet Take 1 Tablet by mouth three (3) times daily for 4 days. 12 Tablet 0    vancomycin 50 mg/mL oral solution (compounded) Take 2.5 mL by mouth every six (6) hours for 18 doses. 45 mL 0    omeprazole (PRILOSEC) 40 mg capsule Take 1 Capsule by mouth daily. 30 Capsule 0    atorvastatin (Lipitor) 20 mg tablet Take 20 mg by mouth nightly.  traZODone (DESYREL) 50 mg tablet Take 50 mg by mouth nightly.  aspirin 81 mg chewable tablet Take 81 mg by mouth daily.  multivitamin (ONE A DAY) tablet Take 1 Tab by mouth daily.          Past History     Past Medical History:  Past Medical History:   Diagnosis Date    Atherosclerosis     Diabetes ([facility] [address] 2013    type 2    High cholesterol     Hyperlipidemia     Hypertension 2013    Muscle weakness     Neoplasm     right kidney    Sepsis ([facility] [address]     Severe pulmonary hypertension (HCC)  Spinal stenosis     Urinary retention     Vitamin D deficiency        Past Surgical History:  Past Surgical History:   Procedure Laterality Date    HX HEENT      40 years ago deviated septum    HX LUMBAR LAMINECTOMY  2017    HX ORTHOPAEDIC      Lumbar laminectomy 9/7/17    HX ORTHOPAEDIC Right     CTR       Family History:  Family History   Problem Relation Age of Onset    Heart Disease Father        Social History:  Social History     Tobacco Use    Smoking status: Former Smoker    Smokeless tobacco: Never Used   Vaping Use    Vaping Use: Never used   Substance Use Topics    Alcohol use: No    Drug use: No       Allergies: Allergies   Allergen Reactions    Zocor [Simvastatin] Other (comments)     Made him \"ill\"       I reviewed and confirmed the above information with patient and updated as necessary. Review of Systems     Review of Systems   Unable to perform ROS: Patient nonverbal       Physical Exam     Visit Vitals  BP (!) 111/95   Pulse (!) 106   Resp (!) 31   Ht 6' (1.829 m)   Wt 64.4 kg (142 lb)   SpO2 (!) 82%   BMI 19.26 kg/m²       Physical Exam  Vitals and nursing note reviewed. Constitutional:       Appearance: He is ill-appearing. Comments: Frail and ill appearing adult male sitting in a hospital stretcher   HENT:      Mouth/Throat:      Mouth: Mucous membranes are dry. Eyes:      Extraocular Movements: Extraocular movements intact. Pupils: Pupils are equal, round, and reactive to light. Cardiovascular:      Rate and Rhythm: Normal rate and regular rhythm. Pulses: Normal pulses. Heart sounds: Normal heart sounds. Comments: Weak peripheral pulses present  Pulmonary:      Effort: Pulmonary effort is normal. No respiratory distress. Breath sounds: Normal breath sounds. Comments: Crackles at the bases bilaterally  Abdominal:      General: Abdomen is flat. Tenderness: There is no abdominal tenderness.    Genitourinary:     Comments: Jimenez catheter in place, dark urine in collection tank  Musculoskeletal:         General: No swelling. Normal range of motion. Cervical back: Normal range of motion. Skin:     General: Skin is warm. Neurological:      General: No focal deficit present. Mental Status: He is alert. Comments: Alert, nodding head yes or no. Able to give thumbs up and follow only most basic of commands.           Diagnostic Study Results     Labs -  Recent Results (from the past 24 hour(s))   URINALYSIS W/ RFLX MICROSCOPIC    Collection Time: 12/29/21  5:15 PM   Result Value Ref Range    Color DARK YELLOW      Appearance TURBID      Specific gravity 1.018 1.005 - 1.030      pH (UA) 5.0 5.0 - 8.0      Protein 30 (A) NEG mg/dL    Glucose Negative NEG mg/dL    Ketone TRACE (A) NEG mg/dL    Bilirubin SMALL (A) NEG      Blood Negative NEG      Urobilinogen 1.0 0.2 - 1.0 EU/dL    Nitrites Positive (A) NEG      Leukocyte Esterase LARGE (A) NEG     URINE MICROSCOPIC ONLY    Collection Time: 12/29/21  5:15 PM   Result Value Ref Range    WBC TOO NUMEROUS TO COUNT 0 - 5 /hpf    RBC NONE 0 - 5 /hpf    Epithelial cells Negative 0 - 5 /lpf    Bacteria FEW (A) NEG /hpf    Budding yeast Positive (A) NEG      Yeast w/hyphae 2+ (A) NEG   BLOOD GAS,LACTIC ACID, POC    Collection Time: 12/29/21  5:24 PM   Result Value Ref Range    Device: NASAL CANNULA      FIO2 (POC) 52 %    pH (POC) 7.28 (L) 7.35 - 7.45      pCO2 (POC) 19.4 (L) 35.0 - 45.0 MMHG    pO2 (POC) 75 (L) 80 - 100 MMHG    HCO3 (POC) 9.2 (L) 22 - 26 MMOL/L    sO2 (POC) 93.6 92 - 97 %    Base deficit (POC) 15.5 mmol/L    Allens test (POC) Positive      Site LEFT BRACHIAL      Specimen type (POC) ARTERIAL      Performed by Sally Garcia     Lactic Acid (POC) 11.69 (HH) 0.40 - 2.06 mmol/L   METABOLIC PANEL, COMPREHENSIVE    Collection Time: 12/29/21  5:25 PM   Result Value Ref Range    Sodium 139 136 - 145 mmol/L    Potassium 6.4 (HH) 3.5 - 5.5 mmol/L    Chloride 109 100 - 111 mmol/L CO2 11 (L) 21 - 32 mmol/L    Anion gap 19 (H) 3.0 - 18 mmol/L    Glucose 129 (H) 74 - 99 mg/dL    BUN 47 (H) 7.0 - 18 MG/DL    Creatinine 4.24 (H) 0.6 - 1.3 MG/DL    BUN/Creatinine ratio 11 (L) 12 - 20      GFR est AA 17 (L) >60 ml/min/1.73m2    GFR est non-AA 14 (L) >60 ml/min/1.73m2    Calcium 8.5 8.5 - 10.1 MG/DL    Bilirubin, total 0.5 0.2 - 1.0 MG/DL    ALT (SGPT) 29 16 - 61 U/L    AST (SGOT) 117 (H) 10 - 38 U/L    Alk. phosphatase 106 45 - 117 U/L    Protein, total 6.9 6.4 - 8.2 g/dL    Albumin 2.1 (L) 3.4 - 5.0 g/dL    Globulin 4.8 (H) 2.0 - 4.0 g/dL    A-G Ratio 0.4 (L) 0.8 - 1.7     CBC WITH AUTOMATED DIFF    Collection Time: 12/29/21  5:25 PM   Result Value Ref Range    WBC 19.5 (H) 4.6 - 13.2 K/uL    RBC 4.24 (L) 4.35 - 5.65 M/uL    HGB 11.1 (L) 13.0 - 16.0 g/dL    HCT 37.7 36.0 - 48.0 %    MCV 88.9 78.0 - 100.0 FL    MCH 26.2 24.0 - 34.0 PG    MCHC 29.4 (L) 31.0 - 37.0 g/dL    RDW 21.2 (H) 11.6 - 14.5 %    PLATELET 399 (H) 557 - 420 K/uL    MPV 9.8 9.2 - 11.8 FL    NRBC 0.2 (H) 0  WBC    ABSOLUTE NRBC 0.04 (H) 0.00 - 0.01 K/uL    NEUTROPHILS 80 (H) 40 - 73 %    BAND NEUTROPHILS 2 0 - 5 %    LYMPHOCYTES 11 (L) 21 - 52 %    MONOCYTES 6 3 - 10 %    EOSINOPHILS 0 0 - 5 %    BASOPHILS 0 0 - 2 %    METAMYELOCYTES 1 (H) 0 %    IMMATURE GRANULOCYTES 0 %    ABS. NEUTROPHILS 16.0 (H) 1.8 - 8.0 K/UL    ABS. LYMPHOCYTES 2.1 0.9 - 3.6 K/UL    ABS. MONOCYTES 1.2 0.05 - 1.2 K/UL    ABS. EOSINOPHILS 0.0 0.0 - 0.4 K/UL    ABS. BASOPHILS 0.0 0.0 - 0.1 K/UL    ABS. IMM. GRANS. 0.0 K/UL    DF MANUAL      PLATELET COMMENTS Increased Platelets      RBC COMMENTS ANISOCYTOSIS  2+        RBC COMMENTS POLYCHROMASIA  SLIGHT  SPHEROCYTES  FEW             Radiologic Studies -   XR CHEST PORT   Final Result   Right basilar atelectasis/pneumonia.       CT HEAD WO CONT    (Results Pending)   CT ABD PELV WO CONT    (Results Pending)   US RETROPERITONEUM COMP    (Results Pending)           Medical Decision Making   I am the first provider for this patient. I reviewed the vital signs, available nursing notes, past medical history, past surgical history, family history and social history. Vital Signs-Reviewed the patient's vital signs. EKG: Nondiagnostic    Records Reviewed: Nursing Notes, Old Medical Records, Previous electrocardiograms and Previous Radiology Studies (Time of Review: 5:20 PM)    Provider Notes (Medical Decision Making):   MDM  14-year-old male here with minimal responsiveness, hypotension, likely septic due to UTI, colitis, aspiration pneumonia or other source    ED Course: Progress Notes, Reevaluation, and Consults:  Patient has hypotensive, 60s over 40s without tachycardia. He is tachypneic with respiratory rate of about 30, unable to obtain a good O2 sat. Patient was placed on nasal cannula at 4 L, 1 L of IV fluids pressure bagged through a peripheral IV. Central access was established     We will screen a septic work-up, treat with broad-spectrum antibiotics. ABG with mil hypoxia, started on NC. Sats now with good pleth are low to mid 90's  CBC shows a leukocytosis of 19.5 with a lactic acid of nearly 12, code sepsis was called. Source appears to be both a right lower lobe pneumonia, likely aspiration given his history as well as a urinary infection  CMP shows new renal failure, creatinine today 4.24, up from 1.6 yesterday. Potassium is elevated to 6.5, patient still making urine    Discussed with nephrology, Dr. Oksana Esposito, he would like patient started on [facility] in addition to the treatment that we have already ordered. Will continue IV fluids at 100 cc/h, obtain retroperitoneal ultrasound he thinks that the patient will have a good recovery. Discussed with pulmonary acute care, they will agree with the plan to admit to the ICU    Discussed with the hospitalist who agreed with the plan to admit. Patient on Levophed 4 mcg/min, clinically seems to be improving, more alert and interactive. Procedures    Critical Care Time: 50 minutes of critical care time     Diagnosis     Clinical Impression:   1. Septic shock ([address]    2. Acute cystitis without hematuria    3. Chronic indwelling Jimenez catheter    4. Pneumonia of right lower lobe due to infectious organism        Disposition: Admit to ICU    Follow-up Information    None          Patient's Medications   Start Taking    No medications on file   Continue Taking    AMLODIPINE (NORVASC) 10 MG TABLET    Take 1 Tablet by mouth daily. ASPIRIN 81 MG CHEWABLE TABLET    Take 81 mg by mouth daily. ATORVASTATIN (LIPITOR) 20 MG TABLET    Take 20 mg by mouth nightly. METOPROLOL SUCCINATE (TOPROL-XL) 50 MG XL TABLET    Take 1 Tablet by mouth nightly. METRONIDAZOLE (FLAGYL) 500 MG TABLET    Take 1 Tablet by mouth three (3) times daily for 4 days. MULTIVITAMIN (ONE A DAY) TABLET    Take 1 Tab by mouth daily. OMEPRAZOLE (PRILOSEC) 40 MG CAPSULE    Take 1 Capsule by mouth daily. TRAZODONE (DESYREL) 50 MG TABLET    Take 50 mg by mouth nightly. VANCOMYCIN 50 MG/ML ORAL SOLUTION (COMPOUNDED)    Take 2.5 mL by mouth every six (6) hours for 18 doses. These Medications have changed    No medications on file   Stop Taking    No medications on file       Shaun Lopez MD   Emergency Medicine   December 29, 2021, 5:20 PM     This note is dictated utilizing Dragon voice recognition software. Unfortunately this leads to occasional typographical errors using the voice recognition. I apologize in advance if the situation occurs. If questions occur please do not hesitate to contact me directly.     Ramon Garcia MD

## 2021-12-29 NOTE — PROGRESS NOTES
[facility] Pharmacokinetic Monitoring Service - Vancomycin     Susan Almaraz is a 78 y.o. male starting on vancomycin therapy for Sepsis of Unknown Etiology. Pharmacy consulted by Dr. Jagjit Peck for monitoring and adjustment. Target Concentration: Goal AUC/BENJAMÍN 400-600 mg*hr/L    Additional Antimicrobials: Zosyn, Levofloxacin    Pertinent Laboratory Values: Wt Readings from Last 1 Encounters:   12/29/21 64.4 kg (142 lb)     Temp Readings from Last 1 Encounters:   12/27/21 98.1 °F (36.7 °C)     No components found for: PROCAL  Estimated Creatinine Clearance: 36.9 mL/min (A) (based on SCr of 1.6 mg/dL (H)). Recent Labs     12/28/21  1416 12/27/21  0535   WBC 12.7* 13.2       Plan:  1. Will dose Vancomycin 1250 mg IV now, then 1000 mg IV q24hrs. 2. Estimated  mg/l.hr   Trough 17.1 mg/l  3. BMP ordered x 3 days  4.  Pharmacy will continue to follow and adjust.    Antonio Gastelum [facility]  12/29/2021 5:41 PM  [phone]

## 2021-12-30 PROBLEM — D75.839 THROMBOCYTOSIS: Status: ACTIVE | Noted: 2021-01-01

## 2021-12-30 PROBLEM — Z97.8 INDWELLING FOLEY CATHETER PRESENT: Status: ACTIVE | Noted: 2021-01-01

## 2021-12-30 PROBLEM — U07.1 PNEUMONIA DUE TO COVID-19 VIRUS: Status: ACTIVE | Noted: 2021-01-01

## 2021-12-30 PROBLEM — J12.82 PNEUMONIA DUE TO COVID-19 VIRUS: Status: ACTIVE | Noted: 2021-01-01

## 2021-12-30 PROBLEM — E11.9 TYPE 2 DIABETES MELLITUS (HCC): Status: ACTIVE | Noted: 2021-01-01

## 2021-12-30 NOTE — ED NOTES
Called to bedside by nursing, patient hypotensive, 40s over 20s,  Maxed out on levo, had neostick, given 100mcg dose of phenylephrine 2 times, mental status improved.      Intensivist at bedside    Critical Care  Performed by: Sacha Rivera MD  Authorized by: Sacha Rivera MD     Critical care provider statement:     Critical care time (minutes):  20    Critical care was necessary to treat or prevent imminent or life-threatening deterioration of the following conditions:  Cardiac failure, sepsis and shock    Critical care was time spent personally by me on the following activities:  Ordering and performing treatments and interventions          Alondra Figueroa MD, [facility]  Emergency Medicine  12/30/2021, 5:08 PM

## 2021-12-30 NOTE — PROGRESS NOTES
Responded to RN call  Pt is now diagnosed with COVID19 by rapid test  Pt started on Decadron by hospitalist  Patient's blood pressure dropped to 68'T systolic  Order placed for Shen-Synephrine and IV fluid bolus  Given the patient's high risk for further deterioration backup order for vasopressin provided and discussed with ER nurse  ABG reviewed with RT -PaO2 > 200 on current HFNC flow  Lactic acid improved ~ 2  Patient remain high risk for further deterioration and death  Monitor closely    Malik Hart MD

## 2021-12-30 NOTE — ED NOTES
Pt returned from CT via stretcher by RN transport. Dr Dalton Rios at bedside. RT called for ABG and assessment.

## 2021-12-30 NOTE — CONSULTS
RENAL CONSULT  2021    Patient:  Tana Boxer  :  1945  Gender:  male  MRN #:  [id number]    Reason for Consult: Acute Renal failure and hyperkalemia. Assessment: Tana Boxer is a 78y.o. year old male with h/o  uncontrolled type 2 diabetes mellitus, history of pulmonary hypertension, emphysema, dysphagia , right kidney renal mass who had recent admission at [facility] treated for colitis  with Flagyl and oral vancomycin. Urine culture grew pseudomonas in 21 . Patient was discharged to rehab facility with indwelling Jimenez's catheter . He was brought from rehab due to altered mental status    He had leukocytosis on admission, grossly infected urine in setting of Chronic indwelling Jimenez catheter, high lactic acid and Hypotensive requiring pressors . # Anuric Acute renal failure- Most likely ATN due to septic shock. No hydronephrosis in USG   Right renal mass was evaluated by Urology in last admission and warranted no further work up      # Hyperkalemia- due to shock, decreased urine flow , thrombocytosis/renal failure and acidosis     # Metabolic acidosis- shock/lactic acidosis       Plan:    - clinically he still looks volume depleted. CXR not consistent with pulmonary congestion   - will give 1 liter NS bolus for now, and continue  cc/hour   - Consider iv lasix prn for sign/symptoms of congestion. Could try iv lasix later in the evening once he is adequately volume resuscitated to see if he makes more urine.   - Lactic acidosis due to shock , would not use bicarbonate for now , continue to treat shock   - Pressors to keep MAP around 65 mmhg   - Hyperkalemia is improving .  Courtney Quick again 10 gram today   - Will get urine electrolytes and UPC  - Intake and output recording   - Avoid NSAIDS, contrast and nephrotoxin   - dose all meds and antibiotics for current eGFR   - Does not have clinical and metabolic indication of dialysis for now     Rest of the other medical management per primary and ICU team       History of Present Illness:    Zoraida Cook is a 78y.o. year old male with h/o  uncontrolled type 2 diabetes mellitus, history of pulmonary hypertension, emphysema, dysphagia , right kidney renal mass who had recent admission at [facility] treated for colitis  with Flagyl and oral vancomycin. Urine culture grew pseudomonas in 12/5/21 . Patient was discharged to rehab facility with indwelling Jimenez's catheter . He was brought from rehab due to altered mental status     In ED he was found to have clinically dehydrated, leukocytosis, UTI, elevated creatinine and hyperkalemia, hypotensive with high lactic acid . He was resuscitated in ED with 3 liter of iv fluid  Was admitted in ICU for septic shock on pressors . When I saw him this morning in ICU he was  on high flow nasal canula. He is awake and alert but confused on pressors. Jimenez's bag has minimal urine with hematuria   Unable to obtained history and review of systems.        Past Medical History:   Diagnosis Date    Atherosclerosis     Diabetes ([address] 2013    type 2    High cholesterol     Hyperlipidemia     Hypertension 2013    Muscle weakness     Neoplasm     right kidney    Sepsis ([address]     Severe pulmonary hypertension ([address]     Spinal stenosis     Urinary retention     Vitamin D deficiency      Past Surgical History:   Procedure Laterality Date    HX HEENT      40 years ago deviated septum    HX LUMBAR LAMINECTOMY  2017    HX ORTHOPAEDIC      Lumbar laminectomy 9/7/17    HX ORTHOPAEDIC Right     CTR     Family History   Problem Relation Age of Onset    Heart Disease Father      Allergies   Allergen Reactions    Zocor [Simvastatin] Other (comments)     Made him \"ill\"     Current Facility-Administered Medications   Medication Dose Route Frequency Provider Last Rate Last Admin    heparin (porcine) injection 5,000 Units  5,000 Units SubCUTAneous Q8H Elvin Comer MD   5,000 Units at 12/30/21 6680    meropenem (MERREM) 1 g in 0.9% sodium chloride (MBP/ADV) 50 mL MBP  1 g IntraVENous Q12H Mauricio Clinton MD        0.9% sodium chloride infusion 1,000 mL  1,000 mL IntraVENous CONTINUOUS Lulú Espinal MD   IV Completed at 12/29/21 1927    sodium chloride (NS) flush 5-10 mL  5-10 mL IntraVENous PRN Lulú Espinal MD        levoFLOXacin (LEVAQUIN) 750 mg in D5W IVPB  750 mg IntraVENous Q48H Lulú Espinal MD   IV Completed at 12/29/21 2039    Vancomycin - Pharmacy to Dose  1 Each Other Rx Dosing/Monitoring Lulú Espinal MD        NOREPINephrine (LEVOPHED) 8 mg in 5% dextrose 250mL (32 mcg/mL) infusion  0.5-30 mcg/min IntraVENous TITRATE Jodi Choe MD 43.1 mL/hr at 12/30/21 0702 23 mcg/min at 12/30/21 0702    vancomycin (VANCOCIN) 1,000 mg in 0.9% sodium chloride 250 mL (VIAL-MATE)  1,000 mg IntraVENous Q24H Lulú Espinal MD        dextrose (D50W) injection syrg 12.5-25 g  12.5-25 g IntraVENous PRN Lulú Espinal MD        albumin human 25% (BUMINATE) solution 12.5 g  12.5 g IntraVENous Q6H Mauricio Clinton MD 0 mL/hr at 12/30/21 0349 12.5 g at 12/30/21 5396    0.9% sodium chloride infusion  150 mL/hr IntraVENous CONTINUOUS Mauricio Clinton  mL/hr at 12/29/21 2043 150 mL/hr at 12/29/21 2043    albuterol-ipratropium (DUO-NEB) 2.5 MG-0.5 MG/3 ML  3 mL Nebulization QID RT Mauricio Clinton MD   3 mL at 12/29/21 2146    budesonide (PULMICORT) 500 mcg/2 ml nebulizer suspension  500 mcg Nebulization BID RT Mauricio Clinton MD   500 mcg at 12/29/21 2146    methylPREDNISolone (PF) (SOLU-MEDROL) injection 40 mg  40 mg IntraVENous Q8H Mauricio Clinton MD   40 mg at 12/30/21 0600    pantoprazole (PROTONIX) 40 mg in 0.9% sodium chloride 10 mL injection  40 mg IntraVENous Q12H Mauricio Clinton MD   40 mg at 12/29/21 2020    sodium chloride (NS) flush 5-40 mL  5-40 mL IntraVENous Q8H Jodi Choe MD   10 mL at 12/30/21 0601    sodium chloride (NS) flush 5-40 mL  5-40 mL IntraVENous PRN Jaison Mclaughlin MD        acetaminophen (TYLENOL) tablet 650 mg  650 mg Oral Q6H ERISN Jaison Mclaughlin MD        Or    acetaminophen (TYLENOL) suppository 650 mg  650 mg Rectal Q6H ANITA Mclaughlin MD        polyethylene glycol (MIRALAX) packet 17 g  17 g Oral DAILY PRN Jiason Mclaughlin MD        ondansetron (ZOFRAN ODT) tablet 4 mg  4 mg Oral Q8H PRN Jaison Mclaughlin MD        Or    [facility] injection 4 mg  4 mg IntraVENous Q6H PRN Jaison Mclaughlin MD        insulin lispro (HUMALOG) injection   SubCUTAneous Q6H Jaison Mclaughlin MD   4 Units at 12/30/21 3824    glucose chewable tablet 16 g  16 g Oral PRN Jaison Mclaughlin MD        glucagon [facility]) injection 1 mg  1 mg IntraMUSCular PRANNALISE Mclaughlin MD        dextrose (D50W) injection syrg 12.5-25 g  25-50 mL IntraVENous PRANNALISE Mclaughlin MD         Current Outpatient Medications   Medication Sig Dispense Refill    metoprolol succinate (TOPROL-XL) 50 mg XL tablet Take 1 Tablet by mouth nightly. 30 Tablet 0    amLODIPine (NORVASC) 10 mg tablet Take 1 Tablet by mouth daily. 30 Tablet 0    metroNIDAZOLE (FLAGYL) 500 mg tablet Take 1 Tablet by mouth three (3) times daily for 4 days. 12 Tablet 0    vancomycin 50 mg/mL oral solution (compounded) Take 2.5 mL by mouth every six (6) hours for 18 doses. 45 mL 0    omeprazole (PRILOSEC) 40 mg capsule Take 1 Capsule by mouth daily. 30 Capsule 0    atorvastatin (Lipitor) 20 mg tablet Take 20 mg by mouth nightly.  traZODone (DESYREL) 50 mg tablet Take 50 mg by mouth nightly.  aspirin 81 mg chewable tablet Take 81 mg by mouth daily.  multivitamin (ONE A DAY) tablet Take 1 Tab by mouth daily. Review of Symptoms:    unable to obtained due to patient condition.        Objective:    Visit Vitals  /61   Pulse (!) 113   Temp 99.1 °F (37.3 °C)   Resp 19   Ht 6' (1.829 m)   Wt 64.4 kg (142 lb)   SpO2 98%   BMI 19.26 kg/m²       Physical Exam:    Pt awake,  alert , confused   HEENT: Nasal canula , no neck swelling, ry mucosa   Lung: wheeze on exam   Heart: s1s2 heard   Abdomen: soft, non distended   Ext: no edema   CNS- awake      Laboratory Data:    Lab Results   Component Value Date    BUN 55 (H) 12/30/2021    BUN 47 (H) 12/29/2021    BUN 24 12/28/2021     12/30/2021     12/29/2021     12/28/2021    CO2 16 (L) 12/30/2021    CO2 11 (L) 12/29/2021    CO2 21 12/28/2021     Lab Results   Component Value Date    WBC 19.5 (H) 12/29/2021    HGB 11.1 (L) 12/29/2021    HCT 37.7 12/29/2021     No components found for: CALCIUM, PHOSPHORUS, MAGNESIUM  Lab Results   Component Value Date    HDL 40 06/01/2016     No results found for: SPECIMENTYP, TURBIDITY, UGLU    Imaging Reveiwed:    CXR 12/29/21:    IMPRESSION  Right basilar atelectasis/pneumonia. USG retroperitoneum: 12/29/21: -  1. Increased left and right renal cortical echogenicity, suggestive of chronic  medical renal disease. No obstructive uropathy or acute sonographic  abnormality. 2. Unchanged solid right lower pole renal mass compared to recent studies,  increased in size compared to more remote exams, almost certainly representing  renal cell carcinoma. Approximately 65     minutes of critical care time spent in the direct evaluation and formulation of treatment plan of this high risk patient. The reason for providing this level of medical care was due a critical illness that impaired one or more vital organ systems such that there was a high probability of imminent or life threatening deterioration in the patients condition. This care involved high complexity decision making to assess, manipulate, and support vital system functions, to treat this degreee vital organ system failure and to prevent further life threatening deterioration of the patients condition.            Rica Wright MD [facility] Nephrology

## 2021-12-30 NOTE — PROGRESS NOTES
Pulm/CC ON CALL    Pressor requirements went up  Anuric RF  Switch zosyn to merrem pending cx results  Consult ID  Neph on case  DNR status noted  Continue fluids    Please call if needed while waiting for daytime team to round.     Kristi Leary MD

## 2021-12-30 NOTE — PROGRESS NOTES
Pharmacy Dosing Services: Levofloxacin     Levaquin 750 mg IV q48h was automatically dose-adjusted to Levaquin 500 mg IV q48h per [facility] P&T Committee Protocol, with respect to renal function. Indication: sepsis     Pt Weight:   Wt Readings from Last 1 Encounters:   12/29/21 64.4 kg (142 lb)     Serum Creatinine Lab Results   Component Value Date/Time    Creatinine 4.39 (H) 12/30/2021 02:00 PM       Creatinine Clearance Estimated Creatinine Clearance: 13.4 mL/min (A) (based on SCr of 4.39 mg/dL (H)). BUN Lab Results   Component Value Date/Time    BUN 61 (H) 12/30/2021 02:00 PM         Pharmacy to continue to monitor patient daily. Will make dosage adjustments based upon changing renal function. Signed Sherry Berry.  Contact information: [phone]

## 2021-12-30 NOTE — H&P
History & Physical    Patient: Bren Fox MRN: [id number]  CSN: [id number]    YOB: 1945  Age: 78 y.o. Sex: male      DOA: 12/29/2021  Primary Care Provider:  Yung Vu MD      Assessment/Plan     Patient Active Problem List   Diagnosis Code    Lumbar spinal stenosis M48.061    Lumbar spondylosis M47.816    Radiculopathy of leg M54.10    Unable to ambulate R26.2    Urinary retention R33.9    Type II diabetes mellitus with manifestations, uncontrolled ([facility] [address] E11.8, E11.65    Renal mass N28.89    Compression fracture of L1 vertebra ([facility]) S32.010A    Acute renal failure (ARF) ([facility]) N17.9    Aneurysm of infrarenal abdominal aorta ([facility]) I71.4    HTN (hypertension) I10    Severe protein-calorie malnutrition ([facility]) E43    Septic shock ([facility]) A41.9, R65.21    Pulmonary hypertension ([facility]) I27.20    History of tobacco abuse Z87.891    Pulmonary emphysema ([facility]) J43.9    Acute hypoxemic respiratory failure ([facility]) J96.01    Tricuspid regurgitation I07.1    Acute metabolic encephalopathy K17.72    Colitis K52.9    Lactic acidosis E87.2    Hyperkalemia E87.5    UTI (urinary tract infection) N39.0    HCAP (healthcare-associated pneumonia) J18.9    Type 2 diabetes mellitus ([facility]) E11.9    Indwelling Alonso catheter present Z97.8    Thrombocytosis D67.12     26-year-old male with multiple recent hospital admissions for UTI with indwelling alonso catheter for urinary retention, hypertension, pulmonary hypertension, emphysema, type 2 diabetes mellitus, and renal mass is admitted for septic shock due to healthcare associated pneumonia and UTI with acute renal failure and hyperkalemia. He is terminally ill and will not survive this hospitalization. His prognosis is poor. Based on [place] II calculation, his mortality risk is 87%. Neuroacute encephalopathy due to sepsis and metabolic derangements. Head CT negative for acute findings.   Pulmonaryhypoxemic respiratory failure due to healthcare associated pneumonia with underlying pulmonary hypertension and emphysema. He is on a nonrebreather and I have asked respiratory therapy to place him on a high flow nasal cannula. He has Pulmicort nebulizers as well as duo nebs as needed. CVseptic shock requiring high-dose Levophed. GIrecently treated for colitis but this appears resolved on CT scan from today. No acute bowel pathology is apparent on noncontrast imaging. Renalacute renal failure with oliguria, severe lactic acidosis, and hyperkalemia. He has a renal mass that was identified during a previous hospital stay. Urology recommended surveillance imaging to follow this. Hemechronic anemia. Thrombocytosis likely an acute phase reactant to current illness. IDseptic shock due to UTI and HCAP. He is on broad-spectrum antibiotics with Zosyn, vancomycin, and Levaquin. He is also on low-dose Solu-Medrol for refractory shock and pneumonia. Follow sepsis bundle and trend lactic acid. Follow-up blood and urine cultures. Sputum culture also ordered. Endosliding scale insulin for type 2 diabetes mellitus. F/E/Nhe was given 3 L of fluid initially for resuscitation with no urine output. He is on maintenance IV fluids and albumin. Replete electrolytes as needed. N.p.o. due to mental status and the acuity of his illness. Full codeas per Dr. Raiza Alanis (intensivist) conversation with his wife    Prophylaxis: Protonix IV, heparin SQ    Estimated length of stay : 5 nights    Lazarus Chum, MD  Nocturnist    Critical Care Time [id number]    70 minutes of critical care time spent in the direct evaluation and treatment of this high risk patient. The reason for providing this level of medical care for this critically ill patient was due a critical illness that impaired one or more vital organ systems such that there was a high probability of imminent or life threatening deterioration in the patients condition.  This care involved high complexity decision making to assess, manipulate, and support vital system functions, to treat this degreee vital organ system failure and to prevent further life threatening deterioration of the patients condition.  ----------------------------------------------------------------------------------------------------------------------------------------------------------------------------  CC: unresponsive at SNF       HPI:     Soraya Olguin is a 78 y.o. male with multiple recent hospital admissions for UTI with indwelling alonso catheter for urinary retention, hypertension, pulmonary hypertension, emphysema, type 2 diabetes mellitus, and renal mass presents via EMS for unresponsiveness at his SNF today. He was nonverbal upon EMS arrival as well as hypotensive to 60s over 40s. He was recently admitted to [facility] and discharged on 12/27. He was treated for colitis during that hospital stay along with metabolic encephalopathy. He was discharged with Flagyl and oral vancomycin. History is unobtainable from the patient due to acute illness.     Past Medical History:   Diagnosis Date    Atherosclerosis     Diabetes ([address] 2013    type 2    High cholesterol     Hyperlipidemia     Hypertension 2013    Muscle weakness     Neoplasm     right kidney    Sepsis ([address]     Severe pulmonary hypertension ([facility] [address]     Spinal stenosis     Urinary retention     Vitamin D deficiency        Past Surgical History:   Procedure Laterality Date    HX HEENT      40 years ago deviated septum    HX LUMBAR LAMINECTOMY  2017    HX ORTHOPAEDIC      Lumbar laminectomy 9/7/17    HX ORTHOPAEDIC Right     CTR       Family History   Problem Relation Age of Onset    Heart Disease Father        Social History     Socioeconomic History    Marital status:    Tobacco Use    Smoking status: Former Smoker    Smokeless tobacco: Never Used   Vaping Use    Vaping Use: Never used   Substance and Sexual Activity    Alcohol use: No    Drug use: No       Prior to Admission medications    Medication Sig Start Date End Date Taking? Authorizing Provider   metoprolol succinate (TOPROL-XL) 50 mg XL tablet Take 1 Tablet by mouth nightly. 12/27/21   Barney Taylor MD   amLODIPine (NORVASC) 10 mg tablet Take 1 Tablet by mouth daily. 12/28/21   Barney Taylor MD   metroNIDAZOLE (FLAGYL) 500 mg tablet Take 1 Tablet by mouth three (3) times daily for 4 days. 12/27/21 12/31/21  Barney Taylor MD   vancomycin 50 mg/mL oral solution (compounded) Take 2.5 mL by mouth every six (6) hours for 18 doses. 12/27/21 1/1/22  Barney Taylor MD   omeprazole (PRILOSEC) 40 mg capsule Take 1 Capsule by mouth daily. 12/1/21   Barney Taylor MD   atorvastatin (Lipitor) 20 mg tablet Take 20 mg by mouth nightly. Provider, Historical   traZODone (DESYREL) 50 mg tablet Take 50 mg by mouth nightly. Provider, Historical   aspirin 81 mg chewable tablet Take 81 mg by mouth daily. Other, MD Katelynn   multivitamin (ONE A DAY) tablet Take 1 Tab by mouth daily. Provider, Historical       Allergies   Allergen Reactions    Zocor [Simvastatin] Other (comments)     Made him \"ill\"       Review of Systems  Unable to obtain due to patient condition    Physical Exam:     Physical Exam:  Visit Vitals  BP (!) 86/54   Pulse (!) 111   Resp 25   Ht 6' (1.829 m)   Wt 64.4 kg (142 lb)   SpO2 98%   BMI 19.26 kg/m²    O2 Flow Rate (L/min): 40 l/min O2 Device: Hi flow nasal cannula    No data recorded. 12/29 1901 - 12/30 0700  In: 3282 [I.V.:3282]  Out: -    No intake/output data recorded. General:  Somnolent due to ativan given for CT scan, nonrebreather in place with O2 sat of 88%, elderly, cachectic. Head:  Normocephalic, without obvious abnormality, atraumatic. Eyes:  Conjunctivae/corneas clear, sclera anicteric. Neck: Supple, symmetrical, trachea midline. Lungs:   Diminished breath sounds in right lower lobe, no wheezing.    Heart: Tachycardic, rate and rhythm, S1, S2 normal, no murmur, click, rub or gallop. Abdomen: Soft, non-tender. Bowel sounds normal. No masses,  No organomegaly. Extremities: Extremities cachectic, atraumatic, fingertip cyanosis bilaterally. Capillary refill delayed. Pulses: 2+ and symmetric all extremities. Skin: Skin color mottled, turgor increased. No rashes or lesions   Neurologic: Somnolent, unable to assess. Labs Reviewed: All lab results for the last 24 hours reviewed.   Recent Results (from the past 24 hour(s))   URINALYSIS W/ RFLX MICROSCOPIC    Collection Time: 12/29/21  5:15 PM   Result Value Ref Range    Color DARK YELLOW      Appearance TURBID      Specific gravity 1.018 1.005 - 1.030      pH (UA) 5.0 5.0 - 8.0      Protein 30 (A) NEG mg/dL    Glucose Negative NEG mg/dL    Ketone TRACE (A) NEG mg/dL    Bilirubin SMALL (A) NEG      Blood Negative NEG      Urobilinogen 1.0 0.2 - 1.0 EU/dL    Nitrites Positive (A) NEG      Leukocyte Esterase LARGE (A) NEG     URINE MICROSCOPIC ONLY    Collection Time: 12/29/21  5:15 PM   Result Value Ref Range    WBC TOO NUMEROUS TO COUNT 0 - 5 /hpf    RBC NONE 0 - 5 /hpf    Epithelial cells Negative 0 - 5 /lpf    Bacteria FEW (A) NEG /hpf    Budding yeast Positive (A) NEG      Yeast w/hyphae 2+ (A) NEG   BLOOD GAS,LACTIC ACID, POC    Collection Time: 12/29/21  5:24 PM   Result Value Ref Range    Device: NASAL CANNULA      FIO2 (POC) 52 %    pH (POC) 7.28 (L) 7.35 - 7.45      pCO2 (POC) 19.4 (L) 35.0 - 45.0 MMHG    pO2 (POC) 75 (L) 80 - 100 MMHG    HCO3 (POC) 9.2 (L) 22 - 26 MMOL/L    sO2 (POC) 93.6 92 - 97 %    Base deficit (POC) 15.5 mmol/L    Allens test (POC) Positive      Site LEFT BRACHIAL      Specimen type (POC) ARTERIAL      Performed by Devorah Fagan     Lactic Acid (POC) 11.69 (HH) 0.40 - 3.06 mmol/L   METABOLIC PANEL, COMPREHENSIVE    Collection Time: 12/29/21  5:25 PM   Result Value Ref Range    Sodium 139 136 - 145 mmol/L    Potassium 6.4 (HH) 3.5 - 5.5 mmol/L    Chloride 109 100 - 111 mmol/L    CO2 11 (L) 21 - 32 mmol/L    Anion gap 19 (H) 3.0 - 18 mmol/L    Glucose 129 (H) 74 - 99 mg/dL    BUN 47 (H) 7.0 - 18 MG/DL    Creatinine 4.24 (H) 0.6 - 1.3 MG/DL    BUN/Creatinine ratio 11 (L) 12 - 20      GFR est AA 17 (L) >60 ml/min/1.73m2    GFR est non-AA 14 (L) >60 ml/min/1.73m2    Calcium 8.5 8.5 - 10.1 MG/DL    Bilirubin, total 0.5 0.2 - 1.0 MG/DL    ALT (SGPT) 29 16 - 61 U/L    AST (SGOT) 117 (H) 10 - 38 U/L    Alk. phosphatase 106 45 - 117 U/L    Protein, total 6.9 6.4 - 8.2 g/dL    Albumin 2.1 (L) 3.4 - 5.0 g/dL    Globulin 4.8 (H) 2.0 - 4.0 g/dL    A-G Ratio 0.4 (L) 0.8 - 1.7     CBC WITH AUTOMATED DIFF    Collection Time: 12/29/21  5:25 PM   Result Value Ref Range    WBC 19.5 (H) 4.6 - 13.2 K/uL    RBC 4.24 (L) 4.35 - 5.65 M/uL    HGB 11.1 (L) 13.0 - 16.0 g/dL    HCT 37.7 36.0 - 48.0 %    MCV 88.9 78.0 - 100.0 FL    MCH 26.2 24.0 - 34.0 PG    MCHC 29.4 (L) 31.0 - 37.0 g/dL    RDW 21.2 (H) 11.6 - 14.5 %    PLATELET 015 (H) 217 - 420 K/uL    MPV 9.8 9.2 - 11.8 FL    NRBC 0.2 (H) 0  WBC    ABSOLUTE NRBC 0.04 (H) 0.00 - 0.01 K/uL    NEUTROPHILS 80 (H) 40 - 73 %    BAND NEUTROPHILS 2 0 - 5 %    LYMPHOCYTES 11 (L) 21 - 52 %    MONOCYTES 6 3 - 10 %    EOSINOPHILS 0 0 - 5 %    BASOPHILS 0 0 - 2 %    METAMYELOCYTES 1 (H) 0 %    IMMATURE GRANULOCYTES 0 %    ABS. NEUTROPHILS 16.0 (H) 1.8 - 8.0 K/UL    ABS. LYMPHOCYTES 2.1 0.9 - 3.6 K/UL    ABS. MONOCYTES 1.2 0.05 - 1.2 K/UL    ABS. EOSINOPHILS 0.0 0.0 - 0.4 K/UL    ABS. BASOPHILS 0.0 0.0 - 0.1 K/UL    ABS. IMM.  GRANS. 0.0 K/UL    DF MANUAL      PLATELET COMMENTS Increased Platelets      RBC COMMENTS ANISOCYTOSIS  2+        RBC COMMENTS POLYCHROMASIA  SLIGHT  SPHEROCYTES  FEW       CK    Collection Time: 12/29/21  5:25 PM   Result Value Ref Range    CK 1,078 (H) 39 - 308 U/L   TROPONIN-HIGH SENSITIVITY    Collection Time: 12/29/21  5:25 PM   Result Value Ref Range    Troponin-High Sensitivity 41 0 - 78 ng/L BLOOD GAS,LACTIC ACID, POC    Collection Time: 12/29/21 10:24 PM   Result Value Ref Range    Device: High Flow Nasal Cannula      FIO2 (POC) 50 %    pH (POC) 7.31 (L) 7.35 - 7.45      pCO2 (POC) 23.7 (L) 35.0 - 45.0 MMHG    pO2 (POC) 102 (H) 80 - 100 MMHG    HCO3 (POC) 12.0 (L) 22 - 26 MMOL/L    sO2 (POC) 97.5 (H) 92 - 97 %    Base deficit (POC) 12.6 mmol/L    Allens test (POC) Positive      Site LEFT RADIAL      Specimen type (POC) ARTERIAL      Performed by Courtney Johnson     Lactic Acid (POC) 7.18 (HH) 0.40 - 2.00 mmol/L     Results  XR CHEST PORT (Accession [id number]) (Order [id number])    Allergies       Not Specified: Zocor [Simvastatin]     Exam Information    Status Exam Begun  Exam Ended    Final [99] 12/29/2021 17:17 12/29/2021  5:35 PM [id number]  5:35 PM     Result Information    Status: Final result (Exam End: 12/29/2021 17:35) Provider Status: Open       XR CHEST PORT: Patient Communication     Released  Not seen     Study Result    Narrative & Impression   EXAM: Chest radiograph     CLINICAL INDICATION/HISTORY: Dyspnea     --Additional history: None. COMPARISON: 12/11/2021     TECHNIQUE: Frontal view of the chest     _______________     FINDINGS:     SUPPORT DEVICES: None.     HEART AND MEDIASTINUM: Cardiac silhouette is normal in size. Normal mediastinal  contours cortical arch atherosclerosis. Normal pulmonary vasculature.     LUNGS AND PLEURAL SPACES: No solid right basilar opacity. Left lung appears  clear. Sharp costophrenic sulci. No pneumothoraces.     BONY THORAX AND SOFT TISSUES: Unremarkable.     _______________     IMPRESSION  Right basilar atelectasis/pneumonia.        Results  CT HEAD WO CONT (Accession [id number]) (Order [id number])    Allergies       Not Specified: Zocor [Simvastatin]     Exam Information    Status Exam Begun  Exam Ended    Final [99] 12/29/2021 21:00 12/29/2021  9:36 PM [id number]  9:36 PM     Result Information    Status: Final result (Exam End: 12/29/2021 21:36) Provider Status: Open       CT HEAD WO CONT: Patient Communication     Released  Not seen     Study Result    Narrative & Impression   EXAM: CT head without contrast     CLINICAL INDICATION/HISTORY: Altered mental status     --Additional history: None.     COMPARISON: 09/11/2021     TECHNIQUE: Axial CT imaging of the head was performed without intravenous  contrast. One or more dose reduction techniques were used on this CT: automated  exposure control, adjustment of the mAs and/or kVp according to patient size,  and iterative reconstruction techniques. The specific techniques used on this  CT exam have been documented in the patient's electronic medical record.     Digital Imaging and Communications in Medicine (DICOM) format image data are  available to nonaffiliated external healthcare facilities or entities on a  secure, media free, reciprocally searchable basis with patient authorization for  at least a 12 month period after this study.     _______________     FINDINGS:     CALVARIUM: Intact.     SOFT TISSUES/SCALP: Normal.     SINUSES: Clear.     BRAIN AND POSTERIOR FOSSA: There is proportional enlargement of the ventricles  and cerebral spinal fluid spaces consistent with generalized cerebral volume  loss. There is no intracranial hemorrhage, mass effect, or midline shift. There are scattered periventricular and subcortical white matter hypodensities  present consistent with nonspecific gliosis. This is most commonly associated  with sequelae of chronic microvascular ischemia.     EXTRA-AXIAL SPACES AND MENINGES: There are no abnormal extra-axial fluid  collections.     OTHER: None.     _______________     IMPRESSION     1. No acute intracranial abnormalities. Specifically, no hemorrhage, mass effect  or CT evident acute cortical infarction.     2. Generalized cerebral volume loss with sequelae of chronic microvascular  ischemia.        Results  CT ABD PELV WO CONT (Accession [id number]) (Order [id number])    Allergies       Not Specified: Zocor [Simvastatin]     Exam Information    Status Exam Begun  Exam Ended    Final [99] 12/29/2021 21:00 12/29/2021  9:36 PM [id number]  9:36 PM     Result Information    Status: Final result (Exam End: 12/29/2021 21:36) Provider Status: Open       CT ABD PELV WO CONT: Patient Communication     Released  Not seen     Study Result    Narrative & Impression   EXAM: CT abdomen/pelvis without contrast     CLINICAL INDICATION/HISTORY: Sepsis    > Additional: None.     COMPARISON: 12/21/2021     TECHNIQUE: Axial CT imaging of the abdomen and pelvis was performed without  intravenous contrast. Multiplanar reformats were generated. One or more dose  reduction techniques were used on this CT: automated exposure control,  adjustment of the mAs and/or kVp according to patient size, and iterative  reconstruction techniques. The specific techniques used on this CT exam have  been documented in the patient's electronic medical record.     Digital Imaging and Communications in Medicine (DICOM) format image data are  available to nonaffiliated external healthcare facilities or entities on a  secure, media free, reciprocally searchable basis with patient authorization for  at least a 12 month period after this study.     _______________     FINDINGS: Study degraded by motion, particularly within the upper abdomen.     LOWER CHEST: Small right and trace left pleural effusions with associated  atelectasis.     LIVER, BILIARY: Stable low attenuating lesions within the right hepatic lobe,  incompletely characterized in the absence of intravenous contrast. No biliary  dilation. Gallbladder is unremarkable.     PANCREAS: Evaluation of the tail limited by motion.  Pancreas is otherwise  unremarkable.     SPLEEN: Limited by motion but unremarkable.     ADRENALS: Normal.     KIDNEYS/URETERS/BLADDER: Stable appearance of the mixed cystic and solid mass  measuring 6.3 cm and arises from the inferior pole of the right kidney. No  hydroureteronephrosis. Bladder is decompressed by a Jimenez catheter.     PELVIC ORGANS: Unremarkable.     VASCULATURE: Atherosclerosis. Mild dilation of the distal aorta just prior to  the bifurcation, similar to prior.     LYMPH NODES: No enlarged lymph nodes.     GASTROINTESTINAL TRACT: Redemonstrated wall thickening of the rectosigmoid  colon, somewhat less prominent than on the prior comparison study. No new focal  bowel wall thickening.     BONES: Redemonstrated severe compression deformity at L1 without significant  retropulsion. Posterior alex and pedicular screw fusion at L4-S1 with some  lucency surrounding the L4 pedicular screw suggestive of loosening.     OTHER: None.     _______________     IMPRESSION  Study degraded by motion.      1. Interval improvement of the bilateral pleural effusions, now small in size on  the right and trace on the left.     2. Decreasing prominence of the circumferential wall thickening involving the  rectosigmoid colon. No bowel obstruction or new areas of bowel wall thickening.     3. Other stable chronic findings, as above.

## 2021-12-30 NOTE — ACP (ADVANCE CARE PLANNING)
Advance Care Planning     General Advance Care Planning (ACP) Conversation    Date of Conversation: 12/29/2021, 1455  Conducted with: Healthcare Decision Maker: Next of Kin by law (only applies in absence of a Healthcare Power of  or Legal Guardian) [address] Decision Maker:     Primary Decision Maker: [facility] - [phone]      Today we documented Decision Maker(s) consistent with Legal Next of Kin hierarchy. Content/Action Overview:   Has NO ACP documents/care preferences. Pt unable to complete at this time  Reviewed DNR/DNI and wife elects DNR order. This had occurred in the ED by the hospitalist who placed the order. I confirmed this with Mrs Karlene Chowdhury during our telephone conversation. 12/30/2021 1455 Seen today in room ED 5 along with Ramiro Morrison NP. Lying on stretcher. Opened his eyes to his name but did not engage in conversation. His oxygen was off at that time and saturations were within normal range but nursing said that he frequently desats with his oxygen off. Levophed infusing. Came to the ED via EMS form [facility] for decreased responsiveness. He was recently discharged from this facility to his SNF. Found to be in acute sepsis and acute renal failure. Started on pressor support and HFNC with non-rebreather. Admitted for management of sepsis. Nephrology consulted. PMH significant for atherosclerosis, DM II, dyslipidemia, HTN, pulmonary HTN, spinal stenosis    The palliative care team has been consulted for goals of care discussion    [phone]: telephone discussion with Mrs Karlene Chowdhury. Introduced the role of palliative medicine for the hospitalized patient. Brief medical update given. She confirmed the current DNR status but wants to proceed with medical treatment.      Disposition plan: to be determined based on response to treatment and family decisions    Palliative care will continue to follow Bob Grey  and his family during his hospitalization and support them as they make healthcare decisions and define goals of care.       Mandy Hong RN, MSN  Palliative Medicine  P: [phone]

## 2021-12-30 NOTE — PROGRESS NOTES
Hospitalist Progress Note    Patient: Arleth Oliveira MRN: [id number]  [facility]: [id number]    YOB: 1945  Age: 78 y.o. Sex: male    DOA: 12/29/2021 LOS:  LOS: 1 day          Chief Complaint:    sepsis      Assessment/Plan     72-year-old male with multiple recent hospital admissions for UTI with indwelling alonso catheter for urinary retention, hypertension, pulmonary hypertension, emphysema, type 2 diabetes mellitus, and renal mass is admitted for septic shock due to healthcare associated pneumonia and UTI with acute renal failure and hyperkalemia.       -acute encephalopathy due to sepsis and ARF, Head CT negative for acute findings. -hypoxemic respiratory failure due to HCAP with underlying pulmonary hypertension and emphysema. -septic shock requiring high-dose Levophed. -ARF with oliguria, severe lactic acidosis, and hyperkalemia. He has a renal mass that was identified during a previous hospital stay. Seen by urology  chronic anemia. Thrombocytosis likely an acute phase reactant to current illness. IVF  Treat elevated potassium  Antibiotics as doing    Appreciate intensivist, ID and renal consults    sliding scale insulin for type 2 diabetes mellitus.     Chronic urinary retention requiring catheter  Recent extensive workup for leukocytosis and treatment for UTI/colitis     DNR  Spoke with wife by phone this am    Continue current measures    High risk for mortality     Prophylaxis: Protonix IV, heparin SQ     Disposition :  Patient Active Problem List   Diagnosis Code    Lumbar spinal stenosis M48.061    Lumbar spondylosis M47.816    Radiculopathy of leg M54.10    Unable to ambulate R26.2    Urinary retention R33.9    Type II diabetes mellitus with manifestations, uncontrolled ([facility] [address] E11.8, E11.65    Renal mass N28.89    Compression fracture of L1 vertebra (HCC) S32.010A    Acute renal failure (ARF) ([facility]) N17.9    Aneurysm of infrarenal abdominal aorta ([facility]) I71.4    HTN (hypertension) I10    Severe protein-calorie malnutrition ([facility] [address] E43    Septic shock (HCC) A41.9, R65.21    Pulmonary hypertension (HCC) I27.20    History of tobacco abuse Z87.891    Pulmonary emphysema (HCC) J43.9    Acute hypoxemic respiratory failure (HCC) J96.01    Tricuspid regurgitation I07.1    Acute metabolic encephalopathy V41.91    Colitis K52.9    Lactic acidosis E87.2    Hyperkalemia E87.5    UTI (urinary tract infection) N39.0    HCAP (healthcare-associated pneumonia) J18.9    Type 2 diabetes mellitus (HCC) E11.9    Indwelling Jimenez catheter present Z97.8    Thrombocytosis D75.839       Subjective:    He is confused and unable to give ROS    Review of systems:    As above      Vital signs/Intake and Output:  Visit Vitals  /61   Pulse (!) 113   Temp 99.1 °F (37.3 °C)   Resp 19   Ht 6' (1.829 m)   Wt 64.4 kg (142 lb)   SpO2 98%   BMI 19.26 kg/m²     Current Shift:  12/30 0701 - 12/30 1900  In: 100 [I.V.:100]  Out: -   Last three shifts:  12/28 1901 - 12/30 0700  In: 3682 [I.V.:3682]  Out: 20 [Urine:20]    Exam:    General: debilitated encephalopathic elderly Wm, NAD, on hgih flow, moving around in bed  Head/Neck: NCAT, supple, No masses, No lymphadenopathy  CVS:Regular rate and rhythm, no M/R/G, S1/S2 heard, no thrill  Lungs:coarse BS without wheezes  Abdomen: Soft, Nontender, No distention, Normal Bowel sounds  Extremities: No C/C/E, pulses palpable 2+  Neuro:does not follow commands                 Labs: Results:       Chemistry Recent Labs     12/30/21  0410 12/29/21  1725 12/28/21  1416   * 129* 122*    139 137   K 5.6* 6.4* 4.4   * 109 108*   CO2 16* 11* 21   BUN 55* 47* 24   CREA 4.04* 4.24* 1.6*   CA 7.7* 8.5 8.9   AGAP 13 19* 8   BUCR 14 11*  --    AP  --  106 117   TP  --  6.9 7.6   ALB  --  2.1* 2.4*   GLOB  --  4.8*  --    AGRAT  --  0.4*  --       CBC w/Diff Recent Labs     12/29/21  1725 12/28/21  1416   WBC 19.5* 12.7*   RBC 4.24* 3.87   HGB 11.1* 10.2*   HCT 37.7 32.8*   * 606*   GRANS 80* 77.0*   LYMPH 11* 4.0*   EOS 0  --       Cardiac Enzymes Recent Labs     12/29/21  1725   CPK 1,078*      Coagulation No results for input(s): PTP, INR, APTT, INREXT in the last 72 hours. Lipid Panel Lab Results   Component Value Date/Time    Cholesterol, total 146 06/01/2016 07:51 AM    HDL Cholesterol 40 06/01/2016 07:51 AM    LDL, calculated 91.6 06/01/2016 07:51 AM    VLDL, calculated 14.4 06/01/2016 07:51 AM    Triglyceride 72 06/01/2016 07:51 AM    CHOL/HDL Ratio 3.7 06/01/2016 07:51 AM      BNP No results for input(s): BNPP in the last 72 hours.    Liver Enzymes Recent Labs     12/29/21  1725   TP 6.9   ALB 2.1*         Thyroid Studies Lab Results   Component Value Date/Time    TSH 2.720 12/28/2021 02:16 PM        Procedures/imaging: see electronic medical records for all procedures/Xrays and details which were not copied into this note but were reviewed prior to creation of Conor Tapia MD

## 2021-12-30 NOTE — PROGRESS NOTES
Reason for Admission:   unresponsive                 RUR Score:  28         PCP: First and Last name:  Perrin Closs, MD     Name of Practice:   Are you a current patient: Yes/No:   Approximate date of last visit:    Can you do a virtual visit with your PCP:              Resources/supports as identified by patient/family:  SNF                 Top Challenges facing patient (as identified by patient/family and CM):  recovery from illness                     Finances/Medication cost?   Pt has medicare  And [employer]? Support system or lack thereof? Spouse                     Living arrangements? Lives with spouse            Self-care/ADLs/Cognition? dependent          Current Advanced Directive/Advance Care Plan:  DNR      Healthcare Decision Maker:   Click here to complete [address] including selection of the Healthcare Decision Maker Relationship (ie \"Primary\")      Primary Decision Maker: Sergei Ramirez Spouse - [phone]    Payor Source Payor: [employer]i Noe / Plan: [employer] / Product Type: Medicare /                             Plan for utilizing home health:    TBD                 Transition of Care Plan:       Chart reviewed, pt was recently discharged from [facility], to [facility], pt readmitted 21 due to unresponsiveness and was diagnoses with sepsis, at this time pt waiting on available ICU bed,noted per ID note pt very critical, cm did reach out to spouse to verify if plan is to return back to [address] if facility is needed for SNF/ or if hospice is recommended during this stay, spouse would like pt to return back to [facility] if needed. Care Management Interventions  PCP Verified by CM:  Yes  Palliative Care Criteria Met (RRAT>21 & CHF Dx)?: Yes  Support Systems: [place]  Confirm Follow Up Transport: Family  The Patient and/or Patient Representative was Provided with a Choice of Provider and Agrees with the Discharge Plan?: Yes  Freedom of Choice List was Provided with Basic Dialogue that Supports the Patient's Individualized Plan of Care/Goals, Treatment Preferences and Shares the Quality Data Associated with the Providers?: Yes  Discharge Location  Discharge Placement: Skilled nursing facility

## 2021-12-30 NOTE — PROGRESS NOTES
I spoke to his wife at [address] likely terminal condition and life sustaining pressors in place    She does not want resuscitation but not 100% ready to go to comfort care  I advised we would touch base later today to see how he is doing and let her have time to make further decisions

## 2021-12-30 NOTE — ED NOTES
Pt pulling off high flow nasal canula. When patient off of O2, patient O2 sats down to 85%. Pt pulling at IV lines and pulling off monitoring wires. Pt disoriented x4. Attempting to contact IP provider for possible non-behavioral restraints.

## 2021-12-30 NOTE — CONSULTS
Brief Af[facility] ID Note    Chart reviewed/pt NOT examined. Mostly dead. Medical treatment going to be futile; keep him comfortable  From an ID perspective, I'm vigilant for a GNR sepsis from his hypoperfusion (mesentary), and agree with renal dosing of meropenem + single shot/dose of gentamicin (3mg/kg -- and let it ride, don't re-dose) until you get (+) BCx back.     Will check back tonight (if still alive -- both of us)    Krys Adame MD  Cell [phone] [facility]

## 2021-12-30 NOTE — PROGRESS NOTES
Attempted to call his wife but her phone is off. Voicemail left. He is requiring increased pressors and he has had no urine output.

## 2021-12-30 NOTE — PROGRESS NOTES
Physician Progress Note      PATIENTSarah Sanchez  CSN #:                  [id number]  :                       1945  ADMIT DATE:       2021 4:59 PM  DISCH DATE:  RESPONDING  PROVIDER #:        Tasha Milian MD          QUERY TEXT:    Pt admitted with septic shock due to healthcare associated pneumonia and UTI with acute renal failure and hyperkalemia. Pt noted to have chronic indwelling urinary catheter. If possible, please document in the progress notes and discharge summary if you are evaluating and/or treating any of the following: The medical record reflects the following:    Risk Factors: Chronic Indwelling Jimenez catheter, multiple recent hospital admissions for UTI with indwelling Jimenez catheter for urinary retention    Clinical Indicators:  > Multiple recent hospital admissions for UTI with indwelling Jimenez catheter for urinary retention  > Septic shock due to UTI and HCAP  > Recent admission at [facility] treated for colitis  with Flagyl and oral vancomycin. Urine culture grew pseudomonas in 21 . Patient was discharged to rehab facility with indwelling Jimenez's catheter .   > UA   Color: DARK YELLOW  Appearance: TURBID  Specific gravity: 1.018  pH (UA): 5.0  Protein: 30 (A)  Glucose: Negative  Ketone: TRACE (A)  Blood: Negative  Bilirubin: SMALL (A)  Urobilinogen: 1.0  Nitrites: Positive (A)  Leukocyte Esterase: LARGE (A)  Epithelial cells: Negative  WBC: TOO NUMEROUS TO COUNT  RBC: NONE  Bacteria: FEW (A)  Budding yeast: Positive (A)  Yeast w/hyphae: 2+ (A)    Treatment: Receiving UA, Chronic Indwelling Jimenez, Nephrology, Meropenem, Vancomycin, Zosyn    Thank you,  Jannette Gongora RN, BSN, [facility]  [phone]  Options provided:  -- UTI due to chronic indwelling urinary catheter  -- UTI not due to indwelling urinary catheter  -- Other - I will add my own diagnosis  -- Disagree - Not applicable / Not valid  -- Disagree - Clinically unable to determine / Unknown  -- Refer to Clinical Documentation Reviewer    PROVIDER RESPONSE TEXT:    UTI is due to the chronic indwelling urinary catheter.     Query created by: Lidya Query on 12/30/2021 12:17 PM      Electronically signed by:  Reyes Garcia MD 12/30/2021 12:24 PM

## 2021-12-30 NOTE — PROGRESS NOTES
Family discussion    His wife called back and I updated her of his condition. He is terminally ill. I offered the option of comfort measures, but she would like to continue treatment for now. I also discussed code status with her. She would like to change his code status to DNR/DNI in the event of cardiac or respiratory arrest. I advised her that I would update her further throughout the night if he continues to decline and she could consider comfort measures at that time.

## 2021-12-30 NOTE — PROGRESS NOTES
covid test came back positive    Wife notified    Isolate patient    decadron and covid order set ordered    Regardless he remains critically ill  Continue all other treatments also

## 2021-12-30 NOTE — PROGRESS NOTES
[facility]  Pulmonary, Critical Care, and Sleep Medicine    Name: Shaina Everett MRN: [id number]   : 1945 Hospital: [facility] [place]    Date: 2021  Room: [address] Note                                              Consult requesting physician: Dr. Iram Pizarro  Reason for Consult: Sepsis with shock      Subjective/History of Present Illness:   Patient is a 78 y.o. male with PMHx significant for recent prolonged admission at [facility] with colitis, uncontrolled type 2 diabetes mellitus, history of pulmonary hypertension, emphysema, dysphagia with choking on food, right kidney mass seen by urology, metabolic encephalopathy. He was treated for colitis during recent admission, and had CT abdomen evaluations/p antibiotic therapy with Flagyl and oral vancomycin. He also had MRI of the cervical, thoracic and lumbar spine with no evidence of discitis, and chronic changesplease review discharge summary done recently by hospitalist team.  Patient was also seen by ID during recent hospitalization. Blood cultures negative during recent admission, but urine culture 2021 showed pansensitive Pseudomonas. The patient has come from rehab facility with sepsis, UTI and pneumonia. He was altered mentation on admission in the ER, but subsequently improved with fluids resuscitation. He has elevated lactic acid on admission. Patient appears to be cachectic. He is awake, and answers to simple questions. He does not appear to be short of breath. Urine outputpoor and concentrated. No hematuria noted. Telemetrysinus rhythm. Blood pressure low requiring Levophed. Stool occult blood + 2021. Patient has chronic indwelling Jimenez catheter. 21  Patient seen at bedside in ER room #5  Awake.  Remains disoriented, off-and-on agitated trying to pull monitor cables, lines, IV  Remains on vasopressor for blood pressure support  On HFNC 40 Lpm; does not appear to be in any respiratory distress  Urine output remains low. Afebrile, NPO  [facility] was not contacted by staff on anything about patient overnight    Review of symptomslimited due to patient condition. Allergies   Allergen Reactions    Zocor [Simvastatin] Other (comments)     Made him \"ill\"      Past Medical History:   Diagnosis Date    Atherosclerosis     Diabetes ([facility] [address] 2013    type 2    High cholesterol     Hyperlipidemia     Hypertension 2013    Muscle weakness     Neoplasm     right kidney    Sepsis ([facility] [address]     Severe pulmonary hypertension ([facility] [address]     Spinal stenosis     Urinary retention     Vitamin D deficiency       Past Surgical History:   Procedure Laterality Date    HX HEENT      40 years ago deviated septum    HX LUMBAR LAMINECTOMY  2017    HX ORTHOPAEDIC      Lumbar laminectomy 9/7/17    HX ORTHOPAEDIC Right     CTR      Social History     Tobacco Use    Smoking status: Former Smoker    Smokeless tobacco: Never Used   Substance Use Topics    Alcohol use: No      Family History   Problem Relation Age of Onset    Heart Disease Father       Prior to Admission medications    Medication Sig Start Date End Date Taking? Authorizing Provider   metoprolol succinate (TOPROL-XL) 50 mg XL tablet Take 1 Tablet by mouth nightly. 12/27/21   Sebastián Thomas MD   amLODIPine (NORVASC) 10 mg tablet Take 1 Tablet by mouth daily. 12/28/21   Sebastián Thomas MD   metroNIDAZOLE (FLAGYL) 500 mg tablet Take 1 Tablet by mouth three (3) times daily for 4 days. 12/27/21 12/31/21  Sebastián Thomas MD   vancomycin 50 mg/mL oral solution (compounded) Take 2.5 mL by mouth every six (6) hours for 18 doses. 12/27/21 1/1/22  Sebastián Thomas MD   omeprazole (PRILOSEC) 40 mg capsule Take 1 Capsule by mouth daily. 12/1/21   Sebastián Thomas MD   atorvastatin (Lipitor) 20 mg tablet Take 20 mg by mouth nightly. Provider, Robby   traZODone (DESYREL) 50 mg tablet Take 50 mg by mouth nightly. Provider, Historical   aspirin 81 mg chewable tablet Take 81 mg by mouth daily. Other, MD Katelynn   multivitamin (ONE A DAY) tablet Take 1 Tab by mouth daily.     Provider, Historical     Current Facility-Administered Medications   Medication Dose Route Frequency    heparin (porcine) injection 5,000 Units  5,000 Units SubCUTAneous Q8H    meropenem (MERREM) 1 g in 0.9% sodium chloride (MBP/ADV) 50 mL MBP  1 g IntraVENous Q12H    0.9% sodium chloride infusion 1,000 mL  1,000 mL IntraVENous ONCE    sodium zirconium cyclosilicate (LOKELMA) powder packet 10 g  10 g Oral ONCE    levoFLOXacin (LEVAQUIN) 750 mg in D5W IVPB  750 mg IntraVENous Q48H    Vancomycin - Pharmacy to Dose  1 Each Other Rx Dosing/Monitoring    NOREPINephrine (LEVOPHED) 8 mg in 5% dextrose 250mL (32 mcg/mL) infusion  0.5-30 mcg/min IntraVENous TITRATE    vancomycin (VANCOCIN) 1,000 mg in 0.9% sodium chloride 250 mL (VIAL-MATE)  1,000 mg IntraVENous Q24H    albumin human 25% (BUMINATE) solution 12.5 g  12.5 g IntraVENous Q6H    0.9% sodium chloride infusion  150 mL/hr IntraVENous CONTINUOUS    albuterol-ipratropium (DUO-NEB) 2.5 MG-0.5 MG/3 ML  3 mL Nebulization QID RT    budesonide (PULMICORT) 500 mcg/2 ml nebulizer suspension  500 mcg Nebulization BID RT    methylPREDNISolone (PF) (SOLU-MEDROL) injection 40 mg  40 mg IntraVENous Q8H    pantoprazole (PROTONIX) 40 mg in 0.9% sodium chloride 10 mL injection  40 mg IntraVENous Q12H    sodium chloride (NS) flush 5-40 mL  5-40 mL IntraVENous Q8H    insulin lispro (HUMALOG) injection   SubCUTAneous Q6H         Objective:   Vital Signs:    Visit Vitals  /61   Pulse (!) 113   Temp 99.1 °F (37.3 °C)   Resp 19   Ht 6' (1.829 m)   Wt 64.4 kg (142 lb)   SpO2 98%   BMI 19.26 kg/m²       O2 Device: Hi flow nasal cannula   O2 Flow Rate (L/min): 40 l/min   Temp (24hrs), Av.1 °F (37.3 °C), Min:99.1 °F (37.3 °C), Max:99.1 °F (37.3 °C)       Intake/Output:   Last shift:      701 -  1900  In: 100 [I.V.:100]  Out: -     Last 3 shifts: 12/28 1901 - 12/30 0700  In: 3682 [I.V.:3682]  Out: 20 [Urine:20]      Intake/Output Summary (Last 24 hours) at 12/30/2021 1025  Last data filed at 12/30/2021 3023  Gross per 24 hour   Intake 3782 ml   Output 20 ml   Net 3762 ml       Last 3 Recorded Weights in this Encounter    12/29/21 1708   Weight: 64.4 kg (142 lb)       Physical Exam:   General: in no respiratory distress, appears stated age, cachectic, on HFNC  HEENT: PERRL, throat dry without erythema or exudate  Neck: No abnormally enlarged lymph nodes or thyroid, supple  Chest: normal  Lungs: moderate air entry, normal air entry,clear to auscultation bilaterally, normal percussion bilaterally, no tenderness/ rash  Heart: Regular rate and rhythm, S1S2 present, or without murmur or extra heart sounds  Abdomen: non-distended, bowel sounds normoactive, tympanic, abdomen is soft without significant tenderness, masses, organomegaly or guarding, rigidity, rebound  Extremity: no edema, peripheral pulses 2+ and symmetric  Neuro: awake, disoriented, doesn't follow commands, moves all extremities well, no involuntary movements, exam limitation  Skin: Skin color, texture, turgor poor       Data:       Recent Results (from the past 24 hour(s))   URINALYSIS W/ RFLX MICROSCOPIC    Collection Time: 12/29/21  5:15 PM   Result Value Ref Range    Color DARK YELLOW      Appearance TURBID      Specific gravity 1.018 1.005 - 1.030      pH (UA) 5.0 5.0 - 8.0      Protein 30 (A) NEG mg/dL    Glucose Negative NEG mg/dL    Ketone TRACE (A) NEG mg/dL    Bilirubin SMALL (A) NEG      Blood Negative NEG      Urobilinogen 1.0 0.2 - 1.0 EU/dL    Nitrites Positive (A) NEG      Leukocyte Esterase LARGE (A) NEG     URINE MICROSCOPIC ONLY    Collection Time: 12/29/21  5:15 PM   Result Value Ref Range    WBC TOO NUMEROUS TO COUNT 0 - 5 /hpf    RBC NONE 0 - 5 /hpf    Epithelial cells Negative 0 - 5 /lpf    Bacteria FEW (A) NEG /hpf    Budding yeast Positive (A) NEG      Yeast w/hyphae 2+ (A) NEG   BLOOD GAS,LACTIC ACID, POC    Collection Time: 12/29/21  5:24 PM   Result Value Ref Range    Device: NASAL CANNULA      FIO2 (POC) 52 %    pH (POC) 7.28 (L) 7.35 - 7.45      pCO2 (POC) 19.4 (L) 35.0 - 45.0 MMHG    pO2 (POC) 75 (L) 80 - 100 MMHG    HCO3 (POC) 9.2 (L) 22 - 26 MMOL/L    sO2 (POC) 93.6 92 - 97 %    Base deficit (POC) 15.5 mmol/L    Allens test (POC) Positive      Site LEFT BRACHIAL      Specimen type (POC) ARTERIAL      Performed by Saul Ramirez     Lactic Acid (POC) 11.69 (HH) 0.40 - 3.29 mmol/L   METABOLIC PANEL, COMPREHENSIVE    Collection Time: 12/29/21  5:25 PM   Result Value Ref Range    Sodium 139 136 - 145 mmol/L    Potassium 6.4 (HH) 3.5 - 5.5 mmol/L    Chloride 109 100 - 111 mmol/L    CO2 11 (L) 21 - 32 mmol/L    Anion gap 19 (H) 3.0 - 18 mmol/L    Glucose 129 (H) 74 - 99 mg/dL    BUN 47 (H) 7.0 - 18 MG/DL    Creatinine 4.24 (H) 0.6 - 1.3 MG/DL    BUN/Creatinine ratio 11 (L) 12 - 20      GFR est AA 17 (L) >60 ml/min/1.73m2    GFR est non-AA 14 (L) >60 ml/min/1.73m2    Calcium 8.5 8.5 - 10.1 MG/DL    Bilirubin, total 0.5 0.2 - 1.0 MG/DL    ALT (SGPT) 29 16 - 61 U/L    AST (SGOT) 117 (H) 10 - 38 U/L    Alk.  phosphatase 106 45 - 117 U/L    Protein, total 6.9 6.4 - 8.2 g/dL    Albumin 2.1 (L) 3.4 - 5.0 g/dL    Globulin 4.8 (H) 2.0 - 4.0 g/dL    A-G Ratio 0.4 (L) 0.8 - 1.7     CBC WITH AUTOMATED DIFF    Collection Time: 12/29/21  5:25 PM   Result Value Ref Range    WBC 19.5 (H) 4.6 - 13.2 K/uL    RBC 4.24 (L) 4.35 - 5.65 M/uL    HGB 11.1 (L) 13.0 - 16.0 g/dL    HCT 37.7 36.0 - 48.0 %    MCV 88.9 78.0 - 100.0 FL    MCH 26.2 24.0 - 34.0 PG    MCHC 29.4 (L) 31.0 - 37.0 g/dL    RDW 21.2 (H) 11.6 - 14.5 %    PLATELET 797 (H) 646 - 420 K/uL    MPV 9.8 9.2 - 11.8 FL    NRBC 0.2 (H) 0  WBC    ABSOLUTE NRBC 0.04 (H) 0.00 - 0.01 K/uL    NEUTROPHILS 80 (H) 40 - 73 %    BAND NEUTROPHILS 2 0 - 5 %    LYMPHOCYTES 11 (L) 21 - 52 %    MONOCYTES 6 3 - 10 %    EOSINOPHILS 0 0 - 5 %    BASOPHILS 0 0 - 2 %    METAMYELOCYTES 1 (H) 0 %    IMMATURE GRANULOCYTES 0 %    ABS. NEUTROPHILS 16.0 (H) 1.8 - 8.0 K/UL    ABS. LYMPHOCYTES 2.1 0.9 - 3.6 K/UL    ABS. MONOCYTES 1.2 0.05 - 1.2 K/UL    ABS. EOSINOPHILS 0.0 0.0 - 0.4 K/UL    ABS. BASOPHILS 0.0 0.0 - 0.1 K/UL    ABS. IMM.  GRANS. 0.0 K/UL    DF MANUAL      PLATELET COMMENTS Increased Platelets      RBC COMMENTS ANISOCYTOSIS  2+        RBC COMMENTS POLYCHROMASIA  SLIGHT  SPHEROCYTES  FEW       CK    Collection Time: 12/29/21  5:25 PM   Result Value Ref Range    CK 1,078 (H) 39 - 308 U/L   TROPONIN-HIGH SENSITIVITY    Collection Time: 12/29/21  5:25 PM   Result Value Ref Range    Troponin-High Sensitivity 41 0 - 78 ng/L   BLOOD GAS,LACTIC ACID, POC    Collection Time: 12/29/21 10:24 PM   Result Value Ref Range    Device: High Flow Nasal Cannula      FIO2 (POC) 50 %    pH (POC) 7.31 (L) 7.35 - 7.45      pCO2 (POC) 23.7 (L) 35.0 - 45.0 MMHG    pO2 (POC) 102 (H) 80 - 100 MMHG    HCO3 (POC) 12.0 (L) 22 - 26 MMOL/L    sO2 (POC) 97.5 (H) 92 - 97 %    Base deficit (POC) 12.6 mmol/L    Allens test (POC) Positive      Site LEFT RADIAL      Specimen type (POC) ARTERIAL      Performed by Chino Johnson     Lactic Acid (POC) 7.18 (HH) 0.40 - 2.00 mmol/L   GLUCOSE, POC    Collection Time: 12/30/21  2:11 AM   Result Value Ref Range    Glucose (POC) 194 (H) 70 - 110 mg/dL   EKG, 12 LEAD, INITIAL    Collection Time: 12/30/21  2:22 AM   Result Value Ref Range    Ventricular Rate 109 BPM    Atrial Rate 109 BPM    P-R Interval 124 ms    QRS Duration 94 ms    Q-T Interval 372 ms    QTC Calculation (Bezet) 500 ms    Calculated P Axis 63 degrees    Calculated R Axis -67 degrees    Calculated T Axis 80 degrees    Diagnosis       Sinus tachycardia  Left axis deviation  Minimal voltage criteria for LVH, may be normal variant ( [place] product )  Inferior infarct (cited on or before 29-DEC-2021)  Abnormal ECG  When compared with ECG of 29-DEC-2021 17:04,  No significant change was found     METABOLIC PANEL, BASIC    Collection Time: 12/30/21  4:10 AM   Result Value Ref Range    Sodium 142 136 - 145 mmol/L    Potassium 5.6 (H) 3.5 - 5.5 mmol/L    Chloride 113 (H) 100 - 111 mmol/L    CO2 16 (L) 21 - 32 mmol/L    Anion gap 13 3.0 - 18 mmol/L    Glucose 218 (H) 74 - 99 mg/dL    BUN 55 (H) 7.0 - 18 MG/DL    Creatinine 4.04 (H) 0.6 - 1.3 MG/DL    BUN/Creatinine ratio 14 12 - 20      GFR est AA 18 (L) >60 ml/min/1.73m2    GFR est non-AA 15 (L) >60 ml/min/1.73m2    Calcium 7.7 (L) 8.5 - 10.1 MG/DL   LACTIC ACID    Collection Time: 12/30/21  4:10 AM   Result Value Ref Range    Lactic acid 5.7 (HH) 0.4 - 2.0 MMOL/L   GLUCOSE, POC    Collection Time: 12/30/21  6:03 AM   Result Value Ref Range    Glucose (POC) 204 (H) 70 - 110 mg/dL         Chemistry Recent Labs     12/30/21  0410 12/29/21  1725 12/28/21  1416   * 129* 122*    139 137   K 5.6* 6.4* 4.4   * 109 108*   CO2 16* 11* 21   BUN 55* 47* 24   CREA 4.04* 4.24* 1.6*   CA 7.7* 8.5 8.9   MG  --   --  1.9   AGAP 13 19* 8   BUCR 14 11*  --    AP  --  106 117   TP  --  6.9 7.6   ALB  --  2.1* 2.4*   GLOB  --  4.8*  --    AGRAT  --  0.4*  --         Lactic Acid Lactic acid   Date Value Ref Range Status   12/30/2021 5.7 (HH) 0.4 - 2.0 MMOL/L Final     Comment:     CALLED TO AND CORRECTLY REPEATED BY:  Kellie Lopez RN ER AT 0506 ON 12/30/21 TO TSH. Recent Labs     12/30/21  0410   LAC 5.7*        Liver Enzymes Protein, total   Date Value Ref Range Status   12/29/2021 6.9 6.4 - 8.2 g/dL Final     Albumin   Date Value Ref Range Status   12/29/2021 2.1 (L) 3.4 - 5.0 g/dL Final     Globulin   Date Value Ref Range Status   12/29/2021 4.8 (H) 2.0 - 4.0 g/dL Final     A-G Ratio   Date Value Ref Range Status   12/29/2021 0.4 (L) 0.8 - 1.7   Final     Alk.  phosphatase   Date Value Ref Range Status   12/29/2021 106 45 - 117 U/L Final     Recent Labs     12/29/21  1725 12/28/21  1416   TP 6.9 7.6   ALB 2.1* 2.4* GLOB 4.8*  --    AGRAT 0.4*  --     117        CBC w/Diff Recent Labs     12/29/21  1725 12/28/21  1416   WBC 19.5* 12.7*   RBC 4.24* 3.87   HGB 11.1* 10.2*   HCT 37.7 32.8*   * 606*   GRANS 80* 77.0*   LYMPH 11* 4.0*   EOS 0  --         Cardiac Enzymes Lab Results   Component Value Date/Time    CPK 1,078 (H) 12/29/2021 05:25 PM        BNP No results found for: BNP, BNPP, XBNPT     Coagulation No results for input(s): PTP, INR, APTT, INREXT, INREXT in the last 72 hours. Thyroid  Lab Results   Component Value Date/Time    TSH 2.720 12/28/2021 02:16 PM       No results found for: T4     Urinalysis Lab Results   Component Value Date/Time    Color DARK YELLOW 12/29/2021 05:15 PM    Appearance TURBID 12/29/2021 05:15 PM    Specific gravity 1.018 12/29/2021 05:15 PM    Specific gravity 0.000 (L) 08/18/2021 05:55 AM    pH (UA) 5.0 12/29/2021 05:15 PM    Protein 30 (A) 12/29/2021 05:15 PM    Glucose Negative 12/29/2021 05:15 PM    Ketone TRACE (A) 12/29/2021 05:15 PM    Bilirubin SMALL (A) 12/29/2021 05:15 PM    Urobilinogen 1.0 12/29/2021 05:15 PM    Nitrites Positive (A) 12/29/2021 05:15 PM    Leukocyte Esterase LARGE (A) 12/29/2021 05:15 PM    Epithelial cells Negative 12/29/2021 05:15 PM    Bacteria FEW (A) 12/29/2021 05:15 PM    WBC TOO NUMEROUS TO COUNT 12/29/2021 05:15 PM    RBC NONE 12/29/2021 05:15 PM          Culture data during this hospitalization.    All Micro Results     Procedure Component Value Units Date/Time    CULTURE, RESPIRATORY/SPUTUM/BRONCH Casmalia Mitts [[id number]]     Order Status: Sent Specimen: Sputum     CULTURE, URINE [[id number]] Collected: 12/29/21 1715    Order Status: Completed Specimen: Cath Urine Updated: 12/29/21 2224    CULTURE, BLOOD [[id number]] Collected: 12/29/21 1730    Order Status: Completed Specimen: Blood Updated: 12/29/21 1751    CULTURE, BLOOD [[id number]]     Order Status: Sent Specimen: Blood              LE Doppler 11/24/2021  Interpretation Summary  · No evidence of deep vein thrombosis in the right lower extremity. · No evidence of deep vein thrombosis in the left lower extremity. ECHO 11/24/2021 Interpretation Summary    Result status: Final result  · LV: Estimated LVEF is 55 - 60%. Normal cavity size, wall thickness and systolic function (ejection fraction normal). Mild (grade 1) left ventricular diastolic dysfunction E/E'= 6.81.  · TV: Moderate to severe tricuspid valve regurgitation is present. · PA: Pulmonary arterial systolic pressure is 86 mmHg. · IVC: Moderately elevated central venous pressure (8 mmHg); IVC diameter is larger than 21 mm and collapses more than 50% with respiration. Images report reviewed by me:  CT (Most Recent) 12/21/2021 Results from Hospital Encounter encounter on 12/29/21    CT ABD PELV WO CONT    Narrative  EXAM: CT abdomen/pelvis without contrast    CLINICAL INDICATION/HISTORY: Sepsis  > Additional: None. COMPARISON: 12/21/2021    TECHNIQUE: Axial CT imaging of the abdomen and pelvis was performed without  intravenous contrast. Multiplanar reformats were generated. One or more dose  reduction techniques were used on this CT: automated exposure control,  adjustment of the mAs and/or kVp according to patient size, and iterative  reconstruction techniques. The specific techniques used on this CT exam have  been documented in the patient's electronic medical record. Digital Imaging and Communications in Medicine (DICOM) format image data are  available to nonaffiliated external healthcare facilities or entities on a  secure, media free, reciprocally searchable basis with patient authorization for  at least a 12 month period after this study. _______________    FINDINGS: Study degraded by motion, particularly within the upper abdomen. LOWER CHEST: Small right and trace left pleural effusions with associated  atelectasis.     LIVER, BILIARY: Stable low attenuating lesions within the right hepatic lobe,  incompletely characterized in the absence of intravenous contrast. No biliary  dilation. Gallbladder is unremarkable. PANCREAS: Evaluation of the tail limited by motion. Pancreas is otherwise  unremarkable. SPLEEN: Limited by motion but unremarkable. ADRENALS: Normal.    KIDNEYS/URETERS/BLADDER: Stable appearance of the mixed cystic and solid mass  measuring 6.3 cm and arises from the inferior pole of the right kidney. No  hydroureteronephrosis. Bladder is decompressed by a Jimenez catheter. PELVIC ORGANS: Unremarkable. VASCULATURE: Atherosclerosis. Mild dilation of the distal aorta just prior to  the bifurcation, similar to prior. LYMPH NODES: No enlarged lymph nodes. GASTROINTESTINAL TRACT: Redemonstrated wall thickening of the rectosigmoid  colon, somewhat less prominent than on the prior comparison study. No new focal  bowel wall thickening. BONES: Redemonstrated severe compression deformity at L1 without significant  retropulsion. Posterior alex and pedicular screw fusion at L4-S1 with some  lucency surrounding the L4 pedicular screw suggestive of loosening. OTHER: None.    _______________    Impression  Study degraded by motion. 1. Interval improvement of the bilateral pleural effusions, now small in size on  the right and trace on the left. 2. Decreasing prominence of the circumferential wall thickening involving the  rectosigmoid colon. No bowel obstruction or new areas of bowel wall thickening. 3. Other stable chronic findings, as above. CTA chest 12/14/2021  FINDINGS:   EXAM QUALITY: Motion artifact   PULMONARY ARTERIES: No evidence of pulmonary embolism.   MEDIASTINUM: Normal heart size. Aorta is unremarkable. No pericardial effusion.   LUNGS: Emphysema. No focal airspace densities. Mild atelectasis   PLEURA: Minimal bilateral effusions   AIRWAY: Normal.   LYMPH NODES: 15 mm right hilar node.  Other smaller mediastinal lymph nodes   UPPER ABDOMEN: Granuloma in the spleen   OTHER: Compression fracture L1 similar to prior exam   IMPRESSION   Exam limited by motion artifact. No pulmonary emboli identified   Minimal bilateral pleural effusions. Emphysema         CXR reviewed by me:  XR (Most Recent). Results from Hospital Encounter encounter on 12/29/21    XR CHEST PORT    Narrative  EXAM: Chest radiograph    CLINICAL INDICATION/HISTORY: Dyspnea  --Additional history: None. COMPARISON: 12/11/2021    TECHNIQUE: Frontal view of the chest    _______________    FINDINGS:    SUPPORT DEVICES: None. HEART AND MEDIASTINUM: Cardiac silhouette is normal in size. Normal mediastinal  contours cortical arch atherosclerosis. Normal pulmonary vasculature. LUNGS AND PLEURAL SPACES: No solid right basilar opacity. Left lung appears  clear. Sharp costophrenic sulci. No pneumothoraces. BONY THORAX AND SOFT TISSUES: Unremarkable.    _______________    Impression  Right basilar atelectasis/pneumonia.          IMPRESSION:   · Acute hypoxic respiratory failure  · Sepsis with shock  · Lactic acidosis  · PneumoniaHCAP  · UTI  · Acute renal failure  · Hyperkalemia  · Acute metabolic encephalopathy  · COPD  · Pulmonary hypertension  Patient Active Problem List   Diagnosis Code    Lumbar spinal stenosis M48.061    Lumbar spondylosis M47.816    Radiculopathy of leg M54.10    Unable to ambulate R26.2    Urinary retention R33.9    Type II diabetes mellitus with manifestations, uncontrolled ([facility] [address] E11.8, E11.65    Renal mass N28.89    Compression fracture of L1 vertebra ([facility]) S32.010A    Acute renal failure (ARF) ([facility]) N17.9    Aneurysm of infrarenal abdominal aorta ([facility]) I71.4    HTN (hypertension) I10    Severe protein-calorie malnutrition (HCC) E43    Septic shock ([facility]) A41.9, R65.21    Pulmonary hypertension ([facility]) I27.20    History of tobacco abuse Z87.891    Pulmonary emphysema ([facility]) J43.9    Acute hypoxemic respiratory failure ([facility]) J96.01    Tricuspid regurgitation I07.1    Acute metabolic encephalopathy E30.42    Colitis K52.9    Lactic acidosis E87.2    Hyperkalemia E87.5    UTI (urinary tract infection) N39.0    HCAP (healthcare-associated pneumonia) J18.9    Type 2 diabetes mellitus (HCC) E11.9    Indwelling Jimenez catheter present Z97.8    Thrombocytosis D75.839   Code status: Full code   RECOMMENDATIONS:   Respiratory: Continue supplemental oxygen and titrate HFNC settings for SPO2 >=92%. Patient has history of COPD; recent CTA chest negative for PEs, and showed emphysematous lung disease. Chest x-ray this admission shows right lower lobe pneumonia with emphysema findings. DuoNeb 4 times daily; budesonide neb 0.5 mg twice daily per  IV steroidsSolu-Medrol 40 mg every 8 hours for now. Keep HOB 30 degree elevation all the time. Aggressive pulmonary toileting. Aspiration precautions. CVS: Actively titrate vasopressor for SBP more than 100 mmHg  Patient has severe pulmonary hypertensionlikely group 3 from chronic emphysematous lung disease. Normal troponin. Monitor EKG or on monitor for QTc. S/p fluid resuscitation in the ER discussed with nephrology  Change maintenance IVF to LR to minimize risk of hyperchloremia and hypernatremia    ID: History of Pseudomonas UTIpansensitive. Broad-spectrum antibiotics for UTI and HCAP  Appreciate ID input   Changed to Merrem and single dose Gentamycin from Levofloxacin, Zosyn; continue IV vancomycin per renal dosingpharmacy on case  Blood cultures 12/29/21 - NGTD  Urine culture 12/29/21 - in process  Follow lactic acid till normalizes    Hematology/Oncology: Monitor Hgb and plt    Renal: Patient in acute renal failure with elevated potassium; s/p cocktail of treatment for hyperkalemia. Nephrology following  Possible repeat Lokelma dosing per nephrology  Continue fluid management; BMP every 4 hours    GI/: CT abd pelvis 12/29/21 shows improving bowel changes compared to 12/21/21  Patient has Jimenez catheter.   Keep n.p.o. for now  PPI twice daily  Watch for GI bleedingrecent occult blood positive. Recent colitiselevated lactic acid on this admission  History of right kidney massseen by Dr. Maurice Meléndez in urology during recent admission and recommended follow-up CT surveillance    Endocrine: Monitor FSBS and for hypoglycemia. Neurology: awake, disoriented, doesn't follow commands, off and on restless  CT head without contrast 12/29/21 nil acute    Skin/Wound: as per nursing care. Electrolytes: Replace electrolytes per ICU electrolyte replacement protocol; cautionrenal failure. IVF: S/p fluid bolus in the ER; then maintenance fluid  Albumin every 6 hours. Nutrition: Keep n.p.o. for now. Prophylaxis: DVT Prophylaxis: SCDs. GI Prophylaxis: Protonix bid. Restraints: as needed  Lines/Tubes: PIV, mid line  Jimenez catheter 12/29/21 (Medically necessary for strict input/output monitoring in critically ill patient, will remove it when not needed. Jimenez bundle followed). Advance Directive/Palliative Care: consulted. DNR  Prognosis is poor; patient appears to be terminally ill. Quality Care: PPI, DVT prophylaxis, HOB elevated, Infection control all reviewed and addressed. Care of plan d/w RN, RT, hospitalist and nephrology physicians    High complexity decision making was performed during the evaluation of this patient at high risk for decompensation with multiple organ involvement. Total critical care time spent rendering care exclusive of procedures/family discussion/coordination of care: 39 minutes. Name Val DEL ROSARIO Spouse [phone]            Please note: Voice-recognition software may have been used to generate this report, which may have resulted in some phonetic-based errors in grammar and contents. Even though attempts were made to correct all the mistakes, some may have been missed, and remained in the body of the document.       Sruthi Mclaughlin MD  12/30/2021

## 2021-12-30 NOTE — PROGRESS NOTES
Brief ID follow up     Chart reviewed from afar/pt NOT seen  We're both alive. Color me skeptical about acute COVID-19 infection. (I've seen some false + tests this past couple of years); but that being said, keep him in isolation to protect staff. I'm good with steroids. He smells more like a GNR sepsis, but BCx sterile to this time. Hospice.     Robbie Howard MD  Cell [phone]  [facility]

## 2021-12-31 VITALS
DIASTOLIC BLOOD PRESSURE: 36 MMHG | TEMPERATURE: 97.5 F | HEIGHT: 72 IN | WEIGHT: 142 LBS | SYSTOLIC BLOOD PRESSURE: 50 MMHG | OXYGEN SATURATION: 92 % | BODY MASS INDEX: 19.23 KG/M2

## 2021-12-31 LAB
CRP SERPL-MCNC: 4.1 MG/DL (ref 0–0.3)
FERRITIN SERPL-MCNC: 611 NG/ML (ref 8–388)
LDH SERPL L TO P-CCNC: 479 U/L (ref 81–234)
PROCALCITONIN SERPL-MCNC: 3.63 NG/ML

## 2021-12-31 NOTE — PROGRESS NOTES
2000 Patient arrived to unit on HFNC. Levo, Shen, and vaso infusing for BP support. CHG bath given. Restraints removed. Critical care continues. 2110 BP 54/36. Maxed on 3 pressors. Sats dropping. Dr. Siddhartha Marvin spoke with wife who wishes to place patient on comfort measures. [facility] notified. 2350 Patient with no pulse on the monitor. Dr Siddhartha Marvin aware.  paged. [phone] [facility] notified of time of death.

## 2021-12-31 NOTE — DEATH NOTE
RN paged that the patient had . Death exam revealed absent cardiac sounds x1 minute as well as absent pupillary and corneal reflexes. Time of death 46. His wife was notified and the  was called.

## 2021-12-31 NOTE — PROGRESS NOTES
Family discussion    I contacted his wife as he is maxed on 3 vasopressors and has a systolic BP of 50. He is terminally ill and dying. He is also COVID+ and his wife will not be able to visit. She would like to make him comfort care. Orders are placed and RN is updated. The  will be called.

## 2021-12-31 NOTE — DISCHARGE SUMMARY
Discharge Summary    Patient: Garret Leigh MRN: [id number]  CSN: [id number]    YOB: 1945  Age: 78 y.o.   Sex: male    DOA: 12/29/2021 LOS:  LOS: 2 days   Discharge Date:      Primary Care Provider:  Juliette Crenshaw MD    Admission Diagnoses: Sepsis ([place] [address].) [A41.9]    Discharge Diagnoses:    Problem List as of 12/31/2021 Date Reviewed: 12/29/2021          Codes Class Noted - Resolved    Type 2 diabetes mellitus ([facility] ICD-10-CM: E11.9  ICD-9-CM: 250.00  12/30/2021 - Present        Indwelling Jimenez catheter present ICD-10-CM: Z97.8  ICD-9-CM: V45.89  12/30/2021 - Present        Thrombocytosis ICD-10-CM: D75.839  ICD-9-CM: 238.71  12/30/2021 - Present        COVID-19 ICD-10-CM: U07.1  ICD-9-CM: 079.89  12/30/2021 - Present        Lactic acidosis ICD-10-CM: E87.2  ICD-9-CM: 276.2  12/29/2021 - Present        Hyperkalemia ICD-10-CM: E87.5  ICD-9-CM: 276.7  12/29/2021 - Present        UTI (urinary tract infection) ICD-10-CM: N39.0  ICD-9-CM: 599.0  12/29/2021 - Present        HCAP (healthcare-associated pneumonia) ICD-10-CM: J18.9  ICD-9-CM: 122  12/29/2021 - Present        Colitis ICD-10-CM: K52.9  ICD-9-CM: 558.9  12/13/2021 - Present        Acute metabolic encephalopathy IBL-03-HA: G93.41  ICD-9-CM: 348.31  12/12/2021 - Present        Tricuspid regurgitation ICD-10-CM: I07.1  ICD-9-CM: 397.0  11/29/2021 - Present        Pulmonary hypertension ([facility] ICD-10-CM: I27.20  ICD-9-CM: 416.8  11/24/2021 - Present        History of tobacco abuse ICD-10-CM: Z87.891  ICD-9-CM: V15.82  11/24/2021 - Present        Pulmonary emphysema ([facility] ICD-10-CM: J43.9  ICD-9-CM: 492.8  11/24/2021 - Present        Acute hypoxemic respiratory failure ([facility] ICD-10-CM: J96.01  ICD-9-CM: 518.81  11/24/2021 - Present        * (Principal) Septic shock ([facility] ICD-10-CM: A41.9, R65.21  ICD-9-CM: 038.9, 785.52, 995.92  11/23/2021 - Present        Severe protein-calorie malnutrition ([facility] ICD-10-CM: E43  ICD-9-CM: 426 2021 - Present        Acute renal failure (ARF) ([facility]) ICD-10-CM: N17.9  ICD-9-CM: 584.9  2021 - Present        Aneurysm of infrarenal abdominal aorta (HCC) ICD-10-CM: I71.4  ICD-9-CM: 441.4  2021 - Present        HTN (hypertension) ICD-10-CM: I10  ICD-9-CM: 401.9  2021 - Present        Compression fracture of L1 vertebra ([facility]) ICD-10-CM: S32.010A  ICD-9-CM: 805.4  2021 - Present        Type II diabetes mellitus with manifestations, uncontrolled ([facility] ICD-10-CM: E11.8, E11.65  ICD-9-CM: 250.92  2017 - Present        Renal mass ICD-10-CM: N28.89  ICD-9-CM: 593.9  2017 - Present        Unable to ambulate ICD-10-CM: R26.2  ICD-9-CM: 719.7  2017 - Present        Urinary retention ICD-10-CM: R33.9  ICD-9-CM: 788.20  2017 - Present        Lumbar spinal stenosis ICD-10-CM: M48.061  ICD-9-CM: 724.02  2017 - Present        Lumbar spondylosis ICD-10-CM: M47.816  ICD-9-CM: 721.3  2017 - Present        Radiculopathy of leg ICD-10-CM: M54.10  ICD-9-CM: 724.4  2017 - Present        RESOLVED: Elevated troponin ICD-10-CM: R77.8  ICD-9-CM: 790.6  2021 - 2021        RESOLVED: Sepsis ([facility] ICD-10-CM: A41.9  ICD-9-CM: 038.9, 995.91  2021 - 2021        RESOLVED: Blurred vision ICD-10-CM: H53.8  ICD-9-CM: 368.8  10/13/2020 - 10/13/2020        RESOLVED: Anemia ICD-10-CM: D64.9  ICD-9-CM: 285.9  2017 - 2020        RESOLVED: Dehydration ICD-10-CM: E86.0  ICD-9-CM: 276.51  2017 - 2020        RESOLVED: Hyponatremia ICD-10-CM: E87.1  ICD-9-CM: 276.1  2017 - 2020        RESOLVED: DM (diabetes mellitus) ([facility] (Chronic) ICD-10-CM: E11.9  ICD-9-CM: 250.00  2017 - 2020              Discharge Medications:     Current Discharge Medication List          Discharge Condition:     Procedures : none    Consults: Pulmonary/Critical Care      PHYSICAL EXAM   Visit Vitals  BP (!) 50/36   Pulse (!) 0   Temp 97.5 °F (36.4 °C)   Resp (!) 0   Ht 6' (1.829 m)   Wt 64.4 kg (142 lb)   SpO2 92%   BMI 19.26 kg/m²     Death exam revealed absent cardiac sounds x1 minute as well as absent pupillary and corneal reflexes. Admission HPI : Stefan Leblanc is a 78 y.o. male with multiple recent hospital admissions for UTI with indwelling alonso catheter for urinary retention, hypertension, pulmonary hypertension, emphysema, type 2 diabetes mellitus, and renal mass presents via EMS for unresponsiveness at his SNF today. He was nonverbal upon EMS arrival as well as hypotensive to 60s over 40s. He was recently admitted to [facility] and discharged on . He was treated for colitis during that hospital stay along with metabolic encephalopathy. He was discharged with Flagyl and oral vancomycin. History is unobtainable from the patient due to acute illness. Hospital Course : He was admitted for severe septic shock due to HCAP and UTI. He was started on vasopressor support and was placed on a high flow nasal cannula. He continued to require increased vasopressor support and clinically worsened despite maximum doses of levophed, neosynephrine, and vasopressin. His COVID test came back positive. I spoke with his wife about comfort measures and she wished to pursue this. He was made comfort care and  shortly thereafter. Time of death 46. His wife was notified of his death as well as the .     Disposition:     Minutes spent on discharge: 30 minutes       Labs: Results:       Chemistry Recent Labs     21  2034 21  1400 21  0410 21  1725 21  1725 21  1416 21  1416   * 214* 218*   < > 129*   < > 122*    144 142   < > 139   < > 137   K 5.9* 5.8* 5.6*   < > 6.4*   < > 4.4   * 114* 113*   < > 109   < > 108*   CO2 14* 17* 16*   < > 11*   < > 21   BUN 67* 61* 55*   < > 47*   < > 24   CREA 4.53* 4.39* 4.04*   < > 4.24*   < > 1.6*   CA 7.5* 7.4* 7.7*   < > 8.5   < > 8.9 AGAP 15 13 13   < > 19*   < > 8   BUCR 15 14 14   < > 11*  --   --    AP  --   --   --   --  106  --  117   TP  --   --   --   --  6.9  --  7.6   ALB  --   --   --   --  2.1*  --  2.4*   GLOB  --   --   --   --  4.8*  --   --    AGRAT  --   --   --   --  0.4*  --   --     < > = values in this interval not displayed. CBC w/Diff Recent Labs     12/29/21  1725 12/28/21  1416   WBC 19.5* 12.7*   RBC 4.24* 3.87   HGB 11.1* 10.2*   HCT 37.7 32.8*   * 606*   GRANS 80* 77.0*   LYMPH 11* 4.0*   EOS 0  --       Cardiac Enzymes Recent Labs     12/29/21  1725   CPK 1,078*      Coagulation No results for input(s): PTP, INR, APTT, INREXT, INREXT in the last 72 hours. Lipid Panel Lab Results   Component Value Date/Time    Cholesterol, total 146 06/01/2016 07:51 AM    HDL Cholesterol 40 06/01/2016 07:51 AM    LDL, calculated 91.6 06/01/2016 07:51 AM    VLDL, calculated 14.4 06/01/2016 07:51 AM    Triglyceride 72 06/01/2016 07:51 AM    CHOL/HDL Ratio 3.7 06/01/2016 07:51 AM      BNP No results for input(s): BNPP in the last 72 hours. Liver Enzymes Recent Labs     12/29/21  1725   TP 6.9   ALB 2.1*         Thyroid Studies Lab Results   Component Value Date/Time    TSH 2.720 12/28/2021 02:16 PM            Significant Diagnostic Studies: XR CHEST PA LAT    Result Date: 12/5/2021  EXAM:  PA and Lateral Chest X-ray INDICATION: Abnormal finding on AP chest COMPARISON: AP chest dated same date ___________ FINDINGS: Heart and mediastinal contours are within normal limits. There is no infiltrates. There are no pleural effusions. No acute osseous findings. _____________      No infiltrates or effusion. MRI CERV SPINE WO CONT    Result Date: 12/21/2021  EXAM: MRI cervical spine without gadolinium CLINICAL INDICATION/HISTORY: occult paraspinal infection, diffuse colitis   > Additional: None. COMPARISON: None. > Reference Exam: None.  TECHNIQUE: Sagittal spin echo T1, FSE T2, and STIR sequences, and axial spin echo T2 and volume 3-D T2*GRE sequences were obtained of the cervical spine without gadolinium. _______________ FINDINGS: Study limited motion artifact especially axial sequences, but study still fairly diagnostic. There is normal alignment with no evidence of fracture or spondylolisthesis. No marrow edema or neoplastic marrow signal. Chronic appearing degenerative endplate marrow changes C6/7. Visualized base of brain unremarkable. There is mild nonspecific prevertebral/retropharyngeal increased STIR signal thickening without abnormal fluid collection. Limited evaluation of disc levels due to motion artifact on axial imaging but there is no evidence of significant canal stenosis or cord deformity. No abnormal intrinsic cord signal or cord enlargement with no abnormal mass or fluid collection within the spinal canal. ______________     1. Limited study due to motion, especially axial imaging, with otherwise diagnostic study 2. No evidence of discitis or osteomyelitis or other acute osseous finding. 3. Mild prevertebral/retropharyngeal edema nonspecific etiology. 4. No significant canal stenosis, no intrinsic cord abnormality. MRI [facility] SPINE W WO CONT    Result Date: 12/22/2021  EXAM: Thoracic spine MRI with gadolinium CLINICAL INDICATION/HISTORY: occult paraspinal infection. > Additional: Leukocytosis, pancolitis COMPARISON: None. > Reference Exam: Correlation cervical and lumbar spine MRI 12/21/2021 TECHNIQUE: Sagittal spin echo T1, ROBERT T2 and STIR sequences, and selected angled axial sequences through the discs with spin echo T1 and T2*GRE sequences were obtained of the thoracic spine. Post gadolinium sagittal and axial T1 weighted sequences were also obtained. _______________ FINDINGS: There is L1 superior endplate fracture with loss of height and marrow edema and small fluid cleft adjacent to superior endplate, as described on lumbar MRI yesterday. There is mildly pronounced kyphosis mid thoracic spine. Mild chronic anterior wedge compression T7 vertebral body. No spondylolisthesis. No marrow edema or neoplastic marrow signal. No abnormal disc or endplate signal changes or enhancement to suggest discitis or osteomyelitis. Thoracic cord has normal size and signal with no abnormal mass or fluid collection within the spinal canal. Multilevel degenerative spondylosis without significant canal or foraminal stenosis. No abnormal enhancement. Mild to moderate bilateral pleural effusions. _______________     1. No evidence of discitis or osteomyelitis or other abnormality to suggest thoracic spine source of infection. 2. Superior endplate fracture L1 vertebral body as described on lumbar spine MRI yesterday. 3. Chronic mild anterior wedge compression T7 vertebral body, no acute osseous findings. 4. Mild to moderate bilateral pleural effusions. MRI LUMB SPINE W WO CONT    Result Date: 12/21/2021  EXAM: Lumbar MRI with gadolinium CLINICAL INDICATION/HISTORY: occult paraspinal infection   > Additional: Sepsis, diffuse colitis COMPARISON: 5/18/2021   > Reference Exam: None. TECHNIQUE: Sagittal spin echo T1, T2 and STIR sequences, axial T1 and T2 sequences, and axial and sagittal T1 post gadolinium sequences obtained of the lumbar spine. _______________ FINDINGS: There is scoliotic curvature convex to the left apex L1/2. There is previous circumferential fusion L4-S1 with susceptibility artifact from bilateral intrapedicular screws with anterior interbody spacers with solid-appearing osseous union. Again noted is superior endplate fracture L1 vertebral body with interval increased moderate anterior and central loss of height with small fluid cleft underneath the superior endplate with persistent but improved adjacent enhancing marrow edema. No significant retropulsion. No other marrow edema or neoplastic marrow signal. No abnormal signal or enhancement involving the discs or endplates to suggest discitis or osteomyelitis.  The conus medullaris is located at the L1/2 level and has a normal appearance and signal. No abnormal fluid collection or mass within the spinal canal and no abnormal paravertebral inflammatory change or fluid collection. There is an exophytic mostly solid enhancing appearing mass lower pole right kidney with small internal cystic component measuring maximally 6.9 cm seen on prior CT studies. Portion of left side of the colon visualized shows diffuse wall thickening compatible with known colitis. Several small benign-appearing right renal cysts. Visualized lower thoracic and upper sacral segments unremarkable. T12/L1 shows facet arthropathy and borderline canal stenosis. L1/2 level: Mild facet arthropathy and degenerative spondylosis without significant stenosis. L2/3 level: Mild facet arthropathy with no disc abnormality or significant stenosis. L3/4 level: Moderate facet arthropathy and mild degenerative spondylosis with mildly prominent dorsal epidural fat with borderline canal and mild right lateral recess stenosis. L4/5 level: Circumferential fusion with solid osseous union without canal stenosis with mild to moderate bilateral foraminal stenosis. L5/S1 level: Circumferential fusion with solid osseous union without significant canal or lateral recess stenosis with moderate to severe degenerative bilateral foraminal stenosis. Contrast: There is no other abnormal enhancement. _______________     1. No evidence of discitis or osteomyelitis or other acute inflammatory changes within the spine. 2. Subacute appearing superior endplate fracture L1 vertebral body with interval increased moderate loss of height, small osteonecrotic left, persistent marrow edema. 3. Solid-appearing previous circumferential fusion L4-S1 without canal stenosis, with residual bilateral foraminal stenosis. 4. Additional degenerative changes but no other significant stenosis.  5. Persistent likely neoplastic mostly solid exophytic mass lower pole right kidney. CT HEAD WO CONT    Result Date: 12/29/2021  EXAM: CT head without contrast  CLINICAL INDICATION/HISTORY: Altered mental status    --Additional history: None. COMPARISON: 09/11/2021 TECHNIQUE: Axial CT imaging of the head was performed without intravenous contrast. One or more dose reduction techniques were used on this CT: automated exposure control, adjustment of the mAs and/or kVp according to patient size, and iterative reconstruction techniques. The specific techniques used on this CT exam have been documented in the patient's electronic medical record. Digital Imaging and Communications in Medicine (DICOM) format image data are available to nonaffiliated external healthcare facilities or entities on a secure, media free, reciprocally searchable basis with patient authorization for at least a 12 month period after this study. _______________ FINDINGS: CALVARIUM: Intact. SOFT TISSUES/SCALP: Normal. SINUSES: Clear. BRAIN AND POSTERIOR FOSSA: There is proportional enlargement of the ventricles and cerebral spinal fluid spaces consistent with generalized cerebral volume loss. There is no intracranial hemorrhage, mass effect, or midline shift. There are scattered periventricular and subcortical white matter hypodensities present consistent with nonspecific gliosis. This is most commonly associated with sequelae of chronic microvascular ischemia. EXTRA-AXIAL SPACES AND MENINGES: There are no abnormal extra-axial fluid collections. OTHER: None. _______________     1. No acute intracranial abnormalities. Specifically, no hemorrhage, mass effect or CT evident acute cortical infarction. 2. Generalized cerebral volume loss with sequelae of chronic microvascular ischemia.     CT HEAD WO CONT    Result Date: 12/11/2021  EXAM: CT head INDICATION: Dizziness COMPARISON: November 23, 2021 TECHNIQUE: Axial CT imaging of the head was performed without intravenous contrast. One or more dose reduction techniques were used on this CT: automated exposure control, adjustment of the mAs and/or kVp according to patient size, and iterative reconstruction techniques. The specific techniques used on this CT exam have been documented in the patient's electronic medical record. Digital Imaging and Communications in the Medicine (DICOM) format image data are available to nonaffiliated external healthcare facilities or entities on a secure, media free, reciprocally searchable basis with patient authorization for at least a 12 month period after this study. _______________ FINDINGS: BRAIN AND POSTERIOR FOSSA: The sulci, ventricles and basal cisterns  are enlarged consistent with atrophy. . There is no intracranial hemorrhage, mass effect, or midline shift. Chronic calcifications in the mat. Moderate regions of low attenuation are seen within the periventricular and deep  white matter. This is nonspecific but are typically due to small vessel ischemic change. Estil Norman EXTRA-AXIAL SPACES AND MENINGES: There are no abnormal extra-axial fluid collections. CALVARIUM: Intact. SINUSES: Clear. OTHER: None. _______________     No acute intracranial abnormalities. Stable exam    CT CHEST ABD PELV WO CONT    Result Date: 12/11/2021  EXAM: CT CHEST ABD PELV WO CONT INDICATION: sepsis COMPARISON: CONTRAST:  None. TECHNIQUE: Thin axial images were obtained through the chest, abdomen and pelvis. Coronal and sagittal reconstructions were generated. Oral contrast was not administered. CT dose reduction was achieved through use of a standardized protocol tailored for this examination and automatic exposure control for dose modulation. The absence of intravenous contrast material reduces the sensitivity for evaluation of the mediastinum and solid parenchymal organs of the abdomen. FINDINGS: THYROID: No nodule. MEDIASTINUM: No mass or lymphadenopathy. ELIF: No mass or lymphadenopathy. THORACIC AORTA: No aneurysm. MAIN PULMONARY ARTERY: Normal in caliber. TRACHEA/BRONCHI: Patent. ESOPHAGUS: No wall thickening or dilatation. HEART: Normal in size. PLEURA: No effusion or pneumothorax. LUNGS: No nodule, mass, or airspace disease. Emphysema LIVER: No mass or biliary dilatation. GALLBLADDER: Unremarkable. SPLEEN: No mass. PANCREAS: No mass or ductal dilatation. ADRENALS: Unremarkable. KIDNEYS/URETERS: No hydronephrosis. Small nonobstructing left renal calculus. Right lower pole renal cyst with thin calcifications in the wall. Jimenez in the bladder STOMACH: Unremarkable. SMALL BOWEL: No dilatation or wall thickening. COLON: Wall thickening of the rectum. Moderate stool in the sigmoid colon. APPENDIX: Unremarkable. PERITONEUM: No ascites or pneumoperitoneum. RETROPERITONEUM: No lymphadenopathy URINARY BLADDER: No mass or calculus. BONES: No destructive bone lesion. Previous lumbar fusion and laminectomy. L1 compression fracture ADDITIONAL COMMENTS: Atherosclerotic vascular calcifications in the abdominal aorta and iliac vessels. Ectasia of the abdominal aorta     Wall thickening of the rectum. This may be due to colitis/proctitis. Relatively large amount of fecal material throughout the colon. This could also be a source of inflammation. Otherwise no evidence of acute abnormality. Atherosclerotic vascular disease. Emphysema. Large right renal cyst    CTA CHEST W OR W WO CONT    Result Date: 12/14/2021  EXAM: CT Pulmonary Angiogram of the chest INDICATION: Dyspnea. Shortness of breath COMPARISON: December 11, 2021 TECHNIQUE: Axial CT imaging from the thoracic inlet through the diaphragm with intravenous contrast. Coronal and sagittal MIP reformats were generated. One or more dose reduction techniques were used on this CT: automated exposure control, adjustment of the mAs and/or kVp according to patient size, and iterative reconstruction techniques. The specific techniques used on this CT exam have been documented in the patient's electronic medical record.  Digital Imaging and Communications in the Medicine (DICOM) format image data are available to nonaffiliated external healthcare facilities or entities on a secure, media free, reciprocally searchable basis with patient authorization for at least a 12 month period after this study. _______________ FINDINGS: EXAM QUALITY: Motion artifact PULMONARY ARTERIES: No evidence of pulmonary embolism. MEDIASTINUM: Normal heart size. Aorta is unremarkable. No pericardial effusion. LUNGS: Emphysema. No focal airspace densities. Mild atelectasis PLEURA: Minimal bilateral effusions AIRWAY: Normal. LYMPH NODES: 15 mm right hilar node. Other smaller mediastinal lymph nodes UPPER ABDOMEN: Granuloma in the spleen OTHER: Compression fracture L1 similar to prior exam _______________     Exam limited by motion artifact. No pulmonary emboli identified Minimal bilateral pleural effusions. Emphysema     CT ABD PELV WO CONT    Result Date: 12/29/2021  EXAM: CT abdomen/pelvis without contrast  CLINICAL INDICATION/HISTORY: Sepsis   > Additional: None. COMPARISON: 12/21/2021 TECHNIQUE: Axial CT imaging of the abdomen and pelvis was performed without intravenous contrast. Multiplanar reformats were generated. One or more dose reduction techniques were used on this CT: automated exposure control, adjustment of the mAs and/or kVp according to patient size, and iterative reconstruction techniques. The specific techniques used on this CT exam have been documented in the patient's electronic medical record. Digital Imaging and Communications in Medicine (DICOM) format image data are available to nonaffiliated external healthcare facilities or entities on a secure, media free, reciprocally searchable basis with patient authorization for at least a 12 month period after this study. _______________ FINDINGS: Study degraded by motion, particularly within the upper abdomen. LOWER CHEST: Small right and trace left pleural effusions with associated atelectasis.  LIVER, BILIARY: Stable low attenuating lesions within the right hepatic lobe, incompletely characterized in the absence of intravenous contrast. No biliary dilation. Gallbladder is unremarkable. PANCREAS: Evaluation of the tail limited by motion. Pancreas is otherwise unremarkable. SPLEEN: Limited by motion but unremarkable. ADRENALS: Normal. KIDNEYS/URETERS/BLADDER: Stable appearance of the mixed cystic and solid mass measuring 6.3 cm and arises from the inferior pole of the right kidney. No hydroureteronephrosis. Bladder is decompressed by a Jimenez catheter. PELVIC ORGANS: Unremarkable. VASCULATURE: Atherosclerosis. Mild dilation of the distal aorta just prior to the bifurcation, similar to prior. LYMPH NODES: No enlarged lymph nodes. GASTROINTESTINAL TRACT: Redemonstrated wall thickening of the rectosigmoid colon, somewhat less prominent than on the prior comparison study. No new focal bowel wall thickening. BONES: Redemonstrated severe compression deformity at L1 without significant retropulsion. Posterior alex and pedicular screw fusion at L4-S1 with some lucency surrounding the L4 pedicular screw suggestive of loosening. OTHER: None. _______________     Study degraded by motion. 1. Interval improvement of the bilateral pleural effusions, now small in size on the right and trace on the left. 2. Decreasing prominence of the circumferential wall thickening involving the rectosigmoid colon. No bowel obstruction or new areas of bowel wall thickening. 3. Other stable chronic findings, as above. CT ABD PELV W CONT    Result Date: 12/21/2021  EXAM: CT ABD PELV W CONT CLINICAL INDICATION/HISTORY: Persistent epigastric abdominal pain, follow-up and colitis, evaluate for intra-abdominal abscess COMPARISON: 12/14/2021 TECHNIQUE:   CT of the abdomen and pelvis following intravenous contrast administration. Coronal and sagittal reformats were generated and reviewed.  One or more dose reduction techniques were used on this CT: automated exposure control, adjustment of the mAs and/or kVp according to patient size, and iterative reconstruction techniques. The specific techniques used on this CT exam have been documented in the patient's electronic medical record. Digital Imaging and Communications in Medicine (DICOM) format image data are available to nonaffiliated external healthcare facilities or entities on a secure, media free, reciprocally searchable basis with patient authorization for at least a 12-month period after this study. _______________ FINDINGS: LOWER THORAX:  Since prior exam are small to moderate right and small left pleural effusions, each with adjacent lower lobe passive atelectasis. No global cardiomegaly or pericardial effusion. LIVER AND BILIARY:  No specific hypodense lesion posterior right hepatic lobe. No suspicious liver lesions. No biliary dilation. Gallbladder unremarkable. SPLEEN:  Normal. PANCREAS:  Normal. ADRENALS:  Normal. KIDNEYS:  Unchanged right renal cysts, to include complex partially exophytic right lower pole lesion which appears slightly hyperdense and solid with peripheral rim calcifications. LYMPH NODES:  No mesenteric or retroperitoneal lymphadenopathy. GI TRACT:  Centrally unchanged moderately prominent circumferential mural thickening throughout the transverse, descending, and sigmoid colon with associated submucosal hyperemia. Fluid opacification of normal caliber ascending colon. Normal caliber small bowel loops. No morphology of bowel obstruction. Mild mesenteric inflammatory stranding adjacent to the descending colon. Normal caliber small and large bowel loops. No morphology of bowel obstruction. No bowel wall thickening. Normal appendix. PELVIC ORGANS:  Jimenez catheter is present within the nondistended bladder. .  No acute abnormality. VASCULATURE:  Normal caliber lower thoracic and abdominal aorta with moderately severe atherosclerotic vascular calcification.  OTHER: Mild soft tissue anasarca. No ascites or free intraperitoneal air. OSSEOUS STRUCTURES:  No acute osseous abnormality. Unchanged posterior instrumentation hardware L4-S1. L1 vertebral body compression fracture deformity also appears unchanged. _______________     1. New, small to moderate right and small left pleural effusions, each with adjacent lower lobe passive atelectasis. No acute pulmonary infiltrate. Mild soft tissue anasarca. 2. Essentially unchanged prominence and distribution of pancolitis. Similar to prior study CT findings raise concern for C. difficile pseudomembranous colitis. Mild descending colon pericolonic mesenteric inflammatory stranding but no drainable fluid collection. 3. Unchanged partially solid lower pole right renal lesion. CT ABD PELV W CONT    Result Date: 12/14/2021  CLINICAL:  Elevated white count. Sepsis COMPARISON: December 11, 2021 TECHNIQUE: Axial CT imaging of the abdomen and pelvis was performed with intravenous contrast. Multiplanar reformats were generated. One or more dose reduction techniques were used on this CT: automated exposure control, adjustment of the mAs and/or kVp according to patient size, and iterative reconstruction techniques. The specific techniques used on this CT exam have been documented in the patient's electronic medical record. Digital Imaging and Communications in the Medicine (DICOM) format image data are available to nonaffiliated external healthcare facilities or entities on a secure, media free, reciprocally searchable basis with patient authorization for at least a 12 month period after this study. _______________ FINDINGS: LOWER CHEST: Emphysema. Minimal pleural effusions. LIVER, BILIARY: Hemangioma right lobe liver. Small central liver cyst. No biliary dilation. Gallbladder is unremarkable. PANCREAS: Normal. SPLEEN: Normal. ADRENALS: Normal. KIDNEYS: Complex lesion lower pole right kidney.  There is a more solid appearing component at its superior margin. Overall lesion measures 6.5 x 5.4 x 6.6 cm. Kidneys otherwise unremarkable LYMPH NODES: No enlarged lymph nodes. GASTROINTESTINAL TRACT: Diffuse wall thickening of the colon with relative sparing of the cecum. This is progressed since the prior exam. Fat stranding adjacent to the colon. Trace free fluid. No significant small bowel dilatation PELVIC ORGANS: Unremarkable. VASCULATURE: Prominent atherosclerotic plaque. Aortic ectasia. BONES: Compression deformity L1. Prior lumbar fusion. Degenerative changes in the hips OTHER: None. _______________     Pancolitis. Interval progression of wall thickening to involve nearly the entire colon. Thumb printing present raising the possibility of C. difficile. This involves 2 vascular territories making ischemic colitis unlikely Enhancing renal mass lower pole right kidney as seen on prior ultrasound No hydronephrosis. US RETROPERITONEUM COMP    Result Date: 12/29/2021  Ultrasound retroperitoneal INDICATIONS: New renal failure COMPARISON: CT abdomen/pelvis same day; retroperitoneal ultrasound 11/24/2021 FINDINGS: The right kidney is normal in size, measuring 10.2 cm in length. Increased right renal cortical echogenicity. Solid right lower pole partially exophytic hypoechoic mass measures 6.2 x 6.3 x 4.7 cm. Additional adjacent simple appearing right lower pole renal cyst measures 1.8 x 1.5 x 1.3 cm. No nephrolithiasis or hydronephrosis. The left kidney is normal in size, measuring 1.5 cm in length. Increased left renal cortical echogenicity. No solid or cystic left renal mass, hydronephrosis, or calculi. Only catheter is present within the nondistended bladder. Partially visualized right pleural effusion. 1. Increased left and right renal cortical echogenicity, suggestive of chronic medical renal disease. No obstructive uropathy or acute sonographic abnormality.  2. Unchanged solid right lower pole renal mass compared to recent studies, increased in size compared to more remote exams, almost certainly representing renal cell carcinoma. XR CHEST PORT    Result Date: 12/29/2021  EXAM: Chest radiograph  CLINICAL INDICATION/HISTORY: Dyspnea    --Additional history: None. COMPARISON: 12/11/2021 TECHNIQUE: Frontal view of the chest _______________ FINDINGS: SUPPORT DEVICES: None. HEART AND MEDIASTINUM: Cardiac silhouette is normal in size. Normal mediastinal contours cortical arch atherosclerosis. Normal pulmonary vasculature. LUNGS AND PLEURAL SPACES: No solid right basilar opacity. Left lung appears clear. Sharp costophrenic sulci. No pneumothoraces. BONY THORAX AND SOFT TISSUES: Unremarkable. _______________     Right basilar atelectasis/pneumonia. XR CHEST PORT    Result Date: 12/11/2021  CLINICAL: Chest pain. COMPARISON: December 5, 2021. A portable view of the chest from 0356 hours: Skin folds over the right chest. No focal airspace density. Pleural spaces are clear. No acute process. XR CHEST PORT    Result Date: 12/5/2021  EXAM:  AP Portable Chest X-ray 1 view INDICATION: Fatigue COMPARISON: November 26, 2021 _______________ FINDINGS:  Heart and mediastinal contours are within normal limits for portable radiograph. There is new increased density in the left perihilar region. This may represent a confluence of shadows. Recommend PA and lateral radiograph for further evaluation. There are no pleural effusions. No acute osseous findings. ________________      New density in the left perihilar region. Advise PA and lateral chest x-ray for further evaluation. No results found for this or any previous visit.         CC: Erin Roldan MD

## 2021-12-31 NOTE — ROUTINE PROCESS
TRANSFER - OUT REPORT:    Verbal report given to ICU nurse  on Soraya Olguin  being transferred to [facility]) for routine progression of care at patients bedside in the ED. Report consisted of patients Situation, Background, Assessment and   Recommendations(SBAR). Information from the following report(s) SBAR, ED Summary, Intake/Output, MAR, Recent Results and Med Rec Status was reviewed with the receiving nurse. Lines:   Peripheral IV 12/29/21 Left Hand (Active)   Site Assessment Clean, dry, & intact 12/29/21 1711   Phlebitis Assessment 0 12/29/21 1711   Infiltration Assessment 0 12/29/21 1711   Dressing Status Clean, dry, & intact 12/29/21 1711   Dressing Type Transparent 12/29/21 1711       Peripheral IV 33/17/76 Right Basilic (Active)       Peripheral IV 12/29/21 Left Antecubital (Active)   Site Assessment Clean, dry, & intact 12/29/21 2259   Phlebitis Assessment 0 12/29/21 2259   Infiltration Assessment 0 12/29/21 2259   Dressing Status Clean, dry, & intact 12/29/21 2259   Dressing Type Transparent 12/29/21 2259   Hub Color/Line Status Flushed 12/29/21 2259   Alcohol Cap Used Yes 12/29/21 2259        Opportunity for questions and clarification was provided.       Patient transported with:   Monitor  O2 @ high flow liters  Registered Nurse

## 2022-01-01 LAB
BACTERIA SPEC CULT: ABNORMAL
BACTERIA SPEC CULT: ABNORMAL
CC UR VC: ABNORMAL
SERVICE CMNT-IMP: ABNORMAL

## 2022-01-02 LAB
ATRIAL RATE: 109 BPM
ATRIAL RATE: 116 BPM
ATRIAL RATE: 92 BPM
CALCULATED P AXIS, ECG09: 57 DEGREES
CALCULATED P AXIS, ECG09: 63 DEGREES
CALCULATED P AXIS, ECG09: 74 DEGREES
CALCULATED R AXIS, ECG10: -67 DEGREES
CALCULATED R AXIS, ECG10: -70 DEGREES
CALCULATED R AXIS, ECG10: -81 DEGREES
CALCULATED T AXIS, ECG11: 68 DEGREES
CALCULATED T AXIS, ECG11: 80 DEGREES
CALCULATED T AXIS, ECG11: 83 DEGREES
DIAGNOSIS, 93000: NORMAL
P-R INTERVAL, ECG05: 124 MS
P-R INTERVAL, ECG05: 128 MS
P-R INTERVAL, ECG05: 146 MS
Q-T INTERVAL, ECG07: 342 MS
Q-T INTERVAL, ECG07: 372 MS
Q-T INTERVAL, ECG07: 386 MS
QRS DURATION, ECG06: 86 MS
QRS DURATION, ECG06: 94 MS
QRS DURATION, ECG06: 98 MS
QTC CALCULATION (BEZET), ECG08: 475 MS
QTC CALCULATION (BEZET), ECG08: 477 MS
QTC CALCULATION (BEZET), ECG08: 500 MS
VENTRICULAR RATE, ECG03: 109 BPM
VENTRICULAR RATE, ECG03: 116 BPM
VENTRICULAR RATE, ECG03: 92 BPM

## 2022-01-03 NOTE — PROGRESS NOTES
EMR reviewed. Patient noted to be in restraints within 24 hours of the time of death.  Information entered into internal log on: 01/03/22 @ 23 [id number]

## 2022-01-04 LAB
BACTERIA SPEC CULT: NORMAL
SERVICE CMNT-IMP: NORMAL

## 2024-06-04 NOTE — PROGRESS NOTES
Pt alert and awake with no c/o pain or distress. Assessment complete. Call light in reach, safety and comfort measures are in place. [FreeTextEntry1] : IMP  Severe COPD Restrictive/ fibrotic component No evidence of diaphragmatic issue Persistent COUGH  Anxiety MI Cerebral aneurysm Poor outlook - we discussed risks of sedating agents - patient wants relief much more than she is concerned with mortality.   Plan  Breztri Prednisone Alprazolam and Hycodan prn One month FU

## 2024-11-22 NOTE — PROGRESS NOTES
Airway    Performed by: Isaac Faustin  Authorized by: Eli Vegas MD    Final Airway Type:  Endotracheal airway  Final Endotracheal Airway*:  ETT  ETT Size (mm)*:  7.0  Cuff*:  Regular  Technique Used for Successful ETT Placement:  Direct laryngoscopy  Devices/Methods Used in Placement*:  Mask  Intubation Procedure*:  Preoxygenation, ETCO2, Atraumatic, Dentition Unchanged and Pharynx Clear  Insertion Site:  Oral  Blade Type*:  MAC  Blade Size*:  3  Placement Verified by: auscultation and capnometry    Glottic View*:  1 - full view of glottis  Attempts*:  1  Location:  OR  Urgency:  Elective  Difficult Airway: No    Indications for Airway Management:  Anesthesia  Mask Difficulty Assessment:  1 - vent by mask  Start Time: 11/22/2024 11:59 AM       Bereavement Note:     responded to the death of Tana Boxer, who is a 78 y.o., male, offering Spiritual Care to patient and family, see flow sheets for interventions.  called wife and offered her support via the phone. No  home is in place [place] [facility]  did education to call the Nursing Supervisior once they have selected a FH. Date of Death: 23  Extended Emergency Contact Information  Primary Emergency Contact: Elnaa Riddle  Address: [address] [place] [address] Phone: [phone]  Mobile Phone: [phone]  Relation: Spouse                 YES      NO  UNKNOWN  Life Net   []        [x]    []   [employer]   [] [x] []   Medical Examiner  []        [x]  []   Going to [employer]  [x]        [] []      Autopsy   []        [x]         []   Sympathy Card  []        [x]  Bereavement Materials  []        [x]           Business Card Provided  []        [x]              Home: TBD     Chaplains will continue to follow family and will provide spiritual care as needed. Rev. Osmel Roldan.  Jakob Corral, [facility] [phone]  Pager :[phone]

## (undated) DEVICE — MICROPUNCTURE INTRODUCER SET SILHOUETTE TRANSITIONLESS WITH NITINOL WIRE GUIDE: Brand: MICROPUNCTURE

## (undated) DEVICE — STOPCOCK TRNSDUC 500PSI 3 W ROT M LUER LT BLU OFF HNDL R

## (undated) DEVICE — MICROSURGICAL INSTRUMENT HYDRODISSECTION CANNULA 27GA, 1.57MM BEND (AKAHOSHI STYLE): Brand: ALCON

## (undated) DEVICE — SOL IRRIGATION INJ NACL 0.9% 500ML BTL

## (undated) DEVICE — CATH IV AUTOGRD ORN 14GA 45MM -- INSYTE-N

## (undated) DEVICE — PLUS HANDPIECE WITH SPRAY TIP: Brand: SURGILAV

## (undated) DEVICE — (D)PREP SKN CHLRAPRP APPL 26ML -- CONVERT TO ITEM 371833

## (undated) DEVICE — SOLUTION IRRIG 1000ML H2O STRL BLT

## (undated) DEVICE — PINNACLE INTRODUCER SHEATH: Brand: PINNACLE

## (undated) DEVICE — STERILE MEDICINE CUP 2 OZ KIT: Brand: CARDINAL HEALTH

## (undated) DEVICE — PACK PROCEDURE SURG CATH CUST

## (undated) DEVICE — SUTURE VCRL + SZ 2-0 L18IN ABSRB VLT CT-2 1/2 CIR TAPERCUT VCP726D

## (undated) DEVICE — SATINSLIT® KNIFE 2.75MM ANGLED: Brand: SATINSLIT®

## (undated) DEVICE — 1010 S-DRAPE TOWEL DRAPE 10/BX: Brand: STERI-DRAPE™

## (undated) DEVICE — SUTURE STRATAFIX SZ 3-0 L30CM NONABSORBABLE UD L19MM FS-2 SXMP2B408

## (undated) DEVICE — DRAPE,ANGIO,BRACH,STERILE,38X44: Brand: MEDLINE

## (undated) DEVICE — SHIELD RAD 14X16 IN W/ SCOOP ABSORBER

## (undated) DEVICE — BOWL ASSY BM210 DUAL BLADE DISPOSABLE: Brand: MIDAS REX™

## (undated) DEVICE — SWAN-GANZ POLYMER BLEND TRUE SIZE C-TIP CONTROLCATH TD CATHETER: Brand: SWAN-GANZ CONTROLCATH TRUE SIZE

## (undated) DEVICE — SYRINGE BLB 50CC IRRIG PLIABLE FNGR FLNG GRAD FLSK DISP

## (undated) DEVICE — SYR LR LCK 1ML GRAD NSAF 30ML --

## (undated) DEVICE — SENSOR PLSE OXMTR AD CBL L36IN ADH FRM FIT SPO2 DISP

## (undated) DEVICE — SUTURE VCRL + SZ 1 L18IN ABSRB UD L36MM CT-1 1/2 CIR VCP841D

## (undated) DEVICE — KIT POS W/ FOAM ARM CRADL SHEARGUARD CHST PD CVR FOR SPNL

## (undated) DEVICE — SYR 5ML 1/5 GRAD LL NSAF LF --

## (undated) DEVICE — STERILE POLYISOPRENE POWDER-FREE SURGICAL GLOVES: Brand: PROTEXIS

## (undated) DEVICE — Device

## (undated) DEVICE — SOLUTION IRRIGATION BAL SALT SOLUTION 500 ML STRL BSS

## (undated) DEVICE — STRAP,POSITIONING,KNEE/BODY,FOAM,4X60": Brand: MEDLINE

## (undated) DEVICE — GAUZE,SPONGE,8"X4",12PLY,XRAY,STRL,LF: Brand: MEDLINE

## (undated) DEVICE — KENDALL SCD EXPRESS SLEEVES, KNEE LENGTH, MEDIUM: Brand: KENDALL SCD

## (undated) DEVICE — SYR 3ML LL TIP 1/10ML GRAD --

## (undated) DEVICE — SOLUTION IRRIG 3000ML LAC R FLX CONT

## (undated) DEVICE — TUBING PRSS MON L24IN PVC RIG NONEXPANDING M TO FEM CONN

## (undated) DEVICE — DRAIN KT WND 10FR RND 400ML --

## (undated) DEVICE — PRESSURE MONITORING SET: Brand: TRUWAVE

## (undated) DEVICE — 5.0MM X-COARSE DIAMOND

## (undated) DEVICE — 3.0MM PRECISION NEURO (MATCH HEAD)

## (undated) DEVICE — SINGLE PORT MANIFOLD: Brand: NEPTUNE 2

## (undated) DEVICE — REM POLYHESIVE ADULT PATIENT RETURN ELECTRODE: Brand: VALLEYLAB

## (undated) DEVICE — STERILE POLYISOPRENE POWDER-FREE SURGICAL GLOVES WITH EMOLLIENT COATING: Brand: PROTEXIS

## (undated) DEVICE — NEEDLE HYPO 18GA L1.5IN PNK POLYPR HUB S STL THN WALL FILL

## (undated) DEVICE — PACK PROCEDURE SURG LAMINECTOMY SPINE CUST

## (undated) DEVICE — NEEDLE BX 11GA L101MM BNE MAR ASPIR W/ ADPT JAMSH

## (undated) DEVICE — 3M(TM) TEGADERM(TM) TRANSPARENT FILM DRESSING FRAME STYLE 9505W: Brand: 3M™ TEGADERM™

## (undated) DEVICE — PROCEDURE KIT FLUID MGMT 10 FR CUST MAINFOLD

## (undated) DEVICE — TRAY CATH 16FR DRN BG LF -- CONVERT TO ITEM 363158

## (undated) DEVICE — SYSTEM SKIN CLSR 22CM DERMBND PRINEO

## (undated) DEVICE — DECANTING SET

## (undated) DEVICE — Z DISCONTINUED USE (MFG CAT 7984-37) SOLUTION IV SODIUM CHL 0.9% 100 ML INJ

## (undated) DEVICE — DISPOSABLE HARDY BAYONET INSULATED BIPOLAR FORCEPS: Brand: INTEGRA® JARIT®

## (undated) DEVICE — NDL SPNE QNCKE 18GX3.5IN LF --

## (undated) DEVICE — CANN VISCOFLOW FRM 27G 22MM --

## (undated) DEVICE — DRESSING FOAM SELF ADH 20X10 CM ABSORBENT MEPILEX BORDER